# Patient Record
Sex: FEMALE | Race: BLACK OR AFRICAN AMERICAN
[De-identification: names, ages, dates, MRNs, and addresses within clinical notes are randomized per-mention and may not be internally consistent; named-entity substitution may affect disease eponyms.]

---

## 2019-01-18 ENCOUNTER — HOSPITAL ENCOUNTER (EMERGENCY)
Dept: HOSPITAL 87 - ER | Age: 49
LOS: 1 days | Discharge: HOME | End: 2019-01-19
Payer: MEDICAID

## 2019-01-18 VITALS — WEIGHT: 134.48 LBS | BODY MASS INDEX: 22.96 KG/M2 | HEIGHT: 64 IN

## 2019-01-18 DIAGNOSIS — F17.200: ICD-10-CM

## 2019-01-18 DIAGNOSIS — Z59.0: ICD-10-CM

## 2019-01-18 DIAGNOSIS — K64.9: ICD-10-CM

## 2019-01-18 DIAGNOSIS — K62.89: Primary | ICD-10-CM

## 2019-01-18 DIAGNOSIS — F20.9: ICD-10-CM

## 2019-01-18 PROCEDURE — 99283 EMERGENCY DEPT VISIT LOW MDM: CPT

## 2019-01-18 SDOH — ECONOMIC STABILITY - HOUSING INSECURITY: HOMELESSNESS: Z59.0

## 2019-01-19 VITALS — DIASTOLIC BLOOD PRESSURE: 76 MMHG | SYSTOLIC BLOOD PRESSURE: 126 MMHG

## 2022-11-13 ENCOUNTER — APPOINTMENT (OUTPATIENT)
Dept: RADIOLOGY | Facility: MEDICAL CENTER | Age: 52
DRG: 923 | End: 2022-11-13
Attending: EMERGENCY MEDICINE
Payer: MEDICAID

## 2022-11-13 ENCOUNTER — APPOINTMENT (OUTPATIENT)
Dept: RADIOLOGY | Facility: MEDICAL CENTER | Age: 52
DRG: 923 | End: 2022-11-13
Attending: INTERNAL MEDICINE
Payer: MEDICAID

## 2022-11-13 ENCOUNTER — HOSPITAL ENCOUNTER (INPATIENT)
Facility: MEDICAL CENTER | Age: 52
LOS: 6 days | DRG: 923 | End: 2022-11-20
Attending: EMERGENCY MEDICINE | Admitting: INTERNAL MEDICINE
Payer: MEDICAID

## 2022-11-13 DIAGNOSIS — T33.821A FROSTBITE OF RIGHT FOOT, INITIAL ENCOUNTER: ICD-10-CM

## 2022-11-13 DIAGNOSIS — F20.9 SCHIZOPHRENIA, UNSPECIFIED TYPE (HCC): ICD-10-CM

## 2022-11-13 DIAGNOSIS — T33.822D FROSTBITE OF BOTH FEET, SUBSEQUENT ENCOUNTER: ICD-10-CM
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
## 2022-11-13 DIAGNOSIS — R78.81 BACTEREMIA: ICD-10-CM

## 2022-11-13 DIAGNOSIS — Z21 ASYMPTOMATIC HIV INFECTION, WITH NO HISTORY OF HIV-RELATED ILLNESS (HCC): ICD-10-CM

## 2022-11-13 DIAGNOSIS — T33.821D FROSTBITE OF BOTH FEET, SUBSEQUENT ENCOUNTER: ICD-10-CM

## 2022-11-13 DIAGNOSIS — R10.84 GENERALIZED ABDOMINAL PAIN: ICD-10-CM

## 2022-11-13 PROBLEM — Z72.0 TOBACCO ABUSE: Status: ACTIVE | Noted: 2022-11-13

## 2022-11-13 PROBLEM — R23.8 BULLAE: Status: ACTIVE | Noted: 2022-11-13

## 2022-11-13 PROBLEM — N94.89 ENDOMETRIAL MASS: Status: ACTIVE | Noted: 2022-11-13

## 2022-11-13 PROBLEM — R10.13 EPIGASTRIC PAIN: Status: ACTIVE | Noted: 2022-11-13

## 2022-11-13 PROBLEM — E87.6 HYPOKALEMIA: Status: ACTIVE | Noted: 2022-11-13

## 2022-11-13 PROBLEM — R74.01 ELEVATED LIVER TRANSAMINASE LEVEL: Status: ACTIVE | Noted: 2022-11-13

## 2022-11-13 LAB
ALBUMIN SERPL BCP-MCNC: 3.9 G/DL (ref 3.2–4.9)
ALBUMIN/GLOB SERPL: 1 G/DL
ALP SERPL-CCNC: 87 U/L (ref 30–99)
ALT SERPL-CCNC: 51 U/L (ref 2–50)
ANION GAP SERPL CALC-SCNC: 12 MMOL/L (ref 7–16)
AST SERPL-CCNC: 81 U/L (ref 12–45)
BASOPHILS # BLD AUTO: 1.1 % (ref 0–1.8)
BASOPHILS # BLD: 0.11 K/UL (ref 0–0.12)
BILIRUB SERPL-MCNC: 0.5 MG/DL (ref 0.1–1.5)
BUN SERPL-MCNC: 23 MG/DL (ref 8–22)
CALCIUM SERPL-MCNC: 9.4 MG/DL (ref 8.5–10.5)
CANCER AG125 SERPL-ACNC: 12.3 U/ML (ref 0–35)
CHLORIDE SERPL-SCNC: 96 MMOL/L (ref 96–112)
CO2 SERPL-SCNC: 27 MMOL/L (ref 20–33)
CREAT SERPL-MCNC: 1 MG/DL (ref 0.5–1.4)
EKG IMPRESSION: NORMAL
EOSINOPHIL # BLD AUTO: 0.09 K/UL (ref 0–0.51)
EOSINOPHIL NFR BLD: 0.9 % (ref 0–6.9)
ERYTHROCYTE [DISTWIDTH] IN BLOOD BY AUTOMATED COUNT: 39.6 FL (ref 35.9–50)
ETHANOL BLD-MCNC: <10.1 MG/DL
GFR SERPLBLD CREATININE-BSD FMLA CKD-EPI: 67 ML/MIN/1.73 M 2
GLOBULIN SER CALC-MCNC: 4.1 G/DL (ref 1.9–3.5)
GLUCOSE SERPL-MCNC: 113 MG/DL (ref 65–99)
HCT VFR BLD AUTO: 38.5 % (ref 37–47)
HGB BLD-MCNC: 12.9 G/DL (ref 12–16)
IMM GRANULOCYTES # BLD AUTO: 0.05 K/UL (ref 0–0.11)
IMM GRANULOCYTES NFR BLD AUTO: 0.5 % (ref 0–0.9)
LACTATE SERPL-SCNC: 1.4 MMOL/L (ref 0.5–2)
LACTATE SERPL-SCNC: 1.6 MMOL/L (ref 0.5–2)
LIPASE SERPL-CCNC: 20 U/L (ref 11–82)
LYMPHOCYTES # BLD AUTO: 1.47 K/UL (ref 1–4.8)
LYMPHOCYTES NFR BLD: 14.4 % (ref 22–41)
MCH RBC QN AUTO: 29.9 PG (ref 27–33)
MCHC RBC AUTO-ENTMCNC: 33.5 G/DL (ref 33.6–35)
MCV RBC AUTO: 89.1 FL (ref 81.4–97.8)
MONOCYTES # BLD AUTO: 1.05 K/UL (ref 0–0.85)
MONOCYTES NFR BLD AUTO: 10.3 % (ref 0–13.4)
NEUTROPHILS # BLD AUTO: 7.46 K/UL (ref 2–7.15)
NEUTROPHILS NFR BLD: 72.8 % (ref 44–72)
NRBC # BLD AUTO: 0 K/UL
NRBC BLD-RTO: 0 /100 WBC
PLATELET # BLD AUTO: 356 K/UL (ref 164–446)
PMV BLD AUTO: 9.6 FL (ref 9–12.9)
POTASSIUM SERPL-SCNC: 3.3 MMOL/L (ref 3.6–5.5)
PROT SERPL-MCNC: 8 G/DL (ref 6–8.2)
RBC # BLD AUTO: 4.32 M/UL (ref 4.2–5.4)
SODIUM SERPL-SCNC: 135 MMOL/L (ref 135–145)
TROPONIN T SERPL-MCNC: 27 NG/L (ref 6–19)
WBC # BLD AUTO: 10.2 K/UL (ref 4.8–10.8)

## 2022-11-13 PROCEDURE — 96376 TX/PRO/DX INJ SAME DRUG ADON: CPT

## 2022-11-13 PROCEDURE — 90471 IMMUNIZATION ADMIN: CPT

## 2022-11-13 PROCEDURE — 84484 ASSAY OF TROPONIN QUANT: CPT

## 2022-11-13 PROCEDURE — 93926 LOWER EXTREMITY STUDY: CPT | Mod: RT

## 2022-11-13 PROCEDURE — 96374 THER/PROPH/DIAG INJ IV PUSH: CPT

## 2022-11-13 PROCEDURE — 93005 ELECTROCARDIOGRAM TRACING: CPT | Performed by: EMERGENCY MEDICINE

## 2022-11-13 PROCEDURE — A9270 NON-COVERED ITEM OR SERVICE: HCPCS | Performed by: INTERNAL MEDICINE

## 2022-11-13 PROCEDURE — 80053 COMPREHEN METABOLIC PANEL: CPT

## 2022-11-13 PROCEDURE — 82077 ASSAY SPEC XCP UR&BREATH IA: CPT

## 2022-11-13 PROCEDURE — 700105 HCHG RX REV CODE 258: Performed by: INTERNAL MEDICINE

## 2022-11-13 PROCEDURE — 94760 N-INVAS EAR/PLS OXIMETRY 1: CPT

## 2022-11-13 PROCEDURE — 99285 EMERGENCY DEPT VISIT HI MDM: CPT

## 2022-11-13 PROCEDURE — 99220 PR INITIAL OBSERVATION CARE,LEVL III: CPT | Mod: 25 | Performed by: INTERNAL MEDICINE

## 2022-11-13 PROCEDURE — 71045 X-RAY EXAM CHEST 1 VIEW: CPT

## 2022-11-13 PROCEDURE — 87077 CULTURE AEROBIC IDENTIFY: CPT

## 2022-11-13 PROCEDURE — 700102 HCHG RX REV CODE 250 W/ 637 OVERRIDE(OP): Performed by: INTERNAL MEDICINE

## 2022-11-13 PROCEDURE — 99406 BEHAV CHNG SMOKING 3-10 MIN: CPT | Performed by: INTERNAL MEDICINE

## 2022-11-13 PROCEDURE — 700111 HCHG RX REV CODE 636 W/ 250 OVERRIDE (IP): Performed by: INTERNAL MEDICINE

## 2022-11-13 PROCEDURE — 83605 ASSAY OF LACTIC ACID: CPT

## 2022-11-13 PROCEDURE — 36415 COLL VENOUS BLD VENIPUNCTURE: CPT

## 2022-11-13 PROCEDURE — 99406 BEHAV CHNG SMOKING 3-10 MIN: CPT

## 2022-11-13 PROCEDURE — 86304 IMMUNOASSAY TUMOR CA 125: CPT

## 2022-11-13 PROCEDURE — 96375 TX/PRO/DX INJ NEW DRUG ADDON: CPT

## 2022-11-13 PROCEDURE — 90715 TDAP VACCINE 7 YRS/> IM: CPT | Performed by: EMERGENCY MEDICINE

## 2022-11-13 PROCEDURE — 76856 US EXAM PELVIC COMPLETE: CPT

## 2022-11-13 PROCEDURE — 83690 ASSAY OF LIPASE: CPT

## 2022-11-13 PROCEDURE — 87186 SC STD MICRODIL/AGAR DIL: CPT

## 2022-11-13 PROCEDURE — 93922 UPR/L XTREMITY ART 2 LEVELS: CPT

## 2022-11-13 PROCEDURE — 85025 COMPLETE CBC W/AUTO DIFF WBC: CPT

## 2022-11-13 PROCEDURE — 87040 BLOOD CULTURE FOR BACTERIA: CPT

## 2022-11-13 PROCEDURE — 700111 HCHG RX REV CODE 636 W/ 250 OVERRIDE (IP): Performed by: EMERGENCY MEDICINE

## 2022-11-13 PROCEDURE — G0378 HOSPITAL OBSERVATION PER HR: HCPCS

## 2022-11-13 PROCEDURE — 74177 CT ABD & PELVIS W/CONTRAST: CPT

## 2022-11-13 PROCEDURE — 96365 THER/PROPH/DIAG IV INF INIT: CPT

## 2022-11-13 PROCEDURE — 700117 HCHG RX CONTRAST REV CODE 255: Performed by: EMERGENCY MEDICINE

## 2022-11-13 RX ORDER — POTASSIUM CHLORIDE 20 MEQ/1
40 TABLET, EXTENDED RELEASE ORAL ONCE
Status: COMPLETED | OUTPATIENT
Start: 2022-11-13 | End: 2022-11-13

## 2022-11-13 RX ORDER — HYDROMORPHONE HYDROCHLORIDE 1 MG/ML
0.5 INJECTION, SOLUTION INTRAMUSCULAR; INTRAVENOUS; SUBCUTANEOUS
Status: COMPLETED | OUTPATIENT
Start: 2022-11-13 | End: 2022-11-13

## 2022-11-13 RX ORDER — PROCHLORPERAZINE EDISYLATE 5 MG/ML
5-10 INJECTION INTRAMUSCULAR; INTRAVENOUS EVERY 4 HOURS PRN
Status: DISCONTINUED | OUTPATIENT
Start: 2022-11-13 | End: 2022-11-20 | Stop reason: HOSPADM

## 2022-11-13 RX ORDER — POLYETHYLENE GLYCOL 3350 17 G/17G
1 POWDER, FOR SOLUTION ORAL
Status: DISCONTINUED | OUTPATIENT
Start: 2022-11-13 | End: 2022-11-20 | Stop reason: HOSPADM

## 2022-11-13 RX ORDER — ONDANSETRON 2 MG/ML
4 INJECTION INTRAMUSCULAR; INTRAVENOUS EVERY 4 HOURS PRN
Status: DISCONTINUED | OUTPATIENT
Start: 2022-11-13 | End: 2022-11-20 | Stop reason: HOSPADM

## 2022-11-13 RX ORDER — ONDANSETRON 4 MG/1
4 TABLET, ORALLY DISINTEGRATING ORAL EVERY 4 HOURS PRN
Status: DISCONTINUED | OUTPATIENT
Start: 2022-11-13 | End: 2022-11-20 | Stop reason: HOSPADM

## 2022-11-13 RX ORDER — HYDROMORPHONE HYDROCHLORIDE 1 MG/ML
0.25 INJECTION, SOLUTION INTRAMUSCULAR; INTRAVENOUS; SUBCUTANEOUS
Status: DISCONTINUED | OUTPATIENT
Start: 2022-11-13 | End: 2022-11-20 | Stop reason: HOSPADM

## 2022-11-13 RX ORDER — ACETAMINOPHEN 325 MG/1
650 TABLET ORAL EVERY 6 HOURS PRN
Status: DISCONTINUED | OUTPATIENT
Start: 2022-11-13 | End: 2022-11-20 | Stop reason: HOSPADM

## 2022-11-13 RX ORDER — AMOXICILLIN 250 MG
2 CAPSULE ORAL 2 TIMES DAILY
Status: DISCONTINUED | OUTPATIENT
Start: 2022-11-13 | End: 2022-11-20 | Stop reason: HOSPADM

## 2022-11-13 RX ORDER — OXYCODONE HYDROCHLORIDE 5 MG/1
2.5 TABLET ORAL
Status: DISCONTINUED | OUTPATIENT
Start: 2022-11-13 | End: 2022-11-20 | Stop reason: HOSPADM

## 2022-11-13 RX ORDER — ONDANSETRON 2 MG/ML
4 INJECTION INTRAMUSCULAR; INTRAVENOUS ONCE
Status: COMPLETED | OUTPATIENT
Start: 2022-11-13 | End: 2022-11-13

## 2022-11-13 RX ORDER — BISACODYL 10 MG
10 SUPPOSITORY, RECTAL RECTAL
Status: DISCONTINUED | OUTPATIENT
Start: 2022-11-13 | End: 2022-11-20 | Stop reason: HOSPADM

## 2022-11-13 RX ORDER — PROMETHAZINE HYDROCHLORIDE 25 MG/1
12.5-25 SUPPOSITORY RECTAL EVERY 4 HOURS PRN
Status: DISCONTINUED | OUTPATIENT
Start: 2022-11-13 | End: 2022-11-20 | Stop reason: HOSPADM

## 2022-11-13 RX ORDER — PROMETHAZINE HYDROCHLORIDE 25 MG/1
12.5-25 TABLET ORAL EVERY 4 HOURS PRN
Status: DISCONTINUED | OUTPATIENT
Start: 2022-11-13 | End: 2022-11-20 | Stop reason: HOSPADM

## 2022-11-13 RX ORDER — CEFAZOLIN 2 G/1
2 INJECTION, POWDER, FOR SOLUTION INTRAMUSCULAR; INTRAVENOUS ONCE
Status: COMPLETED | OUTPATIENT
Start: 2022-11-13 | End: 2022-11-13

## 2022-11-13 RX ORDER — OXYCODONE HYDROCHLORIDE 5 MG/1
5 TABLET ORAL
Status: DISCONTINUED | OUTPATIENT
Start: 2022-11-13 | End: 2022-11-20 | Stop reason: HOSPADM

## 2022-11-13 RX ADMIN — HYDROMORPHONE HYDROCHLORIDE 0.5 MG: 1 INJECTION, SOLUTION INTRAMUSCULAR; INTRAVENOUS; SUBCUTANEOUS at 09:18

## 2022-11-13 RX ADMIN — AMPICILLIN AND SULBACTAM 3 G: 1; 2 INJECTION, POWDER, FOR SOLUTION INTRAMUSCULAR; INTRAVENOUS at 23:04

## 2022-11-13 RX ADMIN — RIVAROXABAN 10 MG: 10 TABLET, FILM COATED ORAL at 17:35

## 2022-11-13 RX ADMIN — HYDROMORPHONE HYDROCHLORIDE 0.5 MG: 1 INJECTION, SOLUTION INTRAMUSCULAR; INTRAVENOUS; SUBCUTANEOUS at 07:14

## 2022-11-13 RX ADMIN — ONDANSETRON 4 MG: 2 INJECTION INTRAMUSCULAR; INTRAVENOUS at 07:14

## 2022-11-13 RX ADMIN — AMPICILLIN AND SULBACTAM 3 G: 1; 2 INJECTION, POWDER, FOR SOLUTION INTRAMUSCULAR; INTRAVENOUS at 17:38

## 2022-11-13 RX ADMIN — SENNOSIDES AND DOCUSATE SODIUM 2 TABLET: 50; 8.6 TABLET ORAL at 17:35

## 2022-11-13 RX ADMIN — CLOSTRIDIUM TETANI TOXOID ANTIGEN (FORMALDEHYDE INACTIVATED), CORYNEBACTERIUM DIPHTHERIAE TOXOID ANTIGEN (FORMALDEHYDE INACTIVATED), BORDETELLA PERTUSSIS TOXOID ANTIGEN (GLUTARALDEHYDE INACTIVATED), BORDETELLA PERTUSSIS FILAMENTOUS HEMAGGLUTININ ANTIGEN (FORMALDEHYDE INACTIVATED), BORDETELLA PERTUSSIS PERTACTIN ANTIGEN, AND BORDETELLA PERTUSSIS FIMBRIAE 2/3 ANTIGEN 0.5 ML: 5; 2; 2.5; 5; 3; 5 INJECTION, SUSPENSION INTRAMUSCULAR at 07:14

## 2022-11-13 RX ADMIN — IOHEXOL 100 ML: 350 INJECTION, SOLUTION INTRAVENOUS at 07:49

## 2022-11-13 RX ADMIN — POTASSIUM CHLORIDE 40 MEQ: 1500 TABLET, EXTENDED RELEASE ORAL at 12:55

## 2022-11-13 RX ADMIN — CEFAZOLIN 2 G: 2 INJECTION, POWDER, FOR SOLUTION INTRAMUSCULAR; INTRAVENOUS at 07:15

## 2022-11-13 RX ADMIN — HYDROMORPHONE HYDROCHLORIDE 0.5 MG: 1 INJECTION, SOLUTION INTRAMUSCULAR; INTRAVENOUS; SUBCUTANEOUS at 19:24

## 2022-11-13 RX ADMIN — SENNOSIDES AND DOCUSATE SODIUM 2 TABLET: 50; 8.6 TABLET ORAL at 12:55

## 2022-11-13 RX ADMIN — AMPICILLIN AND SULBACTAM 3 G: 1; 2 INJECTION, POWDER, FOR SOLUTION INTRAMUSCULAR; INTRAVENOUS at 12:48

## 2022-11-13 ASSESSMENT — ENCOUNTER SYMPTOMS
ABDOMINAL PAIN: 1
NERVOUS/ANXIOUS: 0
COUGH: 0
FEVER: 0
SORE THROAT: 0
VOMITING: 0
BLURRED VISION: 0
DOUBLE VISION: 0
FALLS: 0
SHORTNESS OF BREATH: 0
PALPITATIONS: 0
BACK PAIN: 0
HALLUCINATIONS: 0
DIARRHEA: 1
CHILLS: 0
SEIZURES: 0
LOSS OF CONSCIOUSNESS: 0
NAUSEA: 0

## 2022-11-13 ASSESSMENT — PATIENT HEALTH QUESTIONNAIRE - PHQ9
1. LITTLE INTEREST OR PLEASURE IN DOING THINGS: NOT AT ALL
SUM OF ALL RESPONSES TO PHQ9 QUESTIONS 1 AND 2: 0
2. FEELING DOWN, DEPRESSED, IRRITABLE, OR HOPELESS: NOT AT ALL
SUM OF ALL RESPONSES TO PHQ9 QUESTIONS 1 AND 2: 0
2. FEELING DOWN, DEPRESSED, IRRITABLE, OR HOPELESS: NOT AT ALL
1. LITTLE INTEREST OR PLEASURE IN DOING THINGS: NOT AT ALL

## 2022-11-13 ASSESSMENT — LIFESTYLE VARIABLES
TOTAL SCORE: 0
HAVE YOU EVER FELT YOU SHOULD CUT DOWN ON YOUR DRINKING: NO
TOTAL SCORE: 0
CONSUMPTION TOTAL: NEGATIVE
EVER FELT BAD OR GUILTY ABOUT YOUR DRINKING: NO
ALCOHOL_USE: NO
TOTAL SCORE: 0
EVER HAD A DRINK FIRST THING IN THE MORNING TO STEADY YOUR NERVES TO GET RID OF A HANGOVER: NO
HAVE PEOPLE ANNOYED YOU BY CRITICIZING YOUR DRINKING: NO
DOES PATIENT WANT TO STOP DRINKING: NO
HOW MANY TIMES IN THE PAST YEAR HAVE YOU HAD 5 OR MORE DRINKS IN A DAY: 0
AVERAGE NUMBER OF DAYS PER WEEK YOU HAVE A DRINK CONTAINING ALCOHOL: 0
ON A TYPICAL DAY WHEN YOU DRINK ALCOHOL HOW MANY DRINKS DO YOU HAVE: 0

## 2022-11-13 ASSESSMENT — PAIN DESCRIPTION - PAIN TYPE
TYPE: ACUTE PAIN

## 2022-11-13 ASSESSMENT — FIBROSIS 4 INDEX: FIB4 SCORE: 1.72

## 2022-11-13 NOTE — ED PROVIDER NOTES
"ED Provider Note    Scribed for Elvin Jay M.D. by Barber Daley. 11/13/2022  6:22 AM    Primary care provider: No primary care provider on file.  Means of arrival: EMS  History obtained from: Patient  History limited by: None    CHIEF COMPLAINT  Chief Complaint   Patient presents with    Epigastric Pain     X2days, pt states comes and goes      Foot Pain     Pt reports bilat blisters, that have \"doubled in size\"       HPI  Yumiko Chatman is a 52 y.o. female who presents to the Emergency Department for evaluation of constant abdominal pain onset three days ago. She admits to associated symptoms of bilateral pedal blistering, foot pain, diarrhea, headache, chest pain, shortness of breath, and loss of sleep. She denies any leg swelling, pain radiation, fever, diaphoresis, bloody stools, nausea, vomiting. No alleviating factors were reported. Her chest pain is exacerbated by sitting up, lasts for hours at a time, and is a 10/10. She states that whenever she lays down to go to bed, her foot hurts badly describing this as \"pressure\", and this is exacerbated by laying down. She states that she does wear shoes, and is not sure what caused her blistering. She states that she \"just woke up and they were like that\". She denies any history of similar blistering. Patient reports that she was given aspirin upon arrival to the ED today, with moderate effect. Has a history of bipolar disorder and schizophrenia. Patient is prescribed Depakote and Remeron for her sphyciatric disorders, but admits to not taking it anymore recently. She denies a history of hypertension, diabetes, myocardial infarction, stroke, or blood clots. Patient reports being hospitalized in the past for a broken shoulder. She denies any surgical history. Patient denies any known allergies. She denies any alcohol or recreational drug use, but admits to smoking tobacco. Patient lives in a shelter at this time. She denies any abdominal surgical history. She " "denies any visual or auditory hallucinations patient is falling in and out of sleep at this time.          REVIEW OF SYSTEMS  Pertinent positives include chest pain, bilateral pedal blistering, foot pain, constipation, headache, abdominal pain, shortness of breath, and loss of sleep. Pertinent negatives include no leg swelling, pain radiation, fever, diaphoresis, diarrhea, nausea, vomiting. All other systems reviewed and negative.    PAST MEDICAL HISTORY   None noted.     SURGICAL HISTORY  patient denies any surgical history    SOCIAL HISTORY  Social History     Tobacco Use    Smoking status: Every Day     Packs/day: 0.25     Types: Cigarettes    Smokeless tobacco: Never   Substance Use Topics    Alcohol use: Never    Drug use: Never      Social History     Substance and Sexual Activity   Drug Use Never       FAMILY HISTORY  History reviewed. No pertinent family history.    CURRENT MEDICATIONS  Home Medications       Reviewed by Bobo Brito (Pharmacy Tech) on 11/13/22 at 0826  Med List Status: Complete     Medication Last Dose Status        Patient Choco Taking any Medications                           ALLERGIES  No Known Allergies    PHYSICAL EXAM  VITAL SIGNS: /80   Pulse 94   Temp 37.2 °C (98.9 °F) (Temporal)   Resp 14   Ht 1.778 m (5' 10\")   Wt 79.4 kg (175 lb)   SpO2 97%   BMI 25.11 kg/m²     Constitutional: Well developed, Well nourished, moderate distress, Non-toxic appearance.   HENT: Normocephalic, Atraumatic, Bilateral external ears normal, Oropharynx dry, No oral exudates.   Eyes: PERRLA, EOMI, Conjunctiva normal, No discharge.   Neck: No tenderness, Supple, No stridor.   Lymphatic: No lymphadenopathy noted.   Cardiovascular: Tachycardic, Normal rhythm.   Thorax & Lungs: Clear to auscultation bilaterally, No respiratory distress, No wheezing, No crackles.   Abdomen: Soft, Diffuse tenderness throughout, No masses, No pulsatile masses.   Skin: Warm, Dry, No erythema, No rash. "   Extremities: Right leg with erythema soft tissue welling from knee distally on first through fifth toes and some plantar aspect of right foot with cyanosis. Large blister over first three toes on the dorsal aspect. 2+ dorsalis pedias pulse on the left, unable to get one on the right proximal pulse is warm.        Musculoskeletal: No tenderness to palpation or major deformities noted.  Intact distal pulses  Neurologic: Awake, alert. Moves all extremities spontaneously.  Psychiatric: Affect normal, Judgment normal, Mood normal.     LABS  Results for orders placed or performed during the hospital encounter of 11/13/22   CBC WITH DIFFERENTIAL   Result Value Ref Range    WBC 10.2 4.8 - 10.8 K/uL    RBC 4.32 4.20 - 5.40 M/uL    Hemoglobin 12.9 12.0 - 16.0 g/dL    Hematocrit 38.5 37.0 - 47.0 %    MCV 89.1 81.4 - 97.8 fL    MCH 29.9 27.0 - 33.0 pg    MCHC 33.5 (L) 33.6 - 35.0 g/dL    RDW 39.6 35.9 - 50.0 fL    Platelet Count 356 164 - 446 K/uL    MPV 9.6 9.0 - 12.9 fL    Neutrophils-Polys 72.80 (H) 44.00 - 72.00 %    Lymphocytes 14.40 (L) 22.00 - 41.00 %    Monocytes 10.30 0.00 - 13.40 %    Eosinophils 0.90 0.00 - 6.90 %    Basophils 1.10 0.00 - 1.80 %    Immature Granulocytes 0.50 0.00 - 0.90 %    Nucleated RBC 0.00 /100 WBC    Neutrophils (Absolute) 7.46 (H) 2.00 - 7.15 K/uL    Lymphs (Absolute) 1.47 1.00 - 4.80 K/uL    Monos (Absolute) 1.05 (H) 0.00 - 0.85 K/uL    Eos (Absolute) 0.09 0.00 - 0.51 K/uL    Baso (Absolute) 0.11 0.00 - 0.12 K/uL    Immature Granulocytes (abs) 0.05 0.00 - 0.11 K/uL    NRBC (Absolute) 0.00 K/uL   COMP METABOLIC PANEL   Result Value Ref Range    Sodium 135 135 - 145 mmol/L    Potassium 3.3 (L) 3.6 - 5.5 mmol/L    Chloride 96 96 - 112 mmol/L    Co2 27 20 - 33 mmol/L    Anion Gap 12.0 7.0 - 16.0    Glucose 113 (H) 65 - 99 mg/dL    Bun 23 (H) 8 - 22 mg/dL    Creatinine 1.00 0.50 - 1.40 mg/dL    Calcium 9.4 8.5 - 10.5 mg/dL    AST(SGOT) 81 (H) 12 - 45 U/L    ALT(SGPT) 51 (H) 2 - 50 U/L    Alkaline  Phosphatase 87 30 - 99 U/L    Total Bilirubin 0.5 0.1 - 1.5 mg/dL    Albumin 3.9 3.2 - 4.9 g/dL    Total Protein 8.0 6.0 - 8.2 g/dL    Globulin 4.1 (H) 1.9 - 3.5 g/dL    A-G Ratio 1.0 g/dL   LIPASE   Result Value Ref Range    Lipase 20 11 - 82 U/L   TROPONIN   Result Value Ref Range    Troponin T 27 (H) 6 - 19 ng/L   LACTIC ACID   Result Value Ref Range    Lactic Acid 1.4 0.5 - 2.0 mmol/L   DIAGNOSTIC ALCOHOL   Result Value Ref Range    Diagnostic Alcohol <10.1 <10.1 mg/dL   ESTIMATED GFR   Result Value Ref Range    GFR (CKD-EPI) 67 >60 mL/min/1.73 m 2   EKG   Result Value Ref Range    Report       [facility] Emergency Dept.    Test Date:  2022  Pt Name:    SALENA MCCONNELL                Department: ER  MRN:        [id number]                      Room:        03  Gender:     Female                       Technician: [id number]  :        1970                   Requested By:ER TRIAGE PROTOCOL  Order #:    [id number]                    Reading MD: LILIYA BHARDWAJ MD    Measurements  Intervals                                Axis  Rate:       94                           P:          77  TN:         118                          QRS:        8  QRSD:       92                           T:          47  QT:         365  QTc:        457    Interpretive Statements  Sinus rhythm  Borderline short TN interval  No previous ECG available for comparison  Electronically Signed On 2022 9:18:31 PST by LILIYA BHARDWAJ MD          RADIOLOGY  US-GISELLA SINGLE LEVEL BILAT         CT-ABDOMEN-PELVIS WITH   Final Result         1. No acute inflammatory change in the abdomen or pelvis.   2. No small bowel obstruction.   3. There is a 2.6 cm enhancing or high density mass in the endometrium. Further evaluation with ultrasound is recommended.      DX-CHEST-PORTABLE (1 VIEW)   Final Result         1. No acute cardiopulmonary abnormalities are identified.      US-EXTREMITY ARTERY LOWER UNILAT RIGHT    (Results  Pending)   US-OB TRANSVAGINAL ONLY    (Results Pending)     The radiologist's interpretation of all radiological studies have been reviewed by me.      COURSE & MEDICAL DECISION MAKING  Pertinent Labs & Imaging studies reviewed. (See chart for details    6:22 AM - Patient seen and examined at bedside. Patient will be treated with Dilaudid 0.5 mg, Adacel 0.5 mL, Ancef 2 g, and Zofran 4 mg. Ordered EKG, Troponin, Lipase, CMP, CBC with differential, Blood culture, Urine culture, UA, Lactic acid, Urine drug screen, Diagnostic alcohol, Lactic acid, DX-Chest, CT-Abdomen-Pelvis, US-GISELLA single level bilaterally, and US-Extremity artery lower unilateral with GISELLA right to evaluate her symptoms. The differential diagnoses include but are not limited to: intra abdominal infection, constipation, Diverticulitis, ACS, Cellulitis, Limb ischemia, Frost bite.    9:32 AM - I discussed the patient's case and the above findings with Dr. Orellana (hospitalist) who agrees to admit the patient.      9:56 AM - Ordered US-OB transvaginal to further evaluate patient's symptoms.     Decision Making:  Patient is coming in with epigastric and diffuse abdominal pain.  The patient also complains of bilateral foot pain right greater than left.  CT scan of the abdomen pelvis was unremarkable, her feet look cyanotic, likely secondary to frostbite.  Give the patient IV antibiotics in case there is an infectious component to it.  The patient will need to be admitted to the hospital, discussed case with the hospitalist for hospitalization.        DISPOSITION:  Patient will be hospitalized by Dr. Orellana in guarded condition.     FINAL IMPRESSION  1. Generalized abdominal pain    2. Frostbite of right foot, initial encounter          Barber DAVALOS (Rashaad), am scribing for, and in the presence of, Elvin Jay M.D..    Electronically signed by: Barber Daley (Rashaad), 11/13/2022    Elvin DAVALOS M.D. personally performed the services  described in this documentation, as scribed by Barber Daley in my presence, and it is both accurate and complete.    The note accurately reflects work and decisions made by me.  Elvin Jay M.D.  11/13/2022  1:39 PM

## 2022-11-13 NOTE — ASSESSMENT & PLAN NOTE
Necrosis to right toes 1 through 5, cold to touch.  Forefoot continues to demarcate  -Right heel with ischemic injury, continues to evolve  -Necrosis to left first toe, cool to touch, continues to demarcate  -Continue monitoring for further demarcation prior to consideration of amputation.  Patient remains at high risk for right TMA.  -Continue to monitor right heel ischemia  -Continue to monitor left first toe, at risk for left first toe amputation    Related to frostbite injury.  LPS involved-appreciate their recs.  Allow for self demarcation   Follow up with orthopedic surgery and wound clinic  Will need definitive treatment

## 2022-11-13 NOTE — ASSESSMENT & PLAN NOTE
-Not cuurently on medications  -Has been on Risperdal in the past  -11/17 patient states she has taken Depakote in the past, which has been restarted.  Could not find any mention of Risperdal in the past.

## 2022-11-13 NOTE — ASSESSMENT & PLAN NOTE
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 If hemorrhage is present or there is worsening, biopsy would be indicated.  -CA-125= WDL

## 2022-11-13 NOTE — ASSESSMENT & PLAN NOTE
-Epigastric abdominal pain for 2-3 days with associated loose stools up to 3 times daily  -CT abdomen pelvis demonstrated no evidence of obstruction, no acute inflammatory change, enhancing mass in the endometrium  -Transvaginal ultrasound 11/13/2022: Posterior fundal mass exerts mass effect upon the endometrium is more likely to represent a submucosal leiomyoma than endometrial polyp or carcinoma.   No current hemorrhage, hemoglobin stable.  Follow-up outpatient in 6 months.

## 2022-11-13 NOTE — ED NOTES
Pt medicated per MAR, bedside report to Saw DLE CID.   Pt de satting to 83% on RA. Pt placed on 2L NC and tolerating well.

## 2022-11-13 NOTE — ED TRIAGE NOTES
"Chief Complaint   Patient presents with    Epigastric Pain     X2days, pt states comes and goes      Foot Pain     Pt reports bilat blisters, that have \"doubled in size\"     BIB [facility] for above complaint. Pt received 324 [facility] PTA. Pt reports being seen here apx 2 days ago for the same.     /80   Pulse 94   Temp 37.2 °C (98.9 °F) (Temporal)   Resp 14   Ht 1.778 m (5' 10\")   Wt 79.4 kg (175 lb)   SpO2 97%   BMI 25.11 kg/m²     "

## 2022-11-13 NOTE — ASSESSMENT & PLAN NOTE
Tobacco cessation counseling and education provided for 4 minutes. Nicotine replacement options provided including patch, and further medical treatments including Wellbutrin and Chantix. As well as over the counter options of lozenges and gum.

## 2022-11-13 NOTE — PROGRESS NOTES
Report received from ER RN. Assume care. Pt. AAOx4 pt is bed,  Assessment completed. VSS. Denies pain, able to wiggle toes and dorsi/plantar flex feet, good CMS and pulses to james LE, denies numbness and tingling.  Pt was update for the care for the day. White board updated, All question answered. Pt has call light within reach,  bed is in the lowest position. Pt has no other needs at this time.

## 2022-11-13 NOTE — H&P
"Hospital Medicine History & Physical Note    Date of Service  11/13/2022    Primary Care Physician  Pcp Pt States None    Code Status  Full Code    Chief Complaint  Chief Complaint   Patient presents with    Epigastric Pain     X2days, pt states comes and goes      Foot Pain     Pt reports bilat blisters, that have \"doubled in size\"       History of Presenting Illness  Yumiko Chatman is a 52 y.o. female who presented 11/13/2022 with right foot pain, abdominal pain.  Medical history is significant for schizophrenia with medication noncompliance, tobacco abuse.  Patient resides and homeless shelter.  Presents after experiencing several days of abdominal pain and loose stools up to 3 times a day.  Patient additionally reports right foot pain, swelling, blistering.  Patient denies nausea, vomiting, fever, chills.  Upon presentation to the ED.  Presenting vitals: /87, HR 93, RR 16, T98.9, SPO2 97% on room air.  Initial laboratory evaluation demonstrated a WBC of 10.2, creatinine 1, AST 81, ALT 51, potassium 3.3.  CT abdomen pelvis demonstrated no evidence of obstruction, no acute inflammatory change, enhancing mass in the endometrium.  Patient was started on Ancef for cellulitis.  Discussed case with ER provider and patient was admitted for further work-up and monitoring.    I discussed the plan of care with patient and ER Provider .    Review of Systems  Review of Systems   Constitutional:  Negative for chills and fever.   HENT:  Negative for congestion and sore throat.    Eyes:  Negative for blurred vision and double vision.   Respiratory:  Negative for cough and shortness of breath.    Cardiovascular:  Negative for chest pain and palpitations.   Gastrointestinal:  Positive for abdominal pain and diarrhea. Negative for nausea and vomiting.   Genitourinary:  Negative for dysuria and frequency.   Musculoskeletal:  Negative for back pain and falls.   Skin:  Negative for rash.   Neurological:  Negative for seizures " and loss of consciousness.   Psychiatric/Behavioral:  Negative for hallucinations. The patient is not nervous/anxious.      Past Medical History   has no past medical history on file.    Surgical History   has no past surgical history on file.     Family History  family history is not on file.   Family history reviewed with patient. There is no family history that is pertinent to the chief complaint.     Social History   reports that she has been smoking cigarettes. She has been smoking an average of .25 packs per day. She has never used smokeless tobacco. She reports that she does not drink alcohol and does not use drugs.    Allergies  No Known Allergies    Medications  None       Physical Exam  Temp:  [37.2 °C (98.9 °F)] 37.2 °C (98.9 °F)  Pulse:  [88-97] 89  Resp:  [12-16] 16  BP: (110-125)/(60-87) 121/75  SpO2:  [96 %-97 %] 96 %  Blood Pressure: 121/75   Temperature: 37.2 °C (98.9 °F)   Pulse: 89   Respiration: 16   Pulse Oximetry: 96 %       Physical Exam  Vitals and nursing note reviewed.   Constitutional:       General: She is not in acute distress.     Appearance: She is not toxic-appearing.      Comments: Poor hygeine   HENT:      Right Ear: External ear normal.      Left Ear: External ear normal.      Nose: No congestion.      Mouth/Throat:      Mouth: Mucous membranes are moist.      Pharynx: No oropharyngeal exudate.   Eyes:      General: No scleral icterus.     Pupils: Pupils are equal, round, and reactive to light.   Cardiovascular:      Rate and Rhythm: Normal rate and regular rhythm.      Heart sounds: No murmur heard.  Pulmonary:      Breath sounds: No wheezing.   Abdominal:      Palpations: Abdomen is soft.      Tenderness: There is no abdominal tenderness. There is no guarding or rebound.   Musculoskeletal:         General: No swelling or deformity.   Skin:     Capillary Refill: Capillary refill takes less than 2 seconds.      Coloration: Skin is not jaundiced.      Findings: No bruising.       Comments: Large bollae over right foot and toes  Swelling of right foot   Neurological:      General: No focal deficit present.      Mental Status: She is alert.      Motor: No weakness.   Psychiatric:         Mood and Affect: Mood normal.         Behavior: Behavior normal.         Laboratory:  Recent Labs     11/13/22  0653   WBC 10.2   RBC 4.32   HEMOGLOBIN 12.9   HEMATOCRIT 38.5   MCV 89.1   MCH 29.9   MCHC 33.5*   RDW 39.6   PLATELETCT 356   MPV 9.6     Recent Labs     11/13/22  0653   SODIUM 135   POTASSIUM 3.3*   CHLORIDE 96   CO2 27   GLUCOSE 113*   BUN 23*   CREATININE 1.00   CALCIUM 9.4     Recent Labs     11/13/22  0653   ALTSGPT 51*   ASTSGOT 81*   ALKPHOSPHAT 87   TBILIRUBIN 0.5   LIPASE 20   GLUCOSE 113*         No results for input(s): NTPROBNP in the last 72 hours.      Recent Labs     11/13/22  0653   TROPONINT 27*       Imaging:  US-GISELLA SINGLE LEVEL BILAT         CT-ABDOMEN-PELVIS WITH   Final Result         1. No acute inflammatory change in the abdomen or pelvis.   2. No small bowel obstruction.   3. There is a 2.6 cm enhancing or high density mass in the endometrium. Further evaluation with ultrasound is recommended.      DX-CHEST-PORTABLE (1 VIEW)   Final Result         1. No acute cardiopulmonary abnormalities are identified.      US-EXTREMITY ARTERY LOWER UNILAT RIGHT    (Results Pending)   US-OB TRANSVAGINAL ONLY    (Results Pending)       X-Ray:  I have personally reviewed the images and compared with prior images.  EKG:  I have personally reviewed the images and compared with prior images.    Assessment/Plan:  Justification for Admission Status  I anticipate this patient will require at least two midnights for appropriate medical management, necessitating inpatient admission because cellulitis due to frostbite injury and endometrial mass will require 2 midnights for appropriate IV antibiotic treatment and further workup    Patient will need a  bed on EMERGENCY service .  The need is  secondary to Frostbite injury.    * Bullae- (present on admission)  Assessment & Plan  Possible frostbite injury   LPS consulted  Follow up lower extremity ultrasound  Blood cultures in process  No leukocytosis  Ancef in ED  Unasyn initiated    Endometrial mass  Assessment & Plan  As identified on CT abdomen  Transvaginal ultrasound ordered  Check CA-125    Epigastric pain  Assessment & Plan  Epigastric abdominal pain for 2-3 days with associated loose stools up to 3 times daily  CT abdomen pelvis demonstrated no evidence of obstruction, no acute inflammatory change, enhancing mass in the endometrium  Transvaginal ultrasound ordered    Elevated liver transaminase level  Assessment & Plan  AST 81  ALT 51  Multiple hepatic cysts on CT abdomen    Schizophrenia (HCC)  Assessment & Plan  Not cuurently on medications  Has been on Risperdal in the past    Hypokalemia  Assessment & Plan  Monitor and replace    Tobacco abuse  Assessment & Plan  Tobacco cessation counseling and education provided for 4 minutes. Nicotine replacement options provided including patch, and further medical treatments including Wellbutrin and Chantix. As well as over the counter options of lozenges and gum.        VTE prophylaxis: SCDs/TEDs and Xarelto 10 mg daily as prophylaxis

## 2022-11-14 PROBLEM — R78.81 BACTEREMIA: Status: ACTIVE | Noted: 2022-11-14

## 2022-11-14 LAB
ALBUMIN SERPL BCP-MCNC: 3.1 G/DL (ref 3.2–4.9)
BASOPHILS # BLD AUTO: 1.3 % (ref 0–1.8)
BASOPHILS # BLD: 0.09 K/UL (ref 0–0.12)
BUN SERPL-MCNC: 11 MG/DL (ref 8–22)
CALCIUM SERPL-MCNC: 8.4 MG/DL (ref 8.5–10.5)
CHLORIDE SERPL-SCNC: 99 MMOL/L (ref 96–112)
CO2 SERPL-SCNC: 27 MMOL/L (ref 20–33)
CREAT SERPL-MCNC: 0.54 MG/DL (ref 0.5–1.4)
EOSINOPHIL # BLD AUTO: 0.07 K/UL (ref 0–0.51)
EOSINOPHIL NFR BLD: 1 % (ref 0–6.9)
ERYTHROCYTE [DISTWIDTH] IN BLOOD BY AUTOMATED COUNT: 39.6 FL (ref 35.9–50)
GFR SERPLBLD CREATININE-BSD FMLA CKD-EPI: 109 ML/MIN/1.73 M 2
GLUCOSE SERPL-MCNC: 111 MG/DL (ref 65–99)
HCT VFR BLD AUTO: 33.4 % (ref 37–47)
HGB BLD-MCNC: 11 G/DL (ref 12–16)
IMM GRANULOCYTES # BLD AUTO: 0.04 K/UL (ref 0–0.11)
IMM GRANULOCYTES NFR BLD AUTO: 0.6 % (ref 0–0.9)
LYMPHOCYTES # BLD AUTO: 1.24 K/UL (ref 1–4.8)
LYMPHOCYTES NFR BLD: 18.4 % (ref 22–41)
MAGNESIUM SERPL-MCNC: 1.7 MG/DL (ref 1.5–2.5)
MCH RBC QN AUTO: 29.6 PG (ref 27–33)
MCHC RBC AUTO-ENTMCNC: 32.9 G/DL (ref 33.6–35)
MCV RBC AUTO: 90 FL (ref 81.4–97.8)
MONOCYTES # BLD AUTO: 0.88 K/UL (ref 0–0.85)
MONOCYTES NFR BLD AUTO: 13.1 % (ref 0–13.4)
NEUTROPHILS # BLD AUTO: 4.42 K/UL (ref 2–7.15)
NEUTROPHILS NFR BLD: 65.6 % (ref 44–72)
NRBC # BLD AUTO: 0 K/UL
NRBC BLD-RTO: 0 /100 WBC
PHOSPHATE SERPL-MCNC: 2.2 MG/DL (ref 2.5–4.5)
PLATELET # BLD AUTO: 321 K/UL (ref 164–446)
PMV BLD AUTO: 9.4 FL (ref 9–12.9)
POTASSIUM SERPL-SCNC: 3.8 MMOL/L (ref 3.6–5.5)
RBC # BLD AUTO: 3.71 M/UL (ref 4.2–5.4)
SODIUM SERPL-SCNC: 135 MMOL/L (ref 135–145)
WBC # BLD AUTO: 6.7 K/UL (ref 4.8–10.8)

## 2022-11-14 PROCEDURE — 770001 HCHG ROOM/CARE - MED/SURG/GYN PRIV*

## 2022-11-14 PROCEDURE — 99233 SBSQ HOSP IP/OBS HIGH 50: CPT | Performed by: NURSE PRACTITIONER

## 2022-11-14 PROCEDURE — 85025 COMPLETE CBC W/AUTO DIFF WBC: CPT

## 2022-11-14 PROCEDURE — 700111 HCHG RX REV CODE 636 W/ 250 OVERRIDE (IP): Performed by: NURSE PRACTITIONER

## 2022-11-14 PROCEDURE — A9270 NON-COVERED ITEM OR SERVICE: HCPCS | Performed by: INTERNAL MEDICINE

## 2022-11-14 PROCEDURE — 96376 TX/PRO/DX INJ SAME DRUG ADON: CPT

## 2022-11-14 PROCEDURE — 96375 TX/PRO/DX INJ NEW DRUG ADDON: CPT

## 2022-11-14 PROCEDURE — 80069 RENAL FUNCTION PANEL: CPT

## 2022-11-14 PROCEDURE — 83735 ASSAY OF MAGNESIUM: CPT

## 2022-11-14 PROCEDURE — 99222 1ST HOSP IP/OBS MODERATE 55: CPT | Performed by: NURSE PRACTITIONER

## 2022-11-14 PROCEDURE — 700102 HCHG RX REV CODE 250 W/ 637 OVERRIDE(OP)

## 2022-11-14 PROCEDURE — 36415 COLL VENOUS BLD VENIPUNCTURE: CPT

## 2022-11-14 PROCEDURE — A9270 NON-COVERED ITEM OR SERVICE: HCPCS

## 2022-11-14 PROCEDURE — 700102 HCHG RX REV CODE 250 W/ 637 OVERRIDE(OP): Performed by: INTERNAL MEDICINE

## 2022-11-14 PROCEDURE — 700111 HCHG RX REV CODE 636 W/ 250 OVERRIDE (IP)

## 2022-11-14 RX ADMIN — SENNOSIDES AND DOCUSATE SODIUM 2 TABLET: 50; 8.6 TABLET ORAL at 05:55

## 2022-11-14 RX ADMIN — OXYCODONE 2.5 MG: 5 TABLET ORAL at 18:17

## 2022-11-14 RX ADMIN — SENNOSIDES AND DOCUSATE SODIUM 2 TABLET: 50; 8.6 TABLET ORAL at 18:17

## 2022-11-14 RX ADMIN — HYDROMORPHONE HYDROCHLORIDE 0.25 MG: 1 INJECTION, SOLUTION INTRAMUSCULAR; INTRAVENOUS; SUBCUTANEOUS at 06:00

## 2022-11-14 RX ADMIN — OXYCODONE 5 MG: 5 TABLET ORAL at 09:16

## 2022-11-14 RX ADMIN — CEFTRIAXONE SODIUM 2000 MG: 10 INJECTION, POWDER, FOR SOLUTION INTRAVENOUS at 06:09

## 2022-11-14 RX ADMIN — RIVAROXABAN 10 MG: 10 TABLET, FILM COATED ORAL at 18:17

## 2022-11-14 RX ADMIN — OXYCODONE 5 MG: 5 TABLET ORAL at 21:21

## 2022-11-14 RX ADMIN — OXYCODONE 5 MG: 5 TABLET ORAL at 04:22

## 2022-11-14 ASSESSMENT — COGNITIVE AND FUNCTIONAL STATUS - GENERAL
MOBILITY SCORE: 24
DAILY ACTIVITIY SCORE: 24
SUGGESTED CMS G CODE MODIFIER MOBILITY: CH
SUGGESTED CMS G CODE MODIFIER DAILY ACTIVITY: CH

## 2022-11-14 ASSESSMENT — ENCOUNTER SYMPTOMS
RESPIRATORY NEGATIVE: 1
DEPRESSION: 1
CHILLS: 0
EYES NEGATIVE: 1
ABDOMINAL PAIN: 1
DIARRHEA: 1
FEVER: 0
CARDIOVASCULAR NEGATIVE: 1
NERVOUS/ANXIOUS: 1
WEAKNESS: 1
MYALGIAS: 1

## 2022-11-14 ASSESSMENT — PAIN DESCRIPTION - PAIN TYPE
TYPE: ACUTE PAIN

## 2022-11-14 ASSESSMENT — LIFESTYLE VARIABLES: SUBSTANCE_ABUSE: 1

## 2022-11-14 NOTE — PROGRESS NOTES
[facility] Daily Progress Note    Date of Service  11/14/2022    Chief Complaint  Yumiko Chatman is a 52 y.o. female admitted 11/13/2022 with RIGHT foot pain    Hospital Course  Ms. Yuimko Chatman is a 52 y.o. female who with a PMHx of Medical history is significant for schizophrenia with medication noncompliance, tobacco abuse, resides at homeless shelter, who presented 11/13/2022 with right foot pain, abdominal pain.      Patient presents after experiencing several days of abdominal pain and loose stools up to 3 times a day.  Patient additionally reports right foot pain, swelling, blistering.  Patient denies nausea, vomiting, fever, chills.    Upon presentation to the ER, presenting vitals: /87, HR 93, RR 16, T98.9, SPO2 97% on room air.  Initial laboratory evaluation demonstrated a WBC of 10.2, creatinine 1, AST 81, ALT 51, potassium 3.3.  CT abdomen pelvis demonstrated no evidence of obstruction, no acute inflammatory change, enhancing mass in the endometrium.  Patient was started on Ancef for cellulitis.  Discussed case with ER provider and patient was admitted for further work-up and monitoring.    Patient monitored overnight in observation unit and during this time, patient's blood culture results are back positive for gram-negative rods.  Patient was already placed on IV ABX Ancef due to left lower extremity bullae, this was then changed to Unasyn then to Rocephin for better coverage of gram-negative bacteremia.  Patient switched to inpatient status as she is anticipated to stay 2-3 midnights for management of bacteremia, and left lower foot cellulitis.    Interval Problem Update  -Patient seen and examined.  Patient reports mild discomfort over right foot, more numbing sensation than pain.  Discussed with patient blood culture results of which we will cover her with IV antibiotics.  Also we will consult wound team for management of right foot frostbite/bullae.   -Plan of care: Continue IV  antibiotics; pain management as indicated; await recommendations from wound team regarding right foot frostbite/bullae  -Disposition: Patient switched to inpatient status as she is anticipated to stay 2-3 midnights due to bacteremia, and right foot cellulitis  -Lab work: Reviewed; expected  -VSS at this time    I have discussed this patient's plan of care and discharge plan at IDT rounds today with Case Management, Nursing, Nursing leadership, and other members of the IDT team.    Consultants/Specialty  Wound    Code Status  Full Code    Disposition  Patient is not medically cleared for discharge.   Anticipate discharge to to home with close outpatient follow-up.  I have placed the appropriate orders for post-discharge needs.    Review of Systems  Review of Systems   Constitutional:  Positive for malaise/fatigue. Negative for chills and fever.   HENT: Negative.     Eyes: Negative.    Respiratory: Negative.     Cardiovascular: Negative.    Gastrointestinal:  Positive for abdominal pain and diarrhea.   Genitourinary: Negative.    Musculoskeletal:  Positive for joint pain and myalgias.   Skin: Negative.    Neurological:  Positive for weakness.   Endo/Heme/Allergies: Negative.    Psychiatric/Behavioral:  Positive for depression and substance abuse. The patient is nervous/anxious.       Physical Exam  Temp:  [36.7 °C (98.1 °F)-37.7 °C (99.8 °F)] 37.2 °C (98.9 °F)  Pulse:  [] 92  Resp:  [16-18] 16  BP: (111-128)/(67-75) 128/75  SpO2:  [94 %-97 %] 97 %    Physical Exam  Vitals and nursing note reviewed.   HENT:      Head: Normocephalic.      Nose: Nose normal.      Mouth/Throat:      Mouth: Mucous membranes are moist.      Pharynx: Oropharynx is clear.   Eyes:      Pupils: Pupils are equal, round, and reactive to light.   Cardiovascular:      Rate and Rhythm: Normal rate and regular rhythm.      Pulses: Normal pulses.      Heart sounds: Normal heart sounds.   Pulmonary:      Effort: Pulmonary effort is normal.       Breath sounds: Normal breath sounds.   Abdominal:      General: Bowel sounds are normal.      Palpations: Abdomen is soft.   Musculoskeletal:         General: Tenderness and signs of injury present.      Cervical back: Normal range of motion and neck supple.      Right foot: Deformity present.        Feet:    Feet:      Right foot:      Skin integrity: Blister present.   Skin:     General: Skin is dry.      Capillary Refill: Capillary refill takes 2 to 3 seconds.   Neurological:      Mental Status: She is alert. Mental status is at baseline.       Fluids    Intake/Output Summary (Last 24 hours) at 11/14/2022 1230  Last data filed at 11/13/2022 1318  Gross per 24 hour   Intake 240 ml   Output --   Net 240 ml       Laboratory  Recent Labs     11/13/22  0653 11/14/22  0653   WBC 10.2 6.7   RBC 4.32 3.71*   HEMOGLOBIN 12.9 11.0*   HEMATOCRIT 38.5 33.4*   MCV 89.1 90.0   MCH 29.9 29.6   MCHC 33.5* 32.9*   RDW 39.6 39.6   PLATELETCT 356 321   MPV 9.6 9.4     Recent Labs     11/13/22  0653 11/14/22  0653   SODIUM 135 135   POTASSIUM 3.3* 3.8   CHLORIDE 96 99   CO2 27 27   GLUCOSE 113* 111*   BUN 23* 11   CREATININE 1.00 0.54   CALCIUM 9.4 8.4*                   Imaging  US-PELVIC COMPLETE (TRANSABDOMINAL/TRANSVAGINAL) (COMBO)   Final Result      Posterior fundal mass exerts mass effect upon the endometrium is more likely to represent a submucosal leiomyoma than endometrial polyp or carcinoma. Recommend clinical correlation for hemorrhage. Follow-up is advised in 6 months to confirm stability if    no hemorrhage is present. If hemorrhage is present or there is worsening, biopsy would be indicated      US-EXTREMITY ARTERY LOWER UNILAT RIGHT   Final Result      US-GISELLA SINGLE LEVEL BILAT   Final Result      CT-ABDOMEN-PELVIS WITH   Final Result         1. No acute inflammatory change in the abdomen or pelvis.   2. No small bowel obstruction.   3. There is a 2.6 cm enhancing or high density mass in the endometrium. Further  evaluation with ultrasound is recommended.      DX-CHEST-PORTABLE (1 VIEW)   Final Result         1. No acute cardiopulmonary abnormalities are identified.           Assessment/Plan  * Bullae- (present on admission)  Assessment & Plan  -Possible frostbite injury   -LPS consulted  -Follow up lower extremity ultrasound  -Blood cultures - POSITIVE  -No leukocytosis  -Ancef in ED - changed to Unasyn, then to Rocephin  - Continue to follow                         Bacteremia- (present on admission)  Assessment & Plan  - Noted POSITIVE blood Cx  -ON IV ABX - Rocephine  -Likely from RIGHT foot cellulitis concerning for frost bite  - will need to repeat BLD Cx prior to d/c    Endometrial mass  Assessment & Plan  -As identified on CT abdomen  -Transvaginal ultrasound ordered  -Check CA-125    Elevated liver transaminase level  Assessment & Plan  -AST 81  ALT 51  -Multiple hepatic cysts on CT abdomen    Hypokalemia  Assessment & Plan  -Monitor and replace    Epigastric pain  Assessment & Plan  -Epigastric abdominal pain for 2-3 days with associated loose stools up to 3 times daily  -CT abdomen pelvis demonstrated no evidence of obstruction, no acute inflammatory change, enhancing mass in the endometrium  -Transvaginal ultrasound ordered    Schizophrenia (HCC)  Assessment & Plan  -Not cuurently on medications  -Has been on Risperdal in the past    Tobacco abuse  Assessment & Plan  -Tobacco cessation counseling and education provided for 4 minutes. Nicotine replacement options provided including patch, and further medical treatments including Wellbutrin and Chantix. As well as over the counter options of lozenges and gum.         VTE prophylaxis: Xarelto 10 mg daily as prophylaxis    I have performed a physical exam and reviewed and updated ROS and Plan today (11/14/2022). In review of yesterday's note (11/13/2022), there are no changes except as documented  above.    ====================================================================================================================================  Please note that this dictation was created using voice recognition software. I have made every reasonable attempt to correct obvious errors, but there may be errors of grammar and possibly content that I did not discover before finalizing the note.    Electronically signed by:  Dr. MARILUZ Rick, DNP, APRN, FNP-C  Hospitalist Services  [facility]  [phone]  [email or web address]  11/14/22                 0182

## 2022-11-14 NOTE — PROGRESS NOTES
4 Eyes Skin Assessment Completed by MARIA EUGENIA Bennett and MARIA EUGENIA Arreola.    Head WDL  Ears WDL  Nose WDL  Mouth WDL  Neck WDL  Breast/Chest WDL  Shoulder Blades WDL  Spine WDL  (R) Arm/Elbow/Hand WDL  (L) Arm/Elbow/Hand WDL  Abdomen WDL  Groin WDL  Scrotum/Coccyx/Buttocks WDL  (R) Leg Edema   (L) Leg Edema  (R) Heel/Foot/Toe /heel: Swelling, blister, discoloration(possible frost bite)  (L) Heel/Foot/Toe Swelling, discoloration to toes possible frost bite          Devices In Places Pulse Ox and Nasal Cannula      Interventions In Place Pillows    Possible Skin Injury Yes    Pictures Uploaded Into Epic Yes  Wound Consult Placed Yes  RN Wound Prevention Protocol Ordered No

## 2022-11-14 NOTE — ASSESSMENT & PLAN NOTE
- Noted positive blood Cx  -Likely from RIGHT foot cellulitis concerning for frost bite  - will need to repeat BLD Cx prior to d/c  - repeat blood cultures  negative

## 2022-11-14 NOTE — PROGRESS NOTES
HOSPITALIST NOC CROSS COVER    Blood cultures positive for gram neg rods. Patient was started on ancef in ED for LE bullae. This was changed to Unasyn on admit. Based on blood culture results, will change Unasyn to Rocephin to narrow coverage for G negative bacteremia.

## 2022-11-14 NOTE — CARE PLAN
Assumed care of pt. AA&O x4, currently on 1 liter of oxygen sating 96%. Pt experiencing 6/10 pain in bilateral feet, medicated per MAR. Bed is locked and in the lowest position. Call light and pt belongings within reach.         The patient is Stable - Low risk of patient condition declining or worsening    Shift Goals  Clinical Goals: Pain management  Patient Goals: comfort  Family Goals: not at bedside    Progress made toward(s) clinical / shift goals:    Problem: Pain - Standard  Goal: Alleviation of pain or a reduction in pain to the patient’s comfort goal  Outcome: Progressing     Problem: Knowledge Deficit - Standard  Goal: Patient and family/care givers will demonstrate understanding of plan of care, disease process/condition, diagnostic tests and medications  Outcome: Progressing       Patient is not progressing towards the following goals:

## 2022-11-14 NOTE — HOSPITAL COURSE
Ms. Yumiko Chatman is a 52 y.o. female who with a PMHx of Medical history is significant for schizophrenia with medication noncompliance, tobacco abuse, resides at homeless shelter, who presented 11/13/2022 with right foot pain, abdominal pain.      Patient monitored overnight in observation unit and during this time, patient's blood culture results are back positive for gram-negative rods.  Patient was continued on IV antibiotics.   Patient was evaluated by limb preservation services.  ABIs show decreased blood flow to right lower extremity of 0.6. Patient has frost bite injury that will require 1-2 weeks for final demarcation for amputation. Patient will complete course of antibiotics as outpatient. Patient will follow up with [facility] for assessment of necrotic demarcation and amputation. Patient refused to work with physical therapy, refused to wear off loading boots, refused betadine in dressing changes. Additionally, patient reported abdominal pain on admission. CT abdomen pelvis demonstrated no evidence of obstruction, no acute inflammatory change, enhancing mass in the endometrium. Transvaginal ultrasound demonstrated posterior fundal mass exerts mass effect upon the endometrium is more likely to represent a submucosal leiomyoma than endometrial polyp or carcinoma.  within normal limits. Follow-up outpatient in 6 months for further imaging. Patient was determined satisfactory for discharge with appropriate follow up. Patient follows at [facility].

## 2022-11-14 NOTE — CARE PLAN
Problem: Pain - Standard  Goal: Alleviation of pain or a reduction in pain to the patient’s comfort goal  Outcome: Not Progressing   The patient is Watcher - Medium risk of patient condition declining or worsening    Shift Goals  Clinical Goals: Pain control  Patient Goals: Rest  Family Goals: not at bedside    Progress made toward(s) clinical / shift goals:  The patient is still having 10/10 pain.   She has been updated on plan of care    Patient is not progressing towards the following goals:      Problem: Pain - Standard  Goal: Alleviation of pain or a reduction in pain to the patient’s comfort goal  Outcome: Not Progressing

## 2022-11-15 ENCOUNTER — APPOINTMENT (OUTPATIENT)
Dept: RADIOLOGY | Facility: MEDICAL CENTER | Age: 52
DRG: 923 | End: 2022-11-15
Attending: INTERNAL MEDICINE
Payer: MEDICAID

## 2022-11-15 PROBLEM — T33.821A FROSTBITE OF BOTH FEET: Status: ACTIVE | Noted: 2022-11-15

## 2022-11-15 PROBLEM — T33.822A FROSTBITE OF BOTH FEET: Status: ACTIVE | Noted: 2022-11-15

## 2022-11-15 LAB
ANION GAP SERPL CALC-SCNC: 7 MMOL/L (ref 7–16)
BUN SERPL-MCNC: 9 MG/DL (ref 8–22)
CALCIUM SERPL-MCNC: 8.4 MG/DL (ref 8.5–10.5)
CHLORIDE SERPL-SCNC: 99 MMOL/L (ref 96–112)
CO2 SERPL-SCNC: 27 MMOL/L (ref 20–33)
CREAT SERPL-MCNC: 0.67 MG/DL (ref 0.5–1.4)
ERYTHROCYTE [DISTWIDTH] IN BLOOD BY AUTOMATED COUNT: 39.1 FL (ref 35.9–50)
GFR SERPLBLD CREATININE-BSD FMLA CKD-EPI: 103 ML/MIN/1.73 M 2
GLUCOSE SERPL-MCNC: 96 MG/DL (ref 65–99)
HCT VFR BLD AUTO: 31.8 % (ref 37–47)
HGB BLD-MCNC: 10.4 G/DL (ref 12–16)
MAGNESIUM SERPL-MCNC: 1.6 MG/DL (ref 1.5–2.5)
MCH RBC QN AUTO: 29.5 PG (ref 27–33)
MCHC RBC AUTO-ENTMCNC: 32.7 G/DL (ref 33.6–35)
MCV RBC AUTO: 90.1 FL (ref 81.4–97.8)
PHOSPHATE SERPL-MCNC: 3 MG/DL (ref 2.5–4.5)
PLATELET # BLD AUTO: 290 K/UL (ref 164–446)
PMV BLD AUTO: 9.1 FL (ref 9–12.9)
POTASSIUM SERPL-SCNC: 4 MMOL/L (ref 3.6–5.5)
RBC # BLD AUTO: 3.53 M/UL (ref 4.2–5.4)
SODIUM SERPL-SCNC: 133 MMOL/L (ref 135–145)
WBC # BLD AUTO: 6.3 K/UL (ref 4.8–10.8)

## 2022-11-15 PROCEDURE — 83735 ASSAY OF MAGNESIUM: CPT

## 2022-11-15 PROCEDURE — A9270 NON-COVERED ITEM OR SERVICE: HCPCS

## 2022-11-15 PROCEDURE — 99232 SBSQ HOSP IP/OBS MODERATE 35: CPT | Performed by: NURSE PRACTITIONER

## 2022-11-15 PROCEDURE — 700111 HCHG RX REV CODE 636 W/ 250 OVERRIDE (IP): Performed by: NURSE PRACTITIONER

## 2022-11-15 PROCEDURE — 770001 HCHG ROOM/CARE - MED/SURG/GYN PRIV*

## 2022-11-15 PROCEDURE — 700102 HCHG RX REV CODE 250 W/ 637 OVERRIDE(OP): Performed by: INTERNAL MEDICINE

## 2022-11-15 PROCEDURE — 700102 HCHG RX REV CODE 250 W/ 637 OVERRIDE(OP)

## 2022-11-15 PROCEDURE — 36415 COLL VENOUS BLD VENIPUNCTURE: CPT

## 2022-11-15 PROCEDURE — 85027 COMPLETE CBC AUTOMATED: CPT

## 2022-11-15 PROCEDURE — 80048 BASIC METABOLIC PNL TOTAL CA: CPT

## 2022-11-15 PROCEDURE — 84100 ASSAY OF PHOSPHORUS: CPT

## 2022-11-15 PROCEDURE — A9270 NON-COVERED ITEM OR SERVICE: HCPCS | Performed by: INTERNAL MEDICINE

## 2022-11-15 RX ORDER — MAGNESIUM SULFATE HEPTAHYDRATE 40 MG/ML
2 INJECTION, SOLUTION INTRAVENOUS ONCE
Status: COMPLETED | OUTPATIENT
Start: 2022-11-15 | End: 2022-11-15

## 2022-11-15 RX ADMIN — OXYCODONE 5 MG: 5 TABLET ORAL at 17:16

## 2022-11-15 RX ADMIN — OXYCODONE 5 MG: 5 TABLET ORAL at 05:06

## 2022-11-15 RX ADMIN — OXYCODONE 5 MG: 5 TABLET ORAL at 08:45

## 2022-11-15 RX ADMIN — MAGNESIUM SULFATE HEPTAHYDRATE 2 G: 40 INJECTION, SOLUTION INTRAVENOUS at 17:23

## 2022-11-15 RX ADMIN — OXYCODONE 5 MG: 5 TABLET ORAL at 21:00

## 2022-11-15 RX ADMIN — SENNOSIDES AND DOCUSATE SODIUM 2 TABLET: 50; 8.6 TABLET ORAL at 05:06

## 2022-11-15 RX ADMIN — OXYCODONE 5 MG: 5 TABLET ORAL at 01:49

## 2022-11-15 RX ADMIN — CEFTRIAXONE SODIUM 2000 MG: 10 INJECTION, POWDER, FOR SOLUTION INTRAVENOUS at 05:06

## 2022-11-15 RX ADMIN — RIVAROXABAN 10 MG: 10 TABLET, FILM COATED ORAL at 17:16

## 2022-11-15 ASSESSMENT — ENCOUNTER SYMPTOMS
HEMOPTYSIS: 0
VOMITING: 0
MYALGIAS: 0
COUGH: 0
CONSTIPATION: 0
STRIDOR: 0
NERVOUS/ANXIOUS: 0
DIZZINESS: 0
FOCAL WEAKNESS: 0
HEADACHES: 0
SHORTNESS OF BREATH: 0
FEVER: 0
ORTHOPNEA: 0
WEAKNESS: 0
SORE THROAT: 0
NAUSEA: 0
FLANK PAIN: 0
INSOMNIA: 0
SPUTUM PRODUCTION: 0
DIARRHEA: 0
PALPITATIONS: 0
CHILLS: 0
ABDOMINAL PAIN: 0

## 2022-11-15 ASSESSMENT — PAIN DESCRIPTION - PAIN TYPE
TYPE: ACUTE PAIN

## 2022-11-15 ASSESSMENT — LIFESTYLE VARIABLES: SUBSTANCE_ABUSE: 0

## 2022-11-15 NOTE — PROGRESS NOTES
Pt admitted to room T432 via transport in [facility] from CDU at 1800.      Patient reports pain at 5 on a scale of 0-10.  Medication given for pain control. Educated patient regarding pharmacologic and non pharmacologic modalities for pain management. Oriented to room call light and smoking policy.  Reviewed plan of care (equipment, incentive spirometer, sequential compression devices, medications, activity, diet, fall precautions, skin care, and pain) with patient and family. Welcome packet given and reviewed with patient, all questions answered. Education provided on oral hygiene program.     AA&Ox4. 2L O2.    See 2 RN skin note.    Tolerating regular diet. Denies N/V.    Plan of care discussed, all questions answered. Educated on the importance of calling before getting OOB and pt verbalizes understanding. Educated regarding importance of oral care. Oral care kit at bedside. Call light is within reach, treaded slipper socks on, bed in lowest/ locked position, hourly rounding in place, all needs met at this time.    Elyssa Dietrich R.N.

## 2022-11-15 NOTE — PROGRESS NOTES
.4 Eyes Skin Assessment Completed by MARIA EUGENIA Bergeron and MARIA EUGENIA Russell.    Head WDL  Ears WDL  Nose WDL  Mouth WDL  Neck WDL  Breast/Chest WDL  Shoulder Blades WDL  Spine WDL  (R) Arm/Elbow/Hand WDL  (L) Arm/Elbow/Hand WDL  Abdomen WDL  Groin WDL  Scrotum/Coccyx/Buttocks WDL  (R) Leg WDL  (L) Leg Incision  (R) Heel/Foot/Toe Blisters from frostbite. Mepilex on heel- pt refuses to let us remove mepilex to look underneath. Discoloration, non blanchable  (L) Heel/Foot/Toe Blisters from frostbite. Mepilex on heel- pt refuses to let us remove mepilex to look underneath. Discoloration, nonblanchable.          Devices In Places Blood Pressure Cuff, Pulse Ox, and Nasal Cannula      Interventions In Place Gray Ear Foams, Heel Mepilex, and Pillows    Possible Skin Injury Yes    Pictures Uploaded Into Epic Yes  Wound Consult Placed Yes  RN Wound Prevention Protocol Ordered No

## 2022-11-15 NOTE — DISCHARGE PLANNING
Case Management Discharge Planning    Admission Date: 11/13/2022  GMLOS: 2.8  ALOS: 1    6-Clicks ADL Score: 24  6-Clicks Mobility Score: 24      Anticipated Discharge Dispo: Discharge Disposition: Discharged to home/self care (01)    DME Needed: No    Action(s) Taken: Updated Provider/Nurse on Discharge Plan    Escalations Completed: None    Medically Clear: No    Next Steps: Post acute needs to be determined.     Barriers to Discharge: Medical clearance    Is the patient up for discharge tomorrow: No

## 2022-11-15 NOTE — CARE PLAN
Problem: Pain - Standard  Goal: Alleviation of pain or a reduction in pain to the patient’s comfort goal  Outcome: Progressing   The patient is Stable - Low risk of patient condition declining or worsening    Shift Goals  Clinical Goals: Pain control and safety  Patient Goals: Pain control and comfort  Family Goals: N/A    Progress made toward(s) clinical / shift goals:      Patient is not progressing towards the following goals:N/A

## 2022-11-15 NOTE — CARE PLAN
Problem: Pain - Standard  Goal: Alleviation of pain or a reduction in pain to the patient’s comfort goal  Outcome: Progressing     Problem: Knowledge Deficit - Standard  Goal: Patient and family/care givers will demonstrate understanding of plan of care, disease process/condition, diagnostic tests and medications  Outcome: Progressing   The patient is Stable - Low risk of patient condition declining or worsening    Shift Goals  Clinical Goals: pain control, iv abx  Patient Goals: pain control and comfort  Family Goals: n/a    Progress made toward(s) clinical / shift goals:  a+o x 4, 8/10 right and left foot pain - oxycodone admin a/o - sleeping on reassessment, iv abx a/o, vss/afebrile, +pedal pulses to right and left foot, no needs at this time, [facility]    Patient is not progressing towards the following goals: n/a    Fall precautions/hourly rounding maintained, call light within reach and functioning, all items within reach.  Patient encouraged to call for assistance, poc reviewed with patient, ?'s/concerns answered.   Bed alarm active.

## 2022-11-15 NOTE — CONSULTS
LIMB PRESERVATION SERVICE CONSULT      REFERRED BY: Dr. Orellana    DATE OF CONSULTATION: 11/14/2022    REASON FOR CONSULT: Frostbite right foot, left great toe     HISTORY OF PRESENT ILLNESS: Yumiko Chatman is a 52 y.o.  with a past medical history that includes tobacco abuse, schizophrenia, homelessness, admitted 11/13/2022 for Bullae [R23.8]  Bacteremia [R78.81].     LPS has been consulted for frostbite injury to both feet.  Patient reports she developed pain, swelling and blistering approximately 3 days prior to admission when exposed to cold.  She reports she was wearing socks but not shoes.  She initially was seen at [place] ED and was discharged.  She developed worsening pain to her feet and presented to [facility] ED for further care.  Patient denies fevers, chills, nausea, vomiting.  She denies any numbness, tingling or burning to her feet at this time.  IV antibiotics were started on this admission.  Infectious diseases has not consulted.  No x-ray of feet noted.  Ortho or vascular surgery not involved yet..             Smoking:   reports that she has been smoking cigarettes. She has been smoking an average of .25 packs per day. She has never used smokeless tobacco.    Alcohol:   reports no history of alcohol use.    Drug:   reports no history of drug use.      PAST MEDICAL HISTORY: History reviewed. No pertinent past medical history.     PAST SURGICAL HISTORY: History reviewed. No pertinent surgical history.    MEDICATIONS:   Current Facility-Administered Medications   Medication Dose    cefTRIAXone (Rocephin) syringe 2,000 mg  2,000 mg    senna-docusate (PERICOLACE or SENOKOT S) 8.6-50 MG per tablet 2 Tablet  2 Tablet    And    polyethylene glycol/lytes (MIRALAX) PACKET 1 Packet  1 Packet    And    magnesium hydroxide (MILK OF MAGNESIA) suspension 30 mL  30 mL    And    bisacodyl (DULCOLAX) suppository 10 mg  10 mg    rivaroxaban (XARELTO) tablet 10 mg  10 mg    ondansetron (ZOFRAN) syringe/vial injection 4 mg   4 mg    ondansetron (ZOFRAN ODT) dispertab 4 mg  4 mg    promethazine (PHENERGAN) tablet 12.5-25 mg  12.5-25 mg    promethazine (PHENERGAN) suppository 12.5-25 mg  12.5-25 mg    prochlorperazine (COMPAZINE) injection 5-10 mg  5-10 mg    acetaminophen (Tylenol) tablet 650 mg  650 mg    Pharmacy Consult Request ...Pain Management Review 1 Each  1 Each    oxyCODONE immediate-release (ROXICODONE) tablet 2.5 mg  2.5 mg    Or    oxyCODONE immediate-release (ROXICODONE) tablet 5 mg  5 mg    Or    HYDROmorphone (Dilaudid) injection 0.25 mg  0.25 mg       ALLERGIES:  No Known Allergies     FAMILY HISTORY: History reviewed. No pertinent family history.      REVIEW OF SYSTEMS:   Constitutional: Negative for chills, fever   Respiratory: Negative for cough and shortness of breath.    Cardiovascular:Negative for chest pain, and claudication.   Gastrointestinal: Negative for constipation, diarrhea, nausea and vomiting.   Lower extremities: positive for swelling and pain  Neurological: Negative for numbness to feet and lower legs  All other systems reviewed and are negative     RESULTS:     Recent Labs     11/13/22  0653 11/14/22  0653   WBC 10.2 6.7   RBC 4.32 3.71*   HEMOGLOBIN 12.9 11.0*   HEMATOCRIT 38.5 33.4*   MCV 89.1 90.0   MCH 29.9 29.6   MCHC 33.5* 32.9*   RDW 39.6 39.6   PLATELETCT 356 321   MPV 9.6 9.4     Recent Labs     11/13/22  0653 11/14/22  0653   SODIUM 135 135   POTASSIUM 3.3* 3.8   CHLORIDE 96 99   CO2 27 27   GLUCOSE 113* 111*   BUN 23* 11         ESR:     None this admit    CRP:       None this admit      COVID-19: Not completed this admission     Imaging:  US-PELVIC COMPLETE (TRANSABDOMINAL/TRANSVAGINAL) (COMBO)   Final Result      Posterior fundal mass exerts mass effect upon the endometrium is more likely to represent a submucosal leiomyoma than endometrial polyp or carcinoma. Recommend clinical correlation for hemorrhage. Follow-up is advised in 6 months to confirm stability if    no hemorrhage is  present. If hemorrhage is present or there is worsening, biopsy would be indicated      US-EXTREMITY ARTERY LOWER UNILAT RIGHT   Final Result      US-GISELLA SINGLE LEVEL BILAT   Final Result      CT-ABDOMEN-PELVIS WITH   Final Result         1. No acute inflammatory change in the abdomen or pelvis.   2. No small bowel obstruction.   3. There is a 2.6 cm enhancing or high density mass in the endometrium. Further evaluation with ultrasound is recommended.      DX-CHEST-PORTABLE (1 VIEW)   Final Result         1. No acute cardiopulmonary abnormalities are identified.            Xray/CT/MRI: None of foot bilaterally    Arterial studies:   RIGHT      Waveform            Systolic BPs (mmHg)                              125           Brachial   Hyperemic                                Common Femoral   Hyperemic                                Popliteal   Hyperemic                  75            Posterior Tibial   Hyperemic                  72            Dorsalis Pedis                                            Digit                              0.60          GISELLA                                            TBI                           LEFT   Waveform        Systolic BPs (mmHg)                              120           Brachial   Hyperemic                                Common Femoral   Hyperemic                                Popliteal   Hyperemic                  136           Posterior Tibial   Hyperemic                  138           Dorsalis Pedis                                            Digit                              1.10          GISELLA                                            TBI         Findings   Right-   The ankle-brachial index is mildly reduced.    Doppler waveforms of the common femoral artery and popliteal artery are of    high amplitude and hyperemic multiphasic.    Doppler waveforms at the ankle are brisk and hyperemic multiphasic.        Left-   The ankle-brachial index is normal.    Doppler waveforms of  "the common femoral artery and popliteal artery are of    high amplitude and hyperemic multiphasic.    Doppler waveforms at the ankle are brisk and hyperemic multiphasic.       Right-   Multiphasic flow throughout the common femoral, superficial femoral, and    popliteal arteries with no hemodynamically significant stenosis.   Three vessel runoff to the ankle with biphasic non-reversed flow.               A1c:  No results found for: HBA1C         Microbiology:  Results       Procedure Component Value Units Date/Time    BLOOD CULTURE [[id number]] Collected: 11/13/22 0735    Order Status: Completed Specimen: Blood from Peripheral Updated: 11/14/22 0842     Significant Indicator NEG     Source BLD     Site PERIPHERAL     Culture Result No Growth  Note: Blood cultures are incubated for 5 days and  are monitored continuously.Positive blood cultures  are called to the RN and reported as soon as  they are identified.      Narrative:      Per Hospital Policy: Only change Specimen Src: to \"Line\" if  specified by physician order.  Left Hand    BLOOD CULTURE [[id number]]  (Abnormal) Collected: 11/13/22 0710    Order Status: Completed Specimen: Blood from Peripheral Updated: 11/14/22 0003     Significant Indicator POS     Source BLD     Site PERIPHERAL     Culture Result Growth detected by Bactec instrument. 11/14/2022  00:03  Gram Stain: Gram negative rods.      Narrative:      CALL  [place]  CPU tel. [phone],  CALLED  CPU tel. [phone] 11/14/2022, 00:03, RB PERF. RESULTS CALLED TO: RN  [id number]  Per Hospital Policy: Only change Specimen Src: to \"Line\" if  specified by physician order.  Left AC    URINALYSIS [[id number]]     Order Status: Sent Specimen: Urine     URINE CULTURE(NEW) [[id number]]     Order Status: Sent Specimen: Urine              PHYSICAL EXAMINATION:     VITAL SIGNS: /61   Pulse 94   Temp 37 °C (98.6 °F) (Temporal)   Resp 16   Ht 1.778 m (5' 10\")   Wt 74.5 kg (164 lb 3.9 oz)   SpO2 93%   BMI 23.57 kg/m² "       General Appearance:  Well developed, well nourished, in no acute distress      Lower Extremity Assessment:    Edema:   Moderate non pitting edema bilateral foot        ROM dorsi/plantarflexion:   Dorsiflexion intact    Structural /mechanical changes:   non mycotic toenails    Sensory Assessment:  Monofilament testing with a 10 gram force: sensation: Declined monofilament testing  Feet sensate to light touch        Pulses:  R foot: Nonpalpable DP/PT  L foot: palpable DP, palpable PT  With doppler brisk tones noted to PT/DP      Wound Assessment:      location: Left great toe  Ischemia to the entire toe with necrosis forming distal tip, toe slightly cool      Right foot:  Necrosis to toes 1 through 5  Toes are cold and to level MTP  Ischemic changes to plantar forefoot  Large fluctuant dorsal bulla extending over toes 1 through 4 and dorsal forefoot  Hypersensitive to light touch  Right heel with ischemia and necrotic changes beginning    Bulla drained leaving epidermis intact and wound care completed by wound care RN/PT. See note and flowsheets.                 Wound photo:     Right plantar foot      Right heel      Right dorsal foot  bulla      Right dorsal foot post draining of bulla         Left great toe              ASSESSMENT AND PLAN:   52 y.o. admitted for Bullae [R23.8]  Bacteremia [R78.81]. Presents with bilateral foot frostbite injury, right foot worse than left foot.    -Necrosis present to right toes 1 through 5, cold to touch.  Additionally has ischemia with necrosis beginning to right heel however heel is warm to touch.  -Continue to monitor both feet to allow for further demarcation prior to consideration of amputation.  High risk for right TMA.  Continue to monitor heel ischemia    -Continue to monitor left great toe.  At risk for toe amputation.    See below for further detail          Wound care:   -Wound care orders placed for nursing by wound team   -Right foot: Betadine, Mepitel 1 to  dorsal foot and heel, Mepilex to heel, roll gauze to  foot.  Change daily  -Left great toe: Betadine daily    Imaging/Labs:  -COVID-19: not completed this admission.     Vascular status:   Unable to palpate pedal pulses right foot secondary to edema.  Faintly palpable left DP.   With doppler, pedal pulses are brisk bilaterally  -  arterial studies completed.  GISELLA of 0.6 on RLE.  RLE duplex completed showing bi non reversed blood flow to all vessels   will continue to monitor perfusion and obtain TBI, toe pressures in the next 1 to 2 days to allow for increased reperfusion following cold exposure    Surgery:   -  no plans for surgery at this time  Will allow for further demarcation prior to any amputation.  This may take 1 to 2 weeks for complete demarcation  High risk for right TMA    Antibiotics:   -currently on antibiotics managed by hospitalist           Weight Bearing Status:   -Right foot: Heel weight bearing  -Left foot: Weight bearing as tolerated    Offloading:   TBD demarcation and surgical outcome  -Orthotic company: None  -shoe size: 10    PT/OT:   Not involved at this time          Smoking Cessation:    Patient not interested  in quitting at this time.             Discharge Plan:  TBD.      D/W: pt, RN, Filiberto RN wound care, Linnette DEL CID wound care    Please note that this dictation was created using voice recognition software. I have  worked with technical experts from [employer] to optimize the interface.  I have made every reasonable attempt to correct obvious errors, but there may be errors of grammar and possibly content that I did not discover before finalizing the note.    Please contact LPS through [employer].

## 2022-11-15 NOTE — WOUND TEAM
"[facility]  Inpatient Services  Initial Wound and Skin Care Evaluation    Admission Date: 11/13/2022     Last order of IP CONSULT TO WOUND CARE was found on 11/13/2022 from Hospital Encounter on 11/13/2022     HPI, PMH, SH: Reviewed    History reviewed. No pertinent surgical history.  Social History     Tobacco Use    Smoking status: Every Day     Packs/day: 0.25     Types: Cigarettes    Smokeless tobacco: Never   Substance Use Topics    Alcohol use: Never     Chief Complaint   Patient presents with    Epigastric Pain     X2days, pt states comes and goes      Foot Pain     Pt reports bilat blisters, that have \"doubled in size\"     Diagnosis: Bullae [R23.8]  Bacteremia [R78.81]    Unit where seen by Wound Team: T213/00     WOUND CONSULT/FOLLOW UP RELATED TO:  Bilateral feet     WOUND HISTORY:  Pt presented to ER with black toes and large blister to the right foot. Pt reports that she was outside with no shoes only socks for 3 days. Pt presented to ER at outside facility and was discharged, pt reports the wounds got worse and so she presented to [facility] ER. Pt is a cigarette smoker. Pt denies history of diabetes, ETOH use or illicit drug abuse.     WOUND ASSESSMENT/LDA    Skin Risk/Lonnie   Sensory Perception: Slightly Limited  Moisture: Rarely Moist  Activity: Walks Occasionally  Mobility: Slightly Limited  Nutrition: Adequate  Friction and Shear: No Apparent Problem  Total Score: 19  Skin Breakdown Risk: 18-23 Minimal Risk for Skin Breakdown    Sensory Interventions  Bed Types: Pressure Redistribution Mattress (Atmosair)        Activity : Bed, Ambulated, Edge of bed, Chair  Patient Turns / Repositioning: Patient Turns Self from Side to Side  Patient is Receiving Nutrition: Oral Intake Adequate     Vitamin Therapy in Use: No                            Wound 11/13/22 Soft Tissue Necrosis Foot Dorsal Right Possible frostbite (Active)   Wound Image         Site Assessment Pink;Black;Purple    Periwound " Assessment Purple;Black;Blistered;Edema;Fragile    Margins Unattached edges    Closure None    Drainage Amount Moderate    Drainage Description Serosanguineous;Yellow;Tan    Treatments Cleansed;Offloading;Site care    Wound Cleansing Normal Saline Irrigation    Periwound Protectant Mepitel    Dressing Cleansing/Solutions 3% Betadine    Dressing Options Open to Air    Dressing Changed New    Dressing Status Clean;Dry;Intact    Dressing Change/Treatment Frequency Every Shift, and As Needed    NEXT Dressing Change/Treatment Date 22    NEXT Weekly Photo (Inpatient Only) 22    Non-staged Wound Description Full thickness    Pulses 2+    WOUND NURSE ONLY - Time Spent with Patient (mins) 60      Vascular:    GISELLA:   GISELLA Results, Last 30 Days US-GISELLA SINGLE LEVEL BILAT    Result Date: 2022  Narrative  Vascular Laboratory  Conclusions  Mildly diminished right GISELLA.  Normal left.  SALENA MCCONNELL  Age:    52    Gender:     F  MRN:    [id number]  :    1970      BSA:  Exam Date:     2022 08:26  Room #:     Inpatient  Priority:     Stat  Ht (in):             Wt (lb):  Ordering Physician:        LILIYA BHARDWAJ  Referring Physician:       [id number] Orellana  Sonographer:               Ryann Moon RVT  Study Type:                Complete Bilateral  Technical Quality:         Adequate  Indications:     Cyanosis, Pain in leg, unspecified  CPT Codes:       71010  ICD Codes:       782.5  M79.606  History:         Right lower extremity cyanosis. No prior exams.  Limitations:                 RIGHT  Waveform            Systolic BPs (mmHg)                             125           Brachial  Hyperemic                                Common Femoral  Hyperemic                                Popliteal  Hyperemic                  75            Posterior Tibial  Hyperemic                  72            Dorsalis Pedis                                           Digit                              0.60          GISELLA                                           TBI                       LEFT  Waveform        Systolic BPs (mmHg)                             120           Brachial  Hyperemic                                Common Femoral  Hyperemic                                Popliteal  Hyperemic                  136           Posterior Tibial  Hyperemic                  138           Dorsalis Pedis                                           Digit                             1.10          GISELLA                                           TBI  Findings  Right-  The ankle-brachial index is mildly reduced.  Doppler waveforms of the common femoral artery and popliteal artery are of  high amplitude and hyperemic multiphasic.  Doppler waveforms at the ankle are brisk and hyperemic multiphasic.    Left-  The ankle-brachial index is normal.  Doppler waveforms of the common femoral artery and popliteal artery are of  high amplitude and hyperemic multiphasic.  Doppler waveforms at the ankle are brisk and hyperemic multiphasic.  Right arterial duplex scan was performed in accordance with lower extremity  arterial evaluation protocol - see separate report.  River Lacy  (Electronically Signed)  Final Date:      2022                   10:10    GISELLA Results, Last 30 Days US-EXTREMITY ARTERY LOWER UNILAT RIGHT    Result Date: 2022  Narrative Lower Extremity  Arterial Duplex Report  Vascular Laboratory  CONCLUSIONS  Negative for stenosis.  SALENA MCCONNELL  Exam Date:     2022 09:22  Room #:     Inpatient  Priority:     Stat  Ht (in):             Wt (lb):  Ordering Physician:        LILIAY BHARDWAJ  Referring Physician:       [id number] Orellana  Sonographer:               Ryann Moon RVT  Study Type:                Complete Unilateral  Technical Quality:         Adequate  Age:    52    Gender:     F  MRN:    [id number]  :    1970      BSA:  Indications:     Cyanosis  CPT Codes:        82048  ICD Codes:       782.5  History:         Right lower extremity cyanosis, No prior exams.  Limitations:                RIGHT  Waveform        Peak Systolic Velocity (cm/s)                  Prox    Prox-Mid  Mid    Mid-Dist  Distal  Bi, non-                          168                      CFA  reversed  Bi, non-        96                                         PFA  reversed  Bi, non-        89                143              73      SFA  reversed  Bi, non-                          65                       POP  reversed  Bi, non-        73                                 53      AT  reversed  Bi, non-        24                                 47      PT  reversed  Bi, non-        25                                 41      BOY  reversed                LEFT  Waveform        Peak Systolic Velocity (cm/s)                  Prox    Prox-Mid  Mid    Mid-Dist  Distal                                                             CFA                                                             PFA                                                             SFA                                                             POP                                                             AT                                                             PT                                                             BOY  FINDINGS  Right-  Multiphasic flow throughout the common femoral, superficial femoral, and  popliteal arteries with no hemodynamically significant stenosis.  Three vessel runoff to the ankle with biphasic non-reversed flow.  River Lacy  (Electronically Signed)  Final Date:      13 November 2022                   10:22      Lab Values:    Lab Results   Component Value Date/Time    WBC 6.7 11/14/2022 06:53 AM    RBC 3.71 (L) 11/14/2022 06:53 AM    HEMOGLOBIN 11.0 (L) 11/14/2022 06:53 AM    HEMATOCRIT 33.4 (L) 11/14/2022 06:53 AM      Culture Results show:  No results found for this or any previous visit (from  the past 720 hour(s)).    Pain Level/Medicated:  Pain with movement and assessment.       INTERVENTIONS BY WOUND TEAM:  Chart and images reviewed. Discussed with bedside RN. All areas of concern (based on picture review, LDA review and discussion with bedside RN) have been thoroughly assessed. Documentation of areas based on significant findings. This RN in to assess patient. Performed standard wound care which includes appropriate positioning, dressing removal and non-selective debridement. Pictures and measurements obtained weekly if/when required.  Preparation for Dressing removal: NA open to air  Non-selectively Debrided with:  NS and gauze.  Sharp debridement: Right foot blister deroofed.  Nory wound: Cleansed with NS and gauze, Prepped with Betadine  Primary Dressing: Betadine to Right toes, mepitel one to heel  Secondary (Outer) Dressing: Heel mepilex applied    Interdisciplinary consultation: Patient, Bedside RN, Wound RN (Linnette), LPS ARPN (Gokul Banuelos)    EVALUATION / RATIONALE FOR TREATMENT:  Most Recent Date:  11/14/22: Pt with suspected frostbite. R toes effected more than left. Waiting for full demarcation. Met with LPS service. Bullae to right dorsal foot/toes drained. Betadine applied to dry the area and protect from bacterial growth.      Goals: Steady decrease in wound area and depth weekly.    WOUND TEAM PLAN OF CARE ([X] for frequency of wound follow up,):   Nursing to follow dressing orders written for wound care. Contact wound team if area fails to progress, deteriorates or with any questions/concerns if something comes up before next scheduled follow up (See below as to whether wound is following and frequency of wound follow up)  Dressing changes by wound team:                   Follow up 3 times weekly:                NPWT change 3 times weekly:     Follow up 1-2 times weekly:      Follow up Bi-Monthly:           Follow up Monthly (High Risk):                        Follow up as needed:    X  Other (explain):     NURSING PLAN OF CARE ORDERS (X):  Dressing changes: See Dressing Care orders: X  Skin care: See Skin Care orders:   RN Prevention Protocol:  X  Rectal tube care: See Rectal Tube Care orders:   Other orders:    RSKIN:   CURRENTLY IN PLACE (X), APPLIED THIS VISIT (A), ORDERED (O):   Q shift Lonnie:  X  Q shift pressure point assessments:  X    Surface/Positioning   Pressure redistribution mattress       X     Low Airloss          ICU Low Airloss   Bariatric SHASHA     Waffle cushion        Waffle Overlay          Reposition q 2 hours    Self mobile in bed  TAPs Turning system     Z James Pillow     Offloading/Redistribution   Sacral Mepilex (Silicone dressing)     Heel Mepilex (Silicone dressing)      A   Heel float boots (Prevalon boot)      O       Float Heels off Bed with Pillows           Respiratory   Silicone O2 tubing       X  Gray Foam Ear protectors   X  Cannula fixation Device (Tender )          High flow offloading Clip    Elastic head band offloading device      Anchorfast                                                         Trach with Optifoam split foam             Containment/Moisture Prevention Continent    Rectal tube or BMS    Purwick/Condom Cath        Leggett Catheter    Barrier wipes           Barrier paste       Antifungal tx      Interdry        Mobilization       Up to chair      X  Ambulate    X  PT/OT      Nutrition       Dietician        Diabetes Education      PO   X  TF     TPN     NPO   # days     Other        Anticipated discharge plans:   LTACH:        SNF/Rehab:                  Home Health Care:           Outpatient Wound Center:       X     Self/Family Care:      X  Other:                  Vac Discharge Needs:   Not Applicable Pt not on a wound vac:     X  Regular Vac while inpatient, alternative dressing at DC:        Regular Vac in use and continued at DC:            Reg. Vac w/ Skin Sub/Biologic in use. Will need to be changed 2x wkly:      Veraflo Vac  while inpatient, ok to transition to Regular Vac on Discharge:           Veraflo Vac while inpatient, will need to remain on Veraflo Vac upon discharge:

## 2022-11-15 NOTE — DISCHARGE PLANNING
"Care Transition Team Assessment  In the case of an emergency, pt's legal NOK is none    RNCM met with pt at bedside and obtained the information used in this assessment. Pt states she lives at shelter, has no family, no job, no income. States she \"lost disability.\" Denies substance abuse and MH issues, but unsure if this is true.  Prior to current hospitalization, pt was completely independent in ADLS/IADLS. Pt has no support system.     Information Source:pt  Orientation Level: Oriented X4  Information Given By: Patient  Informant's Name: Yumiko  Who is responsible for making decisions for patient? : Patient         Elopement Risk  Legal Hold: No  Ambulatory or Self Mobile in Wheelchair: Yes  Disoriented: No  Psychiatric Symptoms: None  History of Wandering: No  Elopement this Admit: No  Vocalizing Wanting to Leave: No  Displays Behaviors, Body Language Wanting to Leave: No-Not at Risk for Elopement  Elopement Risk: Not at Risk for Elopement    Interdisciplinary Discharge Planning  Does Admitting Nurse Feel This Could be a Complex Discharge?: Yes  Primary Care Physician: none  Lives with - Patient's Self Care Capacity: Unrelated Adult  Patient or legal guardian wants to designate a caregiver: No  Support Systems: None  Housing / Facility: Homeless  Do You Take your Prescribed Medications Regularly: No  Mobility Issues: No  Durable Medical Equipment: Not Applicable    Discharge Preparedness  What is your plan after discharge?: Other (comment)  What are your discharge supports?:  (none)  Prior Functional Level: Ambulatory, Independent with Activities of Daily Living, Independent with Medication Management  Difficulity with ADLs: None  Difficulity with IADLs: None    Functional Assesment  Prior Functional Level: Ambulatory, Independent with Activities of Daily Living, Independent with Medication Management    Finances  Financial Barriers to Discharge: Yes  Prescription Coverage: Yes    Vision / Hearing " Impairment  Vision Impairment : No  Hearing Impairment : No              Domestic Abuse  Have you ever been the victim of abuse or violence?: No  Physical Abuse or Sexual Abuse: No  Verbal Abuse or Emotional Abuse: No  Possible Abuse/Neglect Reported to:: Not Applicable    Psychological Assessment  History of Substance Abuse: None  History of Psychiatric Problems: No    Discharge Risks or Barriers  Discharge risks or barriers?: No PCP, Post-acute placement / services, Homeless / couch surfing  Patient risk factors: Homeless, Lack of outside supports, Lives alone and no community support, No PCP, Vulnerable adult    Anticipated Discharge Information  Discharge Disposition: Discharged to home/self care (01)

## 2022-11-16 PROBLEM — B20 HIV (HUMAN IMMUNODEFICIENCY VIRUS INFECTION) (HCC): Status: ACTIVE | Noted: 2022-11-16

## 2022-11-16 LAB
ANION GAP SERPL CALC-SCNC: 9 MMOL/L (ref 7–16)
BACTERIA BLD CULT: ABNORMAL
BACTERIA BLD CULT: ABNORMAL
BUN SERPL-MCNC: 6 MG/DL (ref 8–22)
CALCIUM SERPL-MCNC: 8.4 MG/DL (ref 8.5–10.5)
CHLORIDE SERPL-SCNC: 97 MMOL/L (ref 96–112)
CO2 SERPL-SCNC: 26 MMOL/L (ref 20–33)
CREAT SERPL-MCNC: 0.6 MG/DL (ref 0.5–1.4)
ERYTHROCYTE [DISTWIDTH] IN BLOOD BY AUTOMATED COUNT: 39 FL (ref 35.9–50)
GFR SERPLBLD CREATININE-BSD FMLA CKD-EPI: 106 ML/MIN/1.73 M 2
GLUCOSE SERPL-MCNC: 116 MG/DL (ref 65–99)
HCT VFR BLD AUTO: 33.9 % (ref 37–47)
HGB BLD-MCNC: 10.9 G/DL (ref 12–16)
MAGNESIUM SERPL-MCNC: 1.9 MG/DL (ref 1.5–2.5)
MCH RBC QN AUTO: 29.1 PG (ref 27–33)
MCHC RBC AUTO-ENTMCNC: 32.2 G/DL (ref 33.6–35)
MCV RBC AUTO: 90.4 FL (ref 81.4–97.8)
PLATELET # BLD AUTO: 341 K/UL (ref 164–446)
PMV BLD AUTO: 9 FL (ref 9–12.9)
POTASSIUM SERPL-SCNC: 4.1 MMOL/L (ref 3.6–5.5)
RBC # BLD AUTO: 3.75 M/UL (ref 4.2–5.4)
SIGNIFICANT IND 70042: ABNORMAL
SITE SITE: ABNORMAL
SODIUM SERPL-SCNC: 132 MMOL/L (ref 135–145)
SOURCE SOURCE: ABNORMAL
WBC # BLD AUTO: 6.6 K/UL (ref 4.8–10.8)

## 2022-11-16 PROCEDURE — 85027 COMPLETE CBC AUTOMATED: CPT

## 2022-11-16 PROCEDURE — A9270 NON-COVERED ITEM OR SERVICE: HCPCS | Performed by: INTERNAL MEDICINE

## 2022-11-16 PROCEDURE — 700102 HCHG RX REV CODE 250 W/ 637 OVERRIDE(OP)

## 2022-11-16 PROCEDURE — 770001 HCHG ROOM/CARE - MED/SURG/GYN PRIV*

## 2022-11-16 PROCEDURE — 700102 HCHG RX REV CODE 250 W/ 637 OVERRIDE(OP): Performed by: INTERNAL MEDICINE

## 2022-11-16 PROCEDURE — 700111 HCHG RX REV CODE 636 W/ 250 OVERRIDE (IP): Performed by: NURSE PRACTITIONER

## 2022-11-16 PROCEDURE — A9270 NON-COVERED ITEM OR SERVICE: HCPCS

## 2022-11-16 PROCEDURE — 36415 COLL VENOUS BLD VENIPUNCTURE: CPT

## 2022-11-16 PROCEDURE — 83735 ASSAY OF MAGNESIUM: CPT

## 2022-11-16 PROCEDURE — 80048 BASIC METABOLIC PNL TOTAL CA: CPT

## 2022-11-16 PROCEDURE — 99232 SBSQ HOSP IP/OBS MODERATE 35: CPT | Performed by: NURSE PRACTITIONER

## 2022-11-16 PROCEDURE — 700101 HCHG RX REV CODE 250: Performed by: NURSE PRACTITIONER

## 2022-11-16 RX ADMIN — OXYCODONE 5 MG: 5 TABLET ORAL at 14:46

## 2022-11-16 RX ADMIN — OXYCODONE 5 MG: 5 TABLET ORAL at 22:37

## 2022-11-16 RX ADMIN — RIVAROXABAN 10 MG: 10 TABLET, FILM COATED ORAL at 18:34

## 2022-11-16 RX ADMIN — CEFTRIAXONE SODIUM 2000 MG: 10 INJECTION, POWDER, FOR SOLUTION INTRAVENOUS at 22:34

## 2022-11-16 RX ADMIN — OXYCODONE 5 MG: 5 TABLET ORAL at 01:37

## 2022-11-16 RX ADMIN — CEFTRIAXONE SODIUM 2000 MG: 10 INJECTION, POWDER, FOR SOLUTION INTRAVENOUS at 04:47

## 2022-11-16 RX ADMIN — SENNOSIDES AND DOCUSATE SODIUM 2 TABLET: 50; 8.6 TABLET ORAL at 04:47

## 2022-11-16 RX ADMIN — OXYCODONE 5 MG: 5 TABLET ORAL at 04:48

## 2022-11-16 ASSESSMENT — PAIN DESCRIPTION - PAIN TYPE
TYPE: ACUTE PAIN

## 2022-11-16 ASSESSMENT — ENCOUNTER SYMPTOMS
TINGLING: 0
FOCAL WEAKNESS: 0
FLANK PAIN: 0
CHILLS: 0
WEAKNESS: 0
NERVOUS/ANXIOUS: 0
DIARRHEA: 0
DIZZINESS: 0
SORE THROAT: 0
SPUTUM PRODUCTION: 0
HEMOPTYSIS: 0
SHORTNESS OF BREATH: 0
ABDOMINAL PAIN: 0
STRIDOR: 0
VOMITING: 0
NAUSEA: 0
MYALGIAS: 0
CONSTIPATION: 0
INSOMNIA: 0
ORTHOPNEA: 0
HEADACHES: 0
COUGH: 0
FEVER: 0
PALPITATIONS: 0

## 2022-11-16 ASSESSMENT — PAIN SCALES - WONG BAKER: WONGBAKER_NUMERICALRESPONSE: HURTS JUST A LITTLE BIT

## 2022-11-16 ASSESSMENT — LIFESTYLE VARIABLES: SUBSTANCE_ABUSE: 0

## 2022-11-16 NOTE — DISCHARGE PLANNING
Received call from Meli at [facility] . Pt has HIV and was receiving antiviral meds while incarcerated. Was released in Sept and was provided with 30 day supply of Biktarvay. Did not keep appt at [facility] for f/u meds.  Prachi at [facility]  [phone] needs to be contacted prior to discharge to schedule F/U appt.

## 2022-11-16 NOTE — CARE PLAN
The patient is Stable - Low risk of patient condition declining or worsening    Shift Goals  Clinical Goals: Pain control, Mobility  Patient Goals: Pain control, Comfort  Family Goals: IVETTE    Progress made toward(s) clinical / shift goals:  PRN pain medication per MAR. Up to commode. Dressing care to feet per order.     Patient is not progressing towards the following goals: N/A      Problem: Pain - Standard  Goal: Alleviation of pain or a reduction in pain to the patient’s comfort goal  Outcome: Progressing  Pain assessed using verbal and nonverbal scales. Pain medication given per MAR. Education on pain management plan and goals.

## 2022-11-16 NOTE — ASSESSMENT & PLAN NOTE
Ischemic and necrotic changes noted to left great toe and right 1-4 toes and right plantar forefoot.  LPS involved-appreciate their recs.  GISELLA showed decreased blood flow 0.6 to right lower extremity.  TBI's and toe pressures pending.  Recommend offloading and wound care per limb preservation services.    Per LPS, patient at risk for amputation.  Monitor for final demarcation to determine necessity and level of amputation

## 2022-11-16 NOTE — PROGRESS NOTES
Hospital Medicine Daily Progress Note    Date of Service  11/15/2022    Chief Complaint  Yumiko Chatman is a 52 y.o. female admitted 11/13/2022 with right foot pain    Hospital Course  Ms. Yumiko Chatman is a 52 y.o. female who with a PMHx of Medical history is significant for schizophrenia with medication noncompliance, tobacco abuse, resides at homeless shelter, who presented 11/13/2022 with right foot pain, abdominal pain.      Patient presents after experiencing several days of abdominal pain and loose stools up to 3 times a day.  Patient additionally reports right foot pain, swelling, blistering.  Patient denies nausea, vomiting, fever, chills.    Upon presentation to the ER, presenting vitals: /87, HR 93, RR 16, T98.9, SPO2 97% on room air.  Initial laboratory evaluation demonstrated a WBC of 10.2, creatinine 1, AST 81, ALT 51, potassium 3.3.  CT abdomen pelvis demonstrated no evidence of obstruction, no acute inflammatory change, enhancing mass in the endometrium.  Patient was started on Ancef for cellulitis.  Discussed case with ER provider and patient was admitted for further work-up and monitoring.    Patient monitored overnight in observation unit and during this time, patient's blood culture results are back positive for gram-negative rods.  Patient was already placed on IV ABX Ancef due to left lower extremity bullae, this was then changed to Unasyn then to Rocephin for better coverage of gram-negative bacteremia.  Patient switched to inpatient status as she is anticipated to stay 2-3 midnights for management of bacteremia, and left lower foot cellulitis.    Interval Problem Update  1/15/2022: Patient resting quietly but arouses appropriately.  AAOx4.  Patient with right foot pain sensitive to air exposure.  Dressing in place for limb preservation.  She is aware of plan for continuing antibiotics, and monitoring for demarcation.  She is refused offloading per nursing.    I have discussed this  patient's plan of care and discharge plan at IDT rounds today with Case Management, Nursing, Nursing leadership, and other members of the IDT team.    Consultants/Specialty  Limb preservation services  Wound care  Code Status  Full Code    Disposition  Patient is not medically cleared for discharge.   Anticipate discharge to to home with close outpatient follow-up.  I have placed the appropriate orders for post-discharge needs.    Review of Systems  Review of Systems   Constitutional:  Negative for chills, fever and malaise/fatigue.   HENT:  Negative for congestion and sore throat.    Respiratory:  Negative for cough, hemoptysis, sputum production, shortness of breath and stridor.    Cardiovascular:  Negative for chest pain, palpitations, orthopnea and leg swelling.   Gastrointestinal:  Negative for abdominal pain, constipation, diarrhea, nausea and vomiting.   Genitourinary:  Negative for dysuria and flank pain.   Musculoskeletal:  Positive for joint pain (Right foot pain). Negative for myalgias.   Neurological:  Negative for dizziness, focal weakness, weakness and headaches.   Psychiatric/Behavioral:  Negative for substance abuse. The patient is not nervous/anxious and does not have insomnia.    All other systems reviewed and are negative.     Physical Exam  Temp:  [36.2 °C (97.2 °F)-37.4 °C (99.3 °F)] 36.2 °C (97.2 °F)  Pulse:  [83-94] 94  Resp:  [16-17] 17  BP: (104-118)/(61-79) 104/67  SpO2:  [90 %-92 %] 92 %    Physical Exam  Vitals and nursing note reviewed.   Constitutional:       General: She is not in acute distress.     Appearance: She is normal weight. She is not ill-appearing or toxic-appearing.   HENT:      Head: Normocephalic.      Nose: Nose normal.      Mouth/Throat:      Mouth: Mucous membranes are moist.      Pharynx: Oropharynx is clear. No oropharyngeal exudate.   Eyes:      General: No scleral icterus.     Extraocular Movements: Extraocular movements intact.      Conjunctiva/sclera:  Conjunctivae normal.      Pupils: Pupils are equal, round, and reactive to light.   Cardiovascular:      Rate and Rhythm: Normal rate and regular rhythm.      Pulses: Normal pulses.      Heart sounds: Normal heart sounds. No murmur heard.    No gallop.   Pulmonary:      Effort: Pulmonary effort is normal. No respiratory distress.      Breath sounds: Normal breath sounds. No wheezing or rhonchi.   Chest:      Chest wall: No tenderness.   Abdominal:      General: Abdomen is flat. Bowel sounds are normal. There is no distension.      Palpations: Abdomen is soft.      Tenderness: There is no abdominal tenderness. There is no guarding.   Musculoskeletal:         General: No swelling or deformity.   Skin:     Comments: Deflated bullae to right dorsal forefoot proximal to 1-4 toes.  Ischemic changes noted to left great toe and right 1-5 toes.     Neurological:      General: No focal deficit present.      Mental Status: She is alert and oriented to person, place, and time. Mental status is at baseline.      Motor: No weakness.   Psychiatric:         Mood and Affect: Mood normal.         Behavior: Behavior normal.         Thought Content: Thought content normal.         Judgment: Judgment normal.       Fluids    Intake/Output Summary (Last 24 hours) at 11/15/2022 1640  Last data filed at 11/15/2022 0900  Gross per 24 hour   Intake 240 ml   Output --   Net 240 ml       Laboratory  Recent Labs     11/13/22  0653 11/14/22  0653 11/15/22  0240   WBC 10.2 6.7 6.3   RBC 4.32 3.71* 3.53*   HEMOGLOBIN 12.9 11.0* 10.4*   HEMATOCRIT 38.5 33.4* 31.8*   MCV 89.1 90.0 90.1   MCH 29.9 29.6 29.5   MCHC 33.5* 32.9* 32.7*   RDW 39.6 39.6 39.1   PLATELETCT 356 321 290   MPV 9.6 9.4 9.1     Recent Labs     11/13/22  0653 11/14/22  0653 11/15/22  0240   SODIUM 135 135 133*   POTASSIUM 3.3* 3.8 4.0   CHLORIDE 96 99 99   CO2 27 27 27   GLUCOSE 113* 111* 96   BUN 23* 11 9   CREATININE 1.00 0.54 0.67   CALCIUM 9.4 8.4* 8.4*                    Imaging  IR-US GUIDED PIV   Final Result    Ultrasound-guided PERIPHERAL IV INSERTION performed by    qualified nursing staff as above.      US-PELVIC COMPLETE (TRANSABDOMINAL/TRANSVAGINAL) (COMBO)   Final Result      Posterior fundal mass exerts mass effect upon the endometrium is more likely to represent a submucosal leiomyoma than endometrial polyp or carcinoma. Recommend clinical correlation for hemorrhage. Follow-up is advised in 6 months to confirm stability if    no hemorrhage is present. If hemorrhage is present or there is worsening, biopsy would be indicated      US-EXTREMITY ARTERY LOWER UNILAT RIGHT   Final Result      US-GISELLA SINGLE LEVEL BILAT   Final Result      CT-ABDOMEN-PELVIS WITH   Final Result         1. No acute inflammatory change in the abdomen or pelvis.   2. No small bowel obstruction.   3. There is a 2.6 cm enhancing or high density mass in the endometrium. Further evaluation with ultrasound is recommended.      DX-CHEST-PORTABLE (1 VIEW)   Final Result         1. No acute cardiopulmonary abnormalities are identified.           Assessment/Plan  * Bullae- (present on admission)  Assessment & Plan  Right dorsal forefoot proximal to 1-4 toes.    Related to frostbite injury.  LPS involved-appreciate their recs.  ABIs show decreased blood flow right lower extremity at 0.6, TBI's and toe pressures pending.  Cultures positive-see plan for bacteremia.                    Frostbite of both feet  Assessment & Plan  Ischemic and necrotic changes noted to left great toe and right 1-4 toes and right plantar forefoot.  LPS involved-appreciate their recs.  GISELLA showed decreased blood flow 0.6 to right lower extremity.  TBI's and toe pressures pending.  Recommend offloading and wound care per limb preservation services.    Per LPS, patient at risk for amputation.  Monitor for final demarcation to determine necessity and level of amputation    Bacteremia- (present on admission)  Assessment & Plan  - Noted  POSITIVE blood Cx  -ON IV ABX - Rocephine  -Likely from RIGHT foot cellulitis concerning for frost bite  - will need to repeat BLD Cx prior to d/c    Endometrial mass  Assessment & Plan  -As identified on CT abdomen  -Transvaginal ultrasound ordered  -Check CA-125    Elevated liver transaminase level  Assessment & Plan  -AST 81  ALT 51  -Multiple hepatic cysts on CT abdomen    Hypokalemia  Assessment & Plan  -Monitor and replace    Epigastric pain  Assessment & Plan  -Epigastric abdominal pain for 2-3 days with associated loose stools up to 3 times daily  -CT abdomen pelvis demonstrated no evidence of obstruction, no acute inflammatory change, enhancing mass in the endometrium  -Transvaginal ultrasound 11/13/2022: Posterior fundal mass exerts mass effect upon the endometrium is more likely to represent a submucosal leiomyoma than endometrial polyp or carcinoma.   No current hemorrhage, hemoglobin stable.  Follow-up outpatient in 6 months.    Schizophrenia (HCC)  Assessment & Plan  -Not cuurently on medications  -Has been on Risperdal in the past    Tobacco abuse  Assessment & Plan  11/14:Tobacco cessation counseling and education provided for 4 minutes. Nicotine replacement options provided including patch, and further medical treatments including Wellbutrin and Chantix. As well as over the counter options of lozenges and gum.         VTE prophylaxis: Xarelto 10 mg daily as prophylaxis    I have performed a physical exam and reviewed and updated ROS and Plan today (11/15/2022). In review of yesterday's note (11/14/2022), there are no changes except as documented above.

## 2022-11-17 LAB
ANION GAP SERPL CALC-SCNC: 8 MMOL/L (ref 7–16)
BUN SERPL-MCNC: 8 MG/DL (ref 8–22)
CALCIUM SERPL-MCNC: 8.9 MG/DL (ref 8.5–10.5)
CHLORIDE SERPL-SCNC: 99 MMOL/L (ref 96–112)
CO2 SERPL-SCNC: 25 MMOL/L (ref 20–33)
CREAT SERPL-MCNC: 0.58 MG/DL (ref 0.5–1.4)
ERYTHROCYTE [DISTWIDTH] IN BLOOD BY AUTOMATED COUNT: 39.1 FL (ref 35.9–50)
GFR SERPLBLD CREATININE-BSD FMLA CKD-EPI: 107 ML/MIN/1.73 M 2
GLUCOSE SERPL-MCNC: 96 MG/DL (ref 65–99)
HCT VFR BLD AUTO: 33.2 % (ref 37–47)
HGB BLD-MCNC: 10.8 G/DL (ref 12–16)
MAGNESIUM SERPL-MCNC: 1.8 MG/DL (ref 1.5–2.5)
MCH RBC QN AUTO: 29.4 PG (ref 27–33)
MCHC RBC AUTO-ENTMCNC: 32.5 G/DL (ref 33.6–35)
MCV RBC AUTO: 90.5 FL (ref 81.4–97.8)
PLATELET # BLD AUTO: 355 K/UL (ref 164–446)
PMV BLD AUTO: 8.8 FL (ref 9–12.9)
POTASSIUM SERPL-SCNC: 4.4 MMOL/L (ref 3.6–5.5)
RBC # BLD AUTO: 3.67 M/UL (ref 4.2–5.4)
SODIUM SERPL-SCNC: 132 MMOL/L (ref 135–145)
WBC # BLD AUTO: 6.3 K/UL (ref 4.8–10.8)

## 2022-11-17 PROCEDURE — A9270 NON-COVERED ITEM OR SERVICE: HCPCS

## 2022-11-17 PROCEDURE — 700111 HCHG RX REV CODE 636 W/ 250 OVERRIDE (IP): Performed by: NURSE PRACTITIONER

## 2022-11-17 PROCEDURE — 80048 BASIC METABOLIC PNL TOTAL CA: CPT

## 2022-11-17 PROCEDURE — 36415 COLL VENOUS BLD VENIPUNCTURE: CPT

## 2022-11-17 PROCEDURE — 770001 HCHG ROOM/CARE - MED/SURG/GYN PRIV*

## 2022-11-17 PROCEDURE — 700102 HCHG RX REV CODE 250 W/ 637 OVERRIDE(OP)

## 2022-11-17 PROCEDURE — 85027 COMPLETE CBC AUTOMATED: CPT

## 2022-11-17 PROCEDURE — A9270 NON-COVERED ITEM OR SERVICE: HCPCS | Performed by: INTERNAL MEDICINE

## 2022-11-17 PROCEDURE — 700102 HCHG RX REV CODE 250 W/ 637 OVERRIDE(OP): Performed by: INTERNAL MEDICINE

## 2022-11-17 PROCEDURE — 99233 SBSQ HOSP IP/OBS HIGH 50: CPT

## 2022-11-17 PROCEDURE — 83735 ASSAY OF MAGNESIUM: CPT

## 2022-11-17 RX ORDER — SULFAMETHOXAZOLE AND TRIMETHOPRIM 800; 160 MG/1; MG/1
1 TABLET ORAL EVERY 12 HOURS
Status: DISCONTINUED | OUTPATIENT
Start: 2022-11-18 | End: 2022-11-20 | Stop reason: HOSPADM

## 2022-11-17 RX ORDER — DIVALPROEX SODIUM 250 MG/1
500 TABLET, DELAYED RELEASE ORAL EVERY 12 HOURS
Status: DISCONTINUED | OUTPATIENT
Start: 2022-11-17 | End: 2022-11-20 | Stop reason: HOSPADM

## 2022-11-17 RX ORDER — AMOXICILLIN AND CLAVULANATE POTASSIUM 875; 125 MG/1; MG/1
1 TABLET, FILM COATED ORAL EVERY 12 HOURS
Status: DISCONTINUED | OUTPATIENT
Start: 2022-11-18 | End: 2022-11-20 | Stop reason: HOSPADM

## 2022-11-17 RX ORDER — LORAZEPAM 0.5 MG/1
0.5 TABLET ORAL EVERY 6 HOURS PRN
Status: DISCONTINUED | OUTPATIENT
Start: 2022-11-17 | End: 2022-11-20 | Stop reason: HOSPADM

## 2022-11-17 RX ADMIN — RIVAROXABAN 10 MG: 10 TABLET, FILM COATED ORAL at 17:22

## 2022-11-17 RX ADMIN — DIVALPROEX SODIUM 500 MG: 250 TABLET, DELAYED RELEASE ORAL at 17:22

## 2022-11-17 RX ADMIN — OXYCODONE 5 MG: 5 TABLET ORAL at 16:04

## 2022-11-17 RX ADMIN — SENNOSIDES AND DOCUSATE SODIUM 2 TABLET: 50; 8.6 TABLET ORAL at 17:22

## 2022-11-17 RX ADMIN — OXYCODONE 5 MG: 5 TABLET ORAL at 05:03

## 2022-11-17 RX ADMIN — CEFTRIAXONE SODIUM 2000 MG: 10 INJECTION, POWDER, FOR SOLUTION INTRAVENOUS at 05:03

## 2022-11-17 RX ADMIN — SENNOSIDES AND DOCUSATE SODIUM 2 TABLET: 50; 8.6 TABLET ORAL at 05:03

## 2022-11-17 RX ADMIN — OXYCODONE 5 MG: 5 TABLET ORAL at 20:09

## 2022-11-17 ASSESSMENT — ENCOUNTER SYMPTOMS
HEADACHES: 0
FEVER: 0
DIARRHEA: 0
FOCAL WEAKNESS: 0
COUGH: 0
STRIDOR: 0
MYALGIAS: 0
SPUTUM PRODUCTION: 0
HEMOPTYSIS: 0
NAUSEA: 0
PALPITATIONS: 0
VOMITING: 0
ABDOMINAL PAIN: 0
CHILLS: 0
NERVOUS/ANXIOUS: 0
SHORTNESS OF BREATH: 0
INSOMNIA: 0
WEAKNESS: 0
CONSTIPATION: 0
DIZZINESS: 0
TINGLING: 0
SORE THROAT: 0
FLANK PAIN: 0
ORTHOPNEA: 0

## 2022-11-17 ASSESSMENT — PAIN DESCRIPTION - PAIN TYPE
TYPE: ACUTE PAIN

## 2022-11-17 ASSESSMENT — LIFESTYLE VARIABLES: SUBSTANCE_ABUSE: 0

## 2022-11-17 NOTE — PROGRESS NOTES
Hospital Medicine Daily Progress Note    Date of Service  11/16/2022    Chief Complaint  Yumiko Chatman is a 52 y.o. female admitted 11/13/2022 with right foot pain    Hospital Course  Ms. Yumiko Chatman is a 52 y.o. female who with a PMHx of Medical history is significant for schizophrenia with medication noncompliance, tobacco abuse, resides at homeless shelter, who presented 11/13/2022 with right foot pain, abdominal pain.      Patient presents after experiencing several days of abdominal pain and loose stools up to 3 times a day.  Patient additionally reports right foot pain, swelling, blistering.  Patient denies nausea, vomiting, fever, chills.    Upon presentation to the ER, presenting vitals: /87, HR 93, RR 16, T98.9, SPO2 97% on room air.  Initial laboratory evaluation demonstrated a WBC of 10.2, creatinine 1, AST 81, ALT 51, potassium 3.3.  CT abdomen pelvis demonstrated no evidence of obstruction, no acute inflammatory change, enhancing mass in the endometrium.  Patient was started on Ancef for cellulitis.  Discussed case with ER provider and patient was admitted for further work-up and monitoring.    Patient monitored overnight in observation unit and during this time, patient's blood culture results are back positive for gram-negative rods.  Patient was already placed on IV ABX Ancef due to left lower extremity bullae, this was then changed to Unasyn then to Rocephin for better coverage of gram-negative bacteremia.  Patient switched to inpatient status as she is anticipated to stay 2-3 midnights for management of bacteremia, and left lower foot cellulitis.  Patient was evaluated by limb preservation services who states patient at risk for amputation and to monitor for final demarcation to determine amputation necessity as well as level.  ABIs show decreased blood flow to right lower extremity of 0.6.  TBI's are ordered and pending.    Interval Problem Update  11/15/2022: Patient resting quietly but  arouses appropriately.  AAOx4.  Patient with right foot pain sensitive to air exposure.  Dressing in place for limb preservation.  She is aware of plan for continuing antibiotics, and monitoring for demarcation.  She is refused offloading per nursing.    11/16/2022: Patient AOx4, vital signs stable on room air.  Right foot pain sensitivity with exposure, light touch.  Continuing antibiotics at this time.  Received phone call from ancillary staff that health department had called stating patient HIV positive upon recent discharge from incarceration.  During that time, viral load was low and patient was given 1 month supply of antiviral medications with instructions to follow-up with [facility] for follow-up.  Patient missed followed up with [facility] and staff will see if patient may continue to resume despite missing appointment.      I have discussed this patient's plan of care and discharge plan at IDT rounds today with Case Management, Nursing, Nursing leadership, and other members of the IDT team.    Consultants/Specialty  Limb preservation services  Wound care  Code Status  Full Code    Disposition  Patient is not medically cleared for discharge.   Anticipate discharge to to home with close outpatient follow-up.  I have placed the appropriate orders for post-discharge needs.    Review of Systems  Review of Systems   Constitutional:  Negative for chills, fever and malaise/fatigue.   HENT:  Negative for congestion and sore throat.    Respiratory:  Negative for cough, hemoptysis, sputum production, shortness of breath and stridor.    Cardiovascular:  Negative for chest pain, palpitations, orthopnea and leg swelling.   Gastrointestinal:  Negative for abdominal pain, constipation, diarrhea, nausea and vomiting.   Genitourinary:  Negative for dysuria and flank pain.   Musculoskeletal:  Positive for joint pain (Right foot pain). Negative for myalgias.   Neurological:  Negative for dizziness, tingling, focal  weakness, weakness and headaches.   Psychiatric/Behavioral:  Negative for substance abuse. The patient is not nervous/anxious and does not have insomnia.    All other systems reviewed and are negative.     Physical Exam  Temp:  [36.8 °C (98.2 °F)-36.9 °C (98.5 °F)] 36.9 °C (98.5 °F)  Pulse:  [77-90] 77  Resp:  [16-18] 17  BP: (106-122)/(61-81) 119/81  SpO2:  [92 %-93 %] 93 %    Physical Exam  Vitals and nursing note reviewed.   Constitutional:       General: She is not in acute distress.     Appearance: Normal appearance. She is normal weight. She is not ill-appearing or toxic-appearing.   HENT:      Head: Normocephalic.      Nose: Nose normal.      Mouth/Throat:      Mouth: Mucous membranes are moist.      Pharynx: Oropharynx is clear. No oropharyngeal exudate.   Eyes:      General: No scleral icterus.     Extraocular Movements: Extraocular movements intact.      Conjunctiva/sclera: Conjunctivae normal.      Pupils: Pupils are equal, round, and reactive to light.   Cardiovascular:      Rate and Rhythm: Normal rate and regular rhythm.      Pulses: Normal pulses.      Heart sounds: Normal heart sounds. No murmur heard.    No gallop.   Pulmonary:      Effort: Pulmonary effort is normal. No respiratory distress.      Breath sounds: Normal breath sounds. No wheezing or rhonchi.   Chest:      Chest wall: No tenderness.   Abdominal:      General: Abdomen is flat. Bowel sounds are normal. There is no distension.      Palpations: Abdomen is soft.      Tenderness: There is no abdominal tenderness. There is no guarding.   Musculoskeletal:         General: No swelling or deformity.   Skin:     Capillary Refill: Capillary refill takes less than 2 seconds.      Comments: Deflated bullae to right dorsal forefoot proximal to 1-4 toes.  Ischemic changes noted to left great toe and right 1-5 toes.  No surrounding erythema, edema, drainage.     Neurological:      General: No focal deficit present.      Mental Status: She is alert and  oriented to person, place, and time. Mental status is at baseline.      Motor: No weakness.   Psychiatric:         Mood and Affect: Mood normal.         Behavior: Behavior normal.         Thought Content: Thought content normal.         Judgment: Judgment normal.       Fluids    Intake/Output Summary (Last 24 hours) at 11/16/2022 1946  Last data filed at 11/16/2022 0447  Gross per 24 hour   Intake 480 ml   Output --   Net 480 ml         Laboratory  Recent Labs     11/14/22  0653 11/15/22  0240 11/16/22  0550   WBC 6.7 6.3 6.6   RBC 3.71* 3.53* 3.75*   HEMOGLOBIN 11.0* 10.4* 10.9*   HEMATOCRIT 33.4* 31.8* 33.9*   MCV 90.0 90.1 90.4   MCH 29.6 29.5 29.1   MCHC 32.9* 32.7* 32.2*   RDW 39.6 39.1 39.0   PLATELETCT 321 290 341   MPV 9.4 9.1 9.0       Recent Labs     11/14/22  0653 11/15/22  0240 11/16/22  0550   SODIUM 135 133* 132*   POTASSIUM 3.8 4.0 4.1   CHLORIDE 99 99 97   CO2 27 27 26   GLUCOSE 111* 96 116*   BUN 11 9 6*   CREATININE 0.54 0.67 0.60   CALCIUM 8.4* 8.4* 8.4*                     Imaging  IR-US GUIDED PIV   Final Result    Ultrasound-guided PERIPHERAL IV INSERTION performed by    qualified nursing staff as above.      US-PELVIC COMPLETE (TRANSABDOMINAL/TRANSVAGINAL) (COMBO)   Final Result      Posterior fundal mass exerts mass effect upon the endometrium is more likely to represent a submucosal leiomyoma than endometrial polyp or carcinoma. Recommend clinical correlation for hemorrhage. Follow-up is advised in 6 months to confirm stability if    no hemorrhage is present. If hemorrhage is present or there is worsening, biopsy would be indicated      US-EXTREMITY ARTERY LOWER UNILAT RIGHT   Final Result      US-GISELLA SINGLE LEVEL BILAT   Final Result      CT-ABDOMEN-PELVIS WITH   Final Result         1. No acute inflammatory change in the abdomen or pelvis.   2. No small bowel obstruction.   3. There is a 2.6 cm enhancing or high density mass in the endometrium. Further evaluation with ultrasound is  recommended.      DX-CHEST-PORTABLE (1 VIEW)   Final Result         1. No acute cardiopulmonary abnormalities are identified.             Assessment/Plan  * Bullae- (present on admission)  Assessment & Plan  Right dorsal forefoot proximal to 1-4 toes.    Related to frostbite injury.  LPS involved-appreciate their recs.  ABIs show decreased blood flow right lower extremity at 0.6, TBI's and toe pressures pending.  Cultures positive-see plan for bacteremia.                    HIV (human immunodeficiency virus infection) ([facility])  Assessment & Plan  Received phone call from ancillary staff that health department had called stating patient HIV positive upon recent discharge from incarceration.  During that time, viral load was low and patient was given 1 month supply of antiviral medications with instructions to follow-up with [facility] for follow-up.    Patient missed followed up with [facility] and staff will see if patient may continue to resume despite missing appointment.      Frostbite of both feet  Assessment & Plan  Ischemic and necrotic changes noted to left great toe and right 1-4 toes and right plantar forefoot.  LPS involved-appreciate their recs.  GISELLA showed decreased blood flow 0.6 to right lower extremity.  TBI's and toe pressures pending.  Recommend offloading and wound care per limb preservation services.    Per LPS, patient at risk for amputation.  Monitor for final demarcation to determine necessity and level of amputation    Bacteremia- (present on admission)  Assessment & Plan  - Noted POSITIVE blood Cx  -ON IV ABX - Rocephine  -Likely from RIGHT foot cellulitis concerning for frost bite  - will need to repeat BLD Cx prior to d/c    Endometrial mass  Assessment & Plan  -As identified on CT abdomen  -Transvaginal ultrasound ordered  -Check CA-125    Elevated liver transaminase level  Assessment & Plan  -AST 81  ALT 51  -Multiple hepatic cysts on CT abdomen    Hypokalemia  Assessment &  Plan  -Monitor and replace    Epigastric pain  Assessment & Plan  -Epigastric abdominal pain for 2-3 days with associated loose stools up to 3 times daily  -CT abdomen pelvis demonstrated no evidence of obstruction, no acute inflammatory change, enhancing mass in the endometrium  -Transvaginal ultrasound 11/13/2022: Posterior fundal mass exerts mass effect upon the endometrium is more likely to represent a submucosal leiomyoma than endometrial polyp or carcinoma.   No current hemorrhage, hemoglobin stable.  Follow-up outpatient in 6 months.    Schizophrenia (HCC)  Assessment & Plan  -Not cuurently on medications  -Has been on Risperdal in the past    Tobacco abuse  Assessment & Plan  11/14:Tobacco cessation counseling and education provided for 4 minutes. Nicotine replacement options provided including patch, and further medical treatments including Wellbutrin and Chantix. As well as over the counter options of lozenges and gum.         VTE prophylaxis: Xarelto 10 mg daily as prophylaxis    I have performed a physical exam and reviewed and updated ROS and Plan today (11/16/2022). In review of yesterday's note (11/15/2022), there are no changes except as documented above.

## 2022-11-17 NOTE — CARE PLAN
Problem: Pain - Standard  Goal: Alleviation of pain or a reduction in pain to the patient’s comfort goal  Outcome: Progressing     Problem: Knowledge Deficit - Standard  Goal: Patient and family/care givers will demonstrate understanding of plan of care, disease process/condition, diagnostic tests and medications  Outcome: Progressing   The patient is Stable - Low risk of patient condition declining or worsening    Shift Goals  Clinical Goals: pain control, improve mobility  Patient Goals: sleep, walk again  Family Goals: IVETTE    Progress made toward(s) clinical / shift goals:  Patient medicated for pain per MAR    Patient is not progressing towards the following goals:

## 2022-11-17 NOTE — CARE PLAN
The patient is Stable - Low risk of patient condition declining or worsening    Shift Goals  Clinical Goals: pain control, mobility, wound care, abx  Patient Goals: pain management  Family Goals: IVETTE    Progress made toward(s) clinical / shift goals:       Patient is not progressing towards the following goals:

## 2022-11-17 NOTE — PROGRESS NOTES
Hospital Medicine Daily Progress Note    Date of Service  11/17/2022    Chief Complaint  Ymuiko Chatman is a 52 y.o. female admitted 11/13/2022 with right foot pain    Hospital Course  Ms. Yumiko Chatman is a 52 y.o. female who with a PMHx of Medical history is significant for schizophrenia with medication noncompliance, tobacco abuse, resides at homeless shelter, who presented 11/13/2022 with right foot pain, abdominal pain.      Patient presents after experiencing several days of abdominal pain and loose stools up to 3 times a day.  Patient additionally reports right foot pain, swelling, blistering.  Patient denies nausea, vomiting, fever, chills.    Upon presentation to the ER, presenting vitals: /87, HR 93, RR 16, T98.9, SPO2 97% on room air.  Initial laboratory evaluation demonstrated a WBC of 10.2, creatinine 1, AST 81, ALT 51, potassium 3.3.  CT abdomen pelvis demonstrated no evidence of obstruction, no acute inflammatory change, enhancing mass in the endometrium.  Patient was started on Ancef for cellulitis.  Discussed case with ER provider and patient was admitted for further work-up and monitoring.    Patient monitored overnight in observation unit and during this time, patient's blood culture results are back positive for gram-negative rods.  Patient was already placed on IV ABX Ancef due to left lower extremity bullae, this was then changed to Unasyn then to Rocephin for better coverage of gram-negative bacteremia.  Patient switched to inpatient status as she is anticipated to stay 2-3 midnights for management of bacteremia, and left lower foot cellulitis.  Patient was evaluated by limb preservation services who states patient at risk for amputation and to monitor for final demarcation to determine amputation necessity as well as level.  ABIs show decreased blood flow to right lower extremity of 0.6.  TBI's are ordered and pending.    Interval Problem Update  11/15/2022: Patient resting quietly but  arouses appropriately.  AAOx4.  Patient with right foot pain sensitive to air exposure.  Dressing in place for limb preservation.  She is aware of plan for continuing antibiotics, and monitoring for demarcation.  She is refused offloading per nursing.    11/16/2022: Patient AOx4, vital signs stable on room air.  Right foot pain sensitivity with exposure, light touch.  Continuing antibiotics at this time.  Received phone call from ancillary staff that health department had called stating patient HIV positive upon recent discharge from incarceration.  During that time, viral load was low and patient was given 1 month supply of antiviral medications with instructions to follow-up with [facility] for follow-up.  Patient missed followed up with [facility] and staff will see if patient may continue to resume despite missing appointment.     11/17/2022: Afebrile, vital stable, on room air.  No white count.  H&H is stable.  Sodium low but stable.  Patient requesting to be placed back on her schizophrenia medication-Depakote.  Discussed with pharmacy who was unable to find the previous dosage, so started her on low-dose.  Patient states she has taken it since before she went to [facility].  Discussed HIV medication Biktarvay with patient, she would like to continue taking it and follow-up outpatient.  Discussed with ID pharmacy and Dr. Manzo who will address and add if indicated.    I have discussed this patient's plan of care and discharge plan at IDT rounds today with Case Management, Nursing, Nursing leadership, and other members of the IDT team.    Consultants/Specialty  Limb preservation services  Wound care  Code Status  Full Code    Disposition  Patient is not medically cleared for discharge.   Anticipate discharge to to home with close outpatient follow-up.  I have placed the appropriate orders for post-discharge needs.    Review of Systems  Review of Systems   Constitutional:  Negative for chills, fever and  malaise/fatigue.   HENT:  Negative for congestion and sore throat.    Respiratory:  Negative for cough, hemoptysis, sputum production, shortness of breath and stridor.    Cardiovascular:  Negative for chest pain, palpitations, orthopnea and leg swelling.   Gastrointestinal:  Negative for abdominal pain, constipation, diarrhea, nausea and vomiting.   Genitourinary:  Negative for dysuria and flank pain.   Musculoskeletal:  Positive for joint pain (Right foot pain). Negative for myalgias.   Neurological:  Negative for dizziness, tingling, focal weakness, weakness and headaches.   Psychiatric/Behavioral:  Negative for substance abuse. The patient is not nervous/anxious and does not have insomnia.    All other systems reviewed and are negative.     Physical Exam  Temp:  [36.3 °C (97.4 °F)-37.1 °C (98.7 °F)] 36.6 °C (97.9 °F)  Pulse:  [79-88] 88  Resp:  [16-18] 17  BP: (121-128)/(80-92) 125/85  SpO2:  [94 %-96 %] 96 %    Physical Exam  Vitals and nursing note reviewed.   Constitutional:       General: She is not in acute distress.     Appearance: Normal appearance. She is normal weight. She is not ill-appearing or toxic-appearing.   HENT:      Head: Normocephalic.      Nose: Nose normal.      Mouth/Throat:      Mouth: Mucous membranes are moist.      Pharynx: Oropharynx is clear. No oropharyngeal exudate.   Eyes:      General: No scleral icterus.     Extraocular Movements: Extraocular movements intact.      Conjunctiva/sclera: Conjunctivae normal.      Pupils: Pupils are equal, round, and reactive to light.   Cardiovascular:      Rate and Rhythm: Normal rate and regular rhythm.      Pulses: Normal pulses.      Heart sounds: Normal heart sounds. No murmur heard.    No gallop.   Pulmonary:      Effort: Pulmonary effort is normal. No respiratory distress.      Breath sounds: Normal breath sounds. No wheezing or rhonchi.   Chest:      Chest wall: No tenderness.   Abdominal:      General: Abdomen is flat. Bowel sounds are  normal. There is no distension.      Palpations: Abdomen is soft.      Tenderness: There is no abdominal tenderness. There is no guarding.   Musculoskeletal:         General: No swelling or deformity.   Skin:     Capillary Refill: Capillary refill takes less than 2 seconds.      Comments: Deflated bullae to right dorsal forefoot proximal to 1-4 toes.  Ischemic changes noted to left great toe and right 1-5 toes.  No surrounding erythema, edema, drainage.     Neurological:      General: No focal deficit present.      Mental Status: She is alert and oriented to person, place, and time. Mental status is at baseline.      Motor: No weakness.   Psychiatric:         Mood and Affect: Mood normal.         Behavior: Behavior normal.         Thought Content: Thought content normal.         Judgment: Judgment normal.       Fluids    Intake/Output Summary (Last 24 hours) at 11/17/2022 1405  Last data filed at 11/16/2022 2000  Gross per 24 hour   Intake 240 ml   Output --   Net 240 ml       Laboratory  Recent Labs     11/15/22  0240 11/16/22  0550 11/17/22  0441   WBC 6.3 6.6 6.3   RBC 3.53* 3.75* 3.67*   HEMOGLOBIN 10.4* 10.9* 10.8*   HEMATOCRIT 31.8* 33.9* 33.2*   MCV 90.1 90.4 90.5   MCH 29.5 29.1 29.4   MCHC 32.7* 32.2* 32.5*   RDW 39.1 39.0 39.1   PLATELETCT 290 341 355   MPV 9.1 9.0 8.8*     Recent Labs     11/15/22  0240 11/16/22  0550 11/17/22  0441   SODIUM 133* 132* 132*   POTASSIUM 4.0 4.1 4.4   CHLORIDE 99 97 99   CO2 27 26 25   GLUCOSE 96 116* 96   BUN 9 6* 8   CREATININE 0.67 0.60 0.58   CALCIUM 8.4* 8.4* 8.9                   Imaging  IR-US GUIDED PIV   Final Result    Ultrasound-guided PERIPHERAL IV INSERTION performed by    qualified nursing staff as above.      US-PELVIC COMPLETE (TRANSABDOMINAL/TRANSVAGINAL) (COMBO)   Final Result      Posterior fundal mass exerts mass effect upon the endometrium is more likely to represent a submucosal leiomyoma than endometrial polyp or carcinoma. Recommend clinical  correlation for hemorrhage. Follow-up is advised in 6 months to confirm stability if    no hemorrhage is present. If hemorrhage is present or there is worsening, biopsy would be indicated      US-EXTREMITY ARTERY LOWER UNILAT RIGHT   Final Result      US-GISELLA SINGLE LEVEL BILAT   Final Result      CT-ABDOMEN-PELVIS WITH   Final Result         1. No acute inflammatory change in the abdomen or pelvis.   2. No small bowel obstruction.   3. There is a 2.6 cm enhancing or high density mass in the endometrium. Further evaluation with ultrasound is recommended.      DX-CHEST-PORTABLE (1 VIEW)   Final Result         1. No acute cardiopulmonary abnormalities are identified.             Assessment/Plan  * Bullae- (present on admission)  Assessment & Plan  Right dorsal forefoot proximal to 1-4 toes.    Related to frostbite injury.  LPS involved-appreciate their recs.  ABIs show decreased blood flow right lower extremity at 0.6, TBI's and toe pressures pending.  Cultures positive-see plan for bacteremia.                    Frostbite of both feet  Assessment & Plan  Ischemic and necrotic changes noted to left great toe and right 1-4 toes and right plantar forefoot.  LPS involved-appreciate their recs.  GISELLA showed decreased blood flow 0.6 to right lower extremity.  TBI's and toe pressures pending.  Recommend offloading and wound care per limb preservation services.    Per LPS, patient at risk for amputation.  Monitor for final demarcation to determine necessity and level of amputation    Bacteremia- (present on admission)  Assessment & Plan  - Noted POSITIVE blood Cx  -ON IV ABX - Rocephine  -Likely from RIGHT foot cellulitis concerning for frost bite  - will need to repeat BLD Cx prior to d/c    HIV (human immunodeficiency virus infection) ([facility])  Assessment & Plan  Received phone call from ancillary staff that health department had called stating patient HIV positive upon recent discharge from incarceration.  During that time,  viral load was low and patient was given 1 month supply of antiviral medications with instructions to follow-up with [facility] for follow-up.    Patient missed followed up with [facility] and staff will see if patient may continue to resume despite missing appointment.    11/17 discussed with ID pharmacy and Dr. Manzo who will address and place patient on antiviral medications if necessary    Endometrial mass  Assessment & Plan  -As identified on CT abdomen  -Transvaginal ultrasound reveals most likely submucosal leiomyoma - recommend follow up in 6 months to confirm stability if   no hemorrhage is present. If hemorrhage is present or there is worsening, biopsy would be indicated.  -CA-125= WDL    Elevated liver transaminase level  Assessment & Plan  -AST 81  ALT 51  -Multiple hepatic cysts on CT abdomen    Hypokalemia  Assessment & Plan  -Monitor and replace    Epigastric pain  Assessment & Plan  -Epigastric abdominal pain for 2-3 days with associated loose stools up to 3 times daily  -CT abdomen pelvis demonstrated no evidence of obstruction, no acute inflammatory change, enhancing mass in the endometrium  -Transvaginal ultrasound 11/13/2022: Posterior fundal mass exerts mass effect upon the endometrium is more likely to represent a submucosal leiomyoma than endometrial polyp or carcinoma.   No current hemorrhage, hemoglobin stable.  Follow-up outpatient in 6 months.    Schizophrenia (HCC)  Assessment & Plan  -Not cuurently on medications  -Has been on Risperdal in the past  -11/17 patient states she has taken Depakote in the past, which has been restarted.  Could not find any mention of Risperdal in the past.    Tobacco abuse  Assessment & Plan  11/14:Tobacco cessation counseling and education provided for 4 minutes. Nicotine replacement options provided including patch, and further medical treatments including Wellbutrin and Chantix. As well as over the counter options of lozenges and gum.         VTE  prophylaxis: Xarelto 10 mg daily as prophylaxis    I have performed a physical exam and reviewed and updated ROS and Plan today (11/17/2022). In review of yesterday's note (11/16/2022), there are no changes except as documented above.

## 2022-11-17 NOTE — DISCHARGE PLANNING
Case Management Discharge Planning    Admission Date: 11/13/2022  GMLOS: 2.8  ALOS: 3    6-Clicks ADL Score: 24  6-Clicks Mobility Score: 24      Anticipated Discharge Dispo: Discharge Disposition: Discharged to home/self care (01)    DME Needed: No    Action(s) Taken: Updated Provider/Nurse on Discharge Plan    Escalations Completed: None    Medically Clear: No    Next Steps: Pt not yet medically cleared for discharge. Plans to return to shelter. Will need F/U for HIV at [facility].    Barriers to Discharge: Medical clearance    Is the patient up for discharge tomorrow: No

## 2022-11-17 NOTE — ASSESSMENT & PLAN NOTE
Received phone call from ancillary staff that health department had called stating patient HIV positive upon recent discharge from incarceration.  During that time, viral load was low and patient was given 1 month supply of antiviral medications with instructions to follow-up with [facility] for follow-up.    Patient missed followed up with [facility] and staff will see if patient may continue to resume despite missing appointment.    11/17 discussed with ID pharmacy and Dr. Manzo who will address and place patient on antiviral medications if necessary

## 2022-11-17 NOTE — PROGRESS NOTES
Patient stays she is schizophrenic and not getting her medication while she is here. Message sent to SHELLEY Bell

## 2022-11-18 LAB
ALBUMIN SERPL BCP-MCNC: 2.9 G/DL (ref 3.2–4.9)
ALBUMIN/GLOB SERPL: 0.7 G/DL
ALP SERPL-CCNC: 66 U/L (ref 30–99)
ALT SERPL-CCNC: 15 U/L (ref 2–50)
ANION GAP SERPL CALC-SCNC: 8 MMOL/L (ref 7–16)
AST SERPL-CCNC: 16 U/L (ref 12–45)
BACTERIA BLD CULT: NORMAL
BILIRUB SERPL-MCNC: 0.2 MG/DL (ref 0.1–1.5)
BUN SERPL-MCNC: 8 MG/DL (ref 8–22)
CALCIUM SERPL-MCNC: 9 MG/DL (ref 8.5–10.5)
CHLORIDE SERPL-SCNC: 100 MMOL/L (ref 96–112)
CO2 SERPL-SCNC: 25 MMOL/L (ref 20–33)
CREAT SERPL-MCNC: 0.57 MG/DL (ref 0.5–1.4)
ERYTHROCYTE [DISTWIDTH] IN BLOOD BY AUTOMATED COUNT: 38.5 FL (ref 35.9–50)
GFR SERPLBLD CREATININE-BSD FMLA CKD-EPI: 107 ML/MIN/1.73 M 2
GLOBULIN SER CALC-MCNC: 4.1 G/DL (ref 1.9–3.5)
GLUCOSE SERPL-MCNC: 98 MG/DL (ref 65–99)
HCT VFR BLD AUTO: 34.7 % (ref 37–47)
HGB BLD-MCNC: 11.3 G/DL (ref 12–16)
MCH RBC QN AUTO: 29 PG (ref 27–33)
MCHC RBC AUTO-ENTMCNC: 32.6 G/DL (ref 33.6–35)
MCV RBC AUTO: 89.2 FL (ref 81.4–97.8)
PLATELET # BLD AUTO: 415 K/UL (ref 164–446)
PMV BLD AUTO: 8.7 FL (ref 9–12.9)
POTASSIUM SERPL-SCNC: 4.4 MMOL/L (ref 3.6–5.5)
PROT SERPL-MCNC: 7 G/DL (ref 6–8.2)
RBC # BLD AUTO: 3.89 M/UL (ref 4.2–5.4)
SIGNIFICANT IND 70042: NORMAL
SITE SITE: NORMAL
SODIUM SERPL-SCNC: 133 MMOL/L (ref 135–145)
SOURCE SOURCE: NORMAL
WBC # BLD AUTO: 5.2 K/UL (ref 4.8–10.8)

## 2022-11-18 PROCEDURE — A9270 NON-COVERED ITEM OR SERVICE: HCPCS

## 2022-11-18 PROCEDURE — 99232 SBSQ HOSP IP/OBS MODERATE 35: CPT | Performed by: INTERNAL MEDICINE

## 2022-11-18 PROCEDURE — 80053 COMPREHEN METABOLIC PANEL: CPT

## 2022-11-18 PROCEDURE — 86361 T CELL ABSOLUTE COUNT: CPT

## 2022-11-18 PROCEDURE — 770001 HCHG ROOM/CARE - MED/SURG/GYN PRIV*

## 2022-11-18 PROCEDURE — 36415 COLL VENOUS BLD VENIPUNCTURE: CPT

## 2022-11-18 PROCEDURE — 87040 BLOOD CULTURE FOR BACTERIA: CPT | Mod: 91

## 2022-11-18 PROCEDURE — 700102 HCHG RX REV CODE 250 W/ 637 OVERRIDE(OP)

## 2022-11-18 PROCEDURE — 85027 COMPLETE CBC AUTOMATED: CPT

## 2022-11-18 PROCEDURE — 700102 HCHG RX REV CODE 250 W/ 637 OVERRIDE(OP): Performed by: INTERNAL MEDICINE

## 2022-11-18 PROCEDURE — A9270 NON-COVERED ITEM OR SERVICE: HCPCS | Performed by: INTERNAL MEDICINE

## 2022-11-18 RX ADMIN — SULFAMETHOXAZOLE AND TRIMETHOPRIM 1 TABLET: 800; 160 TABLET ORAL at 06:19

## 2022-11-18 RX ADMIN — DIVALPROEX SODIUM 500 MG: 250 TABLET, DELAYED RELEASE ORAL at 06:19

## 2022-11-18 RX ADMIN — AMOXICILLIN AND CLAVULANATE POTASSIUM 1 TABLET: 875; 125 TABLET, FILM COATED ORAL at 06:20

## 2022-11-18 RX ADMIN — OXYCODONE 5 MG: 5 TABLET ORAL at 19:24

## 2022-11-18 RX ADMIN — AMOXICILLIN AND CLAVULANATE POTASSIUM 1 TABLET: 875; 125 TABLET, FILM COATED ORAL at 19:24

## 2022-11-18 RX ADMIN — BICTEGRAVIR SODIUM, EMTRICITABINE, AND TENOFOVIR ALAFENAMIDE FUMARATE 1 TABLET: 50; 200; 25 TABLET ORAL at 09:52

## 2022-11-18 RX ADMIN — OXYCODONE 5 MG: 5 TABLET ORAL at 06:21

## 2022-11-18 RX ADMIN — OXYCODONE 5 MG: 5 TABLET ORAL at 09:35

## 2022-11-18 RX ADMIN — SENNOSIDES AND DOCUSATE SODIUM 2 TABLET: 50; 8.6 TABLET ORAL at 06:20

## 2022-11-18 ASSESSMENT — ENCOUNTER SYMPTOMS
STRIDOR: 0
SORE THROAT: 0
ABDOMINAL PAIN: 0
SEIZURES: 0
CHILLS: 0
FLANK PAIN: 0
FEVER: 0
VOMITING: 0
NAUSEA: 0
DOUBLE VISION: 0
PALPITATIONS: 0
LOSS OF CONSCIOUSNESS: 0
BLURRED VISION: 0
SHORTNESS OF BREATH: 0
MYALGIAS: 0
HALLUCINATIONS: 0
COUGH: 0
DIARRHEA: 0

## 2022-11-18 ASSESSMENT — LIFESTYLE VARIABLES: SUBSTANCE_ABUSE: 0

## 2022-11-18 NOTE — CARE PLAN
Problem: Pain - Standard  Goal: Alleviation of pain or a reduction in pain to the patient’s comfort goal  Outcome: Progressing     Problem: Skin Integrity  Goal: Skin integrity is maintained or improved  Outcome: Progressing   The patient is Stable - Low risk of patient condition declining or worsening    Shift Goals  Clinical Goals: pain control, mobility  Patient Goals: pain control  Family Goals: not present    Progress made toward(s) clinical / shift goals:  yes    Patient is not progressing towards the following goals: n/a

## 2022-11-18 NOTE — CARE PLAN
The patient is Stable - Low risk of patient condition declining or worsening    Shift Goals  Clinical Goals: pain control, mobility  Patient Goals: comfort, sleep  Family Goals: not present    Progress made toward(s) clinical / shift goals:    Problem: Pain - Standard  Goal: Alleviation of pain or a reduction in pain to the patient’s comfort goal  Outcome: Progressing   Pt educated on 0-10 pain scale, pt medicated per MAR.     Problem: Knowledge Deficit - Standard  Goal: Patient and family/care givers will demonstrate understanding of plan of care, disease process/condition, diagnostic tests and medications  Outcome: Progressing   Pt educated on POC, all questions and concerns addressed at this time.     Patient is not progressing towards the following goals:

## 2022-11-18 NOTE — CARE PLAN
The patient is Stable - Low risk of patient condition declining or worsening    Shift Goals  Clinical Goals: pain control, mobility  Patient Goals: pain control  Family Goals: not present    Progress made toward(s) clinical / shift goals:    Problem: Pain - Standard  Goal: Alleviation of pain or a reduction in pain to the patient’s comfort goal  Outcome: Progressing     Problem: Knowledge Deficit - Standard  Goal: Patient and family/care givers will demonstrate understanding of plan of care, disease process/condition, diagnostic tests and medications  Outcome: Progressing     Problem: Skin Integrity  Goal: Skin integrity is maintained or improved  Outcome: Progressing       Patient is not progressing towards the following goals:

## 2022-11-18 NOTE — PROGRESS NOTES
Hospital Medicine Daily Progress Note    Date of Service  11/18/2022    Chief Complaint  Yumiko Chatman is a 52 y.o. female admitted 11/13/2022 with right foot pain    Hospital Course  Ms. Yumiko Chatman is a 52 y.o. female who with a PMHx of Medical history is significant for schizophrenia with medication noncompliance, tobacco abuse, resides at homeless shelter, who presented 11/13/2022 with right foot pain, abdominal pain.      Patient presents after experiencing several days of abdominal pain and loose stools up to 3 times a day.  Patient additionally reports right foot pain, swelling, blistering.  Patient denies nausea, vomiting, fever, chills.    Upon presentation to the ER, presenting vitals: /87, HR 93, RR 16, T98.9, SPO2 97% on room air.  Initial laboratory evaluation demonstrated a WBC of 10.2, creatinine 1, AST 81, ALT 51, potassium 3.3.  CT abdomen pelvis demonstrated no evidence of obstruction, no acute inflammatory change, enhancing mass in the endometrium.  Patient was started on Ancef for cellulitis.  Discussed case with ER provider and patient was admitted for further work-up and monitoring.    Patient monitored overnight in observation unit and during this time, patient's blood culture results are back positive for gram-negative rods.  Patient was already placed on IV ABX Ancef due to left lower extremity bullae, this was then changed to Unasyn then to Rocephin for better coverage of gram-negative bacteremia.  Patient switched to inpatient status as she is anticipated to stay 2-3 midnights for management of bacteremia, and left lower foot cellulitis.  Patient was evaluated by limb preservation services who states patient at risk for amputation and to monitor for final demarcation to determine amputation necessity as well as level.  ABIs show decreased blood flow to right lower extremity of 0.6.  TBI's are ordered and pending.    Interval Problem Update  Patient was seen and examined at bedside.   No acute events overnight. Patient is resting comfortably in bed and in no acute distress.     Discharge in 24 - 48 hours  Repeat blood culture      I have discussed this patient's plan of care and discharge plan at IDT rounds today with Case Management, Nursing, Nursing leadership, and other members of the IDT team.    Consultants/Specialty  Limb preservation services  Wound care  Code Status  Full Code    Disposition  Patient is not medically cleared for discharge.   Anticipate discharge to to home with close outpatient follow-up.  I have placed the appropriate orders for post-discharge needs.    Review of Systems  Review of Systems   Constitutional:  Negative for chills and fever.   HENT:  Negative for congestion and sore throat.    Eyes:  Negative for blurred vision and double vision.   Respiratory:  Negative for cough, shortness of breath and stridor.    Cardiovascular:  Negative for chest pain and palpitations.   Gastrointestinal:  Negative for abdominal pain, diarrhea, nausea and vomiting.   Genitourinary:  Negative for dysuria and flank pain.   Musculoskeletal:  Positive for joint pain (Right foot pain). Negative for myalgias.   Neurological:  Negative for seizures and loss of consciousness.   Psychiatric/Behavioral:  Negative for hallucinations and substance abuse.    All other systems reviewed and are negative.     Physical Exam  Temp:  [36.2 °C (97.1 °F)-36.6 °C (97.9 °F)] 36.2 °C (97.2 °F)  Pulse:  [64-98] 98  Resp:  [17-20] 17  BP: (104-119)/(42-80) 108/42  SpO2:  [94 %-96 %] 94 %    Physical Exam  Vitals and nursing note reviewed.   Constitutional:       General: She is not in acute distress.     Appearance: Normal appearance. She is normal weight. She is not ill-appearing or toxic-appearing.   HENT:      Head: Normocephalic.      Right Ear: External ear normal.      Left Ear: External ear normal.      Nose: Nose normal. No congestion.      Mouth/Throat:      Mouth: Mucous membranes are dry.       Pharynx: No oropharyngeal exudate.   Eyes:      General: No scleral icterus.  Cardiovascular:      Rate and Rhythm: Normal rate and regular rhythm.      Pulses: Normal pulses.      Heart sounds: Normal heart sounds. No murmur heard.  Pulmonary:      Effort: Pulmonary effort is normal.      Breath sounds: Normal breath sounds. No wheezing.   Abdominal:      General: Abdomen is flat. Bowel sounds are normal.      Palpations: Abdomen is soft.      Tenderness: There is no abdominal tenderness. There is no guarding or rebound.   Musculoskeletal:         General: No swelling.   Skin:     Capillary Refill: Capillary refill takes less than 2 seconds.      Coloration: Skin is not jaundiced.      Findings: No bruising.      Comments: Deflated bullae to right dorsal forefoot proximal to 1-4 toes.  Ischemic changes noted to left great toe and right 1-5 toes.  No surrounding erythema, edema, drainage.     Neurological:      General: No focal deficit present.      Mental Status: She is alert and oriented to person, place, and time. Mental status is at baseline.      Motor: No weakness.   Psychiatric:         Mood and Affect: Mood normal.         Behavior: Behavior normal.         Thought Content: Thought content normal.         Judgment: Judgment normal.         Fluids  No intake or output data in the 24 hours ending 11/18/22 1557      Laboratory  Recent Labs     11/16/22  0550 11/17/22  0441 11/18/22  0249   WBC 6.6 6.3 5.2   RBC 3.75* 3.67* 3.89*   HEMOGLOBIN 10.9* 10.8* 11.3*   HEMATOCRIT 33.9* 33.2* 34.7*   MCV 90.4 90.5 89.2   MCH 29.1 29.4 29.0   MCHC 32.2* 32.5* 32.6*   RDW 39.0 39.1 38.5   PLATELETCT 341 355 415   MPV 9.0 8.8* 8.7*     Recent Labs     11/16/22  0550 11/17/22  0441 11/18/22  0249   SODIUM 132* 132* 133*   POTASSIUM 4.1 4.4 4.4   CHLORIDE 97 99 100   CO2 26 25 25   GLUCOSE 116* 96 98   BUN 6* 8 8   CREATININE 0.60 0.58 0.57   CALCIUM 8.4* 8.9 9.0                   Imaging  IR-US GUIDED PIV   Final Result     Ultrasound-guided PERIPHERAL IV INSERTION performed by    qualified nursing staff as above.      US-PELVIC COMPLETE (TRANSABDOMINAL/TRANSVAGINAL) (COMBO)   Final Result      Posterior fundal mass exerts mass effect upon the endometrium is more likely to represent a submucosal leiomyoma than endometrial polyp or carcinoma. Recommend clinical correlation for hemorrhage. Follow-up is advised in 6 months to confirm stability if    no hemorrhage is present. If hemorrhage is present or there is worsening, biopsy would be indicated      US-EXTREMITY ARTERY LOWER UNILAT RIGHT   Final Result      US-GISELLA SINGLE LEVEL BILAT   Final Result      CT-ABDOMEN-PELVIS WITH   Final Result         1. No acute inflammatory change in the abdomen or pelvis.   2. No small bowel obstruction.   3. There is a 2.6 cm enhancing or high density mass in the endometrium. Further evaluation with ultrasound is recommended.      DX-CHEST-PORTABLE (1 VIEW)   Final Result         1. No acute cardiopulmonary abnormalities are identified.             Assessment/Plan  * Bullae- (present on admission)  Assessment & Plan  Right dorsal forefoot proximal to 1-4 toes.    Related to frostbite injury.  LPS involved-appreciate their recs.  ABIs show decreased blood flow right lower extremity at 0.6, TBI's and toe pressures pending.  Cultures positive-see plan for bacteremia.                    Endometrial mass  Assessment & Plan  -As identified on CT abdomen  -Transvaginal ultrasound reveals most likely submucosal leiomyoma - recommend follow up in 6 months to confirm stability if   no hemorrhage is present. If hemorrhage is present or there is worsening, biopsy would be indicated.  -CA-125= WDL    Epigastric pain  Assessment & Plan  -Epigastric abdominal pain for 2-3 days with associated loose stools up to 3 times daily  -CT abdomen pelvis demonstrated no evidence of obstruction, no acute inflammatory change, enhancing mass in the endometrium  -Transvaginal  ultrasound 11/13/2022: Posterior fundal mass exerts mass effect upon the endometrium is more likely to represent a submucosal leiomyoma than endometrial polyp or carcinoma.   No current hemorrhage, hemoglobin stable.  Follow-up outpatient in 6 months.    Elevated liver transaminase level  Assessment & Plan  -AST 81  ALT 51  -Multiple hepatic cysts on CT abdomen  -11/17 recheck CMP in a.m.    Schizophrenia (HCC)  Assessment & Plan  -Not cuurently on medications  -Has been on Risperdal in the past  -11/17 patient states she has taken Depakote in the past, which has been restarted.  Could not find any mention of Risperdal in the past.    HIV (human immunodeficiency virus infection) (HCC)  Assessment & Plan  Received phone call from ancillary staff that health department had called stating patient HIV positive upon recent discharge from incarceration.  During that time, viral load was low and patient was given 1 month supply of antiviral medications with instructions to follow-up with [facility] for follow-up.    Patient missed followed up with [facility] and staff will see if patient may continue to resume despite missing appointment.    11/17 discussed with ID pharmacy and Dr. Manzo who will address and place patient on antiviral medications if necessary    Frostbite of both feet  Assessment & Plan  Ischemic and necrotic changes noted to left great toe and right 1-4 toes and right plantar forefoot.  LPS involved-appreciate their recs.  GISELLA showed decreased blood flow 0.6 to right lower extremity.  TBI's and toe pressures pending.  Recommend offloading and wound care per limb preservation services.    Per LPS, patient at risk for amputation.  Monitor for final demarcation to determine necessity and level of amputation    Bacteremia- (present on admission)  Assessment & Plan  - Noted positive blood Cx  -Likely from RIGHT foot cellulitis concerning for frost bite  - will need to repeat BLD Cx prior to d/c  - repeat  blood cultures     Hypokalemia  Assessment & Plan  -Monitor and replace    Tobacco abuse  Assessment & Plan  Tobacco cessation counseling and education provided for 4 minutes. Nicotine replacement options provided including patch, and further medical treatments including Wellbutrin and Chantix. As well as over the counter options of lozenges and gum.           VTE prophylaxis: Xarelto 10 mg daily as prophylaxis    I have performed a physical exam and reviewed and updated ROS and Plan today (11/18/2022). In review of yesterday's note (11/17/2022), there are no changes except as documented above.

## 2022-11-19 ENCOUNTER — PHARMACY VISIT (OUTPATIENT)
Dept: PHARMACY | Facility: MEDICAL CENTER | Age: 52
End: 2022-11-19
Payer: COMMERCIAL

## 2022-11-19 PROBLEM — I96 NECROTIC TOES (HCC): Status: ACTIVE | Noted: 2022-11-13

## 2022-11-19 PROCEDURE — 700102 HCHG RX REV CODE 250 W/ 637 OVERRIDE(OP)

## 2022-11-19 PROCEDURE — A9270 NON-COVERED ITEM OR SERVICE: HCPCS | Performed by: INTERNAL MEDICINE

## 2022-11-19 PROCEDURE — A9270 NON-COVERED ITEM OR SERVICE: HCPCS

## 2022-11-19 PROCEDURE — 99232 SBSQ HOSP IP/OBS MODERATE 35: CPT | Performed by: INTERNAL MEDICINE

## 2022-11-19 PROCEDURE — 700102 HCHG RX REV CODE 250 W/ 637 OVERRIDE(OP): Performed by: INTERNAL MEDICINE

## 2022-11-19 PROCEDURE — 700105 HCHG RX REV CODE 258: Performed by: INTERNAL MEDICINE

## 2022-11-19 PROCEDURE — RXMED WILLOW AMBULATORY MEDICATION CHARGE: Performed by: INTERNAL MEDICINE

## 2022-11-19 PROCEDURE — 770001 HCHG ROOM/CARE - MED/SURG/GYN PRIV*

## 2022-11-19 PROCEDURE — 99232 SBSQ HOSP IP/OBS MODERATE 35: CPT

## 2022-11-19 RX ORDER — SODIUM CHLORIDE 9 MG/ML
1000 INJECTION, SOLUTION INTRAVENOUS ONCE
Status: COMPLETED | OUTPATIENT
Start: 2022-11-19 | End: 2022-11-19

## 2022-11-19 RX ORDER — AMOXICILLIN AND CLAVULANATE POTASSIUM 875; 125 MG/1; MG/1
1 TABLET, FILM COATED ORAL EVERY 12 HOURS
Qty: 20 TABLET | Refills: 0 | Status: SHIPPED | OUTPATIENT
Start: 2022-11-19 | End: 2022-11-29

## 2022-11-19 RX ORDER — DIVALPROEX SODIUM 500 MG/1
500 TABLET, DELAYED RELEASE ORAL EVERY 12 HOURS
Qty: 90 TABLET | Refills: 0 | Status: ON HOLD | OUTPATIENT
Start: 2022-11-19 | End: 2023-03-29 | Stop reason: SDUPTHER

## 2022-11-19 RX ADMIN — OXYCODONE 5 MG: 5 TABLET ORAL at 06:11

## 2022-11-19 RX ADMIN — SULFAMETHOXAZOLE AND TRIMETHOPRIM 1 TABLET: 800; 160 TABLET ORAL at 16:36

## 2022-11-19 RX ADMIN — OXYCODONE 5 MG: 5 TABLET ORAL at 09:49

## 2022-11-19 RX ADMIN — RIVAROXABAN 10 MG: 10 TABLET, FILM COATED ORAL at 16:36

## 2022-11-19 RX ADMIN — SODIUM CHLORIDE 1000 ML: 9 INJECTION, SOLUTION INTRAVENOUS at 16:34

## 2022-11-19 RX ADMIN — AMOXICILLIN AND CLAVULANATE POTASSIUM 1 TABLET: 875; 125 TABLET, FILM COATED ORAL at 16:36

## 2022-11-19 RX ADMIN — BICTEGRAVIR SODIUM, EMTRICITABINE, AND TENOFOVIR ALAFENAMIDE FUMARATE 1 TABLET: 50; 200; 25 TABLET ORAL at 06:11

## 2022-11-19 RX ADMIN — SENNOSIDES AND DOCUSATE SODIUM 2 TABLET: 50; 8.6 TABLET ORAL at 16:36

## 2022-11-19 RX ADMIN — DIVALPROEX SODIUM 500 MG: 250 TABLET, DELAYED RELEASE ORAL at 16:37

## 2022-11-19 ASSESSMENT — ENCOUNTER SYMPTOMS
ABDOMINAL PAIN: 0
BLURRED VISION: 0
VOMITING: 0
DOUBLE VISION: 0
HALLUCINATIONS: 0
FEVER: 0
MYALGIAS: 0
STRIDOR: 0
SORE THROAT: 0
FLANK PAIN: 0
SHORTNESS OF BREATH: 0
NAUSEA: 0
LOSS OF CONSCIOUSNESS: 0
DIARRHEA: 0
COUGH: 0
SEIZURES: 0
CHILLS: 0
PALPITATIONS: 0

## 2022-11-19 ASSESSMENT — LIFESTYLE VARIABLES: SUBSTANCE_ABUSE: 0

## 2022-11-19 ASSESSMENT — PAIN DESCRIPTION - PAIN TYPE: TYPE: ACUTE PAIN

## 2022-11-19 NOTE — THERAPY
Missed Therapy     Patient Name: Yumiko Chatman  Age:  52 y.o., Sex:  female  Medical Record #: [id number]  Today's Date: 11/19/2022    Pt refused eval today. Does not want to sit up in bed, does not want to ambulate with off loading shoes. Discussed this with both RN and Physician. Will attempt again if pt does not DC from acute setting.       11/19/22 1430   Initial Contact Note    Initial Contact Note Order Received and Verified, Physical Therapy Evaluation in Progress with Full Report to Follow.   Interdisciplinary Plan of Care Collaboration   Collaboration Comments pt refusing eval today, does not want to get out of bed, does not want to wear offloading shoes. Discussed this with RN and Physician.   Session Information   Date / Session Number  11/19

## 2022-11-19 NOTE — PROGRESS NOTES
"Pt once again refused majority of medications and would not allow this RN to apply betadine per order to feet. States that she \"doesn't want anyone to touch my feet.\" Pt educated on each medication and POC moving forward despite refusal of care. Will notify day shift RN.  "

## 2022-11-19 NOTE — PROGRESS NOTES
"Received report from day shift RN Rabia that pt refused all evening medications. Attempted to re-administer at shift change--pt refused. On 2nd attempt ~1930 she only agreed to take her Augmentin and Oxy 5mg for pain--all other meds were refused. Pt states \"they make me more sick.\" Pt was educated on each medication.  "

## 2022-11-19 NOTE — PROGRESS NOTES
" LIMB PRESERVATION SERVICE  PROGRESS NOTE    HPI: Yumiko Chatman is a 52 y.o.  with a past medical history that includes tobacco abuse, schizophrenia, homelessness, admitted 11/13/2022 for Bullae [R23.8]  Bacteremia [R78.81].     LPS has been consulted for frostbite injury to both feet.  Patient reports she developed pain, swelling and blistering approximately 3 days prior to admission when exposed to cold.  She reports she was wearing socks but not shoes.  She initially was seen at [facility] ED and was discharged.  She developed worsening pain to her feet and presented to [facility] ED for further care.  Patient denies fevers, chills, nausea, vomiting.  She denies any numbness, tingling or burning to her feet at this time.  IV antibiotics were started on this admission.  Infectious diseases has not consulted.  No x-ray of feet noted.  Ortho or vascular surgery not involved yet.      INTERVAL HISTORY:  11/19/2022: Patient denies fevers, chills, nausea, vomiting.  Patient stated pain controlled as long as she does not walk.      PERTINENT LPS RESULTS:   COVID-19: not completed      EXAM:      /79   Pulse 64   Temp 36.5 °C (97.7 °F) (Temporal)   Resp 16   Ht 1.778 m (5' 10\")   Wt 74.5 kg (164 lb 3.9 oz)   SpO2 94%   BMI 23.57 kg/m²     Pedal Pulses:   R foot: Faintly palpable DP, nonpalpable PT  L foot: palpable DP, palpable PT  With doppler brisk tones noted to PT/DP      Sensation:   Monofilament testing completed on 11/14/2022  Feet sensate to light touch      Wound(s) :    Wound location: Left first toe  Ischemia to entire toe.  Frostbite damage continues to demarcate      Wound location: Right forefoot  Necrosis to toes 1 through 5.   Frostbite damage continues to demarcate  Ischemic changes to plantar forefoot  Bulla deflated over toes 1 through 4.  Small fluid-filled bulla dorsal base of fifth toe  Remains hypersensitive to light touch  Right heel with ischemia, continues to evolve        Wound photo: " "Left first toe    Right plantar foot    Right dorsal foot    Right heel      Patient refused dressing.  Bilateral Prevalon boots were placed      DIABETES MANAGEMENT:    A1c: No results found for: HBA1C         INFECTION MANAGEMENT:    Results from last 7 days   Lab Units 11/18/22  0249 11/17/22  0441 11/16/22  0550 11/15/22  0240 11/14/22  0653 11/13/22  0653   WBC 1501 K/uL 5.2 6.3 6.6 6.3 6.7 10.2   PLATELET COUNT 1518 K/uL 415 355 341 290 321 356     Wound culture results:   Results       Procedure Component Value Units Date/Time    BLOOD CULTURE [[id number]] Collected: 11/18/22 0905    Order Status: Completed Specimen: Blood from Peripheral Updated: 11/19/22 0602     Significant Indicator NEG     Source BLD     Site PERIPHERAL     Culture Result No Growth  Note: Blood cultures are incubated for 5 days and  are monitored continuously.Positive blood cultures  are called to the RN and reported as soon as  they are identified.      Narrative:      Per Hospital Policy: Only change Specimen Src: to \"Line\" if  specified by physician order.  Left Hand    BLOOD CULTURE [[id number]] Collected: 11/18/22 0905    Order Status: Completed Specimen: Blood from Peripheral Updated: 11/19/22 0602     Significant Indicator NEG     Source BLD     Site PERIPHERAL     Culture Result No Growth  Note: Blood cultures are incubated for 5 days and  are monitored continuously.Positive blood cultures  are called to the RN and reported as soon as  they are identified.      Narrative:      Per Hospital Policy: Only change Specimen Src: to \"Line\" if  specified by physician order.  Right Hand    BLOOD CULTURE [[id number]] Collected: 11/13/22 0735    Order Status: Completed Specimen: Blood from Peripheral Updated: 11/18/22 0900     Significant Indicator NEG     Source BLD     Site PERIPHERAL     Culture Result No growth after 5 days of incubation.    Narrative:      Per Hospital Policy: Only change Specimen Src: to \"Line\" if  specified by physician " "order.  Left Hand    BLOOD CULTURE [[id number]]  (Abnormal)  (Susceptibility) Collected: 11/13/22 0710    Order Status: Completed Specimen: Blood from Peripheral Updated: 11/16/22 0844     Significant Indicator POS     Source BLD     Site PERIPHERAL     Culture Result Growth detected by Bactec instrument. 11/14/2022  00:03      Acinetobacter lwoffii    Narrative:      CALL  [place]  CPU tel. [phone],  CALLED  CPU tel. [phone] 11/14/2022, 00:03, RB PERF. RESULTS CALLED TO: RN  [id number]  Per Hospital Policy: Only change Specimen Src: to \"Line\" if  specified by physician order.  Left AC    Susceptibility       Acinetobacter lwoffii (1)       Antibiotic Interpretation Microscan   Method Status    Amikacin Sensitive <=16 mcg/mL MARK Final    Ceftriaxone Sensitive 2 mcg/mL MARK Final    Cefepime Sensitive <=2 mcg/mL MARK Final    Ampicillin/sulbactam Sensitive <=4/2 mcg/mL MARK Final    Gentamicin Sensitive <=2 mcg/mL MARK Final    Tobramycin Sensitive <=2 mcg/mL MARK Final    Minocycline Sensitive <=4 mcg/mL MARK Final    Trimeth/Sulfa Sensitive <=0.5/9.5 mcg/mL MARK Final                       URINALYSIS [[id number]] Collected: 11/13/22 0000    Order Status: Canceled Specimen: Urine     URINE CULTURE(NEW) [[id number]] Collected: 11/13/22 0000    Order Status: Canceled Specimen: Urine                        ASSESSMENT/PLAN:   52 y.o. admitted for Bullae [R23.8]  Bacteremia [R78.81]. Presents with frostbite injury to right forefoot and right heel, frostbite injury to left first toe    -Necrosis to right toes 1 through 5, cold to touch.  Forefoot continues to demarcate  -Right heel with ischemic injury, continues to evolve  -Necrosis to left first toe, cool to touch, continues to demarcate  -Continue monitoring for further demarcation prior to consideration of amputation.  Patient remains at high risk for right TMA.  -Continue to monitor right heel ischemia  -Continue to monitor left first toe, at risk for left first toe " amputation        Wound care:   - continue current wound care orders  -Right foot: Betadine, Mepitel 1 to dorsal foot and heel, Mepilex to heel, roll gauze to  foot.  Change daily  -Left great toe: Betadine daily    Labs/Imaging:  -COVID-19: not completed this admission.     Vascular status:   - R foot: Faintly palpable DP, nonpalpable PT  -L foot: palpable DP, palpable PT  -With doppler brisk tones noted to PT/DP  -Arterial studies completed, GISELLA of 0.6 on RLE.  RLE duplex completed showing by  Reversed flow to all vessels    Surgery:   -No plans for surgery at this time  -Allowing for further demarcation prior to amputation.  May take 1 to 2 weeks  -High risk for right TMA and left first toe amputation    Antibiotics:   - on antibiotics managed by hospitalist    Weight Bearing Status:   -Right foot: Heel weight bearing  -Left foot: Weight bearing as tolerated    Offloading:   -Offloading shoe; bilateral offloading device ordered.  -Orthotic company: None  -Shoe size: 10    PT/OT :   -not involved.   -To be ordered to assist with DC planning        DISCHARGE PLAN:    Disposition:   TBD, patient homeless.  Recommend DC to SNF as frostbite injury demarcates if able    Follow-up: [facility] once frostbite injury has completely demarcated      Discussed with: pt, RN, Dr. Orellana     Please note that this dictation was created using voice recognition software. I have  worked with technical experts from [facility] to optimize the interface.  I have made every reasonable attempt to correct obvious errors, but there may be errors of grammar and possibly content that I did not discover before finalizing the note.    Yuliet Hwang, A.P.R.N.    If any questions or concerns, please contact LPS through voalte.

## 2022-11-20 VITALS
DIASTOLIC BLOOD PRESSURE: 68 MMHG | SYSTOLIC BLOOD PRESSURE: 104 MMHG | OXYGEN SATURATION: 97 % | TEMPERATURE: 97 F | HEIGHT: 70 IN | BODY MASS INDEX: 23.51 KG/M2 | RESPIRATION RATE: 16 BRPM | HEART RATE: 85 BPM | WEIGHT: 164.24 LBS

## 2022-11-20 LAB
CD3+CD4+ CELLS # BLD: 692 CELLS/UL (ref 430–1800)
CD3+CD4+ CELLS NFR BLD: 45 % (ref 32–64)
IMMUNODEFICIENCY MARKERS SPEC-IMP: NORMAL

## 2022-11-20 PROCEDURE — 700102 HCHG RX REV CODE 250 W/ 637 OVERRIDE(OP)

## 2022-11-20 PROCEDURE — A9270 NON-COVERED ITEM OR SERVICE: HCPCS

## 2022-11-20 PROCEDURE — 700102 HCHG RX REV CODE 250 W/ 637 OVERRIDE(OP): Performed by: INTERNAL MEDICINE

## 2022-11-20 PROCEDURE — A9270 NON-COVERED ITEM OR SERVICE: HCPCS | Performed by: INTERNAL MEDICINE

## 2022-11-20 PROCEDURE — 700111 HCHG RX REV CODE 636 W/ 250 OVERRIDE (IP): Performed by: INTERNAL MEDICINE

## 2022-11-20 PROCEDURE — 99239 HOSP IP/OBS DSCHRG MGMT >30: CPT | Performed by: INTERNAL MEDICINE

## 2022-11-20 PROCEDURE — 97162 PT EVAL MOD COMPLEX 30 MIN: CPT

## 2022-11-20 PROCEDURE — 97165 OT EVAL LOW COMPLEX 30 MIN: CPT

## 2022-11-20 RX ADMIN — DIVALPROEX SODIUM 500 MG: 250 TABLET, DELAYED RELEASE ORAL at 05:34

## 2022-11-20 RX ADMIN — BICTEGRAVIR SODIUM, EMTRICITABINE, AND TENOFOVIR ALAFENAMIDE FUMARATE 1 TABLET: 50; 200; 25 TABLET ORAL at 05:34

## 2022-11-20 RX ADMIN — SULFAMETHOXAZOLE AND TRIMETHOPRIM 1 TABLET: 800; 160 TABLET ORAL at 05:33

## 2022-11-20 RX ADMIN — AMOXICILLIN AND CLAVULANATE POTASSIUM 1 TABLET: 875; 125 TABLET, FILM COATED ORAL at 05:34

## 2022-11-20 RX ADMIN — ONDANSETRON 4 MG: 2 INJECTION INTRAMUSCULAR; INTRAVENOUS at 09:32

## 2022-11-20 RX ADMIN — SENNOSIDES AND DOCUSATE SODIUM 2 TABLET: 50; 8.6 TABLET ORAL at 05:34

## 2022-11-20 RX ADMIN — OXYCODONE 5 MG: 5 TABLET ORAL at 05:37

## 2022-11-20 ASSESSMENT — COGNITIVE AND FUNCTIONAL STATUS - GENERAL
CLIMB 3 TO 5 STEPS WITH RAILING: TOTAL
MOBILITY SCORE: 12
WALKING IN HOSPITAL ROOM: A LOT
MOVING TO AND FROM BED TO CHAIR: A LOT
STANDING UP FROM CHAIR USING ARMS: A LITTLE
DAILY ACTIVITIY SCORE: 24
SUGGESTED CMS G CODE MODIFIER DAILY ACTIVITY: CH
TURNING FROM BACK TO SIDE WHILE IN FLAT BAD: A LITTLE
SUGGESTED CMS G CODE MODIFIER MOBILITY: CL
MOVING FROM LYING ON BACK TO SITTING ON SIDE OF FLAT BED: UNABLE

## 2022-11-20 ASSESSMENT — ACTIVITIES OF DAILY LIVING (ADL): TOILETING: INDEPENDENT

## 2022-11-20 ASSESSMENT — GAIT ASSESSMENTS: GAIT LEVEL OF ASSIST: UNABLE TO PARTICIPATE

## 2022-11-20 ASSESSMENT — PAIN DESCRIPTION - PAIN TYPE: TYPE: ACUTE PAIN

## 2022-11-20 NOTE — THERAPY
Physical Therapy   Initial Evaluation     Patient Name: Yumiko Chatman  Age:  52 y.o., Sex:  female  Medical Record #: [id number]  Today's Date: 11/20/2022    Precautions: Heel Weight Bearing Right Lower Extremity;Weight Bearing As Tolerated Left Lower Extremity  Comments: pt refused her B offloading shoes    Assessment  Patient is a 52 y.o. female admitted with necrotic toes. Pt seen for PT evaluation at this time. Pt resistant to edu regarding use of FWW or WC for mobility, only willing to perform squat-pivot to/from [facility] and had difficulty explaining needs or how she planned to mobilize and access community upon DC. Pt reports is discharging today, PT will follow if pt to remain in acute and as pt willing.     Plan  Recommend Physical Therapy 4 times per week until therapy goals are met for the following treatments:  Bed Mobility, Gait Training, Neuro Re-Education / Balance, Self Care/Home Evaluation, Stair Training, Therapeutic Activities, and Therapeutic Exercises  DC Equipment Recommendations: Unable to determine at this time  Discharge Recommendations: Recommend post-acute placement for additional physical therapy services prior to discharge home     11/20/22 1126   Prior Living Situation   Prior Services None   Housing / Facility Homeless   Equipment Owned None   Comments reports is staying at the shelter   Prior Level of Functional Mobility   Bed Mobility Independent   Transfer Status Independent   Ambulation Independent   Distance Ambulation (Feet) community distances   Assistive Devices Used None   Comments pt reports was indep PTA   Cognition    Level of Consciousness Alert   Comments decr insight into current condition, pt reluctant to use FWW despite stating she needs one to walk, but unwilling to try ambulating although reports she will be getting into a cab to go to the shelter. pt with difficulty forming full sentences and making needs known.   Balance Assessment   Sitting Balance (Static) Fair   Sitting  Balance (Dynamic) Fair   Standing Balance (Static) Fair   Standing Balance (Dynamic) Fair   Weight Shift Sitting Fair   Weight Shift Standing Poor   Comments partial stand with UE support for pivot transfers   Gait Analysis   Gait Level Of Assist Unable to Participate   Weight Bearing Status HWB RLE, WBAT LLE   Comments pt refusing to attempt ambulating, completed SPT to/from commode. educated on HWB status and use of walker, importance of gait training prior to return to community, pt declines but also does not appear to truly understand deficits and implication at DC   Bed Mobility    Supine to Sit Supervised   Sit to Supine Supervised   Comments HOB elevated   Functional Mobility   Bed, Chair, Wheelchair Transfer Supervised   Transfer Method Squat Pivot   Mobility EOB <> BSC, very rigid and with BUE support throughout, decr foot clearance and unwilling to attempt full STS with FWW to offload for improved transfers and stepping   Short Term Goals    Short Term Goal # 1 pt will perform supine <> sit without bed features with SPV in 6 visits for improved independence   Short Term Goal # 2 pt will perform STS to FWW with SPV in 6 visits for improved indep   Short Term Goal # 3 pt will ambulate 50ft with FWW and SPV in 6 visits for improved indep

## 2022-11-20 NOTE — DISCHARGE PLANNING
Patient medically stable for discharge today back to [facility]. SW calling [facility] to arrange transport for today.    LMSW left VM for Prachi at [facility] ([phone] ext. [phone]) to schedule follow up appointment at [facility]. LMSW called main [facility] number, office closed today with no triage available.    Transport Company Scheduled: [facility]  Spoke with Radha at [facility] to schedule transport  [phone]     Scheduled Date: 11/20/2022  Scheduled Time: 1230     Destination: [address] [address]     Attending, RN, charge RN updated.

## 2022-11-20 NOTE — CARE PLAN
The patient is Stable - Low risk of patient condition declining or worsening    Shift Goals  Clinical Goals: pain mgmt  Patient Goals: comfort, rest  Family Goals: not present    Progress made toward(s) clinical / shift goals:      Patient is not progressing towards the following goals:      Problem: Skin Integrity  Goal: Skin integrity is maintained or improved  Outcome: Not Met   Necrosis to bilat feet. Demarcation noted. Toes cool to touch

## 2022-11-20 NOTE — PROGRESS NOTES
Hospital Medicine Daily Progress Note    Date of Service  11/19/2022    Chief Complaint  Yumiko Chatman is a 52 y.o. female admitted 11/13/2022 with right foot pain    Hospital Course  Ms. Yumiko Chatman is a 52 y.o. female who with a PMHx of Medical history is significant for schizophrenia with medication noncompliance, tobacco abuse, resides at homeless shelter, who presented 11/13/2022 with right foot pain, abdominal pain.      Patient presents after experiencing several days of abdominal pain and loose stools up to 3 times a day.  Patient additionally reports right foot pain, swelling, blistering.  Patient denies nausea, vomiting, fever, chills.    Upon presentation to the ER, presenting vitals: /87, HR 93, RR 16, T98.9, SPO2 97% on room air.  Initial laboratory evaluation demonstrated a WBC of 10.2, creatinine 1, AST 81, ALT 51, potassium 3.3.  CT abdomen pelvis demonstrated no evidence of obstruction, no acute inflammatory change, enhancing mass in the endometrium.  Patient was started on Ancef for cellulitis.  Discussed case with ER provider and patient was admitted for further work-up and monitoring.    Patient monitored overnight in observation unit and during this time, patient's blood culture results are back positive for gram-negative rods.  Patient was already placed on IV ABX Ancef due to left lower extremity bullae, this was then changed to Unasyn then to Rocephin for better coverage of gram-negative bacteremia.  Patient switched to inpatient status as she is anticipated to stay 2-3 midnights for management of bacteremia, and left lower foot cellulitis.  Patient was evaluated by limb preservation services who states patient at risk for amputation and to monitor for final demarcation to determine amputation necessity as well as level.  ABIs show decreased blood flow to right lower extremity of 0.6.  TBI's are ordered and pending.    Interval Problem Update  Patient was seen and examined at bedside.   No acute events overnight. Patient is resting comfortably in bed and in no acute distress.     Patient is agitated, refusing care, refusing to participate with physical therapy, refusing wound care.  Repeat blood culture negative x1 day  Plan for discharge in AM  Asymptomatic hypotension with SBP in 80's this afternoon  1L bolus NS  Plan to allow for self demarcation, follow up with orthopedic surgery to discuss amputation  Offloading shoe provided    I have discussed this patient's plan of care and discharge plan at IDT rounds today with Case Management, Nursing, Nursing leadership, and other members of the IDT team.    Consultants/Specialty  Limb preservation services  Wound care  Code Status  Full Code    Disposition  Patient is not medically cleared for discharge.   Anticipate discharge to to home with close outpatient follow-up.  I have placed the appropriate orders for post-discharge needs.    Review of Systems  Review of Systems   Constitutional:  Negative for chills and fever.   HENT:  Negative for congestion and sore throat.    Eyes:  Negative for blurred vision and double vision.   Respiratory:  Negative for cough, shortness of breath and stridor.    Cardiovascular:  Negative for chest pain and palpitations.   Gastrointestinal:  Negative for abdominal pain, diarrhea, nausea and vomiting.   Genitourinary:  Negative for dysuria and flank pain.   Musculoskeletal:  Positive for joint pain (Right foot pain). Negative for myalgias.   Neurological:  Negative for seizures and loss of consciousness.   Psychiatric/Behavioral:  Negative for hallucinations and substance abuse.    All other systems reviewed and are negative.     Physical Exam  Temp:  [36 °C (96.8 °F)-37 °C (98.6 °F)] 37 °C (98.6 °F)  Pulse:  [64-79] 64  Resp:  [16-17] 16  BP: ()/(57-79) 87/57  SpO2:  [92 %-96 %] 94 %    Physical Exam  Vitals and nursing note reviewed.   Constitutional:       General: She is not in acute distress.     Appearance:  Normal appearance. She is normal weight. She is not ill-appearing or toxic-appearing.   HENT:      Head: Normocephalic.      Right Ear: External ear normal.      Left Ear: External ear normal.      Nose: Nose normal. No congestion.      Mouth/Throat:      Mouth: Mucous membranes are dry.      Pharynx: No oropharyngeal exudate.   Eyes:      General: No scleral icterus.  Cardiovascular:      Rate and Rhythm: Normal rate and regular rhythm.      Pulses: Normal pulses.      Heart sounds: Normal heart sounds. No murmur heard.  Pulmonary:      Effort: Pulmonary effort is normal.      Breath sounds: Normal breath sounds. No wheezing.   Abdominal:      General: Abdomen is flat. Bowel sounds are normal.      Palpations: Abdomen is soft.      Tenderness: There is no abdominal tenderness. There is no guarding or rebound.   Musculoskeletal:         General: No swelling.   Skin:     Capillary Refill: Capillary refill takes less than 2 seconds.      Coloration: Skin is not jaundiced.      Findings: No bruising.      Comments: Deflated bullae to right dorsal forefoot proximal to 1-4 toes.  Ischemic changes noted to left great toe and right 1-5 toes.  No surrounding erythema, edema, drainage.     Neurological:      General: No focal deficit present.      Mental Status: She is alert and oriented to person, place, and time. Mental status is at baseline.      Motor: No weakness.   Psychiatric:         Mood and Affect: Mood normal.         Behavior: Behavior normal.         Thought Content: Thought content normal.         Judgment: Judgment normal.         Fluids  No intake or output data in the 24 hours ending 11/19/22 1626      Laboratory  Recent Labs     11/17/22 0441 11/18/22  0249   WBC 6.3 5.2   RBC 3.67* 3.89*   HEMOGLOBIN 10.8* 11.3*   HEMATOCRIT 33.2* 34.7*   MCV 90.5 89.2   MCH 29.4 29.0   MCHC 32.5* 32.6*   RDW 39.1 38.5   PLATELETCT 355 415   MPV 8.8* 8.7*     Recent Labs     11/17/22 0441 11/18/22  0249   SODIUM 132*  133*   POTASSIUM 4.4 4.4   CHLORIDE 99 100   CO2 25 25   GLUCOSE 96 98   BUN 8 8   CREATININE 0.58 0.57   CALCIUM 8.9 9.0                   Imaging  IR-US GUIDED PIV   Final Result    Ultrasound-guided PERIPHERAL IV INSERTION performed by    qualified nursing staff as above.      US-PELVIC COMPLETE (TRANSABDOMINAL/TRANSVAGINAL) (COMBO)   Final Result      Posterior fundal mass exerts mass effect upon the endometrium is more likely to represent a submucosal leiomyoma than endometrial polyp or carcinoma. Recommend clinical correlation for hemorrhage. Follow-up is advised in 6 months to confirm stability if    no hemorrhage is present. If hemorrhage is present or there is worsening, biopsy would be indicated      US-EXTREMITY ARTERY LOWER UNILAT RIGHT   Final Result      US-GISELLA SINGLE LEVEL BILAT   Final Result      CT-ABDOMEN-PELVIS WITH   Final Result         1. No acute inflammatory change in the abdomen or pelvis.   2. No small bowel obstruction.   3. There is a 2.6 cm enhancing or high density mass in the endometrium. Further evaluation with ultrasound is recommended.      DX-CHEST-PORTABLE (1 VIEW)   Final Result         1. No acute cardiopulmonary abnormalities are identified.             Assessment/Plan  * Necrotic toes (HCC)- (present on admission)  Assessment & Plan  Necrosis to right toes 1 through 5, cold to touch.  Forefoot continues to demarcate  -Right heel with ischemic injury, continues to evolve  -Necrosis to left first toe, cool to touch, continues to demarcate  -Continue monitoring for further demarcation prior to consideration of amputation.  Patient remains at high risk for right TMA.  -Continue to monitor right heel ischemia  -Continue to monitor left first toe, at risk for left first toe amputation    Related to frostbite injury.  LPS involved-appreciate their recs.  Allow for self demarcation   Follow up with orthopedic surgery and wound clinic  Will need definitive treatment        Endometrial  mass  Assessment & Plan  -As identified on CT abdomen  -Transvaginal ultrasound reveals most likely submucosal leiomyoma - recommend follow up in 6 months to confirm stability if   no hemorrhage is present. If hemorrhage is present or there is worsening, biopsy would be indicated.  -CA-125= WDL    HIV (human immunodeficiency virus infection) (HCC)  Assessment & Plan  Received phone call from ancillary staff that health department had called stating patient HIV positive upon recent discharge from incarceration.  During that time, viral load was low and patient was given 1 month supply of antiviral medications with instructions to follow-up with [facility] for follow-up.    Patient missed followed up with [facility] and staff will see if patient may continue to resume despite missing appointment.    11/17 discussed with ID pharmacy and Dr. Manzo who will address and place patient on antiviral medications if necessary    Elevated liver transaminase level  Assessment & Plan  -AST 81  ALT 51  -Multiple hepatic cysts on CT abdomen  -11/17 recheck CMP in a.m.    Schizophrenia (HCC)  Assessment & Plan  -Not cuurently on medications  -Has been on Risperdal in the past  -11/17 patient states she has taken Depakote in the past, which has been restarted.  Could not find any mention of Risperdal in the past.    Frostbite of both feet  Assessment & Plan  Ischemic and necrotic changes noted to left great toe and right 1-4 toes and right plantar forefoot.  LPS involved-appreciate their recs.  GISELLA showed decreased blood flow 0.6 to right lower extremity.  TBI's and toe pressures pending.  Recommend offloading and wound care per limb preservation services.    Per LPS, patient at risk for amputation.  Monitor for final demarcation to determine necessity and level of amputation    Bacteremia- (present on admission)  Assessment & Plan  - Noted positive blood Cx  -Likely from RIGHT foot cellulitis concerning for frost bite  - will  need to repeat BLD Cx prior to d/c  - repeat blood cultures  negative    Hypokalemia  Assessment & Plan  -Monitor and replace    Epigastric pain  Assessment & Plan  -Epigastric abdominal pain for 2-3 days with associated loose stools up to 3 times daily  -CT abdomen pelvis demonstrated no evidence of obstruction, no acute inflammatory change, enhancing mass in the endometrium  -Transvaginal ultrasound 11/13/2022: Posterior fundal mass exerts mass effect upon the endometrium is more likely to represent a submucosal leiomyoma than endometrial polyp or carcinoma.   No current hemorrhage, hemoglobin stable.  Follow-up outpatient in 6 months.    Tobacco abuse  Assessment & Plan  Tobacco cessation counseling and education provided for 4 minutes. Nicotine replacement options provided including patch, and further medical treatments including Wellbutrin and Chantix. As well as over the counter options of lozenges and gum.           VTE prophylaxis: Xarelto 10 mg daily as prophylaxis    I have performed a physical exam and reviewed and updated ROS and Plan today (11/19/2022). In review of yesterday's note (11/18/2022), there are no changes except as documented above.

## 2022-11-20 NOTE — DISCHARGE SUMMARY
"Discharge Summary    CHIEF COMPLAINT ON ADMISSION  Chief Complaint   Patient presents with    Epigastric Pain     X2days, pt states comes and goes      Foot Pain     Pt reports bilat blisters, that have \"doubled in size\"       Reason for Admission  EMS     Admission Date  11/13/2022    CODE STATUS  Full Code    HPI & HOSPITAL COURSE  Ms. Yumiko Chatman is a 52 y.o. female who with a PMHx of Medical history is significant for schizophrenia with medication noncompliance, tobacco abuse, resides at homeless shelter, who presented 11/13/2022 with right foot pain, abdominal pain.      Patient monitored overnight in observation unit and during this time, patient's blood culture results are back positive for gram-negative rods.  Patient was continued on IV antibiotics.   Patient was evaluated by limb preservation services.  ABIs show decreased blood flow to right lower extremity of 0.6. Patient has frost bite injury that will require 1-2 weeks for final demarcation for amputation. Patient will complete course of antibiotics as outpatient. Patient will follow up with [facility] for assessment of necrotic demarcation and amputation. Patient refused to work with physical therapy, refused to wear off loading boots, refused betadine in dressing changes. Additionally, patient reported abdominal pain on admission. CT abdomen pelvis demonstrated no evidence of obstruction, no acute inflammatory change, enhancing mass in the endometrium. Transvaginal ultrasound demonstrated posterior fundal mass exerts mass effect upon the endometrium is more likely to represent a submucosal leiomyoma than endometrial polyp or carcinoma.  within normal limits. Follow-up outpatient in 6 months for further imaging. Patient was determined satisfactory for discharge with appropriate follow up. Patient [facility] at [facility].     Therefore, she is discharged in fair and stable condition to home with close outpatient follow-up.    The " patient met 2-midnight criteria for an inpatient stay at the time of discharge.    Discharge Date  11/20/2022    FOLLOW UP ITEMS POST DISCHARGE  Please follow up with PCP in 3-5 days for post hospitalization follow up and medication reconciliation.     Follow up with Orthopedic clinic for further assessment regarding future amputation.     DISCHARGE DIAGNOSES  Principal Problem:    Necrotic toes (HCC) POA: Yes  Active Problems:    Endometrial mass POA: Unknown    Schizophrenia (HCC) POA: Unknown    Elevated liver transaminase level POA: Unknown    HIV (human immunodeficiency virus infection) (HCC) POA: Unknown    Bacteremia POA: Yes    Frostbite of both feet POA: Unknown    Tobacco abuse POA: Unknown    Epigastric pain POA: Unknown    Hypokalemia POA: Unknown  Resolved Problems:    * No resolved hospital problems. *      FOLLOW UP  No future appointments.  [facility]  [address]  [place] [address]  [phone]  Schedule an appointment as soon as possible for a visit in 1 week(s)  Orthopedic surgery, follow up for possible amputation, assess demarcation of necrosis      MEDICATIONS ON DISCHARGE     Medication List        START taking these medications        Instructions   amoxicillin-clavulanate 875-125 MG Tabs  Commonly known as: AUGMENTIN   Take 1 Tablet by mouth every 12 hours for 10 days.  Dose: 1 Tablet     Biktarvy -25 mg Tabs tablet  Generic drug: bictegravir-emtricitab-TAF   Take 1 Tablet by mouth every day.  Dose: 1 Tablet     divalproex 500 MG Tbec  Commonly known as: DEPAKOTE   Take 1 Tablet by mouth every 12 hours.  Dose: 500 mg              Allergies  No Known Allergies    DIET  Orders Placed This Encounter   Procedures    Diet Order Diet: Regular     Standing Status:   Standing     Number of Occurrences:   1     Order Specific Question:   Diet:     Answer:   Regular [1]       ACTIVITY  As tolerated.  Weight bearing as  tolerated    CONSULTATIONS  LPS    PROCEDURES  N/A    LABORATORY  Lab Results   Component Value Date    SODIUM 133 (L) 11/18/2022    POTASSIUM 4.4 11/18/2022    CHLORIDE 100 11/18/2022    CO2 25 11/18/2022    GLUCOSE 98 11/18/2022    BUN 8 11/18/2022    CREATININE 0.57 11/18/2022        Lab Results   Component Value Date    WBC 5.2 11/18/2022    HEMOGLOBIN 11.3 (L) 11/18/2022    HEMATOCRIT 34.7 (L) 11/18/2022    PLATELETCT 415 11/18/2022        Total time of the discharge process exceeds 46 minutes.

## 2022-11-20 NOTE — THERAPY
Occupational Therapy   Initial Evaluation     Patient Name: Yumiko Chatman  Age:  52 y.o., Sex:  female  Medical Record #: [id number]  Today's Date: 11/20/2022     Precautions: Heel Weight Bearing Right Lower Extremity, Weight Bearing As Tolerated Left Lower Extremity    Assessment    Patient is 52 y.o. female admitted with necrotic toes and abdominal pain, multiple days of loose stools. Pmhx includes schizophrenia, HIV, medical noncompliance and homelessness. Per chart pt has been refusing wound care for necrotic feet and presents minimally participatory with OT eval. Pt demonstrates ability to tolerate squat pivot txf to commode and independence with dressing and toileting ADLs. Pt refusing to practice additional ambulation with FWW d/t pain, appears to have decreased insight to her deficits. Would recommend supportive DC location d/t concerns for pt's safety awareness and ability to care for herself.     Plan    Recommend Occupational Therapy for Evaluation only    DC Equipment Recommendations: Unable to determine at this time  Discharge Recommendations: Other - (recommend supportive DC location ie group home, pt homeless with high risk for readmission)      Objective       11/20/22 1127   Prior Living Situation   Prior Services None   Housing / Facility Homeless   Prior Level of ADL Function   Self Feeding Independent   Grooming / Hygiene Independent   Bathing Independent   Dressing Independent   Toileting Independent   Comments reports no social support   Precautions   Precautions Heel Weight Bearing Right Lower Extremity;Weight Bearing As Tolerated Left Lower Extremity   Cognition    Level of Consciousness Alert   Comments pt with poor insight to her deficits and refuses encouragement to attempt functional tasks prior to DC to shelter. Question confusion as pt initially stated she will DC to [facility], then later said [facility]. Pt with impaired problem solving, risk for readmission d/t  unprepared for potential challenges/complications upon DC   Active ROM Upper Body   Active ROM Upper Body  WDL   Strength Upper Body   Upper Body Strength  WDL   Coordination Upper Body   Coordination WDL   Balance Assessment   Sitting Balance (Static) Fair   Sitting Balance (Dynamic) Fair   Standing Balance (Static) Poor +   Standing Balance (Dynamic) Poor   Weight Shift Sitting Fair   Weight Shift Standing Poor   Bed Mobility    Supine to Sit Supervised   Sit to Supine Supervised   Scooting Supervised   Rolling Supervised   ADL Assessment   Eating Supervision   Grooming Supervision;Seated   Upper Body Dressing Supervision   Lower Body Dressing Supervision   Toileting Supervision   How much help from another person does the patient currently need...   Putting on and taking off regular lower body clothing? 4   Bathing (including washing, rinsing, and drying)? 4   Toileting, which includes using a toilet, bedpan, or urinal? 4   Putting on and taking off regular upper body clothing? 4   Taking care of personal grooming such as brushing teeth? 4   Eating meals? 4   6 Clicks Daily Activity Score 24   Functional Mobility   Sit to Stand Supervised   Bed, Chair, Wheelchair Transfer Supervised   Toilet Transfers Supervised   Transfer Method Squat Pivot   Mobility to BSC only, refused additional sit>stand or FWW practice   Activity Tolerance   Sitting in Chair 5min   Sitting Edge of Bed 5min   Standing squat pivot to BSC and back to bed only   Education Group   Education Provided Activities of Daily Living;Role of Occupational Therapist;Transfers   Role of Occupational Therapist Patient Response Patient;Acceptance;Explanation;No Learning Evidence   Transfers Patient Response Patient;Acceptance;Explanation;No Learning Evidence   ADL Patient Response Patient;Acceptance;Explanation;No Learning Evidence   Problem List   Problem List Impaired Cognitive Function   Anticipated Discharge Equipment and Recommendations   DC Equipment  Recommendations Unable to determine at this time   Discharge Recommendations Other -  (recommend supportive DC location ie group home, pt homeless with high risk for readmission)

## 2022-11-23 LAB
BACTERIA BLD CULT: NORMAL
BACTERIA BLD CULT: NORMAL
SIGNIFICANT IND 70042: NORMAL
SIGNIFICANT IND 70042: NORMAL
SITE SITE: NORMAL
SITE SITE: NORMAL
SOURCE SOURCE: NORMAL
SOURCE SOURCE: NORMAL

## 2022-11-24 ENCOUNTER — HOSPITAL ENCOUNTER (INPATIENT)
Facility: MEDICAL CENTER | Age: 52
LOS: 3 days | DRG: 300 | End: 2022-11-27
Attending: EMERGENCY MEDICINE | Admitting: INTERNAL MEDICINE
Payer: MEDICAID

## 2022-11-24 ENCOUNTER — APPOINTMENT (OUTPATIENT)
Dept: RADIOLOGY | Facility: MEDICAL CENTER | Age: 52
DRG: 300 | End: 2022-11-24
Attending: EMERGENCY MEDICINE
Payer: MEDICAID

## 2022-11-24 DIAGNOSIS — I96 NECROTIC TOES (HCC): ICD-10-CM

## 2022-11-24 LAB
ALBUMIN SERPL BCP-MCNC: 3.8 G/DL (ref 3.2–4.9)
ALBUMIN/GLOB SERPL: 1 G/DL
ALP SERPL-CCNC: 96 U/L (ref 30–99)
ALT SERPL-CCNC: 9 U/L (ref 2–50)
ANION GAP SERPL CALC-SCNC: 10 MMOL/L (ref 7–16)
AST SERPL-CCNC: 14 U/L (ref 12–45)
BASOPHILS # BLD AUTO: 0 % (ref 0–1.8)
BASOPHILS # BLD: 0 K/UL (ref 0–0.12)
BILIRUB SERPL-MCNC: 0.2 MG/DL (ref 0.1–1.5)
BUN SERPL-MCNC: 13 MG/DL (ref 8–22)
CALCIUM SERPL-MCNC: 9.2 MG/DL (ref 8.5–10.5)
CHLORIDE SERPL-SCNC: 105 MMOL/L (ref 96–112)
CO2 SERPL-SCNC: 29 MMOL/L (ref 20–33)
CREAT SERPL-MCNC: 0.65 MG/DL (ref 0.5–1.4)
CRP SERPL HS-MCNC: 0.34 MG/DL (ref 0–0.75)
EOSINOPHIL # BLD AUTO: 0.36 K/UL (ref 0–0.51)
EOSINOPHIL NFR BLD: 6.1 % (ref 0–6.9)
ERYTHROCYTE [DISTWIDTH] IN BLOOD BY AUTOMATED COUNT: 42.1 FL (ref 35.9–50)
ERYTHROCYTE [SEDIMENTATION RATE] IN BLOOD BY WESTERGREN METHOD: 72 MM/HOUR (ref 0–25)
GFR SERPLBLD CREATININE-BSD FMLA CKD-EPI: 104 ML/MIN/1.73 M 2
GIANT PLATELETS BLD QL SMEAR: NORMAL
GLOBULIN SER CALC-MCNC: 3.9 G/DL (ref 1.9–3.5)
GLUCOSE SERPL-MCNC: 66 MG/DL (ref 65–99)
HCT VFR BLD AUTO: 37.3 % (ref 37–47)
HGB BLD-MCNC: 11.8 G/DL (ref 12–16)
LACTATE SERPL-SCNC: 1.3 MMOL/L (ref 0.5–2)
LACTATE SERPL-SCNC: 1.6 MMOL/L (ref 0.5–2)
LYMPHOCYTES # BLD AUTO: 1.66 K/UL (ref 1–4.8)
LYMPHOCYTES NFR BLD: 28.1 % (ref 22–41)
MANUAL DIFF BLD: NORMAL
MCH RBC QN AUTO: 29.2 PG (ref 27–33)
MCHC RBC AUTO-ENTMCNC: 31.6 G/DL (ref 33.6–35)
MCV RBC AUTO: 92.3 FL (ref 81.4–97.8)
MONOCYTES # BLD AUTO: 0.26 K/UL (ref 0–0.85)
MONOCYTES NFR BLD AUTO: 4.4 % (ref 0–13.4)
MORPHOLOGY BLD-IMP: NORMAL
NEUTROPHILS # BLD AUTO: 3.62 K/UL (ref 2–7.15)
NEUTROPHILS NFR BLD: 61.4 % (ref 44–72)
NRBC # BLD AUTO: 0 K/UL
NRBC BLD-RTO: 0 /100 WBC
PLATELET # BLD AUTO: 565 K/UL (ref 164–446)
PLATELET BLD QL SMEAR: NORMAL
PMV BLD AUTO: 8.4 FL (ref 9–12.9)
POLYCHROMASIA BLD QL SMEAR: NORMAL
POTASSIUM SERPL-SCNC: 4 MMOL/L (ref 3.6–5.5)
PROCALCITONIN SERPL-MCNC: <0.05 NG/ML
PROT SERPL-MCNC: 7.7 G/DL (ref 6–8.2)
RBC # BLD AUTO: 4.04 M/UL (ref 4.2–5.4)
RBC BLD AUTO: PRESENT
SODIUM SERPL-SCNC: 144 MMOL/L (ref 135–145)
WBC # BLD AUTO: 5.9 K/UL (ref 4.8–10.8)

## 2022-11-24 PROCEDURE — 96372 THER/PROPH/DIAG INJ SC/IM: CPT

## 2022-11-24 PROCEDURE — 99285 EMERGENCY DEPT VISIT HI MDM: CPT

## 2022-11-24 PROCEDURE — 87040 BLOOD CULTURE FOR BACTERIA: CPT | Mod: 91

## 2022-11-24 PROCEDURE — 86140 C-REACTIVE PROTEIN: CPT

## 2022-11-24 PROCEDURE — 770001 HCHG ROOM/CARE - MED/SURG/GYN PRIV*

## 2022-11-24 PROCEDURE — 85025 COMPLETE CBC W/AUTO DIFF WBC: CPT

## 2022-11-24 PROCEDURE — 700105 HCHG RX REV CODE 258: Performed by: EMERGENCY MEDICINE

## 2022-11-24 PROCEDURE — 80053 COMPREHEN METABOLIC PANEL: CPT

## 2022-11-24 PROCEDURE — 83605 ASSAY OF LACTIC ACID: CPT

## 2022-11-24 PROCEDURE — 96365 THER/PROPH/DIAG IV INF INIT: CPT

## 2022-11-24 PROCEDURE — A9270 NON-COVERED ITEM OR SERVICE: HCPCS | Performed by: INTERNAL MEDICINE

## 2022-11-24 PROCEDURE — 99223 1ST HOSP IP/OBS HIGH 75: CPT | Performed by: INTERNAL MEDICINE

## 2022-11-24 PROCEDURE — 73630 X-RAY EXAM OF FOOT: CPT | Mod: RT

## 2022-11-24 PROCEDURE — 700102 HCHG RX REV CODE 250 W/ 637 OVERRIDE(OP): Performed by: INTERNAL MEDICINE

## 2022-11-24 PROCEDURE — 84145 PROCALCITONIN (PCT): CPT

## 2022-11-24 PROCEDURE — 700111 HCHG RX REV CODE 636 W/ 250 OVERRIDE (IP): Performed by: EMERGENCY MEDICINE

## 2022-11-24 PROCEDURE — 85007 BL SMEAR W/DIFF WBC COUNT: CPT

## 2022-11-24 PROCEDURE — 36415 COLL VENOUS BLD VENIPUNCTURE: CPT

## 2022-11-24 PROCEDURE — 700111 HCHG RX REV CODE 636 W/ 250 OVERRIDE (IP): Performed by: INTERNAL MEDICINE

## 2022-11-24 PROCEDURE — 85652 RBC SED RATE AUTOMATED: CPT

## 2022-11-24 RX ORDER — POLYETHYLENE GLYCOL 3350 17 G/17G
1 POWDER, FOR SOLUTION ORAL
Status: DISCONTINUED | OUTPATIENT
Start: 2022-11-24 | End: 2022-11-27 | Stop reason: HOSPADM

## 2022-11-24 RX ORDER — ACETAMINOPHEN 325 MG/1
650 TABLET ORAL EVERY 6 HOURS PRN
Status: DISCONTINUED | OUTPATIENT
Start: 2022-11-24 | End: 2022-11-27 | Stop reason: HOSPADM

## 2022-11-24 RX ORDER — ONDANSETRON 4 MG/1
4 TABLET, ORALLY DISINTEGRATING ORAL EVERY 4 HOURS PRN
Status: DISCONTINUED | OUTPATIENT
Start: 2022-11-24 | End: 2022-11-27 | Stop reason: HOSPADM

## 2022-11-24 RX ORDER — PROMETHAZINE HYDROCHLORIDE 25 MG/1
12.5-25 TABLET ORAL EVERY 4 HOURS PRN
Status: DISCONTINUED | OUTPATIENT
Start: 2022-11-24 | End: 2022-11-27 | Stop reason: HOSPADM

## 2022-11-24 RX ORDER — LABETALOL HYDROCHLORIDE 5 MG/ML
10 INJECTION, SOLUTION INTRAVENOUS EVERY 4 HOURS PRN
Status: DISCONTINUED | OUTPATIENT
Start: 2022-11-24 | End: 2022-11-27 | Stop reason: HOSPADM

## 2022-11-24 RX ORDER — BISACODYL 10 MG
10 SUPPOSITORY, RECTAL RECTAL
Status: DISCONTINUED | OUTPATIENT
Start: 2022-11-24 | End: 2022-11-27 | Stop reason: HOSPADM

## 2022-11-24 RX ORDER — ONDANSETRON 2 MG/ML
4 INJECTION INTRAMUSCULAR; INTRAVENOUS EVERY 4 HOURS PRN
Status: DISCONTINUED | OUTPATIENT
Start: 2022-11-24 | End: 2022-11-27 | Stop reason: HOSPADM

## 2022-11-24 RX ORDER — AMOXICILLIN 250 MG
2 CAPSULE ORAL 2 TIMES DAILY
Status: DISCONTINUED | OUTPATIENT
Start: 2022-11-25 | End: 2022-11-27 | Stop reason: HOSPADM

## 2022-11-24 RX ORDER — DIVALPROEX SODIUM 500 MG/1
500 TABLET, DELAYED RELEASE ORAL EVERY 12 HOURS
Status: DISCONTINUED | OUTPATIENT
Start: 2022-11-24 | End: 2022-11-27 | Stop reason: HOSPADM

## 2022-11-24 RX ORDER — ENOXAPARIN SODIUM 100 MG/ML
40 INJECTION SUBCUTANEOUS DAILY
Status: DISCONTINUED | OUTPATIENT
Start: 2022-11-24 | End: 2022-11-27 | Stop reason: HOSPADM

## 2022-11-24 RX ORDER — PROMETHAZINE HYDROCHLORIDE 25 MG/1
12.5-25 SUPPOSITORY RECTAL EVERY 4 HOURS PRN
Status: DISCONTINUED | OUTPATIENT
Start: 2022-11-24 | End: 2022-11-27 | Stop reason: HOSPADM

## 2022-11-24 RX ORDER — PROCHLORPERAZINE EDISYLATE 5 MG/ML
5-10 INJECTION INTRAMUSCULAR; INTRAVENOUS EVERY 4 HOURS PRN
Status: DISCONTINUED | OUTPATIENT
Start: 2022-11-24 | End: 2022-11-27 | Stop reason: HOSPADM

## 2022-11-24 RX ADMIN — ENOXAPARIN SODIUM 40 MG: 40 INJECTION SUBCUTANEOUS at 18:34

## 2022-11-24 RX ADMIN — DIVALPROEX SODIUM 500 MG: 500 TABLET, DELAYED RELEASE ORAL at 18:33

## 2022-11-24 RX ADMIN — AMPICILLIN AND SULBACTAM 3 G: 1; 2 INJECTION, POWDER, FOR SOLUTION INTRAMUSCULAR; INTRAVENOUS at 18:15

## 2022-11-24 ASSESSMENT — ENCOUNTER SYMPTOMS
HEADACHES: 0
NECK PAIN: 0
FLANK PAIN: 0
COUGH: 0
PALPITATIONS: 0
CHILLS: 0
WEIGHT LOSS: 0
DOUBLE VISION: 0
POLYDIPSIA: 0
NERVOUS/ANXIOUS: 0
HEMOPTYSIS: 0
NAUSEA: 0
HALLUCINATIONS: 0
ORTHOPNEA: 0
TREMORS: 0
SPEECH CHANGE: 0
BRUISES/BLEEDS EASILY: 0
BLURRED VISION: 0
BACK PAIN: 0
FOCAL WEAKNESS: 0
HEARTBURN: 0
FEVER: 0
PHOTOPHOBIA: 0
SPUTUM PRODUCTION: 0
VOMITING: 0

## 2022-11-24 ASSESSMENT — COGNITIVE AND FUNCTIONAL STATUS - GENERAL
TOILETING: A LITTLE
SUGGESTED CMS G CODE MODIFIER DAILY ACTIVITY: CI
SUGGESTED CMS G CODE MODIFIER MOBILITY: CK
WALKING IN HOSPITAL ROOM: A LOT
STANDING UP FROM CHAIR USING ARMS: A LITTLE
DAILY ACTIVITIY SCORE: 23
MOVING FROM LYING ON BACK TO SITTING ON SIDE OF FLAT BED: A LITTLE
CLIMB 3 TO 5 STEPS WITH RAILING: A LITTLE
MOBILITY SCORE: 19

## 2022-11-24 ASSESSMENT — PATIENT HEALTH QUESTIONNAIRE - PHQ9
2. FEELING DOWN, DEPRESSED, IRRITABLE, OR HOPELESS: NOT AT ALL
SUM OF ALL RESPONSES TO PHQ9 QUESTIONS 1 AND 2: 0
1. LITTLE INTEREST OR PLEASURE IN DOING THINGS: NOT AT ALL

## 2022-11-24 ASSESSMENT — LIFESTYLE VARIABLES
EVER FELT BAD OR GUILTY ABOUT YOUR DRINKING: NO
HOW MANY TIMES IN THE PAST YEAR HAVE YOU HAD 5 OR MORE DRINKS IN A DAY: 0
CONSUMPTION TOTAL: NEGATIVE
TOTAL SCORE: 0
HAVE YOU EVER FELT YOU SHOULD CUT DOWN ON YOUR DRINKING: NO
SUBSTANCE_ABUSE: 0
EVER HAD A DRINK FIRST THING IN THE MORNING TO STEADY YOUR NERVES TO GET RID OF A HANGOVER: NO
AVERAGE NUMBER OF DAYS PER WEEK YOU HAVE A DRINK CONTAINING ALCOHOL: 0
TOTAL SCORE: 0
HAVE PEOPLE ANNOYED YOU BY CRITICIZING YOUR DRINKING: NO
ON A TYPICAL DAY WHEN YOU DRINK ALCOHOL HOW MANY DRINKS DO YOU HAVE: 0
DOES PATIENT WANT TO STOP DRINKING: NO
TOTAL SCORE: 0
ALCOHOL_USE: NO

## 2022-11-24 ASSESSMENT — FIBROSIS 4 INDEX
FIB4 SCORE: 0.45
FIB4 SCORE: 0.54

## 2022-11-24 ASSESSMENT — PAIN DESCRIPTION - PAIN TYPE: TYPE: ACUTE PAIN

## 2022-11-24 NOTE — ED TRIAGE NOTES
"Chief Complaint   Patient presents with    Wound Check     Frostbite injury noted to pt's R toes. Pt recently admitted here for the same and discharged on 11/20.     /66   Pulse 92   Temp 36.6 °C (97.8 °F) (Oral)   Resp 16   Ht 1.778 m (5' 10\")   Wt 74.8 kg (165 lb)   SpO2 94%   BMI 23.68 kg/m²     Pt by wheelchair to triage for above. +CMS intact to pt's RLE.   "

## 2022-11-25 PROBLEM — D64.9 NORMOCYTIC ANEMIA: Status: ACTIVE | Noted: 2022-11-25

## 2022-11-25 LAB
ALBUMIN SERPL BCP-MCNC: 2.6 G/DL (ref 3.2–4.9)
ALBUMIN/GLOB SERPL: 0.7 G/DL
ALP SERPL-CCNC: 81 U/L (ref 30–99)
ALT SERPL-CCNC: 7 U/L (ref 2–50)
ANION GAP SERPL CALC-SCNC: 9 MMOL/L (ref 7–16)
AST SERPL-CCNC: 11 U/L (ref 12–45)
BASOPHILS # BLD AUTO: 2.6 % (ref 0–1.8)
BASOPHILS # BLD: 0.12 K/UL (ref 0–0.12)
BILIRUB SERPL-MCNC: <0.2 MG/DL (ref 0.1–1.5)
BUN SERPL-MCNC: 12 MG/DL (ref 8–22)
CALCIUM SERPL-MCNC: 8.6 MG/DL (ref 8.5–10.5)
CHLORIDE SERPL-SCNC: 108 MMOL/L (ref 96–112)
CO2 SERPL-SCNC: 24 MMOL/L (ref 20–33)
CREAT SERPL-MCNC: 0.64 MG/DL (ref 0.5–1.4)
EOSINOPHIL # BLD AUTO: 0.16 K/UL (ref 0–0.51)
EOSINOPHIL NFR BLD: 3.5 % (ref 0–6.9)
ERYTHROCYTE [DISTWIDTH] IN BLOOD BY AUTOMATED COUNT: 41.9 FL (ref 35.9–50)
FERRITIN SERPL-MCNC: 81.9 NG/ML (ref 10–291)
GFR SERPLBLD CREATININE-BSD FMLA CKD-EPI: 104 ML/MIN/1.73 M 2
GLOBULIN SER CALC-MCNC: 3.6 G/DL (ref 1.9–3.5)
GLUCOSE SERPL-MCNC: 93 MG/DL (ref 65–99)
HCT VFR BLD AUTO: 31.4 % (ref 37–47)
HGB BLD-MCNC: 9.9 G/DL (ref 12–16)
IRON SATN MFR SERPL: 31 % (ref 15–55)
IRON SERPL-MCNC: 68 UG/DL (ref 40–170)
LYMPHOCYTES # BLD AUTO: 1.88 K/UL (ref 1–4.8)
LYMPHOCYTES NFR BLD: 40 % (ref 22–41)
MANUAL DIFF BLD: NORMAL
MCH RBC QN AUTO: 29 PG (ref 27–33)
MCHC RBC AUTO-ENTMCNC: 31.5 G/DL (ref 33.6–35)
MCV RBC AUTO: 92.1 FL (ref 81.4–97.8)
MONOCYTES # BLD AUTO: 0.12 K/UL (ref 0–0.85)
MONOCYTES NFR BLD AUTO: 2.6 % (ref 0–13.4)
MORPHOLOGY BLD-IMP: NORMAL
MYELOCYTES NFR BLD MANUAL: 0.9 %
NEUTROPHILS # BLD AUTO: 2.37 K/UL (ref 2–7.15)
NEUTROPHILS NFR BLD: 49.5 % (ref 44–72)
NEUTS BAND NFR BLD MANUAL: 0.9 % (ref 0–10)
NRBC # BLD AUTO: 0 K/UL
NRBC BLD-RTO: 0 /100 WBC
PLATELET # BLD AUTO: 437 K/UL (ref 164–446)
PLATELET BLD QL SMEAR: NORMAL
PMV BLD AUTO: 8.6 FL (ref 9–12.9)
POTASSIUM SERPL-SCNC: 4.2 MMOL/L (ref 3.6–5.5)
PROT SERPL-MCNC: 6.2 G/DL (ref 6–8.2)
RBC # BLD AUTO: 3.41 M/UL (ref 4.2–5.4)
RBC BLD AUTO: NORMAL
SODIUM SERPL-SCNC: 141 MMOL/L (ref 135–145)
TIBC SERPL-MCNC: 222 UG/DL (ref 250–450)
UIBC SERPL-MCNC: 154 UG/DL (ref 110–370)
VIT B12 SERPL-MCNC: 974 PG/ML (ref 211–911)
WBC # BLD AUTO: 4.7 K/UL (ref 4.8–10.8)

## 2022-11-25 PROCEDURE — 85007 BL SMEAR W/DIFF WBC COUNT: CPT

## 2022-11-25 PROCEDURE — A9270 NON-COVERED ITEM OR SERVICE: HCPCS | Performed by: INTERNAL MEDICINE

## 2022-11-25 PROCEDURE — 97161 PT EVAL LOW COMPLEX 20 MIN: CPT

## 2022-11-25 PROCEDURE — 99222 1ST HOSP IP/OBS MODERATE 55: CPT | Performed by: ORTHOPAEDIC SURGERY

## 2022-11-25 PROCEDURE — 700111 HCHG RX REV CODE 636 W/ 250 OVERRIDE (IP): Performed by: INTERNAL MEDICINE

## 2022-11-25 PROCEDURE — 99233 SBSQ HOSP IP/OBS HIGH 50: CPT | Performed by: STUDENT IN AN ORGANIZED HEALTH CARE EDUCATION/TRAINING PROGRAM

## 2022-11-25 PROCEDURE — 302015 SILVER POWDER: Performed by: STUDENT IN AN ORGANIZED HEALTH CARE EDUCATION/TRAINING PROGRAM

## 2022-11-25 PROCEDURE — 770001 HCHG ROOM/CARE - MED/SURG/GYN PRIV*

## 2022-11-25 PROCEDURE — 82607 VITAMIN B-12: CPT

## 2022-11-25 PROCEDURE — 700102 HCHG RX REV CODE 250 W/ 637 OVERRIDE(OP): Performed by: INTERNAL MEDICINE

## 2022-11-25 PROCEDURE — 83540 ASSAY OF IRON: CPT

## 2022-11-25 PROCEDURE — 85025 COMPLETE CBC W/AUTO DIFF WBC: CPT

## 2022-11-25 PROCEDURE — 80053 COMPREHEN METABOLIC PANEL: CPT

## 2022-11-25 PROCEDURE — 36415 COLL VENOUS BLD VENIPUNCTURE: CPT

## 2022-11-25 PROCEDURE — 83550 IRON BINDING TEST: CPT

## 2022-11-25 PROCEDURE — 82728 ASSAY OF FERRITIN: CPT

## 2022-11-25 RX ADMIN — ENOXAPARIN SODIUM 40 MG: 40 INJECTION SUBCUTANEOUS at 17:14

## 2022-11-25 RX ADMIN — ACETAMINOPHEN 650 MG: 325 TABLET, FILM COATED ORAL at 15:13

## 2022-11-25 RX ADMIN — SENNOSIDES AND DOCUSATE SODIUM 2 TABLET: 50; 8.6 TABLET ORAL at 17:15

## 2022-11-25 RX ADMIN — DIVALPROEX SODIUM 500 MG: 500 TABLET, DELAYED RELEASE ORAL at 17:15

## 2022-11-25 RX ADMIN — DIVALPROEX SODIUM 500 MG: 500 TABLET, DELAYED RELEASE ORAL at 05:35

## 2022-11-25 RX ADMIN — BICTEGRAVIR SODIUM, EMTRICITABINE, AND TENOFOVIR ALAFENAMIDE FUMARATE 1 TABLET: 50; 200; 25 TABLET ORAL at 05:35

## 2022-11-25 ASSESSMENT — PAIN DESCRIPTION - PAIN TYPE: TYPE: ACUTE PAIN

## 2022-11-25 ASSESSMENT — ENCOUNTER SYMPTOMS: WEAKNESS: 1

## 2022-11-25 NOTE — WOUND TEAM
[facility]  Inpatient Services  LIMB PRESERVATION SERVICE CONSULT    Admission Date: 11/24/2022      REFERRED BY:   Dr Toby Frausto                          ON: 11/24/22    DATE OF LPS CONSULTATION: 11/25/22    REASON FOR CONSULT: B foot frost bite     NEEDED: pt is english speaking    Chief Complaint   Patient presents with    Wound Check     Frostbite injury noted to pt's R toes. Pt recently admitted here for the same and discharged on 11/20.       Gangrene (HCC) [I96]    HISTORY  HPI: Pt presented to ED for wound check.  Pt reports to this clinician that she has been staying at the shelter since she was discharged. She states that her pain has increased to the B feet.  Pt was made NPO in the ED.      Pt was evaluated by LPS on prior admission on 11/19/22.  Pt reported blistering to feet around 26530/22 due to cold exposure.  Pt has hx of schizophrenia ad homelessness ad HIV.      Diabetes History - pt does not have DM   Wound History    Onset 11/16/22    Previous treatment betadine    Previous surgeries - awaiting demarcation of foot wounds  History reviewed. No pertinent surgical history.  Social History     Tobacco Use    Smoking status: Every Day     Packs/day: 0.25     Types: Cigarettes    Smokeless tobacco: Never   Substance Use Topics    Alcohol use: Never      Occupation/home environment/level of function - pt currently residing in shelter  Activity restrictions: TBD    Interval History/Procedures    Initial Treatments/Consults/Diagnostics   Unasyn    LAB VALUES AND IMAGING  Lab Values:  Lab Results   Component Value Date/Time    WBC 4.7 (L) 11/25/2022 02:56 AM    RBC 3.41 (L) 11/25/2022 02:56 AM    HEMOGLOBIN 9.9 (L) 11/25/2022 02:56 AM    HEMATOCRIT 31.4 (L) 11/25/2022 02:56 AM    CREACTPROT 0.34 11/24/2022 02:30 PM    SEDRATEWES 72 (H) 11/24/2022 02:30 PM      Microbiology  na  Blood Culture 11/24/22 prelim is negative  Urinalysis na  X-Ray 11/24/22  IMPRESSION:      1.  There is mild midfoot swelling. There is no soft tissue gas or foreign body.  2.  There is mild multifocal osteoarthritis.  MRI na  CT  GISELLA   FINDINGS  11/13/22   RIGHT      Waveform            Systolic BPs (mmHg)                              125           Brachial   Hyperemic                                Common Femoral   Hyperemic                                Popliteal   Hyperemic                  75            Posterior Tibial   Hyperemic                  72            Dorsalis Pedis                                            Digit                              0.60          GISELLA                                            TBI                           LEFT   Waveform        Systolic BPs (mmHg)                              120           Brachial   Hyperemic                                Common Femoral   Hyperemic                                Popliteal   Hyperemic                  136           Posterior Tibial   Hyperemic                  138           Dorsalis Pedis                                            Digit                              1.10          GISELLA                                            TBI         Findings   Right-   The ankle-brachial index is mildly reduced.    Doppler waveforms of the common femoral artery and popliteal artery are of    high amplitude and hyperemic multiphasic.    Doppler waveforms at the ankle are brisk and hyperemic multiphasic.        Left-   The ankle-brachial index is normal.    Doppler waveforms of the common femoral artery and popliteal artery are of    high amplitude and hyperemic multiphasic.    Doppler waveforms at the ankle are brisk and hyperemic multiphasic.   Right-   Multiphasic flow throughout the common femoral, superficial femoral, and    popliteal arteries with no hemodynamically significant stenosis.   Three vessel runoff to the ankle with biphasic non-reversed flow.      Venous Studies    LOWER EXTERMITY ASSESSMENT    Pain 10/10  Wound  Assessment/LDA/Pictures                          Wound 11/13/22 Soft Tissue Necrosis Foot Dorsal Right frostbite (Active)   Periwound Assessment Edema 11/25/22 0902   Drainage Amount Scant 11/25/22 0902   Drainage Description Serous 11/25/22 0902   Dressing Cleansing/Solutions 3% Betadine 11/25/22 0902   Dressing Changed Observed 11/25/22 0902   Dressing Change/Treatment Frequency Daily, and As Needed 11/25/22 0902   NEXT Dressing Change/Treatment Date 11/25/22 11/25/22 0902   NEXT Weekly Photo (Inpatient Only) 12/02/22 11/25/22 0902   Non-staged Wound Description Full thickness 11/25/22 0902   Wound Bed Slough % - (Post-Procedure) 10 % 11/25/22 0902   Wound Bed Eschar % - (Post-Procedure) 90 % 11/25/22 0902   Wound Odor None 11/25/22 0902   Exposed Structures IVETTE 11/25/22 0902   Number of days: 12       Wound 11/24/22 Other (comment) Toe, Hallux Left frostbite (Active)   Wound Image    11/25/22 0902   Site Assessment Clean;Dry;Black 11/25/22 0746   Periwound Assessment Edema 11/25/22 0902   Drainage Amount None 11/25/22 0902   Dressing Cleansing/Solutions 3% Betadine 11/25/22 0902   Dressing Options Open to Air 11/25/22 0746   Dressing Change/Treatment Frequency Daily, and As Needed 11/25/22 0902   NEXT Dressing Change/Treatment Date 11/26/22 11/25/22 0902   NEXT Weekly Photo (Inpatient Only) 12/02/22 11/25/22 0902   Wound Bed Eschar (%) 100 % 11/25/22 0902   Wound Odor None 11/25/22 0902   Exposed Structures IVETTE 11/25/22 0902   WOUND NURSE ONLY - Time Spent with Patient (mins) 60 11/25/22 0902   Number of days: 1        INTERVENTIONS BY WOUND TEAM:    Wound care completed by:    Wound care orders for daily 3% betadine to eschar written for RN to perform daily  Interdisciplinary consultation: Patient, Bedside RN, Hospitalist Dr Choudhary    Goals of wound care: follow ortho/medical plan.  For now keep all affected areas of the B feet dry    EVALUATION:  11/25/22 Pt now with mummified great toe on the right with wet area  between digits 1 and 2.  Entire R forefoot is black with a fairly distinct line between viable and non viable tissues.  There is also some proximal intact blisters.  Unfortunately pt has a large black eschar surrounding the calcaneous on the R as well with a small area at the proximal aspect of blistering that is presently closed.  Foot is edematous.  L great toe is somewhat mummified, eschar is hard and demarcated.  No open areas on the L.  L second and third toes have some darkened discoloration as well and will need to be watched.  Awaiting ortho consult to determine surgical needs at this point.  In the meantime will keep affected tissue as dry as possible with betadine and off load damaged tissue.      PLAN OF CARE   Dressing changes - bedside RN   LPS to follow - POD2,       Consults -       ID not yet     Ortho- awaiting consult    Nutrition ordered 11/25/22    PT/OT not yet    DISCHARGE PLAN   Anticipated wound care needs TBD   VAC   Foot wear ordered - pt states she did not take any shoes with her from her last hospital stay -  pt is size 10   Type of facility possibly SNFvs rehab   LPS rounds TBD   Follow up with physician KAITLIN

## 2022-11-25 NOTE — CARE PLAN
The patient is Stable - Low risk of patient condition declining or worsening    Shift Goals  Clinical Goals: Safety    Progress made toward(s) clinical / shift goals:    Problem: Knowledge Deficit - Standard  Goal: Patient and family/care givers will demonstrate understanding of plan of care, disease process/condition, diagnostic tests and medications  Outcome: Progressing     Problem: Skin Integrity  Goal: Skin integrity is maintained or improved  Outcome: Progressing     Problem: Pain - Standard  Goal: Alleviation of pain or a reduction in pain to the patient’s comfort goal  Outcome: Progressing       Patient is not progressing towards the following goals:

## 2022-11-25 NOTE — ED NOTES
Pt transported to T7 with transport staff with all belongings and personal wheelchair. Vitals stable for transport.

## 2022-11-25 NOTE — CARE PLAN
Kellie is alert and oriented on room air. Complained of pain in L toes, relief w PRN tylenol. Up with 1 person assist to wheelchair and bathroom. Tolerating PO intake well. Heels boots placed on feet while in bed. Pending ortho consult. New wound care orders updated for bilateral toes - wound care performed. Vitals stable, hourly rounding performed.

## 2022-11-25 NOTE — WOUND TEAM
Wound team and LPS consulted for B feet.  LPS will be following, Wound team signing off.  Please re consult wound as needed.

## 2022-11-25 NOTE — ED NOTES
Message sent to Jonna wound care voalte contact to determine if wound care team on shift and can assess wound if appropriate for inpatient or outpatient care

## 2022-11-25 NOTE — H&P
Hospital Medicine History & Physical Note    Date of Service  11/24/2022    Primary Care Physician  Pcp Pt States None    Consultants  Orthopedic surgery    Code Status  Full Code    Chief Complaint  Chief Complaint   Patient presents with    Wound Check     Frostbite injury noted to pt's R toes. Pt recently admitted here for the same and discharged on 11/20.       History of Presenting Illness  Kellie Chatman is a 52 y.o. -American female with past medical history of schizophrenia, homelessness, HIV, nicotine dependence, frostbite injury to the feet bilaterally, who presented 11/24/2022 with bilateral feet pain, more on the right side, black discoloration of the toes, more on the right foot.  She was admitted to this hospital 11/13-11/20 for gangrene.  She was treated with IV antibiotics and LPS consulted.  GISELLA showed decreased blood flow in the right lower extremity of 0.6.  Orthopedic surgery consulted and recommended to allow for demarcation over 1 to 2 weeks.  He was discharged on p.o. Augmentin.  Patient return to ER for continuous right foot pain without alleviating aggravating factors on and off up to 9 out of 10, some numbness.  Her sed rate is elevated.  ERP requested admission for LPS consultation, orthopedic surgery consultation.  She was started on Unasyn.  X-ray of the right foot showed osteoarthritis, soft tissue swelling, no soft tissue gas  I discussed the plan of care with patient.    Review of Systems  Review of Systems   Constitutional:  Negative for chills, fever and weight loss.   HENT:  Negative for ear pain, hearing loss and tinnitus.    Eyes:  Negative for blurred vision, double vision and photophobia.   Respiratory:  Negative for cough, hemoptysis and sputum production.    Cardiovascular:  Negative for chest pain, palpitations and orthopnea.   Gastrointestinal:  Negative for heartburn, nausea and vomiting.   Genitourinary:  Negative for dysuria, flank pain, frequency and hematuria.    Musculoskeletal:  Positive for joint pain. Negative for back pain and neck pain.   Skin:  Negative for itching and rash.   Neurological:  Negative for tremors, speech change, focal weakness and headaches.   Endo/Heme/Allergies:  Negative for environmental allergies and polydipsia. Does not bruise/bleed easily.   Psychiatric/Behavioral:  Negative for hallucinations and substance abuse. The patient is not nervous/anxious.      Past Medical History   has no past medical history on file.  Frostbite  HIV  Schizophrenia    Surgical History   has no past surgical history on file.     Family History     Family history reviewed with patient. There is no family history that is pertinent to the chief complaint.     Social History   reports that she has been smoking cigarettes. She has been smoking an average of .25 packs per day. She has never used smokeless tobacco. She reports that she does not drink alcohol and does not use drugs.    Allergies  No Known Allergies    Medications  Prior to Admission Medications   Prescriptions Last Dose Informant Patient Reported? Taking?   amoxicillin-clavulanate (AUGMENTIN) 875-125 MG Tab   No No   Sig: Take 1 Tablet by mouth every 12 hours for 10 days.   bictegravir-emtricitab-TAF (BIKTARVY) -25 mg Tab tablet   No No   Sig: Take 1 Tablet by mouth every day.   divalproex (DEPAKOTE) 500 MG Tablet Delayed Response   No No   Sig: Take 1 Tablet by mouth every 12 hours.      Facility-Administered Medications: None       Physical Exam  Temp:  [36.6 °C (97.8 °F)] 36.6 °C (97.8 °F)  Pulse:  [] 100  Resp:  [16] 16  BP: ()/(52-66) 94/52  SpO2:  [93 %-98 %] 94 %  Blood Pressure: (!) 94/52   Temperature: 36.6 °C (97.8 °F)   Pulse: 100   Respiration: 16   Pulse Oximetry: 94 %       Physical Exam  Vitals and nursing note reviewed.   Constitutional:       General: She is not in acute distress.     Appearance: Normal appearance.   HENT:      Head: Normocephalic and atraumatic.       Nose: Nose normal.      Mouth/Throat:      Mouth: Mucous membranes are moist.   Eyes:      Extraocular Movements: Extraocular movements intact.      Pupils: Pupils are equal, round, and reactive to light.   Cardiovascular:      Rate and Rhythm: Normal rate and regular rhythm.   Pulmonary:      Effort: Pulmonary effort is normal.      Breath sounds: Normal breath sounds.   Abdominal:      General: Abdomen is flat. There is no distension.      Tenderness: There is no abdominal tenderness.   Musculoskeletal:         General: No swelling or deformity. Normal range of motion.      Cervical back: Normal range of motion and neck supple.      Comments:  blackening of all 5 toes of the right foot with demarcation about a centimeter into the foot.  Swelling, skin breakdown, serous weeping.  Some gangrene to the heel.   The foot is warm to touch   Skin:     General: Skin is warm and dry.   Neurological:      General: No focal deficit present.      Mental Status: She is alert and oriented to person, place, and time.   Psychiatric:         Mood and Affect: Mood normal.         Behavior: Behavior normal.       Laboratory:  Recent Labs     11/24/22  1430   WBC 5.9   RBC 4.04*   HEMOGLOBIN 11.8*   HEMATOCRIT 37.3   MCV 92.3   MCH 29.2   MCHC 31.6*   RDW 42.1   PLATELETCT 565*   MPV 8.4*     Recent Labs     11/24/22  1430   SODIUM 144   POTASSIUM 4.0   CHLORIDE 105   CO2 29   GLUCOSE 66   BUN 13   CREATININE 0.65   CALCIUM 9.2     Recent Labs     11/24/22  1430   ALTSGPT 9   ASTSGOT 14   ALKPHOSPHAT 96   TBILIRUBIN 0.2   GLUCOSE 66         No results for input(s): NTPROBNP in the last 72 hours.      No results for input(s): TROPONINT in the last 72 hours.    Imaging:  DX-FOOT-COMPLETE 3+ RIGHT   Final Result      1.  There is mild midfoot swelling. There is no soft tissue gas or foreign body.   2.  There is mild multifocal osteoarthritis.          X-Ray:  I have personally reviewed the images and compared with prior  images.    Assessment/Plan:  Justification for Admission Status  I anticipate this patient will require at least two midnights for appropriate medical management, necessitating inpatient admission because gangrene    Patient will need a Med/Surg bed on MEDICAL service .  The need is secondary to gangrene.    * Gangrene (HCC)- (present on admission)  Assessment & Plan  Dry and wet gangrene of the right foot, With possible cellulitis and osteomyelitis  Secondary to recent frostbite and arterial insufficiency  Plan: Empiric Unasyn IV  LPS consult ordered  Orthopedic surgery is expected to consult by ERP  Pain control    HIV (human immunodeficiency virus infection) ([facility])- (present on admission)  Assessment & Plan  Continue Biktarvy    Schizophrenia ([facility])- (present on admission)  Assessment & Plan  Continue Depakote    Tobacco abuse- (present on admission)  Assessment & Plan  Nicotine cessation counseling        VTE prophylaxis: enoxaparin ppx

## 2022-11-25 NOTE — PROGRESS NOTES
Hospital Medicine Daily Progress Note    Date of Service  11/25/2022    Chief Complaint  Kellie Chatman is a 52 y.o. female admitted 11/24/2022 with frostbite injury    Hospital Course  52 y.o. -American female with past medical history of schizophrenia, homelessness, HIV, nicotine dependence, frostbite injury to the feet bilaterally, who presented 11/24/2022 with bilateral feet pain, more on the right side, black discoloration of the toes, more on the right foot.  She was admitted to this hospital 11/13-11/20 for gangrene.  She was treated with IV antibiotics and LPS consulted.  GISELLA showed decreased blood flow in the right lower extremity of 0.6.  Orthopedic surgery consulted and recommended to allow for demarcation over 1 to 2 weeks.  He was discharged on p.o. Augmentin.  Patient return to ER for continuous right foot pain.   LPS and orthopedics consulted    Interval Problem Update  Seen at bedside.   Consulted LPS and orthopedics.   Demarcation   Await ortho plans    I have discussed this patient's plan of care and discharge plan at IDT rounds today with Case Management, Nursing, Nursing leadership, and other members of the IDT team.    Consultants/Specialty  orthopedics    Code Status  Full Code    Disposition  Patient is not medically cleared for discharge.   Anticipate discharge to to home with close outpatient follow-up.  I have placed the appropriate orders for post-discharge needs.    Review of Systems  Review of Systems   Constitutional:  Positive for malaise/fatigue.   Musculoskeletal:  Positive for joint pain.   Neurological:  Positive for weakness.   All other systems reviewed and are negative.     Physical Exam  Temp:  [36.8 °C (98.2 °F)-37 °C (98.6 °F)] 36.8 °C (98.2 °F)  Pulse:  [] 83  Resp:  [16-17] 17  BP: ()/(52-83) 117/68  SpO2:  [93 %-98 %] 97 %    Physical Exam  Vitals reviewed.   Constitutional:       Appearance: Normal appearance. She is ill-appearing.   HENT:      Head:  Normocephalic and atraumatic.      Nose: Nose normal.      Mouth/Throat:      Pharynx: Oropharynx is clear.   Eyes:      Extraocular Movements: Extraocular movements intact.      Conjunctiva/sclera: Conjunctivae normal.      Pupils: Pupils are equal, round, and reactive to light.   Cardiovascular:      Rate and Rhythm: Normal rate and regular rhythm.      Pulses: Normal pulses.      Heart sounds: Normal heart sounds.   Pulmonary:      Effort: Pulmonary effort is normal.      Breath sounds: Normal breath sounds.   Abdominal:      General: Abdomen is flat. Bowel sounds are normal.      Palpations: Abdomen is soft.   Musculoskeletal:         General: Signs of injury present.      Cervical back: Normal range of motion and neck supple.      Comments: R foot:  blackening of all 5 toes with demarcation about a centimeter into the foot.  There is also some necrosis and gangrene noted to the heel.  There is no crepitus or skin sloughing  L foot: Big toe gangrene    Skin:     General: Skin is warm and dry.   Neurological:      General: No focal deficit present.      Mental Status: She is alert and oriented to person, place, and time. Mental status is at baseline.   Psychiatric:         Mood and Affect: Mood normal.         Behavior: Behavior normal.       Fluids    Intake/Output Summary (Last 24 hours) at 11/25/2022 1232  Last data filed at 11/25/2022 0746  Gross per 24 hour   Intake 460 ml   Output --   Net 460 ml       Laboratory  Recent Labs     11/24/22  1430 11/25/22  0256   WBC 5.9 4.7*   RBC 4.04* 3.41*   HEMOGLOBIN 11.8* 9.9*   HEMATOCRIT 37.3 31.4*   MCV 92.3 92.1   MCH 29.2 29.0   MCHC 31.6* 31.5*   RDW 42.1 41.9   PLATELETCT 565* 437   MPV 8.4* 8.6*     Recent Labs     11/24/22  1430 11/25/22  0256   SODIUM 144 141   POTASSIUM 4.0 4.2   CHLORIDE 105 108   CO2 29 24   GLUCOSE 66 93   BUN 13 12   CREATININE 0.65 0.64   CALCIUM 9.2 8.6                   Imaging  DX-FOOT-COMPLETE 3+ RIGHT   Final Result      1.  There  is mild midfoot swelling. There is no soft tissue gas or foreign body.   2.  There is mild multifocal osteoarthritis.           Assessment/Plan  * Gangrene (HCC)- (present on admission)  Assessment & Plan  Dry and wet gangrene of the right foot, With possible cellulitis and osteomyelitis  Secondary to recent frostbite and arterial insufficiency  Plan: Empiric Unasyn IV  LPS and Orthopedic surgery consulted  Pain control    Normocytic anemia  Assessment & Plan  No sign of active bleeding  Cont monitoring, transfuse if Hb<7  Check iron profile and ferritin, vitB12    HIV (human immunodeficiency virus infection) ([facility])- (present on admission)  Assessment & Plan  Continue Biktarvy    Schizophrenia ([facility])- (present on admission)  Assessment & Plan  Continue Depakote    Tobacco abuse- (present on admission)  Assessment & Plan  Nicotine cessation counseling       VTE prophylaxis: enoxaparin ppx    I have performed a physical exam and reviewed and updated ROS and Plan today (11/25/2022). In review of yesterday's note (11/24/2022), there are no changes except as documented above.

## 2022-11-25 NOTE — PROGRESS NOTES
4 Eyes Skin Assessment Completed by Franklin RN and MARIA EUGENIA Stahl.    Head WDL  Ears WDL  Nose WDL  Mouth WDL  Neck WDL  Breast/Chest WDL  Shoulder Blades WDL  Spine WDL  (R) Arm/Elbow/Hand WDL  (L) Arm/Elbow/Hand WDL  Abdomen WDL  Groin WDL  Scrotum/Coccyx/Buttocks WDL  (R) Leg WDL  (L) Leg WDL  (R) Heel/Foot/Toe Discoloration and Swelling Gangrene Toes  (L) Heel/Foot/Toe Discoloration and Swelling. Gangrene Toes          Devices In Places NA      Interventions In Place N/A    Possible Skin Injury No    Pictures Uploaded Into Epic Yes  Wound Consult Placed Yes  RN Wound Prevention Protocol Ordered Yes

## 2022-11-25 NOTE — PROGRESS NOTES
Pt received from [facility] on stretcher. Able to transport from stretcher to bed. Unable to assess gait, pt arrived with own wheelchair. Noted gangrene to BL Feet/Toes. Educated pt on unit routine, verbalized understanding. Callbell and bedside table placed within reach. Home meds given to pharmacy. Fall/Safety precautions maintained.

## 2022-11-25 NOTE — ASSESSMENT & PLAN NOTE
No sign of active bleeding  Cont monitoring, transfuse if Hb<7  Check iron profile and ferritin, vitB12

## 2022-11-25 NOTE — ASSESSMENT & PLAN NOTE
Dry and wet gangrene of the right foot, With possible cellulitis and osteomyelitis  Secondary to recent frostbite and arterial insufficiency  LPS and Orthopedic surgery consulted. Per orthopedics, still needs time for demarcation   Pain control  PT/OT

## 2022-11-26 PROCEDURE — 99232 SBSQ HOSP IP/OBS MODERATE 35: CPT | Performed by: STUDENT IN AN ORGANIZED HEALTH CARE EDUCATION/TRAINING PROGRAM

## 2022-11-26 PROCEDURE — 700111 HCHG RX REV CODE 636 W/ 250 OVERRIDE (IP): Performed by: INTERNAL MEDICINE

## 2022-11-26 PROCEDURE — 700102 HCHG RX REV CODE 250 W/ 637 OVERRIDE(OP): Performed by: INTERNAL MEDICINE

## 2022-11-26 PROCEDURE — 770001 HCHG ROOM/CARE - MED/SURG/GYN PRIV*

## 2022-11-26 PROCEDURE — A9270 NON-COVERED ITEM OR SERVICE: HCPCS | Performed by: INTERNAL MEDICINE

## 2022-11-26 RX ORDER — OXYCODONE HYDROCHLORIDE 5 MG/1
5 TABLET ORAL EVERY 4 HOURS PRN
Status: DISCONTINUED | OUTPATIENT
Start: 2022-11-26 | End: 2022-11-27 | Stop reason: HOSPADM

## 2022-11-26 RX ADMIN — DIVALPROEX SODIUM 500 MG: 500 TABLET, DELAYED RELEASE ORAL at 18:04

## 2022-11-26 RX ADMIN — SENNOSIDES AND DOCUSATE SODIUM 2 TABLET: 50; 8.6 TABLET ORAL at 18:04

## 2022-11-26 RX ADMIN — SENNOSIDES AND DOCUSATE SODIUM 2 TABLET: 50; 8.6 TABLET ORAL at 04:39

## 2022-11-26 RX ADMIN — DIVALPROEX SODIUM 500 MG: 500 TABLET, DELAYED RELEASE ORAL at 04:40

## 2022-11-26 RX ADMIN — BICTEGRAVIR SODIUM, EMTRICITABINE, AND TENOFOVIR ALAFENAMIDE FUMARATE 1 TABLET: 50; 200; 25 TABLET ORAL at 04:39

## 2022-11-26 RX ADMIN — ENOXAPARIN SODIUM 40 MG: 40 INJECTION SUBCUTANEOUS at 18:04

## 2022-11-26 ASSESSMENT — ENCOUNTER SYMPTOMS: WEAKNESS: 1

## 2022-11-26 ASSESSMENT — FIBROSIS 4 INDEX: FIB4 SCORE: 0.51

## 2022-11-26 NOTE — CONSULTS
DATE:  11/25/2022    REQUESTING PHYSICIAN: [facility] hospitalist    CHIEF COMPLAINT:  Frostbite injury to both feet    HISTORY OF PRESENT ILLNESS: The patient is homeless and had a frostbite injury to both feet a couple of weeks ago.  She was admitted to the hospital.  Orthopedic consultation was requested and there was apparently plans for observation and for clinic follow-up but there was no note from the consultant on-call.  She has been readmitted and orthopedic consultation was read as requested.    ALLERGIES: None    MEDICATIONS: Valproic acid, Biktarvy    PAST MEDICAL HISTORY: Schizophrenia    PAST SURGICAL HISTORY: None    SOCIAL HISTORY: Patient is homeless.  She smokes cigarettes.    FAMILY HISTORY: Negative    REVIEW OF SYSTEMS:  No headaches, nausea, vomiting, diarrhea, constipation, polyuria, dysuria, fevers, chills, weight loss, weight gain, abdominal pain, chest pain, shortness of breath.     EXAMINATION:    General: No acute distress   Vitals:  On presentation to the ER, blood pressure 94/52, heart rate 100, respirations 16, temperature 97.8  HEENT: Normocephalic  Neck: Nontender  Chest: Nontender  Lungs: No labored breathing  Heart: Regular  Extremities: Deformity of the upper extremities or left lower extremity.  There is some developing gangrene at the tip of her right second through fifth toes and there is dry gangrene of her great great toe.  No purulence.  No odor.  Vascular: Good dorsalis pedis pulse     IMAGING: None    ASSESSMENT: Frostbite injury to bilateral feet with dry gangrene of right great toe    PLAN: Recommend continued observation and follow-up in the clinic.  Looks like the left foot will be viable.  Some of the lesser toes on the right foot may be as well.  She will likely require at least a great toe amputation if not a transmetatarsal amputation.

## 2022-11-26 NOTE — WOUND TEAM
New consult received for LPS.  Full LPS evaluation done 11/25/22.  LPS will follow pt to watch for further demarcation of B feet.  Ortho recommendations noted.  LPS will update ortho service as pt progresses.

## 2022-11-26 NOTE — CARE PLAN
Problem: Knowledge Deficit - Standard  Goal: Patient and family/care givers will demonstrate understanding of plan of care, disease process/condition, diagnostic tests and medications  Outcome: Progressing     Problem: Skin Integrity  Goal: Skin integrity is maintained or improved  Outcome: Progressing   The patient is Stable - Low risk of patient condition declining or worsening    Shift Goals  Clinical Goals: Wound care safety  Patient Goals: Rest    Progress made toward(s) clinical / shift goals:  pt is able to explain dressing change and car of wounds-    Patient is not progressing towards the following goals:

## 2022-11-26 NOTE — DIETARY
NUTRITION SERVICES:   Consult received for pressure ulcer stage 2-4 or non-healing wound. Per wound team note on 11/25, pt with soft tissue necrosis foot dorsal right frostbite and frostbite on toe ,hallux left. No staged wounds noted. PO intake % of meals since admit per ADL's.     RD will monitor per dept policy.

## 2022-11-26 NOTE — CONSULTS
DATE OF SERVICE:  11/25/2022     ORTHOPEDIC CONSULTATION     REQUESTING PHYSICIAN:  Nabila Choudhary MD, hospitalist service.     REASON FOR CONSULTATION:  Frostbite bilaterally with necrosis of several toes.     HISTORY OF PRESENT ILLNESS:  The patient is a 52-year-old female.  She is   homeless and has schizophrenia, HIV.  She sustained a frostbite injury she   states about 2 weeks ago to her bilateral feet.  She was seen in the emergency   department and admitted on 11/13/2022.  She had a concern for frostbite to   the bilateral lower extremities and was evaluated by the limb preservation   service and was ultimately discharged on 11/20/2022 with recommendation to   follow up with the [facility] on an outpatient basis after   awaiting demarcation of the zone of necrosis and to coordinate amputation of   devitalized toes and tissue.  She returned yesterday to the emergency   department yesterday and was readmitted by the hospitalist service because the   toes started looking more black.  She denies having any pain at rest.  She   denies feeling febrile.  I was contacted by Dr. Choudhary to provide further   treatment recommendations for her bilateral frostbite injuries.     PAST MEDICAL HISTORY:  ALLERGIES:  No known drug allergies.     OUTPATIENT MEDICATIONS:  Augmentin, Depakote, Biktarvy.     PERTINENT ADDITIONAL INPATIENT MEDICATIONS:  Include Lovenox.     PAST MEDICAL DIAGNOSES:  Include schizophrenia, HIV, homelessness, nicotine   dependence.     PAST SURGICAL HISTORY:  None listed.     SOCIAL HISTORY:  The patient smokes 1/4 pack of cigarettes a day.  Denies   alcohol and illicit drug use.  She is homeless.     PHYSICAL EXAMINATION:    VITAL SIGNS:  Temperature 98.2, heart rate 83, respiratory rate 17, blood   pressure 117/68, pulse oximetry 97% on room air.  GENERAL APPEARANCE:  The patient is alert, pleasant, cooperative, in no acute   distress.  MUSCULOSKELETAL:  Right lower extremity; her decubitus  ulcer boot was removed.    She does have what appears to be dry gangrene of the great toe and some   necrosis of the soft tissue release of the lesser toes.  There is a large zone   of necrosis over the posterior heel.  Left lower extremity; she has some   black discoloration of the soft tissue of the great toe without significant   swelling or wound breakdown.  There is minimal areas of soft tissue necrosis   of the tips of the remaining toes on the left foot.     ASSESSMENT:  A 52-year-old female with schizophrenia, homeless, HIV infection.    She has bilateral foot frostbite injuries.  She was seen a couple weeks ago   and discharged after being evaluated by the limb preservation service with   plans to follow up with the [facility] to coordinate further   surgical treatment over the weeks to come, but she was readmitted just   yesterday to the hospitalist service.  She is currently not having any pain.    She has no signs of systemic infection or obvious local infection.  She still   has evidence of full-thickness tissue injury to the right great toe to at   least the level of the metatarsophalangeal joint and to a lesser degree the   lesser toes on the right foot.  She has a large area of soft tissue necrosis   at the heel on the right side as well.  On the left lower extremity, she has   some necrosis to the soft tissues of the great toe without obvious   full-thickness necrosis down to bone or any skin breakdown there.  She has a   normal white blood cell count, elevated ESR is 72 and a normal CRP of 0.34.     RECOMMENDATIONS:    1.  I discussed these findings with the patient and I feel that at this point,   she would benefit from a longer duration of time to allow for further   demarcation of the zone of tissue necrosis, particularly at the right heel.  If that  Area does not have any healing potential, then the only best option for further   surgical treatment would likely be a transtibial  amputation.  If the   skin on the heel can recover, then she may be a candidate for a more distal   amputation either through the first metatarsal or MTP joint or a transmetatarsal   amputation through the foot.  2.  With regard to the left great toe, I recommend just continued clinical   monitoring as I do not feel that the necrosis is significant enough at the point  to warrant amputation, but it could potentially progress to that.  3.  Since she is currently not systemically ill and is not having pain, I feel that   more time to wait prior to proceeding with surgical intervention would be in her   best interest.  4.  I recommend that the limb preservation service continue to follow the patient   and from my standpoint it would be fine with her to follow up on an outpatient   basis with the [facility], which was apparently the plan after her  previous admission.  If she remains inpatient, and her feet become amenable to    amputation after further demarcation then I would be happy to assist with that   as well.        ______________________________  MD KIARRA Tao/SUB    DD:  11/25/2022 13:31  DT:  11/25/2022 19:12    Job#:  [id number]

## 2022-11-26 NOTE — PROGRESS NOTES
Frostbite injury a couple weeks ago   Lives in shelter  Apparently was previously evaluated by [facility] ortho MD, but no documentation  There is dry gangrene of right great toe and tips of lesser toes with extension over 1st metatarsal head dorsally and plantarly  Left foot without any obvious gangrene  Continue observation  Will need some level of amputation on right, probably TMA, but will reassess on Monday

## 2022-11-26 NOTE — PROGRESS NOTES
Hospital Medicine Daily Progress Note    Date of Service  11/26/2022    Chief Complaint  Kellie Chatman is a 52 y.o. female admitted 11/24/2022 with frostbite injury    Hospital Course  52 y.o. -American female with past medical history of schizophrenia, homelessness, HIV, nicotine dependence, frostbite injury to the feet bilaterally, who presented 11/24/2022 with bilateral feet pain, more on the right side, black discoloration of the toes, more on the right foot.  She was admitted to this hospital 11/13-11/20 for gangrene.  She was treated with IV antibiotics and LPS consulted.  GISELLA showed decreased blood flow in the right lower extremity of 0.6.  Orthopedic surgery consulted and recommended to allow for demarcation over 1 to 2 weeks.  He was discharged on p.o. Augmentin.  Patient return to ER for continuous right foot pain.   LPS and orthopedics consulted  Per orthopedics, still needs time for demarcation.     Interval Problem Update  Seen at bedside.   Reports pain in BLE  PT/OT    I have discussed this patient's plan of care and discharge plan at IDT rounds today with Case Management, Nursing, Nursing leadership, and other members of the IDT team.    Consultants/Specialty  orthopedics    Code Status  Full Code    Disposition  Patient is not medically cleared for discharge.   Anticipate discharge to to home with close outpatient follow-up.  I have placed the appropriate orders for post-discharge needs.    Review of Systems  Review of Systems   Constitutional:  Positive for malaise/fatigue.   Musculoskeletal:  Positive for joint pain.   Neurological:  Positive for weakness.   All other systems reviewed and are negative.     Physical Exam  Temp:  [36.1 °C (97 °F)-36.7 °C (98 °F)] 36.5 °C (97.7 °F)  Pulse:  [78-91] 82  Resp:  [17-18] 17  BP: (105-125)/(68-76) 118/69  SpO2:  [95 %-98 %] 98 %    Physical Exam  Vitals reviewed.   Constitutional:       Appearance: Normal appearance. She is ill-appearing.   HENT:       Head: Normocephalic and atraumatic.      Nose: Nose normal.      Mouth/Throat:      Pharynx: Oropharynx is clear.   Eyes:      Extraocular Movements: Extraocular movements intact.      Conjunctiva/sclera: Conjunctivae normal.      Pupils: Pupils are equal, round, and reactive to light.   Cardiovascular:      Rate and Rhythm: Normal rate and regular rhythm.      Pulses: Normal pulses.      Heart sounds: Normal heart sounds.   Pulmonary:      Effort: Pulmonary effort is normal.      Breath sounds: Normal breath sounds.   Abdominal:      General: Abdomen is flat. Bowel sounds are normal.      Palpations: Abdomen is soft.   Musculoskeletal:         General: Signs of injury present.      Cervical back: Normal range of motion and neck supple.      Comments: R foot:  blackening of all 5 toes with demarcation about a centimeter into the foot.  There is also some necrosis and gangrene noted to the heel.  There is no crepitus or skin sloughing  L foot: Big toe gangrene    Skin:     General: Skin is warm and dry.   Neurological:      General: No focal deficit present.      Mental Status: She is alert and oriented to person, place, and time. Mental status is at baseline.   Psychiatric:         Mood and Affect: Mood normal.         Behavior: Behavior normal.       Fluids    Intake/Output Summary (Last 24 hours) at 11/26/2022 1253  Last data filed at 11/26/2022 0900  Gross per 24 hour   Intake 840 ml   Output --   Net 840 ml         Laboratory  Recent Labs     11/24/22  1430 11/25/22  0256   WBC 5.9 4.7*   RBC 4.04* 3.41*   HEMOGLOBIN 11.8* 9.9*   HEMATOCRIT 37.3 31.4*   MCV 92.3 92.1   MCH 29.2 29.0   MCHC 31.6* 31.5*   RDW 42.1 41.9   PLATELETCT 565* 437   MPV 8.4* 8.6*       Recent Labs     11/24/22  1430 11/25/22  0256   SODIUM 144 141   POTASSIUM 4.0 4.2   CHLORIDE 105 108   CO2 29 24   GLUCOSE 66 93   BUN 13 12   CREATININE 0.65 0.64   CALCIUM 9.2 8.6                     Imaging  DX-FOOT-COMPLETE 3+ RIGHT   Final  Result      1.  There is mild midfoot swelling. There is no soft tissue gas or foreign body.   2.  There is mild multifocal osteoarthritis.             Assessment/Plan  * Gangrene (HCC)- (present on admission)  Assessment & Plan  Dry and wet gangrene of the right foot, With possible cellulitis and osteomyelitis  Secondary to recent frostbite and arterial insufficiency  LPS and Orthopedic surgery consulted. Per orthopedics, still needs time for demarcation   Pain control  PT/OT    Normocytic anemia  Assessment & Plan  No sign of active bleeding  Cont monitoring, transfuse if Hb<7  Check iron profile and ferritin, vitB12    HIV (human immunodeficiency virus infection) (HCC)- (present on admission)  Assessment & Plan  Continue Biktarvy    Schizophrenia ([facility])- (present on admission)  Assessment & Plan  Continue Depakote    Tobacco abuse- (present on admission)  Assessment & Plan  Nicotine cessation counseling       VTE prophylaxis: enoxaparin ppx    I have performed a physical exam and reviewed and updated ROS and Plan today (11/26/2022). In review of yesterday's note (11/25/2022), there are no changes except as documented above.

## 2022-11-27 VITALS
RESPIRATION RATE: 16 BRPM | DIASTOLIC BLOOD PRESSURE: 66 MMHG | OXYGEN SATURATION: 96 % | HEART RATE: 73 BPM | BODY MASS INDEX: 24.43 KG/M2 | SYSTOLIC BLOOD PRESSURE: 104 MMHG | HEIGHT: 70 IN | WEIGHT: 170.64 LBS | TEMPERATURE: 98 F

## 2022-11-27 PROCEDURE — 99239 HOSP IP/OBS DSCHRG MGMT >30: CPT | Performed by: STUDENT IN AN ORGANIZED HEALTH CARE EDUCATION/TRAINING PROGRAM

## 2022-11-27 PROCEDURE — A9270 NON-COVERED ITEM OR SERVICE: HCPCS | Performed by: INTERNAL MEDICINE

## 2022-11-27 PROCEDURE — 700102 HCHG RX REV CODE 250 W/ 637 OVERRIDE(OP): Performed by: INTERNAL MEDICINE

## 2022-11-27 RX ORDER — LIDOCAINE AND PRILOCAINE 25; 25 MG/G; MG/G
CREAM TOPICAL ONCE
Status: SHIPPED | OUTPATIENT
Start: 2022-11-27 | End: 2022-11-30

## 2022-11-27 RX ADMIN — DIVALPROEX SODIUM 500 MG: 500 TABLET, DELAYED RELEASE ORAL at 04:26

## 2022-11-27 RX ADMIN — BICTEGRAVIR SODIUM, EMTRICITABINE, AND TENOFOVIR ALAFENAMIDE FUMARATE 1 TABLET: 50; 200; 25 TABLET ORAL at 04:23

## 2022-11-27 NOTE — DISCHARGE SUMMARY
Discharge Summary    CHIEF COMPLAINT ON ADMISSION  Chief Complaint   Patient presents with    Wound Check     Frostbite injury noted to pt's R toes. Pt recently admitted here for the same and discharged on 11/20.       Reason for Admission  EMS     Admission Date  11/24/2022    CODE STATUS  Full Code    HPI & HOSPITAL COURSE  52 y.o. -American female with past medical history of schizophrenia, homelessness, HIV, nicotine dependence, frostbite injury to the feet bilaterally, who presented 11/24/2022 with bilateral feet pain, more on the right side, black discoloration of the toes, more on the right foot.  She was admitted to this hospital 11/13-11/20 for gangrene.  She was treated with IV antibiotics and LPS consulted.  GISELLA showed decreased blood flow in the right lower extremity of 0.6.  Orthopedic surgery consulted and recommended to allow for demarcation over 1 to 2 weeks.  He was discharged on p.o. Augmentin.  Patient return to ER for continuous right foot pain. LPS and orthopedics consulted. Per orthopedics Dr. Garsia, recommended continuing monitoring to allow for further demarcation of the zone of tissue necrosis. She will need to follow up with wound care and [facility] as outpatient basis. Discussed with the patient and she agreed to follow with orthopedics and wound care as outpatient setting. Wound care follow up referral was placed. I have called  to set up ortho Dr. Garsia follow up.   Patient is wheelchair bound and she declines PT/OT evaluation and requests to be discharged to the community/shelter.     Therefore, she is discharged in fair and stable condition to home with close outpatient follow-up.    The patient met 2-midnight criteria for an inpatient stay at the time of discharge.    Discharge Date  11/27/2022    FOLLOW UP ITEMS POST DISCHARGE  Wound care  Orthopedics Dr. Garsia     DISCHARGE DIAGNOSES  Principal Problem:    Gangrene (HCC) POA: Yes  Active  Problems:    Tobacco abuse POA: Yes    Schizophrenia (HCC) POA: Yes    HIV (human immunodeficiency virus infection) (HCC) POA: Yes    Normocytic anemia POA: Unknown  Resolved Problems:    * No resolved hospital problems. *      FOLLOW UP  No future appointments.  Blake Garsia M.D.  [address] [facility] [address]  [phone]    Follow up in 1 week(s)        MEDICATIONS ON DISCHARGE     Medication List        CONTINUE taking these medications        Instructions   amoxicillin-clavulanate 875-125 MG Tabs  Commonly known as: AUGMENTIN   Take 1 Tablet by mouth every 12 hours for 10 days.  Dose: 1 Tablet     Biktarvy -25 mg Tabs tablet  Generic drug: bictegravir-emtricitab-TAF   Take 1 Tablet by mouth every day.  Dose: 1 Tablet     divalproex 500 MG Tbec  Commonly known as: DEPAKOTE   Take 1 Tablet by mouth every 12 hours.  Dose: 500 mg              Allergies  No Known Allergies    DIET  Orders Placed This Encounter   Procedures    Diet Order Diet: Regular     Standing Status:   Standing     Number of Occurrences:   1     Order Specific Question:   Diet:     Answer:   Regular [1]       ACTIVITY  As tolerated.  Weight bearing as tolerated    CONSULTATIONS  orthopedics    PROCEDURES  na    LABORATORY  Lab Results   Component Value Date    SODIUM 141 11/25/2022    POTASSIUM 4.2 11/25/2022    CHLORIDE 108 11/25/2022    CO2 24 11/25/2022    GLUCOSE 93 11/25/2022    BUN 12 11/25/2022    CREATININE 0.64 11/25/2022        Lab Results   Component Value Date    WBC 4.7 (L) 11/25/2022    HEMOGLOBIN 9.9 (L) 11/25/2022    HEMATOCRIT 31.4 (L) 11/25/2022    PLATELETCT 437 11/25/2022        Total time of the discharge process 33 minutes.

## 2022-11-27 NOTE — CARE PLAN
[place] of care from [id number]. Kellie is alert and oriented on room air. Denied pain. Up to wheel chair with stand by assist. Pt tolerating PO intake well. DC education provided. Pt is aware she needs to call tomorrow to schedule a wound care/LPS follow up appointment outpatient. Verbal teach-back reported by pt that she will call and schedule appointment. Transport called for pt. Pt to DC back to shelter.

## 2022-11-27 NOTE — DISCHARGE PLANNING
Case Management Discharge Planning    Admission Date: 11/24/2022  GMLOS: 6.2  ALOS: 3    6-Clicks ADL Score: 23  6-Clicks Mobility Score: 19      Anticipated Discharge Dispo: Discharge Disposition: Discharged to home/self care (01)    DME Needed: No    Action(s) Taken: Updated Provider/Nurse on Discharge Plan, DC Assessment Complete (See below), Referral(s) sent, and Transport Arranged     Escalations Completed: None    Medically Clear: Yes    Next Steps: Rev'd with LARS Lomeli, Dr Choudhary wants to dc pt home today but she needs a wound clinic appt arranged for some time this week. Unfortunately she is homeless, lives at the [facility], and has no phone so she needs the appt arranged prior to dc today. Wound clinic is only open M-F 7:30-5:00. I left a message for tomorrow to return my call.     Voalte message escalated to Sue patel, Admin Director for wound clinic, and she states the clinic is closed and she has no way to access the schedule. She can only confirm they have some open spots this week. Voalte texted Dr Choudhary and LARS Lomeli.     Pt can call wound clinic tomorrow ar [phone].     Pt wants taxi ride to shelter. Pt given MTM number as she has transportation benefits to arrange herself.     Barriers to Discharge: Outpatient referrals pending and Transportation    Is the patient up for discharge tomorrow: No

## 2022-11-27 NOTE — CARE PLAN
Problem: Knowledge Deficit - Standard  Goal: Patient and family/care givers will demonstrate understanding of plan of care, disease process/condition, diagnostic tests and medications  Outcome: Progressing     Problem: Skin Integrity  Goal: Skin integrity is maintained or improved  Outcome: Progressing   The patient is Stable - Low risk of patient condition declining or worsening    Shift Goals  Clinical Goals: Wound care safety  Patient Goals: Rest    Progress made toward(s) clinical / shift goals:  pt is usually slow to respond yet is [place] of [place] able to express wants and needs and use of call light for assist        Patient is not progressing towards the following goals:

## 2022-11-27 NOTE — DISCHARGE INSTRUCTIONS
Discharge Instructions per Nabila Choudhary M.D.    Please follow-up with [facility] and follow up with wound care as outpatient    DIET: regular diet    ACTIVITY: As tolerated    DIAGNOSIS: frostbite injury gangrene, HIV    Return to ER in the event of new or worsening symptoms. Please note importance of compliance and the patient has agreed to proceed with all medical recommendations and follow up plan indicated above. All medications come with benefits and risks. Risks may include permanent injury or death and these risks can be minimized with close reassessment and monitoring. Please make it to your scheduled follow ups with PCP and orthopedics

## 2022-11-27 NOTE — CARE PLAN
Kellie is alert and oriented on room air. Complained of pain in bilateral feet, denied interventions this shift. Heel boots on and feet elevated for relief. Betadine applied to bilateral feet per orders. Pt up to wheel chair with stand by assist. Independently gave herself a bed bath today. Tolerating PO intake. Vitals stable, hourly rounding performed.

## 2022-11-28 ENCOUNTER — PATIENT OUTREACH (OUTPATIENT)
Dept: SCHEDULING | Facility: IMAGING CENTER | Age: 52
End: 2022-11-28
Payer: MEDICAID

## 2023-03-06 ENCOUNTER — APPOINTMENT (OUTPATIENT)
Dept: RADIOLOGY | Facility: MEDICAL CENTER | Age: 53
DRG: 475 | End: 2023-03-06
Attending: EMERGENCY MEDICINE
Payer: MEDICAID

## 2023-03-06 ENCOUNTER — HOSPITAL ENCOUNTER (INPATIENT)
Facility: MEDICAL CENTER | Age: 53
LOS: 24 days | DRG: 475 | End: 2023-03-30
Attending: EMERGENCY MEDICINE | Admitting: INTERNAL MEDICINE
Payer: MEDICAID

## 2023-03-06 DIAGNOSIS — F20.9 SCHIZOPHRENIA, UNSPECIFIED TYPE (HCC): ICD-10-CM

## 2023-03-06 DIAGNOSIS — I82.4Y2 DVT, LOWER EXTREMITY, PROXIMAL, ACUTE, LEFT (HCC): ICD-10-CM

## 2023-03-06 DIAGNOSIS — I96 NECROTIC TOES (HCC): ICD-10-CM

## 2023-03-06 DIAGNOSIS — T33.821D FROSTBITE OF BOTH FEET, SUBSEQUENT ENCOUNTER: ICD-10-CM

## 2023-03-06 DIAGNOSIS — T87.44 INFECTION OF AMPUTATION STUMP OF LEFT LOWER EXTREMITY (HCC): ICD-10-CM

## 2023-03-06 DIAGNOSIS — Z21 ASYMPTOMATIC HIV INFECTION, WITH NO HISTORY OF HIV-RELATED ILLNESS (HCC): ICD-10-CM

## 2023-03-06 DIAGNOSIS — I82.421 ACUTE DEEP VEIN THROMBOSIS (DVT) OF ILIAC VEIN OF RIGHT LOWER EXTREMITY (HCC): ICD-10-CM

## 2023-03-06 DIAGNOSIS — T33.822D FROSTBITE OF BOTH FEET, SUBSEQUENT ENCOUNTER: ICD-10-CM

## 2023-03-06 PROBLEM — Z89.611 HX OF AKA (ABOVE KNEE AMPUTATION), RIGHT (HCC): Status: ACTIVE | Noted: 2023-03-06

## 2023-03-06 PROBLEM — E87.20 LACTIC ACID ACIDOSIS: Status: ACTIVE | Noted: 2023-03-06

## 2023-03-06 PROBLEM — I82.409 ACUTE DEEP VEIN THROMBOSIS (DVT) (HCC): Status: ACTIVE | Noted: 2023-03-06

## 2023-03-06 PROBLEM — F15.10 METHAMPHETAMINE ABUSE (HCC): Status: ACTIVE | Noted: 2023-03-06

## 2023-03-06 PROBLEM — Z59.00 HOMELESSNESS: Status: ACTIVE | Noted: 2023-03-06

## 2023-03-06 PROBLEM — N12 PYELONEPHRITIS: Status: ACTIVE | Noted: 2023-03-06

## 2023-03-06 PROBLEM — Z91.199 NONCOMPLIANCE: Status: ACTIVE | Noted: 2023-03-06

## 2023-03-06 LAB
ALBUMIN SERPL BCP-MCNC: 3.9 G/DL (ref 3.2–4.9)
ALBUMIN/GLOB SERPL: 0.9 G/DL
ALP SERPL-CCNC: 93 U/L (ref 30–99)
ALT SERPL-CCNC: 12 U/L (ref 2–50)
AMPHET UR QL SCN: POSITIVE
ANION GAP SERPL CALC-SCNC: 14 MMOL/L (ref 7–16)
APPEARANCE UR: ABNORMAL
APPEARANCE UR: CLEAR
APTT PPP: 28.5 SEC (ref 24.7–36)
AST SERPL-CCNC: 15 U/L (ref 12–45)
BACTERIA #/AREA URNS HPF: NEGATIVE /HPF
BACTERIA #/AREA URNS HPF: NEGATIVE /HPF
BARBITURATES UR QL SCN: NEGATIVE
BASOPHILS # BLD AUTO: 0.9 % (ref 0–1.8)
BASOPHILS # BLD: 0.06 K/UL (ref 0–0.12)
BENZODIAZ UR QL SCN: NEGATIVE
BILIRUB SERPL-MCNC: 0.3 MG/DL (ref 0.1–1.5)
BILIRUB UR QL STRIP.AUTO: ABNORMAL
BILIRUB UR QL STRIP.AUTO: NEGATIVE
BLOOD CULTURE HOLD CXBCH: NORMAL
BUN SERPL-MCNC: 30 MG/DL (ref 8–22)
BZE UR QL SCN: NEGATIVE
CALCIUM ALBUM COR SERPL-MCNC: 9.8 MG/DL (ref 8.5–10.5)
CALCIUM SERPL-MCNC: 9.7 MG/DL (ref 8.5–10.5)
CANNABINOIDS UR QL SCN: NEGATIVE
CHLORIDE SERPL-SCNC: 101 MMOL/L (ref 96–112)
CO2 SERPL-SCNC: 22 MMOL/L (ref 20–33)
COLOR UR: ABNORMAL
COLOR UR: YELLOW
CREAT SERPL-MCNC: 0.9 MG/DL (ref 0.5–1.4)
EKG IMPRESSION: NORMAL
EOSINOPHIL # BLD AUTO: 0.13 K/UL (ref 0–0.51)
EOSINOPHIL NFR BLD: 1.9 % (ref 0–6.9)
EPI CELLS #/AREA URNS HPF: ABNORMAL /HPF
EPI CELLS #/AREA URNS HPF: ABNORMAL /HPF
ERYTHROCYTE [DISTWIDTH] IN BLOOD BY AUTOMATED COUNT: 43.8 FL (ref 35.9–50)
ERYTHROCYTE [SEDIMENTATION RATE] IN BLOOD BY WESTERGREN METHOD: 24 MM/HOUR (ref 0–25)
GFR SERPLBLD CREATININE-BSD FMLA CKD-EPI: 75 ML/MIN/1.73 M 2
GLOBULIN SER CALC-MCNC: 4.4 G/DL (ref 1.9–3.5)
GLUCOSE SERPL-MCNC: 98 MG/DL (ref 65–99)
GLUCOSE UR STRIP.AUTO-MCNC: NEGATIVE MG/DL
GLUCOSE UR STRIP.AUTO-MCNC: NEGATIVE MG/DL
GRAN CASTS #/AREA URNS LPF: ABNORMAL /LPF
HCT VFR BLD AUTO: 39.1 % (ref 37–47)
HGB BLD-MCNC: 12.4 G/DL (ref 12–16)
HYALINE CASTS #/AREA URNS LPF: ABNORMAL /LPF
HYALINE CASTS #/AREA URNS LPF: ABNORMAL /LPF
IMM GRANULOCYTES # BLD AUTO: 0.03 K/UL (ref 0–0.11)
IMM GRANULOCYTES NFR BLD AUTO: 0.4 % (ref 0–0.9)
INR PPP: 1.29 (ref 0.87–1.13)
KETONES UR STRIP.AUTO-MCNC: ABNORMAL MG/DL
KETONES UR STRIP.AUTO-MCNC: ABNORMAL MG/DL
LACTATE SERPL-SCNC: 1.2 MMOL/L (ref 0.5–2)
LACTATE SERPL-SCNC: 1.4 MMOL/L (ref 0.5–2)
LACTATE SERPL-SCNC: 1.6 MMOL/L (ref 0.5–2)
LACTATE SERPL-SCNC: 2.6 MMOL/L (ref 0.5–2)
LEUKOCYTE ESTERASE UR QL STRIP.AUTO: ABNORMAL
LEUKOCYTE ESTERASE UR QL STRIP.AUTO: NEGATIVE
LYMPHOCYTES # BLD AUTO: 1.95 K/UL (ref 1–4.8)
LYMPHOCYTES NFR BLD: 29.1 % (ref 22–41)
MCH RBC QN AUTO: 27.2 PG (ref 27–33)
MCHC RBC AUTO-ENTMCNC: 31.7 G/DL (ref 33.6–35)
MCV RBC AUTO: 85.7 FL (ref 81.4–97.8)
METHADONE UR QL SCN: NEGATIVE
MICRO URNS: ABNORMAL
MICRO URNS: ABNORMAL
MONOCYTES # BLD AUTO: 0.5 K/UL (ref 0–0.85)
MONOCYTES NFR BLD AUTO: 7.5 % (ref 0–13.4)
MUCOUS THREADS #/AREA URNS HPF: ABNORMAL /HPF
NEUTROPHILS # BLD AUTO: 4.03 K/UL (ref 2–7.15)
NEUTROPHILS NFR BLD: 60.2 % (ref 44–72)
NITRITE UR QL STRIP.AUTO: NEGATIVE
NITRITE UR QL STRIP.AUTO: NEGATIVE
NRBC # BLD AUTO: 0 K/UL
NRBC BLD-RTO: 0 /100 WBC
OPIATES UR QL SCN: NEGATIVE
OXYCODONE UR QL SCN: NEGATIVE
PCP UR QL SCN: NEGATIVE
PH UR STRIP.AUTO: 5 [PH] (ref 5–8)
PH UR STRIP.AUTO: 5 [PH] (ref 5–8)
PLATELET # BLD AUTO: 466 K/UL (ref 164–446)
PMV BLD AUTO: 9.3 FL (ref 9–12.9)
POTASSIUM SERPL-SCNC: 4 MMOL/L (ref 3.6–5.5)
PROCALCITONIN SERPL-MCNC: 0.09 NG/ML
PROPOXYPH UR QL SCN: NEGATIVE
PROT SERPL-MCNC: 8.3 G/DL (ref 6–8.2)
PROT UR QL STRIP: 30 MG/DL
PROT UR QL STRIP: 30 MG/DL
PROTHROMBIN TIME: 15.9 SEC (ref 12–14.6)
RBC # BLD AUTO: 4.56 M/UL (ref 4.2–5.4)
RBC # URNS HPF: ABNORMAL /HPF
RBC # URNS HPF: ABNORMAL /HPF
RBC UR QL AUTO: NEGATIVE
RBC UR QL AUTO: NEGATIVE
SODIUM SERPL-SCNC: 137 MMOL/L (ref 135–145)
SP GR UR STRIP.AUTO: 1.03
SP GR UR STRIP.AUTO: >=1.045
TROPONIN T SERPL-MCNC: 28 NG/L (ref 6–19)
TROPONIN T SERPL-MCNC: 30 NG/L (ref 6–19)
UFH PPP CHRO-ACNC: <0.1 IU/ML
UROBILINOGEN UR STRIP.AUTO-MCNC: 0.2 MG/DL
UROBILINOGEN UR STRIP.AUTO-MCNC: 1 MG/DL
WBC # BLD AUTO: 6.7 K/UL (ref 4.8–10.8)
WBC #/AREA URNS HPF: ABNORMAL /HPF
WBC #/AREA URNS HPF: ABNORMAL /HPF

## 2023-03-06 PROCEDURE — 85520 HEPARIN ASSAY: CPT

## 2023-03-06 PROCEDURE — 80307 DRUG TEST PRSMV CHEM ANLYZR: CPT

## 2023-03-06 PROCEDURE — 770001 HCHG ROOM/CARE - MED/SURG/GYN PRIV*

## 2023-03-06 PROCEDURE — 700111 HCHG RX REV CODE 636 W/ 250 OVERRIDE (IP): Performed by: INTERNAL MEDICINE

## 2023-03-06 PROCEDURE — 85652 RBC SED RATE AUTOMATED: CPT

## 2023-03-06 PROCEDURE — 85025 COMPLETE CBC W/AUTO DIFF WBC: CPT

## 2023-03-06 PROCEDURE — 99223 1ST HOSP IP/OBS HIGH 75: CPT | Performed by: INTERNAL MEDICINE

## 2023-03-06 PROCEDURE — 700105 HCHG RX REV CODE 258: Performed by: GENERAL PRACTICE

## 2023-03-06 PROCEDURE — 83605 ASSAY OF LACTIC ACID: CPT | Mod: 91

## 2023-03-06 PROCEDURE — 700105 HCHG RX REV CODE 258: Performed by: EMERGENCY MEDICINE

## 2023-03-06 PROCEDURE — A9270 NON-COVERED ITEM OR SERVICE: HCPCS | Performed by: INTERNAL MEDICINE

## 2023-03-06 PROCEDURE — 700102 HCHG RX REV CODE 250 W/ 637 OVERRIDE(OP): Performed by: INTERNAL MEDICINE

## 2023-03-06 PROCEDURE — 36415 COLL VENOUS BLD VENIPUNCTURE: CPT

## 2023-03-06 PROCEDURE — 86359 T CELLS TOTAL COUNT: CPT

## 2023-03-06 PROCEDURE — 80053 COMPREHEN METABOLIC PANEL: CPT

## 2023-03-06 PROCEDURE — 71045 X-RAY EXAM CHEST 1 VIEW: CPT

## 2023-03-06 PROCEDURE — 86140 C-REACTIVE PROTEIN: CPT

## 2023-03-06 PROCEDURE — 84484 ASSAY OF TROPONIN QUANT: CPT

## 2023-03-06 PROCEDURE — 93005 ELECTROCARDIOGRAM TRACING: CPT | Performed by: EMERGENCY MEDICINE

## 2023-03-06 PROCEDURE — 73620 X-RAY EXAM OF FOOT: CPT | Mod: LT

## 2023-03-06 PROCEDURE — 700111 HCHG RX REV CODE 636 W/ 250 OVERRIDE (IP): Performed by: EMERGENCY MEDICINE

## 2023-03-06 PROCEDURE — 84145 PROCALCITONIN (PCT): CPT

## 2023-03-06 PROCEDURE — 86360 T CELL ABSOLUTE COUNT/RATIO: CPT

## 2023-03-06 PROCEDURE — 700117 HCHG RX CONTRAST REV CODE 255: Performed by: EMERGENCY MEDICINE

## 2023-03-06 PROCEDURE — 87086 URINE CULTURE/COLONY COUNT: CPT

## 2023-03-06 PROCEDURE — 96365 THER/PROPH/DIAG IV INF INIT: CPT

## 2023-03-06 PROCEDURE — 74177 CT ABD & PELVIS W/CONTRAST: CPT

## 2023-03-06 PROCEDURE — 85730 THROMBOPLASTIN TIME PARTIAL: CPT

## 2023-03-06 PROCEDURE — 96375 TX/PRO/DX INJ NEW DRUG ADDON: CPT

## 2023-03-06 PROCEDURE — 73552 X-RAY EXAM OF FEMUR 2/>: CPT | Mod: RT

## 2023-03-06 PROCEDURE — 81001 URINALYSIS AUTO W/SCOPE: CPT

## 2023-03-06 PROCEDURE — 87040 BLOOD CULTURE FOR BACTERIA: CPT | Mod: 91

## 2023-03-06 PROCEDURE — 99285 EMERGENCY DEPT VISIT HI MDM: CPT

## 2023-03-06 PROCEDURE — 85610 PROTHROMBIN TIME: CPT

## 2023-03-06 RX ORDER — POLYETHYLENE GLYCOL 3350 17 G/17G
1 POWDER, FOR SOLUTION ORAL
Status: DISCONTINUED | OUTPATIENT
Start: 2023-03-06 | End: 2023-03-30 | Stop reason: HOSPADM

## 2023-03-06 RX ORDER — HEPARIN SODIUM 1000 [USP'U]/ML
80 INJECTION, SOLUTION INTRAVENOUS; SUBCUTANEOUS ONCE
Status: COMPLETED | OUTPATIENT
Start: 2023-03-06 | End: 2023-03-06

## 2023-03-06 RX ORDER — SODIUM CHLORIDE 9 MG/ML
INJECTION, SOLUTION INTRAVENOUS CONTINUOUS
Status: DISCONTINUED | OUTPATIENT
Start: 2023-03-06 | End: 2023-03-06

## 2023-03-06 RX ORDER — BISACODYL 10 MG
10 SUPPOSITORY, RECTAL RECTAL
Status: DISCONTINUED | OUTPATIENT
Start: 2023-03-06 | End: 2023-03-30 | Stop reason: HOSPADM

## 2023-03-06 RX ORDER — ASPIRIN 81 MG/1
81 TABLET ORAL DAILY
Status: ON HOLD | COMMUNITY
Start: 2023-02-09 | End: 2023-03-29

## 2023-03-06 RX ORDER — NICOTINE 21 MG/24HR
14 PATCH, TRANSDERMAL 24 HOURS TRANSDERMAL
Status: DISCONTINUED | OUTPATIENT
Start: 2023-03-06 | End: 2023-03-30 | Stop reason: HOSPADM

## 2023-03-06 RX ORDER — SODIUM CHLORIDE 9 MG/ML
INJECTION, SOLUTION INTRAVENOUS CONTINUOUS
Status: ACTIVE | OUTPATIENT
Start: 2023-03-06 | End: 2023-03-07

## 2023-03-06 RX ORDER — HEPARIN SODIUM 1000 [USP'U]/ML
40 INJECTION, SOLUTION INTRAVENOUS; SUBCUTANEOUS PRN
Status: DISCONTINUED | OUTPATIENT
Start: 2023-03-06 | End: 2023-03-06

## 2023-03-06 RX ORDER — SODIUM CHLORIDE 9 MG/ML
1000 INJECTION, SOLUTION INTRAVENOUS ONCE
Status: COMPLETED | OUTPATIENT
Start: 2023-03-06 | End: 2023-03-06

## 2023-03-06 RX ORDER — GUAIFENESIN/DEXTROMETHORPHAN 100-10MG/5
10 SYRUP ORAL EVERY 6 HOURS PRN
Status: DISCONTINUED | OUTPATIENT
Start: 2023-03-06 | End: 2023-03-30 | Stop reason: HOSPADM

## 2023-03-06 RX ORDER — DIVALPROEX SODIUM 500 MG/1
500 TABLET, EXTENDED RELEASE ORAL 2 TIMES DAILY
Status: ON HOLD | COMMUNITY
Start: 2023-02-28 | End: 2023-03-29

## 2023-03-06 RX ORDER — DIVALPROEX SODIUM 500 MG/1
500 TABLET, DELAYED RELEASE ORAL EVERY 12 HOURS
Status: DISCONTINUED | OUTPATIENT
Start: 2023-03-06 | End: 2023-03-30 | Stop reason: HOSPADM

## 2023-03-06 RX ORDER — ONDANSETRON 4 MG/1
4 TABLET, ORALLY DISINTEGRATING ORAL EVERY 4 HOURS PRN
Status: DISCONTINUED | OUTPATIENT
Start: 2023-03-06 | End: 2023-03-30 | Stop reason: HOSPADM

## 2023-03-06 RX ORDER — OLANZAPINE 15 MG/1
15 TABLET ORAL
Status: ON HOLD | COMMUNITY
Start: 2023-02-28 | End: 2023-03-29

## 2023-03-06 RX ORDER — PROMETHAZINE HYDROCHLORIDE 25 MG/1
12.5-25 TABLET ORAL EVERY 4 HOURS PRN
Status: DISCONTINUED | OUTPATIENT
Start: 2023-03-06 | End: 2023-03-30 | Stop reason: HOSPADM

## 2023-03-06 RX ORDER — ONDANSETRON 2 MG/ML
4 INJECTION INTRAMUSCULAR; INTRAVENOUS EVERY 4 HOURS PRN
Status: DISCONTINUED | OUTPATIENT
Start: 2023-03-06 | End: 2023-03-30 | Stop reason: HOSPADM

## 2023-03-06 RX ORDER — PROMETHAZINE HYDROCHLORIDE 25 MG/1
12.5-25 SUPPOSITORY RECTAL EVERY 4 HOURS PRN
Status: DISCONTINUED | OUTPATIENT
Start: 2023-03-06 | End: 2023-03-30 | Stop reason: HOSPADM

## 2023-03-06 RX ORDER — PROCHLORPERAZINE EDISYLATE 5 MG/ML
5-10 INJECTION INTRAMUSCULAR; INTRAVENOUS EVERY 4 HOURS PRN
Status: DISCONTINUED | OUTPATIENT
Start: 2023-03-06 | End: 2023-03-30 | Stop reason: HOSPADM

## 2023-03-06 RX ORDER — HEPARIN SODIUM 5000 [USP'U]/100ML
0-30 INJECTION, SOLUTION INTRAVENOUS CONTINUOUS
Status: DISCONTINUED | OUTPATIENT
Start: 2023-03-06 | End: 2023-03-06

## 2023-03-06 RX ORDER — AMLODIPINE BESYLATE 5 MG/1
5 TABLET ORAL DAILY
Status: ON HOLD | COMMUNITY
Start: 2023-03-01 | End: 2023-03-29

## 2023-03-06 RX ORDER — ENOXAPARIN SODIUM 100 MG/ML
40 INJECTION SUBCUTANEOUS DAILY
Status: DISCONTINUED | OUTPATIENT
Start: 2023-03-06 | End: 2023-03-06

## 2023-03-06 RX ORDER — AMOXICILLIN 250 MG
2 CAPSULE ORAL 2 TIMES DAILY
Status: DISCONTINUED | OUTPATIENT
Start: 2023-03-06 | End: 2023-03-30 | Stop reason: HOSPADM

## 2023-03-06 RX ORDER — LORAZEPAM 2 MG/ML
0.5 INJECTION INTRAMUSCULAR ONCE
Status: COMPLETED | OUTPATIENT
Start: 2023-03-06 | End: 2023-03-06

## 2023-03-06 RX ADMIN — APIXABAN 10 MG: 5 TABLET, FILM COATED ORAL at 17:35

## 2023-03-06 RX ADMIN — LORAZEPAM 0.5 MG: 2 INJECTION INTRAMUSCULAR; INTRAVENOUS at 14:00

## 2023-03-06 RX ADMIN — HEPARIN SODIUM 6200 UNITS: 1000 INJECTION, SOLUTION INTRAVENOUS; SUBCUTANEOUS at 14:51

## 2023-03-06 RX ADMIN — SODIUM CHLORIDE 1000 ML: 9 INJECTION, SOLUTION INTRAVENOUS at 12:58

## 2023-03-06 RX ADMIN — SODIUM CHLORIDE: 9 INJECTION, SOLUTION INTRAVENOUS at 17:40

## 2023-03-06 RX ADMIN — HEPARIN SODIUM 18 UNITS/KG/HR: 5000 INJECTION, SOLUTION INTRAVENOUS at 14:55

## 2023-03-06 RX ADMIN — IOHEXOL 100 ML: 350 INJECTION, SOLUTION INTRAVENOUS at 13:35

## 2023-03-06 RX ADMIN — DIVALPROEX SODIUM 500 MG: 500 TABLET, DELAYED RELEASE ORAL at 17:36

## 2023-03-06 RX ADMIN — BICTEGRAVIR SODIUM, EMTRICITABINE, AND TENOFOVIR ALAFENAMIDE FUMARATE 1 TABLET: 50; 200; 25 TABLET ORAL at 17:35

## 2023-03-06 RX ADMIN — CEFTRIAXONE SODIUM 1000 MG: 10 INJECTION, POWDER, FOR SOLUTION INTRAVENOUS at 20:56

## 2023-03-06 ASSESSMENT — COGNITIVE AND FUNCTIONAL STATUS - GENERAL
SUGGESTED CMS G CODE MODIFIER DAILY ACTIVITY: CK
STANDING UP FROM CHAIR USING ARMS: TOTAL
HELP NEEDED FOR BATHING: A LOT
CLIMB 3 TO 5 STEPS WITH RAILING: TOTAL
MOBILITY SCORE: 9
DRESSING REGULAR UPPER BODY CLOTHING: A LITTLE
MOVING TO AND FROM BED TO CHAIR: A LOT
WALKING IN HOSPITAL ROOM: TOTAL
TURNING FROM BACK TO SIDE WHILE IN FLAT BAD: A LITTLE
SUGGESTED CMS G CODE MODIFIER MOBILITY: CM
DRESSING REGULAR LOWER BODY CLOTHING: A LITTLE
MOVING FROM LYING ON BACK TO SITTING ON SIDE OF FLAT BED: UNABLE
DAILY ACTIVITIY SCORE: 19
TOILETING: A LITTLE

## 2023-03-06 ASSESSMENT — ENCOUNTER SYMPTOMS
SPEECH CHANGE: 0
CHILLS: 0
CLAUDICATION: 0
NECK PAIN: 0
ABDOMINAL PAIN: 1
WEIGHT LOSS: 0
COUGH: 0
NERVOUS/ANXIOUS: 1
PALPITATIONS: 0
FEVER: 1
HALLUCINATIONS: 1
NAUSEA: 0
VOMITING: 0
HEMOPTYSIS: 0
DIARRHEA: 0
DIZZINESS: 0
MYALGIAS: 0
BLURRED VISION: 0
WEAKNESS: 0
PHOTOPHOBIA: 0
DOUBLE VISION: 0
ORTHOPNEA: 0
CONSTIPATION: 0

## 2023-03-06 ASSESSMENT — LIFESTYLE VARIABLES
TOTAL SCORE: 0
HOW MANY TIMES IN THE PAST YEAR HAVE YOU HAD 5 OR MORE DRINKS IN A DAY: 0
TOTAL SCORE: 0
TOTAL SCORE: 0
CONSUMPTION TOTAL: NEGATIVE
EVER HAD A DRINK FIRST THING IN THE MORNING TO STEADY YOUR NERVES TO GET RID OF A HANGOVER: NO
ON A TYPICAL DAY WHEN YOU DRINK ALCOHOL HOW MANY DRINKS DO YOU HAVE: 0
EVER FELT BAD OR GUILTY ABOUT YOUR DRINKING: NO
HAVE YOU EVER FELT YOU SHOULD CUT DOWN ON YOUR DRINKING: NO
HAVE PEOPLE ANNOYED YOU BY CRITICIZING YOUR DRINKING: NO
ALCOHOL_USE: NO
AVERAGE NUMBER OF DAYS PER WEEK YOU HAVE A DRINK CONTAINING ALCOHOL: 0
DOES PATIENT WANT TO STOP DRINKING: NO

## 2023-03-06 ASSESSMENT — PATIENT HEALTH QUESTIONNAIRE - PHQ9
1. LITTLE INTEREST OR PLEASURE IN DOING THINGS: NOT AT ALL
SUM OF ALL RESPONSES TO PHQ9 QUESTIONS 1 AND 2: 0
2. FEELING DOWN, DEPRESSED, IRRITABLE, OR HOPELESS: NOT AT ALL

## 2023-03-06 ASSESSMENT — FIBROSIS 4 INDEX: FIB4 SCORE: 0.73

## 2023-03-06 NOTE — ED TRIAGE NOTES
".  Chief Complaint   Patient presents with    Wound Check     Open wounds to bilateral lower extremity amputation sites .    Aggressive Behavior     Reports she is hearing voices. Hx Schizophrenia, has not been taking her medication.        54 yo female BIB Ambulance because she was lying in the middle of the street. Patient is making threatening statements while being triaged, saying \"You need to be dead\". Endorses hearing voices.     Patient has toe amputations to left foot with sutures in place, and right below knee amputation with non-healing surgical sites. Patient reports she has not attempted to follow up with orthopedics.     /82   Pulse (!) 115   Temp 36.8 °C (98.2 °F) (Temporal)   Resp 20   Ht 1.778 m (5' 10\")   Wt 77.1 kg (170 lb)   SpO2 93%   BMI 24.39 kg/m²     "

## 2023-03-06 NOTE — ED NOTES
MD in to discuss findings thus far and request pt. Get a straight cath urine sample  as pt. Was unable to follow instructions for clean catch ua.  Straight cath ua collected with MD at [facility]. PT. Tolerated well.

## 2023-03-06 NOTE — ED NOTES
Called x-ray about femur imaging.  Pt. Was reported to have been refusing this.  Will medicate pt. And x-ray states they will come and attempt again.

## 2023-03-06 NOTE — ED PROVIDER NOTES
"ED Provider Note    CHIEF COMPLAINT  Chief Complaint   Patient presents with    Wound Check     Open wounds to bilateral lower extremity amputation sites .    Aggressive Behavior     Reports she is hearing voices. Hx Schizophrenia, has not been taking her medication.        EXTERNAL RECORDS REVIEWED  Inpatient Notes -from [facility]'s discharge summary dated 3/1/2023 the patient was admitted for recurrent frostbite with a right AKA and left TMA as well as left superficial femoral vein DVT, hypertension, constipation, HIV with last CD4 count greater than 900, schizophrenia, and methamphetamine abuse.  Orthopedic surgery was consulted and recommended to wait for further demarcation of wounds in the next 1 to 2 weeks.  They recommended sutures stay in place for the transmetatarsal amputation stump and also recommended postop shoe with weightbearing as tolerated.  Therapy services recommended postacute therapy however this was difficult to obtain.  Her frostbite injury appeared to significantly improve and she was discharged back to the shelter with the boot.  She was continued on Xarelto for left superficial femoral vein DVT and was instructed to follow-up with orthopedic surgery in 2 weeks.  Imaging performed 2/2/2023 includes an MRI of the right tib/fib with and without contrast which was suspicious for osteomyelitis at the distal margin of the right tibial stump.  MRI of the left foot 2/1/2023 was suspicious for osteomyelitis of the first distal metatarsal and second proximal phalanx.  Also notable for severe cellulitis.    HPI/ROS  LIMITATION TO HISTORY   Select: Behavior  OUTSIDE HISTORIAN(S):  None    Kellie Chatman is a 53 y.o. undomiciled female who presents after she was found lying in the middle of the street.  She was brought to the ER because there was some concern about her stump wounds.  She reports she does have pain in her \"feet\" and states that she has \"heart\" and abdomen pain as well as pain all over.  " "She denies any nausea or vomiting and does not think she has had fevers.  She states she is hearing voices and notes that she has medication for schizophrenia but has not been taking it for an unclear reason.  She denies alcohol or drug use.  She otherwise is a very poor historian and unable to provide additional information about her presentation.    PAST MEDICAL HISTORY   has a past medical history of Frostbite of foot, left, Frostbite of foot, right, and HIV (human immunodeficiency virus infection) (HCC).    SURGICAL HISTORY  patient denies any surgical history    FAMILY HISTORY  No family history on file.    SOCIAL HISTORY  Social History     Tobacco Use    Smoking status: Every Day     Packs/day: 0.25     Types: Cigarettes    Smokeless tobacco: Never   Vaping Use    Vaping Use: Never used   Substance and Sexual Activity    Alcohol use: Never    Drug use: Never    Sexual activity: Not on file       CURRENT MEDICATIONS  Home Medications       Reviewed by Rupa Sutherland R.N. (Registered Nurse) on 03/06/23 at 1042  Med List Status: Partial     Medication Last Dose Status   bictegravir-emtricitab-TAF (BIKTARVY) -25 mg Tab tablet  Active   divalproex (DEPAKOTE) 500 MG Tablet Delayed Response  Active                    ALLERGIES  No Known Allergies    PHYSICAL EXAM  VITAL SIGNS: /82   Pulse (!) 115   Temp 36.8 °C (98.2 °F) (Temporal)   Resp 20   Ht 1.778 m (5' 10\")   Wt 77.1 kg (170 lb)   SpO2 93%   BMI 24.39 kg/m²    Physical Exam  Vitals and nursing note reviewed.   Constitutional:       Comments: Chronically ill-appearing.  Disheveled.   HENT:      Head: Normocephalic and atraumatic.      Right Ear: External ear normal.      Left Ear: External ear normal.      Nose: Nose normal.      Mouth/Throat:      Mouth: Mucous membranes are dry.   Eyes:      Extraocular Movements: Extraocular movements intact.      Conjunctiva/sclera: Conjunctivae normal.      Pupils: Pupils are equal, round, and reactive " to light.   Cardiovascular:      Rate and Rhythm: Regular rhythm. Tachycardia present.   Pulmonary:      Effort: Pulmonary effort is normal.      Breath sounds: Normal breath sounds.   Abdominal:      Palpations: Abdomen is soft.      Tenderness: There is abdominal tenderness (Diffuse without peritoneal signs).   Musculoskeletal:      Cervical back: Normal range of motion and neck supple.      Comments: Right AKA with left TMA.  There is swelling at the wound site bilaterally.  There is no drainage.  Sutures are still in place in the transmetatarsal amputation site.  There is very mild erythema.  There is associated tenderness.   Skin:     Comments: Cold.   Neurological:      General: No focal deficit present.      Mental Status: She is alert.   Psychiatric:      Comments: Withdrawn.  Appears to be responding to internal stimuli.     DIAGNOSTIC STUDIES / PROCEDURES  LABS  Results for orders placed or performed during the hospital encounter of 03/06/23   CBC With Differential   Result Value Ref Range    WBC 6.7 4.8 - 10.8 K/uL    RBC 4.56 4.20 - 5.40 M/uL    Hemoglobin 12.4 12.0 - 16.0 g/dL    Hematocrit 39.1 37.0 - 47.0 %    MCV 85.7 81.4 - 97.8 fL    MCH 27.2 27.0 - 33.0 pg    MCHC 31.7 (L) 33.6 - 35.0 g/dL    RDW 43.8 35.9 - 50.0 fL    Platelet Count 466 (H) 164 - 446 K/uL    MPV 9.3 9.0 - 12.9 fL    Neutrophils-Polys 60.20 44.00 - 72.00 %    Lymphocytes 29.10 22.00 - 41.00 %    Monocytes 7.50 0.00 - 13.40 %    Eosinophils 1.90 0.00 - 6.90 %    Basophils 0.90 0.00 - 1.80 %    Immature Granulocytes 0.40 0.00 - 0.90 %    Nucleated RBC 0.00 /100 WBC    Neutrophils (Absolute) 4.03 2.00 - 7.15 K/uL    Lymphs (Absolute) 1.95 1.00 - 4.80 K/uL    Monos (Absolute) 0.50 0.00 - 0.85 K/uL    Eos (Absolute) 0.13 0.00 - 0.51 K/uL    Baso (Absolute) 0.06 0.00 - 0.12 K/uL    Immature Granulocytes (abs) 0.03 0.00 - 0.11 K/uL    NRBC (Absolute) 0.00 K/uL   Comp Metabolic Panel   Result Value Ref Range    Sodium 137 135 - 145 mmol/L     Potassium 4.0 3.6 - 5.5 mmol/L    Chloride 101 96 - 112 mmol/L    Co2 22 20 - 33 mmol/L    Anion Gap 14.0 7.0 - 16.0    Glucose 98 65 - 99 mg/dL    Bun 30 (H) 8 - 22 mg/dL    Creatinine 0.90 0.50 - 1.40 mg/dL    Calcium 9.7 8.5 - 10.5 mg/dL    AST(SGOT) 15 12 - 45 U/L    ALT(SGPT) 12 2 - 50 U/L    Alkaline Phosphatase 93 30 - 99 U/L    Total Bilirubin 0.3 0.1 - 1.5 mg/dL    Albumin 3.9 3.2 - 4.9 g/dL    Total Protein 8.3 (H) 6.0 - 8.2 g/dL    Globulin 4.4 (H) 1.9 - 3.5 g/dL    A-G Ratio 0.9 g/dL   Lactic Acid   Result Value Ref Range    Lactic Acid 2.6 (H) 0.5 - 2.0 mmol/L   Lactic Acid   Result Value Ref Range    Lactic Acid 1.6 0.5 - 2.0 mmol/L   Troponin   Result Value Ref Range    Troponin T 28 (H) 6 - 19 ng/L   URINALYSIS,CULTURE IF INDICATED    Specimen: Urine, Clean Catch   Result Value Ref Range    Color DK Yellow     Character Cloudy (A)     Specific Gravity 1.029 <1.035    Ph 5.0 5.0 - 8.0    Glucose Negative Negative mg/dL    Ketones Trace (A) Negative mg/dL    Protein 30 (A) Negative mg/dL    Bilirubin Moderate (A) Negative    Urobilinogen, Urine 1.0 Negative    Nitrite Negative Negative    Leukocyte Esterase Trace (A) Negative    Occult Blood Negative Negative    Micro Urine Req Microscopic    URINE DRUG SCREEN   Result Value Ref Range    Amphetamines Urine Positive (A) Negative    Barbiturates Negative Negative    Benzodiazepines Negative Negative    Cocaine Metabolite Negative Negative    Methadone Negative Negative    Opiates Negative Negative    Oxycodone Negative Negative    Phencyclidine -Pcp Negative Negative    Propoxyphene Negative Negative    Cannabinoid Metab Negative Negative   CORRECTED CALCIUM   Result Value Ref Range    Correct Calcium 9.8 8.5 - 10.5 mg/dL   ESTIMATED GFR   Result Value Ref Range    GFR (CKD-EPI) 75 >60 mL/min/1.73 m 2   Blood Culture,Hold   Result Value Ref Range    Blood Culture Hold Collected    URINE MICROSCOPIC (W/UA)   Result Value Ref Range    WBC 10-20 (A) /hpf     RBC 0-2 /hpf    Bacteria Negative None /hpf    Epithelial Cells Few /hpf    Mucous Threads Few /hpf    Hyaline Cast 6-10 (A) /lpf    Granular Casts 0-2 /lpf   URINE CULTURE(NEW)    Specimen: Urine   Result Value Ref Range    Significant Indicator NEG     Source UR     Site -     Culture Result -    URINALYSIS,CULTURE IF INDICATED    Specimen: Urine, Cath; Blood   Result Value Ref Range    Color Yellow     Character Clear     Specific Gravity >=1.045 (A) <1.035    Ph 5.0 5.0 - 8.0    Glucose Negative Negative mg/dL    Ketones Trace (A) Negative mg/dL    Protein 30 (A) Negative mg/dL    Bilirubin Negative Negative    Urobilinogen, Urine 0.2 Negative    Nitrite Negative Negative    Leukocyte Esterase Negative Negative    Occult Blood Negative Negative    Micro Urine Req Microscopic    aPTT   Result Value Ref Range    APTT 28.5 24.7 - 36.0 sec   Prothrombin Time   Result Value Ref Range    PT 15.9 (H) 12.0 - 14.6 sec    INR 1.29 (H) 0.87 - 1.13   Heparin Xa (Unfractionated)   Result Value Ref Range    Heparin Xa (UFH) <0.10 IU/mL   URINE MICROSCOPIC (W/UA)   Result Value Ref Range    WBC 2-5 /hpf    RBC 0-2 /hpf    Bacteria Negative None /hpf    Epithelial Cells Few /hpf    Hyaline Cast 6-10 (A) /lpf   PROCALCITONIN   Result Value Ref Range    Procalcitonin 0.09 <0.25 ng/mL   EKG   Result Value Ref Range    Report       [facility] Emergency Dept.    Test Date:  2023  Pt Name:    MARY MCCONNELL                Department: ER  MRN:        [id number]                      Room:        07  Gender:     Female                       Technician: [id number]  :        1970                   Requested By:JOE LANG  Order #:    [id number]                    Reading MD: Joe Lang MD    Measurements  Intervals                                Axis  Rate:       109                          P:          51  WV:         99                           QRS:        -8  QRSD:       131                           T:          91  QT:         358  QTc:        483    Interpretive Statements  Sinus tachycardia  Atrial premature complex  Probable left ventricular hypertrophy  Artifact in lead(s) I,III,aVR,aVL,V1,V2,V6 and baseline wander in lead(s) V6  Compared to ECG 11/13/2022 06:02:51  Atrial premature complex(es) now present  Sinus rhythm no longer present  Electronically Signed On 3-6-2023 14:40:10  PST by Reno Child MD       RADIOLOGY  DX-FEMUR-2+ RIGHT   Final Result      No radiographic evidence of acute traumatic injury.      CT-ABDOMEN-PELVIS WITH   Final Result      1.  Right internal iliac vein DVT.   2.  Somewhat striated nephrogram of the bilateral superior poles may be artifactual. Correlate with urinalysis for pyelonephritis.   3.  Redemonstration of enhancing lesion of the uterus. This has been evaluated on prior ultrasound.   4.  Prominent bilateral inguinal lymph nodes may be reactive. Malignancy not excluded.      Dr. Emanuel discussed these findings with Dr. Child at 1:49 PM by telephone on 3/6/2023      DX-FOOT-2- LEFT   Final Result      Status post transmetatarsal amputation. No definitive radiographic evidence of ostomy myelitis. If there is strong clinical suspicion, MRI of the foot with and without contrast should be obtained for further evaluation.      DX-CHEST-PORTABLE (1 VIEW)   Final Result      No acute cardiopulmonary disease evident.        COURSE & MEDICAL DECISION MAKING    ED Observation Status? No; Patient does not meet criteria for ED Observation.     INITIAL ASSESSMENT, COURSE AND PLAN  Care Narrative: This is an unfortunate and chronically ill 53-year-old female who was brought here after she was found lying on the ground in traffic.  She has multiple complaints but EMS was concerned about her poorly healed stump wounds.  As above she recently had a right AKA and left TMA after pretty significant frostbite.  There are still sutures in the left TMA but the wounds themselves appear to be  "healing.  Her legs bilaterally are very cold and tender with some swelling but no discoloration.  Her abdomen is diffusely tender.  She is reporting chest pain.  She has no shortness of breath but reports that she is supposed to be on supplemental oxygen at baseline but cannot tell me how much oxygen she is supposed to use.  She is noncompliant with her oxygen as well as all of her medications including her psychiatric medication and anticoagulation.  She does appear to be responding to internal stimuli and reports that she is hearing voices from her \"feet\".    Started on IV fluids for tachycardia and dry mucous membranes.  She was also given a dose of Ativan for some mild agitation.    CBC is reassuring without leukocytosis or anemia.  Metabolic panel is also reassuring with normal electrolytes, glucose, and liver function.  BUN is slightly elevated to 30 but creatinine and GFR are both normal.  Lactic acid is elevated to 2.6 with a troponin of 28.  Urine drug screen is positive for amphetamines.  She does not have a urinary tract infection.    Chest x-ray is without acute abnormality.  X-ray of the left foot and right femur do not suggest osteomyelitis.    Given her abdominal pain and diffuse tenderness I did obtain a CT of the abdomen/pelvis which revealed a right internal iliac vein DVT. Also noted was superior pole renale striation which was felt to be possibly artifact. The patient's UA does not suggest infection and I think it is unlikely that this represents pyelonephritis. She confirms that she is not compliant on her Eliquis and now that we have seen the DVT there is concern for PE given her persistent tachycardia. I am starting her on heparin and she will require hospitalization. I cannot perform a CTA chest to rule out PE given the contrast load from the prior CT abd/pelvis and this can be accomplished on the floor.    Discussed with the hospitalist and admitted in guarded condition.    HYDRATION: Based " on the patient's presentation of Dehydration and Tachycardia the patient was given IV fluids. IV Hydration was used because oral hydration was not adequate alone. Upon recheck following hydration, the patient was unchanged.    ADDITIONAL PROBLEM LIST  DVT    DISPOSITION AND DISCUSSIONS  I have discussed management of the patient with the following physicians and LONNY's:  Dr Friend, hospitalist.    Discussion of management with other Q or appropriate source(s): None     Escalation of care considered, and ultimately not performed: N/A    Barriers to care at this time, including but not limited to:  None .     Decision tools and prescription drugs considered including, but not limited to:  N/A .    FINAL DIAGNOSIS  DVT  Elevated troponin  Homelessness    Electronically signed by: Reno Child M.D., 3/6/2023 11:02 AM

## 2023-03-06 NOTE — ED NOTES
Med Rec complete per patient  No oral antibiotics in the last 30 days   Allergies reviewed  Preferred Pharmacy: [facility]     Patient states she has not taken her medications in about a week due to running out or losing them

## 2023-03-06 NOTE — ASSESSMENT & PLAN NOTE
Complicated medical history and noncompliance and drug abuse   was consulted to consider group home

## 2023-03-06 NOTE — ASSESSMENT & PLAN NOTE
Patient has hallucination with hearing voices  Denied any SI or HI, however with active auditory hallucinations  Resume home medication Depakote, formally on Zyprexa  Psych consulted, continue with Depakote, trough level stable, Zyprexa 5 mg twice daily initiated

## 2023-03-06 NOTE — ASSESSMENT & PLAN NOTE
Urine culture noted E. Faecium VRE.  However patient denies UTI symptoms  No indication for treatment

## 2023-03-06 NOTE — H&P
Hospital Medicine History & Physical Note    Date of Service  3/6/2023    Primary Care Physician  Pcp Pt States None    Consultants  None    Code Status  Full Code    Chief Complaint  Chief Complaint   Patient presents with    Wound Check     Open wounds to bilateral lower extremity amputation sites .    Aggressive Behavior     Reports she is hearing voices. Hx Schizophrenia, has not been taking her medication.        History of Presenting Illness    53-year-old female with history of homelessness, noncompliance, schizophrenia, HIV, drug abuse, smoking and bilateral rotation due to frostbite presented 3/6 with abdominal pain.  Patient is a very poor historian due to psych disorder.  Patient was found laying in the middle of the street and brought by EMS.  She states she has some abdominal pain, and pain on her stump wounds, no significant fever.  Also the patient states she is hearing voices.  Patient admitted that she is using meth and smoking cigarettes.  On admission patient was found to be dry with lactic acid 2.6, no leukocytosis, CT scan for abdomen showed right internal iliac vein DVT with possible pyelonephritis bilateral.  IV fluid with heparin drip was started at the ED.    I discussed the plan of care with patient and bedside RN.    Review of Systems  Review of Systems   Constitutional:  Positive for fever. Negative for chills and weight loss.   HENT:  Negative for ear pain, hearing loss and tinnitus.    Eyes:  Negative for blurred vision, double vision and photophobia.   Respiratory:  Negative for cough and hemoptysis.    Cardiovascular:  Negative for chest pain, palpitations, orthopnea and claudication.   Gastrointestinal:  Positive for abdominal pain. Negative for constipation, diarrhea, nausea and vomiting.   Genitourinary:  Negative for dysuria, frequency and urgency.   Musculoskeletal:  Negative for myalgias and neck pain.   Skin:  Negative for rash.   Neurological:  Negative for dizziness, speech  change and weakness.   Psychiatric/Behavioral:  Positive for hallucinations. Negative for suicidal ideas. The patient is nervous/anxious.      Past Medical History   has a past medical history of Frostbite of foot, left, Frostbite of foot, right, and HIV (human immunodeficiency virus infection) (HCC).    Surgical History   has no past surgical history on file.     Family History  Family history reviewed with patient. There is no family history that is pertinent to the chief complaint.     Social History   reports that she has been smoking cigarettes. She has been smoking an average of .25 packs per day. She has never used smokeless tobacco. She reports that she does not drink alcohol and does not use drugs.    Allergies  No Known Allergies    Medications  Prior to Admission Medications   Prescriptions Last Dose Informant Patient Reported? Taking?   bictegravir-emtricitab-TAF (BIKTARVY) -25 mg Tab tablet  Patient No No   Sig: Take 1 Tablet by mouth every day.   divalproex (DEPAKOTE) 500 MG Tablet Delayed Response  Patient No No   Sig: Take 1 Tablet by mouth every 12 hours.      Facility-Administered Medications: None       Physical Exam  Temp:  [36.8 °C (98.2 °F)] 36.8 °C (98.2 °F)  Pulse:  [104-121] 121  Resp:  [12-20] 16  BP: (131-148)/(71-95) 146/80  SpO2:  [93 %-99 %] 96 %  Blood Pressure: (!) 146/80   Temperature: 36.8 °C (98.2 °F)   Pulse: (!) 121   Respiration: 16   Pulse Oximetry: 96 %       Physical Exam  Constitutional:       General: She is not in acute distress.     Appearance: She is not ill-appearing.   HENT:      Head: Normocephalic and atraumatic.   Cardiovascular:      Rate and Rhythm: Tachycardia present.      Heart sounds: No murmur heard.  Pulmonary:      Effort: No respiratory distress.      Breath sounds: No wheezing or rales.   Abdominal:      General: There is no distension.      Palpations: Abdomen is soft.      Tenderness: There is no abdominal tenderness.   Musculoskeletal:       Comments: Bilateral amputation  Wound on her stump   Skin:     General: Skin is dry.      Coloration: Skin is not jaundiced or pale.      Findings: Erythema and lesion present. No bruising.   Neurological:      General: No focal deficit present.      Mental Status: Mental status is at baseline. She is disoriented.      Cranial Nerves: No cranial nerve deficit.      Motor: No weakness.      Comments: Alert and oriented to herself and place however confusing about date  Patient states she has some hallucination however no SI       Laboratory:  Recent Labs     03/06/23  1239   WBC 6.7   RBC 4.56   HEMOGLOBIN 12.4   HEMATOCRIT 39.1   MCV 85.7   MCH 27.2   MCHC 31.7*   RDW 43.8   PLATELETCT 466*   MPV 9.3     Recent Labs     03/06/23  1239   SODIUM 137   POTASSIUM 4.0   CHLORIDE 101   CO2 22   GLUCOSE 98   BUN 30*   CREATININE 0.90   CALCIUM 9.7     Recent Labs     03/06/23  1239   ALTSGPT 12   ASTSGOT 15   ALKPHOSPHAT 93   TBILIRUBIN 0.3   GLUCOSE 98         No results for input(s): NTPROBNP in the last 72 hours.      Recent Labs     03/06/23  1239   TROPONINT 28*       Imaging:  DX-FEMUR-2+ RIGHT   Final Result      No radiographic evidence of acute traumatic injury.      CT-ABDOMEN-PELVIS WITH   Final Result      1.  Right internal iliac vein DVT.   2.  Somewhat striated nephrogram of the bilateral superior poles may be artifactual. Correlate with urinalysis for pyelonephritis.   3.  Redemonstration of enhancing lesion of the uterus. This has been evaluated on prior ultrasound.   4.  Prominent bilateral inguinal lymph nodes may be reactive. Malignancy not excluded.      Dr. Emanuel discussed these findings with Dr. Child at 1:49 PM by telephone on 3/6/2023      DX-FOOT-2- LEFT   Final Result      Status post transmetatarsal amputation. No definitive radiographic evidence of ostomy myelitis. If there is strong clinical suspicion, MRI of the foot with and without contrast should be obtained for further evaluation.       DX-CHEST-PORTABLE (1 VIEW)   Final Result      No acute cardiopulmonary disease evident.          X-Ray:  I have personally reviewed the images and compared with prior images.  EKG:  I have personally reviewed the images and compared with prior images.    Assessment/Plan:  Justification for Admission Status  I anticipate this patient will require at least two midnights for appropriate medical management, necessitating inpatient admission because DVT treatment and pyelonephritis    Patient will need a Med/Surg bed on MEDICAL service .  The need is secondary to DVT treatment    I attest.    * Acute deep vein thrombosis (DVT) ([facility])- (present on admission)  Assessment & Plan  no recent history of surgery however patient is active.  was found at the CT scan  Start with heparin drip, switch to oral medication Eliquis  No shortness of breath and no signs of PE    Pyelonephritis  Assessment & Plan  CT scan shows signs of pyelonephritis  Urine showed white blood cell  No leukocytosis or sepsis  Patient has abdominal pain  Start with ceftriaxone  Blood culture    Homelessness  Assessment & Plan  Complicated medical history and noncompliance and drug abuse   was consulted to consider group home      Hx of AKA (above knee amputation), right ([facility])  Assessment & Plan  Wound care for the stump    HIV (human immunodeficiency virus infection) ([facility])- (present on admission)  Assessment & Plan  Patient is on Biktarvy however, clearly questioning about compliance  Check CD4 and CD8  Resume Biktarvy with close monitoring      Schizophrenia ([facility])- (present on admission)  Assessment & Plan  Patient has hallucination with hearing voices  Denied SI however patient states she has some voices tell her to kill home however not significant person.  Resume home medication Depakote  Consider psych consult if needed    Noncompliance  Assessment & Plan  Significant history with noncompliance with medications and doctor  visits    Lactic acid acidosis  Assessment & Plan  Likely related to dehydration and infection  IV fluid and ceftriaxone  Follow-up with labs    Methamphetamine abuse (HCC)  Assessment & Plan  Encouraged the patient to quit  Urine drug screen is pending  Blood culture    Tobacco abuse- (present on admission)  Assessment & Plan  Cessation counseled, 3 minute  Nicotine replacement          VTE prophylaxis: SCDs/TEDs and therapeutic anticoagulation with heparin drip

## 2023-03-06 NOTE — ED NOTES
Pt. O2 sat occasionally dropping to mid 80's.  Pt. Reports that she is supposed to be using supplemental oxygen 24/7 but she doesn't have it and doesn't know how many liters she is supposed to use. 2L o2 via nc placed with immediate increase to >92%.  Pt. Verbally aggressive with this RN but is cooperative with interventions.  Pt. Assisted to reposition for comfort on [facility].  IV established and IVF infusing per order, labs were sent.

## 2023-03-07 PROBLEM — K59.01 SLOW TRANSIT CONSTIPATION: Status: ACTIVE | Noted: 2023-03-07

## 2023-03-07 PROBLEM — Z89.511 HX OF BKA, RIGHT (HCC): Status: ACTIVE | Noted: 2023-03-06

## 2023-03-07 LAB
ANION GAP SERPL CALC-SCNC: 11 MMOL/L (ref 7–16)
BACTERIA UR CULT: NORMAL
BASOPHILS # BLD AUTO: 0.9 % (ref 0–1.8)
BASOPHILS # BLD: 0.05 K/UL (ref 0–0.12)
BUN SERPL-MCNC: 19 MG/DL (ref 8–22)
CALCIUM SERPL-MCNC: 8.5 MG/DL (ref 8.5–10.5)
CHLORIDE SERPL-SCNC: 106 MMOL/L (ref 96–112)
CO2 SERPL-SCNC: 23 MMOL/L (ref 20–33)
CREAT SERPL-MCNC: 0.78 MG/DL (ref 0.5–1.4)
CRP SERPL HS-MCNC: 1.41 MG/DL (ref 0–0.75)
EOSINOPHIL # BLD AUTO: 0.36 K/UL (ref 0–0.51)
EOSINOPHIL NFR BLD: 6.1 % (ref 0–6.9)
ERYTHROCYTE [DISTWIDTH] IN BLOOD BY AUTOMATED COUNT: 43.8 FL (ref 35.9–50)
GFR SERPLBLD CREATININE-BSD FMLA CKD-EPI: 90 ML/MIN/1.73 M 2
GLUCOSE SERPL-MCNC: 98 MG/DL (ref 65–99)
HCT VFR BLD AUTO: 29 % (ref 37–47)
HGB BLD-MCNC: 9.2 G/DL (ref 12–16)
IMM GRANULOCYTES # BLD AUTO: 0.03 K/UL (ref 0–0.11)
IMM GRANULOCYTES NFR BLD AUTO: 0.5 % (ref 0–0.9)
LACTATE SERPL-SCNC: 1 MMOL/L (ref 0.5–2)
LYMPHOCYTES # BLD AUTO: 0.94 K/UL (ref 1–4.8)
LYMPHOCYTES NFR BLD: 16 % (ref 22–41)
MAGNESIUM SERPL-MCNC: 1.6 MG/DL (ref 1.5–2.5)
MCH RBC QN AUTO: 26.9 PG (ref 27–33)
MCHC RBC AUTO-ENTMCNC: 31.7 G/DL (ref 33.6–35)
MCV RBC AUTO: 84.8 FL (ref 81.4–97.8)
MONOCYTES # BLD AUTO: 0.5 K/UL (ref 0–0.85)
MONOCYTES NFR BLD AUTO: 8.5 % (ref 0–13.4)
NEUTROPHILS # BLD AUTO: 3.98 K/UL (ref 2–7.15)
NEUTROPHILS NFR BLD: 68 % (ref 44–72)
NRBC # BLD AUTO: 0 K/UL
NRBC BLD-RTO: 0 /100 WBC
PHOSPHATE SERPL-MCNC: 2.9 MG/DL (ref 2.5–4.5)
PLATELET # BLD AUTO: 302 K/UL (ref 164–446)
PMV BLD AUTO: 9.3 FL (ref 9–12.9)
POTASSIUM SERPL-SCNC: 3.4 MMOL/L (ref 3.6–5.5)
RBC # BLD AUTO: 3.42 M/UL (ref 4.2–5.4)
SIGNIFICANT IND 70042: NORMAL
SITE SITE: NORMAL
SODIUM SERPL-SCNC: 140 MMOL/L (ref 135–145)
SOURCE SOURCE: NORMAL
WBC # BLD AUTO: 5.9 K/UL (ref 4.8–10.8)

## 2023-03-07 PROCEDURE — 80048 BASIC METABOLIC PNL TOTAL CA: CPT

## 2023-03-07 PROCEDURE — 700102 HCHG RX REV CODE 250 W/ 637 OVERRIDE(OP): Performed by: INTERNAL MEDICINE

## 2023-03-07 PROCEDURE — 700111 HCHG RX REV CODE 636 W/ 250 OVERRIDE (IP): Performed by: GENERAL PRACTICE

## 2023-03-07 PROCEDURE — A9270 NON-COVERED ITEM OR SERVICE: HCPCS | Performed by: GENERAL PRACTICE

## 2023-03-07 PROCEDURE — 84100 ASSAY OF PHOSPHORUS: CPT

## 2023-03-07 PROCEDURE — 83735 ASSAY OF MAGNESIUM: CPT

## 2023-03-07 PROCEDURE — 700102 HCHG RX REV CODE 250 W/ 637 OVERRIDE(OP): Performed by: GENERAL PRACTICE

## 2023-03-07 PROCEDURE — 36415 COLL VENOUS BLD VENIPUNCTURE: CPT

## 2023-03-07 PROCEDURE — 97535 SELF CARE MNGMENT TRAINING: CPT

## 2023-03-07 PROCEDURE — 85025 COMPLETE CBC W/AUTO DIFF WBC: CPT

## 2023-03-07 PROCEDURE — 770001 HCHG ROOM/CARE - MED/SURG/GYN PRIV*

## 2023-03-07 PROCEDURE — 99233 SBSQ HOSP IP/OBS HIGH 50: CPT | Performed by: GENERAL PRACTICE

## 2023-03-07 PROCEDURE — 83605 ASSAY OF LACTIC ACID: CPT

## 2023-03-07 PROCEDURE — A9270 NON-COVERED ITEM OR SERVICE: HCPCS | Performed by: INTERNAL MEDICINE

## 2023-03-07 PROCEDURE — 700101 HCHG RX REV CODE 250: Performed by: GENERAL PRACTICE

## 2023-03-07 RX ORDER — BISACODYL 5 MG
10 TABLET, DELAYED RELEASE (ENTERIC COATED) ORAL ONCE
Status: COMPLETED | OUTPATIENT
Start: 2023-03-07 | End: 2023-03-07

## 2023-03-07 RX ORDER — DIVALPROEX SODIUM 500 MG/1
500 TABLET, DELAYED RELEASE ORAL EVERY 12 HOURS
Qty: 60 TABLET | Refills: 0 | OUTPATIENT
Start: 2023-03-07 | End: 2023-04-06

## 2023-03-07 RX ORDER — POTASSIUM CHLORIDE 20 MEQ/1
40 TABLET, EXTENDED RELEASE ORAL ONCE
Status: COMPLETED | OUTPATIENT
Start: 2023-03-07 | End: 2023-03-07

## 2023-03-07 RX ADMIN — MAGNESIUM HYDROXIDE 30 ML: 400 SUSPENSION ORAL at 00:05

## 2023-03-07 RX ADMIN — POTASSIUM CHLORIDE 40 MEQ: 1500 TABLET, EXTENDED RELEASE ORAL at 08:45

## 2023-03-07 RX ADMIN — DIVALPROEX SODIUM 500 MG: 500 TABLET, DELAYED RELEASE ORAL at 17:39

## 2023-03-07 RX ADMIN — APIXABAN 10 MG: 5 TABLET, FILM COATED ORAL at 21:10

## 2023-03-07 RX ADMIN — MAGNESIUM HYDROXIDE 30 ML: 400 SUSPENSION ORAL at 21:10

## 2023-03-07 RX ADMIN — CEFTRIAXONE SODIUM 1000 MG: 10 INJECTION, POWDER, FOR SOLUTION INTRAVENOUS at 21:14

## 2023-03-07 RX ADMIN — POLYETHYLENE GLYCOL 3350 1 PACKET: 17 POWDER, FOR SOLUTION ORAL at 21:10

## 2023-03-07 RX ADMIN — NICOTINE 14 MG: 14 PATCH, EXTENDED RELEASE TRANSDERMAL at 04:14

## 2023-03-07 RX ADMIN — DOCUSATE SODIUM 50 MG AND SENNOSIDES 8.6 MG 2 TABLET: 8.6; 5 TABLET, FILM COATED ORAL at 17:39

## 2023-03-07 RX ADMIN — BISACODYL 10 MG: 5 TABLET, COATED ORAL at 12:32

## 2023-03-07 RX ADMIN — POLYETHYLENE GLYCOL 3350 1 PACKET: 17 POWDER, FOR SOLUTION ORAL at 00:05

## 2023-03-07 ASSESSMENT — PAIN DESCRIPTION - PAIN TYPE: TYPE: ACUTE PAIN;CHRONIC PAIN

## 2023-03-07 ASSESSMENT — ENCOUNTER SYMPTOMS: WEAKNESS: 1

## 2023-03-07 NOTE — DISCHARGE PLANNING
note:  MD wanted ROGELIO to check if pt can be dc to [facility].    ROGELIO called Judy at [facility] and she said that pt is eligible to return but they do not have any beds. Judy said that pt will need to complete a new application.

## 2023-03-07 NOTE — PROGRESS NOTES
Lethargic , oriented x2, arouses but closes her eyes to back to sleep. Bed alarm in place  Notified greg Lilly regarding tachycardia and increased trop from 28-30; asymptomatic . Await orders  Refused to have mepilex replaced, to stump and heel.  Refused morning medication despite education

## 2023-03-07 NOTE — PROGRESS NOTES
4 Eyes Skin Assessment Completed by MARIA EUGENIA Sow and MARIA EUGENIA Jacobs.    Head WDL  Ears WDL  Nose WDL  Mouth WDL  Neck WDL  Breast/Chest WDL  Shoulder Blades WDL  Spine WDL  (R) Arm/Elbow/Hand WDL  (L) Arm/Elbow/Hand WDL  Abdomen WDL  Groin WDL  Scrotum/Coccyx/Buttocks Redness and Blanching  (R) Leg Scar, Scab, and Incision  (L) Leg Scar, Scab, and Incision  (R) Heel/Foot/Toe n/a  (L) Heel/Foot/Toe Ulcer(s) and Scab          Devices In Places Blood Pressure Cuff      Interventions In Place NC W/Ear Foams, Heel Mepilex, Sacral Mepilex, and Waffle Overlay    Possible Skin Injury Yes    Pictures Uploaded Into Epic Yes  Wound Consult Placed Yes  RN Wound Prevention Protocol Ordered Yes

## 2023-03-07 NOTE — PROGRESS NOTES
[facility] Medicine Daily Progress Note    Date of Service  3/7/2023    Chief Complaint  Kellie Chatman is a 53 y.o. female admitted 3/6/2023 with AMS    Hospital Course  This is a 53-year-old female with past medical history of s/p right BKA and left transmetatarsal amputation, hypertension, HIV, seizure disorder, schizophrenia, methamphetamine use, tobacco use and homelessness who was found laying in the middle of the street and brought in by EMS.    Patient had recent admission at outside facility 2/2023 where she was noted to have recurrent frostbite on her right BKA and left transmetatarsal amputation stump, recommended to wait for further demarcation prior to any surgical intervention.  She was to follow-up with orthopedic surgery outpatient.  During that admission she was diagnosed with a left superficial femoral vein DVT discharged with Xarelto.    On this admission, patient had CT imaging which noted right internal iliac vein DVT.  Concern for possible pyelonephritis.  Patient started on Rocephin.  Urine culture and blood culture pending.    Interval Problem Update  Patient states she is currently residing at [facility].  She is not on any medications.    Upon EMR review, noted that patient had diagnosis of DVT in February however noncompliant with medications.  Repeat imaging here noted right internal iliac vein DVT.  Started patient on Eliquis.    Abdomen is distended, no bowel movement over the past several days.  Aggressive bowel regimen initiated.    Mild hypokalemia noted on labs, oral supplementation.  Monitor labs.    Patient denied any SI/HI however admitted to auditory hallucinations.  Formally on Zyprexa, psych consulted.    Patient is wheelchair-bound at baseline, electric wheelchair at bedside.    Discussing with case management, [facility] where she can reside up to 6 months.    I have discussed this patient's plan of care and discharge plan at  IDT rounds today with Case Management, Nursing, Nursing leadership, and other members of the IDT team.    Consultants/Specialty  psychiatry    Code Status  Full Code    Disposition  Patient is not medically cleared for discharge.   Anticipate discharge to  D .  I have placed the appropriate orders for post-discharge needs.    Review of Systems  Review of Systems   Constitutional:  Positive for malaise/fatigue.   Neurological:  Positive for weakness.   All other systems reviewed and are negative.     Physical Exam  Temp:  [36.3 °C (97.4 °F)-36.7 °C (98.1 °F)] 36.3 °C (97.4 °F)  Pulse:  [104-127] 104  Resp:  [18] 18  BP: (112-144)/(64-96) 112/64  SpO2:  [96 %-98 %] 97 %    Physical Exam  Vitals and nursing note reviewed.   Constitutional:       General: She is not in acute distress.     Appearance: She is ill-appearing.   HENT:      Head: Normocephalic and atraumatic.      Mouth/Throat:      Mouth: Mucous membranes are moist.      Pharynx: No oropharyngeal exudate.   Eyes:      Extraocular Movements: Extraocular movements intact.      Pupils: Pupils are equal, round, and reactive to light.   Cardiovascular:      Rate and Rhythm: Normal rate and regular rhythm.      Pulses: Normal pulses.      Heart sounds: No murmur heard.    No friction rub. No gallop.   Pulmonary:      Effort: Pulmonary effort is normal. No respiratory distress.      Breath sounds: No wheezing, rhonchi or rales.   Abdominal:      General: Bowel sounds are normal. There is distension.      Palpations: Abdomen is soft. There is no mass.      Tenderness: There is no abdominal tenderness.   Musculoskeletal:         General: No swelling or tenderness. Normal range of motion.      Cervical back: Normal range of motion. No rigidity. No muscular tenderness.      Right lower leg: No edema.      Left lower leg: No edema.      Comments: Right lower extremity BKA, left lower extremity with transmetatarsal amputation, no concern for cellulitis or active  infection   Skin:     General: Skin is warm and dry.      Capillary Refill: Capillary refill takes less than 2 seconds.      Findings: No erythema or rash.   Neurological:      General: No focal deficit present.      Mental Status: She is alert. She is disoriented.      Motor: No weakness.      Gait: Gait normal.   Psychiatric:         Attention and Perception: She perceives auditory hallucinations.         Mood and Affect: Affect is flat.         Speech: Speech is delayed.         Behavior: Behavior is withdrawn.       Fluids    Intake/Output Summary (Last 24 hours) at 3/7/2023 1549  Last data filed at 3/7/2023 1443  Gross per 24 hour   Intake 600 ml   Output 0 ml   Net 600 ml       Laboratory  Recent Labs     03/06/23  1239 03/07/23  0315   WBC 6.7 5.9   RBC 4.56 3.42*   HEMOGLOBIN 12.4 9.2*   HEMATOCRIT 39.1 29.0*   MCV 85.7 84.8   MCH 27.2 26.9*   MCHC 31.7* 31.7*   RDW 43.8 43.8   PLATELETCT 466* 302   MPV 9.3 9.3     Recent Labs     03/06/23  1239 03/07/23  0315   SODIUM 137 140   POTASSIUM 4.0 3.4*   CHLORIDE 101 106   CO2 22 23   GLUCOSE 98 98   BUN 30* 19   CREATININE 0.90 0.78   CALCIUM 9.7 8.5     Recent Labs     03/06/23  1450   APTT 28.5   INR 1.29*               Imaging  DX-FEMUR-2+ RIGHT   Final Result      No radiographic evidence of acute traumatic injury.      CT-ABDOMEN-PELVIS WITH   Final Result      1.  Right internal iliac vein DVT.   2.  Somewhat striated nephrogram of the bilateral superior poles may be artifactual. Correlate with urinalysis for pyelonephritis.   3.  Redemonstration of enhancing lesion of the uterus. This has been evaluated on prior ultrasound.   4.  Prominent bilateral inguinal lymph nodes may be reactive. Malignancy not excluded.      Dr. Emanuel discussed these findings with Dr. Child at 1:49 PM by telephone on 3/6/2023      DX-FOOT-2- LEFT   Final Result      Status post transmetatarsal amputation. No definitive radiographic evidence of ostomy myelitis. If there is strong  clinical suspicion, MRI of the foot with and without contrast should be obtained for further evaluation.      DX-CHEST-PORTABLE (1 VIEW)   Final Result      No acute cardiopulmonary disease evident.           Assessment/Plan  * Acute deep vein thrombosis (DVT) ([facility])- (present on admission)  Assessment & Plan  Patient had recent admission at outside facility 2/2023,  she was diagnosed with a left superficial femoral vein DVT discharged with Xarelto.  Patient had CT imaging which noted right internal iliac vein DVT.    Initially on heparin drip, transitioned to Eliquis      Pyelonephritis  Assessment & Plan  CT scan shows signs of pyelonephritis  Urine showed white blood cell  No leukocytosis or sepsis  Patient has abdominal pain  Start with ceftriaxone  Blood culture    Schizophrenia ([facility])- (present on admission)  Assessment & Plan  Patient has hallucination with hearing voices  Denied any SI or HI, however with active auditory hallucinations  Resume home medication Depakote, formally on Zyprexa  Psych consulted    Slow transit constipation  Assessment & Plan  Patient noted to have abdominal distention, prior bowel movement several days prior  Started on aggressive bowel regimen, given one-time dose of Dulcolax  Serial abdominal exams    Noncompliance  Assessment & Plan  Significant history with noncompliance with medications and doctor visits    Homelessness  Assessment & Plan  Complicated medical history and noncompliance and drug abuse   was consulted to consider group home      Lactic acid acidosis  Assessment & Plan  Likely related to dehydration and infection  IV fluid and ceftriaxone  Resolved    Hx of BKA, right ([facility])  Assessment & Plan  Wound care for the stump    Methamphetamine abuse ([facility])  Assessment & Plan  Encouraged the patient to quit  Urine drug screen is pending  Blood culture    HIV (human immunodeficiency virus infection) ([facility])- (present on admission)  Assessment & Plan  Patient is on  Biktarvy however, clearly questioning about compliance  Check CD4 and CD8  Resume Biktarvy with close monitoring    Hypokalemia- (present on admission)  Assessment & Plan  Mild at 3.4  Given oral supplementation  BMP and mag ordered for tomorrow    Tobacco abuse- (present on admission)  Assessment & Plan  Cessation counseled, 3 minute  Nicotine replacement           VTE prophylaxis: therapeutic anticoagulation with Eliquis    I have performed a physical exam and reviewed and updated ROS and Plan today (3/7/2023). In review of yesterday's note (3/6/2023), there are no changes except as documented above.

## 2023-03-07 NOTE — HOSPITAL COURSE
53-year-old female with past medical history of s/p right BKA and left transmetatarsal amputation, hypertension, HIV, seizure disorder, schizophrenia, methamphetamine use, tobacco use and homelessness to the ED with altered mental status.  See completed course of antibiotic for pyelonephritis  CT imaging LLE, negative for osteomyelitis or abscess formation.  Evaluated by orthopedics and status post left below-knee amputation on 3/22/2023.  Patient will need placement,  assisting

## 2023-03-07 NOTE — PROGRESS NOTES
Patient arrived to unit in stable condition. Skin check performed with second RN and pictures taken. Waffle mattress applied to bed. Patient A&O x3. On RA , 95% patient refusing O2 at this time. Patient reports pain in her right stump, and LLE but does not want medication at this time. Will continue to closely monitor.

## 2023-03-07 NOTE — CARE PLAN
The patient is Stable - Low risk of patient condition declining or worsening    Shift Goals  Clinical Goals: safety  Patient Goals: rest    Progress made toward(s) clinical / shift goals:  Patient was able to rest during the shift.  Patient was weaned from oxygen this am.  Patient very drowsy during shift; easy to arouse and take medications. Patient noncompliant with instructions from staff.  Medications administered per orders.  Patient provided opportunity for questions.  All questions answered during shift.       Problem: Pain - Standard  Goal: Alleviation of pain or a reduction in pain to the patient’s comfort goal  Outcome: Progressing     Problem: Fall Risk  Goal: Patient will remain free from falls  Outcome: Progressing       Patient is not progressing towards the following goals:      Problem: Knowledge Deficit - Standard  Goal: Patient and family/care givers will demonstrate understanding of plan of care, disease process/condition, diagnostic tests and medications  Outcome: Not Progressing     Problem: Skin Integrity  Goal: Skin integrity is maintained or improved  Outcome: Not Progressing

## 2023-03-07 NOTE — THERAPY
Physical Therapy Contact Note    PT consult received and acknowledged. Eval attempted but patient was lethargic, reported she was cold and irritable when therapist attempted to move blankets, and largely unable/unwilling to attend to therapist. Will re attempt as able and appropriate.    Rupa Hay, PT, DPT  [phone]

## 2023-03-07 NOTE — THERAPY
Occupational Therapy Contact Note    Patient Name: Kellie Chatman  Age:  53 y.o., Sex:  female  Medical Record #: [id number]  Today's Date: 3/7/2023       03/07/23 1344   Interdisciplinary Plan of Care Collaboration   IDT Collaboration with  Nursing   Collaboration Comments Attempted to see patient for OT evaluation, pt initially agreeable but would get upset if blankets moved or assist provided to EOB. Attempted education of benefit of maintaining mobility and ADL independence pt continued to refuse. Will attempt again as able/appropriate.     Abelardo Palacio OTD, OTR/L

## 2023-03-08 ENCOUNTER — APPOINTMENT (OUTPATIENT)
Dept: RADIOLOGY | Facility: MEDICAL CENTER | Age: 53
DRG: 475 | End: 2023-03-08
Attending: GENERAL PRACTICE
Payer: MEDICAID

## 2023-03-08 PROBLEM — R50.9 FEVER IN ADULT: Status: ACTIVE | Noted: 2023-03-08

## 2023-03-08 LAB
ANION GAP SERPL CALC-SCNC: 10 MMOL/L (ref 7–16)
ANNOTATION COMMENT IMP: ABNORMAL
BUN SERPL-MCNC: 10 MG/DL (ref 8–22)
CALCIUM SERPL-MCNC: 8.6 MG/DL (ref 8.5–10.5)
CD3 CELLS # BLD: 965 CELLS/UL (ref 570–2400)
CD3+CD4+ CELLS # BLD: 761 CELLS/UL (ref 430–1800)
CD3+CD4+ CELLS/CD3+CD8+ CLL BLD: 3.92 RATIO (ref 0.8–3.9)
CD3+CD8+ CELLS # BLD: 194 CELLS/UL (ref 210–1200)
CHLORIDE SERPL-SCNC: 108 MMOL/L (ref 96–112)
CO2 SERPL-SCNC: 24 MMOL/L (ref 20–33)
CREAT SERPL-MCNC: 0.6 MG/DL (ref 0.5–1.4)
ERYTHROCYTE [DISTWIDTH] IN BLOOD BY AUTOMATED COUNT: 44.6 FL (ref 35.9–50)
FLUAV RNA SPEC QL NAA+PROBE: NEGATIVE
FLUBV RNA SPEC QL NAA+PROBE: NEGATIVE
GFR SERPLBLD CREATININE-BSD FMLA CKD-EPI: 106 ML/MIN/1.73 M 2
GLUCOSE SERPL-MCNC: 85 MG/DL (ref 65–99)
HCT VFR BLD AUTO: 32.8 % (ref 37–47)
HGB BLD-MCNC: 10.4 G/DL (ref 12–16)
LACTATE SERPL-SCNC: 1.1 MMOL/L (ref 0.5–2)
MAGNESIUM SERPL-MCNC: 1.9 MG/DL (ref 1.5–2.5)
MCH RBC QN AUTO: 27 PG (ref 27–33)
MCHC RBC AUTO-ENTMCNC: 31.7 G/DL (ref 33.6–35)
MCV RBC AUTO: 85.2 FL (ref 81.4–97.8)
PLATELET # BLD AUTO: 310 K/UL (ref 164–446)
PMV BLD AUTO: 9.8 FL (ref 9–12.9)
POTASSIUM SERPL-SCNC: 4.4 MMOL/L (ref 3.6–5.5)
PROCALCITONIN SERPL-MCNC: 0.1 NG/ML
RBC # BLD AUTO: 3.85 M/UL (ref 4.2–5.4)
RSV RNA SPEC QL NAA+PROBE: NEGATIVE
SARS-COV-2 RNA RESP QL NAA+PROBE: NOTDETECTED
SCCMEC + MECA PNL NOSE NAA+PROBE: POSITIVE
SODIUM SERPL-SCNC: 142 MMOL/L (ref 135–145)
SPECIMEN SOURCE: NORMAL
WBC # BLD AUTO: 6.4 K/UL (ref 4.8–10.8)

## 2023-03-08 PROCEDURE — 99233 SBSQ HOSP IP/OBS HIGH 50: CPT | Performed by: GENERAL PRACTICE

## 2023-03-08 PROCEDURE — 99255 IP/OBS CONSLTJ NEW/EST HI 80: CPT | Mod: GC | Performed by: PSYCHIATRY & NEUROLOGY

## 2023-03-08 PROCEDURE — 85027 COMPLETE CBC AUTOMATED: CPT

## 2023-03-08 PROCEDURE — 83735 ASSAY OF MAGNESIUM: CPT

## 2023-03-08 PROCEDURE — 80048 BASIC METABOLIC PNL TOTAL CA: CPT

## 2023-03-08 PROCEDURE — 83605 ASSAY OF LACTIC ACID: CPT

## 2023-03-08 PROCEDURE — 87641 MR-STAPH DNA AMP PROBE: CPT

## 2023-03-08 PROCEDURE — 700102 HCHG RX REV CODE 250 W/ 637 OVERRIDE(OP): Performed by: GENERAL PRACTICE

## 2023-03-08 PROCEDURE — 700105 HCHG RX REV CODE 258: Performed by: GENERAL PRACTICE

## 2023-03-08 PROCEDURE — A9270 NON-COVERED ITEM OR SERVICE: HCPCS | Performed by: INTERNAL MEDICINE

## 2023-03-08 PROCEDURE — C9803 HOPD COVID-19 SPEC COLLECT: HCPCS | Performed by: GENERAL PRACTICE

## 2023-03-08 PROCEDURE — 700102 HCHG RX REV CODE 250 W/ 637 OVERRIDE(OP): Performed by: INTERNAL MEDICINE

## 2023-03-08 PROCEDURE — 87040 BLOOD CULTURE FOR BACTERIA: CPT

## 2023-03-08 PROCEDURE — 36415 COLL VENOUS BLD VENIPUNCTURE: CPT

## 2023-03-08 PROCEDURE — 99221 1ST HOSP IP/OBS SF/LOW 40: CPT

## 2023-03-08 PROCEDURE — 770006 HCHG ROOM/CARE - MED/SURG/GYN SEMI*

## 2023-03-08 PROCEDURE — 0241U HCHG SARS-COV-2 COVID-19 NFCT DS RESP RNA 4 TRGT MIC: CPT

## 2023-03-08 PROCEDURE — 84145 PROCALCITONIN (PCT): CPT

## 2023-03-08 PROCEDURE — 71045 X-RAY EXAM CHEST 1 VIEW: CPT

## 2023-03-08 PROCEDURE — 700101 HCHG RX REV CODE 250

## 2023-03-08 PROCEDURE — 700111 HCHG RX REV CODE 636 W/ 250 OVERRIDE (IP): Performed by: GENERAL PRACTICE

## 2023-03-08 PROCEDURE — A9270 NON-COVERED ITEM OR SERVICE: HCPCS | Performed by: GENERAL PRACTICE

## 2023-03-08 RX ORDER — ACETAMINOPHEN 500 MG
1000 TABLET ORAL ONCE
Status: COMPLETED | OUTPATIENT
Start: 2023-03-08 | End: 2023-03-08

## 2023-03-08 RX ORDER — LACTULOSE 20 G/30ML
30 SOLUTION ORAL ONCE
Status: COMPLETED | OUTPATIENT
Start: 2023-03-08 | End: 2023-03-08

## 2023-03-08 RX ORDER — ACETAMINOPHEN 325 MG/1
650 TABLET ORAL EVERY 6 HOURS PRN
Status: DISCONTINUED | OUTPATIENT
Start: 2023-03-08 | End: 2023-03-30 | Stop reason: HOSPADM

## 2023-03-08 RX ORDER — ENEMA 19; 7 G/133ML; G/133ML
1 ENEMA RECTAL ONCE
Status: COMPLETED | OUTPATIENT
Start: 2023-03-08 | End: 2023-03-08

## 2023-03-08 RX ORDER — OLANZAPINE 5 MG/1
5 TABLET ORAL 2 TIMES DAILY
Status: DISCONTINUED | OUTPATIENT
Start: 2023-03-08 | End: 2023-03-30 | Stop reason: HOSPADM

## 2023-03-08 RX ADMIN — BICTEGRAVIR SODIUM, EMTRICITABINE, AND TENOFOVIR ALAFENAMIDE FUMARATE 1 TABLET: 50; 200; 25 TABLET ORAL at 09:20

## 2023-03-08 RX ADMIN — APIXABAN 10 MG: 5 TABLET, FILM COATED ORAL at 20:04

## 2023-03-08 RX ADMIN — LACTULOSE 30 ML: 10 SOLUTION ORAL at 09:20

## 2023-03-08 RX ADMIN — DOCUSATE SODIUM 50 MG AND SENNOSIDES 8.6 MG 2 TABLET: 8.6; 5 TABLET, FILM COATED ORAL at 20:04

## 2023-03-08 RX ADMIN — VANCOMYCIN HYDROCHLORIDE 2000 MG: 500 INJECTION, POWDER, LYOPHILIZED, FOR SOLUTION INTRAVENOUS at 20:07

## 2023-03-08 RX ADMIN — DOCUSATE SODIUM 50 MG AND SENNOSIDES 8.6 MG 2 TABLET: 8.6; 5 TABLET, FILM COATED ORAL at 09:19

## 2023-03-08 RX ADMIN — NICOTINE 14 MG: 14 PATCH, EXTENDED RELEASE TRANSDERMAL at 09:19

## 2023-03-08 RX ADMIN — DIVALPROEX SODIUM 500 MG: 500 TABLET, DELAYED RELEASE ORAL at 09:19

## 2023-03-08 RX ADMIN — APIXABAN 10 MG: 5 TABLET, FILM COATED ORAL at 09:19

## 2023-03-08 RX ADMIN — OLANZAPINE 5 MG: 5 TABLET, FILM COATED ORAL at 18:03

## 2023-03-08 RX ADMIN — PIPERACILLIN AND TAZOBACTAM 3.38 G: 3; .375 INJECTION, POWDER, LYOPHILIZED, FOR SOLUTION INTRAVENOUS; PARENTERAL at 18:10

## 2023-03-08 RX ADMIN — PIPERACILLIN AND TAZOBACTAM 3.38 G: 3; .375 INJECTION, POWDER, LYOPHILIZED, FOR SOLUTION INTRAVENOUS; PARENTERAL at 23:18

## 2023-03-08 RX ADMIN — SODIUM PHOSPHATE 133 ML: 7; 19 ENEMA RECTAL at 22:01

## 2023-03-08 RX ADMIN — DIVALPROEX SODIUM 500 MG: 500 TABLET, DELAYED RELEASE ORAL at 20:04

## 2023-03-08 RX ADMIN — ACETAMINOPHEN 1000 MG: 500 TABLET, FILM COATED ORAL at 17:13

## 2023-03-08 ASSESSMENT — ENCOUNTER SYMPTOMS
CONSTITUTIONAL NEGATIVE: 1
CARDIOVASCULAR NEGATIVE: 1
NEUROLOGICAL NEGATIVE: 1
RESPIRATORY NEGATIVE: 1
HALLUCINATIONS: 0
WEAKNESS: 1
EYES NEGATIVE: 1
ABDOMINAL PAIN: 0
DIARRHEA: 1
MUSCULOSKELETAL NEGATIVE: 1

## 2023-03-08 ASSESSMENT — LIFESTYLE VARIABLES: SUBSTANCE_ABUSE: 1

## 2023-03-08 ASSESSMENT — PAIN DESCRIPTION - PAIN TYPE: TYPE: ACUTE PAIN;CHRONIC PAIN

## 2023-03-08 NOTE — DISCHARGE PLANNING
Met with pt but she was falling asleep during our conversation. CM assisted pt to complete [facility] application and this was emailed to [facility] INTAKE copy to Jeremy CLARK for follow up. Pt is homeless, has her own wheelchair at bedside. Pt sign the application form and then went back to sleep.    Care Transition Team Assessment    Information Source  Orientation Level: Disoriented to place, Disoriented to time  Information Given By: Patient  Who is responsible for making decisions for patient? : Patient    Readmission Evaluation  Is this a readmission?: No    Elopement Risk  Legal Hold: No  Ambulatory or Self Mobile in Wheelchair: No-Not an Elopement Risk  Elopement Risk: Not at Risk for Elopement    Interdisciplinary Discharge Planning  Does Admitting Nurse Feel This Could be a Complex Discharge?: No  Lives with - Patient's Self Care Capacity: Alone and Able to Care For Self  Patient or legal guardian wants to designate a caregiver: No  Support Systems: None  Housing / Facility: Homeless  Do You Take your Prescribed Medications Regularly: No  Reasons Why Not Taking Medications : Financial Reasons  Able to Return to Previous ADL's: Yes  Mobility Issues: Yes  Prior Services: Home-Independent  Patient Prefers to be Discharged to:: [facility]  Assistance Needed: No  Durable Medical Equipment: Other - Specify (wheelchair)    Discharge Preparedness  What is your plan after discharge?: Other (comment)  What are your discharge supports?: Other (comment)  Prior Functional Level: Uses Wheelchair  Difficulity with ADLs: None  Difficulity with IADLs: None    Functional Assesment  Prior Functional Level: Uses Wheelchair    Finances  Financial Barriers to Discharge: No  Prescription Coverage: Yes    Vision / Hearing Impairment  Vision Impairment : Yes  Hearing Impairment : No    Values / Beliefs / Concerns  Values / Beliefs Concerns : No    Advance Directive  Advance Directive?: None    Domestic Abuse  Have you ever been the victim  of abuse or violence?: No         Discharge Risks or Barriers  Discharge risks or barriers?: Post-acute placement / services  Patient risk factors: Homeless    Anticipated Discharge Information  Discharge Disposition: Discharged to home/self care (01)

## 2023-03-08 NOTE — PROGRESS NOTES
Lethargic but arouses only to fall asleep, bed alarm in place. Prn bowel meds given again with night snack per request  Refuses to place dressings to stump and heel wound; AZRA

## 2023-03-08 NOTE — WOUND TEAM
Wound team consulted for patient's bilateral lower extremities, Patient may need revisions LPS consulted to assist.

## 2023-03-08 NOTE — CARE PLAN
The patient is Stable - Low risk of patient condition declining or worsening    Shift Goals  Clinical Goals: montior bowel routine  Patient Goals: rest    Progress made toward(s) clinical / shift goals:      Patient A&O x 2-disorient to time or situation.  Patient able to make needs known to staff when staff in room.  Patient reluctant to utilize call light for assistance.  Patient with 101.5 fever this afternoon.  MD notified with orders received and followed.  Day shift will pass urine labs to night shift to obtain.  Abx and tylenol provided.  Temperature recheck to 99.9 and patient feeling better.  RN completed wound teams instructions for wound care.  Patient provided opportunity for questions.  All questions answered during shift.     Problem: Knowledge Deficit - Standard  Goal: Patient and family/care givers will demonstrate understanding of plan of care, disease process/condition, diagnostic tests and medications  Outcome: Progressing     Problem: Pain - Standard  Goal: Alleviation of pain or a reduction in pain to the patient’s comfort goal  Outcome: Progressing     Problem: Skin Integrity  Goal: Skin integrity is maintained or improved  Outcome: Progressing     Problem: Fall Risk  Goal: Patient will remain free from falls  Outcome: Progressing

## 2023-03-08 NOTE — CONSULTS
PSYCHIATRIC INTAKE EVALUATION(new)  Reason for admission: wound check, aggressive behavior  Reason for consult: psychiatric medication evaluation/management   Requesting Provider: Mary Cristobal D.O.      Legal Hold Status: Pt is NOT on a legal hold     Chart reviewed.         Pt is a 53 y.o. female with past medical history of schizophrenia, HIV on biktarvy, HTN, seizure disorder, methamphetamine use disorder, and s/p right BKA and left transmetatarsal amputation found to be lying in the middle of the street complaining of abdominal pain. There were concerns about her stump wounds and was subsequently brought to the hospital. She was recently discharged from [facility] where she was admitted for recent frostbite and left superficial femoral vein DVT. Pt reportedly has difficulty with adherence to both medication and appointments. She did endorse auditory hallucinations upon initial presentation to the ER. Today, she denies having such complaints. Last instance of auditory hallucinations being yesterday. She states that the voices oftentimes tell her to harm others but she does not intend to. She says that she has never acted on their commands. She denies SI or HI. She reports that she was at the shelter and would likely return upon discharge. She says that she has been taking her medication but was documented otherwise by ER physicians.     Psychiatric ROS:  It should be noted that Pt is a poor historian and engages minimally with evaluation.   Depression: denies thoughts of worthlessness, feelings of guilt, recent changes in sleep, concentration, appetite or energy level. Denies psychomotor retardation. Denies Suicidal ideation.   Anxiety: denies difficulty in controlling worry, feeling restless, or irritable. Denies muscle tension or impairment in social/occupational functioning.    Ebony: denies recent period of distractibility, impulsivity, grandiosity, flight of ideas, increased activity and needing little  "sleep.   Psychosis: no AVH, paranoia or delusions     Medical ROS (as pertinent):     Review of Systems   Constitutional: Negative.    HENT: Negative.     Eyes: Negative.    Respiratory: Negative.     Cardiovascular: Negative.    Gastrointestinal:  Positive for diarrhea. Negative for abdominal pain.   Musculoskeletal: Negative.    Skin: Negative.    Neurological: Negative.    Psychiatric/Behavioral:  Positive for substance abuse. Negative for hallucinations and suicidal ideas.        *Psychiatric Examination:  Vitals:   Vitals:    03/08/23 0404   BP: 99/61   Pulse: 100   Resp: 18   Temp: 37.4 °C (99.3 °F)   SpO2: 97%      General Appearance: Appears stated age, no acute distress, wearing hospital gown, laying in bed, somnolent, engaging little with interview   Abnormal Movements: No abnormal movements. Poor eye contact.   Gait and Posture: Gait not observed; posture is okay  Speech: Normal rate and low volume, nonpressured speech. Provides mostly one word responses.   Thought processes: largely linear, repetition of responses   Associations: No loose associations  Abnormal or Psychotic Thoughts: No AVH; does not appear to be responding to internal stimuli.  No paranoia or delusional content evident on evaluation.  Judgement and Insight: poor-limited  Orientation: Alert and fully oriented  Recent and Remote Memory: difficulty in recall of both short and long term memory  Attention Span and Concentration: Within normal limits  Language: Fluent and coherent English  Fund of Knowledge: Estimated to be average for age group  Mood and Affect: \"okay\" flat, incongruent to mood, non-labile  SI/HI: Denies/denies    Past Medical History:   Past Medical History:   Diagnosis Date    Frostbite of foot, left     Frostbite of foot, right     HIV (human immunodeficiency virus infection) (HCC)         Past Psychiatric History:  Previous Diagnosis: schizophrenia, schizoaffective bipolar type; reportedly diagnosed with psychosis at a " "young age  Current meds: depakote (dose unclear) and zyprexa (dose unclear)  Previous med trials: unable to recall  Hospitalizations: little engagement with interview   Suicide attempts/SIB: reports that she attempted to end her life two years ago via cutting   Outpatient services: states that she saw a psychiatrist 3 days ago but unable to describe any details of the encounter    Family Hx:   Denies    Social Hx:   Drugs: Uses meth \"monthly\". Last usage a couple of days ago. Occasional cocaine and marijuana.   Alcohol: reportedly has not had alcohol in a few days. Used to drink daily.  Nicotine: denies    Current Medications:  Current Facility-Administered Medications   Medication Dose Route Frequency Provider Last Rate Last Admin    apixaban (ELIQUIS) tablet 10 mg  10 mg Oral BID Mary Cristobal D.O.   10 mg at 03/07/23 2110    Followed by    [START ON 3/13/2023] apixaban (ELIQUIS) tablet 5 mg  5 mg Oral BID Mary Cristobal D.O.        cefTRIAXone (Rocephin) syringe 1,000 mg  1,000 mg Intravenous Q24HRS Mary Cristobal D.O.   1,000 mg at 03/07/23 2114    bictegravir-emtricitab-TAF (Biktarvy) -25 mg tablet 1 Tablet  1 Tablet Oral DAILY Noreen Friend M.D.   1 Tablet at 03/06/23 1735    divalproex (DEPAKOTE) delayed-release tablet 500 mg  500 mg Oral Q12HRS Noreen Friend M.D.   500 mg at 03/07/23 1739    senna-docusate (PERICOLACE or SENOKOT S) 8.6-50 MG per tablet 2 Tablet  2 Tablet Oral BID Noreen Friend M.D.   2 Tablet at 03/07/23 1739    And    polyethylene glycol/lytes (MIRALAX) PACKET 1 Packet  1 Packet Oral QDAY PRN Noreen Friend M.D.   1 Packet at 03/07/23 2110    And    magnesium hydroxide (MILK OF MAGNESIA) suspension 30 mL  30 mL Oral QDAY PRN Noreen Friend M.D.   30 mL at 03/07/23 2110    And    bisacodyl (DULCOLAX) suppository 10 mg  10 mg Rectal QDAY PRN Noreen Friend M.D.        nicotine (NICODERM) 14 MG/24HR 14 mg  14 mg Transdermal Daily-0600 Noreen Friend, " M.D.   14 mg at 23 0414    And    nicotine polacrilex (NICORETTE) 2 MG piece 2 mg  2 mg Oral Q HOUR PRN Noreen Friend M.D.        ondansetron (ZOFRAN) syringe/vial injection 4 mg  4 mg Intravenous Q4HRS PRN Noreen Friend M.D.        ondansetron (ZOFRAN ODT) dispertab 4 mg  4 mg Oral Q4HRS PRN Noreen Friend M.D.        promethazine (PHENERGAN) tablet 12.5-25 mg  12.5-25 mg Oral Q4HRS PRN Noreen Friend M.D.        promethazine (PHENERGAN) suppository 12.5-25 mg  12.5-25 mg Rectal Q4HRS PRN Noreen Friend M.D.        prochlorperazine (COMPAZINE) injection 5-10 mg  5-10 mg Intravenous Q4HRS PRN Noreen Friend M.D.        guaiFENesin dextromethorphan (ROBITUSSIN DM) 100-10 MG/5ML syrup 10 mL  10 mL Oral Q6HRS PRN Noreen Friend M.D.            Allergies:  Patient has no known allergies.       Labs personally reviewed:   Recent Results (from the past 72 hour(s))   EKG    Collection Time: 23 11:48 AM   Result Value Ref Range    Report       [facility] Emergency Dept.    Test Date:  2023  Pt Name:    MARY MCCONNELL                Department: ER  MRN:        [id number]                      Room:       [facility]  Gender:     Female                       Technician: [id number]  :        1970                   Requested By:JOE LANG  Order #:    [id number]                    Reading MD: Joe Lang MD    Measurements  Intervals                                Axis  Rate:       109                          P:          51  VT:         99                           QRS:        -8  QRSD:       131                          T:          91  QT:         358  QTc:        483    Interpretive Statements  Sinus tachycardia  Atrial premature complex  Probable left ventricular hypertrophy  Artifact in lead(s) I,III,aVR,aVL,V1,V2,V6 and baseline wander in lead(s) V6  Compared to ECG 2022 06:02:51  Atrial premature complex(es) now present  Sinus rhythm no longer  present  Electronically Signed On 3-6-2023 14:40:10  PST by Reno Child MD     Blood Culture,Hold    Collection Time: 03/06/23 12:38 PM   Result Value Ref Range    Blood Culture Hold Collected    CBC With Differential    Collection Time: 03/06/23 12:39 PM   Result Value Ref Range    WBC 6.7 4.8 - 10.8 K/uL    RBC 4.56 4.20 - 5.40 M/uL    Hemoglobin 12.4 12.0 - 16.0 g/dL    Hematocrit 39.1 37.0 - 47.0 %    MCV 85.7 81.4 - 97.8 fL    MCH 27.2 27.0 - 33.0 pg    MCHC 31.7 (L) 33.6 - 35.0 g/dL    RDW 43.8 35.9 - 50.0 fL    Platelet Count 466 (H) 164 - 446 K/uL    MPV 9.3 9.0 - 12.9 fL    Neutrophils-Polys 60.20 44.00 - 72.00 %    Lymphocytes 29.10 22.00 - 41.00 %    Monocytes 7.50 0.00 - 13.40 %    Eosinophils 1.90 0.00 - 6.90 %    Basophils 0.90 0.00 - 1.80 %    Immature Granulocytes 0.40 0.00 - 0.90 %    Nucleated RBC 0.00 /100 WBC    Neutrophils (Absolute) 4.03 2.00 - 7.15 K/uL    Lymphs (Absolute) 1.95 1.00 - 4.80 K/uL    Monos (Absolute) 0.50 0.00 - 0.85 K/uL    Eos (Absolute) 0.13 0.00 - 0.51 K/uL    Baso (Absolute) 0.06 0.00 - 0.12 K/uL    Immature Granulocytes (abs) 0.03 0.00 - 0.11 K/uL    NRBC (Absolute) 0.00 K/uL   Comp Metabolic Panel    Collection Time: 03/06/23 12:39 PM   Result Value Ref Range    Sodium 137 135 - 145 mmol/L    Potassium 4.0 3.6 - 5.5 mmol/L    Chloride 101 96 - 112 mmol/L    Co2 22 20 - 33 mmol/L    Anion Gap 14.0 7.0 - 16.0    Glucose 98 65 - 99 mg/dL    Bun 30 (H) 8 - 22 mg/dL    Creatinine 0.90 0.50 - 1.40 mg/dL    Calcium 9.7 8.5 - 10.5 mg/dL    AST(SGOT) 15 12 - 45 U/L    ALT(SGPT) 12 2 - 50 U/L    Alkaline Phosphatase 93 30 - 99 U/L    Total Bilirubin 0.3 0.1 - 1.5 mg/dL    Albumin 3.9 3.2 - 4.9 g/dL    Total Protein 8.3 (H) 6.0 - 8.2 g/dL    Globulin 4.4 (H) 1.9 - 3.5 g/dL    A-G Ratio 0.9 g/dL   Lactic Acid    Collection Time: 03/06/23 12:39 PM   Result Value Ref Range    Lactic Acid 2.6 (H) 0.5 - 2.0 mmol/L   Troponin    Collection Time: 03/06/23 12:39 PM   Result Value Ref Range     Troponin T 28 (H) 6 - 19 ng/L   CORRECTED CALCIUM    Collection Time: 03/06/23 12:39 PM   Result Value Ref Range    Correct Calcium 9.8 8.5 - 10.5 mg/dL   ESTIMATED GFR    Collection Time: 03/06/23 12:39 PM   Result Value Ref Range    GFR (CKD-EPI) 75 >60 mL/min/1.73 m 2   URINALYSIS,CULTURE IF INDICATED    Collection Time: 03/06/23  1:38 PM    Specimen: Urine, Clean Catch   Result Value Ref Range    Color DK Yellow     Character Cloudy (A)     Specific Gravity 1.029 <1.035    Ph 5.0 5.0 - 8.0    Glucose Negative Negative mg/dL    Ketones Trace (A) Negative mg/dL    Protein 30 (A) Negative mg/dL    Bilirubin Moderate (A) Negative    Urobilinogen, Urine 1.0 Negative    Nitrite Negative Negative    Leukocyte Esterase Trace (A) Negative    Occult Blood Negative Negative    Micro Urine Req Microscopic    URINE DRUG SCREEN    Collection Time: 03/06/23  1:38 PM   Result Value Ref Range    Amphetamines Urine Positive (A) Negative    Barbiturates Negative Negative    Benzodiazepines Negative Negative    Cocaine Metabolite Negative Negative    Methadone Negative Negative    Opiates Negative Negative    Oxycodone Negative Negative    Phencyclidine -Pcp Negative Negative    Propoxyphene Negative Negative    Cannabinoid Metab Negative Negative   URINE MICROSCOPIC (W/UA)    Collection Time: 03/06/23  1:38 PM   Result Value Ref Range    WBC 10-20 (A) /hpf    RBC 0-2 /hpf    Bacteria Negative None /hpf    Epithelial Cells Few /hpf    Mucous Threads Few /hpf    Hyaline Cast 6-10 (A) /lpf    Granular Casts 0-2 /lpf   URINE CULTURE(NEW)    Collection Time: 03/06/23  1:38 PM    Specimen: Urine   Result Value Ref Range    Significant Indicator NEG     Source UR     Site -     Culture Result       Mixed enteric itzel >100,000 cfu/mL  Greater than 3 organisms isolated, culture of doubtful  significance, please recollect.     URINALYSIS,CULTURE IF INDICATED    Collection Time: 03/06/23  2:20 PM    Specimen: Urine, Cath; Blood   Result  Value Ref Range    Color Yellow     Character Clear     Specific Gravity >=1.045 (A) <1.035    Ph 5.0 5.0 - 8.0    Glucose Negative Negative mg/dL    Ketones Trace (A) Negative mg/dL    Protein 30 (A) Negative mg/dL    Bilirubin Negative Negative    Urobilinogen, Urine 0.2 Negative    Nitrite Negative Negative    Leukocyte Esterase Negative Negative    Occult Blood Negative Negative    Micro Urine Req Microscopic    URINE MICROSCOPIC (W/UA)    Collection Time: 03/06/23  2:20 PM   Result Value Ref Range    WBC 2-5 /hpf    RBC 0-2 /hpf    Bacteria Negative None /hpf    Epithelial Cells Few /hpf    Hyaline Cast 6-10 (A) /lpf   Lactic Acid    Collection Time: 03/06/23  2:50 PM   Result Value Ref Range    Lactic Acid 1.6 0.5 - 2.0 mmol/L   aPTT    Collection Time: 03/06/23  2:50 PM   Result Value Ref Range    APTT 28.5 24.7 - 36.0 sec   Prothrombin Time    Collection Time: 03/06/23  2:50 PM   Result Value Ref Range    PT 15.9 (H) 12.0 - 14.6 sec    INR 1.29 (H) 0.87 - 1.13   Heparin Xa (Unfractionated)    Collection Time: 03/06/23  2:50 PM   Result Value Ref Range    Heparin Xa (UFH) <0.10 IU/mL   BLOOD CULTURE    Collection Time: 03/06/23  3:28 PM    Specimen: Peripheral; Blood   Result Value Ref Range    Significant Indicator NEG     Source BLD     Site PERIPHERAL     Culture Result       No Growth  Note: Blood cultures are incubated for 5 days and  are monitored continuously.Positive blood cultures  are called to the RN and reported as soon as  they are identified.     PROCALCITONIN    Collection Time: 03/06/23  3:28 PM   Result Value Ref Range    Procalcitonin 0.09 <0.25 ng/mL   BLOOD CULTURE    Collection Time: 03/06/23  7:41 PM    Specimen: Peripheral; Blood   Result Value Ref Range    Significant Indicator NEG     Source BLD     Site PERIPHERAL     Culture Result       No Growth  Note: Blood cultures are incubated for 5 days and  are monitored continuously.Positive blood cultures  are called to the RN and reported  as soon as  they are identified.     CRP QUANTITIVE (NON-CARDIAC)    Collection Time: 03/06/23  7:41 PM   Result Value Ref Range    Stat C-Reactive Protein 1.41 (H) 0.00 - 0.75 mg/dL   Sed Rate    Collection Time: 03/06/23  7:41 PM   Result Value Ref Range    Sed Rate Westergren 24 0 - 25 mm/hour   Lactic Acid    Collection Time: 03/06/23  8:16 PM   Result Value Ref Range    Lactic Acid 1.2 0.5 - 2.0 mmol/L   TROPONIN    Collection Time: 03/06/23  8:16 PM   Result Value Ref Range    Troponin T 30 (H) 6 - 19 ng/L   LACTIC ACID    Collection Time: 03/06/23 10:10 PM   Result Value Ref Range    Lactic Acid 1.4 0.5 - 2.0 mmol/L   CBC WITH DIFFERENTIAL    Collection Time: 03/07/23  3:15 AM   Result Value Ref Range    WBC 5.9 4.8 - 10.8 K/uL    RBC 3.42 (L) 4.20 - 5.40 M/uL    Hemoglobin 9.2 (L) 12.0 - 16.0 g/dL    Hematocrit 29.0 (L) 37.0 - 47.0 %    MCV 84.8 81.4 - 97.8 fL    MCH 26.9 (L) 27.0 - 33.0 pg    MCHC 31.7 (L) 33.6 - 35.0 g/dL    RDW 43.8 35.9 - 50.0 fL    Platelet Count 302 164 - 446 K/uL    MPV 9.3 9.0 - 12.9 fL    Neutrophils-Polys 68.00 44.00 - 72.00 %    Lymphocytes 16.00 (L) 22.00 - 41.00 %    Monocytes 8.50 0.00 - 13.40 %    Eosinophils 6.10 0.00 - 6.90 %    Basophils 0.90 0.00 - 1.80 %    Immature Granulocytes 0.50 0.00 - 0.90 %    Nucleated RBC 0.00 /100 WBC    Neutrophils (Absolute) 3.98 2.00 - 7.15 K/uL    Lymphs (Absolute) 0.94 (L) 1.00 - 4.80 K/uL    Monos (Absolute) 0.50 0.00 - 0.85 K/uL    Eos (Absolute) 0.36 0.00 - 0.51 K/uL    Baso (Absolute) 0.05 0.00 - 0.12 K/uL    Immature Granulocytes (abs) 0.03 0.00 - 0.11 K/uL    NRBC (Absolute) 0.00 K/uL   Basic Metabolic Panel    Collection Time: 03/07/23  3:15 AM   Result Value Ref Range    Sodium 140 135 - 145 mmol/L    Potassium 3.4 (L) 3.6 - 5.5 mmol/L    Chloride 106 96 - 112 mmol/L    Co2 23 20 - 33 mmol/L    Glucose 98 65 - 99 mg/dL    Bun 19 8 - 22 mg/dL    Creatinine 0.78 0.50 - 1.40 mg/dL    Calcium 8.5 8.5 - 10.5 mg/dL    Anion Gap 11.0 7.0 -  16.0   MAGNESIUM    Collection Time: 23  3:15 AM   Result Value Ref Range    Magnesium 1.6 1.5 - 2.5 mg/dL   PHOSPHORUS    Collection Time: 23  3:15 AM   Result Value Ref Range    Phosphorus 2.9 2.5 - 4.5 mg/dL   LACTIC ACID    Collection Time: 23  3:15 AM   Result Value Ref Range    Lactic Acid 1.0 0.5 - 2.0 mmol/L   ESTIMATED GFR    Collection Time: 23  3:15 AM   Result Value Ref Range    GFR (CKD-EPI) 90 >60 mL/min/1.73 m 2   Basic Metabolic Panel    Collection Time: 23  5:21 AM   Result Value Ref Range    Sodium 142 135 - 145 mmol/L    Potassium 4.4 3.6 - 5.5 mmol/L    Chloride 108 96 - 112 mmol/L    Co2 24 20 - 33 mmol/L    Glucose 85 65 - 99 mg/dL    Bun 10 8 - 22 mg/dL    Creatinine 0.60 0.50 - 1.40 mg/dL    Calcium 8.6 8.5 - 10.5 mg/dL    Anion Gap 10.0 7.0 - 16.0   MAGNESIUM    Collection Time: 23  5:21 AM   Result Value Ref Range    Magnesium 1.9 1.5 - 2.5 mg/dL   CBC WITHOUT DIFFERENTIAL    Collection Time: 23  5:21 AM   Result Value Ref Range    WBC 6.4 4.8 - 10.8 K/uL    RBC 3.85 (L) 4.20 - 5.40 M/uL    Hemoglobin 10.4 (L) 12.0 - 16.0 g/dL    Hematocrit 32.8 (L) 37.0 - 47.0 %    MCV 85.2 81.4 - 97.8 fL    MCH 27.0 27.0 - 33.0 pg    MCHC 31.7 (L) 33.6 - 35.0 g/dL    RDW 44.6 35.9 - 50.0 fL    Platelet Count 310 164 - 446 K/uL    MPV 9.8 9.0 - 12.9 fL   ESTIMATED GFR    Collection Time: 23  5:21 AM   Result Value Ref Range    GFR (CKD-EPI) 106 >60 mL/min/1.73 m 2      EKG:   Results for orders placed or performed during the hospital encounter of 23   EKG   Result Value Ref Range    Report       [facility] Emergency Dept.    Test Date:  2023  Pt Name:    MARY MCCONNELL                Department: ER  MRN:        [id number]                      Room:       [facility]  Gender:     Female                       Technician: [id number]  :        1970                   Requested By:JOE LANG  Order #:    [id number]                     Reading MD: Reno Child MD    Measurements  Intervals                                Axis  Rate:       109                          P:          51  UT:         99                           QRS:        -8  QRSD:       131                          T:          91  QT:         358  QTc:        483    Interpretive Statements  Sinus tachycardia  Atrial premature complex  Probable left ventricular hypertrophy  Artifact in lead(s) I,III,aVR,aVL,V1,V2,V6 and baseline wander in lead(s) V6  Compared to ECG 11/13/2022 06:02:51  Atrial premature complex(es) now present  Sinus rhythm no longer present  Electronically Signed On 3-6-2023 14:40:10  PST by Reno Child MD       Brain Imaging: N/a  EEG: N/a     Assessment:  Pt is a 53 y.o. female with past medical history of schizophrenia, HIV on biktarvy, HTN, seizure disorder, methamphetamine use disorder, and s/p right BKA and left transmetatarsal amputation found to be lying in the middle of the street complaining of abdominal pain. There were concerns about her stump wounds and was subsequently brought to the hospital. Upon presentation, she did endorse auditory hallucinations but denies any on initial psychiatric evaluation. UDS was positive for amphetamines which could be leaving her system and would be in line with the improvement in psychotic symptoms. She does have a history of schizophrenia. She did make mention of possible schizoaffective bipolar type however would not describe any symptoms indicative of ham[place]. She is reportedly on Depakote which has already been restarted and Zyprexa which she would like restarted. Pt is not a risk of harm to self or others, nor does she have inability to care for self. Therefore, she does not require further stabilization at a psychiatric inpatient unit at this time.     Dx:  Methamphetamine intoxication on arrival  Methamphetamine withdrawal   Schizophrenia      Methamphetamine use disorder    Medical:  Acute  DVT  Pyelonephritis    Plan:  Legal hold: Pt is not on a legal hold   Psychotropic medications  Recommend continuing Depakote  mg bid for mood stabilization and seizure disorder. Recommend ordering Depakote level in 5 days.  Recommend restarting Zyprexa PO at dose of 5 mg bid for psychosis.   Labs reviewed  EKG reviewed: Qtc of 483  Old records reviewed   Pt counseled on tobacco cessation (code 13301)  Discussed the case with: Dr. Martin  Psychiatry will follow up     Thank you for the consult.

## 2023-03-08 NOTE — CONSULTS
LIMB PRESERVATION SERVICE CONSULT      REFERRED BY: Dr. Cristobal     DATE OF CONSULTATION: 3/8/2023    REASON FOR CONSULT: frostbite injury to left TMA, left heel, right BKA.     HISTORY OF PRESENT ILLNESS: Kellie Chatman is a 53 y.o.  with a past medical history that includes frostbite of bilateral feet, HIV, EtOH abuse, methamphetamine abuse.  Admitted 3/6/2023 for DVT, lower extremity, proximal, acute, left (HCC) [I82.4Y2].     [facility] has been consulted for frostbite injuries to left TMA, left heel, right BKA.    The patient was initially seen on 11/14/2022 for frostbite to her right foot and left hallux.  Due to this injury patient had a right BKA on 12/20/2022 with Dr. Lux.  Due to continued decline of her left foot she eventually had a left TMA on 2/7/2023 with Dr. Lux.    Patient is homeless and does attempt to stay at the [facility] as able.  Patient states unfortunately she had another frostbite injury to the posterior aspect of her right BKA and her left TMA and plantar foot including her left heel.  Patient was seen at [place] with DC on 3/1/2023, and it was deemed that frostbite injury needed to demarcate prior to determination for surgical intervention for 1 to 2 weeks.    Patient denies fevers, chills, nausea, vomiting.  Patient does states she continues to hear voices, states they are telling her to eat.    Antibiotics were started on this admission.  Infectious diseases has not been consulted.  Bilateral xray completed and is negative for osteomyelitis. Ortho has been consulted.  Vascular surgery not involved yet.         Smoking:   reports that she has been smoking cigarettes. She has been smoking an average of .25 packs per day. She has never used smokeless tobacco.    Alcohol:   reports no history of alcohol use.    Drug:   reports current drug use. Drug: Inhaled.      PAST MEDICAL HISTORY:   Past Medical History:   Diagnosis Date    Frostbite of foot, left     Frostbite of foot, right      HIV (human immunodeficiency virus infection) (HCC)         PAST SURGICAL HISTORY: History reviewed. No pertinent surgical history.    MEDICATIONS:   Scheduled Medications   Medication Dose Frequency    apixaban  10 mg BID    Followed by    [START ON 3/13/2023] apixaban  5 mg BID    cefTRIAXone (ROCEPHIN) IV  1,000 mg Q24HRS    bictegravir-emtricitab-TAF  1 Tablet DAILY    divalproex  500 mg Q12HRS    senna-docusate  2 Tablet BID    nicotine  14 mg Daily-0600       ALLERGIES:  No Known Allergies     FAMILY HISTORY: History reviewed. No pertinent family history.      REVIEW OF SYSTEMS:   Constitutional: Negative for chills, fever   Respiratory: Negative for cough and shortness of breath.    Cardiovascular:Negative for chest pain, and claudication.   Gastrointestinal: Negative for constipation, diarrhea, nausea and vomiting.   Lower extremities: positive for swelling and redness  Neurological: Negative for numbness to feet and lower legs  All other systems reviewed and are negative     RESULTS:     Recent Labs     03/06/23  1239 03/07/23  0315 03/08/23  0521   WBC 6.7 5.9 6.4   RBC 4.56 3.42* 3.85*   HEMOGLOBIN 12.4 9.2* 10.4*   HEMATOCRIT 39.1 29.0* 32.8*   MCV 85.7 84.8 85.2   MCH 27.2 26.9* 27.0   MCHC 31.7* 31.7* 31.7*   RDW 43.8 43.8 44.6   PLATELETCT 466* 302 310   MPV 9.3 9.3 9.8     Recent Labs     03/06/23  1239 03/07/23  0315 03/08/23  0521   SODIUM 137 140 142   POTASSIUM 4.0 3.4* 4.4   CHLORIDE 101 106 108   CO2 22 23 24   GLUCOSE 98 98 85   BUN 30* 19 10         ESR:     Results from last 7 days   Lab Units 03/06/23 1941   SED RATE WESTERGREN 1526 mm/hour 24       CRP:       Results from last 7 days   Lab Units 03/06/23 1941   C REACTIVE PROTEIN 4596 mg/dL 1.41*         COVID-19: Not completed this admission     Imaging:  DX-FEMUR-2+ RIGHT   Final Result      No radiographic evidence of acute traumatic injury.      CT-ABDOMEN-PELVIS WITH   Final Result      1.  Right internal iliac vein DVT.   2.   Somewhat striated nephrogram of the bilateral superior poles may be artifactual. Correlate with urinalysis for pyelonephritis.   3.  Redemonstration of enhancing lesion of the uterus. This has been evaluated on prior ultrasound.   4.  Prominent bilateral inguinal lymph nodes may be reactive. Malignancy not excluded.      Dr. Emanuel discussed these findings with Dr. Child at 1:49 PM by telephone on 3/6/2023      DX-FOOT-2- LEFT   Final Result      Status post transmetatarsal amputation. No definitive radiographic evidence of ostomy myelitis. If there is strong clinical suspicion, MRI of the foot with and without contrast should be obtained for further evaluation.      DX-CHEST-PORTABLE (1 VIEW)   Final Result      No acute cardiopulmonary disease evident.          Arterial studies: 11/13/2022-prior to right BKA  RIGHT      Waveform            Systolic BPs (mmHg)                              125           Brachial   Hyperemic                                Common Femoral   Hyperemic                                Popliteal   Hyperemic                  75            Posterior Tibial   Hyperemic                  72            Dorsalis Pedis                                            Digit                              0.60          GISELLA                                            TBI                           LEFT   Waveform        Systolic BPs (mmHg)                              120           Brachial   Hyperemic                                Common Femoral   Hyperemic                                Popliteal   Hyperemic                  136           Posterior Tibial   Hyperemic                  138           Dorsalis Pedis                                            Digit                              1.10          GISELLA                                            TBI         Findings   Right-   The ankle-brachial index is mildly reduced.    Doppler waveforms of the common femoral artery and popliteal artery are of    high  "amplitude and hyperemic multiphasic.    Doppler waveforms at the ankle are brisk and hyperemic multiphasic.        Left-   The ankle-brachial index is normal.    Doppler waveforms of the common femoral artery and popliteal artery are of    high amplitude and hyperemic multiphasic.    Doppler waveforms at the ankle are brisk and hyperemic multiphasic.     A1c:  Lab Results   Component Value Date/Time    HBA1C 5.1 02/24/2023 05:54 AM            Microbiology:  Results       Procedure Component Value Units Date/Time    URINE CULTURE(NEW) [[id number]] Collected: 03/06/23 1338    Order Status: Completed Specimen: Urine Updated: 03/07/23 1558     Significant Indicator NEG     Source UR     Site -     Culture Result Mixed enteric itzel >100,000 cfu/mL  Greater than 3 organisms isolated, culture of doubtful  significance, please recollect.      Narrative:      Indication for culture:->Patient WITHOUT an indwelling Leggett  catheter in place with new onset of Dysuria, Frequency,  Urgency, and/or Suprapubic pain    BLOOD CULTURE [[id number]] Collected: 03/06/23 1528    Order Status: Completed Specimen: Blood from Peripheral Updated: 03/07/23 0734     Significant Indicator NEG     Source BLD     Site PERIPHERAL     Culture Result No Growth  Note: Blood cultures are incubated for 5 days and  are monitored continuously.Positive blood cultures  are called to the RN and reported as soon as  they are identified.      Narrative:      Per Hospital Policy: Only change Specimen Src: to \"Line\" if  specified by physician order.  Left Forearm/Arm    BLOOD CULTURE [[id number]] Collected: 03/06/23 1941    Order Status: Completed Specimen: Blood from Peripheral Updated: 03/07/23 0734     Significant Indicator NEG     Source BLD     Site PERIPHERAL     Culture Result No Growth  Note: Blood cultures are incubated for 5 days and  are monitored continuously.Positive blood cultures  are called to the RN and reported as soon as  they are identified.   " "   Narrative:      Per Hospital Policy: Only change Specimen Src: to \"Line\" if  specified by physician order.  Right Hand    URINALYSIS,CULTURE IF INDICATED [[id number]]  (Abnormal) Collected: 03/06/23 1420    Order Status: Completed Specimen: Blood from Urine, Cath Updated: 03/06/23 1544     Color Yellow     Character Clear     Specific Gravity >=1.045     Ph 5.0     Glucose Negative mg/dL      Ketones Trace mg/dL      Protein 30 mg/dL      Bilirubin Negative     Urobilinogen, Urine 0.2     Nitrite Negative     Leukocyte Esterase Negative     Occult Blood Negative     Micro Urine Req Microscopic    Narrative:      Indication for culture:->Patient WITHOUT an indwelling Leggett  catheter in place with new onset of Dysuria, Frequency,  Urgency, and/or Suprapubic pain    URINALYSIS,CULTURE IF INDICATED [[id number]]  (Abnormal) Collected: 03/06/23 1338    Order Status: Completed Specimen: Urine, Clean Catch Updated: 03/06/23 1413     Color DK Yellow     Character Cloudy     Specific Gravity 1.029     Ph 5.0     Glucose Negative mg/dL      Ketones Trace mg/dL      Protein 30 mg/dL      Bilirubin Moderate     Urobilinogen, Urine 1.0     Nitrite Negative     Leukocyte Esterase Trace     Occult Blood Negative     Micro Urine Req Microscopic    Narrative:      Indication for culture:->Patient WITHOUT an indwelling Leggett  catheter in place with new onset of Dysuria, Frequency,  Urgency, and/or Suprapubic pain    Blood Culture,Hold [[id number]] Collected: 03/06/23 1238    Order Status: Completed Updated: 03/06/23 1342     Blood Culture Hold Collected             PHYSICAL EXAMINATION:     VITAL SIGNS: BP 96/62 Comment: nurse aware  Pulse 99   Temp 37.1 °C (98.8 °F) (Temporal)   Resp 17   Ht 1.778 m (5' 10\")   Wt 77.1 kg (170 lb)   SpO2 96%   BMI 24.39 kg/m²       General Appearance:  Well developed, well nourished, in no acute distress    Lower Extremity Assessment:    Edema:   Minimal non pitting edema    Structural " /mechanical changes:  Left TMA, right BKA    Sensory Assessment:  Monofilament testing with a 10 gram force: Declines  Visual Inspection: Feet with maceration, ulcers, fissures.  Pedal pulses: Intact to left, right BKA    Sensation intact to left foot  Sensation intact to right BKA      Pulses:  R BKA warm and well-perfused  L foot: palpable DP, palpable PT      Wound Assessment:    Wound(s)   location: Posterior right BKA-frostbite injury  Wound characteristics: Black eschar  Erythema: Mild  Drainage: None   Measures: 6.5 x 7.5 by IVETTE      Wound(s)   location: Left TMA sutures in place-frostbite injury  Wound characteristics: Blackened eschar over incision with large bullae to dorsal/medial foot  Erythema: None  Drainage: None    Wound(s)   location: Left plantar heel and foot-frostbite injury  Wound characteristics: Blackened tissue to heal with bolus stretching from posterior heel to distal end of foot  Erythema: Mild  Drainage: None    Right posterior BKA: Paint with Betadine, allowed to air dry, leave open to air  Left TMA, heel, plantar foot: Paint with Betadine, allowed to air dry, leave open to air      Wound photo:                       ASSESSMENT AND PLAN:   53 y.o. admitted for DVT, lower extremity, proximal, acute, left (HCC) [I82.4Y2]. Presents with frostbite injury to posterior right BKA, left TMA, left heel, left plantar foot.    -Frostbite injury continues to evolve at this time.  -Betadine ordered to be applied to all areas of frostbite injury  -Discussed with patient need to keep areas of frostbite injury warm, dry, and clean        Wound care:   -Wound care orders placed for nursing by LPS   -Right posterior BKA: Paint with Betadine, allowed to air dry, leave open to air  -Left TMA, heel, plantar foot: Paint with Betadine, allowed to air dry, leave open to air    Imaging/Labs:  -COVID-19: not completed this admission.     Vascular status:   R BKA warm and well-perfused  L foot: palpable DP,  palpable PT    Surgery:   -Carly has consulted Ortho Dr. Lux.   -  no plans for surgery at this time    Antibiotics:   -currently on antibiotics managed by hospitalist     Weight Bearing Status:   -Right foot: Weight bearing as tolerated  -Left foot: Transfer weight bearing    Offloading:   -Wheelchair at the bedside    PT/OT:   -Not involved at this time    -   Lab Results   Component Value Date/Time    HBA1C 5.1 02/24/2023 05:54 AM        Smoking Cessation:   -negative effects of smoking to cardiac, pulmonary, vascular, and skin systems not discussed with patient at this time.            Discharge Plan:  -TBD, patient currently homeless      D/W: pt, RN, Dr. Cristobal    Please note that this dictation was created using voice recognition software. I have  worked with technical experts from [employer] to optimize the interface.  I have made every reasonable attempt to correct obvious errors, but there may be errors of grammar and possibly content that I did not discover before finalizing the note.    Please contact [facility] through [employer].

## 2023-03-09 ENCOUNTER — APPOINTMENT (OUTPATIENT)
Dept: RADIOLOGY | Facility: MEDICAL CENTER | Age: 53
DRG: 475 | End: 2023-03-09
Attending: GENERAL PRACTICE
Payer: MEDICAID

## 2023-03-09 PROBLEM — T87.44 INFECTION OF AMPUTATION STUMP OF LEFT LOWER EXTREMITY (HCC): Status: ACTIVE | Noted: 2023-03-09

## 2023-03-09 LAB
ALBUMIN SERPL BCP-MCNC: 2.7 G/DL (ref 3.2–4.9)
ALBUMIN/GLOB SERPL: 0.7 G/DL
ALP SERPL-CCNC: 68 U/L (ref 30–99)
ALT SERPL-CCNC: 11 U/L (ref 2–50)
ANION GAP SERPL CALC-SCNC: 10 MMOL/L (ref 7–16)
APPEARANCE UR: CLEAR
AST SERPL-CCNC: 18 U/L (ref 12–45)
BILIRUB SERPL-MCNC: 0.3 MG/DL (ref 0.1–1.5)
BILIRUB UR QL STRIP.AUTO: NEGATIVE
BUN SERPL-MCNC: 8 MG/DL (ref 8–22)
CALCIUM ALBUM COR SERPL-MCNC: 9.6 MG/DL (ref 8.5–10.5)
CALCIUM SERPL-MCNC: 8.6 MG/DL (ref 8.5–10.5)
CHLORIDE SERPL-SCNC: 106 MMOL/L (ref 96–112)
CO2 SERPL-SCNC: 25 MMOL/L (ref 20–33)
COLOR UR: YELLOW
CREAT SERPL-MCNC: 0.67 MG/DL (ref 0.5–1.4)
ERYTHROCYTE [DISTWIDTH] IN BLOOD BY AUTOMATED COUNT: 43.8 FL (ref 35.9–50)
GFR SERPLBLD CREATININE-BSD FMLA CKD-EPI: 103 ML/MIN/1.73 M 2
GLOBULIN SER CALC-MCNC: 3.8 G/DL (ref 1.9–3.5)
GLUCOSE SERPL-MCNC: 83 MG/DL (ref 65–99)
GLUCOSE UR STRIP.AUTO-MCNC: NEGATIVE MG/DL
HCT VFR BLD AUTO: 30.8 % (ref 37–47)
HGB BLD-MCNC: 9.8 G/DL (ref 12–16)
KETONES UR STRIP.AUTO-MCNC: NEGATIVE MG/DL
LEUKOCYTE ESTERASE UR QL STRIP.AUTO: NEGATIVE
MCH RBC QN AUTO: 27.1 PG (ref 27–33)
MCHC RBC AUTO-ENTMCNC: 31.8 G/DL (ref 33.6–35)
MCV RBC AUTO: 85.1 FL (ref 81.4–97.8)
MICRO URNS: NORMAL
NITRITE UR QL STRIP.AUTO: NEGATIVE
PH UR STRIP.AUTO: 6.5 [PH] (ref 5–8)
PLATELET # BLD AUTO: 299 K/UL (ref 164–446)
PMV BLD AUTO: 9.5 FL (ref 9–12.9)
POTASSIUM SERPL-SCNC: 4 MMOL/L (ref 3.6–5.5)
PROT SERPL-MCNC: 6.5 G/DL (ref 6–8.2)
PROT UR QL STRIP: NEGATIVE MG/DL
RBC # BLD AUTO: 3.62 M/UL (ref 4.2–5.4)
RBC UR QL AUTO: NEGATIVE
SODIUM SERPL-SCNC: 141 MMOL/L (ref 135–145)
SP GR UR STRIP.AUTO: 1.01
UROBILINOGEN UR STRIP.AUTO-MCNC: 0.2 MG/DL
WBC # BLD AUTO: 8.5 K/UL (ref 4.8–10.8)

## 2023-03-09 PROCEDURE — 99233 SBSQ HOSP IP/OBS HIGH 50: CPT | Performed by: GENERAL PRACTICE

## 2023-03-09 PROCEDURE — 87086 URINE CULTURE/COLONY COUNT: CPT

## 2023-03-09 PROCEDURE — 700102 HCHG RX REV CODE 250 W/ 637 OVERRIDE(OP): Performed by: INTERNAL MEDICINE

## 2023-03-09 PROCEDURE — 700102 HCHG RX REV CODE 250 W/ 637 OVERRIDE(OP): Performed by: GENERAL PRACTICE

## 2023-03-09 PROCEDURE — 73701 CT LOWER EXTREMITY W/DYE: CPT | Mod: LT

## 2023-03-09 PROCEDURE — 700111 HCHG RX REV CODE 636 W/ 250 OVERRIDE (IP): Performed by: GENERAL PRACTICE

## 2023-03-09 PROCEDURE — 36415 COLL VENOUS BLD VENIPUNCTURE: CPT

## 2023-03-09 PROCEDURE — A9270 NON-COVERED ITEM OR SERVICE: HCPCS | Performed by: GENERAL PRACTICE

## 2023-03-09 PROCEDURE — A9270 NON-COVERED ITEM OR SERVICE: HCPCS | Performed by: INTERNAL MEDICINE

## 2023-03-09 PROCEDURE — 87186 SC STD MICRODIL/AGAR DIL: CPT

## 2023-03-09 PROCEDURE — 700105 HCHG RX REV CODE 258: Performed by: GENERAL PRACTICE

## 2023-03-09 PROCEDURE — 80053 COMPREHEN METABOLIC PANEL: CPT

## 2023-03-09 PROCEDURE — 700117 HCHG RX CONTRAST REV CODE 255: Performed by: GENERAL PRACTICE

## 2023-03-09 PROCEDURE — 87077 CULTURE AEROBIC IDENTIFY: CPT

## 2023-03-09 PROCEDURE — 85027 COMPLETE CBC AUTOMATED: CPT

## 2023-03-09 PROCEDURE — 770006 HCHG ROOM/CARE - MED/SURG/GYN SEMI*

## 2023-03-09 PROCEDURE — 81003 URINALYSIS AUTO W/O SCOPE: CPT

## 2023-03-09 RX ORDER — LINEZOLID 600 MG/1
600 TABLET, FILM COATED ORAL EVERY 12 HOURS
Status: DISCONTINUED | OUTPATIENT
Start: 2023-03-09 | End: 2023-03-10

## 2023-03-09 RX ADMIN — BICTEGRAVIR SODIUM, EMTRICITABINE, AND TENOFOVIR ALAFENAMIDE FUMARATE 1 TABLET: 50; 200; 25 TABLET ORAL at 09:07

## 2023-03-09 RX ADMIN — NICOTINE 14 MG: 14 PATCH, EXTENDED RELEASE TRANSDERMAL at 09:02

## 2023-03-09 RX ADMIN — DIVALPROEX SODIUM 500 MG: 500 TABLET, DELAYED RELEASE ORAL at 20:41

## 2023-03-09 RX ADMIN — DOCUSATE SODIUM 50 MG AND SENNOSIDES 8.6 MG 2 TABLET: 8.6; 5 TABLET, FILM COATED ORAL at 20:41

## 2023-03-09 RX ADMIN — APIXABAN 10 MG: 5 TABLET, FILM COATED ORAL at 20:41

## 2023-03-09 RX ADMIN — APIXABAN 10 MG: 5 TABLET, FILM COATED ORAL at 09:02

## 2023-03-09 RX ADMIN — PIPERACILLIN AND TAZOBACTAM 3.38 G: 3; .375 INJECTION, POWDER, LYOPHILIZED, FOR SOLUTION INTRAVENOUS; PARENTERAL at 05:07

## 2023-03-09 RX ADMIN — LINEZOLID 600 MG: 600 TABLET, FILM COATED ORAL at 17:39

## 2023-03-09 RX ADMIN — OLANZAPINE 5 MG: 5 TABLET, FILM COATED ORAL at 09:01

## 2023-03-09 RX ADMIN — IOHEXOL 100 ML: 350 INJECTION, SOLUTION INTRAVENOUS at 17:40

## 2023-03-09 RX ADMIN — OLANZAPINE 5 MG: 5 TABLET, FILM COATED ORAL at 20:41

## 2023-03-09 RX ADMIN — VANCOMYCIN HYDROCHLORIDE 1250 MG: 500 INJECTION, POWDER, LYOPHILIZED, FOR SOLUTION INTRAVENOUS at 05:06

## 2023-03-09 RX ADMIN — DIVALPROEX SODIUM 500 MG: 500 TABLET, DELAYED RELEASE ORAL at 09:02

## 2023-03-09 RX ADMIN — DOCUSATE SODIUM 50 MG AND SENNOSIDES 8.6 MG 2 TABLET: 8.6; 5 TABLET, FILM COATED ORAL at 09:02

## 2023-03-09 ASSESSMENT — PAIN DESCRIPTION - PAIN TYPE: TYPE: ACUTE PAIN;CHRONIC PAIN

## 2023-03-09 ASSESSMENT — ENCOUNTER SYMPTOMS: WEAKNESS: 1

## 2023-03-09 NOTE — ASSESSMENT & PLAN NOTE
Patient noted to spike a 101.6 fever on 3/8, negative chest x-ray, UA negative, COVID, influenza, RSV negative, chest x-ray negative for any infiltrate or pulmonary effusion.  Lactic acid and procalcitonin negative. Blood cultures with no growth to date.  Concern for possible underlying infection and patient's prior frostbitten BLE. MRSA swab positive.

## 2023-03-09 NOTE — PROGRESS NOTES
[facility] Medicine Daily Progress Note    Date of Service  3/9/2023    Chief Complaint  Kellie Chatman is a 53 y.o. female admitted 3/6/2023 with AMS    Hospital Course  This is a 53-year-old female with past medical history of s/p right BKA and left transmetatarsal amputation, hypertension, HIV, seizure disorder, schizophrenia, methamphetamine use, tobacco use and homelessness who was found laying in the middle of the street and brought in by EMS.    Patient had recent admission at outside facility 2/2023 where she was noted to have recurrent frostbite on her right BKA and left transmetatarsal amputation stump, recommended to wait for further demarcation prior to any surgical intervention.  She was to follow-up with orthopedic surgery outpatient.  During that admission she was diagnosed with a left superficial femoral vein DVT discharged with Xarelto.    On this admission, patient had CT imaging which noted right internal iliac vein DVT, patient started on Eliquis.      Concern for possible pyelonephritis on CT imaging.  Patient started on Rocephin. UC pending.    Patient noted to spike a 101.6 fever on 3/8, negative chest x-ray, UA negative, COVID, influenza, RSV negative, chest x-ray negative for any infiltrate or pulmonary effusion.  Lactic acid and procalcitonin negative. Blood cultures with no growth to date.  Concern for possible underlying infection and patient's prior frostbitten BLE. MRSA swab positive. CT imaging ordered.    Interval Problem Update  Patient noted to spike a 101.6 fever on 3/8, negative chest x-ray, UA negative, COVID, influenza, RSV negative, chest x-ray negative for any infiltrate or pulmonary effusion.  Lactic acid and procalcitonin negative. Blood cultures with no growth to date.  Concern for possible underlying infection and patient's prior frostbitten BLE. MRSA swab positive.  Discontinued vancomycin and Zosyn, switched to Zyvox.  Monitor renal function.    Discussed with LPS again  today given concerns for ongoing infection, CT imaging ordered, possible intervention with orthopedic surgery pending results    Abdomen soft, noted to have a large bowel movement last night.  Continue with gentle bowel regimen.    Patient tolerating resumption of Depakote and Zyprexa, depakote trough level ordered for 3/12.    Patient is wheelchair-bound at baseline, electric wheelchair at bedside.  Plan is to discharge back to our place once medically cleared.    I have discussed this patient's plan of care and discharge plan at IDT rounds today with Case Management, Nursing, Nursing leadership, and other members of the IDT team.    Consultants/Specialty  psychiatry    Code Status  Full Code    Disposition  Patient is not medically cleared for discharge.   Anticipate discharge to shelter ([facility]).  I have placed the appropriate orders for post-discharge needs.    Review of Systems  Review of Systems   Constitutional:  Positive for malaise/fatigue.   Neurological:  Positive for weakness.   All other systems reviewed and are negative.     Physical Exam  Temp:  [36.3 °C (97.4 °F)-38.7 °C (101.6 °F)] 36.3 °C (97.4 °F)  Pulse:  [65-97] 65  Resp:  [15-20] 17  BP: ()/(48-67) 110/67  SpO2:  [94 %-100 %] 100 %    Physical Exam  Vitals and nursing note reviewed.   Constitutional:       General: She is not in acute distress.     Appearance: She is ill-appearing.   HENT:      Head: Normocephalic and atraumatic.      Mouth/Throat:      Mouth: Mucous membranes are moist.      Pharynx: No oropharyngeal exudate.   Eyes:      Extraocular Movements: Extraocular movements intact.      Pupils: Pupils are equal, round, and reactive to light.   Cardiovascular:      Rate and Rhythm: Normal rate and regular rhythm.      Pulses: Normal pulses.      Heart sounds: No murmur heard.    No friction rub. No gallop.   Pulmonary:      Effort: Pulmonary effort is normal. No respiratory distress.      Breath sounds: No wheezing, rhonchi or  rales.   Abdominal:      General: Bowel sounds are normal. There is no distension.      Palpations: Abdomen is soft. There is no mass.      Tenderness: There is no abdominal tenderness.   Musculoskeletal:         General: No swelling or tenderness. Normal range of motion.      Cervical back: Normal range of motion. No rigidity. No muscular tenderness.      Right lower leg: No edema.      Left lower leg: No edema.      Comments: Right lower extremity BKA, left lower extremity with transmetatarsal amputation, bullae noted, no pus   Skin:     General: Skin is warm and dry.      Capillary Refill: Capillary refill takes less than 2 seconds.      Findings: No erythema or rash.   Neurological:      General: No focal deficit present.      Mental Status: She is alert. She is disoriented.      Motor: No weakness.      Gait: Gait normal.   Psychiatric:         Mood and Affect: Affect is flat.         Speech: Speech is delayed.         Behavior: Behavior is withdrawn.       Fluids    Intake/Output Summary (Last 24 hours) at 3/9/2023 1412  Last data filed at 3/9/2023 0853  Gross per 24 hour   Intake --   Output 600 ml   Net -600 ml       Laboratory  Recent Labs     03/07/23  0315 03/08/23  0521 03/09/23  0623   WBC 5.9 6.4 8.5   RBC 3.42* 3.85* 3.62*   HEMOGLOBIN 9.2* 10.4* 9.8*   HEMATOCRIT 29.0* 32.8* 30.8*   MCV 84.8 85.2 85.1   MCH 26.9* 27.0 27.1   MCHC 31.7* 31.7* 31.8*   RDW 43.8 44.6 43.8   PLATELETCT 302 310 299   MPV 9.3 9.8 9.5     Recent Labs     03/07/23  0315 03/08/23  0521 03/09/23  0623   SODIUM 140 142 141   POTASSIUM 3.4* 4.4 4.0   CHLORIDE 106 108 106   CO2 23 24 25   GLUCOSE 98 85 83   BUN 19 10 8   CREATININE 0.78 0.60 0.67   CALCIUM 8.5 8.6 8.6     Recent Labs     03/06/23  1450   APTT 28.5   INR 1.29*               Imaging  DX-CHEST-PORTABLE (1 VIEW)   Final Result      No acute cardiac or pulmonary abnormalities are identified.      DX-FEMUR-2+ RIGHT   Final Result      No radiographic evidence of acute  traumatic injury.      CT-ABDOMEN-PELVIS WITH   Final Result      1.  Right internal iliac vein DVT.   2.  Somewhat striated nephrogram of the bilateral superior poles may be artifactual. Correlate with urinalysis for pyelonephritis.   3.  Redemonstration of enhancing lesion of the uterus. This has been evaluated on prior ultrasound.   4.  Prominent bilateral inguinal lymph nodes may be reactive. Malignancy not excluded.      Dr. Emanuel discussed these findings with Dr. Child at 1:49 PM by telephone on 3/6/2023      DX-FOOT-2- LEFT   Final Result      Status post transmetatarsal amputation. No definitive radiographic evidence of ostomy myelitis. If there is strong clinical suspicion, MRI of the foot with and without contrast should be obtained for further evaluation.      DX-CHEST-PORTABLE (1 VIEW)   Final Result      No acute cardiopulmonary disease evident.      CT-FOOT WITH PLUS RECONS LEFT    (Results Pending)        Assessment/Plan  * Infection of amputation stump of left lower extremity (HCC)  Assessment & Plan  Concern for   Initially discussed the case with orthopedic surgery, who did not believe at this time required intervention  However patient with persistent fever, all other work-up negative  Concern for possible underlying infection and patient's prior frostbitten BLE stumps, bullae noted   Blood cultures with no growth to date.   MRSA swab positive.   Discontinued vancomycin and Zosyn, switched to Zyvox.  Monitor renal function.     Discussed with LPS again today given concerns for ongoing infection, CT imaging ordered, possible intervention with orthopedic surgery pending results      Fever in adult  Assessment & Plan  Patient noted to spike a 101.6 fever on 3/8, negative chest x-ray, UA negative, COVID, influenza, RSV negative, chest x-ray negative for any infiltrate or pulmonary effusion.  Lactic acid and procalcitonin negative. Blood cultures with no growth to date.  Concern for possible underlying  infection and patient's prior frostbitten BLE. MRSA swab positive.     Pyelonephritis  Assessment & Plan  CT scan shows signs of ??pyelonephritis  UA with no nitrites or leuk esterases  Patient has abdominal pain  Initially on Rocephin, escalated to Zosyn and vancomycin at this time  Repeat UA negative for any infection  Blood culture no growth to date  Urine culture pending    Schizophrenia ([facility])- (present on admission)  Assessment & Plan  Patient has hallucination with hearing voices  Denied any SI or HI, however with active auditory hallucinations  Resume home medication Depakote, formally on Zyprexa  Psych consulted, continue with Depakote, trough level ordered for 3/12, Zyprexa 5 mg twice daily initiated    Slow transit constipation  Assessment & Plan  Patient noted to have abdominal distention, prior bowel movement several days prior  Started on aggressive bowel regimen, given one-time dose of Dulcolax  Still with no bowel movement, oral lactulose given  Large BM 3/8 PM  Serial abdominal exams  RESOLVED    Noncompliance  Assessment & Plan  Significant history with noncompliance with medications and doctor visits    Homelessness  Assessment & Plan  Complicated medical history and noncompliance and drug abuse   was consulted to consider group home      Lactic acid acidosis  Assessment & Plan  Likely related to dehydration and infection  IV fluid and ceftriaxone  Resolved    Methamphetamine abuse ([facility])  Assessment & Plan  Encouraged the patient to quit  Urine drug screen is positive  Blood culture no growth to date    Acute deep vein thrombosis (DVT) ([facility])- (present on admission)  Assessment & Plan  Patient had recent admission at outside facility 2/2023,  she was diagnosed with a left superficial femoral vein DVT discharged with Xarelto.  Patient had CT imaging which noted right internal iliac vein DVT.    Initially on heparin drip, transitioned to Eliquis      HIV (human immunodeficiency virus  infection) (HCC)- (present on admission)  Assessment & Plan  Patient is on Biktarvy however, clearly questioning about compliance  CD4-761 and CD8-194  Resume Biktarvy with close monitoring    Hypokalemia- (present on admission)  Assessment & Plan  Labs reviewed, resolved    Tobacco abuse- (present on admission)  Assessment & Plan  Cessation counseled, 3 minute  Nicotine replacement    Hx of BKA, right (HCC)  Assessment & Plan  Right BKA and left transmetatarsal amputation  LPS following, local wound care          VTE prophylaxis: therapeutic anticoagulation with Eliquis    I have performed a physical exam and reviewed and updated ROS and Plan today (3/9/2023). In review of yesterday's note (3/8/2023), there are no changes except as documented above.

## 2023-03-09 NOTE — ASSESSMENT & PLAN NOTE
CT imaging LLE, negative for osteomyelitis or abscess formation.   S/p course of abx     status post left below-knee amputation on 3/22/2023.    Pathology noted  With Gangrene of bone and soft tissue with underlying chronic active  osteomyelitis ,Resection margins viable.  Outpatient would care clinic and Dr. Lux follow up

## 2023-03-09 NOTE — PROGRESS NOTES
Hospital Medicine Daily Progress Note    Date of Service  3/8/2023    Chief Complaint  Kellie Chatman is a 53 y.o. female admitted 3/6/2023 with AMS    Hospital Course  This is a 53-year-old female with past medical history of s/p right BKA and left transmetatarsal amputation, hypertension, HIV, seizure disorder, schizophrenia, methamphetamine use, tobacco use and homelessness who was found laying in the middle of the street and brought in by EMS.    Patient had recent admission at outside facility 2/2023 where she was noted to have recurrent frostbite on her right BKA and left transmetatarsal amputation stump, recommended to wait for further demarcation prior to any surgical intervention.  She was to follow-up with orthopedic surgery outpatient.  During that admission she was diagnosed with a left superficial femoral vein DVT discharged with Xarelto.    On this admission, patient had CT imaging which noted right internal iliac vein DVT.  Concern for possible pyelonephritis.  Patient started on Rocephin.  Urine culture and blood culture pending.    Interval Problem Update  Patient noted to be febrile on 3/8/2023 4 PM, 102.5F     Unclear source at this time  Reordered chest x-ray, UA and urine culture  Patient initially hypoxic on admission, COVID/influenza, RSV swab pending  Repeat blood cultures ordered, MRSA swab pending, Pro-Calvin and lactate pending  Bilateral lower extremity frostbite wounds do not look infected  At this time will cover with empiric antibiotics Zosyn and vancomycin    Discussed the case with orthopedic surgery today, Dr. Lux, no recommendation for OR intervention, recommended LPS/wound consult.     Patient tolerating Eliquis, no bleeding, monitor for any signs of bleeding.    Abdomen continues with distention, no bowel movement over the past several days.  Patient started on stool softener, given dose of Dulcolax, added lactulose today, if no bowel movement, milk of molasses enema to be  administered.      Labs reviewed, electrolyte abnormalities resolved.    Psych consulted, resumption of Depakote and Zyprexa, trough level ordered for 3/12.    Patient is wheelchair-bound at baseline, electric wheelchair at bedside.  Plan is to discharge back to our place once medically clear.    I have discussed this patient's plan of care and discharge plan at IDT rounds today with Case Management, Nursing, Nursing leadership, and other members of the IDT team.    Consultants/Specialty  psychiatry    Code Status  Full Code    Disposition  Patient is not medically cleared for discharge.   Anticipate discharge to shelter ([facility]).  I have placed the appropriate orders for post-discharge needs.    Review of Systems  Review of Systems   Constitutional:  Positive for malaise/fatigue.   Neurological:  Positive for weakness.   All other systems reviewed and are negative.     Physical Exam  Temp:  [36.6 °C (97.9 °F)-37.4 °C (99.3 °F)] 37.1 °C (98.8 °F)  Pulse:  [] 99  Resp:  [17-18] 17  BP: (94-99)/(55-62) 96/62  SpO2:  [96 %-97 %] 96 %    Physical Exam  Vitals and nursing note reviewed.   Constitutional:       General: She is not in acute distress.     Appearance: She is ill-appearing.   HENT:      Head: Normocephalic and atraumatic.      Mouth/Throat:      Mouth: Mucous membranes are moist.      Pharynx: No oropharyngeal exudate.   Eyes:      Extraocular Movements: Extraocular movements intact.      Pupils: Pupils are equal, round, and reactive to light.   Cardiovascular:      Rate and Rhythm: Normal rate and regular rhythm.      Pulses: Normal pulses.      Heart sounds: No murmur heard.    No friction rub. No gallop.   Pulmonary:      Effort: Pulmonary effort is normal. No respiratory distress.      Breath sounds: No wheezing, rhonchi or rales.   Abdominal:      General: Bowel sounds are normal. There is distension.      Palpations: Abdomen is soft. There is no mass.      Tenderness: There is no abdominal  tenderness.   Musculoskeletal:         General: No swelling or tenderness. Normal range of motion.      Cervical back: Normal range of motion. No rigidity. No muscular tenderness.      Right lower leg: No edema.      Left lower leg: No edema.      Comments: Right lower extremity BKA, left lower extremity with transmetatarsal amputation, no concern for cellulitis or active infection   Skin:     General: Skin is warm and dry.      Capillary Refill: Capillary refill takes less than 2 seconds.      Findings: No erythema or rash.   Neurological:      General: No focal deficit present.      Mental Status: She is alert. She is disoriented.      Motor: No weakness.      Gait: Gait normal.   Psychiatric:         Attention and Perception: She perceives auditory hallucinations.         Mood and Affect: Affect is flat.         Speech: Speech is delayed.         Behavior: Behavior is withdrawn.       Fluids    Intake/Output Summary (Last 24 hours) at 3/8/2023 1701  Last data filed at 3/8/2023 1355  Gross per 24 hour   Intake 580 ml   Output 0 ml   Net 580 ml       Laboratory  Recent Labs     03/06/23  1239 03/07/23  0315 03/08/23  0521   WBC 6.7 5.9 6.4   RBC 4.56 3.42* 3.85*   HEMOGLOBIN 12.4 9.2* 10.4*   HEMATOCRIT 39.1 29.0* 32.8*   MCV 85.7 84.8 85.2   MCH 27.2 26.9* 27.0   MCHC 31.7* 31.7* 31.7*   RDW 43.8 43.8 44.6   PLATELETCT 466* 302 310   MPV 9.3 9.3 9.8     Recent Labs     03/06/23  1239 03/07/23  0315 03/08/23  0521   SODIUM 137 140 142   POTASSIUM 4.0 3.4* 4.4   CHLORIDE 101 106 108   CO2 22 23 24   GLUCOSE 98 98 85   BUN 30* 19 10   CREATININE 0.90 0.78 0.60   CALCIUM 9.7 8.5 8.6     Recent Labs     03/06/23  1450   APTT 28.5   INR 1.29*               Imaging  DX-FEMUR-2+ RIGHT   Final Result      No radiographic evidence of acute traumatic injury.      CT-ABDOMEN-PELVIS WITH   Final Result      1.  Right internal iliac vein DVT.   2.  Somewhat striated nephrogram of the bilateral superior poles may be artifactual.  Correlate with urinalysis for pyelonephritis.   3.  Redemonstration of enhancing lesion of the uterus. This has been evaluated on prior ultrasound.   4.  Prominent bilateral inguinal lymph nodes may be reactive. Malignancy not excluded.      Dr. Emanuel discussed these findings with Dr. Child at 1:49 PM by telephone on 3/6/2023      DX-FOOT-2- LEFT   Final Result      Status post transmetatarsal amputation. No definitive radiographic evidence of ostomy myelitis. If there is strong clinical suspicion, MRI of the foot with and without contrast should be obtained for further evaluation.      DX-CHEST-PORTABLE (1 VIEW)   Final Result      No acute cardiopulmonary disease evident.      DX-CHEST-PORTABLE (1 VIEW)    (Results Pending)        Assessment/Plan  * Acute deep vein thrombosis (DVT) (HCC)- (present on admission)  Assessment & Plan  Patient had recent admission at outside facility 2/2023,  she was diagnosed with a left superficial femoral vein DVT discharged with Xarelto.  Patient had CT imaging which noted right internal iliac vein DVT.    Initially on heparin drip, transitioned to Eliquis      Fever in adult  Assessment & Plan  Vital stable  Febrile at 102.5 on 3/8/2023  Unclear source at this time  Reordere chest x-ray, UA and urine culture  Patient initially hypoxic on admission, COVID/influenza, RSV swab pending  Repeat blood cultures ordered, MRSA swab pending Pro-Calvin and lactate pending  Bilateral lower extremity frostbite wounds do not look overtly infected  At this time will cover with empiric antibiotics Zosyn and vancomycin    Hx of BKA, right (HCC)  Assessment & Plan  Right BKA and left transmetatarsal amputation  Discussed the case with orthopedic surgery, no need for debridement in the OR  LPS following, local wound care     Pyelonephritis  Assessment & Plan  CT scan shows signs of ??pyelonephritis  UA with no nitrites or leuk esterases  Patient has abdominal pain  Initially on Rocephin, escalated to  Zosyn and vancomycin at this time  Urine culture pending  Blood culture no growth to date    Schizophrenia (HCC)- (present on admission)  Assessment & Plan  Patient has hallucination with hearing voices  Denied any SI or HI, however with active auditory hallucinations  Resume home medication Depakote, formally on Zyprexa  Psych consulted, continue with Depakote, trough level ordered for 3/12, Zyprexa 5 mg twice daily initiated    Slow transit constipation  Assessment & Plan  Patient noted to have abdominal distention, prior bowel movement several days prior  Started on aggressive bowel regimen, given one-time dose of Dulcolax  Still with no bowel movement, oral lactulose ordered, if no bowel movement will proceed with milk molasses enema  Serial abdominal exams    Noncompliance  Assessment & Plan  Significant history with noncompliance with medications and doctor visits    Homelessness  Assessment & Plan  Complicated medical history and noncompliance and drug abuse   was consulted to consider group home      Lactic acid acidosis  Assessment & Plan  Likely related to dehydration and infection  IV fluid and ceftriaxone  Resolved    Methamphetamine abuse ([facility])  Assessment & Plan  Encouraged the patient to quit  Urine drug screen is positive  Blood culture no growth to date    HIV (human immunodeficiency virus infection) (HCC)- (present on admission)  Assessment & Plan  Patient is on Biktarvy however, clearly questioning about compliance  CD4-761 and CD8-194  Resume Biktarvy with close monitoring    Hypokalemia- (present on admission)  Assessment & Plan  Labs reviewed, resolved    Tobacco abuse- (present on admission)  Assessment & Plan  Cessation counseled, 3 minute  Nicotine replacement         VTE prophylaxis: therapeutic anticoagulation with Eliquis    I have performed a physical exam and reviewed and updated ROS and Plan today (3/8/2023). In review of yesterday's note (3/7/2023), there are no changes  except as documented above.

## 2023-03-09 NOTE — CARE PLAN
The patient is Stable - Low risk of patient condition declining or worsening    Shift Goals  Clinical Goals: monitor bowel routing  Patient Goals: rest    Progress made toward(s) clinical / shift goals:        Problem: Pain - Standard  Goal: Alleviation of pain or a reduction in pain to the patient’s comfort goal  Outcome: Progressing     Problem: Skin Integrity  Goal: Skin integrity is maintained or improved  Outcome: Progressing     Problem: Fall Risk  Goal: Patient will remain free from falls  Outcome: Progressing       Patient is not progressing towards the following goals:      Problem: Knowledge Deficit - Standard  Goal: Patient and family/care givers will demonstrate understanding of plan of care, disease process/condition, diagnostic tests and medications  Outcome: Not Progressing

## 2023-03-09 NOTE — PROGRESS NOTES
Pharmacy Vancomycin Kinetics Note for 3/8/2023     53 y.o. female on Vancomycin day # 1     Vancomycin Indication (AUC Dosing): Skin/skin structure infection  Provider specified end date: 03/15/23    Active Antibiotics (From admission, onward)      Ordered     Ordering Provider       Wed Mar 8, 2023  6:56 PM    03/08/23 1856  vancomycin (VANCOCIN) 1,250 mg in  mL IVPB  (vancomycin (VANCOCIN) IV (LD + Maintenance))  EVERY 12 HOURS         Mary Cristobal D.O.       Wed Mar 8, 2023  5:51 PM    03/08/23 1751  vancomycin (VANCOCIN) 2,000 mg in  mL IVPB  (vancomycin (VANCOCIN) IV (LD + Maintenance))  ONCE         Mary Cristobal D.O.       Wed Mar 8, 2023  4:41 PM    03/08/23 1641  MD Alert...Vancomycin per Pharmacy  PHARMACY TO DOSE        Question:  Indication(s) for vancomycin?  Answer:  Skin and soft tissue infection    Mary Cristobal D.O.    03/08/23 1641  piperacillin-tazobactam (Zosyn) 3.375 g in  mL IVPB  (piperacillin-tazobactam (ZOSYN) IV (Extended-infusion) PANEL )  ONCE        See Hyperspace for full Linked Orders Report.    Mary Cristobal D.O.    03/08/23 1641  piperacillin-tazobactam (Zosyn) 3.375 g in  mL IVPB  (piperacillin-tazobactam (ZOSYN) IV (Extended-infusion) PANEL )  EVERY 8 HOURS        See Hyperspace for full Linked Orders Report.    Mary Cristobal D.O.       Mon Mar 6, 2023  3:04 PM    03/06/23 1504  bictegravir-emtricitab-TAF (Biktarvy) -25 mg tablet 1 Tablet  DAILY         Noreen Friend M.D.            Dosing Weight: 77.1 kg (169 lb 15.6 oz)      Admission History: Admitted on 3/6/2023 for DVT, lower extremity, proximal, acute, left (HCC) [I82.4Y2]  Pertinent history: 54 y/o F presenting for SSTI and DVT.    Allergies:     Patient has no known allergies.     Pertinent cultures to date:     Results       Procedure Component Value Units Date/Time    BLOOD CULTURE [[id number]] Collected: 03/08/23 1748    Order Status: Sent Specimen: Blood from Peripheral  "Updated: 03/08/23 1839    Narrative:      Per Hospital Policy: Only change Specimen Src: to \"Line\" if  specified by physician order.    BLOOD CULTURE [[id number]] Collected: 03/08/23 1748    Order Status: Sent Specimen: Blood from Peripheral Updated: 03/08/23 1838    Narrative:      Per Hospital Policy: Only change Specimen Src: to \"Line\" if  specified by physician order.    COV-2, FLU A/B, AND RSV BY PCR (2-4 HOURS CEPmyParcelDeliveryID): Collect NP swab in VTM [[id number]] Collected: 03/08/23 1716    Order Status: Completed Specimen: Respirate from Nasopharyngeal Updated: 03/08/23 1835     Influenza virus A RNA Negative     Influenza virus B, PCR Negative     RSV, PCR Negative     SARS-CoV-2 by PCR NotDetected     Comment: [facility] providers: PLEASE REFER TO DE-ESCALATION AND RETESTING PROTOCOL  on inside[email or web address]    **The [employer] GeneXpert Xpress SARS-CoV-2 RT-PCR Test has been made  available for use under the Emergency Use Authorization (EUA) only.          SARS-CoV-2 Source NP Swab    Narrative:      Collected By: [id number] DC GONZALEZ    MRSA By PCR (Amp) [[id number]] Collected: 03/08/23 1716    Order Status: Sent Specimen: Respirate from Nasopharyngeal Updated: 03/08/23 1742    Narrative:      Collected By: [id number] DC GONZALEZ    URINALYSIS [[id number]]     Order Status: No result Specimen: Urine, Clean Catch     URINE CULTURE(NEW) [[id number]]     Order Status: No result Specimen: Urine, Clean Catch     URINE CULTURE(NEW) [[id number]] Collected: 03/06/23 1338    Order Status: Completed Specimen: Urine Updated: 03/07/23 1558     Significant Indicator NEG     Source UR     Site -     Culture Result Mixed enteric itzel >100,000 cfu/mL  Greater than 3 organisms isolated, culture of doubtful  significance, please recollect.      Narrative:      Indication for culture:->Patient WITHOUT an indwelling Leggett  catheter in place with new onset of Dysuria, Frequency,  Urgency, and/or Suprapubic pain    BLOOD CULTURE [[id number]] " "Collected: 03/06/23 1528    Order Status: Completed Specimen: Blood from Peripheral Updated: 03/07/23 0734     Significant Indicator NEG     Source BLD     Site PERIPHERAL     Culture Result No Growth  Note: Blood cultures are incubated for 5 days and  are monitored continuously.Positive blood cultures  are called to the RN and reported as soon as  they are identified.      Narrative:      Per Hospital Policy: Only change Specimen Src: to \"Line\" if  specified by physician order.  Left Forearm/Arm    BLOOD CULTURE [[id number]] Collected: 03/06/23 1941    Order Status: Completed Specimen: Blood from Peripheral Updated: 03/07/23 0734     Significant Indicator NEG     Source BLD     Site PERIPHERAL     Culture Result No Growth  Note: Blood cultures are incubated for 5 days and  are monitored continuously.Positive blood cultures  are called to the RN and reported as soon as  they are identified.      Narrative:      Per Hospital Policy: Only change Specimen Src: to \"Line\" if  specified by physician order.  Right Hand    URINALYSIS,CULTURE IF INDICATED [[id number]]  (Abnormal) Collected: 03/06/23 1420    Order Status: Completed Specimen: Blood from Urine, Cath Updated: 03/06/23 1544     Color Yellow     Character Clear     Specific Gravity >=1.045     Ph 5.0     Glucose Negative mg/dL      Ketones Trace mg/dL      Protein 30 mg/dL      Bilirubin Negative     Urobilinogen, Urine 0.2     Nitrite Negative     Leukocyte Esterase Negative     Occult Blood Negative     Micro Urine Req Microscopic    Narrative:      Indication for culture:->Patient WITHOUT an indwelling Leggett  catheter in place with new onset of Dysuria, Frequency,  Urgency, and/or Suprapubic pain    URINALYSIS,CULTURE IF INDICATED [[id number]]  (Abnormal) Collected: 03/06/23 1338    Order Status: Completed Specimen: Urine, Clean Catch Updated: 03/06/23 1413     Color DK Yellow     Character Cloudy     Specific Gravity 1.029     Ph 5.0     Glucose Negative mg/dL  " "    Ketones Trace mg/dL      Protein 30 mg/dL      Bilirubin Moderate     Urobilinogen, Urine 1.0     Nitrite Negative     Leukocyte Esterase Trace     Occult Blood Negative     Micro Urine Req Microscopic    Narrative:      Indication for culture:->Patient WITHOUT an indwelling Leggett  catheter in place with new onset of Dysuria, Frequency,  Urgency, and/or Suprapubic pain    Blood Culture,Hold [[id number]] Collected: 23 1238    Order Status: Completed Updated: 23 1342     Blood Culture Hold Collected            Labs:     Estimated Creatinine Clearance: 114.6 mL/min (by C-G formula based on SCr of 0.6 mg/dL).  Recent Labs     23  1239 23  0315 23  0521   WBC 6.7 5.9 6.4   NEUTSPOLYS 60.20 68.00  --      Recent Labs     23  1239 23  0521   BUN 30* 19 10   CREATININE 0.90 0.78 0.60   ALBUMIN 3.9  --   --        Intake/Output Summary (Last 24 hours) at 3/8/2023 1856  Last data filed at 3/8/2023 1355  Gross per 24 hour   Intake 580 ml   Output 0 ml   Net 580 ml      /62   Pulse 97   Temp 37.7 °C (99.9 °F)   Resp 20   Ht 1.778 m (5' 10\")   Wt 77.1 kg (170 lb)   SpO2 100%  Temp (24hrs), Av.5 °C (99.5 °F), Min:36.6 °C (97.9 °F), Max:38.7 °C (101.6 °F)      List concerns for Vancomycin clearance:     Nephrotoxic drugs    Pharmacokinetics:   AUC kinetics:   Ke (hr ^-1): 0.0995 hr^-1  Half life: 6.97 hr  Clearance: 4.986  Estimated TDD: 2493  Estimated Dose: 935  Estimated interval: 9    A/P:     -  Vancomycin dose: 1250 mg q12h maintenance     -  Next vancomycin level(s): if +MRSA or continued beyond 48H, levels due 3/11    -  Predicted vancomycin AUC from initial AUC test calculator: 501 mg·hr/L    Татьяна Hutchinson, PharmD  [id number]    "

## 2023-03-09 NOTE — PROGRESS NOTES
Nursing communication from MD to give milk of molasses enema if no stool this evening. None available through pharmacy, new order received by on call.  Large soft BM noted; abdomin soft in some areas but firmness still present

## 2023-03-10 LAB
ANION GAP SERPL CALC-SCNC: 9 MMOL/L (ref 7–16)
BUN SERPL-MCNC: 9 MG/DL (ref 8–22)
CALCIUM SERPL-MCNC: 8.3 MG/DL (ref 8.5–10.5)
CHLORIDE SERPL-SCNC: 106 MMOL/L (ref 96–112)
CO2 SERPL-SCNC: 22 MMOL/L (ref 20–33)
CREAT SERPL-MCNC: 0.64 MG/DL (ref 0.5–1.4)
ERYTHROCYTE [DISTWIDTH] IN BLOOD BY AUTOMATED COUNT: 43.3 FL (ref 35.9–50)
GFR SERPLBLD CREATININE-BSD FMLA CKD-EPI: 104 ML/MIN/1.73 M 2
GLUCOSE SERPL-MCNC: 101 MG/DL (ref 65–99)
HCT VFR BLD AUTO: 26.2 % (ref 37–47)
HGB BLD-MCNC: 8.5 G/DL (ref 12–16)
MCH RBC QN AUTO: 27.2 PG (ref 27–33)
MCHC RBC AUTO-ENTMCNC: 32.4 G/DL (ref 33.6–35)
MCV RBC AUTO: 84 FL (ref 81.4–97.8)
PLATELET # BLD AUTO: 331 K/UL (ref 164–446)
PMV BLD AUTO: 9.8 FL (ref 9–12.9)
POTASSIUM SERPL-SCNC: 4.2 MMOL/L (ref 3.6–5.5)
RBC # BLD AUTO: 3.12 M/UL (ref 4.2–5.4)
SODIUM SERPL-SCNC: 137 MMOL/L (ref 135–145)
WBC # BLD AUTO: 6.4 K/UL (ref 4.8–10.8)

## 2023-03-10 PROCEDURE — 770006 HCHG ROOM/CARE - MED/SURG/GYN SEMI*

## 2023-03-10 PROCEDURE — 36415 COLL VENOUS BLD VENIPUNCTURE: CPT

## 2023-03-10 PROCEDURE — 99232 SBSQ HOSP IP/OBS MODERATE 35: CPT

## 2023-03-10 PROCEDURE — 700102 HCHG RX REV CODE 250 W/ 637 OVERRIDE(OP): Performed by: GENERAL PRACTICE

## 2023-03-10 PROCEDURE — 85027 COMPLETE CBC AUTOMATED: CPT

## 2023-03-10 PROCEDURE — 80048 BASIC METABOLIC PNL TOTAL CA: CPT

## 2023-03-10 PROCEDURE — 700102 HCHG RX REV CODE 250 W/ 637 OVERRIDE(OP): Performed by: INTERNAL MEDICINE

## 2023-03-10 PROCEDURE — 99232 SBSQ HOSP IP/OBS MODERATE 35: CPT | Performed by: GENERAL PRACTICE

## 2023-03-10 PROCEDURE — A9270 NON-COVERED ITEM OR SERVICE: HCPCS | Performed by: GENERAL PRACTICE

## 2023-03-10 PROCEDURE — A9270 NON-COVERED ITEM OR SERVICE: HCPCS | Performed by: INTERNAL MEDICINE

## 2023-03-10 RX ORDER — AMOXICILLIN AND CLAVULANATE POTASSIUM 875; 125 MG/1; MG/1
1 TABLET, FILM COATED ORAL EVERY 12 HOURS
Status: DISCONTINUED | OUTPATIENT
Start: 2023-03-10 | End: 2023-03-10

## 2023-03-10 RX ORDER — AMOXICILLIN AND CLAVULANATE POTASSIUM 500; 125 MG/1; MG/1
1 TABLET, FILM COATED ORAL EVERY 8 HOURS
Status: DISCONTINUED | OUTPATIENT
Start: 2023-03-10 | End: 2023-03-11

## 2023-03-10 RX ADMIN — DIVALPROEX SODIUM 500 MG: 500 TABLET, DELAYED RELEASE ORAL at 08:22

## 2023-03-10 RX ADMIN — AMOXICILLIN AND CLAVULANATE POTASSIUM 1 TABLET: 500; 125 TABLET, FILM COATED ORAL at 17:25

## 2023-03-10 RX ADMIN — OLANZAPINE 5 MG: 5 TABLET, FILM COATED ORAL at 20:51

## 2023-03-10 RX ADMIN — DIVALPROEX SODIUM 500 MG: 500 TABLET, DELAYED RELEASE ORAL at 20:51

## 2023-03-10 RX ADMIN — APIXABAN 10 MG: 5 TABLET, FILM COATED ORAL at 08:22

## 2023-03-10 RX ADMIN — BICTEGRAVIR SODIUM, EMTRICITABINE, AND TENOFOVIR ALAFENAMIDE FUMARATE 1 TABLET: 50; 200; 25 TABLET ORAL at 08:23

## 2023-03-10 RX ADMIN — APIXABAN 10 MG: 5 TABLET, FILM COATED ORAL at 20:52

## 2023-03-10 RX ADMIN — NICOTINE 14 MG: 14 PATCH, EXTENDED RELEASE TRANSDERMAL at 08:22

## 2023-03-10 RX ADMIN — DOCUSATE SODIUM 50 MG AND SENNOSIDES 8.6 MG 2 TABLET: 8.6; 5 TABLET, FILM COATED ORAL at 08:23

## 2023-03-10 RX ADMIN — LINEZOLID 600 MG: 600 TABLET, FILM COATED ORAL at 06:05

## 2023-03-10 RX ADMIN — DOCUSATE SODIUM 50 MG AND SENNOSIDES 8.6 MG 2 TABLET: 8.6; 5 TABLET, FILM COATED ORAL at 20:52

## 2023-03-10 RX ADMIN — OLANZAPINE 5 MG: 5 TABLET, FILM COATED ORAL at 08:22

## 2023-03-10 ASSESSMENT — FIBROSIS 4 INDEX
FIB4 SCORE: 0.9
FIB4 SCORE: 0.9

## 2023-03-10 ASSESSMENT — ENCOUNTER SYMPTOMS: WEAKNESS: 1

## 2023-03-10 ASSESSMENT — PAIN DESCRIPTION - PAIN TYPE: TYPE: ACUTE PAIN;CHRONIC PAIN

## 2023-03-10 NOTE — DISCHARGE PLANNING
note:  Received an email from Sharon Kellogg confirming that she has received the [facility] application. The Page 2 of the waiver and DANNY needs to be completes as well but this CM is not covering the unit where pt is so email has been forwarded to the [address].

## 2023-03-10 NOTE — CARE PLAN
Problem: Fall Risk  Goal: Patient will remain free from falls  Outcome: Progressing   The patient is Stable - Low risk of patient condition declining or worsening    Shift Goals  Clinical Goals: compliance with care  Patient Goals: rest    Progress made toward(s) clinical / shift goals:    Patient is not progressing towards the following goals:

## 2023-03-10 NOTE — PROGRESS NOTES
Hospital Medicine Daily Progress Note    Date of Service  3/10/2023    Chief Complaint  Kellie Chatman is a 53 y.o. female admitted 3/6/2023 with AMS    Hospital Course  This is a 53-year-old female with past medical history of s/p right BKA and left transmetatarsal amputation, hypertension, HIV, seizure disorder, schizophrenia, methamphetamine use, tobacco use and homelessness who was found laying in the middle of the street and brought in by EMS.    Patient had recent admission at outside facility 2/2023 where she was noted to have recurrent frostbite on her right BKA and left transmetatarsal amputation stump, recommended to wait for further demarcation prior to any surgical intervention.  She was to follow-up with orthopedic surgery outpatient.  During that admission she was diagnosed with a left superficial femoral vein DVT discharged with Xarelto.    On this admission, patient had CT imaging which noted right internal iliac vein DVT, patient started on Eliquis.      Concern for possible pyelonephritis on CT imaging.  Patient started on Rocephin. UC pending.    Patient noted to spike a 101.6 fever on 3/8, negative chest x-ray, UA negative, COVID, influenza, RSV negative, chest x-ray negative for any infiltrate or pulmonary effusion.  Lactic acid and procalcitonin negative. Blood cultures with no growth to date.  Concern for possible underlying infection and patient's prior frostbitten BLE. MRSA swab positive.  Discussed with ID pharmacy, recommended de-escalation to Augmentin.  CT imaging LLE, negative for osteomyelitis or abscess formation.    Interval Problem Update  CT imaging LLE, negative for osteomyelitis or abscess formation. Discussed with ID pharmacy, recommended de-escalation to Augmentin.      Discussed with LPS again today,  due to copious drainage from blisters from TMA heel and BKA stump.    Patient tolerating resumption of Depakote and Zyprexa, depakote trough level ordered for 3/12.    Patient is  wheelchair-bound at baseline, electric wheelchair at bedside.     I have discussed this patient's plan of care and discharge plan at IDT rounds today with Case Management, Nursing, Nursing leadership, and other members of the IDT team.    Consultants/Specialty  psychiatry    Code Status  Full Code    Disposition  Patient is not medically cleared for discharge.   Anticipate discharge to shelter ([facility]).  I have placed the appropriate orders for post-discharge needs.    Review of Systems  Review of Systems   Constitutional:  Positive for malaise/fatigue.   Neurological:  Positive for weakness.   All other systems reviewed and are negative.     Physical Exam  Temp:  [36.7 °C (98.1 °F)-37.7 °C (99.8 °F)] 36.8 °C (98.2 °F)  Pulse:  [] 89  Resp:  [18-20] 18  BP: ()/(54-67) 98/66  SpO2:  [93 %-97 %] 97 %    Physical Exam  Vitals and nursing note reviewed.   Constitutional:       General: She is not in acute distress.     Appearance: She is ill-appearing.   HENT:      Head: Normocephalic and atraumatic.      Mouth/Throat:      Mouth: Mucous membranes are moist.      Pharynx: No oropharyngeal exudate.   Eyes:      Extraocular Movements: Extraocular movements intact.      Pupils: Pupils are equal, round, and reactive to light.   Cardiovascular:      Rate and Rhythm: Normal rate and regular rhythm.      Pulses: Normal pulses.      Heart sounds: No murmur heard.    No friction rub. No gallop.   Pulmonary:      Effort: Pulmonary effort is normal. No respiratory distress.      Breath sounds: No wheezing, rhonchi or rales.   Abdominal:      General: Bowel sounds are normal. There is no distension.      Palpations: Abdomen is soft. There is no mass.      Tenderness: There is no abdominal tenderness.   Musculoskeletal:         General: No swelling or tenderness. Normal range of motion.      Cervical back: Normal range of motion. No rigidity. No muscular tenderness.      Right lower leg: No edema.      Left lower leg:  No edema.      Comments: Right lower extremity BKA, left lower extremity with transmetatarsal amputation, bullae noted, now draining copiously   Skin:     General: Skin is warm and dry.      Capillary Refill: Capillary refill takes less than 2 seconds.      Findings: No erythema or rash.   Neurological:      General: No focal deficit present.      Mental Status: She is alert. She is disoriented.      Motor: No weakness.      Gait: Gait normal.   Psychiatric:         Mood and Affect: Affect is flat.         Speech: Speech is delayed.         Behavior: Behavior is withdrawn.       Fluids    Intake/Output Summary (Last 24 hours) at 3/10/2023 1505  Last data filed at 3/10/2023 1400  Gross per 24 hour   Intake 480 ml   Output 900 ml   Net -420 ml       Laboratory  Recent Labs     03/08/23  0521 03/09/23  0623 03/10/23  0231   WBC 6.4 8.5 6.4   RBC 3.85* 3.62* 3.12*   HEMOGLOBIN 10.4* 9.8* 8.5*   HEMATOCRIT 32.8* 30.8* 26.2*   MCV 85.2 85.1 84.0   MCH 27.0 27.1 27.2   MCHC 31.7* 31.8* 32.4*   RDW 44.6 43.8 43.3   PLATELETCT 310 299 331   MPV 9.8 9.5 9.8     Recent Labs     03/08/23  0521 03/09/23  0623 03/10/23  0231   SODIUM 142 141 137   POTASSIUM 4.4 4.0 4.2   CHLORIDE 108 106 106   CO2 24 25 22   GLUCOSE 85 83 101*   BUN 10 8 9   CREATININE 0.60 0.67 0.64   CALCIUM 8.6 8.6 8.3*                     Imaging  CT-FOOT WITH PLUS RECONS LEFT   Final Result      1.  Edema and/or cellulitis of the visualized leg, ankle and foot. No focal abscess.   2.  Large blisters.   3.  Postsurgical changes first through fifth transmetatarsal amputations.   4.  No definite evidence for osteomyelitis.   5.  Some thickening of the Achilles tendon is nonspecific and not well evaluated on CT.   6.  Further evaluation with MRI as clinically indicated.      DX-CHEST-PORTABLE (1 VIEW)   Final Result      No acute cardiac or pulmonary abnormalities are identified.      DX-FEMUR-2+ RIGHT   Final Result      No radiographic evidence of acute  traumatic injury.      CT-ABDOMEN-PELVIS WITH   Final Result      1.  Right internal iliac vein DVT.   2.  Somewhat striated nephrogram of the bilateral superior poles may be artifactual. Correlate with urinalysis for pyelonephritis.   3.  Redemonstration of enhancing lesion of the uterus. This has been evaluated on prior ultrasound.   4.  Prominent bilateral inguinal lymph nodes may be reactive. Malignancy not excluded.      Dr. Emanuel discussed these findings with Dr. Child at 1:49 PM by telephone on 3/6/2023      DX-FOOT-2- LEFT   Final Result      Status post transmetatarsal amputation. No definitive radiographic evidence of ostomy myelitis. If there is strong clinical suspicion, MRI of the foot with and without contrast should be obtained for further evaluation.      DX-CHEST-PORTABLE (1 VIEW)   Final Result      No acute cardiopulmonary disease evident.           Assessment/Plan  * Infection of amputation stump of left lower extremity (HCC)  Assessment & Plan  Concern for   Initially discussed the case with orthopedic surgery, who did not believe at this time required intervention  However patient with persistent fever, all other work-up negative  Concern for possible underlying infection and patient's prior frostbitten BLE stumps, bullae noted   Blood cultures with no growth to date.   MRSA swab positive.   Discontinued vancomycin and Zosyn, switched to Zyvox.  Monitor renal function.     CT imaging LLE, negative for osteomyelitis or abscess formation.   Discussed with ID pharmacy, recommended de-escalation to Augmentin.    Discussed with LPS for further wound management as blisters are leaking     Fever in adult  Assessment & Plan  Patient noted to spike a 101.6 fever on 3/8, negative chest x-ray, UA negative, COVID, influenza, RSV negative, chest x-ray negative for any infiltrate or pulmonary effusion.  Lactic acid and procalcitonin negative. Blood cultures with no growth to date.  Concern for possible  underlying infection and patient's prior frostbitten BLE. MRSA swab positive.     Pyelonephritis  Assessment & Plan  CT scan shows signs of ??pyelonephritis  UA with no nitrites or leuk esterases  Patient has abdominal pain  Initially on Rocephin, escalated to Zosyn and vancomycin at this time  Repeat UA negative for any infection  Blood culture no growth to date  Urine culture pending    Schizophrenia ([facility])- (present on admission)  Assessment & Plan  Patient has hallucination with hearing voices  Denied any SI or HI, however with active auditory hallucinations  Resume home medication Depakote, formally on Zyprexa  Psych consulted, continue with Depakote, trough level ordered for 3/12, Zyprexa 5 mg twice daily initiated    Slow transit constipation  Assessment & Plan  Patient noted to have abdominal distention, prior bowel movement several days prior  Started on aggressive bowel regimen, given one-time dose of Dulcolax  Still with no bowel movement, oral lactulose given  Large BM 3/8 PM  Serial abdominal exams  RESOLVED    Noncompliance  Assessment & Plan  Significant history with noncompliance with medications and doctor visits    Homelessness  Assessment & Plan  Complicated medical history and noncompliance and drug abuse   was consulted to consider group home      Lactic acid acidosis  Assessment & Plan  Likely related to dehydration and infection  IV fluid and ceftriaxone  Resolved    Methamphetamine abuse ([facility])  Assessment & Plan  Encouraged the patient to quit  Urine drug screen is positive  Blood culture no growth to date    Acute deep vein thrombosis (DVT) ([facility])- (present on admission)  Assessment & Plan  Patient had recent admission at outside facility 2/2023,  she was diagnosed with a left superficial femoral vein DVT discharged with Xarelto.  Patient had CT imaging which noted right internal iliac vein DVT.    Initially on heparin drip, transitioned to Eliquis      HIV (human immunodeficiency  virus infection) (HCC)- (present on admission)  Assessment & Plan  Patient is on Biktarvy however, clearly questioning about compliance  CD4-761 and CD8-194  Resume Biktarvy with close monitoring    Hypokalemia- (present on admission)  Assessment & Plan  Labs reviewed, resolved    Tobacco abuse- (present on admission)  Assessment & Plan  Cessation counseled, 3 minute  Nicotine replacement    Hx of BKA, right (HCC)  Assessment & Plan  Right BKA and left transmetatarsal amputation  LPS following, local wound care          VTE prophylaxis: therapeutic anticoagulation with Eliquis    I have performed a physical exam and reviewed and updated ROS and Plan today (3/10/2023). In review of yesterday's note (3/9/2023), there are no changes except as documented above.

## 2023-03-11 PROCEDURE — 700102 HCHG RX REV CODE 250 W/ 637 OVERRIDE(OP): Performed by: GENERAL PRACTICE

## 2023-03-11 PROCEDURE — 700102 HCHG RX REV CODE 250 W/ 637 OVERRIDE(OP): Performed by: INTERNAL MEDICINE

## 2023-03-11 PROCEDURE — A9270 NON-COVERED ITEM OR SERVICE: HCPCS | Performed by: GENERAL PRACTICE

## 2023-03-11 PROCEDURE — 770006 HCHG ROOM/CARE - MED/SURG/GYN SEMI*

## 2023-03-11 PROCEDURE — 99232 SBSQ HOSP IP/OBS MODERATE 35: CPT | Performed by: GENERAL PRACTICE

## 2023-03-11 PROCEDURE — A9270 NON-COVERED ITEM OR SERVICE: HCPCS | Performed by: INTERNAL MEDICINE

## 2023-03-11 RX ORDER — AMOXICILLIN AND CLAVULANATE POTASSIUM 500; 125 MG/1; MG/1
1 TABLET, FILM COATED ORAL EVERY 8 HOURS
Status: DISCONTINUED | OUTPATIENT
Start: 2023-03-11 | End: 2023-03-14

## 2023-03-11 RX ADMIN — AMOXICILLIN AND CLAVULANATE POTASSIUM 1 TABLET: 500; 125 TABLET, FILM COATED ORAL at 06:07

## 2023-03-11 RX ADMIN — APIXABAN 10 MG: 5 TABLET, FILM COATED ORAL at 08:52

## 2023-03-11 RX ADMIN — BICTEGRAVIR SODIUM, EMTRICITABINE, AND TENOFOVIR ALAFENAMIDE FUMARATE 1 TABLET: 50; 200; 25 TABLET ORAL at 08:51

## 2023-03-11 RX ADMIN — DOCUSATE SODIUM 50 MG AND SENNOSIDES 8.6 MG 2 TABLET: 8.6; 5 TABLET, FILM COATED ORAL at 21:43

## 2023-03-11 RX ADMIN — DIVALPROEX SODIUM 500 MG: 500 TABLET, DELAYED RELEASE ORAL at 21:45

## 2023-03-11 RX ADMIN — DIVALPROEX SODIUM 500 MG: 500 TABLET, DELAYED RELEASE ORAL at 08:51

## 2023-03-11 RX ADMIN — AMOXICILLIN AND CLAVULANATE POTASSIUM 1 TABLET: 500; 125 TABLET, FILM COATED ORAL at 13:07

## 2023-03-11 RX ADMIN — DOCUSATE SODIUM 50 MG AND SENNOSIDES 8.6 MG 2 TABLET: 8.6; 5 TABLET, FILM COATED ORAL at 08:51

## 2023-03-11 RX ADMIN — OLANZAPINE 5 MG: 5 TABLET, FILM COATED ORAL at 08:51

## 2023-03-11 RX ADMIN — OLANZAPINE 5 MG: 5 TABLET, FILM COATED ORAL at 21:43

## 2023-03-11 RX ADMIN — AMOXICILLIN AND CLAVULANATE POTASSIUM 1 TABLET: 500; 125 TABLET, FILM COATED ORAL at 21:43

## 2023-03-11 RX ADMIN — NICOTINE 14 MG: 14 PATCH, EXTENDED RELEASE TRANSDERMAL at 08:51

## 2023-03-11 RX ADMIN — APIXABAN 10 MG: 5 TABLET, FILM COATED ORAL at 21:42

## 2023-03-11 ASSESSMENT — FIBROSIS 4 INDEX: FIB4 SCORE: 0.9

## 2023-03-11 ASSESSMENT — ENCOUNTER SYMPTOMS: WEAKNESS: 1

## 2023-03-11 ASSESSMENT — PAIN DESCRIPTION - PAIN TYPE: TYPE: ACUTE PAIN

## 2023-03-11 NOTE — DIETARY
NUTRITION SERVICES -   Day 5 of admit.  Kellie Chatman is a 53 y.o. female with admitting DX of DVT, lower extremity, proximal, acute, left     Alert received for newly identified wound. Pt with hx of right BKA, recently admitted at outside facility for recurrent frostbite on right BKA and left transmetatarsal amputation stump. PI also noted to left heel, seen and assessed by wound team and LPS.     Current Diet: Regular. Pt with variable overall intake, intake averaging ~50%. RD recommends addition of Cayla plus supplements TID to aid in wound healing. RD encourages intake of all meals and supplements >50%.     Problem: Nutritional:  Goal: Achieve adequate nutritional intake  Description: Patient will consume >50% of meals and sups   Outcome: Not met       RD following

## 2023-03-11 NOTE — DISCHARGE PLANNING
Case Management Discharge Planning    Admission Date: 3/6/2023  GMLOS: 3  ALOS: 4    6-Clicks ADL Score: 19  6-Clicks Mobility Score: 9  PT and/or OT Eval ordered: Yes  Post-acute Referrals Ordered: No  Post-acute Choice Obtained: Yes  Has referral(s) been sent to post-acute provider:  No      Anticipated Discharge Dispo: Discharge Disposition: Still a Patient (30)    DME Needed: No    Action(s) Taken: Updated Provider/Nurse on Discharge Plan and Patient Conference    Escalations Completed: Provider    Medically Clear: No    Next Steps: CM updated by MD that pt will need wound care follow up and barrier to DCPlan for community is pt's noncompliance concern of wound care follow up. CM met with pt about DCPlan options. Pt states has been staying at shelter. Per prior notes pt has application for women's shelter for [facility]. Cm discussed to SNF, [facility] with transportation from [facility] [place] and possible Medical respite and HH. Pt in agreement with SNF if this is an option. SNF choice form sent to [facility].     Barriers to Discharge: Medical clearance, Pending Placement, Outpatient referrals pending, No Social Support, and Homelessness    Is the patient up for discharge tomorrow: No

## 2023-03-11 NOTE — PROGRESS NOTES
" [facility]ICE  PROGRESS NOTE    HPI: Kellie Chatman is a 53 y.o.  with a past medical history that includes frostbite of bilateral feet, HIV, EtOH abuse, methamphetamine abuse.  Admitted 3/6/2023 for DVT, lower extremity, proximal, acute, left (HCC) [I82.4Y2].     LPS has been consulted for frostbite injuries to left TMA, left heel, right BKA.    The patient was initially seen on 11/14/2022 for frostbite to her right foot and left hallux.  Due to this injury patient had a right BKA on 12/20/2022 with Dr. Lux.  Due to continued decline of her left foot she eventually had a left TMA on 2/7/2023 with Dr. Lux.    Patient is homeless and does attempt to stay at the [facility] as able.  Patient states unfortunately she had another frostbite injury to the posterior aspect of her right BKA and her left TMA and plantar foot including her left heel.  Patient was seen at [facility] with DC on 3/1/2023, and it was deemed that frostbite injury needed to demarcate prior to determination for surgical intervention for 1 to 2 weeks.         INTERVAL HISTORY:  3/11/2023: Patient denies fevers, chills, nausea, vomiting.  Pain controlled.       PERTINENT LPS RESULTS:   COVID-19: not completed      EXAM:      /70   Pulse 85   Temp 36.2 °C (97.2 °F) (Temporal)   Resp 17   Ht 1.778 m (5' 10\")   Wt 72 kg (158 lb 11.7 oz)   SpO2 98%   BMI 22.78 kg/m²     Pedal Pulses:   Right BKA warm and well-perfused  Left foot: Palpable DP, palpable PT    Sensation:   Monofilament testing, patient declines   Sensation intact to left foot  Sensation intact to right BKA      Wound(s) :    Wound location: Right posterior BKA  Wound characteristics: Black eschar  Erythema: Mild  Drainage: None      Wound location: Left TMA-frostbite injury  Wound characteristics: Blackened eschar over incision with bulla to dorsal/medial foot-bone with decreasing  Erythema: None  Drainage: None    Wound location: Left plantar heel and " foot-frostbite injury  Wound characteristics: Black eschar to plantar heel with intact bulla, from heel to distal end of foot intact  Erythema: None  Drainage: None    Wound location: Left posterior ankle-deflated frostbite bulla  Wound characteristics: Pink, red tissue  Erythema: None  Drainage: Serosanguineous    Wound photo:                 Wound care completed by LPS APRN  All areas of eschar to posterior right BKA and left TMA, plantar heel, plantar foot: Paint with Betadine    Left posterior ankle: Cleanse with NS, pat dry.  Apply Xeroform accordion folded over wound bed, secure with half a Mepilex.  Do not cover heel.        DIABETES MANAGEMENT:    A1c:   Lab Results   Component Value Date/Time    HBA1C 5.1 02/24/2023 05:54 AM            INFECTION MANAGEMENT:    Results from last 7 days   Lab Units 03/10/23  0231 03/09/23  0623 03/08/23  0521 03/07/23  0315 03/06/23  1239   WBC 1501 K/uL 6.4 8.5 6.4 5.9 6.7   PLATELET COUNT 1518 K/uL 331 299 310 302 466*     Wound culture results:   Results       Procedure Component Value Units Date/Time    URINE CULTURE(NEW) [[id number]]  (Abnormal) Collected: 03/09/23 0853    Order Status: Completed Specimen: Urine, Clean Catch Updated: 03/10/23 1626     Significant Indicator POS     Source UR     Site URINE, CLEAN CATCH     Culture Result -      Enterococcus faecium  ,000 cfu/mL      Narrative:      Collected By: [id number] DC GONZALEZ  Indication for culture:->New onset fever with no other  identified cause  Collected By: [id number] DC GONZALEZ    URINALYSIS [[id number]] Collected: 03/09/23 0853    Order Status: Completed Specimen: Urine, Cath Updated: 03/09/23 0920     Color Yellow     Character Clear     Specific Gravity 1.015     Ph 6.5     Glucose Negative mg/dL      Ketones Negative mg/dL      Protein Negative mg/dL      Bilirubin Negative     Urobilinogen, Urine 0.2     Nitrite Negative     Leukocyte Esterase Negative     Occult Blood Negative     Micro Urine Req  "see below     Comment: Microscopic examination not performed when specimen is clear  and chemically negative for protein, blood, leukocyte esterase  and nitrite.         Narrative:      Collected By: [id number] DC GONZALEZ  Indication for culture:->New onset fever with no other  identified cause    BLOOD CULTURE [[id number]] Collected: 03/08/23 1748    Order Status: Completed Specimen: Blood from Peripheral Updated: 03/09/23 0714     Significant Indicator NEG     Source BLD     Site PERIPHERAL     Culture Result No Growth  Note: Blood cultures are incubated for 5 days and  are monitored continuously.Positive blood cultures  are called to the RN and reported as soon as  they are identified.      Narrative:      Per Hospital Policy: Only change Specimen Src: to \"Line\" if  specified by physician order.  Right Hand    BLOOD CULTURE [[id number]] Collected: 03/08/23 1748    Order Status: Completed Specimen: Blood from Peripheral Updated: 03/09/23 0714     Significant Indicator NEG     Source BLD     Site PERIPHERAL     Culture Result No Growth  Note: Blood cultures are incubated for 5 days and  are monitored continuously.Positive blood cultures  are called to the RN and reported as soon as  they are identified.      Narrative:      Per Hospital Policy: Only change Specimen Src: to \"Line\" if  specified by physician order.  Left Hand    MRSA By PCR (Amp) [[id number]] Collected: 03/08/23 1716    Order Status: Completed Specimen: Respirate from Nasopharyngeal Updated: 03/08/23 1906     MRSA by PCR POSITIVE    Narrative:      Collected By: [id number] DC GONZALEZ    COV-2, FLU A/B, AND RSV BY PCR (2-4 HOURS CEPHEID): Collect NP swab in VTM [[id number]] Collected: 03/08/23 1716    Order Status: Completed Specimen: Respirate from Nasopharyngeal Updated: 03/08/23 1835     Influenza virus A RNA Negative     Influenza virus B, PCR Negative     RSV, PCR Negative     SARS-CoV-2 by PCR NotDetected     Comment: [facility] providers: PLEASE REFER " "TO DE-ESCALATION AND RETESTING PROTOCOL  on [email or web address]    **The [employer] GeneXpert Xpress SARS-CoV-2 RT-PCR Test has been made  available for use under the Emergency Use Authorization (EUA) only.          SARS-CoV-2 Source NP Swab    Narrative:      Collected By: [id number] DC GONZALEZ    URINALYSIS [[id number]]     Order Status: Canceled Specimen: Urine, Clean Catch     URINE CULTURE(NEW) [[id number]]     Order Status: Canceled Specimen: Urine, Clean Catch     URINE CULTURE(NEW) [[id number]] Collected: 03/06/23 1338    Order Status: Completed Specimen: Urine Updated: 03/07/23 1558     Significant Indicator NEG     Source UR     Site -     Culture Result Mixed enteric itzel >100,000 cfu/mL  Greater than 3 organisms isolated, culture of doubtful  significance, please recollect.      Narrative:      Indication for culture:->Patient WITHOUT an indwelling Leggett  catheter in place with new onset of Dysuria, Frequency,  Urgency, and/or Suprapubic pain    BLOOD CULTURE [[id number]] Collected: 03/06/23 1528    Order Status: Completed Specimen: Blood from Peripheral Updated: 03/07/23 0734     Significant Indicator NEG     Source BLD     Site PERIPHERAL     Culture Result No Growth  Note: Blood cultures are incubated for 5 days and  are monitored continuously.Positive blood cultures  are called to the RN and reported as soon as  they are identified.      Narrative:      Per Hospital Policy: Only change Specimen Src: to \"Line\" if  specified by physician order.  Left Forearm/Arm    BLOOD CULTURE [[id number]] Collected: 03/06/23 1941    Order Status: Completed Specimen: Blood from Peripheral Updated: 03/07/23 0734     Significant Indicator NEG     Source BLD     Site PERIPHERAL     Culture Result No Growth  Note: Blood cultures are incubated for 5 days and  are monitored continuously.Positive blood cultures  are called to the RN and reported as soon as  they are identified.      Narrative:      Per Hospital Policy: Only change " "Specimen Src: to \"Line\" if  specified by physician order.  Right Hand    URINALYSIS,CULTURE IF INDICATED [[id number]]  (Abnormal) Collected: 03/06/23 1420    Order Status: Completed Specimen: Blood from Urine, Cath Updated: 03/06/23 1544     Color Yellow     Character Clear     Specific Gravity >=1.045     Ph 5.0     Glucose Negative mg/dL      Ketones Trace mg/dL      Protein 30 mg/dL      Bilirubin Negative     Urobilinogen, Urine 0.2     Nitrite Negative     Leukocyte Esterase Negative     Occult Blood Negative     Micro Urine Req Microscopic    Narrative:      Indication for culture:->Patient WITHOUT an indwelling Leggett  catheter in place with new onset of Dysuria, Frequency,  Urgency, and/or Suprapubic pain    URINALYSIS,CULTURE IF INDICATED [[id number]]  (Abnormal) Collected: 03/06/23 1338    Order Status: Completed Specimen: Urine, Clean Catch Updated: 03/06/23 1413     Color DK Yellow     Character Cloudy     Specific Gravity 1.029     Ph 5.0     Glucose Negative mg/dL      Ketones Trace mg/dL      Protein 30 mg/dL      Bilirubin Moderate     Urobilinogen, Urine 1.0     Nitrite Negative     Leukocyte Esterase Trace     Occult Blood Negative     Micro Urine Req Microscopic    Narrative:      Indication for culture:->Patient WITHOUT an indwelling Leggett  catheter in place with new onset of Dysuria, Frequency,  Urgency, and/or Suprapubic pain    Blood Culture,Hold [[id number]] Collected: 03/06/23 1238    Order Status: Completed Updated: 03/06/23 1342     Blood Culture Hold Collected                       ASSESSMENT/PLAN:   53 y.o. admitted for DVT, lower extremity, proximal, acute, left ([facility]) [I82.4Y2]. Presents with frostbite injury posterior right BKA, left TMA, left heel, left plantar foot.    -Frostbite injury continues to evolve  -Bulla to left posterior ankle burst today.  Dressing orders initiated  -Continue current orders for Betadine to all intact eschar        Wound care:   -wound care orders placed for " no change in pain nursing by LPS   -Right posterior BKA: Paint with Betadine, allowed to air dry, leave open to air.  -Left TMA, plantar heel, plantar foot: Paint with Betadine, allowed to air dry, leave open to air  -Left posterior ankle: Cleanse with NS, pat dry.  Apply Xeroform accordion folded over wound bed, secure with half of Mepilex.  Ensure heel remains open to air      Labs/Imaging:  -COVID-19: not completed this admission.     Vascular status:   -Right BKA warm and well-perfused  - Left foot: Palpable DP, palpable PT      Surgery:   - Ortho Dr. Lux  -No plans for surgery at this time    Antibiotics:   - on antibiotics managed by hospitalist    Weight Bearing Status:   -Right BKA: Nonweightbearing  -Left foot: Transfer weight bearing    Offloading:   -Pillows for comfort  -Wheelchair at bedside    PT/OT :   -involved,          DISCHARGE PLAN:    Disposition:   TBD, patient currently homeless  -Patient would benefit from SNF stay for continued wound care              Discussed with: pt, RN, Dr. Cristobal     Please note that this dictation was created using voice recognition software. I have  worked with technical experts from [employer] to optimize the interface.  I have made every reasonable attempt to correct obvious errors, but there may be errors of grammar and possibly content that I did not discover before finalizing the note.    Yuliet Hwang, A.P.R.N.    If any questions or concerns, please contact [facility] through voalte.

## 2023-03-11 NOTE — CARE PLAN
Problem: Fall Risk  Goal: Patient will remain free from falls  Outcome: Progressing   The patient is Stable - Low risk of patient condition declining or worsening    Shift Goals  Clinical Goals: compliance with cares/treatments  Patient Goals: rest    Progress made toward(s) clinical / shift goals:  fall precautions in place. Bed locked and in lowest position.     Patient is not progressing towards the following goals:

## 2023-03-11 NOTE — PROGRESS NOTES
Received report and assumed care at shift change. A&O x 4 , non compliant with some cares. Fall precautions in place, bed locked and in lowest position. Needs attended to. No complains of pain

## 2023-03-11 NOTE — PROGRESS NOTES
Hospital Medicine Daily Progress Note    Date of Service  3/11/2023    Chief Complaint  Kellie Chatman is a 53 y.o. female admitted 3/6/2023 with AMS    Hospital Course  This is a 53-year-old female with past medical history of s/p right BKA and left transmetatarsal amputation, hypertension, HIV, seizure disorder, schizophrenia, methamphetamine use, tobacco use and homelessness who was found laying in the middle of the street and brought in by EMS.    Patient had recent admission at outside facility 2/2023 where she was noted to have recurrent frostbite on her right BKA and left transmetatarsal amputation stump, recommended to wait for further demarcation prior to any surgical intervention.  She was to follow-up with orthopedic surgery outpatient.  During that admission she was diagnosed with a left superficial femoral vein DVT discharged with Xarelto.    On this admission, patient had CT imaging which noted right internal iliac vein DVT, patient started on Eliquis.      Concern for possible pyelonephritis on CT imaging.  Patient started on Rocephin. Urine culture E. Faecium 50-100K.    Patient noted to spike a 101.6 fever on 3/8, negative chest x-ray, UA negative, COVID, influenza, RSV negative, chest x-ray negative for any infiltrate or pulmonary effusion.  Lactic acid and procalcitonin negative. Blood cultures with no growth to date.  Concern for possible underlying infection and patient's prior frostbitten BLE. MRSA swab positive.  Discussed with ID pharmacy, recommended de-escalation to Augmentin.  CT imaging LLE, negative for osteomyelitis or abscess formation.    Interval Problem Update  CT imaging LLE, negative for osteomyelitis or abscess formation. Discussed with ID pharmacy, recommended de-escalation to Augmentin will complete total 10-day course of antibiotics.    Patient will require extensive wound care which will not be available at [facility] place shelter as home health cannot be initiated there.  Discussed  with case management SNF versus medical respite for placement to attend to her bilateral lower extremity wounds.    If these wounds are not management, they can result in further cellulitis, osteomyelitis, leading to sepsis and/ or even death.     Patient tolerating resumption of Depakote and Zyprexa, depakote trough level ordered for 3/12. Labs ordered for 3/12.     I have discussed this patient's plan of care and discharge plan at IDT rounds today with Case Management, Nursing, Nursing leadership, and other members of the IDT team.    Consultants/Specialty  psychiatry    Code Status  Full Code    Disposition  Patient is not medically cleared for discharge.   Anticipate discharge to shelter SNF vs medical respite for wound care  I have placed the appropriate orders for post-discharge needs.    Review of Systems  Review of Systems   Constitutional:  Positive for malaise/fatigue.   Neurological:  Positive for weakness.   All other systems reviewed and are negative.     Physical Exam  Temp:  [36.2 °C (97.2 °F)-37.6 °C (99.7 °F)] 36.2 °C (97.2 °F)  Pulse:  [] 85  Resp:  [16-18] 17  BP: ()/(59-72) 118/70  SpO2:  [96 %-98 %] 98 %    Physical Exam  Vitals and nursing note reviewed.   Constitutional:       General: She is not in acute distress.     Appearance: She is ill-appearing.   HENT:      Head: Normocephalic and atraumatic.      Mouth/Throat:      Mouth: Mucous membranes are moist.      Pharynx: No oropharyngeal exudate.   Eyes:      Extraocular Movements: Extraocular movements intact.      Pupils: Pupils are equal, round, and reactive to light.   Cardiovascular:      Rate and Rhythm: Normal rate and regular rhythm.      Pulses: Normal pulses.      Heart sounds: No murmur heard.    No friction rub. No gallop.   Pulmonary:      Effort: Pulmonary effort is normal. No respiratory distress.      Breath sounds: No wheezing, rhonchi or rales.   Abdominal:      General: Bowel sounds are normal. There is no  distension.      Palpations: Abdomen is soft. There is no mass.      Tenderness: There is no abdominal tenderness.   Musculoskeletal:         General: No swelling or tenderness. Normal range of motion.      Cervical back: Normal range of motion. No rigidity. No muscular tenderness.      Right lower leg: No edema.      Left lower leg: No edema.      Comments: Right lower extremity BKA, left lower extremity with transmetatarsal amputation, bullae noted, now draining copiously   Skin:     General: Skin is warm and dry.      Capillary Refill: Capillary refill takes less than 2 seconds.      Findings: No erythema or rash.   Neurological:      General: No focal deficit present.      Mental Status: She is alert. She is disoriented.      Motor: No weakness.      Gait: Gait normal.   Psychiatric:         Mood and Affect: Affect is flat.         Speech: Speech is delayed.         Behavior: Behavior is withdrawn.       Fluids    Intake/Output Summary (Last 24 hours) at 3/11/2023 1439  Last data filed at 3/11/2023 1400  Gross per 24 hour   Intake 478 ml   Output 900 ml   Net -422 ml       Laboratory  Recent Labs     03/09/23  0623 03/10/23  0231   WBC 8.5 6.4   RBC 3.62* 3.12*   HEMOGLOBIN 9.8* 8.5*   HEMATOCRIT 30.8* 26.2*   MCV 85.1 84.0   MCH 27.1 27.2   MCHC 31.8* 32.4*   RDW 43.8 43.3   PLATELETCT 299 331   MPV 9.5 9.8     Recent Labs     03/09/23  0623 03/10/23  0231   SODIUM 141 137   POTASSIUM 4.0 4.2   CHLORIDE 106 106   CO2 25 22   GLUCOSE 83 101*   BUN 8 9   CREATININE 0.67 0.64   CALCIUM 8.6 8.3*                     Imaging  CT-FOOT WITH PLUS RECONS LEFT   Final Result      1.  Edema and/or cellulitis of the visualized leg, ankle and foot. No focal abscess.   2.  Large blisters.   3.  Postsurgical changes first through fifth transmetatarsal amputations.   4.  No definite evidence for osteomyelitis.   5.  Some thickening of the Achilles tendon is nonspecific and not well evaluated on CT.   6.  Further evaluation with  MRI as clinically indicated.      DX-CHEST-PORTABLE (1 VIEW)   Final Result      No acute cardiac or pulmonary abnormalities are identified.      DX-FEMUR-2+ RIGHT   Final Result      No radiographic evidence of acute traumatic injury.      CT-ABDOMEN-PELVIS WITH   Final Result      1.  Right internal iliac vein DVT.   2.  Somewhat striated nephrogram of the bilateral superior poles may be artifactual. Correlate with urinalysis for pyelonephritis.   3.  Redemonstration of enhancing lesion of the uterus. This has been evaluated on prior ultrasound.   4.  Prominent bilateral inguinal lymph nodes may be reactive. Malignancy not excluded.      Dr. Emanuel discussed these findings with Dr. Child at 1:49 PM by telephone on 3/6/2023      DX-FOOT-2- LEFT   Final Result      Status post transmetatarsal amputation. No definitive radiographic evidence of ostomy myelitis. If there is strong clinical suspicion, MRI of the foot with and without contrast should be obtained for further evaluation.      DX-CHEST-PORTABLE (1 VIEW)   Final Result      No acute cardiopulmonary disease evident.           Assessment/Plan  * Infection of amputation stump of left lower extremity (HCC)  Assessment & Plan  Concern for   Initially discussed the case with orthopedic surgery, who did not believe at this time required intervention  However patient with persistent fever, all other work-up negative  Concern for possible underlying infection and patient's prior frostbitten BLE stumps, bullae noted   Blood cultures with no growth to date.   MRSA swab positive.   Discontinued vancomycin and Zosyn, switched to Zyvox.  Monitor renal function.     CT imaging LLE, negative for osteomyelitis or abscess formation.   Discussed with ID pharmacy, recommended de-escalation to Augmentin, 10 day total    Discussed with LPS for further wound management as blisters are leaking     Patient will require extensive wound care which will not be available at our place  shelter as home health cannot be initiated there.  Discussed with case management SNF versus medical respite for placement to attend to her bilateral lower extremity wounds. If these wounds are not management, they can result in further cellulitis, osteomyelitis, leading to sepsis and/ or even death.     Fever in adult  Assessment & Plan  Patient noted to spike a 101.6 fever on 3/8, negative chest x-ray, UA negative, COVID, influenza, RSV negative, chest x-ray negative for any infiltrate or pulmonary effusion.  Lactic acid and procalcitonin negative. Blood cultures with no growth to date.  Concern for possible underlying infection and patient's prior frostbitten BLE. MRSA swab positive.     Pyelonephritis  Assessment & Plan  CT scan shows signs of ??pyelonephritis  UA with no nitrites or leuk esterases  Patient has abdominal pain  Initially on Rocephin, escalated to Zosyn and vancomycin at this time  Repeat UA negative for any infection  Blood culture no growth to date  Urine culture 50-100k, likely not infective source    Schizophrenia (HCC)- (present on admission)  Assessment & Plan  Patient has hallucination with hearing voices  Denied any SI or HI, however with active auditory hallucinations  Resume home medication Depakote, formally on Zyprexa  Psych consulted, continue with Depakote, trough level ordered for 3/12, Zyprexa 5 mg twice daily initiated    Slow transit constipation  Assessment & Plan  Patient noted to have abdominal distention, prior bowel movement several days prior  Started on aggressive bowel regimen, given one-time dose of Dulcolax  Still with no bowel movement, oral lactulose given  Large BM 3/8 PM  Serial abdominal exams  RESOLVED    Noncompliance  Assessment & Plan  Significant history with noncompliance with medications and doctor visits    Homelessness  Assessment & Plan  Complicated medical history and noncompliance and drug abuse   was consulted to consider group  home      Lactic acid acidosis  Assessment & Plan  Likely related to dehydration and infection  IV fluid and ceftriaxone  Resolved    Methamphetamine abuse ([facility])  Assessment & Plan  Encouraged the patient to quit  Urine drug screen is positive  Blood culture no growth to date    Acute deep vein thrombosis (DVT) ([facility])- (present on admission)  Assessment & Plan  Patient had recent admission at outside facility 2/2023,  she was diagnosed with a left superficial femoral vein DVT discharged with Xarelto.  Patient had CT imaging which noted right internal iliac vein DVT.    Initially on heparin drip, transitioned to Eliquis      HIV (human immunodeficiency virus infection) ([facility])- (present on admission)  Assessment & Plan  Patient is on Biktarvy however, clearly questioning about compliance  CD4-761 and CD8-194  Resume Biktarvy with close monitoring    Hypokalemia- (present on admission)  Assessment & Plan  Labs reviewed, resolved    Tobacco abuse- (present on admission)  Assessment & Plan  Cessation counseled, 3 minute  Nicotine replacement    Hx of BKA, right ([facility])  Assessment & Plan  Right BKA and left transmetatarsal amputation  LPS following, local wound care          VTE prophylaxis: therapeutic anticoagulation with Eliquis    I have performed a physical exam and reviewed and updated ROS and Plan today (3/11/2023). In review of yesterday's note (3/10/2023), there are no changes except as documented above.

## 2023-03-11 NOTE — DISCHARGE PLANNING
Received referral at 1618.Referral sent to all [facility] SNF as per request at [phone].

## 2023-03-11 NOTE — CARE PLAN
The patient is Stable - Low risk of patient condition declining or worsening    Shift Goals  Clinical Goals: compliance with care  Patient Goals: rest    Progress made toward(s) clinical / shift goals:    Problem: Knowledge Deficit - Standard  Goal: Patient and family/care givers will demonstrate understanding of plan of care, disease process/condition, diagnostic tests and medications  Outcome: Progressing     Problem: Pain - Standard  Goal: Alleviation of pain or a reduction in pain to the patient’s comfort goal  Outcome: Progressing     Problem: Skin Integrity  Goal: Skin integrity is maintained or improved  Outcome: Progressing     Problem: Fall Risk  Goal: Patient will remain free from falls  Outcome: Progressing       Patient is not progressing towards the following goals:

## 2023-03-12 LAB
ALBUMIN SERPL BCP-MCNC: 2.5 G/DL (ref 3.2–4.9)
ALBUMIN/GLOB SERPL: 0.6 G/DL
ALP SERPL-CCNC: 68 U/L (ref 30–99)
ALT SERPL-CCNC: 9 U/L (ref 2–50)
ANION GAP SERPL CALC-SCNC: 11 MMOL/L (ref 7–16)
AST SERPL-CCNC: 10 U/L (ref 12–45)
BACTERIA UR CULT: ABNORMAL
BACTERIA UR CULT: ABNORMAL
BILIRUB SERPL-MCNC: 0.2 MG/DL (ref 0.1–1.5)
BUN SERPL-MCNC: 11 MG/DL (ref 8–22)
CALCIUM ALBUM COR SERPL-MCNC: 10 MG/DL (ref 8.5–10.5)
CALCIUM SERPL-MCNC: 8.8 MG/DL (ref 8.5–10.5)
CHLORIDE SERPL-SCNC: 106 MMOL/L (ref 96–112)
CO2 SERPL-SCNC: 21 MMOL/L (ref 20–33)
CREAT SERPL-MCNC: 0.55 MG/DL (ref 0.5–1.4)
ERYTHROCYTE [DISTWIDTH] IN BLOOD BY AUTOMATED COUNT: 43.1 FL (ref 35.9–50)
GFR SERPLBLD CREATININE-BSD FMLA CKD-EPI: 108 ML/MIN/1.73 M 2
GLOBULIN SER CALC-MCNC: 4.1 G/DL (ref 1.9–3.5)
GLUCOSE SERPL-MCNC: 91 MG/DL (ref 65–99)
HCT VFR BLD AUTO: 28.8 % (ref 37–47)
HGB BLD-MCNC: 9.1 G/DL (ref 12–16)
MAGNESIUM SERPL-MCNC: 1.6 MG/DL (ref 1.5–2.5)
MCH RBC QN AUTO: 27 PG (ref 27–33)
MCHC RBC AUTO-ENTMCNC: 31.6 G/DL (ref 33.6–35)
MCV RBC AUTO: 85.5 FL (ref 81.4–97.8)
PHOSPHATE SERPL-MCNC: 2.8 MG/DL (ref 2.5–4.5)
PLATELET # BLD AUTO: 399 K/UL (ref 164–446)
PMV BLD AUTO: 8.9 FL (ref 9–12.9)
POTASSIUM SERPL-SCNC: 4.5 MMOL/L (ref 3.6–5.5)
PROT SERPL-MCNC: 6.6 G/DL (ref 6–8.2)
RBC # BLD AUTO: 3.37 M/UL (ref 4.2–5.4)
SIGNIFICANT IND 70042: ABNORMAL
SITE SITE: ABNORMAL
SODIUM SERPL-SCNC: 138 MMOL/L (ref 135–145)
SOURCE SOURCE: ABNORMAL
VALPROATE SERPL-MCNC: 70.2 UG/ML (ref 50–100)
WBC # BLD AUTO: 4.8 K/UL (ref 4.8–10.8)

## 2023-03-12 PROCEDURE — A9270 NON-COVERED ITEM OR SERVICE: HCPCS | Performed by: INTERNAL MEDICINE

## 2023-03-12 PROCEDURE — 36415 COLL VENOUS BLD VENIPUNCTURE: CPT

## 2023-03-12 PROCEDURE — 99232 SBSQ HOSP IP/OBS MODERATE 35: CPT | Performed by: GENERAL PRACTICE

## 2023-03-12 PROCEDURE — 80164 ASSAY DIPROPYLACETIC ACD TOT: CPT

## 2023-03-12 PROCEDURE — 700102 HCHG RX REV CODE 250 W/ 637 OVERRIDE(OP): Performed by: GENERAL PRACTICE

## 2023-03-12 PROCEDURE — 770006 HCHG ROOM/CARE - MED/SURG/GYN SEMI*

## 2023-03-12 PROCEDURE — 85027 COMPLETE CBC AUTOMATED: CPT

## 2023-03-12 PROCEDURE — 80053 COMPREHEN METABOLIC PANEL: CPT

## 2023-03-12 PROCEDURE — 83735 ASSAY OF MAGNESIUM: CPT

## 2023-03-12 PROCEDURE — 84100 ASSAY OF PHOSPHORUS: CPT

## 2023-03-12 PROCEDURE — 700102 HCHG RX REV CODE 250 W/ 637 OVERRIDE(OP): Performed by: INTERNAL MEDICINE

## 2023-03-12 PROCEDURE — A9270 NON-COVERED ITEM OR SERVICE: HCPCS | Performed by: GENERAL PRACTICE

## 2023-03-12 RX ADMIN — DIVALPROEX SODIUM 500 MG: 500 TABLET, DELAYED RELEASE ORAL at 22:52

## 2023-03-12 RX ADMIN — DIVALPROEX SODIUM 500 MG: 500 TABLET, DELAYED RELEASE ORAL at 08:56

## 2023-03-12 RX ADMIN — NICOTINE 14 MG: 14 PATCH, EXTENDED RELEASE TRANSDERMAL at 08:56

## 2023-03-12 RX ADMIN — DOCUSATE SODIUM 50 MG AND SENNOSIDES 8.6 MG 2 TABLET: 8.6; 5 TABLET, FILM COATED ORAL at 22:52

## 2023-03-12 RX ADMIN — AMOXICILLIN AND CLAVULANATE POTASSIUM 1 TABLET: 500; 125 TABLET, FILM COATED ORAL at 05:00

## 2023-03-12 RX ADMIN — APIXABAN 10 MG: 5 TABLET, FILM COATED ORAL at 22:52

## 2023-03-12 RX ADMIN — OLANZAPINE 5 MG: 5 TABLET, FILM COATED ORAL at 22:52

## 2023-03-12 RX ADMIN — APIXABAN 10 MG: 5 TABLET, FILM COATED ORAL at 08:56

## 2023-03-12 RX ADMIN — AMOXICILLIN AND CLAVULANATE POTASSIUM 1 TABLET: 500; 125 TABLET, FILM COATED ORAL at 14:01

## 2023-03-12 RX ADMIN — OLANZAPINE 5 MG: 5 TABLET, FILM COATED ORAL at 08:56

## 2023-03-12 RX ADMIN — BICTEGRAVIR SODIUM, EMTRICITABINE, AND TENOFOVIR ALAFENAMIDE FUMARATE 1 TABLET: 50; 200; 25 TABLET ORAL at 08:56

## 2023-03-12 RX ADMIN — AMOXICILLIN AND CLAVULANATE POTASSIUM 1 TABLET: 500; 125 TABLET, FILM COATED ORAL at 22:52

## 2023-03-12 ASSESSMENT — PAIN DESCRIPTION - PAIN TYPE: TYPE: ACUTE PAIN

## 2023-03-12 ASSESSMENT — ENCOUNTER SYMPTOMS: WEAKNESS: 1

## 2023-03-12 NOTE — PROGRESS NOTES
Hospital Medicine Daily Progress Note    Date of Service  3/12/2023    Chief Complaint  Kellie Chatman is a 53 y.o. female admitted 3/6/2023 with AMS    Hospital Course  This is a 53-year-old female with past medical history of s/p right BKA and left transmetatarsal amputation, hypertension, HIV, seizure disorder, schizophrenia, methamphetamine use, tobacco use and homelessness who was found laying in the middle of the street and brought in by EMS.    Patient had recent admission at outside facility 2/2023 where she was noted to have recurrent frostbite on her right BKA and left transmetatarsal amputation stump, recommended to wait for further demarcation prior to any surgical intervention.  She was to follow-up with orthopedic surgery outpatient.  During that admission she was diagnosed with a left superficial femoral vein DVT discharged with Xarelto.    On this admission, patient had CT imaging which noted right internal iliac vein DVT, patient started on Eliquis.      Concern for possible pyelonephritis on CT imaging.  Patient started on Rocephin. Urine culture E. Faecium VRE.  Discussing the case with ID pharmacy as unable to determine if patient is symptomatic or not, as she is a poor historian.  Given the imaging, labs and urine culture unclear to treat.    Patient noted to spike a 101.6 fever on 3/8, negative chest x-ray, UA negative, COVID, influenza, RSV negative, chest x-ray negative for any infiltrate or pulmonary effusion.  Lactic acid and procalcitonin negative. Blood cultures with no growth to date.  Concern for possible underlying infection and patient's prior frostbitten BLE. MRSA swab positive.  Discussed with ID pharmacy, recommended de-escalation to Augmentin.  CT imaging LLE, negative for osteomyelitis or abscess formation.    Interval Problem Update  Urine culture E. Faecium VRE.  Discussing the case with ID pharmacy as unable to determine if patient is symptomatic or not, as she is a poor  historian.      Patient will require extensive wound care which will not be [facility] as home health cannot be initiated there.  Discussing with case management, if SNF declines, can arrange for [facility] with outpatient wound clinic appointments, her insurance covers transportation.    If these wounds are not managed, they can result in further cellulitis, osteomyelitis, leading to sepsis and/ or even death.     Patient tolerating resumption of Depakote and Zyprexa, depakote trough level ordered for 3/12. Labs ordered for 3/12.     I have discussed this patient's plan of care and discharge plan at IDT rounds today with Case Management, Nursing, Nursing leadership, and other members of the IDT team.    Consultants/Specialty  psychiatry    Code Status  Full Code    Disposition  Patient is not medically cleared for discharge.   Anticipate discharge to shelter SNF vs medical respite for wound care  I have placed the appropriate orders for post-discharge needs.    Review of Systems  Review of Systems   Constitutional:  Positive for malaise/fatigue.   Neurological:  Positive for weakness.   All other systems reviewed and are negative.     Physical Exam  Temp:  [36.5 °C (97.7 °F)-37.4 °C (99.4 °F)] 37.4 °C (99.4 °F)  Pulse:  [] 91  Resp:  [16-18] 18  BP: (105-118)/(59-69) 105/60  SpO2:  [93 %-96 %] 96 %    Physical Exam  Vitals and nursing note reviewed.   Constitutional:       General: She is not in acute distress.     Appearance: She is ill-appearing.   HENT:      Head: Normocephalic and atraumatic.      Mouth/Throat:      Mouth: Mucous membranes are moist.      Pharynx: No oropharyngeal exudate.   Eyes:      Extraocular Movements: Extraocular movements intact.      Pupils: Pupils are equal, round, and reactive to light.   Cardiovascular:      Rate and Rhythm: Normal rate and regular rhythm.      Pulses: Normal pulses.      Heart sounds: No murmur heard.    No friction rub. No  gallop.   Pulmonary:      Effort: Pulmonary effort is normal. No respiratory distress.      Breath sounds: No wheezing, rhonchi or rales.   Abdominal:      General: Bowel sounds are normal. There is no distension.      Palpations: Abdomen is soft. There is no mass.      Tenderness: There is no abdominal tenderness.   Musculoskeletal:         General: No swelling or tenderness. Normal range of motion.      Cervical back: Normal range of motion. No rigidity. No muscular tenderness.      Right lower leg: No edema.      Left lower leg: No edema.      Comments: Right lower extremity BKA, left lower extremity with transmetatarsal amputation, bullae noted, now draining copiously   Skin:     General: Skin is warm and dry.      Capillary Refill: Capillary refill takes less than 2 seconds.      Findings: No erythema or rash.   Neurological:      General: No focal deficit present.      Mental Status: She is alert. She is disoriented.      Motor: No weakness.      Gait: Gait normal.   Psychiatric:         Mood and Affect: Affect is flat.         Speech: Speech is delayed.         Behavior: Behavior is withdrawn.       Fluids    Intake/Output Summary (Last 24 hours) at 3/12/2023 1623  Last data filed at 3/12/2023 1435  Gross per 24 hour   Intake 118 ml   Output 1200 ml   Net -1082 ml       Laboratory  Recent Labs     03/10/23  0231 03/12/23  0658   WBC 6.4 4.8   RBC 3.12* 3.37*   HEMOGLOBIN 8.5* 9.1*   HEMATOCRIT 26.2* 28.8*   MCV 84.0 85.5   MCH 27.2 27.0   MCHC 32.4* 31.6*   RDW 43.3 43.1   PLATELETCT 331 399   MPV 9.8 8.9*     Recent Labs     03/10/23  0231 03/12/23  0533   SODIUM 137 138   POTASSIUM 4.2 4.5   CHLORIDE 106 106   CO2 22 21   GLUCOSE 101* 91   BUN 9 11   CREATININE 0.64 0.55   CALCIUM 8.3* 8.8                     Imaging  CT-FOOT WITH PLUS RECONS LEFT   Final Result      1.  Edema and/or cellulitis of the visualized leg, ankle and foot. No focal abscess.   2.  Large blisters.   3.  Postsurgical changes first  through fifth transmetatarsal amputations.   4.  No definite evidence for osteomyelitis.   5.  Some thickening of the Achilles tendon is nonspecific and not well evaluated on CT.   6.  Further evaluation with MRI as clinically indicated.      DX-CHEST-PORTABLE (1 VIEW)   Final Result      No acute cardiac or pulmonary abnormalities are identified.      DX-FEMUR-2+ RIGHT   Final Result      No radiographic evidence of acute traumatic injury.      CT-ABDOMEN-PELVIS WITH   Final Result      1.  Right internal iliac vein DVT.   2.  Somewhat striated nephrogram of the bilateral superior poles may be artifactual. Correlate with urinalysis for pyelonephritis.   3.  Redemonstration of enhancing lesion of the uterus. This has been evaluated on prior ultrasound.   4.  Prominent bilateral inguinal lymph nodes may be reactive. Malignancy not excluded.      Dr. Emanuel discussed these findings with Dr. Child at 1:49 PM by telephone on 3/6/2023      DX-FOOT-2- LEFT   Final Result      Status post transmetatarsal amputation. No definitive radiographic evidence of ostomy myelitis. If there is strong clinical suspicion, MRI of the foot with and without contrast should be obtained for further evaluation.      DX-CHEST-PORTABLE (1 VIEW)   Final Result      No acute cardiopulmonary disease evident.           Assessment/Plan  * Infection of amputation stump of left lower extremity (HCC)  Assessment & Plan  Concern for   Initially discussed the case with orthopedic surgery, who did not believe at this time required intervention  However patient with persistent fever, all other work-up negative  Concern for possible underlying infection and patient's prior frostbitten BLE stumps, bullae noted   Blood cultures with no growth to date.   MRSA swab positive.   Discontinued vancomycin and Zosyn, switched to Zyvox.  Monitor renal function.     CT imaging LLE, negative for osteomyelitis or abscess formation.   Discussed with ID pharmacy,  recommended de-escalation to Augmentin, 10 day total    Discussed with LPS for further wound management as blisters are leaking     Patient will require extensive wound care which will not be available [facility] as home health cannot be initiated there.  Discussed with case management SNF versus medical respite for placement to attend to her bilateral lower extremity wounds. If these wounds are not management, they can result in further cellulitis, osteomyelitis, leading to sepsis and/ or even death.     Fever in adult  Assessment & Plan  Patient noted to spike a 101.6 fever on 3/8, negative chest x-ray, UA negative, COVID, influenza, RSV negative, chest x-ray negative for any infiltrate or pulmonary effusion.  Lactic acid and procalcitonin negative. Blood cultures with no growth to date.  Concern for possible underlying infection and patient's prior frostbitten BLE. MRSA swab positive.     Pyelonephritis  Assessment & Plan  CT scan shows signs of ??pyelonephritis  UA with no nitrites or leuk esterases  Patient has abdominal pain but due to constipation  Initially on Rocephin, escalated to Zosyn and vancomycin at this time, switched to zyvox then augmentin  Repeat UA negative for any infection  Blood culture no growth to date  Urine culture E. Faecium VRE.  Discussing the case with ID pharmacy as unable to determine if patient is symptomatic or not, as she is a poor historian.  Given the imaging, labs and urine culture unclear to treat.    Schizophrenia (HCC)- (present on admission)  Assessment & Plan  Patient has hallucination with hearing voices  Denied any SI or HI, however with active auditory hallucinations  Resume home medication Depakote, formally on Zyprexa  Psych consulted, continue with Depakote, trough level ordered for 3/12, Zyprexa 5 mg twice daily initiated    Slow transit constipation  Assessment & Plan  Patient noted to have abdominal distention, prior bowel movement several days  prior  Started on aggressive bowel regimen, given one-time dose of Dulcolax  Still with no bowel movement, oral lactulose given  Large BM 3/8 PM  Serial abdominal exams  RESOLVED    Noncompliance  Assessment & Plan  Significant history with noncompliance with medications and doctor visits    Homelessness  Assessment & Plan  Complicated medical history and noncompliance and drug abuse   was consulted to consider group home      Lactic acid acidosis  Assessment & Plan  Likely related to dehydration and infection  IV fluid and ceftriaxone  Resolved    Methamphetamine abuse ([facility])  Assessment & Plan  Encouraged the patient to quit  Urine drug screen is positive  Blood culture no growth to date    Acute deep vein thrombosis (DVT) ([facility])- (present on admission)  Assessment & Plan  Patient had recent admission at outside facility 2/2023,  she was diagnosed with a left superficial femoral vein DVT discharged with Xarelto.  Patient had CT imaging which noted right internal iliac vein DVT.    Initially on heparin drip, transitioned to Eliquis      HIV (human immunodeficiency virus infection) ([facility])- (present on admission)  Assessment & Plan  Patient is on Biktarvy however, clearly questioning about compliance  CD4-761 and CD8-194  Resume Biktarvy with close monitoring    Hypokalemia- (present on admission)  Assessment & Plan  Labs reviewed, resolved    Tobacco abuse- (present on admission)  Assessment & Plan  Cessation counseled, 3 minute  Nicotine replacement    Hx of BKA, right ([facility])  Assessment & Plan  Right BKA and left transmetatarsal amputation  LPS following, local wound care          VTE prophylaxis: therapeutic anticoagulation with Eliquis    I have performed a physical exam and reviewed and updated ROS and Plan today (3/12/2023). In review of yesterday's note (3/11/2023), there are no changes except as documented above.

## 2023-03-12 NOTE — CARE PLAN
Problem: Skin Integrity  Goal: Skin integrity is maintained or improved  Outcome: Progressing     Problem: Fall Risk  Goal: Patient will remain free from falls  Outcome: Progressing     The patient is Stable - Low risk of patient condition declining or worsening    Shift Goals  Clinical Goals: wound care, compliance with cares  Patient Goals: rest    Progress made toward(s) clinical / shift goals:  fall precautions in place. Bed locked and in lowest position.     Patient is not progressing towards the following goals:

## 2023-03-12 NOTE — PROGRESS NOTES
Received report and assumed care at shift change. A&O x 4, non compliant with some cares. Patient sleeps majority of the time but is easy to awake. Fall precautions in place, bed locked and in lowest position. Complained of pain but refused medication when it was offered and goes back to sleep.

## 2023-03-13 LAB
BACTERIA BLD CULT: NORMAL
BACTERIA BLD CULT: NORMAL
BASOPHILS # BLD AUTO: 0.8 % (ref 0–1.8)
BASOPHILS # BLD: 0.04 K/UL (ref 0–0.12)
EOSINOPHIL # BLD AUTO: 0.14 K/UL (ref 0–0.51)
EOSINOPHIL NFR BLD: 2.8 % (ref 0–6.9)
ERYTHROCYTE [DISTWIDTH] IN BLOOD BY AUTOMATED COUNT: 43 FL (ref 35.9–50)
GLUCOSE BLD STRIP.AUTO-MCNC: 95 MG/DL (ref 65–99)
HCT VFR BLD AUTO: 28.8 % (ref 37–47)
HGB BLD-MCNC: 9.1 G/DL (ref 12–16)
IMM GRANULOCYTES # BLD AUTO: 0.09 K/UL (ref 0–0.11)
IMM GRANULOCYTES NFR BLD AUTO: 1.8 % (ref 0–0.9)
LYMPHOCYTES # BLD AUTO: 1.21 K/UL (ref 1–4.8)
LYMPHOCYTES NFR BLD: 24.2 % (ref 22–41)
MCH RBC QN AUTO: 26.5 PG (ref 27–33)
MCHC RBC AUTO-ENTMCNC: 31.6 G/DL (ref 33.6–35)
MCV RBC AUTO: 84 FL (ref 81.4–97.8)
MONOCYTES # BLD AUTO: 0.41 K/UL (ref 0–0.85)
MONOCYTES NFR BLD AUTO: 8.2 % (ref 0–13.4)
NEUTROPHILS # BLD AUTO: 3.12 K/UL (ref 2–7.15)
NEUTROPHILS NFR BLD: 62.2 % (ref 44–72)
NRBC # BLD AUTO: 0 K/UL
NRBC BLD-RTO: 0 /100 WBC
PLATELET # BLD AUTO: 465 K/UL (ref 164–446)
PMV BLD AUTO: 9.3 FL (ref 9–12.9)
RBC # BLD AUTO: 3.43 M/UL (ref 4.2–5.4)
SIGNIFICANT IND 70042: NORMAL
SIGNIFICANT IND 70042: NORMAL
SITE SITE: NORMAL
SITE SITE: NORMAL
SOURCE SOURCE: NORMAL
SOURCE SOURCE: NORMAL
WBC # BLD AUTO: 5 K/UL (ref 4.8–10.8)

## 2023-03-13 PROCEDURE — 770001 HCHG ROOM/CARE - MED/SURG/GYN PRIV*

## 2023-03-13 PROCEDURE — A9270 NON-COVERED ITEM OR SERVICE: HCPCS | Performed by: GENERAL PRACTICE

## 2023-03-13 PROCEDURE — 700102 HCHG RX REV CODE 250 W/ 637 OVERRIDE(OP): Performed by: INTERNAL MEDICINE

## 2023-03-13 PROCEDURE — 82962 GLUCOSE BLOOD TEST: CPT

## 2023-03-13 PROCEDURE — 36415 COLL VENOUS BLD VENIPUNCTURE: CPT

## 2023-03-13 PROCEDURE — 700102 HCHG RX REV CODE 250 W/ 637 OVERRIDE(OP): Performed by: GENERAL PRACTICE

## 2023-03-13 PROCEDURE — 99231 SBSQ HOSP IP/OBS SF/LOW 25: CPT | Mod: GC | Performed by: PSYCHIATRY & NEUROLOGY

## 2023-03-13 PROCEDURE — 85025 COMPLETE CBC W/AUTO DIFF WBC: CPT

## 2023-03-13 PROCEDURE — A9270 NON-COVERED ITEM OR SERVICE: HCPCS | Performed by: INTERNAL MEDICINE

## 2023-03-13 PROCEDURE — 99232 SBSQ HOSP IP/OBS MODERATE 35: CPT | Performed by: GENERAL PRACTICE

## 2023-03-13 RX ADMIN — AMOXICILLIN AND CLAVULANATE POTASSIUM 1 TABLET: 500; 125 TABLET, FILM COATED ORAL at 16:25

## 2023-03-13 RX ADMIN — DOCUSATE SODIUM 50 MG AND SENNOSIDES 8.6 MG 2 TABLET: 8.6; 5 TABLET, FILM COATED ORAL at 10:23

## 2023-03-13 RX ADMIN — DOCUSATE SODIUM 50 MG AND SENNOSIDES 8.6 MG 2 TABLET: 8.6; 5 TABLET, FILM COATED ORAL at 20:14

## 2023-03-13 RX ADMIN — NICOTINE 14 MG: 14 PATCH, EXTENDED RELEASE TRANSDERMAL at 10:22

## 2023-03-13 RX ADMIN — APIXABAN 10 MG: 5 TABLET, FILM COATED ORAL at 10:23

## 2023-03-13 RX ADMIN — DIVALPROEX SODIUM 500 MG: 500 TABLET, DELAYED RELEASE ORAL at 10:23

## 2023-03-13 RX ADMIN — AMOXICILLIN AND CLAVULANATE POTASSIUM 1 TABLET: 500; 125 TABLET, FILM COATED ORAL at 20:14

## 2023-03-13 RX ADMIN — DIVALPROEX SODIUM 500 MG: 500 TABLET, DELAYED RELEASE ORAL at 20:14

## 2023-03-13 RX ADMIN — AMOXICILLIN AND CLAVULANATE POTASSIUM 1 TABLET: 500; 125 TABLET, FILM COATED ORAL at 06:03

## 2023-03-13 RX ADMIN — OLANZAPINE 5 MG: 5 TABLET, FILM COATED ORAL at 10:23

## 2023-03-13 RX ADMIN — APIXABAN 5 MG: 5 TABLET, FILM COATED ORAL at 21:05

## 2023-03-13 RX ADMIN — OLANZAPINE 5 MG: 5 TABLET, FILM COATED ORAL at 20:14

## 2023-03-13 RX ADMIN — BICTEGRAVIR SODIUM, EMTRICITABINE, AND TENOFOVIR ALAFENAMIDE FUMARATE 1 TABLET: 50; 200; 25 TABLET ORAL at 10:23

## 2023-03-13 ASSESSMENT — ENCOUNTER SYMPTOMS
HALLUCINATIONS: 0
WEAKNESS: 1
DEPRESSION: 0

## 2023-03-13 NOTE — CONSULTS
"PSYCHIATRIC FOLLOW-UP:(established)  *Reason for admission: wound check, aggressive behavior        *Legal Hold Status: Pt is not on a legal hold   Chart reviewed.         No acute events overnight per nursing documentation. Pt has been adherent with medication. Pt was evaluated this afternoon at bedside. She states that her mood has been \"good\". She is sleeping well overnight and has had a sufficient appetite. Pt is still in pain from her leg wounds however reports that it is gradually decreasing in intensity. She denies SI or HI. She states that last instance of auditory hallucinations occurred three days ago. She does note that the Depakote has been causing drowsiness but denies significant sedation. She has no further questions or concerns at this time.     Medical ROS (as pertinent):     Review of Systems   Psychiatric/Behavioral:  Negative for depression, hallucinations and suicidal ideas.        *Psychiatric Examination:  Vitals:   Vitals:    03/13/23 0738   BP: 108/71   Pulse: 78   Resp: 18   Temp: 36.6 °C (97.8 °F)   SpO2: 97%      General Appearance: Appears stated age, no acute distress, wearing hospital gown, laying in bed, somnolent but engaging with interview. More alert than previous interaction.  Abnormal Movements: No abnormal movements. Good eye contact.   Gait and Posture: Gait not observed; posture is okay  Speech: Normal rate and volume, nonpressured speech.   Thought processes: linear, goal directed  Associations: No loose associations  Abnormal or Psychotic Thoughts: No AVH; does not appear to be responding to internal stimuli.  No paranoia or delusional content evident on evaluation.  Judgement and Insight: poor-limited  Orientation: Alert and fully oriented  Recent and Remote Memory: deficits in memory not evident today   Attention Span and Concentration: Within normal limits  Language: Fluent and coherent English  Fund of Knowledge: Estimated to be average for age group  Mood and Affect: " "\"good\" restricted range, non-labile  SI/HI: Denies/denies     *PAST MEDICAL/PSYCH/FAMILY/SOCIAL(as reported by patient):       Past Psychiatric History:  Previous Diagnosis: schizophrenia, schizoaffective bipolar type; reportedly diagnosed with psychosis at a young age  Current meds: depakote (dose unclear) and zyprexa (dose unclear)  Previous med trials: unable to recall  Hospitalizations: little engagement with interview   Suicide attempts/SIB: reports that she attempted to end her life two years ago via cutting   Outpatient services: states that she saw a psychiatrist 3 days ago but unable to describe any details of the encounter    Family Hx:   Denies    Social Hx:   Drugs: Uses meth \"monthly\". Last usage a couple of days ago. Occasional cocaine and marijuana.   Alcohol: reportedly has not had alcohol in a few days. Used to drink daily.  Nicotine: denies      Results for orders placed or performed during the hospital encounter of 23   EKG   Result Value Ref Range    Report       [facility] Emergency [facility]    Test Date:  2023  Pt Name:    MARY MCCONNELL                Department: ER  MRN:        [id number]                      Room:       [facility]  Gender:     Female                       Technician: [id number]  :        1970                   Requested By:JOE LANG  Order #:    [id number]                    Reading MD: Joe Lang MD    Measurements  Intervals                                Axis  Rate:       109                          P:          51  CT:         99                           QRS:        -8  QRSD:       131                          T:          91  QT:         358  QTc:        483    Interpretive Statements  Sinus tachycardia  Atrial premature complex  Probable left ventricular hypertrophy  Artifact in lead(s) I,III,aVR,aVL,V1,V2,V6 and baseline wander in lead(s) V6  Compared to ECG 2022 06:02:51  Atrial premature complex(es) now present  Sinus rhythm no " longer present  Electronically Signed On 3-6-2023 14:40:10  PST by Reno Child MD        Brain Imaging: N/a  EEG: N/a     Assessment:  Pt is a 53 y.o. female with past medical history of schizophrenia, HIV on biktarvy, HTN, seizure disorder, methamphetamine use disorder, and s/p right BKA and left transmetatarsal amputation found to be lying in the middle of the street complaining of abdominal pain. She did endorse auditory hallucinations upon initial presentation, but these have since diminished since the addition of the Zyprexa. It could also be that the improvement is due to the methamphetamine leaving her system. Pt does report drowsiness on Depakote however would like to continue the current dose.     Dx:  Methamphetamine intoxication on arrival  Methamphetamine withdrawal   Schizophrenia      Methamphetamine use disorder    Medical:  Acute DVT  Pyelonephritis    Plan:  Legal hold: Pt is not on a legal hold   Psychotropic medications  Recommend continuing Depakote  mg bid for mood stabilization and seizure disorder.   Recommend continuing Zyprexa PO at dose of 5 mg bid for psychosis.   Labs reviewed  EKG reviewed: Qtc of 483, Depakote lvl 70.2  Old records reviewed   Pt counseled on tobacco cessation (code [id number])  Discussed the case with: Dr. Martin  Psychiatry will sign off    Thank you for the consult.

## 2023-03-13 NOTE — DISCHARGE PLANNING
Care Transition Team Discharge Planning    Anticipated Discharge Information  Discharge Disposition: Still a Patient [phone] Patient has been declined by [facility]. CM will follow up with MD for OP wound care.       Discharge Plan:  ROGELIO spoke with Rose Mary at [facility] and she states that she hasn't looked at this patient's referral. She states that she will look at it and call CM back. CM will continue to follow.

## 2023-03-13 NOTE — PROGRESS NOTES
Hospital Medicine Daily Progress Note    Date of Service  3/13/2023    Chief Complaint  Kellie Chatman is a 53 y.o. female admitted 3/6/2023 with [facility]    Hospital Course  This is a 53-year-old female with past medical history of s/p right BKA and left transmetatarsal amputation, hypertension, HIV, seizure disorder, schizophrenia, methamphetamine use, tobacco use and homelessness who was found laying in the middle of the street and brought in by EMS.    Patient had recent admission at outside facility 2/2023 where she was noted to have recurrent frostbite on her right BKA and left transmetatarsal amputation stump, recommended to wait for further demarcation prior to any surgical intervention.  She was to follow-up with orthopedic surgery outpatient.  During that admission she was diagnosed with a left superficial femoral vein DVT discharged with Xarelto.    On this admission, patient had CT imaging which noted right internal iliac vein DVT, patient started on Eliquis.      Concern for possible pyelonephritis on CT imaging.  Patient started on Rocephin. Urine culture E. Faecium VRE.  Discussing the case with ID pharmacy as unable to determine if patient is symptomatic or not, as she is a poor historian.  Given she improved clinically throughout this admission, will defer any further changes in her antibiotic regimen.    Blood cultures with no growth to date.  Concern for possible underlying infection and patient's prior frostbitten BLE. MRSA swab positive.  Discussed with ID pharmacy, recommended de-escalation to Augmentin.  CT imaging LLE, negative for osteomyelitis or abscess formation.  Discussed with orthopedic surgery, no recommendation for debridement or surgical intervention at this time.  LPS consulted for wound care.     Interval Problem Update  Discussed with ID pharmacy, given patient has improved clinically, will refrain from treating VRE in the urine.     Discussing with case management, if SNF declines for  "wound care, can arrange for return to [facility] with outpatient wound clinic appointments, her insurance covers transportation.     If these wounds are not managed, they can result in further cellulitis, osteomyelitis, leading to sepsis and/ or even death.     Depakote trough level stable.    I have discussed this patient's plan of care and discharge plan at IDT rounds today with Case Management, Nursing, Nursing leadership, and other members of the IDT team.    Consultants/Specialty  psychiatry    Code Status  Full Code    Disposition  Patient is medically cleared for discharge.   Anticipate discharge to Shelter + outpt wound care vs SNF  I have placed the appropriate orders for post-discharge needs.    Review of Systems  Review of Systems   Constitutional:  Positive for malaise/fatigue.   Neurological:  Positive for weakness.   All other systems reviewed and are negative.     Physical Exam  Temp:  [36.6 °C (97.8 °F)-37.5 °C (99.5 °F)] 36.8 °C (98.2 °F)  Pulse:  [78-89] 82  Resp:  [18-20] 18  BP: (102-108)/(57-71) 102/59  SpO2:  [95 %-97 %] 95 %    Physical Exam  Vitals and nursing note reviewed.   Constitutional:       General: She is not in acute distress.     Appearance: She is ill-appearing.   HENT:      Head: Normocephalic and atraumatic.      Mouth/Throat:      Mouth: Mucous membranes are moist.      Pharynx: No oropharyngeal exudate.   Eyes:      Extraocular Movements: Extraocular movements intact.      Pupils: Pupils are equal, round, and reactive to light.   Cardiovascular:      Rate and Rhythm: Normal rate and regular rhythm.      Pulses: Normal pulses.      Heart sounds: No murmur heard.    No friction rub. No gallop.   Pulmonary:      Effort: Pulmonary effort is normal. No respiratory distress.      Breath sounds: No wheezing, rhonchi or rales.   Abdominal:      General: Bowel sounds are normal. There is no distension.      Palpations: Abdomen is soft. There is no mass.      Tenderness: There is no " abdominal tenderness.   Musculoskeletal:         General: No swelling or tenderness. Normal range of motion.      Cervical back: Normal range of motion. No rigidity. No muscular tenderness.      Right lower leg: No edema.      Left lower leg: No edema.      Comments: Right lower extremity BKA, left lower extremity with transmetatarsal amputation, bullae noted, now draining copiously   Skin:     General: Skin is warm and dry.      Capillary Refill: Capillary refill takes less than 2 seconds.      Findings: No erythema or rash.   Neurological:      General: No focal deficit present.      Mental Status: She is alert. She is disoriented.      Motor: No weakness.      Gait: Gait normal.   Psychiatric:         Mood and Affect: Affect is flat.         Speech: Speech is delayed.         Behavior: Behavior is withdrawn.       Fluids    Intake/Output Summary (Last 24 hours) at 3/13/2023 1616  Last data filed at 3/13/2023 1042  Gross per 24 hour   Intake 240 ml   Output 900 ml   Net -660 ml       Laboratory  Recent Labs     03/12/23  0658 03/13/23  1033   WBC 4.8 5.0   RBC 3.37* 3.43*   HEMOGLOBIN 9.1* 9.1*   HEMATOCRIT 28.8* 28.8*   MCV 85.5 84.0   MCH 27.0 26.5*   MCHC 31.6* 31.6*   RDW 43.1 43.0   PLATELETCT 399 465*   MPV 8.9* 9.3     Recent Labs     03/12/23  0533   SODIUM 138   POTASSIUM 4.5   CHLORIDE 106   CO2 21   GLUCOSE 91   BUN 11   CREATININE 0.55   CALCIUM 8.8                     Imaging  CT-FOOT WITH PLUS RECONS LEFT   Final Result      1.  Edema and/or cellulitis of the visualized leg, ankle and foot. No focal abscess.   2.  Large blisters.   3.  Postsurgical changes first through fifth transmetatarsal amputations.   4.  No definite evidence for osteomyelitis.   5.  Some thickening of the Achilles tendon is nonspecific and not well evaluated on CT.   6.  Further evaluation with MRI as clinically indicated.      DX-CHEST-PORTABLE (1 VIEW)   Final Result      No acute cardiac or pulmonary abnormalities are  "identified.      DX-FEMUR-2+ RIGHT   Final Result      No radiographic evidence of acute traumatic injury.      CT-ABDOMEN-PELVIS WITH   Final Result      1.  Right internal iliac vein DVT.   2.  Somewhat striated nephrogram of the bilateral superior poles may be artifactual. Correlate with urinalysis for pyelonephritis.   3.  Redemonstration of enhancing lesion of the uterus. This has been evaluated on prior ultrasound.   4.  Prominent bilateral inguinal lymph nodes may be reactive. Malignancy not excluded.      Dr. Emanuel discussed these findings with Dr. Child at 1:49 PM by telephone on 3/6/2023      DX-FOOT-2- LEFT   Final Result      Status post transmetatarsal amputation. No definitive radiographic evidence of ostomy myelitis. If there is strong clinical suspicion, MRI of the foot with and without contrast should be obtained for further evaluation.      DX-CHEST-PORTABLE (1 VIEW)   Final Result      No acute cardiopulmonary disease evident.           Assessment/Plan  * Infection of amputation stump of left lower extremity (HCC)  Assessment & Plan  Concern for   Initially discussed the case with orthopedic surgery, who did not believe at this time required intervention  However patient with persistent fever, all other work-up negative  Concern for possible underlying infection and patient's prior frostbitten BLE stumps, bullae noted   Blood cultures with no growth to date.   MRSA swab positive.   Discontinued vancomycin and Zosyn, switched to Zyvox.  Monitor renal function.     CT imaging LLE, negative for osteomyelitis or abscess formation.   Discussed with ID pharmacy, recommended de-escalation to Augmentin, 10 day total    Discussed with LPS for further wound management as blisters are leaking     Discussing with case management, if SNF declines for wound care, can arrange for return to [facility] with outpatient wound clinic appointments, her insurance covers transportation. If these wounds are not " management, they can result in further cellulitis, osteomyelitis, leading to sepsis and/ or even death.     Fever in adult  Assessment & Plan  Patient noted to spike a 101.6 fever on 3/8, negative chest x-ray, UA negative, COVID, influenza, RSV negative, chest x-ray negative for any infiltrate or pulmonary effusion.  Lactic acid and procalcitonin negative. Blood cultures with no growth to date.  Concern for possible underlying infection and patient's prior frostbitten BLE. MRSA swab positive.     Pyelonephritis  Assessment & Plan  CT scan shows signs of ??pyelonephritis  UA with no nitrites or leuk esterases  Patient has abdominal pain but due to constipation  Initially on Rocephin, escalated to Zosyn and vancomycin at this time, switched to zyvox then augmentin  Repeat UA negative for any infection  Blood culture no growth to date  Urine culture E. Faecium VRE.  Discussed the case with ID pharmacy as unable to determine if patient is symptomatic or not, as she is a poor historian  Given she improved clinically throughout this admission, will defer any further changes in her antibiotic regimen.    Schizophrenia (HCC)- (present on admission)  Assessment & Plan  Patient has hallucination with hearing voices  Denied any SI or HI, however with active auditory hallucinations  Resume home medication Depakote, formally on Zyprexa  Psych consulted, continue with Depakote, trough level stable, Zyprexa 5 mg twice daily initiated    Slow transit constipation  Assessment & Plan  Patient noted to have abdominal distention, prior bowel movement several days prior  Started on aggressive bowel regimen, given one-time dose of Dulcolax  Still with no bowel movement, oral lactulose given  Large BM 3/8 PM  Serial abdominal exams  RESOLVED    Noncompliance  Assessment & Plan  Significant history with noncompliance with medications and doctor visits    Homelessness  Assessment & Plan  Complicated medical history and noncompliance and  drug abuse   was consulted to consider group home      Lactic acid acidosis  Assessment & Plan  Likely related to dehydration and infection  IV fluid and ceftriaxone  Resolved    Methamphetamine abuse ([facility])  Assessment & Plan  Encouraged the patient to quit  Urine drug screen is positive  Blood culture no growth to date    Acute deep vein thrombosis (DVT) ([facility])- (present on admission)  Assessment & Plan  Patient had recent admission at outside facility 2/2023,  she was diagnosed with a left superficial femoral vein DVT discharged with Xarelto.  Patient had CT imaging which noted right internal iliac vein DVT.    Initially on heparin drip, transitioned to Eliquis      HIV (human immunodeficiency virus infection) ([facility])- (present on admission)  Assessment & Plan  Patient is on Biktarvy however, clearly questioning about compliance  CD4-761 and CD8-194  Resume Biktarvy with close monitoring    Hypokalemia- (present on admission)  Assessment & Plan  Labs reviewed, resolved    Tobacco abuse- (present on admission)  Assessment & Plan  Cessation counseled, 3 minute  Nicotine replacement    Hx of BKA, right ([facility])  Assessment & Plan  Right BKA and left transmetatarsal amputation  LPS following, local wound care          VTE prophylaxis: therapeutic anticoagulation with Eliquis    I have performed a physical exam and reviewed and updated ROS and Plan today (3/13/2023). In review of yesterday's note (3/12/2023), there are no changes except as documented above.

## 2023-03-13 NOTE — PROGRESS NOTES
Received report and assumed care at shift change. A&O x 4. Fall precautions in place, bed locked and in lowest position. Needs attended to. No complains of pain or distress.

## 2023-03-13 NOTE — CARE PLAN
Problem: Fall Risk  Goal: Patient will remain free from falls  Outcome: Progressing     The patient is Stable - Low risk of patient condition declining or worsening    Shift Goals  Clinical Goals: safety  Patient Goals: rest    Progress made toward(s) clinical / shift goals:  fall precautions in place. Bed locked and in lowest position.     Patient is not progressing towards the following goals:

## 2023-03-14 PROCEDURE — A9270 NON-COVERED ITEM OR SERVICE: HCPCS | Performed by: INTERNAL MEDICINE

## 2023-03-14 PROCEDURE — 11055 PARING/CUTG B9 HYPRKER LES 1: CPT | Performed by: NURSE PRACTITIONER

## 2023-03-14 PROCEDURE — 700111 HCHG RX REV CODE 636 W/ 250 OVERRIDE (IP): Performed by: STUDENT IN AN ORGANIZED HEALTH CARE EDUCATION/TRAINING PROGRAM

## 2023-03-14 PROCEDURE — 99232 SBSQ HOSP IP/OBS MODERATE 35: CPT | Performed by: STUDENT IN AN ORGANIZED HEALTH CARE EDUCATION/TRAINING PROGRAM

## 2023-03-14 PROCEDURE — 700102 HCHG RX REV CODE 250 W/ 637 OVERRIDE(OP): Performed by: STUDENT IN AN ORGANIZED HEALTH CARE EDUCATION/TRAINING PROGRAM

## 2023-03-14 PROCEDURE — A9270 NON-COVERED ITEM OR SERVICE: HCPCS | Performed by: GENERAL PRACTICE

## 2023-03-14 PROCEDURE — 700102 HCHG RX REV CODE 250 W/ 637 OVERRIDE(OP): Performed by: GENERAL PRACTICE

## 2023-03-14 PROCEDURE — A9270 NON-COVERED ITEM OR SERVICE: HCPCS | Performed by: STUDENT IN AN ORGANIZED HEALTH CARE EDUCATION/TRAINING PROGRAM

## 2023-03-14 PROCEDURE — 700102 HCHG RX REV CODE 250 W/ 637 OVERRIDE(OP): Performed by: INTERNAL MEDICINE

## 2023-03-14 PROCEDURE — 99233 SBSQ HOSP IP/OBS HIGH 50: CPT | Mod: 25 | Performed by: NURSE PRACTITIONER

## 2023-03-14 PROCEDURE — 700105 HCHG RX REV CODE 258: Performed by: STUDENT IN AN ORGANIZED HEALTH CARE EDUCATION/TRAINING PROGRAM

## 2023-03-14 PROCEDURE — 770001 HCHG ROOM/CARE - MED/SURG/GYN PRIV*

## 2023-03-14 RX ORDER — LINEZOLID 600 MG/1
600 TABLET, FILM COATED ORAL EVERY 12 HOURS
Status: COMPLETED | OUTPATIENT
Start: 2023-03-14 | End: 2023-03-19

## 2023-03-14 RX ADMIN — DOCUSATE SODIUM 50 MG AND SENNOSIDES 8.6 MG 2 TABLET: 8.6; 5 TABLET, FILM COATED ORAL at 20:31

## 2023-03-14 RX ADMIN — NICOTINE 14 MG: 14 PATCH, EXTENDED RELEASE TRANSDERMAL at 08:27

## 2023-03-14 RX ADMIN — LINEZOLID 600 MG: 600 TABLET, FILM COATED ORAL at 17:53

## 2023-03-14 RX ADMIN — APIXABAN 5 MG: 5 TABLET, FILM COATED ORAL at 20:31

## 2023-03-14 RX ADMIN — OLANZAPINE 5 MG: 5 TABLET, FILM COATED ORAL at 20:31

## 2023-03-14 RX ADMIN — AMPICILLIN AND SULBACTAM 3 G: 1; 2 INJECTION, POWDER, FOR SOLUTION INTRAMUSCULAR; INTRAVENOUS at 16:19

## 2023-03-14 RX ADMIN — BICTEGRAVIR SODIUM, EMTRICITABINE, AND TENOFOVIR ALAFENAMIDE FUMARATE 1 TABLET: 50; 200; 25 TABLET ORAL at 08:26

## 2023-03-14 RX ADMIN — OLANZAPINE 5 MG: 5 TABLET, FILM COATED ORAL at 08:26

## 2023-03-14 RX ADMIN — APIXABAN 5 MG: 5 TABLET, FILM COATED ORAL at 08:27

## 2023-03-14 RX ADMIN — AMOXICILLIN AND CLAVULANATE POTASSIUM 1 TABLET: 500; 125 TABLET, FILM COATED ORAL at 04:41

## 2023-03-14 RX ADMIN — DIVALPROEX SODIUM 500 MG: 500 TABLET, DELAYED RELEASE ORAL at 08:26

## 2023-03-14 RX ADMIN — DIVALPROEX SODIUM 500 MG: 500 TABLET, DELAYED RELEASE ORAL at 20:32

## 2023-03-14 ASSESSMENT — ENCOUNTER SYMPTOMS: WEAKNESS: 1

## 2023-03-14 ASSESSMENT — PAIN DESCRIPTION - PAIN TYPE: TYPE: ACUTE PAIN

## 2023-03-14 NOTE — CARE PLAN
The patient is Stable - Low risk of patient condition declining or worsening    Shift Goals  Clinical Goals: Monitor wound improvement, IV abo  Patient Goals: rest  Family Goals: ryan    Progress made toward(s) clinical / shift goals:      Problem: Knowledge Deficit - Standard  Goal: Patient and family/care givers will demonstrate understanding of plan of care, disease process/condition, diagnostic tests and medications  Outcome: Progressing     Problem: Pain - Standard  Goal: Alleviation of pain or a reduction in pain to the patient’s comfort goal  Outcome: Progressing     Problem: Skin Integrity  Goal: Skin integrity is maintained or improved  Outcome: Progressing     Problem: Fall Risk  Goal: Patient will remain free from falls  Outcome: Progressing

## 2023-03-14 NOTE — PROGRESS NOTES
Hospital Medicine Daily Progress Note    Date of Service  3/14/2023    Chief Complaint  Kellie Chatman is a 53 y.o. female admitted 3/6/2023 with [facility]    Hospital Course  This is a 53-year-old female with past medical history of s/p right BKA and left transmetatarsal amputation, hypertension, HIV, seizure disorder, schizophrenia, methamphetamine use, tobacco use and homelessness who was found laying in the middle of the street and brought in by EMS.    Patient had recent admission at outside facility 2/2023 where she was noted to have recurrent frostbite on her right BKA and left transmetatarsal amputation stump, recommended to wait for further demarcation prior to any surgical intervention.  She was to follow-up with orthopedic surgery outpatient.  During that admission she was diagnosed with a left superficial femoral vein DVT discharged with Xarelto.    On this admission, patient had CT imaging which noted right internal iliac vein DVT, patient started on Eliquis.      Concern for possible pyelonephritis on CT imaging.  Patient started on Rocephin. Urine culture E. Faecium VRE.  Discussing the case with ID pharmacy as unable to determine if patient is symptomatic or not, as she is a poor historian.  Given she improved clinically throughout this admission, will defer any further changes in her antibiotic regimen.    Blood cultures with no growth to date.  Concern for possible underlying infection and patient's prior frostbitten BLE. MRSA swab positive.  Discussed with ID pharmacy, recommended de-escalation to Augmentin.  CT imaging LLE, negative for osteomyelitis or abscess formation.  Discussed with orthopedic surgery, no recommendation for debridement or surgical intervention at this time.  LPS consulted for wound care.     Interval Problem Update  No acute overnight events.   Reports abdominal pain, 3/10, bowel sound appreciated. Abdomen soft.     Discussed with LPS, Frostbite injury to left heel and posterior  ankle continues to evolve and worsen. Likely need BKA next week.     On Augmentin, given worsening wound, will change to Unasyn. MRSA swab positive, added zyvox.     I have discussed this patient's plan of care and discharge plan at IDT rounds today with Case Management, Nursing, Nursing leadership, and other members of the IDT team.    Consultants/Specialty  psychiatry  LPS    Code Status  Full Code    Disposition  Patient is medically cleared for discharge.   Anticipate discharge to Shelter + outpt wound care vs SNF  I have placed the appropriate orders for post-discharge needs.    Review of Systems  Review of Systems   Constitutional:  Positive for malaise/fatigue.   Neurological:  Positive for weakness.   All other systems reviewed and are negative.     Physical Exam  Temp:  [36.4 °C (97.6 °F)-36.8 °C (98.2 °F)] 36.6 °C (97.8 °F)  Pulse:  [80-95] 80  Resp:  [17-18] 17  BP: ()/(50-65) 110/65  SpO2:  [95 %-97 %] 97 %    Physical Exam  Vitals and nursing note reviewed.   Constitutional:       General: She is not in acute distress.     Appearance: She is ill-appearing.   HENT:      Head: Normocephalic and atraumatic.      Mouth/Throat:      Mouth: Mucous membranes are moist.      Pharynx: No oropharyngeal exudate.   Eyes:      Extraocular Movements: Extraocular movements intact.      Pupils: Pupils are equal, round, and reactive to light.   Cardiovascular:      Rate and Rhythm: Normal rate and regular rhythm.      Pulses: Normal pulses.      Heart sounds: No murmur heard.    No friction rub. No gallop.   Pulmonary:      Effort: Pulmonary effort is normal. No respiratory distress.      Breath sounds: No wheezing, rhonchi or rales.   Abdominal:      General: Bowel sounds are normal. There is no distension.      Palpations: Abdomen is soft. There is no mass.      Tenderness: There is no abdominal tenderness.   Musculoskeletal:         General: No swelling or tenderness. Normal range of motion.      Cervical back:  Normal range of motion. No rigidity. No muscular tenderness.      Right lower leg: No edema.      Left lower leg: No edema.      Comments: Right lower extremity BKA, left lower extremity with transmetatarsal amputation, bullae noted, now draining copiously   Skin:     General: Skin is warm and dry.      Capillary Refill: Capillary refill takes less than 2 seconds.      Findings: No erythema or rash.   Neurological:      General: No focal deficit present.      Mental Status: She is alert. She is disoriented.      Motor: No weakness.      Gait: Gait normal.   Psychiatric:         Mood and Affect: Affect is flat.         Speech: Speech is delayed.         Behavior: Behavior is withdrawn.       Fluids    Intake/Output Summary (Last 24 hours) at 3/14/2023 1437  Last data filed at 3/13/2023 2017  Gross per 24 hour   Intake 240 ml   Output --   Net 240 ml         Laboratory  Recent Labs     03/12/23  0658 03/13/23  1033   WBC 4.8 5.0   RBC 3.37* 3.43*   HEMOGLOBIN 9.1* 9.1*   HEMATOCRIT 28.8* 28.8*   MCV 85.5 84.0   MCH 27.0 26.5*   MCHC 31.6* 31.6*   RDW 43.1 43.0   PLATELETCT 399 465*   MPV 8.9* 9.3       Recent Labs     03/12/23  0533   SODIUM 138   POTASSIUM 4.5   CHLORIDE 106   CO2 21   GLUCOSE 91   BUN 11   CREATININE 0.55   CALCIUM 8.8                       Imaging  CT-FOOT WITH PLUS RECONS LEFT   Final Result      1.  Edema and/or cellulitis of the visualized leg, ankle and foot. No focal abscess.   2.  Large blisters.   3.  Postsurgical changes first through fifth transmetatarsal amputations.   4.  No definite evidence for osteomyelitis.   5.  Some thickening of the Achilles tendon is nonspecific and not well evaluated on CT.   6.  Further evaluation with MRI as clinically indicated.      DX-CHEST-PORTABLE (1 VIEW)   Final Result      No acute cardiac or pulmonary abnormalities are identified.      DX-FEMUR-2+ RIGHT   Final Result      No radiographic evidence of acute traumatic injury.      CT-ABDOMEN-PELVIS WITH    Final Result      1.  Right internal iliac vein DVT.   2.  Somewhat striated nephrogram of the bilateral superior poles may be artifactual. Correlate with urinalysis for pyelonephritis.   3.  Redemonstration of enhancing lesion of the uterus. This has been evaluated on prior ultrasound.   4.  Prominent bilateral inguinal lymph nodes may be reactive. Malignancy not excluded.      Dr. Emanuel discussed these findings with Dr. Child at 1:49 PM by telephone on 3/6/2023      DX-FOOT-2- LEFT   Final Result      Status post transmetatarsal amputation. No definitive radiographic evidence of ostomy myelitis. If there is strong clinical suspicion, MRI of the foot with and without contrast should be obtained for further evaluation.      DX-CHEST-PORTABLE (1 VIEW)   Final Result      No acute cardiopulmonary disease evident.             Assessment/Plan  * Infection of amputation stump of left lower extremity (HCC)  Assessment & Plan  Concern for   Initially discussed the case with orthopedic surgery, who did not believe at this time required intervention  However patient with persistent fever, all other work-up negative  Concern for possible underlying infection and patient's prior frostbitten BLE stumps, bullae noted   Blood cultures with no growth to date.   MRSA swab positive.   Discontinued vancomycin and Zosyn, switched to Zyvox.  Monitor renal function.     CT imaging LLE, negative for osteomyelitis or abscess formation.   3/14: Per LPS, Frostbite injury to left heel and posterior ankle continues to evolve and worsen. Likely need BKA next week.     On Augmentin, given worsening wound, will change to Unasyn. MRSA swab positive, added zyvox.     Fever in adult  Assessment & Plan  Patient noted to spike a 101.6 fever on 3/8, negative chest x-ray, UA negative, COVID, influenza, RSV negative, chest x-ray negative for any infiltrate or pulmonary effusion.  Lactic acid and procalcitonin negative. Blood cultures with no growth to  date.  Concern for possible underlying infection and patient's prior frostbitten BLE. MRSA swab positive.     Slow transit constipation  Assessment & Plan  Patient noted to have abdominal distention, prior bowel movement several days prior  Started on aggressive bowel regimen, given one-time dose of Dulcolax  Still with no bowel movement, oral lactulose given  Large BM 3/8 PM  Serial abdominal exams  RESOLVED    Noncompliance  Assessment & Plan  Significant history with noncompliance with medications and doctor visits    Homelessness  Assessment & Plan  Complicated medical history and noncompliance and drug abuse   was consulted to consider group home      Lactic acid acidosis  Assessment & Plan  Likely related to dehydration and infection  IV fluid and ceftriaxone  Resolved    Hx of BKA, right ([facility])  Assessment & Plan  Right BKA and left transmetatarsal amputation  LPS following, local wound care     Methamphetamine abuse ([facility])  Assessment & Plan  Encouraged the patient to quit  Urine drug screen is positive  Blood culture no growth to date    Pyelonephritis  Assessment & Plan  CT scan shows signs of ??pyelonephritis  Urine culture noted E. Faecium VRE  Poor historian, unable to determine if patient is symptomatic or not. She did reports abdominal pain  I ordered Zyvox, which will cover both UTI? And worsening L heel ankle wound. MRSA swab positive.     Acute deep vein thrombosis (DVT) ([facility])- (present on admission)  Assessment & Plan  Patient had recent admission at outside facility 2/2023,  she was diagnosed with a left superficial femoral vein DVT discharged with Xarelto.  Patient had CT imaging which noted right internal iliac vein DVT.    Initially on heparin drip, transitioned to Eliquis      HIV (human immunodeficiency virus infection) ([facility])- (present on admission)  Assessment & Plan  Patient is on Biktarvy however, clearly questioning about compliance  CD4-761 and CD8-194  Resume Biktarvy with  close monitoring    Hypokalemia- (present on admission)  Assessment & Plan  Labs reviewed, resolved    Schizophrenia (HCC)- (present on admission)  Assessment & Plan  Patient has hallucination with hearing voices  Denied any SI or HI, however with active auditory hallucinations  Resume home medication Depakote, formally on Zyprexa  Psych consulted, continue with Depakote, trough level stable, Zyprexa 5 mg twice daily initiated    Tobacco abuse- (present on admission)  Assessment & Plan  Cessation counseled, 3 minute  Nicotine replacement         VTE prophylaxis: therapeutic anticoagulation with Eliquis    I have performed a physical exam and reviewed and updated ROS and Plan today (3/14/2023). In review of yesterday's note (3/13/2023), there are no changes except as documented above.

## 2023-03-14 NOTE — CARE PLAN
The patient is Stable - Low risk of patient condition declining or worsening    Shift Goals  Clinical Goals: wound care  Patient Goals: rest    Progress made toward(s) clinical / shift goals:      Problem: Knowledge Deficit - Standard  Goal: Patient and family/care givers will demonstrate understanding of plan of care, disease process/condition, diagnostic tests and medications  Outcome: Progressing     Problem: Pain - Standard  Goal: Alleviation of pain or a reduction in pain to the patient’s comfort goal  Outcome: Progressing     Problem: Skin Integrity  Goal: Skin integrity is maintained or improved  Outcome: Progressing     Problem: Fall Risk  Goal: Patient will remain free from falls  Outcome: Progressing       Patient is not progressing towards the following goals:

## 2023-03-14 NOTE — PROGRESS NOTES
" [facility]  PROGRESS NOTE    HPI: Kellie Chatman is a 53 y.o.  with a past medical history that includes frostbite of bilateral feet, HIV, EtOH abuse, methamphetamine abuse.  Admitted 3/6/2023 for DVT, lower extremity, proximal, acute, left (HCC) [I82.4Y2].     LPS has been consulted for frostbite injuries to left TMA, left heel, right BKA.    The patient was initially seen on 11/14/2022 for frostbite to her right foot and left hallux.  Due to this injury patient had a right BKA on 12/20/2022 with Dr. Lux.  Due to continued decline of her left foot she eventually had a left TMA on 2/7/2023 with Dr. Lux.    Patient is homeless and does attempt to stay at the [facility] as able.  Patient states unfortunately she had another frostbite injury to the posterior aspect of her right BKA and her left TMA and plantar foot including her left heel.  Patient was seen at [facility] with DC on 3/1/2023, and it was deemed that frostbite injury needed to demarcate prior to determination for surgical intervention for 1 to 2 weeks.         INTERVAL HISTORY:  3/11/2023: Patient denies fevers, chills, nausea, vomiting.  Pain controlled.   3/14/23: complains of discomfort to left foot.  Patient with eyes closed during majority of visit, appears to be resting.      PERTINENT LPS RESULTS:   COVID-19: not completed  CT left foot  1.  Edema and/or cellulitis of the visualized leg, ankle and foot. No focal abscess.  2.  Large blisters.  3.  Postsurgical changes first through fifth transmetatarsal amputations.  4.  No definite evidence for osteomyelitis.  5.  Some thickening of the Achilles tendon is nonspecific and not well evaluated on CT.  6.  Further evaluation with MRI as clinically indicated.        EXAM:      Disengaged and examination    /65   Pulse 80   Temp 36.6 °C (97.8 °F)   Resp 17   Ht 1.778 m (5' 10\")   Wt 73.8 kg (162 lb 11.2 oz)   SpO2 97%   BMI 23.34 kg/m²     Pedal Pulses:   Right BKA " warm and well-perfused, palpable popliteal  Left foot: Faintly palpable DP, palpable PT    Sensation:   Monofilament testing, patient declines   Sensation intact to left foot  Sensation intact to right BKA      Wound(s) :    Wound location: Right posterior BKA  Wound characteristics:   Black eschar, dry, intact  Erythema: None  Minimal edema  Drainage: None  Measures approximately 6 x 7 cm        Right BKA anterior:  Superficial adherent eschar along previous incision line  Dry  No erythema  Minimal edema        Wound location: Left TMA-frostbite injury  Wound characteristics: Black eschar over incision  Sutures remain in place  Erythema: None  Drainage: None    Wound location: Left plantar heel and foot-frostbite injury  Wound characteristics: Black eschar to plantar heel with deflated bulla from heel to distal end  Erythema: None  Drainage: None    Wound location: Left posterior, medial, lateral ankle  Wound characteristics: Dark red tissue with white islands of tissue and brown islands of tissue  No drainage  Tender to touch  Loose epithelium from previous deflated bulla surrounding wound  Erythema: None        Procedure  Using scissors and forceps, trimmed nonviable epithelium to skin level of left heel and ankle.  Total area debrided less than 20 cm² to skin level.  No bleeding noted.  Patient tolerated procedure without pain.  Wound care completed by LPS APRN.  Applied surgical lube to medial/lateral/posterior ankle.  Applied petroleum gauze followed by Mepilex.  Applied dry gauze over left plantar heel followed by Mepilex.        Wound photo:       Left TMA      Left lateral TMA        Left plantar heel      Left plantar foot      Left lateral ankle      Left medial ankle      Right posterior BKA      Right lateral BKA      Right medial BKA                                Photos from 3/8/23                      DIABETES MANAGEMENT:    A1c:   Lab Results   Component Value Date/Time    HBA1C 5.1 02/24/2023 05:54  AM            INFECTION MANAGEMENT:    Results from last 7 days   Lab Units 03/13/23  1033 03/12/23  0658 03/10/23  0231 03/09/23  0623 03/08/23  0521   WBC 1501 K/uL 5.0 4.8 6.4 8.5 6.4   PLATELET COUNT 1518 K/uL 465* [phone] 310                        ASSESSMENT/PLAN:   53 y.o. admitted for DVT, lower extremity, proximal, acute, left (HCC) [I82.4Y2]. Presents with frostbite injury posterior right BKA, left TMA, left heel, left plantar foot.    -Frostbite injury to left heel and posterior ankle continues to evolve and worsen.  Bulla to plantar foot is deflated.  Eschar to TMA stable.  Patient has palpable PT pulse, however there is significant tissue injury from cold exposure that is not improving.  Patient will most likely need BKA in the next week.  We will continue to follow.  -   Discussed high risk for left BKA thoroughly with patient.  Patient lacked interest in discussing any future surgery.        Wound care:   -wound care orders placed for nursing by LPS   -Right anterior/posterior BKA: Paint with Betadine, allowed to air dry, leave open to air.  Daily  -Left TMA, plantar heel,  Paint with Betadine, allowed to air dry, leave open to air.  Daily  -Left posterior ankle: Cleanse with NS, pat dry.  Apply surgical lube, petroleum gauze 3 layers thick, Mepilex.  Applied dry gauze over plantar heel if Mepilex is covering heel.  Change daily      Labs/Imaging:  -COVID-19: not completed this admission.     Vascular status:   -Right BKA palpable popliteal pulse  - Left foot: Faintly palpable DP, palpable PT      Surgery:   - Ortho Dr. Lux  -No plans for surgery at this time  Anticipate left BKA in the next 1 to 2 weeks.  When patient returned to surgery, recommend revision of right BKA as well.    Antibiotics:   - on antibiotics managed by hospitalist    Weight Bearing Status:   -Right BKA: Nonweightbearing  -Left foot: Transfer weight bearing    Offloading:   Heel offloading redistribution pad  -Wheelchair  at bedside    PT/OT :   -involved,          DISCHARGE PLAN:    Disposition:   TBD, patient currently homeless  recommend SNF               Discussed with: pt, RN, Dr. Choudhary    My total time spent caring for the patient on the day of the encounter was 50 minutes.   This does not include time spent on separately billable procedures/tests.   for patient care, counseling, and coordination on this date, including patient face-to face time, unit/floor time with review of records/pertinent lab data and studies, as well as discussing diagnostic evaluation/work up, planned therapeutic interventions, and future disposition of care, as per the interval events/subjective and the assessment and plan as noted above.        Please note that this dictation was created using voice recognition software. I have  worked with technical experts from [facility] to optimize the interface.  I have made every reasonable attempt to correct obvious errors, but there may be errors of grammar and possibly content that I did not discover before finalizing the note.    Jessica Banuelos, A.P.R.N.    If any questions or concerns, please contact LPS through voalte.

## 2023-03-15 LAB
ANION GAP SERPL CALC-SCNC: 7 MMOL/L (ref 7–16)
BUN SERPL-MCNC: 15 MG/DL (ref 8–22)
CALCIUM SERPL-MCNC: 8.8 MG/DL (ref 8.5–10.5)
CHLORIDE SERPL-SCNC: 106 MMOL/L (ref 96–112)
CO2 SERPL-SCNC: 26 MMOL/L (ref 20–33)
CREAT SERPL-MCNC: 0.63 MG/DL (ref 0.5–1.4)
ERYTHROCYTE [DISTWIDTH] IN BLOOD BY AUTOMATED COUNT: 44.7 FL (ref 35.9–50)
GFR SERPLBLD CREATININE-BSD FMLA CKD-EPI: 105 ML/MIN/1.73 M 2
GLUCOSE SERPL-MCNC: 101 MG/DL (ref 65–99)
HCT VFR BLD AUTO: 29.7 % (ref 37–47)
HGB BLD-MCNC: 9.4 G/DL (ref 12–16)
MCH RBC QN AUTO: 27 PG (ref 27–33)
MCHC RBC AUTO-ENTMCNC: 31.6 G/DL (ref 33.6–35)
MCV RBC AUTO: 85.3 FL (ref 81.4–97.8)
PLATELET # BLD AUTO: 507 K/UL (ref 164–446)
PMV BLD AUTO: 9.1 FL (ref 9–12.9)
POTASSIUM SERPL-SCNC: 4.3 MMOL/L (ref 3.6–5.5)
RBC # BLD AUTO: 3.48 M/UL (ref 4.2–5.4)
SODIUM SERPL-SCNC: 139 MMOL/L (ref 135–145)
WBC # BLD AUTO: 6.1 K/UL (ref 4.8–10.8)

## 2023-03-15 PROCEDURE — 700111 HCHG RX REV CODE 636 W/ 250 OVERRIDE (IP): Performed by: STUDENT IN AN ORGANIZED HEALTH CARE EDUCATION/TRAINING PROGRAM

## 2023-03-15 PROCEDURE — 99232 SBSQ HOSP IP/OBS MODERATE 35: CPT | Performed by: STUDENT IN AN ORGANIZED HEALTH CARE EDUCATION/TRAINING PROGRAM

## 2023-03-15 PROCEDURE — 700105 HCHG RX REV CODE 258: Performed by: STUDENT IN AN ORGANIZED HEALTH CARE EDUCATION/TRAINING PROGRAM

## 2023-03-15 PROCEDURE — 700102 HCHG RX REV CODE 250 W/ 637 OVERRIDE(OP): Performed by: INTERNAL MEDICINE

## 2023-03-15 PROCEDURE — 700102 HCHG RX REV CODE 250 W/ 637 OVERRIDE(OP): Performed by: GENERAL PRACTICE

## 2023-03-15 PROCEDURE — 700102 HCHG RX REV CODE 250 W/ 637 OVERRIDE(OP): Performed by: STUDENT IN AN ORGANIZED HEALTH CARE EDUCATION/TRAINING PROGRAM

## 2023-03-15 PROCEDURE — 97602 WOUND(S) CARE NON-SELECTIVE: CPT

## 2023-03-15 PROCEDURE — 85027 COMPLETE CBC AUTOMATED: CPT

## 2023-03-15 PROCEDURE — A9270 NON-COVERED ITEM OR SERVICE: HCPCS | Performed by: STUDENT IN AN ORGANIZED HEALTH CARE EDUCATION/TRAINING PROGRAM

## 2023-03-15 PROCEDURE — 770001 HCHG ROOM/CARE - MED/SURG/GYN PRIV*

## 2023-03-15 PROCEDURE — A9270 NON-COVERED ITEM OR SERVICE: HCPCS | Performed by: GENERAL PRACTICE

## 2023-03-15 PROCEDURE — 80048 BASIC METABOLIC PNL TOTAL CA: CPT

## 2023-03-15 PROCEDURE — 36415 COLL VENOUS BLD VENIPUNCTURE: CPT

## 2023-03-15 PROCEDURE — A9270 NON-COVERED ITEM OR SERVICE: HCPCS | Performed by: INTERNAL MEDICINE

## 2023-03-15 RX ORDER — ULTRASOUND COUPLING MEDIUM
1 GEL (GRAM) TOPICAL DAILY
Status: DISCONTINUED | OUTPATIENT
Start: 2023-03-15 | End: 2023-03-23

## 2023-03-15 RX ORDER — ULTRASOUND COUPLING MEDIUM
1 GEL (GRAM) TOPICAL DAILY
Status: DISCONTINUED | OUTPATIENT
Start: 2023-03-16 | End: 2023-03-15

## 2023-03-15 RX ADMIN — OLANZAPINE 5 MG: 5 TABLET, FILM COATED ORAL at 23:47

## 2023-03-15 RX ADMIN — OLANZAPINE 5 MG: 5 TABLET, FILM COATED ORAL at 09:29

## 2023-03-15 RX ADMIN — AMPICILLIN AND SULBACTAM 3 G: 1; 2 INJECTION, POWDER, FOR SOLUTION INTRAMUSCULAR; INTRAVENOUS at 12:11

## 2023-03-15 RX ADMIN — BICTEGRAVIR SODIUM, EMTRICITABINE, AND TENOFOVIR ALAFENAMIDE FUMARATE 1 TABLET: 50; 200; 25 TABLET ORAL at 09:27

## 2023-03-15 RX ADMIN — LINEZOLID 600 MG: 600 TABLET, FILM COATED ORAL at 18:18

## 2023-03-15 RX ADMIN — APIXABAN 5 MG: 5 TABLET, FILM COATED ORAL at 23:47

## 2023-03-15 RX ADMIN — AMPICILLIN AND SULBACTAM 3 G: 1; 2 INJECTION, POWDER, FOR SOLUTION INTRAMUSCULAR; INTRAVENOUS at 00:31

## 2023-03-15 RX ADMIN — DOCUSATE SODIUM 50 MG AND SENNOSIDES 8.6 MG 2 TABLET: 8.6; 5 TABLET, FILM COATED ORAL at 23:48

## 2023-03-15 RX ADMIN — DIVALPROEX SODIUM 500 MG: 500 TABLET, DELAYED RELEASE ORAL at 09:26

## 2023-03-15 RX ADMIN — DIVALPROEX SODIUM 500 MG: 500 TABLET, DELAYED RELEASE ORAL at 23:47

## 2023-03-15 RX ADMIN — AMPICILLIN AND SULBACTAM 3 G: 1; 2 INJECTION, POWDER, FOR SOLUTION INTRAMUSCULAR; INTRAVENOUS at 23:46

## 2023-03-15 RX ADMIN — LINEZOLID 600 MG: 600 TABLET, FILM COATED ORAL at 05:20

## 2023-03-15 RX ADMIN — AMPICILLIN AND SULBACTAM 3 G: 1; 2 INJECTION, POWDER, FOR SOLUTION INTRAMUSCULAR; INTRAVENOUS at 05:22

## 2023-03-15 RX ADMIN — AMPICILLIN AND SULBACTAM 3 G: 1; 2 INJECTION, POWDER, FOR SOLUTION INTRAMUSCULAR; INTRAVENOUS at 18:20

## 2023-03-15 RX ADMIN — NICOTINE 14 MG: 14 PATCH, EXTENDED RELEASE TRANSDERMAL at 09:26

## 2023-03-15 RX ADMIN — APIXABAN 5 MG: 5 TABLET, FILM COATED ORAL at 09:26

## 2023-03-15 ASSESSMENT — PAIN DESCRIPTION - PAIN TYPE
TYPE: ACUTE PAIN
TYPE: ACUTE PAIN

## 2023-03-15 ASSESSMENT — ENCOUNTER SYMPTOMS: WEAKNESS: 1

## 2023-03-15 NOTE — CARE PLAN
The patient is Stable - Low risk of patient condition declining or worsening    Shift Goals  Clinical Goals: Wound care, placement, LPS c/s, IV ABX  Patient Goals: Rest, comfort  Family Goals: IVETTE      Problem: Knowledge Deficit - Standard  Goal: Patient and family/care givers will demonstrate understanding of plan of care, disease process/condition, diagnostic tests and medications  Outcome: Progressing     Problem: Pain - Standard  Goal: Alleviation of pain or a reduction in pain to the patient’s comfort goal  Outcome: Progressing     Problem: Skin Integrity  Goal: Skin integrity is maintained or improved  Outcome: Progressing     Problem: Fall Risk  Goal: Patient will remain free from falls  Outcome: Progressing

## 2023-03-15 NOTE — WOUND TEAM
[facility]  Inpatient Services  Wound and Skin Care Brief Evaluation    Admission Date: 3/6/2023     Last order of IP CONSULT TO WOUND CARE was found on 3/6/2023 from Hospital Encounter on 3/6/2023     HPI, PMH, SH: Reviewed    Chief Complaint   Patient presents with    Wound Check     Open wounds to bilateral lower extremity amputation sites .    Aggressive Behavior     Reports she is hearing voices. Hx Schizophrenia, has not been taking her medication.      Diagnosis: DVT, lower extremity, proximal, acute, left (HCC) [I82.4Y2]    Unit where seen by Wound Team: [id number]     Evaluation: Changed dressings to Right BKA and Left Foot. Spoke to SHELLEY Mon[place], regarding findings and dressing change. Left heel and ankle wounds have merged and continues to evolve and worsen as anticipated. Right BKA anterior aspect primarily eschar with scattered open areas of pink granulation and slough along incision line with small purulent drainage expressed, xeroform (yellow) applied to open areas to provide an occlusive dressing that incorporates bacteriostatic protection.      Wound 03/06/23 Pressure Injury Knee Right stump pressure ulcer, open area at suture line (Active)   Wound Image    03/15/23 1200   Site Assessment Black;Brown;Pink;Yellow;Boggy;Edema;Eschar;Fragile 03/15/23 1200   Periwound Assessment Clean;Dry;Intact;Normal Temperature;Edema 03/15/23 1200   Margins Defined edges 03/15/23 1200   Closure Secondary intention 03/15/23 1200   Drainage Amount Small 03/15/23 1200   Drainage Description Purulent 03/15/23 1200   Treatments Cleansed;Site care 03/15/23 1200   Wound Cleansing Approved Wound Cleanser 03/15/23 1200   Periwound Protectant Skin Protectant Wipes to Periwound 03/15/23 1200   Dressing Cleansing/Solutions 3% Betadine 03/15/23 1200   Dressing Options Petrolatum Gauze (yellow)to open anterior aspect only;Mepilex;Dry Roll Gauze 03/15/23 1200   Dressing Changed Changed 03/15/23 1200    Dressing Status Clean;Dry;Intact 03/15/23 1200   Dressing Change/Treatment Frequency Daily, and As Needed 03/15/23 1200   NEXT Dressing Change/Treatment Date 03/16/23 03/15/23 1200   NEXT Weekly Photo (Inpatient Only) 03/22/23 03/15/23 1200   Non-staged Wound Description Full thickness 03/15/23 1200   Wound Bed Granulation (%) 10 % 03/15/23 1200   Wound Bed Slough (%) 10 % 03/15/23 1200   Wound Bed Eschar (%) 80 % 03/15/23 1200   Wound Odor None 03/15/23 1200   WOUND NURSE ONLY - Time Spent with Patient (mins) 30 03/15/23 1200       Wound 03/06/23 Incision Foot Left (Active)   Wound Image    03/15/23 1200   Site Assessment IVETTE 03/15/23 1400   Periwound Assessment Dry;Edema;Fragile 03/15/23 1200   Margins Attached edges 03/15/23 1200   Closure Sutures 03/15/23 1200   Drainage Amount None 03/15/23 1200   Treatments Cleansed;Offloading;Site care 03/15/23 1200   Wound Cleansing Approved Wound Cleanser 03/15/23 1200   Dressing Cleansing/Solutions 3% Betadine 03/15/23 1200   Dressing Options Absorbent Abdominal Pad;Dry Roll Gauze 03/15/23 1200   Dressing Changed Changed 03/15/23 1200   Dressing Status Clean;Dry;Intact 03/15/23 1200   Dressing Change/Treatment Frequency Daily, and As Needed 03/15/23 1200   NEXT Dressing Change/Treatment Date 03/16/23 03/15/23 1200   NEXT Weekly Photo (Inpatient Only) 03/22/23 03/15/23 1200   Non-staged Wound Description Full thickness 03/15/23 1200   Wound Bed Eschar (%) 100 % 03/15/23 1200   Wound Odor None 03/15/23 1200   WOUND NURSE ONLY - Time Spent with Patient (mins) 30 03/15/23 1200       Wound 03/06/23 Pressure Injury Heel Left (Active) to ankle   Wound Image      03/15/23 1200   Site Assessment IVETTE 03/15/23 1400   Periwound Assessment Denuded;Normal Temperature;Pink;Red;Blistered;Callused;Edema;Fragile 03/15/23 1200   Margins Unattached edges;Undefined edges 03/15/23 1200   Closure None 03/15/23 1200   Drainage Amount Moderate 03/15/23 1200   Drainage Description  Serosanguineous;Purulent 03/15/23 1200   Treatments Cleansed;Offloading;Site care 03/15/23 1200   Wound Cleansing Approved Wound Cleanser 03/15/23 1200   Periwound Protectant Skin Protectant Wipes to Periwound 03/15/23 1200   Dressing Cleansing/Solutions 3% Betadine 03/15/23 1200   Dressing Options Petrolatum Gauze (yellow);Other (Comments);Absorbent Abdominal Pad;Dry Roll Gauze 03/15/23 1200   Dressing Changed Changed 03/15/23 1200   Dressing Status Clean;Dry;Intact 03/15/23 1200   Dressing Change/Treatment Frequency Daily, and As Needed 03/15/23 1200   NEXT Dressing Change/Treatment Date 03/16/23 03/15/23 1200   NEXT Weekly Photo (Inpatient Only) 03/22/23 03/15/23 1200   WOUND NURSE ONLY - Pressure Injury Stage U 03/15/23 1200   Shape heel and ankle wounds have merged 03/15/23 1200   Wound Odor None 03/15/23 1200   WOUND NURSE ONLY - Time Spent with Patient (mins) 30 03/15/23 1200     Surface/Positioning X  Standard/Trauma Bed          Low Airloss X         Bariatric SHASHA     ICU SHASHA                              Waffle cushion        Waffle Overlay  X        Reposition q 2 hours      TAPs Turning system     Z James Pillow     Offloading/Redistribution X  Sacral Offloading Dressing (Silicone dressing)     Heel Offloading Dressing (Silicone dressing)         Heel float boots (Prevalon boot)             Float Heels off Bed with Pillows  X         Respiratory n/a    Containment/Moisture Prevention n/a       Mobilization IVETTE      Up to chair        Ambulate      PT/OT

## 2023-03-15 NOTE — DIETARY
Nutrition Services Brief Update:    Day 9 of admit.  Kellie Chatman is a 53 y.o. female with admitting DX of DVT, lower extremity, proximal, acute, left (HCC) [I82.4Y2]    Current Diet: Regular w/ Boost Plus    Pt eating well with all meals/snacks/supplements >50%, mostly %.     Problem: Nutritional:  Goal: Achieve adequate nutritional intake  Description: Patient will consume >50% of meals  Outcome: Met    RD to monitor per department policy.

## 2023-03-15 NOTE — PROGRESS NOTES
Received report from off-going nurse.   Assumed pt care at change of shift.   Assessment completed.   Pt is A&Ox 4, vital signs are stable on room air.   Denies pain, no apparent signs of discomfort.   Bed is in low and locked position, call light and belongings in reach, reminded to use call light, frame bed alarm on.   No needs at this time.

## 2023-03-15 NOTE — PROGRESS NOTES
Hospital Medicine Daily Progress Note    Date of Service  3/15/2023    Chief Complaint  Kellie Chatman is a 53 y.o. female admitted 3/6/2023 with [facility]    Hospital Course  This is a 53-year-old female with past medical history of s/p right BKA and left transmetatarsal amputation, hypertension, HIV, seizure disorder, schizophrenia, methamphetamine use, tobacco use and homelessness who was found laying in the middle of the street and brought in by EMS.    Patient had recent admission at outside facility 2/2023 where she was noted to have recurrent frostbite on her right BKA and left transmetatarsal amputation stump, recommended to wait for further demarcation prior to any surgical intervention.  She was to follow-up with orthopedic surgery outpatient.  During that admission she was diagnosed with a left superficial femoral vein DVT discharged with Xarelto.    On this admission, patient had CT imaging which noted right internal iliac vein DVT, patient started on Eliquis.      Concern for possible pyelonephritis on CT imaging.  Patient started on Rocephin. Urine culture E. Faecium VRE.  Discussing the case with ID pharmacy as unable to determine if patient is symptomatic or not, as she is a poor historian.  Given she improved clinically throughout this admission, will defer any further changes in her antibiotic regimen.    Blood cultures with no growth to date.  Concern for possible underlying infection and patient's prior frostbitten BLE. MRSA swab positive.  Discussed with ID pharmacy, recommended de-escalation to Augmentin.  CT imaging LLE, negative for osteomyelitis or abscess formation.  Discussed with orthopedic surgery, no recommendation for debridement or surgical intervention at this time.  LPS consulted for wound care.     Interval Problem Update  L heel wound worsening with drainage.     Discussed with LPS, Frostbite injury to left heel and posterior ankle continues to evolve and worsen. Likely need BKA next  week.     On unasyn and zyvox. Tolerating     I have discussed this patient's plan of care and discharge plan at IDT rounds today with Case Management, Nursing, Nursing leadership, and other members of the IDT team.    Consultants/Specialty  psychiatry  LPS    Code Status  Full Code    Disposition  Patient is medically cleared for discharge.   Anticipate discharge to Shelter + outpt wound care vs SNF  I have placed the appropriate orders for post-discharge needs.    Review of Systems  Review of Systems   Constitutional:  Positive for malaise/fatigue.   Neurological:  Positive for weakness.   All other systems reviewed and are negative.     Physical Exam  Temp:  [36.4 °C (97.5 °F)-36.7 °C (98 °F)] 36.7 °C (98 °F)  Pulse:  [75-89] 75  Resp:  [15-17] 15  BP: ()/(53-65) 101/53  SpO2:  [95 %-98 %] 95 %    Physical Exam  Vitals and nursing note reviewed.   Constitutional:       General: She is not in acute distress.     Appearance: She is ill-appearing.   HENT:      Head: Normocephalic and atraumatic.      Mouth/Throat:      Mouth: Mucous membranes are moist.      Pharynx: No oropharyngeal exudate.   Eyes:      Extraocular Movements: Extraocular movements intact.      Pupils: Pupils are equal, round, and reactive to light.   Cardiovascular:      Rate and Rhythm: Normal rate and regular rhythm.      Pulses: Normal pulses.      Heart sounds: No murmur heard.    No friction rub. No gallop.   Pulmonary:      Effort: Pulmonary effort is normal. No respiratory distress.      Breath sounds: No wheezing, rhonchi or rales.   Abdominal:      General: Bowel sounds are normal. There is no distension.      Palpations: Abdomen is soft. There is no mass.      Tenderness: There is no abdominal tenderness.   Musculoskeletal:         General: No swelling or tenderness. Normal range of motion.      Cervical back: Normal range of motion. No rigidity. No muscular tenderness.      Right lower leg: No edema.      Left lower leg: No edema.       Comments: Right lower extremity BKA, left lower extremity with transmetatarsal amputation, bullae noted, now draining copiously   Skin:     General: Skin is warm and dry.      Capillary Refill: Capillary refill takes less than 2 seconds.      Findings: No erythema or rash.   Neurological:      General: No focal deficit present.      Mental Status: She is alert. She is disoriented.      Motor: No weakness.      Gait: Gait normal.   Psychiatric:         Mood and Affect: Affect is flat.         Speech: Speech is delayed.         Behavior: Behavior is withdrawn.       Fluids    Intake/Output Summary (Last 24 hours) at 3/15/2023 1431  Last data filed at 3/15/2023 1000  Gross per 24 hour   Intake 1402.63 ml   Output 700 ml   Net 702.63 ml         Laboratory  Recent Labs     03/13/23  1033 03/15/23  0555   WBC 5.0 6.1   RBC 3.43* 3.48*   HEMOGLOBIN 9.1* 9.4*   HEMATOCRIT 28.8* 29.7*   MCV 84.0 85.3   MCH 26.5* 27.0   MCHC 31.6* 31.6*   RDW 43.0 44.7   PLATELETCT 465* 507*   MPV 9.3 9.1       Recent Labs     03/15/23  0555   SODIUM 139   POTASSIUM 4.3   CHLORIDE 106   CO2 26   GLUCOSE 101*   BUN 15   CREATININE 0.63   CALCIUM 8.8                       Imaging  CT-FOOT WITH PLUS RECONS LEFT   Final Result      1.  Edema and/or cellulitis of the visualized leg, ankle and foot. No focal abscess.   2.  Large blisters.   3.  Postsurgical changes first through fifth transmetatarsal amputations.   4.  No definite evidence for osteomyelitis.   5.  Some thickening of the Achilles tendon is nonspecific and not well evaluated on CT.   6.  Further evaluation with MRI as clinically indicated.      DX-CHEST-PORTABLE (1 VIEW)   Final Result      No acute cardiac or pulmonary abnormalities are identified.      DX-FEMUR-2+ RIGHT   Final Result      No radiographic evidence of acute traumatic injury.      CT-ABDOMEN-PELVIS WITH   Final Result      1.  Right internal iliac vein DVT.   2.  Somewhat striated nephrogram of the bilateral  superior poles may be artifactual. Correlate with urinalysis for pyelonephritis.   3.  Redemonstration of enhancing lesion of the uterus. This has been evaluated on prior ultrasound.   4.  Prominent bilateral inguinal lymph nodes may be reactive. Malignancy not excluded.      Dr. Emanuel discussed these findings with Dr. Child at 1:49 PM by telephone on 3/6/2023      DX-FOOT-2- LEFT   Final Result      Status post transmetatarsal amputation. No definitive radiographic evidence of ostomy myelitis. If there is strong clinical suspicion, MRI of the foot with and without contrast should be obtained for further evaluation.      DX-CHEST-PORTABLE (1 VIEW)   Final Result      No acute cardiopulmonary disease evident.             Assessment/Plan  * Infection of amputation stump of left lower extremity (HCC)  Assessment & Plan  Concern for   Initially discussed the case with orthopedic surgery, who did not believe at this time required intervention  However patient with persistent fever, all other work-up negative  Concern for possible underlying infection and patient's prior frostbitten BLE stumps, bullae noted   Blood cultures with no growth to date.   MRSA swab positive.   Discontinued vancomycin and Zosyn, switched to Zyvox.  Monitor renal function.     CT imaging LLE, negative for osteomyelitis or abscess formation.   3/14: Per LPS, Frostbite injury to left heel and posterior ankle continues to evolve and worsen. Likely need BKA next week.     On Augmentin, given worsening wound, will change to Unasyn. MRSA swab positive, added zyvox.   Tolerating and monitoring cbc, bmp    Fever in adult  Assessment & Plan  Patient noted to spike a 101.6 fever on 3/8, negative chest x-ray, UA negative, COVID, influenza, RSV negative, chest x-ray negative for any infiltrate or pulmonary effusion.  Lactic acid and procalcitonin negative. Blood cultures with no growth to date.  Concern for possible underlying infection and patient's prior  frostbitten BLE. MRSA swab positive.     Slow transit constipation  Assessment & Plan  Patient noted to have abdominal distention, prior bowel movement several days prior  Started on aggressive bowel regimen, given one-time dose of Dulcolax  Still with no bowel movement, oral lactulose given  Large BM 3/8 PM  Serial abdominal exams  RESOLVED    Noncompliance  Assessment & Plan  Significant history with noncompliance with medications and doctor visits    Homelessness  Assessment & Plan  Complicated medical history and noncompliance and drug abuse   was consulted to consider group home      Lactic acid acidosis  Assessment & Plan  Likely related to dehydration and infection  IV fluid and ceftriaxone  Resolved    Hx of BKA, right ([facility])  Assessment & Plan  Right BKA and left transmetatarsal amputation  LPS following, local wound care     Methamphetamine abuse ([facility])  Assessment & Plan  Encouraged the patient to quit  Urine drug screen is positive  Blood culture no growth to date    Pyelonephritis  Assessment & Plan  CT scan shows signs of ??pyelonephritis  Urine culture noted E. Faecium VRE  Poor historian, unable to determine if patient is symptomatic or not. She did reports abdominal pain  I ordered Zyvox, which will cover both UTI? And worsening L heel ankle wound. MRSA swab positive.     Acute deep vein thrombosis (DVT) ([facility])- (present on admission)  Assessment & Plan  Patient had recent admission at outside facility 2/2023,  she was diagnosed with a left superficial femoral vein DVT discharged with Xarelto.  Patient had CT imaging which noted right internal iliac vein DVT.    Initially on heparin drip, transitioned to Eliquis      HIV (human immunodeficiency virus infection) ([facility])- (present on admission)  Assessment & Plan  Patient is on Biktarvy however, clearly questioning about compliance  CD4-761 and CD8-194  Resume Biktarvy with close monitoring    Hypokalemia- (present on admission)  Assessment &  Plan  Labs reviewed, resolved    Schizophrenia (HCC)- (present on admission)  Assessment & Plan  Patient has hallucination with hearing voices  Denied any SI or HI, however with active auditory hallucinations  Resume home medication Depakote, formally on Zyprexa  Psych consulted, continue with Depakote, trough level stable, Zyprexa 5 mg twice daily initiated    Tobacco abuse- (present on admission)  Assessment & Plan  Cessation counseled, 3 minute  Nicotine replacement         VTE prophylaxis: therapeutic anticoagulation with Eliquis    I have performed a physical exam and reviewed and updated ROS and Plan today (3/15/2023). In review of yesterday's note (3/14/2023), there are no changes except as documented above.

## 2023-03-16 PROCEDURE — 700111 HCHG RX REV CODE 636 W/ 250 OVERRIDE (IP): Performed by: STUDENT IN AN ORGANIZED HEALTH CARE EDUCATION/TRAINING PROGRAM

## 2023-03-16 PROCEDURE — 700105 HCHG RX REV CODE 258: Performed by: STUDENT IN AN ORGANIZED HEALTH CARE EDUCATION/TRAINING PROGRAM

## 2023-03-16 PROCEDURE — A9270 NON-COVERED ITEM OR SERVICE: HCPCS | Performed by: GENERAL PRACTICE

## 2023-03-16 PROCEDURE — A9270 NON-COVERED ITEM OR SERVICE: HCPCS | Performed by: STUDENT IN AN ORGANIZED HEALTH CARE EDUCATION/TRAINING PROGRAM

## 2023-03-16 PROCEDURE — 700102 HCHG RX REV CODE 250 W/ 637 OVERRIDE(OP): Performed by: INTERNAL MEDICINE

## 2023-03-16 PROCEDURE — A9270 NON-COVERED ITEM OR SERVICE: HCPCS | Performed by: INTERNAL MEDICINE

## 2023-03-16 PROCEDURE — 770001 HCHG ROOM/CARE - MED/SURG/GYN PRIV*

## 2023-03-16 PROCEDURE — 700102 HCHG RX REV CODE 250 W/ 637 OVERRIDE(OP): Performed by: GENERAL PRACTICE

## 2023-03-16 PROCEDURE — 700102 HCHG RX REV CODE 250 W/ 637 OVERRIDE(OP): Performed by: STUDENT IN AN ORGANIZED HEALTH CARE EDUCATION/TRAINING PROGRAM

## 2023-03-16 PROCEDURE — 99232 SBSQ HOSP IP/OBS MODERATE 35: CPT | Performed by: STUDENT IN AN ORGANIZED HEALTH CARE EDUCATION/TRAINING PROGRAM

## 2023-03-16 RX ADMIN — LINEZOLID 600 MG: 600 TABLET, FILM COATED ORAL at 05:57

## 2023-03-16 RX ADMIN — OLANZAPINE 5 MG: 5 TABLET, FILM COATED ORAL at 21:55

## 2023-03-16 RX ADMIN — NICOTINE 14 MG: 14 PATCH, EXTENDED RELEASE TRANSDERMAL at 09:47

## 2023-03-16 RX ADMIN — DIVALPROEX SODIUM 500 MG: 500 TABLET, DELAYED RELEASE ORAL at 09:47

## 2023-03-16 RX ADMIN — OLANZAPINE 5 MG: 5 TABLET, FILM COATED ORAL at 09:46

## 2023-03-16 RX ADMIN — BICTEGRAVIR SODIUM, EMTRICITABINE, AND TENOFOVIR ALAFENAMIDE FUMARATE 1 TABLET: 50; 200; 25 TABLET ORAL at 09:46

## 2023-03-16 RX ADMIN — LINEZOLID 600 MG: 600 TABLET, FILM COATED ORAL at 17:36

## 2023-03-16 RX ADMIN — AMPICILLIN AND SULBACTAM 3 G: 1; 2 INJECTION, POWDER, FOR SOLUTION INTRAMUSCULAR; INTRAVENOUS at 23:32

## 2023-03-16 RX ADMIN — DIVALPROEX SODIUM 500 MG: 500 TABLET, DELAYED RELEASE ORAL at 21:55

## 2023-03-16 RX ADMIN — APIXABAN 5 MG: 5 TABLET, FILM COATED ORAL at 09:46

## 2023-03-16 RX ADMIN — APIXABAN 5 MG: 5 TABLET, FILM COATED ORAL at 21:55

## 2023-03-16 RX ADMIN — AMPICILLIN AND SULBACTAM 3 G: 1; 2 INJECTION, POWDER, FOR SOLUTION INTRAMUSCULAR; INTRAVENOUS at 17:38

## 2023-03-16 RX ADMIN — AMPICILLIN AND SULBACTAM 3 G: 1; 2 INJECTION, POWDER, FOR SOLUTION INTRAMUSCULAR; INTRAVENOUS at 06:03

## 2023-03-16 RX ADMIN — Medication 1 APPLICATION: at 09:47

## 2023-03-16 RX ADMIN — AMPICILLIN AND SULBACTAM 3 G: 1; 2 INJECTION, POWDER, FOR SOLUTION INTRAMUSCULAR; INTRAVENOUS at 12:44

## 2023-03-16 ASSESSMENT — PAIN DESCRIPTION - PAIN TYPE
TYPE: ACUTE PAIN
TYPE: ACUTE PAIN

## 2023-03-16 ASSESSMENT — ENCOUNTER SYMPTOMS: WEAKNESS: 1

## 2023-03-16 NOTE — PROGRESS NOTES
Hospital Medicine Daily Progress Note    Date of Service  3/16/2023    Chief Complaint  Kellie Chatman is a 53 y.o. female admitted 3/6/2023 with [facility]    Hospital Course  This is a 53-year-old female with past medical history of s/p right BKA and left transmetatarsal amputation, hypertension, HIV, seizure disorder, schizophrenia, methamphetamine use, tobacco use and homelessness who was found laying in the middle of the street and brought in by EMS.    Patient had recent admission at outside facility 2/2023 where she was noted to have recurrent frostbite on her right BKA and left transmetatarsal amputation stump, recommended to wait for further demarcation prior to any surgical intervention.  She was to follow-up with orthopedic surgery outpatient.  During that admission she was diagnosed with a left superficial femoral vein DVT discharged with Xarelto.    On this admission, patient had CT imaging which noted right internal iliac vein DVT, patient started on Eliquis.      Concern for possible pyelonephritis on CT imaging.  Patient started on Rocephin. Urine culture E. Faecium VRE.  Discussing the case with ID pharmacy as unable to determine if patient is symptomatic or not, as she is a poor historian.  Given she improved clinically throughout this admission, will defer any further changes in her antibiotic regimen.    Blood cultures with no growth to date.  Concern for possible underlying infection and patient's prior frostbitten BLE. MRSA swab positive.  Discussed with ID pharmacy, recommended de-escalation to Augmentin.  CT imaging LLE, negative for osteomyelitis or abscess formation.  Discussed with orthopedic surgery, no recommendation for debridement or surgical intervention at this time.  LPS consulted for wound care.     Interval Problem Update  L heel wound worsening with drainage. Continue Unasyn and zyvox, tolerating. Monitoring labs    Discussed with LPS, Frostbite injury to left heel and posterior ankle  continues to evolve and worsen. Likely need BKA next week.     I have discussed this patient's plan of care and discharge plan at IDT rounds today with Case Management, Nursing, Nursing leadership, and other members of the IDT team.    Consultants/Specialty  psychiatry  LPS    Code Status  Full Code    Disposition  Patient is medically cleared for discharge.   Anticipate discharge to Shelter + outpt wound care vs SNF  I have placed the appropriate orders for post-discharge needs.    Review of Systems  Review of Systems   Constitutional:  Positive for malaise/fatigue.   Neurological:  Positive for weakness.   All other systems reviewed and are negative.     Physical Exam  Temp:  [36.5 °C (97.7 °F)-37.1 °C (98.7 °F)] 36.5 °C (97.7 °F)  Pulse:  [77-85] 77  Resp:  [16-20] 16  BP: ()/(49-77) 109/77  SpO2:  [95 %-100 %] 100 %    Physical Exam  Vitals and nursing note reviewed.   Constitutional:       General: She is not in acute distress.     Appearance: She is ill-appearing.   HENT:      Head: Normocephalic and atraumatic.      Mouth/Throat:      Mouth: Mucous membranes are moist.      Pharynx: No oropharyngeal exudate.   Eyes:      Extraocular Movements: Extraocular movements intact.      Pupils: Pupils are equal, round, and reactive to light.   Cardiovascular:      Rate and Rhythm: Normal rate and regular rhythm.      Pulses: Normal pulses.      Heart sounds: No murmur heard.    No friction rub. No gallop.   Pulmonary:      Effort: Pulmonary effort is normal. No respiratory distress.      Breath sounds: No wheezing, rhonchi or rales.   Abdominal:      General: Bowel sounds are normal. There is no distension.      Palpations: Abdomen is soft. There is no mass.      Tenderness: There is no abdominal tenderness.   Musculoskeletal:         General: No swelling or tenderness. Normal range of motion.      Cervical back: Normal range of motion. No rigidity. No muscular tenderness.      Right lower leg: No edema.       Left lower leg: No edema.      Comments: Right lower extremity BKA, left lower extremity with transmetatarsal amputation, bullae noted, now draining copiously   Skin:     General: Skin is warm and dry.      Capillary Refill: Capillary refill takes less than 2 seconds.      Findings: No erythema or rash.   Neurological:      General: No focal deficit present.      Mental Status: She is alert. She is disoriented.      Motor: No weakness.      Gait: Gait normal.   Psychiatric:         Mood and Affect: Affect is flat.         Speech: Speech is delayed.         Behavior: Behavior is withdrawn.       Fluids    Intake/Output Summary (Last 24 hours) at 3/16/2023 1324  Last data filed at 3/16/2023 0946  Gross per 24 hour   Intake 360 ml   Output --   Net 360 ml         Laboratory  Recent Labs     03/15/23  0555   WBC 6.1   RBC 3.48*   HEMOGLOBIN 9.4*   HEMATOCRIT 29.7*   MCV 85.3   MCH 27.0   MCHC 31.6*   RDW 44.7   PLATELETCT 507*   MPV 9.1       Recent Labs     03/15/23  0555   SODIUM 139   POTASSIUM 4.3   CHLORIDE 106   CO2 26   GLUCOSE 101*   BUN 15   CREATININE 0.63   CALCIUM 8.8                       Imaging  CT-FOOT WITH PLUS RECONS LEFT   Final Result      1.  Edema and/or cellulitis of the visualized leg, ankle and foot. No focal abscess.   2.  Large blisters.   3.  Postsurgical changes first through fifth transmetatarsal amputations.   4.  No definite evidence for osteomyelitis.   5.  Some thickening of the Achilles tendon is nonspecific and not well evaluated on CT.   6.  Further evaluation with MRI as clinically indicated.      DX-CHEST-PORTABLE (1 VIEW)   Final Result      No acute cardiac or pulmonary abnormalities are identified.      DX-FEMUR-2+ RIGHT   Final Result      No radiographic evidence of acute traumatic injury.      CT-ABDOMEN-PELVIS WITH   Final Result      1.  Right internal iliac vein DVT.   2.  Somewhat striated nephrogram of the bilateral superior poles may be artifactual. Correlate with  urinalysis for pyelonephritis.   3.  Redemonstration of enhancing lesion of the uterus. This has been evaluated on prior ultrasound.   4.  Prominent bilateral inguinal lymph nodes may be reactive. Malignancy not excluded.      Dr. Emanuel discussed these findings with Dr. Child at 1:49 PM by telephone on 3/6/2023      DX-FOOT-2- LEFT   Final Result      Status post transmetatarsal amputation. No definitive radiographic evidence of ostomy myelitis. If there is strong clinical suspicion, MRI of the foot with and without contrast should be obtained for further evaluation.      DX-CHEST-PORTABLE (1 VIEW)   Final Result      No acute cardiopulmonary disease evident.             Assessment/Plan  * Infection of amputation stump of left lower extremity (HCC)  Assessment & Plan  Concern for   Initially discussed the case with orthopedic surgery, who did not believe at this time required intervention  However patient with persistent fever, all other work-up negative  Concern for possible underlying infection and patient's prior frostbitten BLE stumps, bullae noted   Blood cultures with no growth to date.   MRSA swab positive.   Discontinued vancomycin and Zosyn, switched to Zyvox.  Monitor renal function.     CT imaging LLE, negative for osteomyelitis or abscess formation.   3/14: Per LPS, Frostbite injury to left heel and posterior ankle continues to evolve and worsen. Likely need BKA next week.     On Augmentin, given worsening wound, will change to Unasyn. MRSA swab positive, added zyvox.   Tolerating and monitoring cbc, bmp    Fever in adult  Assessment & Plan  Patient noted to spike a 101.6 fever on 3/8, negative chest x-ray, UA negative, COVID, influenza, RSV negative, chest x-ray negative for any infiltrate or pulmonary effusion.  Lactic acid and procalcitonin negative. Blood cultures with no growth to date.  Concern for possible underlying infection and patient's prior frostbitten BLE. MRSA swab positive.     Slow  transit constipation  Assessment & Plan  Patient noted to have abdominal distention, prior bowel movement several days prior  Started on aggressive bowel regimen, given one-time dose of Dulcolax  Still with no bowel movement, oral lactulose given  Large BM 3/8 PM  Serial abdominal exams  RESOLVED    Noncompliance  Assessment & Plan  Significant history with noncompliance with medications and doctor visits    Homelessness  Assessment & Plan  Complicated medical history and noncompliance and drug abuse   was consulted to consider group home      Lactic acid acidosis  Assessment & Plan  Likely related to dehydration and infection  IV fluid and ceftriaxone  Resolved    Hx of BKA, right ([facility])  Assessment & Plan  Right BKA and left transmetatarsal amputation  LPS following, local wound care     Methamphetamine abuse ([facility])  Assessment & Plan  Encouraged the patient to quit  Urine drug screen is positive  Blood culture no growth to date    Pyelonephritis  Assessment & Plan  CT scan shows signs of ??pyelonephritis  Urine culture noted E. Faecium VRE  Poor historian, unable to determine if patient is symptomatic or not. She did reports abdominal pain  I ordered Zyvox, which will cover both UTI? And worsening L heel ankle wound. MRSA swab positive.     Acute deep vein thrombosis (DVT) ([facility])- (present on admission)  Assessment & Plan  Patient had recent admission at outside facility 2/2023,  she was diagnosed with a left superficial femoral vein DVT discharged with Xarelto.  Patient had CT imaging which noted right internal iliac vein DVT.    Initially on heparin drip, transitioned to Eliquis  No sign of active bleeding       HIV (human immunodeficiency virus infection) ([facility])- (present on admission)  Assessment & Plan  Patient is on Biktarvy however, clearly questioning about compliance  CD4-761 and CD8-194  Resume Biktarvy with close monitoring    Hypokalemia- (present on admission)  Assessment & Plan  Labs  reviewed, resolved    Schizophrenia (HCC)- (present on admission)  Assessment & Plan  Patient has hallucination with hearing voices  Denied any SI or HI, however with active auditory hallucinations  Resume home medication Depakote, formally on Zyprexa  Psych consulted, continue with Depakote, trough level stable, Zyprexa 5 mg twice daily initiated    Tobacco abuse- (present on admission)  Assessment & Plan  Cessation counseled, 3 minute  Nicotine replacement         VTE prophylaxis: therapeutic anticoagulation with Eliquis    I have performed a physical exam and reviewed and updated ROS and Plan today (3/16/2023). In review of yesterday's note (3/15/2023), there are no changes except as documented above.

## 2023-03-16 NOTE — PROGRESS NOTES
Pt alert/oriented x4, denies pain/discomfort. Pt fatigued/drowsy, resting in bed, needs met. Tolerated IV abx, no adverse rxn noted. Call light within reach, personal belongings available, bed in lowest position, treaded socks on, and bed alarm on. Hourly rounding in place.

## 2023-03-16 NOTE — CARE PLAN
The patient is Stable - Low risk of patient condition declining or worsening    Shift Goals  Clinical Goals: woundcare and placment  Patient Goals: rest  Family Goals: ryan    Progress made toward(s) clinical / shift goals:  Patient included in and updated on plan of care. Wound care provided per protocol by wound nurse.   Problem: Knowledge Deficit - Standard  Goal: Patient and family/care givers will demonstrate understanding of plan of care, disease process/condition, diagnostic tests and medications  Outcome: Progressing     Problem: Skin Integrity  Goal: Skin integrity is maintained or improved  Outcome: Progressing       Patient is not progressing towards the following goals:

## 2023-03-16 NOTE — CARE PLAN
The patient is Stable - Low risk of patient condition declining or worsening    Shift Goals  Clinical Goals: safety  Patient Goals: rest  Family Goals: ryan    Progress made toward(s) clinical / shift goals:  Pt has a right BKA. Safety precautions in place. Bed in low position, treaded socks on, personal possessions in reach, call light in reach and Bed alarm on. Hourly rounding in place.      Problem: Skin Integrity  Goal: Skin integrity is maintained or improved  Outcome: Progressing     Problem: Pain - Standard  Goal: Alleviation of pain or a reduction in pain to the patient’s comfort goal  Outcome: Progressing     Problem: Knowledge Deficit - Standard  Goal: Patient and family/care givers will demonstrate understanding of plan of care, disease process/condition, diagnostic tests and medications  Outcome: Progressing     Problem: Fall Risk  Goal: Patient will remain free from falls  Outcome: Progressing

## 2023-03-17 PROCEDURE — 700105 HCHG RX REV CODE 258: Performed by: STUDENT IN AN ORGANIZED HEALTH CARE EDUCATION/TRAINING PROGRAM

## 2023-03-17 PROCEDURE — 99232 SBSQ HOSP IP/OBS MODERATE 35: CPT | Mod: 25

## 2023-03-17 PROCEDURE — 700102 HCHG RX REV CODE 250 W/ 637 OVERRIDE(OP): Performed by: INTERNAL MEDICINE

## 2023-03-17 PROCEDURE — 700102 HCHG RX REV CODE 250 W/ 637 OVERRIDE(OP): Performed by: GENERAL PRACTICE

## 2023-03-17 PROCEDURE — 11042 DBRDMT SUBQ TIS 1ST 20SQCM/<: CPT

## 2023-03-17 PROCEDURE — A9270 NON-COVERED ITEM OR SERVICE: HCPCS | Performed by: GENERAL PRACTICE

## 2023-03-17 PROCEDURE — 700111 HCHG RX REV CODE 636 W/ 250 OVERRIDE (IP): Performed by: STUDENT IN AN ORGANIZED HEALTH CARE EDUCATION/TRAINING PROGRAM

## 2023-03-17 PROCEDURE — 97602 WOUND(S) CARE NON-SELECTIVE: CPT

## 2023-03-17 PROCEDURE — 770001 HCHG ROOM/CARE - MED/SURG/GYN PRIV*

## 2023-03-17 PROCEDURE — A9270 NON-COVERED ITEM OR SERVICE: HCPCS | Performed by: STUDENT IN AN ORGANIZED HEALTH CARE EDUCATION/TRAINING PROGRAM

## 2023-03-17 PROCEDURE — A9270 NON-COVERED ITEM OR SERVICE: HCPCS | Performed by: INTERNAL MEDICINE

## 2023-03-17 PROCEDURE — 700102 HCHG RX REV CODE 250 W/ 637 OVERRIDE(OP): Performed by: STUDENT IN AN ORGANIZED HEALTH CARE EDUCATION/TRAINING PROGRAM

## 2023-03-17 PROCEDURE — 99232 SBSQ HOSP IP/OBS MODERATE 35: CPT | Performed by: STUDENT IN AN ORGANIZED HEALTH CARE EDUCATION/TRAINING PROGRAM

## 2023-03-17 RX ADMIN — APIXABAN 5 MG: 5 TABLET, FILM COATED ORAL at 09:15

## 2023-03-17 RX ADMIN — OLANZAPINE 5 MG: 5 TABLET, FILM COATED ORAL at 09:15

## 2023-03-17 RX ADMIN — AMPICILLIN AND SULBACTAM 3 G: 1; 2 INJECTION, POWDER, FOR SOLUTION INTRAMUSCULAR; INTRAVENOUS at 06:06

## 2023-03-17 RX ADMIN — BICTEGRAVIR SODIUM, EMTRICITABINE, AND TENOFOVIR ALAFENAMIDE FUMARATE 1 TABLET: 50; 200; 25 TABLET ORAL at 09:15

## 2023-03-17 RX ADMIN — Medication 1 APPLICATION: at 09:16

## 2023-03-17 RX ADMIN — DIVALPROEX SODIUM 500 MG: 500 TABLET, DELAYED RELEASE ORAL at 09:15

## 2023-03-17 RX ADMIN — LINEZOLID 600 MG: 600 TABLET, FILM COATED ORAL at 06:07

## 2023-03-17 RX ADMIN — OLANZAPINE 5 MG: 5 TABLET, FILM COATED ORAL at 20:48

## 2023-03-17 RX ADMIN — ACETAMINOPHEN 650 MG: 325 TABLET, FILM COATED ORAL at 18:31

## 2023-03-17 RX ADMIN — LINEZOLID 600 MG: 600 TABLET, FILM COATED ORAL at 18:31

## 2023-03-17 RX ADMIN — NICOTINE 14 MG: 14 PATCH, EXTENDED RELEASE TRANSDERMAL at 09:16

## 2023-03-17 RX ADMIN — AMPICILLIN AND SULBACTAM 3 G: 1; 2 INJECTION, POWDER, FOR SOLUTION INTRAMUSCULAR; INTRAVENOUS at 12:50

## 2023-03-17 RX ADMIN — APIXABAN 5 MG: 5 TABLET, FILM COATED ORAL at 20:48

## 2023-03-17 RX ADMIN — AMPICILLIN AND SULBACTAM 3 G: 1; 2 INJECTION, POWDER, FOR SOLUTION INTRAMUSCULAR; INTRAVENOUS at 18:35

## 2023-03-17 RX ADMIN — DIVALPROEX SODIUM 500 MG: 500 TABLET, DELAYED RELEASE ORAL at 20:48

## 2023-03-17 ASSESSMENT — PAIN DESCRIPTION - PAIN TYPE
TYPE: ACUTE PAIN
TYPE: ACUTE PAIN

## 2023-03-17 ASSESSMENT — ENCOUNTER SYMPTOMS: WEAKNESS: 1

## 2023-03-17 NOTE — DISCHARGE PLANNING
Case Management Discharge Planning    Admission Date: 3/6/2023  GMLOS: 3.3  ALOS: 11    6-Clicks ADL Score: 19  6-Clicks Mobility Score: 9  PT and/or OT Eval ordered: NA  Post-acute Referrals Ordered: Yes  Post-acute Choice Obtained: Yes  Has referral(s) been sent to post-acute provider:  Yes      Anticipated Discharge Dispo: Discharge Disposition: D/T to SNF with Medicare cert in anticipation of skilled care (03)    DME Needed: No    Action(s) Taken: Updated Provider/Nurse on Discharge Plan, Patient Conference, Choice obtained, and Referral(s) sent    Escalations Completed: None    Medically Clear: No    Next Steps: Referral sent to SNF.    Barriers to Discharge: Medical clearance and Pending Placement    Is the patient up for discharge tomorrow: No     EDUARDO Ta introduced self to Pt. SNF referral placement pending. Release of Information form signed by Pt for [facility], pending placement options. DANNY form sent to PDA.

## 2023-03-17 NOTE — CARE PLAN
The patient is Stable - Low risk of patient condition declining or worsening    Shift Goals  Clinical Goals: Wound Care, Monitor BP  Patient Goals: Rest, proper wound care  Family Goals: ryan    Problem: Pain - Standard  Goal: Alleviation of pain or a reduction in pain to the patient’s comfort goal  Outcome: Progressing     Problem: Skin Integrity  Goal: Skin integrity is maintained or improved  Outcome: Progressing     Problem: Fall Risk  Goal: Patient will remain free from falls  Outcome: Progressing       Progress made toward(s) clinical / shift goals:  Wound care provided, Pt free of falls

## 2023-03-17 NOTE — PROGRESS NOTES
LIMB PRESERVATION SERVICE  PROGRESS NOTE    HPI: Kellie Chatman is a 53 y.o.  with a past medical history that includes frostbite of bilateral feet, HIV, EtOH abuse, methamphetamine abuse.  Admitted 3/6/2023 for DVT, lower extremity, proximal, acute, left (HCC) [I82.4Y2].     LPS has been consulted for frostbite injuries to left TMA, left heel, right BKA.    The patient was initially seen on 11/14/2022 for frostbite to her right foot and left hallux.  Due to this injury patient had a right BKA on 12/20/2022 with Dr. Lux.  Due to continued decline of her left foot she eventually had a left TMA on 2/7/2023 with Dr. Lux.    Patient is homeless and does attempt to stay at the [facility] as able.  Patient states unfortunately she had another frostbite injury to the posterior aspect of her right BKA and her left TMA and plantar foot including her left heel.  Patient was seen at [facility] with DC on 3/1/2023, and it was deemed that frostbite injury needed to demarcate prior to determination for surgical intervention for 1 to 2 weeks.         INTERVAL HISTORY:  3/11/2023: Patient denies fevers, chills, nausea, vomiting.  Pain controlled.   3/14/23: complains of discomfort to left foot.  Patient with eyes closed during majority of visit, appears to be resting.  3/17/2023: Patient feeling better today, sitting up in bed, alert.  States pain under control at this time.  Discussed left TMA and possible need for left BKA if unable to heal.  Patient wishes to preserve left limb if possible and would like to continue with current wound care at this time.  3/22/2023: Received update that pt left TMA and heel have declined and pt is currently NPO. Discussed with pt that due to tissue loss/damage, she may likely require left BKA rather than left TMA revision. Pt vioces understanding and is agreeable to proceed with surgery today.       PERTINENT LPS RESULTS:   COVID-19: not completed  CT left foot  1.  Edema and/or  "cellulitis of the visualized leg, ankle and foot. No focal abscess.  2.  Large blisters.  3.  Postsurgical changes first through fifth transmetatarsal amputations.  4.  No definite evidence for osteomyelitis.  5.  Some thickening of the Achilles tendon is nonspecific and not well evaluated on CT.  6.  Further evaluation with MRI as clinically indicated.        EXAM:      Disengaged and examination    /60   Pulse 77   Temp 37.1 °C (98.7 °F) (Temporal)   Resp 16   Ht 1.778 m (5' 10\")   Wt 73.8 kg (162 lb 11.2 oz)   SpO2 97%   BMI 23.34 kg/m²     Pedal Pulses:   Right BKA warm and well-perfused, palpable popliteal  Left foot: Faintly palpable DP, palpable PT    Sensation:   Monofilament testing, patient declines   Sensation intact to left foot  Sensation intact to right BKA      Wound(s) :    Wound location: Right posterior BKA  Wound characteristics:   Intact Black eschar, dry, no bogginess noted   No erythema  Minimal edema  No drainage    Right BKA anterior:  Superficial eschar along previous incision line-slowly lifting  No erythema  No edema   No drainage    Wound location: Left TMA-frostbite injury  Wound characteristics: Black eschar over incision  Sutures remain embedded  Erythema: None  Drainage: scant, sanguinous     Wound location: Left plantar heel and foot-frostbite injury  Wound characteristics: Plantar heel with black eschar, deflated bulla from heel to distal end of TMA  Yellow, white, nonviable tissue with areas of black eschar  Erythema: None  Drainage: Small, sanguinous      Wound location: Left posterior, medial, lateral ankle  Wound characteristics: Yellow, white, discolored tissue with areas of black of eschar   Erythema: None  Drainage: None      Wound photo: Right BKA      Left TMA    Left plantar TMA     Left medial heel/achilles             DIABETES MANAGEMENT:    A1c:   Lab Results   Component Value Date/Time    HBA1C 5.1 02/24/2023 05:54 AM        INFECTION " MANAGEMENT:    Results from last 7 days   Lab Units 03/15/23  0555 03/13/23  1033 03/12/23  0658   WBC 1501 K/uL 6.1 5.0 4.8   PLATELET COUNT 1518 K/uL 507* 465* 399           ASSESSMENT/PLAN:   53 y.o. admitted for DVT, lower extremity, proximal, acute, left (HCC) [I82.4Y2]. Presents with frostbite injury posterior right BKA, left TMA, left heel, left plantar foot.    -Frostbite injury to left heel, plantar foot, posterior ankle continues to evolve.    -Eschar to distal end of TMA now with drainage    -Patient has been NPO since midnight  -Discussed possible need for left BKA due to extent of tissue loss/damage. Patient agreeable   -Updated Dr Lux. Plan for left BKA vs TMA revision and heel debridement        Wound care:   -wound care orders placed for nursing by LPS   -Right anterior/posterior BKA: Paint with Betadine, allowed to air dry, leave open to air.  Daily  -Left TMA, left heel, plantar foot: Apply 3 layers xeroform over open tissue, paint eschar with betadine, secure with roll gauze, tape     Labs/Imaging:  -COVID-19: not completed this admission.     Vascular status:   -Right BKA palpable popliteal pulse  - Left foot: Faintly palpable DP, palpable PT      Surgery:   - Ortho Dr. Lux  -NPO  -plan for left BKA vs TMA revision with heel debridement     Antibiotics:   - on antibiotics managed by hospitalist    Weight Bearing Status:   -Right BKA: Nonweightbearing  -Left foot: Transfer weight bearing    Offloading:   Heel offloading redistribution pad  -Wheelchair at bedside    PT/OT :   -involved      DISCHARGE PLAN:    Disposition:   TBD, patient currently homeless  recommend SNF              Discussed with: pt, RN, Dr. Lux, Dr. Bolivar     My total time spent caring for the patient on the day of the encounter was 50 minutes.   This does not include time spent on separately billable procedures/tests.   for patient care, counseling, and coordination on this date, including patient face-to face time,  unit/floor time with review of records/pertinent lab data and studies, as well as discussing diagnostic evaluation/work up, planned therapeutic interventions, and future disposition of care, as per the interval events/subjective and the assessment and plan as noted above.        Please note that this dictation was created using voice recognition software. I have  worked with technical experts from [facility] to optimize the interface.  I have made every reasonable attempt to correct obvious errors, but there may be errors of grammar and possibly content that I did not discover before finalizing the note.    Yuliet Hwang, A.P.R.N.    If any questions or concerns, please contact LPS through voalte.

## 2023-03-17 NOTE — PROGRESS NOTES
Hospital Medicine Daily Progress Note    Date of Service  3/17/2023    Chief Complaint  Kellie Chatman is a 53 y.o. female admitted 3/6/2023 with [facility]    Hospital Course  This is a 53-year-old female with past medical history of s/p right BKA and left transmetatarsal amputation, hypertension, HIV, seizure disorder, schizophrenia, methamphetamine use, tobacco use and homelessness who was found laying in the middle of the street and brought in by EMS.    Patient had recent admission at outside facility 2/2023 where she was noted to have recurrent frostbite on her right BKA and left transmetatarsal amputation stump, recommended to wait for further demarcation prior to any surgical intervention.  She was to follow-up with orthopedic surgery outpatient.  During that admission she was diagnosed with a left superficial femoral vein DVT discharged with Xarelto.    On this admission, patient had CT imaging which noted right internal iliac vein DVT, patient started on Eliquis.      Concern for possible pyelonephritis on CT imaging.  Patient started on Rocephin. Urine culture E. Faecium VRE.  Discussing the case with ID pharmacy as unable to determine if patient is symptomatic or not, as she is a poor historian.  Given she improved clinically throughout this admission, will defer any further changes in her antibiotic regimen.    Blood cultures with no growth to date.  Concern for possible underlying infection and patient's prior frostbitten BLE. MRSA swab positive.  Discussed with ID pharmacy, recommended de-escalation to Augmentin.  CT imaging LLE, negative for osteomyelitis or abscess formation.  Discussed with orthopedic surgery, no recommendation for debridement or surgical intervention at this time.  LPS consulted for wound care.     Interval Problem Update  I have discussed with LPS today regarding patient's left heel wound, currently slightly improving.  Patient does not want to have left BKA.   Will repeat  procalcitonin  Continue Unasyn and Zyvox  Need replacement with wound care    I have discussed this patient's plan of care and discharge plan at IDT rounds today with Case Management, Nursing, Nursing leadership, and other members of the IDT team.    Consultants/Specialty  psychiatry  LPS    Code Status  Full Code    Disposition  Patient is medically cleared for discharge.   Anticipate discharge to Shelter + outpt wound care vs SNF  I have placed the appropriate orders for post-discharge needs.    Review of Systems  Review of Systems   Constitutional:  Positive for malaise/fatigue.   Neurological:  Positive for weakness.   All other systems reviewed and are negative.     Physical Exam  Temp:  [36.4 °C (97.5 °F)-37.3 °C (99.1 °F)] 37.1 °C (98.7 °F)  Pulse:  [76-78] 77  Resp:  [16-18] 16  BP: (101-109)/(60-69) 101/60  SpO2:  [96 %-100 %] 97 %    Physical Exam  Vitals and nursing note reviewed.   Constitutional:       General: She is not in acute distress.     Appearance: She is ill-appearing.   HENT:      Head: Normocephalic and atraumatic.      Mouth/Throat:      Mouth: Mucous membranes are moist.      Pharynx: No oropharyngeal exudate.   Eyes:      Extraocular Movements: Extraocular movements intact.      Pupils: Pupils are equal, round, and reactive to light.   Cardiovascular:      Rate and Rhythm: Normal rate and regular rhythm.      Pulses: Normal pulses.      Heart sounds: No murmur heard.    No friction rub. No gallop.   Pulmonary:      Effort: Pulmonary effort is normal. No respiratory distress.      Breath sounds: No wheezing, rhonchi or rales.   Abdominal:      General: Bowel sounds are normal. There is no distension.      Palpations: Abdomen is soft. There is no mass.      Tenderness: There is no abdominal tenderness.   Musculoskeletal:         General: No swelling or tenderness. Normal range of motion.      Cervical back: Normal range of motion. No rigidity. No muscular tenderness.      Right lower leg: No  edema.      Left lower leg: No edema.      Comments: Right lower extremity BKA, left lower extremity with transmetatarsal amputation, bullae noted, now draining copiously   Skin:     General: Skin is warm and dry.      Capillary Refill: Capillary refill takes less than 2 seconds.      Findings: No erythema or rash.   Neurological:      General: No focal deficit present.      Mental Status: She is alert. She is disoriented.      Motor: No weakness.      Gait: Gait normal.   Psychiatric:         Mood and Affect: Affect is flat.         Speech: Speech is delayed.         Behavior: Behavior is withdrawn.       Fluids    Intake/Output Summary (Last 24 hours) at 3/17/2023 1422  Last data filed at 3/17/2023 0915  Gross per 24 hour   Intake 480 ml   Output 800 ml   Net -320 ml         Laboratory  Recent Labs     03/15/23  0555   WBC 6.1   RBC 3.48*   HEMOGLOBIN 9.4*   HEMATOCRIT 29.7*   MCV 85.3   MCH 27.0   MCHC 31.6*   RDW 44.7   PLATELETCT 507*   MPV 9.1       Recent Labs     03/15/23  0555   SODIUM 139   POTASSIUM 4.3   CHLORIDE 106   CO2 26   GLUCOSE 101*   BUN 15   CREATININE 0.63   CALCIUM 8.8                       Imaging  CT-FOOT WITH PLUS RECONS LEFT   Final Result      1.  Edema and/or cellulitis of the visualized leg, ankle and foot. No focal abscess.   2.  Large blisters.   3.  Postsurgical changes first through fifth transmetatarsal amputations.   4.  No definite evidence for osteomyelitis.   5.  Some thickening of the Achilles tendon is nonspecific and not well evaluated on CT.   6.  Further evaluation with MRI as clinically indicated.      DX-CHEST-PORTABLE (1 VIEW)   Final Result      No acute cardiac or pulmonary abnormalities are identified.      DX-FEMUR-2+ RIGHT   Final Result      No radiographic evidence of acute traumatic injury.      CT-ABDOMEN-PELVIS WITH   Final Result      1.  Right internal iliac vein DVT.   2.  Somewhat striated nephrogram of the bilateral superior poles may be artifactual.  Correlate with urinalysis for pyelonephritis.   3.  Redemonstration of enhancing lesion of the uterus. This has been evaluated on prior ultrasound.   4.  Prominent bilateral inguinal lymph nodes may be reactive. Malignancy not excluded.      Dr. Emanuel discussed these findings with Dr. Child at 1:49 PM by telephone on 3/6/2023      DX-FOOT-2- LEFT   Final Result      Status post transmetatarsal amputation. No definitive radiographic evidence of ostomy myelitis. If there is strong clinical suspicion, MRI of the foot with and without contrast should be obtained for further evaluation.      DX-CHEST-PORTABLE (1 VIEW)   Final Result      No acute cardiopulmonary disease evident.             Assessment/Plan  * Infection of amputation stump of left lower extremity (HCC)  Assessment & Plan  Concern for   Initially discussed the case with orthopedic surgery, who did not believe at this time required intervention  However patient with persistent fever, all other work-up negative  Concern for possible underlying infection and patient's prior frostbitten BLE stumps, bullae noted   Blood cultures with no growth to date.   MRSA swab positive.   Discontinued vancomycin and Zosyn, switched to Zyvox.  Monitor renal function.     CT imaging LLE, negative for osteomyelitis or abscess formation.   3/14: Per LPS, Frostbite injury to left heel and posterior ankle continues to evolve and worsen. Likely need BKA next week.     On Augmentin, given worsening wound, will change to Unasyn. MRSA swab positive, added zyvox.   Tolerating and monitoring cbc, bmp  3/17: Patient is not interested in left BKA.  Left heel wound slightly improving, will continue antibiotics    Fever in adult  Assessment & Plan  Patient noted to spike a 101.6 fever on 3/8, negative chest x-ray, UA negative, COVID, influenza, RSV negative, chest x-ray negative for any infiltrate or pulmonary effusion.  Lactic acid and procalcitonin negative. Blood cultures with no growth  to date.  Concern for possible underlying infection and patient's prior frostbitten BLE. MRSA swab positive.     Slow transit constipation  Assessment & Plan  Patient noted to have abdominal distention, prior bowel movement several days prior  Started on aggressive bowel regimen, given one-time dose of Dulcolax  Still with no bowel movement, oral lactulose given  Large BM 3/8 PM  Serial abdominal exams  RESOLVED    Noncompliance  Assessment & Plan  Significant history with noncompliance with medications and doctor visits    Homelessness  Assessment & Plan  Complicated medical history and noncompliance and drug abuse   was consulted to consider group home      Lactic acid acidosis  Assessment & Plan  Likely related to dehydration and infection  IV fluid and ceftriaxone  Resolved    Hx of BKA, right ([facility])  Assessment & Plan  Right BKA and left transmetatarsal amputation  LPS following, local wound care     Methamphetamine abuse ([facility])  Assessment & Plan  Encouraged the patient to quit  Urine drug screen is positive  Blood culture no growth to date    Pyelonephritis  Assessment & Plan  Urine culture noted E. Faecium VRE.  However patient denies UTI symptoms  No indication for treatment    Acute deep vein thrombosis (DVT) ([facility])- (present on admission)  Assessment & Plan  Patient had recent admission at outside facility 2/2023,  she was diagnosed with a left superficial femoral vein DVT discharged with Xarelto.  Patient had CT imaging which noted right internal iliac vein DVT.    Initially on heparin drip, transitioned to Eliquis  No sign of active bleeding       HIV (human immunodeficiency virus infection) ([facility])- (present on admission)  Assessment & Plan  Patient is on Biktarvy however, clearly questioning about compliance  CD4-761 and CD8-194  Resume Biktarvy with close monitoring    Hypokalemia- (present on admission)  Assessment & Plan  Labs reviewed, resolved    Schizophrenia ([facility])- (present on  admission)  Assessment & Plan  Patient has hallucination with hearing voices  Denied any SI or HI, however with active auditory hallucinations  Resume home medication Depakote, formally on Zyprexa  Psych consulted, continue with Depakote, trough level stable, Zyprexa 5 mg twice daily initiated    Tobacco abuse- (present on admission)  Assessment & Plan  Cessation counseled, 3 minute  Nicotine replacement         VTE prophylaxis: therapeutic anticoagulation with Eliquis    I have performed a physical exam and reviewed and updated ROS and Plan today (3/17/2023). In review of yesterday's note (3/16/2023), there are no changes except as documented above.

## 2023-03-17 NOTE — PROGRESS NOTES
Received bedside report from night shift RN. Patient is A&Ox3 for me this shift. She did not know date. Patient is on RA, VSS, bp can be soft. Patient denies any pain at this time. POC discussed with patient, all questions answered. No further needs at this time. Bed is in lowest/locked position. Call light and belongings are within reach. Hourly rounding in place.     Wound care was complete in the early afternoon. Patient got up to the bsc to have a bm. When the patient got back to bed, the dressing on the L foot had fallen off. The skin was folded back. It almost look like a degloving. This RN took a photo and contacted LPS for advice.    LPS said to cover the foot with Surgilube , 3 layers of xeroform and then wrap the foot. This was completed, no other needs at this time.

## 2023-03-18 LAB
ANION GAP SERPL CALC-SCNC: 8 MMOL/L (ref 7–16)
BUN SERPL-MCNC: 17 MG/DL (ref 8–22)
CALCIUM SERPL-MCNC: 8.7 MG/DL (ref 8.5–10.5)
CHLORIDE SERPL-SCNC: 108 MMOL/L (ref 96–112)
CO2 SERPL-SCNC: 24 MMOL/L (ref 20–33)
CREAT SERPL-MCNC: 0.71 MG/DL (ref 0.5–1.4)
ERYTHROCYTE [DISTWIDTH] IN BLOOD BY AUTOMATED COUNT: 46.7 FL (ref 35.9–50)
GFR SERPLBLD CREATININE-BSD FMLA CKD-EPI: 100 ML/MIN/1.73 M 2
GLUCOSE SERPL-MCNC: 95 MG/DL (ref 65–99)
HCT VFR BLD AUTO: 28.5 % (ref 37–47)
HGB BLD-MCNC: 8.8 G/DL (ref 12–16)
MCH RBC QN AUTO: 26.7 PG (ref 27–33)
MCHC RBC AUTO-ENTMCNC: 30.9 G/DL (ref 33.6–35)
MCV RBC AUTO: 86.6 FL (ref 81.4–97.8)
PLATELET # BLD AUTO: 431 K/UL (ref 164–446)
PMV BLD AUTO: 9.3 FL (ref 9–12.9)
POTASSIUM SERPL-SCNC: 4.2 MMOL/L (ref 3.6–5.5)
PROCALCITONIN SERPL-MCNC: <0.05 NG/ML
RBC # BLD AUTO: 3.29 M/UL (ref 4.2–5.4)
SODIUM SERPL-SCNC: 140 MMOL/L (ref 135–145)
WBC # BLD AUTO: 5.2 K/UL (ref 4.8–10.8)

## 2023-03-18 PROCEDURE — 700102 HCHG RX REV CODE 250 W/ 637 OVERRIDE(OP): Performed by: GENERAL PRACTICE

## 2023-03-18 PROCEDURE — A9270 NON-COVERED ITEM OR SERVICE: HCPCS | Performed by: STUDENT IN AN ORGANIZED HEALTH CARE EDUCATION/TRAINING PROGRAM

## 2023-03-18 PROCEDURE — A9270 NON-COVERED ITEM OR SERVICE: HCPCS | Performed by: INTERNAL MEDICINE

## 2023-03-18 PROCEDURE — 80048 BASIC METABOLIC PNL TOTAL CA: CPT

## 2023-03-18 PROCEDURE — 700105 HCHG RX REV CODE 258: Performed by: STUDENT IN AN ORGANIZED HEALTH CARE EDUCATION/TRAINING PROGRAM

## 2023-03-18 PROCEDURE — 84145 PROCALCITONIN (PCT): CPT

## 2023-03-18 PROCEDURE — 99232 SBSQ HOSP IP/OBS MODERATE 35: CPT | Performed by: STUDENT IN AN ORGANIZED HEALTH CARE EDUCATION/TRAINING PROGRAM

## 2023-03-18 PROCEDURE — 36415 COLL VENOUS BLD VENIPUNCTURE: CPT

## 2023-03-18 PROCEDURE — 700102 HCHG RX REV CODE 250 W/ 637 OVERRIDE(OP): Performed by: INTERNAL MEDICINE

## 2023-03-18 PROCEDURE — 700102 HCHG RX REV CODE 250 W/ 637 OVERRIDE(OP): Performed by: STUDENT IN AN ORGANIZED HEALTH CARE EDUCATION/TRAINING PROGRAM

## 2023-03-18 PROCEDURE — 85027 COMPLETE CBC AUTOMATED: CPT

## 2023-03-18 PROCEDURE — A9270 NON-COVERED ITEM OR SERVICE: HCPCS | Performed by: GENERAL PRACTICE

## 2023-03-18 PROCEDURE — 770001 HCHG ROOM/CARE - MED/SURG/GYN PRIV*

## 2023-03-18 PROCEDURE — 700111 HCHG RX REV CODE 636 W/ 250 OVERRIDE (IP): Performed by: STUDENT IN AN ORGANIZED HEALTH CARE EDUCATION/TRAINING PROGRAM

## 2023-03-18 RX ORDER — KETOROLAC TROMETHAMINE 30 MG/ML
15 INJECTION, SOLUTION INTRAMUSCULAR; INTRAVENOUS ONCE
Status: COMPLETED | OUTPATIENT
Start: 2023-03-19 | End: 2023-03-19

## 2023-03-18 RX ADMIN — AMPICILLIN AND SULBACTAM 3 G: 1; 2 INJECTION, POWDER, FOR SOLUTION INTRAMUSCULAR; INTRAVENOUS at 11:36

## 2023-03-18 RX ADMIN — DOCUSATE SODIUM 50 MG AND SENNOSIDES 8.6 MG 2 TABLET: 8.6; 5 TABLET, FILM COATED ORAL at 21:59

## 2023-03-18 RX ADMIN — AMPICILLIN AND SULBACTAM 3 G: 1; 2 INJECTION, POWDER, FOR SOLUTION INTRAMUSCULAR; INTRAVENOUS at 17:24

## 2023-03-18 RX ADMIN — LINEZOLID 600 MG: 600 TABLET, FILM COATED ORAL at 17:22

## 2023-03-18 RX ADMIN — NICOTINE 14 MG: 14 PATCH, EXTENDED RELEASE TRANSDERMAL at 09:30

## 2023-03-18 RX ADMIN — APIXABAN 5 MG: 5 TABLET, FILM COATED ORAL at 22:00

## 2023-03-18 RX ADMIN — APIXABAN 5 MG: 5 TABLET, FILM COATED ORAL at 09:31

## 2023-03-18 RX ADMIN — AMPICILLIN AND SULBACTAM 3 G: 1; 2 INJECTION, POWDER, FOR SOLUTION INTRAMUSCULAR; INTRAVENOUS at 05:18

## 2023-03-18 RX ADMIN — OLANZAPINE 5 MG: 5 TABLET, FILM COATED ORAL at 21:59

## 2023-03-18 RX ADMIN — LINEZOLID 600 MG: 600 TABLET, FILM COATED ORAL at 05:17

## 2023-03-18 RX ADMIN — OLANZAPINE 5 MG: 5 TABLET, FILM COATED ORAL at 09:31

## 2023-03-18 RX ADMIN — BICTEGRAVIR SODIUM, EMTRICITABINE, AND TENOFOVIR ALAFENAMIDE FUMARATE 1 TABLET: 50; 200; 25 TABLET ORAL at 09:31

## 2023-03-18 RX ADMIN — AMPICILLIN AND SULBACTAM 3 G: 1; 2 INJECTION, POWDER, FOR SOLUTION INTRAMUSCULAR; INTRAVENOUS at 00:29

## 2023-03-18 RX ADMIN — DIVALPROEX SODIUM 500 MG: 500 TABLET, DELAYED RELEASE ORAL at 09:31

## 2023-03-18 RX ADMIN — Medication 1 APPLICATION: at 09:31

## 2023-03-18 RX ADMIN — ACETAMINOPHEN 650 MG: 325 TABLET, FILM COATED ORAL at 22:27

## 2023-03-18 RX ADMIN — DIVALPROEX SODIUM 500 MG: 500 TABLET, DELAYED RELEASE ORAL at 22:00

## 2023-03-18 ASSESSMENT — PAIN DESCRIPTION - PAIN TYPE: TYPE: ACUTE PAIN

## 2023-03-18 ASSESSMENT — ENCOUNTER SYMPTOMS: WEAKNESS: 1

## 2023-03-18 NOTE — PROGRESS NOTES
Received bedside report from night shift RN. Patient is A&Ox3-4. Patient is on RA, VSS. Patient complained of head pain at this time, she asked for tylenol. POC discussed with patient, all questions answered. No further needs at this time. Bed is in lowest/locked position. Call light and belongings are within reach. Hourly rounding in place.

## 2023-03-18 NOTE — CARE PLAN
The patient is Stable - Low risk of patient condition declining or worsening    Shift Goals  Clinical Goals: wound care  Patient Goals: Rest, pain management  Family Goals: IVETTE    Progress made toward(s) clinical / shift goals:  No falls this shift, new wound care orders put in today on day shift 3/17    Problem: Pain - Standard  Goal: Alleviation of pain or a reduction in pain to the patient’s comfort goal  Outcome: Progressing     Problem: Skin Integrity  Goal: Skin integrity is maintained or improved  Outcome: Progressing  Follow new wound care orders placed today 3/17/23     Problem: Fall Risk  Goal: Patient will remain free from falls  Outcome: Progressing       Patient is not progressing towards the following goals:

## 2023-03-18 NOTE — PROGRESS NOTES
Hospital Medicine Daily Progress Note    Date of Service  3/18/2023    Chief Complaint  Kellie Chatman is a 53 y.o. female admitted 3/6/2023 with AMS    Hospital Course  This is a 53-year-old female with past medical history of s/p right BKA and left transmetatarsal amputation, hypertension, HIV, seizure disorder, schizophrenia, methamphetamine use, tobacco use and homelessness who was found laying in the middle of the street and brought in by EMS.    Patient had recent admission at outside facility 2/2023 where she was noted to have recurrent frostbite on her right BKA and left transmetatarsal amputation stump, recommended to wait for further demarcation prior to any surgical intervention.  She was to follow-up with orthopedic surgery outpatient.  During that admission she was diagnosed with a left superficial femoral vein DVT discharged with Xarelto.    On this admission, patient had CT imaging which noted right internal iliac vein DVT, patient started on Eliquis.      Concern for possible pyelonephritis on CT imaging.  Patient started on Rocephin. Urine culture E. Faecium VRE.  Discussing the case with ID pharmacy as unable to determine if patient is symptomatic or not, as she is a poor historian.  Given she improved clinically throughout this admission, will defer any further changes in her antibiotic regimen.    Blood cultures with no growth to date.  Concern for possible underlying infection and patient's prior frostbitten BLE. MRSA swab positive.  Discussed with ID pharmacy, recommended de-escalation to Augmentin.  CT imaging LLE, negative for osteomyelitis or abscess formation.  Discussed with orthopedic surgery, no recommendation for debridement or surgical intervention at this time.  LPS consulted for wound care.     Interval Problem Update  Patient is sleepy this morning, flat affection  Labs reviewed, stable.   Procal negative  Will complete antibiotics until tomorrow  I have discussed with LPS regarding  patient's left heel wound, currently slightly improving.  Patient does not want to have left BKA.   Need replacement with wound care    I have discussed this patient's plan of care and discharge plan at IDT rounds today with Case Management, Nursing, Nursing leadership, and other members of the IDT team.    Consultants/Specialty  psychiatry  LPS    Code Status  Full Code    Disposition  Patient is medically cleared for discharge.   Anticipate discharge to Shelter + outpt wound care vs SNF  I have placed the appropriate orders for post-discharge needs.    Review of Systems  Review of Systems   Constitutional:  Positive for malaise/fatigue.   Neurological:  Positive for weakness.   All other systems reviewed and are negative.     Physical Exam  Temp:  [19.9 °C (67.8 °F)-36.6 °C (97.8 °F)] 36.4 °C (97.6 °F)  Pulse:  [70-96] 89  Resp:  [16-18] 16  BP: ()/(55-65) 94/57  SpO2:  [94 %-97 %] 96 %    Physical Exam  Vitals and nursing note reviewed.   Constitutional:       General: She is not in acute distress.     Appearance: She is ill-appearing.   HENT:      Head: Normocephalic and atraumatic.      Mouth/Throat:      Mouth: Mucous membranes are moist.      Pharynx: No oropharyngeal exudate.   Eyes:      Extraocular Movements: Extraocular movements intact.      Pupils: Pupils are equal, round, and reactive to light.   Cardiovascular:      Rate and Rhythm: Normal rate and regular rhythm.      Pulses: Normal pulses.      Heart sounds: No murmur heard.    No friction rub. No gallop.   Pulmonary:      Effort: Pulmonary effort is normal. No respiratory distress.      Breath sounds: No wheezing, rhonchi or rales.   Abdominal:      General: Bowel sounds are normal. There is no distension.      Palpations: Abdomen is soft. There is no mass.      Tenderness: There is no abdominal tenderness.   Musculoskeletal:         General: No swelling or tenderness. Normal range of motion.      Cervical back: Normal range of motion. No  rigidity. No muscular tenderness.      Right lower leg: No edema.      Left lower leg: No edema.      Comments: Right lower extremity BKA, left lower extremity with transmetatarsal amputation, bullae noted, now draining copiously   Skin:     General: Skin is warm and dry.      Capillary Refill: Capillary refill takes less than 2 seconds.      Findings: No erythema or rash.   Neurological:      General: No focal deficit present.      Mental Status: She is alert. She is disoriented.      Motor: No weakness.      Gait: Gait normal.   Psychiatric:         Mood and Affect: Affect is flat.         Speech: Speech is delayed.         Behavior: Behavior is withdrawn.       Fluids  No intake or output data in the 24 hours ending 03/18/23 1149      Laboratory  Recent Labs     03/18/23  0337   WBC 5.2   RBC 3.29*   HEMOGLOBIN 8.8*   HEMATOCRIT 28.5*   MCV 86.6   MCH 26.7*   MCHC 30.9*   RDW 46.7   PLATELETCT 431   MPV 9.3       Recent Labs     03/18/23  0337   SODIUM 140   POTASSIUM 4.2   CHLORIDE 108   CO2 24   GLUCOSE 95   BUN 17   CREATININE 0.71   CALCIUM 8.7                       Imaging  CT-FOOT WITH PLUS RECONS LEFT   Final Result      1.  Edema and/or cellulitis of the visualized leg, ankle and foot. No focal abscess.   2.  Large blisters.   3.  Postsurgical changes first through fifth transmetatarsal amputations.   4.  No definite evidence for osteomyelitis.   5.  Some thickening of the Achilles tendon is nonspecific and not well evaluated on CT.   6.  Further evaluation with MRI as clinically indicated.      DX-CHEST-PORTABLE (1 VIEW)   Final Result      No acute cardiac or pulmonary abnormalities are identified.      DX-FEMUR-2+ RIGHT   Final Result      No radiographic evidence of acute traumatic injury.      CT-ABDOMEN-PELVIS WITH   Final Result      1.  Right internal iliac vein DVT.   2.  Somewhat striated nephrogram of the bilateral superior poles may be artifactual. Correlate with urinalysis for pyelonephritis.    3.  Redemonstration of enhancing lesion of the uterus. This has been evaluated on prior ultrasound.   4.  Prominent bilateral inguinal lymph nodes may be reactive. Malignancy not excluded.      Dr. Emanuel discussed these findings with Dr. Child at 1:49 PM by telephone on 3/6/2023      DX-FOOT-2- LEFT   Final Result      Status post transmetatarsal amputation. No definitive radiographic evidence of ostomy myelitis. If there is strong clinical suspicion, MRI of the foot with and without contrast should be obtained for further evaluation.      DX-CHEST-PORTABLE (1 VIEW)   Final Result      No acute cardiopulmonary disease evident.             Assessment/Plan  * Infection of amputation stump of left lower extremity (HCC)  Assessment & Plan  Concern for   Initially discussed the case with orthopedic surgery, who did not believe at this time required intervention  However patient with persistent fever, all other work-up negative  Concern for possible underlying infection and patient's prior frostbitten BLE stumps, bullae noted   Blood cultures with no growth to date.   MRSA swab positive.   Discontinued vancomycin and Zosyn, switched to Zyvox.  Monitor renal function.     CT imaging LLE, negative for osteomyelitis or abscess formation.   3/14: Per LPS, Frostbite injury to left heel and posterior ankle continues to evolve and worsen. Likely need BKA next week.     On Augmentin, given worsening wound, will change to Unasyn. MRSA swab positive, added zyvox.   Tolerating and monitoring cbc, bmp  3/17: Patient is not interested in left BKA.  Left heel wound slightly improving, will continue antibiotics  3/18: procal negative. No sign of active infection. Will complete antibiotics until completion     Fever in adult  Assessment & Plan  Patient noted to spike a 101.6 fever on 3/8, negative chest x-ray, UA negative, COVID, influenza, RSV negative, chest x-ray negative for any infiltrate or pulmonary effusion.  Lactic acid and  procalcitonin negative. Blood cultures with no growth to date.  Concern for possible underlying infection and patient's prior frostbitten BLE. MRSA swab positive.     Slow transit constipation  Assessment & Plan  Patient noted to have abdominal distention, prior bowel movement several days prior  Started on aggressive bowel regimen, given one-time dose of Dulcolax  Still with no bowel movement, oral lactulose given  Large BM 3/8 PM  Serial abdominal exams  RESOLVED    Noncompliance  Assessment & Plan  Significant history with noncompliance with medications and doctor visits    Homelessness  Assessment & Plan  Complicated medical history and noncompliance and drug abuse   was consulted to consider group home      Lactic acid acidosis  Assessment & Plan  Likely related to dehydration and infection  IV fluid and ceftriaxone  Resolved    Hx of BKA, right ([facility])  Assessment & Plan  Right BKA and left transmetatarsal amputation  LPS following, local wound care     Methamphetamine abuse ([facility])  Assessment & Plan  Encouraged the patient to quit  Urine drug screen is positive  Blood culture no growth to date    Pyelonephritis  Assessment & Plan  Urine culture noted E. Faecium VRE.  However patient denies UTI symptoms  No indication for treatment    Acute deep vein thrombosis (DVT) ([facility])- (present on admission)  Assessment & Plan  Patient had recent admission at outside facility 2/2023,  she was diagnosed with a left superficial femoral vein DVT discharged with Xarelto.  Patient had CT imaging which noted right internal iliac vein DVT.    Initially on heparin drip, transitioned to Eliquis  No sign of active bleeding       HIV (human immunodeficiency virus infection) ([facility])- (present on admission)  Assessment & Plan  Patient is on Biktarvy however, clearly questioning about compliance  CD4-761 and CD8-194  Resume Biktarvy with close monitoring    Hypokalemia- (present on admission)  Assessment & Plan  Labs reviewed,  resolved    Schizophrenia (HCC)- (present on admission)  Assessment & Plan  Patient has hallucination with hearing voices  Denied any SI or HI, however with active auditory hallucinations  Resume home medication Depakote, formally on Zyprexa  Psych consulted, continue with Depakote, trough level stable, Zyprexa 5 mg twice daily initiated    Tobacco abuse- (present on admission)  Assessment & Plan  Cessation counseled, 3 minute  Nicotine replacement         VTE prophylaxis: therapeutic anticoagulation with Eliquis    I have performed a physical exam and reviewed and updated ROS and Plan today (3/18/2023). In review of yesterday's note (3/17/2023), there are no changes except as documented above.

## 2023-03-19 PROBLEM — N39.0 UTI (URINARY TRACT INFECTION): Status: ACTIVE | Noted: 2023-03-19

## 2023-03-19 PROCEDURE — A9270 NON-COVERED ITEM OR SERVICE: HCPCS | Performed by: INTERNAL MEDICINE

## 2023-03-19 PROCEDURE — A9270 NON-COVERED ITEM OR SERVICE: HCPCS | Performed by: STUDENT IN AN ORGANIZED HEALTH CARE EDUCATION/TRAINING PROGRAM

## 2023-03-19 PROCEDURE — 700105 HCHG RX REV CODE 258: Performed by: STUDENT IN AN ORGANIZED HEALTH CARE EDUCATION/TRAINING PROGRAM

## 2023-03-19 PROCEDURE — 770001 HCHG ROOM/CARE - MED/SURG/GYN PRIV*

## 2023-03-19 PROCEDURE — 99232 SBSQ HOSP IP/OBS MODERATE 35: CPT | Performed by: STUDENT IN AN ORGANIZED HEALTH CARE EDUCATION/TRAINING PROGRAM

## 2023-03-19 PROCEDURE — 700102 HCHG RX REV CODE 250 W/ 637 OVERRIDE(OP): Performed by: GENERAL PRACTICE

## 2023-03-19 PROCEDURE — 700111 HCHG RX REV CODE 636 W/ 250 OVERRIDE (IP): Performed by: NURSE PRACTITIONER

## 2023-03-19 PROCEDURE — 700102 HCHG RX REV CODE 250 W/ 637 OVERRIDE(OP): Performed by: INTERNAL MEDICINE

## 2023-03-19 PROCEDURE — 700102 HCHG RX REV CODE 250 W/ 637 OVERRIDE(OP): Performed by: STUDENT IN AN ORGANIZED HEALTH CARE EDUCATION/TRAINING PROGRAM

## 2023-03-19 PROCEDURE — 700111 HCHG RX REV CODE 636 W/ 250 OVERRIDE (IP): Performed by: STUDENT IN AN ORGANIZED HEALTH CARE EDUCATION/TRAINING PROGRAM

## 2023-03-19 PROCEDURE — A9270 NON-COVERED ITEM OR SERVICE: HCPCS | Performed by: GENERAL PRACTICE

## 2023-03-19 RX ADMIN — LINEZOLID 600 MG: 600 TABLET, FILM COATED ORAL at 04:45

## 2023-03-19 RX ADMIN — APIXABAN 5 MG: 5 TABLET, FILM COATED ORAL at 12:18

## 2023-03-19 RX ADMIN — OLANZAPINE 5 MG: 5 TABLET, FILM COATED ORAL at 12:18

## 2023-03-19 RX ADMIN — DIVALPROEX SODIUM 500 MG: 500 TABLET, DELAYED RELEASE ORAL at 19:43

## 2023-03-19 RX ADMIN — ACETAMINOPHEN 650 MG: 325 TABLET, FILM COATED ORAL at 04:45

## 2023-03-19 RX ADMIN — BICTEGRAVIR SODIUM, EMTRICITABINE, AND TENOFOVIR ALAFENAMIDE FUMARATE 1 TABLET: 50; 200; 25 TABLET ORAL at 12:18

## 2023-03-19 RX ADMIN — Medication 1 APPLICATION: at 09:00

## 2023-03-19 RX ADMIN — ACETAMINOPHEN 650 MG: 325 TABLET, FILM COATED ORAL at 19:42

## 2023-03-19 RX ADMIN — DIVALPROEX SODIUM 500 MG: 500 TABLET, DELAYED RELEASE ORAL at 12:18

## 2023-03-19 RX ADMIN — AMPICILLIN AND SULBACTAM 3 G: 1; 2 INJECTION, POWDER, FOR SOLUTION INTRAMUSCULAR; INTRAVENOUS at 04:47

## 2023-03-19 RX ADMIN — KETOROLAC TROMETHAMINE 15 MG: 30 INJECTION, SOLUTION INTRAMUSCULAR at 00:43

## 2023-03-19 RX ADMIN — AMPICILLIN AND SULBACTAM 3 G: 1; 2 INJECTION, POWDER, FOR SOLUTION INTRAMUSCULAR; INTRAVENOUS at 00:45

## 2023-03-19 RX ADMIN — APIXABAN 5 MG: 5 TABLET, FILM COATED ORAL at 19:42

## 2023-03-19 RX ADMIN — NICOTINE 14 MG: 14 PATCH, EXTENDED RELEASE TRANSDERMAL at 12:18

## 2023-03-19 RX ADMIN — DOCUSATE SODIUM 50 MG AND SENNOSIDES 8.6 MG 2 TABLET: 8.6; 5 TABLET, FILM COATED ORAL at 12:18

## 2023-03-19 RX ADMIN — AMPICILLIN AND SULBACTAM 3 G: 1; 2 INJECTION, POWDER, FOR SOLUTION INTRAMUSCULAR; INTRAVENOUS at 12:24

## 2023-03-19 RX ADMIN — OLANZAPINE 5 MG: 5 TABLET, FILM COATED ORAL at 19:42

## 2023-03-19 ASSESSMENT — PAIN DESCRIPTION - PAIN TYPE
TYPE: ACUTE PAIN;CHRONIC PAIN
TYPE: ACUTE PAIN;CHRONIC PAIN

## 2023-03-19 ASSESSMENT — ENCOUNTER SYMPTOMS: WEAKNESS: 1

## 2023-03-19 NOTE — PROGRESS NOTES
Hospital Medicine Daily Progress Note    Date of Service  3/19/2023    Chief Complaint  Kellie Chatman is a 53 y.o. female admitted 3/6/2023 with [facility]    Hospital Course  This is a 53-year-old female with past medical history of s/p right BKA and left transmetatarsal amputation, hypertension, HIV, seizure disorder, schizophrenia, methamphetamine use, tobacco use and homelessness who was found laying in the middle of the street and brought in by EMS.    Patient had recent admission at outside facility 2/2023 where she was noted to have recurrent frostbite on her right BKA and left transmetatarsal amputation stump, recommended to wait for further demarcation prior to any surgical intervention.  She was to follow-up with orthopedic surgery outpatient.  During that admission she was diagnosed with a left superficial femoral vein DVT discharged with Xarelto.    On this admission, patient had CT imaging which noted right internal iliac vein DVT, patient started on Eliquis.      Concern for possible pyelonephritis on CT imaging.  Patient started on Rocephin. Urine culture E. Faecium VRE.  Discussing the case with ID pharmacy as unable to determine if patient is symptomatic or not, as she is a poor historian.  Given she improved clinically throughout this admission, will defer any further changes in her antibiotic regimen.    Blood cultures with no growth to date.  Concern for possible underlying infection and patient's prior frostbitten BLE. MRSA swab positive.  Discussed with ID pharmacy, recommended de-escalation to Augmentin.  CT imaging LLE, negative for osteomyelitis or abscess formation.  Discussed with orthopedic surgery, no recommendation for debridement or surgical intervention at this time.  LPS consulted for wound care.     Interval Problem Update  Patient has flat affection  She will complete antibiotics today. Checked L foot wound, no active drainage or erythema  I have discussed with LPS regarding patient's  left heel wound, currently slightly improving.  Patient does not want to have left BKA.   Need replacement with wound care    I have discussed this patient's plan of care and discharge plan at IDT rounds today with Case Management, Nursing, Nursing leadership, and other members of the IDT team.    Consultants/Specialty  psychiatry  LPS    Code Status  Full Code    Disposition  Patient is medically cleared for discharge.   Anticipate discharge to Shelter + outpt wound care vs SNF  I have placed the appropriate orders for post-discharge needs.    Review of Systems  Review of Systems   Constitutional:  Positive for malaise/fatigue.   Neurological:  Positive for weakness.   All other systems reviewed and are negative.     Physical Exam  Temp:  [36.6 °C (97.8 °F)-37.1 °C (98.7 °F)] 36.6 °C (97.8 °F)  Pulse:  [72-99] 76  Resp:  [16-20] 17  BP: ()/(57-63) 121/63  SpO2:  [96 %-100 %] 100 %    Physical Exam  Vitals and nursing note reviewed.   Constitutional:       General: She is not in acute distress.     Appearance: She is ill-appearing.   HENT:      Head: Normocephalic and atraumatic.      Mouth/Throat:      Mouth: Mucous membranes are moist.      Pharynx: No oropharyngeal exudate.   Eyes:      Extraocular Movements: Extraocular movements intact.      Pupils: Pupils are equal, round, and reactive to light.   Cardiovascular:      Rate and Rhythm: Normal rate and regular rhythm.      Pulses: Normal pulses.      Heart sounds: No murmur heard.    No friction rub. No gallop.   Pulmonary:      Effort: Pulmonary effort is normal. No respiratory distress.      Breath sounds: No wheezing, rhonchi or rales.   Abdominal:      General: Bowel sounds are normal. There is no distension.      Palpations: Abdomen is soft. There is no mass.      Tenderness: There is no abdominal tenderness.   Musculoskeletal:         General: No swelling or tenderness. Normal range of motion.      Cervical back: Normal range of motion. No rigidity.  No muscular tenderness.      Right lower leg: No edema.      Left lower leg: No edema.      Comments: Right lower extremity BKA, left lower extremity with transmetatarsal amputation, bullae noted, now draining copiously   Skin:     General: Skin is warm and dry.      Capillary Refill: Capillary refill takes less than 2 seconds.      Findings: No erythema or rash.   Neurological:      General: No focal deficit present.      Mental Status: She is alert. She is disoriented.      Motor: No weakness.      Gait: Gait normal.   Psychiatric:         Mood and Affect: Affect is flat.         Speech: Speech is delayed.         Behavior: Behavior is withdrawn.       Fluids    Intake/Output Summary (Last 24 hours) at 3/19/2023 1220  Last data filed at 3/19/2023 0835  Gross per 24 hour   Intake 480 ml   Output 900 ml   Net -420 ml         Laboratory  Recent Labs     03/18/23  0337   WBC 5.2   RBC 3.29*   HEMOGLOBIN 8.8*   HEMATOCRIT 28.5*   MCV 86.6   MCH 26.7*   MCHC 30.9*   RDW 46.7   PLATELETCT 431   MPV 9.3       Recent Labs     03/18/23  0337   SODIUM 140   POTASSIUM 4.2   CHLORIDE 108   CO2 24   GLUCOSE 95   BUN 17   CREATININE 0.71   CALCIUM 8.7                       Imaging  CT-FOOT WITH PLUS RECONS LEFT   Final Result      1.  Edema and/or cellulitis of the visualized leg, ankle and foot. No focal abscess.   2.  Large blisters.   3.  Postsurgical changes first through fifth transmetatarsal amputations.   4.  No definite evidence for osteomyelitis.   5.  Some thickening of the Achilles tendon is nonspecific and not well evaluated on CT.   6.  Further evaluation with MRI as clinically indicated.      DX-CHEST-PORTABLE (1 VIEW)   Final Result      No acute cardiac or pulmonary abnormalities are identified.      DX-FEMUR-2+ RIGHT   Final Result      No radiographic evidence of acute traumatic injury.      CT-ABDOMEN-PELVIS WITH   Final Result      1.  Right internal iliac vein DVT.   2.  Somewhat striated nephrogram of the  bilateral superior poles may be artifactual. Correlate with urinalysis for pyelonephritis.   3.  Redemonstration of enhancing lesion of the uterus. This has been evaluated on prior ultrasound.   4.  Prominent bilateral inguinal lymph nodes may be reactive. Malignancy not excluded.      Dr. Emanuel discussed these findings with Dr. Child at 1:49 PM by telephone on 3/6/2023      DX-FOOT-2- LEFT   Final Result      Status post transmetatarsal amputation. No definitive radiographic evidence of ostomy myelitis. If there is strong clinical suspicion, MRI of the foot with and without contrast should be obtained for further evaluation.      DX-CHEST-PORTABLE (1 VIEW)   Final Result      No acute cardiopulmonary disease evident.             Assessment/Plan  * Infection of amputation stump of left lower extremity (HCC)  Assessment & Plan  Concern for   Initially discussed the case with orthopedic surgery, who did not believe at this time required intervention  However patient with persistent fever, all other work-up negative  Concern for possible underlying infection and patient's prior frostbitten BLE stumps, bullae noted   Blood cultures with no growth to date.   MRSA swab positive.   Discontinued vancomycin and Zosyn, switched to Zyvox.  Monitor renal function.     CT imaging LLE, negative for osteomyelitis or abscess formation.   3/14: Per LPS, Frostbite injury to left heel and posterior ankle continues to evolve and worsen. Likely need BKA next week.     On Augmentin, given worsening wound, will change to Unasyn. MRSA swab positive, added zyvox.   Tolerating and monitoring cbc, bmp  3/17: Patient is not interested in left BKA.  Left heel wound slightly improving, will continue antibiotics  3/18: procal negative. No sign of active infection. Will complete antibiotics until completion   Antibiotics completed. L foot checked, no sign of active infection    UTI (urinary tract infection)  Assessment & Plan  Urine culture noted  E. Faecium VRE.  However patient denies UTI symptoms  No indication for treatment    Fever in adult  Assessment & Plan  Patient noted to spike a 101.6 fever on 3/8, negative chest x-ray, UA negative, COVID, influenza, RSV negative, chest x-ray negative for any infiltrate or pulmonary effusion.  Lactic acid and procalcitonin negative. Blood cultures with no growth to date.  Concern for possible underlying infection and patient's prior frostbitten BLE. MRSA swab positive.     Slow transit constipation  Assessment & Plan  Patient noted to have abdominal distention, prior bowel movement several days prior  Started on aggressive bowel regimen, given one-time dose of Dulcolax  Still with no bowel movement, oral lactulose given  Large BM 3/8 PM  Serial abdominal exams  RESOLVED    Noncompliance  Assessment & Plan  Significant history with noncompliance with medications and doctor visits    Homelessness  Assessment & Plan  Complicated medical history and noncompliance and drug abuse   was consulted to consider group home      Lactic acid acidosis  Assessment & Plan  Likely related to dehydration and infection  IV fluid and ceftriaxone  Resolved    Hx of BKA, right ([facility])  Assessment & Plan  Right BKA and left transmetatarsal amputation  LPS following, local wound care     Methamphetamine abuse ([facility])  Assessment & Plan  Encouraged the patient to quit  Urine drug screen is positive  Blood culture no growth to date    Acute deep vein thrombosis (DVT) ([facility])- (present on admission)  Assessment & Plan  Patient had recent admission at outside facility 2/2023,  she was diagnosed with a left superficial femoral vein DVT discharged with Xarelto.  Patient had CT imaging which noted right internal iliac vein DVT.    Initially on heparin drip, transitioned to Eliquis  No sign of active bleeding       HIV (human immunodeficiency virus infection) ([facility])- (present on admission)  Assessment & Plan  Patient is on Biktarvy however,  clearly questioning about compliance  CD4-761 and CD8-194  Resume Biktarvy with close monitoring    Hypokalemia- (present on admission)  Assessment & Plan  Labs reviewed, resolved    Schizophrenia (HCC)- (present on admission)  Assessment & Plan  Patient has hallucination with hearing voices  Denied any SI or HI, however with active auditory hallucinations  Resume home medication Depakote, formally on Zyprexa  Psych consulted, continue with Depakote, trough level stable, Zyprexa 5 mg twice daily initiated    Tobacco abuse- (present on admission)  Assessment & Plan  Cessation counseled, 3 minute  Nicotine replacement         VTE prophylaxis: therapeutic anticoagulation with Eliquis    I have performed a physical exam and reviewed and updated ROS and Plan today (3/19/2023). In review of yesterday's note (3/18/2023), there are no changes except as documented above.

## 2023-03-19 NOTE — CARE PLAN
The patient is Stable - Low risk of patient condition declining or worsening    Shift Goals  Clinical Goals: wound management, pain management  Patient Goals: rest, pain management  Family Goals: IVETTE    Progress made toward(s) clinical / shift goals:  No falls this shift, new pain med ordered to help pt's pain level, wound care orders to be followed       Problem: Pain - Standard  Goal: Alleviation of pain or a reduction in pain to the patient’s comfort goal  Outcome: Progressing     Problem: Skin Integrity  Goal: Skin integrity is maintained or improved  Outcome: Progressing     Problem: Fall Risk  Goal: Patient will remain free from falls  Outcome: Progressing       Patient is not progressing towards the following goals:

## 2023-03-19 NOTE — PROGRESS NOTES
Received bedside report from night shift RN. Patient is A&Ox3-4, Kellie often doesn't know the current date. Patient is on RA, VSS. Patient denies any pain at this time. POC discussed with patient, all questions answered. No further needs at this time. Bed is in lowest/locked position. Call light and belongings are within reach. Hourly rounding in place.

## 2023-03-19 NOTE — CARE PLAN
The patient is Stable - Low risk of patient condition declining or worsening    Shift Goals  Clinical Goals: pain control  Patient Goals: rest  Family Goals: IVETTE    Progress made toward(s) clinical / shift goals:        Problem: Knowledge Deficit - Standard  Goal: Patient and family/care givers will demonstrate understanding of plan of care, disease process/condition, diagnostic tests and medications  Outcome: Progressing     Problem: Pain - Standard  Goal: Alleviation of pain or a reduction in pain to the patient’s comfort goal  Outcome: Progressing     Problem: Skin Integrity  Goal: Skin integrity is maintained or improved  Outcome: Progressing     Problem: Fall Risk  Goal: Patient will remain free from falls  Outcome: Progressing       Patient is not progressing towards the following goals:

## 2023-03-20 PROCEDURE — 700102 HCHG RX REV CODE 250 W/ 637 OVERRIDE(OP): Performed by: INTERNAL MEDICINE

## 2023-03-20 PROCEDURE — 99231 SBSQ HOSP IP/OBS SF/LOW 25: CPT | Performed by: STUDENT IN AN ORGANIZED HEALTH CARE EDUCATION/TRAINING PROGRAM

## 2023-03-20 PROCEDURE — 700102 HCHG RX REV CODE 250 W/ 637 OVERRIDE(OP): Performed by: GENERAL PRACTICE

## 2023-03-20 PROCEDURE — A9270 NON-COVERED ITEM OR SERVICE: HCPCS | Performed by: INTERNAL MEDICINE

## 2023-03-20 PROCEDURE — 770001 HCHG ROOM/CARE - MED/SURG/GYN PRIV*

## 2023-03-20 PROCEDURE — A9270 NON-COVERED ITEM OR SERVICE: HCPCS | Performed by: GENERAL PRACTICE

## 2023-03-20 RX ADMIN — DIVALPROEX SODIUM 500 MG: 500 TABLET, DELAYED RELEASE ORAL at 09:38

## 2023-03-20 RX ADMIN — DIVALPROEX SODIUM 500 MG: 500 TABLET, DELAYED RELEASE ORAL at 21:30

## 2023-03-20 RX ADMIN — NICOTINE 14 MG: 14 PATCH, EXTENDED RELEASE TRANSDERMAL at 09:38

## 2023-03-20 RX ADMIN — OLANZAPINE 5 MG: 5 TABLET, FILM COATED ORAL at 21:30

## 2023-03-20 RX ADMIN — OLANZAPINE 5 MG: 5 TABLET, FILM COATED ORAL at 09:38

## 2023-03-20 RX ADMIN — APIXABAN 5 MG: 5 TABLET, FILM COATED ORAL at 09:38

## 2023-03-20 RX ADMIN — APIXABAN 5 MG: 5 TABLET, FILM COATED ORAL at 21:30

## 2023-03-20 RX ADMIN — BICTEGRAVIR SODIUM, EMTRICITABINE, AND TENOFOVIR ALAFENAMIDE FUMARATE 1 TABLET: 50; 200; 25 TABLET ORAL at 09:38

## 2023-03-20 ASSESSMENT — PAIN DESCRIPTION - PAIN TYPE: TYPE: ACUTE PAIN

## 2023-03-20 ASSESSMENT — ENCOUNTER SYMPTOMS: WEAKNESS: 1

## 2023-03-20 NOTE — CARE PLAN
The patient is Stable - Low risk of patient condition declining or worsening    Shift Goals  Clinical Goals: pain mgmt  Patient Goals: rest  Family Goals: IVETTE    Progress made toward(s) clinical / shift goals:  pt meds passed per MAR, no injuries this shift      Problem: Knowledge Deficit - Standard  Goal: Patient and family/care givers will demonstrate understanding of plan of care, disease process/condition, diagnostic tests and medications  Outcome: Progressing     Problem: Pain - Standard  Goal: Alleviation of pain or a reduction in pain to the patient’s comfort goal  Outcome: Progressing     Problem: Skin Integrity  Goal: Skin integrity is maintained or improved  Outcome: Progressing     Problem: Fall Risk  Goal: Patient will remain free from falls  Outcome: Progressing       Patient is not progressing towards the following goals:

## 2023-03-20 NOTE — PROGRESS NOTES
Hospital Medicine Daily Progress Note    Date of Service  3/20/2023    Chief Complaint  Kellie Chatman is a 53 y.o. female admitted 3/6/2023 with AMS    Hospital Course  This is a 53-year-old female with past medical history of s/p right BKA and left transmetatarsal amputation, hypertension, HIV, seizure disorder, schizophrenia, methamphetamine use, tobacco use and homelessness who was found laying in the middle of the street and brought in by EMS.    Patient had recent admission at outside facility 2/2023 where she was noted to have recurrent frostbite on her right BKA and left transmetatarsal amputation stump, recommended to wait for further demarcation prior to any surgical intervention.  She was to follow-up with orthopedic surgery outpatient.  During that admission she was diagnosed with a left superficial femoral vein DVT discharged with Xarelto.    On this admission, patient had CT imaging which noted right internal iliac vein DVT, patient started on Eliquis.      Concern for possible pyelonephritis on CT imaging.  Patient started on Rocephin. Urine culture E. Faecium VRE.  Discussing the case with ID pharmacy as unable to determine if patient is symptomatic or not, as she is a poor historian.  Given she improved clinically throughout this admission, will defer any further changes in her antibiotic regimen.    Blood cultures with no growth to date.  Concern for possible underlying infection and patient's prior frostbitten BLE. MRSA swab positive.  Discussed with ID pharmacy, recommended de-escalation to Augmentin.  CT imaging LLE, negative for osteomyelitis or abscess formation.  Discussed with orthopedic surgery, no recommendation for debridement or surgical intervention at this time.  LPS consulted for wound care.     Interval Problem Update  Patient has flat affection  Patient has completed antibiotics.   Checked L foot wound, no active drainage or erythema  I have discussed with LPS regarding patient's  left heel wound, currently slightly improving.  Patient does not want to have left BKA.   PT/OT  Wound care clinic     I have discussed this patient's plan of care and discharge plan at IDT rounds today with Case Management, Nursing, Nursing leadership, and other members of the IDT team.    Consultants/Specialty  psychiatry  LPS    Code Status  Full Code    Disposition  Patient is medically cleared for discharge.   Anticipate discharge to Shelter + outpt wound care vs SNF  I have placed the appropriate orders for post-discharge needs.    Review of Systems  Review of Systems   Constitutional:  Positive for malaise/fatigue.   Neurological:  Positive for weakness.   All other systems reviewed and are negative.     Physical Exam  Temp:  [36.1 °C (97 °F)-37 °C (98.6 °F)] 36.4 °C (97.6 °F)  Pulse:  [76-99] 83  Resp:  [17-20] 17  BP: (103-114)/(63-90) 107/90  SpO2:  [95 %-100 %] 100 %    Physical Exam  Vitals and nursing note reviewed.   Constitutional:       General: She is not in acute distress.     Appearance: She is ill-appearing.   HENT:      Head: Normocephalic and atraumatic.      Mouth/Throat:      Mouth: Mucous membranes are moist.      Pharynx: No oropharyngeal exudate.   Eyes:      Extraocular Movements: Extraocular movements intact.      Pupils: Pupils are equal, round, and reactive to light.   Cardiovascular:      Rate and Rhythm: Normal rate and regular rhythm.      Pulses: Normal pulses.      Heart sounds: No murmur heard.    No friction rub. No gallop.   Pulmonary:      Effort: Pulmonary effort is normal. No respiratory distress.      Breath sounds: No wheezing, rhonchi or rales.   Abdominal:      General: Bowel sounds are normal. There is no distension.      Palpations: Abdomen is soft. There is no mass.      Tenderness: There is no abdominal tenderness.   Musculoskeletal:         General: No swelling or tenderness. Normal range of motion.      Cervical back: Normal range of motion. No rigidity. No  muscular tenderness.      Right lower leg: No edema.      Left lower leg: No edema.      Comments: Right lower extremity BKA, left lower extremity with transmetatarsal amputation, bullae noted, now draining copiously   Skin:     General: Skin is warm and dry.      Capillary Refill: Capillary refill takes less than 2 seconds.      Findings: No erythema or rash.   Neurological:      General: No focal deficit present.      Mental Status: She is alert. She is disoriented.      Motor: No weakness.      Gait: Gait normal.   Psychiatric:         Mood and Affect: Affect is flat.         Speech: Speech is delayed.         Behavior: Behavior is withdrawn.       Fluids    Intake/Output Summary (Last 24 hours) at 3/20/2023 1238  Last data filed at 3/20/2023 0900  Gross per 24 hour   Intake 480 ml   Output --   Net 480 ml         Laboratory  Recent Labs     03/18/23  0337   WBC 5.2   RBC 3.29*   HEMOGLOBIN 8.8*   HEMATOCRIT 28.5*   MCV 86.6   MCH 26.7*   MCHC 30.9*   RDW 46.7   PLATELETCT 431   MPV 9.3       Recent Labs     03/18/23  0337   SODIUM 140   POTASSIUM 4.2   CHLORIDE 108   CO2 24   GLUCOSE 95   BUN 17   CREATININE 0.71   CALCIUM 8.7                       Imaging  CT-FOOT WITH PLUS RECONS LEFT   Final Result      1.  Edema and/or cellulitis of the visualized leg, ankle and foot. No focal abscess.   2.  Large blisters.   3.  Postsurgical changes first through fifth transmetatarsal amputations.   4.  No definite evidence for osteomyelitis.   5.  Some thickening of the Achilles tendon is nonspecific and not well evaluated on CT.   6.  Further evaluation with MRI as clinically indicated.      DX-CHEST-PORTABLE (1 VIEW)   Final Result      No acute cardiac or pulmonary abnormalities are identified.      DX-FEMUR-2+ RIGHT   Final Result      No radiographic evidence of acute traumatic injury.      CT-ABDOMEN-PELVIS WITH   Final Result      1.  Right internal iliac vein DVT.   2.  Somewhat striated nephrogram of the bilateral  superior poles may be artifactual. Correlate with urinalysis for pyelonephritis.   3.  Redemonstration of enhancing lesion of the uterus. This has been evaluated on prior ultrasound.   4.  Prominent bilateral inguinal lymph nodes may be reactive. Malignancy not excluded.      Dr. Emanuel discussed these findings with Dr. Child at 1:49 PM by telephone on 3/6/2023      DX-FOOT-2- LEFT   Final Result      Status post transmetatarsal amputation. No definitive radiographic evidence of ostomy myelitis. If there is strong clinical suspicion, MRI of the foot with and without contrast should be obtained for further evaluation.      DX-CHEST-PORTABLE (1 VIEW)   Final Result      No acute cardiopulmonary disease evident.             Assessment/Plan  * Infection of amputation stump of left lower extremity (HCC)  Assessment & Plan  Concern for   Initially discussed the case with orthopedic surgery, who did not believe at this time required intervention  However patient with persistent fever, all other work-up negative  Concern for possible underlying infection and patient's prior frostbitten BLE stumps, bullae noted   Blood cultures with no growth to date.   MRSA swab positive.   Discontinued vancomycin and Zosyn, switched to Zyvox.  Monitor renal function.     CT imaging LLE, negative for osteomyelitis or abscess formation.   3/14: Per LPS, Frostbite injury to left heel and posterior ankle continues to evolve and worsen. Likely need BKA next week.     On Augmentin, given worsening wound, will change to Unasyn. MRSA swab positive, added zyvox.   Tolerating and monitoring cbc, bmp  3/17: Patient is not interested in left BKA.  Left heel wound slightly improving, will continue antibiotics  3/18: procal negative. No sign of active infection. Will complete antibiotics until completion   Antibiotics completed. L foot checked, no sign of active infection    UTI (urinary tract infection)  Assessment & Plan  Urine culture noted E. Faecium  VRE.  However patient denies UTI symptoms  No indication for treatment    Fever in adult  Assessment & Plan  Patient noted to spike a 101.6 fever on 3/8, negative chest x-ray, UA negative, COVID, influenza, RSV negative, chest x-ray negative for any infiltrate or pulmonary effusion.  Lactic acid and procalcitonin negative. Blood cultures with no growth to date.  Concern for possible underlying infection and patient's prior frostbitten BLE. MRSA swab positive.     Slow transit constipation  Assessment & Plan  Patient noted to have abdominal distention, prior bowel movement several days prior  Started on aggressive bowel regimen, given one-time dose of Dulcolax  Still with no bowel movement, oral lactulose given  Large BM 3/8 PM  Serial abdominal exams  RESOLVED    Noncompliance  Assessment & Plan  Significant history with noncompliance with medications and doctor visits    Homelessness  Assessment & Plan  Complicated medical history and noncompliance and drug abuse   was consulted to consider group home      Lactic acid acidosis  Assessment & Plan  Likely related to dehydration and infection  IV fluid and ceftriaxone  Resolved    Hx of BKA, right (HCC)  Assessment & Plan  Right BKA and left transmetatarsal amputation  LPS following, local wound care     Methamphetamine abuse ([facility])  Assessment & Plan  Encouraged the patient to quit  Urine drug screen is positive  Blood culture no growth to date    Acute deep vein thrombosis (DVT) ([facility])- (present on admission)  Assessment & Plan  Patient had recent admission at outside facility 2/2023,  she was diagnosed with a left superficial femoral vein DVT discharged with Xarelto.  Patient had CT imaging which noted right internal iliac vein DVT.    Initially on heparin drip, transitioned to Eliquis  No sign of active bleeding       HIV (human immunodeficiency virus infection) ([facility])- (present on admission)  Assessment & Plan  Patient is on Biktarvy however, clearly  questioning about compliance  CD4-761 and CD8-194  Resume Biktarvy with close monitoring    Hypokalemia- (present on admission)  Assessment & Plan  Labs reviewed, resolved    Schizophrenia (HCC)- (present on admission)  Assessment & Plan  Patient has hallucination with hearing voices  Denied any SI or HI, however with active auditory hallucinations  Resume home medication Depakote, formally on Zyprexa  Psych consulted, continue with Depakote, trough level stable, Zyprexa 5 mg twice daily initiated    Tobacco abuse- (present on admission)  Assessment & Plan  Cessation counseled, 3 minute  Nicotine replacement         VTE prophylaxis: therapeutic anticoagulation with Eliquis    I have performed a physical exam and reviewed and updated ROS and Plan today (3/20/2023). In review of yesterday's note (3/19/2023), there are no changes except as documented above.

## 2023-03-20 NOTE — CARE PLAN
Problem: Knowledge Deficit - Standard  Goal: Patient and family/care givers will demonstrate understanding of plan of care, disease process/condition, diagnostic tests and medications  Outcome: Progressing  Note: Discussed POC and medications with patient.  Patient verbalized understanding.     The patient is Stable - Low risk of patient condition declining or worsening    Shift Goals  Clinical Goals: pain control  Patient Goals: rest  Family Goals: IVETTE

## 2023-03-20 NOTE — DISCHARGE PLANNING
Due to isolation status CHW tried to contact pt via bedside phone. CHW was unable to reach pt. CHW will try at a later date\.

## 2023-03-21 PROCEDURE — 700102 HCHG RX REV CODE 250 W/ 637 OVERRIDE(OP): Performed by: GENERAL PRACTICE

## 2023-03-21 PROCEDURE — 99232 SBSQ HOSP IP/OBS MODERATE 35: CPT | Performed by: STUDENT IN AN ORGANIZED HEALTH CARE EDUCATION/TRAINING PROGRAM

## 2023-03-21 PROCEDURE — 770001 HCHG ROOM/CARE - MED/SURG/GYN PRIV*

## 2023-03-21 PROCEDURE — A9270 NON-COVERED ITEM OR SERVICE: HCPCS | Performed by: INTERNAL MEDICINE

## 2023-03-21 PROCEDURE — 700102 HCHG RX REV CODE 250 W/ 637 OVERRIDE(OP): Performed by: INTERNAL MEDICINE

## 2023-03-21 PROCEDURE — A9270 NON-COVERED ITEM OR SERVICE: HCPCS | Performed by: GENERAL PRACTICE

## 2023-03-21 RX ADMIN — OLANZAPINE 5 MG: 5 TABLET, FILM COATED ORAL at 08:59

## 2023-03-21 RX ADMIN — DOCUSATE SODIUM 50 MG AND SENNOSIDES 8.6 MG 2 TABLET: 8.6; 5 TABLET, FILM COATED ORAL at 09:00

## 2023-03-21 RX ADMIN — NICOTINE 14 MG: 14 PATCH, EXTENDED RELEASE TRANSDERMAL at 09:00

## 2023-03-21 RX ADMIN — OLANZAPINE 5 MG: 5 TABLET, FILM COATED ORAL at 21:14

## 2023-03-21 RX ADMIN — Medication 1 APPLICATION: at 08:59

## 2023-03-21 RX ADMIN — DIVALPROEX SODIUM 500 MG: 500 TABLET, DELAYED RELEASE ORAL at 08:59

## 2023-03-21 RX ADMIN — DOCUSATE SODIUM 50 MG AND SENNOSIDES 8.6 MG 2 TABLET: 8.6; 5 TABLET, FILM COATED ORAL at 21:14

## 2023-03-21 RX ADMIN — BICTEGRAVIR SODIUM, EMTRICITABINE, AND TENOFOVIR ALAFENAMIDE FUMARATE 1 TABLET: 50; 200; 25 TABLET ORAL at 09:00

## 2023-03-21 RX ADMIN — APIXABAN 5 MG: 5 TABLET, FILM COATED ORAL at 21:14

## 2023-03-21 RX ADMIN — APIXABAN 5 MG: 5 TABLET, FILM COATED ORAL at 09:00

## 2023-03-21 RX ADMIN — DIVALPROEX SODIUM 500 MG: 500 TABLET, DELAYED RELEASE ORAL at 21:14

## 2023-03-21 ASSESSMENT — ENCOUNTER SYMPTOMS
BLURRED VISION: 0
BRUISES/BLEEDS EASILY: 0
FEVER: 0
MYALGIAS: 0
DIZZINESS: 0
COUGH: 0
VOMITING: 0
SHORTNESS OF BREATH: 0
NAUSEA: 0

## 2023-03-21 NOTE — PROGRESS NOTES
Hospital Medicine Daily Progress Note    Date of Service  3/21/2023    Chief Complaint  Kellie Chatman is a 53 y.o. female admitted 3/6/2023 with altered mental status    Hospital Course  53-year-old female with past medical history of s/p right BKA and left transmetatarsal amputation, hypertension, HIV, seizure disorder, schizophrenia, methamphetamine use, tobacco use and homelessness who was found laying in the middle of the street and brought in by EMS.     Patient had recent admission at outside facility 2/2023 where she was noted to have recurrent frostbite on her right BKA and left transmetatarsal amputation stump, recommended to wait for further demarcation prior to any surgical intervention.  She was to follow-up with orthopedic surgery outpatient.  During that admission she was diagnosed with a left superficial femoral vein DVT discharged with Xarelto.     On this admission, patient had CT imaging which noted right internal iliac vein DVT, patient started on Eliquis.       Concern for possible pyelonephritis on CT imaging.  Patient started on Rocephin. Urine culture E. Faecium VRE.  Discussing the case with ID pharmacy as unable to determine if patient is symptomatic or not, as she is a poor historian.  Given she improved clinically throughout this admission, will defer any further changes in her antibiotic regimen.     Blood cultures with no growth to date.  Concern for possible underlying infection and patient's prior frostbitten BLE. MRSA swab positive.  Discussed with ID pharmacy, recommended de-escalation to Augmentin.  CT imaging LLE, negative for osteomyelitis or abscess formation.  Discussed with orthopedic surgery, no recommendation for debridement or surgical intervention at this time.  LPS consulted for wound care.   Interval Problem Update  3/21/2023  Vitals remained stable  Labs reviewed  Pain well controlled  Significant drainage noted around left heel, foul-smelling  Greenish  discharge  Patient okay to have surgery if needed    Get wound culture  LPS and wound care follow-up  Given significant wound she would likely benefit from aggressive wound care on discharge.  Likely need placement      I have discussed this patient's plan of care and discharge plan at IDT rounds today with Case Management, Nursing, Nursing leadership, and other members of the IDT team.    Consultants/Specialty  LPS    Code Status  Full Code    Disposition  Patient is not medically cleared for discharge.   Anticipate discharge to  TBD .  I have placed the appropriate orders for post-discharge needs.    Review of Systems  Review of Systems   Constitutional:  Positive for malaise/fatigue. Negative for fever.   HENT:  Negative for hearing loss.    Eyes:  Negative for blurred vision.   Respiratory:  Negative for cough and shortness of breath.    Cardiovascular:  Negative for chest pain.   Gastrointestinal:  Negative for nausea and vomiting.   Genitourinary:  Negative for dysuria.   Musculoskeletal:  Positive for joint pain. Negative for myalgias.   Skin:  Negative for rash.   Neurological:  Negative for dizziness.   Endo/Heme/Allergies:  Does not bruise/bleed easily.      Physical Exam  Temp:  [36.5 °C (97.7 °F)-36.8 °C (98.3 °F)] 36.5 °C (97.7 °F)  Pulse:  [70-95] 71  Resp:  [18] 18  BP: (101-124)/(50-75) 113/75  SpO2:  [95 %-100 %] 97 %    Physical Exam  Constitutional:       General: She is not in acute distress.  HENT:      Head: Normocephalic and atraumatic.      Right Ear: Tympanic membrane normal.      Left Ear: Tympanic membrane normal.      Nose: Nose normal.      Mouth/Throat:      Mouth: Mucous membranes are moist.   Eyes:      Extraocular Movements: Extraocular movements intact.      Pupils: Pupils are equal, round, and reactive to light.   Cardiovascular:      Rate and Rhythm: Normal rate and regular rhythm.      Pulses: Normal pulses.   Pulmonary:      Effort: Pulmonary effort is normal. No respiratory  distress.   Abdominal:      General: Abdomen is flat. There is no distension.   Musculoskeletal:      Cervical back: Normal range of motion.      Right lower leg: No edema.      Left lower leg: No edema.      Comments:   Right-sided BKA, left lower extremity noted with transmetatarsal amputation with significant greenish drainage, foul-smelling, tender to touch.   Skin:     General: Skin is warm.   Neurological:      General: No focal deficit present.      Mental Status: She is alert and oriented to person, place, and time. Mental status is at baseline.   Psychiatric:         Mood and Affect: Mood normal.       Fluids    Intake/Output Summary (Last 24 hours) at 3/21/2023 1516  Last data filed at 3/21/2023 0900  Gross per 24 hour   Intake 240 ml   Output 900 ml   Net -660 ml       Laboratory                        Imaging  CT-FOOT WITH PLUS RECONS LEFT   Final Result      1.  Edema and/or cellulitis of the visualized leg, ankle and foot. No focal abscess.   2.  Large blisters.   3.  Postsurgical changes first through fifth transmetatarsal amputations.   4.  No definite evidence for osteomyelitis.   5.  Some thickening of the Achilles tendon is nonspecific and not well evaluated on CT.   6.  Further evaluation with MRI as clinically indicated.      DX-CHEST-PORTABLE (1 VIEW)   Final Result      No acute cardiac or pulmonary abnormalities are identified.      DX-FEMUR-2+ RIGHT   Final Result      No radiographic evidence of acute traumatic injury.      CT-ABDOMEN-PELVIS WITH   Final Result      1.  Right internal iliac vein DVT.   2.  Somewhat striated nephrogram of the bilateral superior poles may be artifactual. Correlate with urinalysis for pyelonephritis.   3.  Redemonstration of enhancing lesion of the uterus. This has been evaluated on prior ultrasound.   4.  Prominent bilateral inguinal lymph nodes may be reactive. Malignancy not excluded.      Dr. Emanuel discussed these findings with Dr. Child at 1:49 PM by  telephone on 3/6/2023      DX-FOOT-2- LEFT   Final Result      Status post transmetatarsal amputation. No definitive radiographic evidence of ostomy myelitis. If there is strong clinical suspicion, MRI of the foot with and without contrast should be obtained for further evaluation.      DX-CHEST-PORTABLE (1 VIEW)   Final Result      No acute cardiopulmonary disease evident.           Assessment/Plan  * Infection of amputation stump of left lower extremity (HCC)  Assessment & Plan  Concern for   Initially discussed the case with orthopedic surgery, who did not believe at this time required intervention  However patient with persistent fever, all other work-up negative  Concern for possible underlying infection and patient's prior frostbitten BLE stumps, bullae noted   Blood cultures with no growth to date.   MRSA swab positive.   Discontinued vancomycin and Zosyn, switched to Zyvox.  Monitor renal function.     CT imaging LLE, negative for osteomyelitis or abscess formation.   3/14: Per LPS, Frostbite injury to left heel and posterior ankle continues to evolve and worsen. Likely need BKA next week.     On Augmentin, given worsening wound, will change to Unasyn. MRSA swab positive, added zyvox.   Tolerating and monitoring cbc, bmp    See completed course of antibiotic  Okay to go through surgical procedure if needed    UTI (urinary tract infection)  Assessment & Plan  Urine culture noted E. Faecium VRE.  However patient denies UTI symptoms  No indication for treatment    Fever in adult  Assessment & Plan  Patient noted to spike a 101.6 fever on 3/8, negative chest x-ray, UA negative, COVID, influenza, RSV negative, chest x-ray negative for any infiltrate or pulmonary effusion.  Lactic acid and procalcitonin negative. Blood cultures with no growth to date.  Concern for possible underlying infection and patient's prior frostbitten BLE. MRSA swab positive.     Slow transit constipation  Assessment & Plan  Patient noted  to have abdominal distention, prior bowel movement several days prior  Started on aggressive bowel regimen, given one-time dose of Dulcolax  Still with no bowel movement, oral lactulose given  Large BM 3/8 PM  Serial abdominal exams  RESOLVED    Noncompliance  Assessment & Plan  Significant history with noncompliance with medications and doctor visits    Homelessness  Assessment & Plan  Complicated medical history and noncompliance and drug abuse   was consulted to consider group home      Lactic acid acidosis  Assessment & Plan  Likely related to dehydration and infection  IV fluid and ceftriaxone  Resolved    Hx of BKA, right ([facility])  Assessment & Plan  Right BKA and left transmetatarsal amputation  LPS following, local wound care     Methamphetamine abuse ([facility])  Assessment & Plan  Encouraged the patient to quit  Urine drug screen is positive  Blood culture no growth to date    Acute deep vein thrombosis (DVT) ([facility])- (present on admission)  Assessment & Plan  Patient had recent admission at outside facility 2/2023,  she was diagnosed with a left superficial femoral vein DVT discharged with Xarelto.  Patient had CT imaging which noted right internal iliac vein DVT.    Initially on heparin drip, transitioned to Eliquis  No sign of active bleeding       HIV (human immunodeficiency virus infection) ([facility])- (present on admission)  Assessment & Plan  Patient is on Biktarvy however, clearly questioning about compliance  CD4-761 and CD8-194  Resume Biktarvy with close monitoring    Hypokalemia- (present on admission)  Assessment & Plan  Labs reviewed, resolved    Schizophrenia ([facility])- (present on admission)  Assessment & Plan  Patient has hallucination with hearing voices  Denied any SI or HI, however with active auditory hallucinations  Resume home medication Depakote, formally on Zyprexa  Psych consulted, continue with Depakote, trough level stable, Zyprexa 5 mg twice daily initiated    Tobacco abuse- (present  on admission)  Assessment & Plan  Cessation counseled, 3 minute  Nicotine replacement         VTE prophylaxis: SCDs/TEDs and therapeutic anticoagulation with eliquis    I have performed a physical exam and reviewed and updated ROS and Plan today (3/21/2023). In review of yesterday's note (3/20/2023), there are no changes except as documented above.

## 2023-03-21 NOTE — CARE PLAN
The patient is Stable - Low risk of patient condition declining or worsening    Shift Goals  Clinical Goals: VSS, comfort, rest  Patient Goals: rest  Family Goals: IVETTE    Progress made toward(s) clinical / shift goals:  pt meds passed per MAR, no injuries this shift      Problem: Knowledge Deficit - Standard  Goal: Patient and family/care givers will demonstrate understanding of plan of care, disease process/condition, diagnostic tests and medications  Outcome: Progressing     Problem: Pain - Standard  Goal: Alleviation of pain or a reduction in pain to the patient’s comfort goal  Outcome: Progressing     Problem: Skin Integrity  Goal: Skin integrity is maintained or improved  Outcome: Progressing     Problem: Fall Risk  Goal: Patient will remain free from falls  Outcome: Progressing       Patient is not progressing towards the following goals:

## 2023-03-21 NOTE — CARE PLAN
The patient is Stable - Low risk of patient condition declining or worsening    Shift Goals  Clinical Goals: VSS  Patient Goals: rest    Progress made toward(s) clinical / shift goals:    Plan of care reviewed with pt, all questions and concerns addressed. Call light within reach, bed in lowest and locked position.    Problem: Knowledge Deficit - Standard  Goal: Patient and family/care givers will demonstrate understanding of plan of care, disease process/condition, diagnostic tests and medications  Outcome: Progressing     Problem: Pain - Standard  Goal: Alleviation of pain or a reduction in pain to the patient’s comfort goal  Outcome: Progressing     Problem: Skin Integrity  Goal: Skin integrity is maintained or improved  Outcome: Progressing     Problem: Fall Risk  Goal: Patient will remain free from falls  Outcome: Progressing

## 2023-03-22 ENCOUNTER — ANESTHESIA EVENT (OUTPATIENT)
Dept: SURGERY | Facility: MEDICAL CENTER | Age: 53
DRG: 475 | End: 2023-03-22
Payer: MEDICAID

## 2023-03-22 ENCOUNTER — ANESTHESIA (OUTPATIENT)
Dept: SURGERY | Facility: MEDICAL CENTER | Age: 53
DRG: 475 | End: 2023-03-22
Payer: MEDICAID

## 2023-03-22 PROCEDURE — 700111 HCHG RX REV CODE 636 W/ 250 OVERRIDE (IP): Performed by: ANESTHESIOLOGY

## 2023-03-22 PROCEDURE — 160048 HCHG OR STATISTICAL LEVEL 1-5: Performed by: ORTHOPAEDIC SURGERY

## 2023-03-22 PROCEDURE — 770001 HCHG ROOM/CARE - MED/SURG/GYN PRIV*

## 2023-03-22 PROCEDURE — 27880 AMPUTATION OF LOWER LEG: CPT | Mod: 78,LT | Performed by: ORTHOPAEDIC SURGERY

## 2023-03-22 PROCEDURE — A9270 NON-COVERED ITEM OR SERVICE: HCPCS | Performed by: GENERAL PRACTICE

## 2023-03-22 PROCEDURE — 700111 HCHG RX REV CODE 636 W/ 250 OVERRIDE (IP): Performed by: PHYSICIAN ASSISTANT

## 2023-03-22 PROCEDURE — 700102 HCHG RX REV CODE 250 W/ 637 OVERRIDE(OP): Performed by: PHYSICIAN ASSISTANT

## 2023-03-22 PROCEDURE — A9270 NON-COVERED ITEM OR SERVICE: HCPCS | Performed by: PHYSICIAN ASSISTANT

## 2023-03-22 PROCEDURE — 700102 HCHG RX REV CODE 250 W/ 637 OVERRIDE(OP): Performed by: GENERAL PRACTICE

## 2023-03-22 PROCEDURE — 99024 POSTOP FOLLOW-UP VISIT: CPT | Performed by: ORTHOPAEDIC SURGERY

## 2023-03-22 PROCEDURE — 700102 HCHG RX REV CODE 250 W/ 637 OVERRIDE(OP): Performed by: ANESTHESIOLOGY

## 2023-03-22 PROCEDURE — 700102 HCHG RX REV CODE 250 W/ 637 OVERRIDE(OP): Performed by: INTERNAL MEDICINE

## 2023-03-22 PROCEDURE — 160035 HCHG PACU - 1ST 60 MINS PHASE I: Performed by: ORTHOPAEDIC SURGERY

## 2023-03-22 PROCEDURE — 99232 SBSQ HOSP IP/OBS MODERATE 35: CPT | Performed by: STUDENT IN AN ORGANIZED HEALTH CARE EDUCATION/TRAINING PROGRAM

## 2023-03-22 PROCEDURE — 0Y6J0Z1 DETACHMENT AT LEFT LOWER LEG, HIGH, OPEN APPROACH: ICD-10-PCS | Performed by: ORTHOPAEDIC SURGERY

## 2023-03-22 PROCEDURE — A9270 NON-COVERED ITEM OR SERVICE: HCPCS | Performed by: ANESTHESIOLOGY

## 2023-03-22 PROCEDURE — 01482 ANES OPN LWR L/A/F RAD RESCJ: CPT | Performed by: ANESTHESIOLOGY

## 2023-03-22 PROCEDURE — 88311 DECALCIFY TISSUE: CPT

## 2023-03-22 PROCEDURE — A9270 NON-COVERED ITEM OR SERVICE: HCPCS | Performed by: INTERNAL MEDICINE

## 2023-03-22 PROCEDURE — 99232 SBSQ HOSP IP/OBS MODERATE 35: CPT

## 2023-03-22 PROCEDURE — 88307 TISSUE EXAM BY PATHOLOGIST: CPT

## 2023-03-22 PROCEDURE — 160039 HCHG SURGERY MINUTES - EA ADDL 1 MIN LEVEL 3: Performed by: ORTHOPAEDIC SURGERY

## 2023-03-22 PROCEDURE — 160009 HCHG ANES TIME/MIN: Performed by: ORTHOPAEDIC SURGERY

## 2023-03-22 PROCEDURE — 160028 HCHG SURGERY MINUTES - 1ST 30 MINS LEVEL 3: Performed by: ORTHOPAEDIC SURGERY

## 2023-03-22 PROCEDURE — 27880 AMPUTATION OF LOWER LEG: CPT | Mod: 80ROC,78,LT | Performed by: STUDENT IN AN ORGANIZED HEALTH CARE EDUCATION/TRAINING PROGRAM

## 2023-03-22 PROCEDURE — 160002 HCHG RECOVERY MINUTES (STAT): Performed by: ORTHOPAEDIC SURGERY

## 2023-03-22 PROCEDURE — 160036 HCHG PACU - EA ADDL 30 MINS PHASE I: Performed by: ORTHOPAEDIC SURGERY

## 2023-03-22 RX ORDER — OXYCODONE HYDROCHLORIDE 10 MG/1
10 TABLET ORAL
Status: DISCONTINUED | OUTPATIENT
Start: 2023-03-22 | End: 2023-03-23

## 2023-03-22 RX ORDER — CEFAZOLIN SODIUM 1 G/3ML
INJECTION, POWDER, FOR SOLUTION INTRAMUSCULAR; INTRAVENOUS PRN
Status: DISCONTINUED | OUTPATIENT
Start: 2023-03-22 | End: 2023-03-22 | Stop reason: SURG

## 2023-03-22 RX ORDER — HYDROMORPHONE HYDROCHLORIDE 1 MG/ML
0.1 INJECTION, SOLUTION INTRAMUSCULAR; INTRAVENOUS; SUBCUTANEOUS
Status: DISCONTINUED | OUTPATIENT
Start: 2023-03-22 | End: 2023-03-22 | Stop reason: HOSPADM

## 2023-03-22 RX ORDER — ONDANSETRON 2 MG/ML
4 INJECTION INTRAMUSCULAR; INTRAVENOUS
Status: COMPLETED | OUTPATIENT
Start: 2023-03-22 | End: 2023-03-22

## 2023-03-22 RX ORDER — MORPHINE SULFATE 4 MG/ML
4 INJECTION INTRAVENOUS
Status: DISCONTINUED | OUTPATIENT
Start: 2023-03-22 | End: 2023-03-23

## 2023-03-22 RX ORDER — OXYCODONE HCL 5 MG/5 ML
5 SOLUTION, ORAL ORAL
Status: COMPLETED | OUTPATIENT
Start: 2023-03-22 | End: 2023-03-22

## 2023-03-22 RX ORDER — SODIUM CHLORIDE, SODIUM LACTATE, POTASSIUM CHLORIDE, CALCIUM CHLORIDE 600; 310; 30; 20 MG/100ML; MG/100ML; MG/100ML; MG/100ML
INJECTION, SOLUTION INTRAVENOUS CONTINUOUS
Status: DISCONTINUED | OUTPATIENT
Start: 2023-03-22 | End: 2023-03-22 | Stop reason: HOSPADM

## 2023-03-22 RX ORDER — HYDROMORPHONE HYDROCHLORIDE 1 MG/ML
0.2 INJECTION, SOLUTION INTRAMUSCULAR; INTRAVENOUS; SUBCUTANEOUS
Status: DISCONTINUED | OUTPATIENT
Start: 2023-03-22 | End: 2023-03-22 | Stop reason: HOSPADM

## 2023-03-22 RX ORDER — HYDROMORPHONE HYDROCHLORIDE 1 MG/ML
0.4 INJECTION, SOLUTION INTRAMUSCULAR; INTRAVENOUS; SUBCUTANEOUS
Status: DISCONTINUED | OUTPATIENT
Start: 2023-03-22 | End: 2023-03-22 | Stop reason: HOSPADM

## 2023-03-22 RX ORDER — DIPHENHYDRAMINE HYDROCHLORIDE 50 MG/ML
12.5 INJECTION INTRAMUSCULAR; INTRAVENOUS
Status: DISCONTINUED | OUTPATIENT
Start: 2023-03-22 | End: 2023-03-22 | Stop reason: HOSPADM

## 2023-03-22 RX ORDER — ONDANSETRON 2 MG/ML
INJECTION INTRAMUSCULAR; INTRAVENOUS PRN
Status: DISCONTINUED | OUTPATIENT
Start: 2023-03-22 | End: 2023-03-22 | Stop reason: SURG

## 2023-03-22 RX ORDER — OXYCODONE HCL 5 MG/5 ML
10 SOLUTION, ORAL ORAL
Status: COMPLETED | OUTPATIENT
Start: 2023-03-22 | End: 2023-03-22

## 2023-03-22 RX ORDER — OXYCODONE HYDROCHLORIDE 5 MG/1
5 TABLET ORAL
Status: DISCONTINUED | OUTPATIENT
Start: 2023-03-22 | End: 2023-03-23

## 2023-03-22 RX ADMIN — FENTANYL CITRATE 100 MCG: 50 INJECTION, SOLUTION INTRAMUSCULAR; INTRAVENOUS at 13:36

## 2023-03-22 RX ADMIN — CEFAZOLIN 2 G: 330 INJECTION, POWDER, FOR SOLUTION INTRAMUSCULAR; INTRAVENOUS at 13:44

## 2023-03-22 RX ADMIN — OXYCODONE HYDROCHLORIDE 10 MG: 5 SOLUTION ORAL at 15:00

## 2023-03-22 RX ADMIN — FENTANYL CITRATE 50 MCG: 50 INJECTION, SOLUTION INTRAMUSCULAR; INTRAVENOUS at 15:10

## 2023-03-22 RX ADMIN — HYDROMORPHONE HYDROCHLORIDE 0.2 MG: 1 INJECTION, SOLUTION INTRAMUSCULAR; INTRAVENOUS; SUBCUTANEOUS at 15:35

## 2023-03-22 RX ADMIN — OLANZAPINE 5 MG: 5 TABLET, FILM COATED ORAL at 20:02

## 2023-03-22 RX ADMIN — OXYCODONE HYDROCHLORIDE 10 MG: 10 TABLET ORAL at 18:34

## 2023-03-22 RX ADMIN — ONDANSETRON HYDROCHLORIDE 4 MG: 2 SOLUTION INTRAMUSCULAR; INTRAVENOUS at 16:05

## 2023-03-22 RX ADMIN — HYDROMORPHONE HYDROCHLORIDE 0.4 MG: 1 INJECTION, SOLUTION INTRAMUSCULAR; INTRAVENOUS; SUBCUTANEOUS at 15:15

## 2023-03-22 RX ADMIN — ONDANSETRON 4 MG: 2 INJECTION INTRAMUSCULAR; INTRAVENOUS at 13:57

## 2023-03-22 RX ADMIN — HYDROMORPHONE HYDROCHLORIDE 0.4 MG: 1 INJECTION, SOLUTION INTRAMUSCULAR; INTRAVENOUS; SUBCUTANEOUS at 15:22

## 2023-03-22 RX ADMIN — DIPHENHYDRAMINE HYDROCHLORIDE 12.5 MG: 50 INJECTION, SOLUTION INTRAMUSCULAR; INTRAVENOUS at 16:05

## 2023-03-22 RX ADMIN — Medication 1 APPLICATION: at 10:10

## 2023-03-22 RX ADMIN — PROPOFOL 150 MG: 10 INJECTION, EMULSION INTRAVENOUS at 13:38

## 2023-03-22 RX ADMIN — MORPHINE SULFATE 4 MG: 4 INJECTION, SOLUTION INTRAMUSCULAR; INTRAVENOUS at 20:02

## 2023-03-22 RX ADMIN — FENTANYL CITRATE 50 MCG: 50 INJECTION, SOLUTION INTRAMUSCULAR; INTRAVENOUS at 15:00

## 2023-03-22 RX ADMIN — FENTANYL CITRATE 100 MCG: 50 INJECTION, SOLUTION INTRAMUSCULAR; INTRAVENOUS at 13:53

## 2023-03-22 RX ADMIN — DIVALPROEX SODIUM 500 MG: 500 TABLET, DELAYED RELEASE ORAL at 20:02

## 2023-03-22 RX ADMIN — ACETAMINOPHEN 650 MG: 325 TABLET, FILM COATED ORAL at 18:11

## 2023-03-22 RX ADMIN — FENTANYL CITRATE 50 MCG: 50 INJECTION, SOLUTION INTRAMUSCULAR; INTRAVENOUS at 13:56

## 2023-03-22 RX ADMIN — NICOTINE 14 MG: 14 PATCH, EXTENDED RELEASE TRANSDERMAL at 10:10

## 2023-03-22 RX ADMIN — APIXABAN 5 MG: 5 TABLET, FILM COATED ORAL at 20:02

## 2023-03-22 ASSESSMENT — PAIN DESCRIPTION - PAIN TYPE
TYPE: SURGICAL PAIN
TYPE: ACUTE PAIN
TYPE: SURGICAL PAIN

## 2023-03-22 ASSESSMENT — ENCOUNTER SYMPTOMS
NAUSEA: 0
SHORTNESS OF BREATH: 0
FEVER: 0
BRUISES/BLEEDS EASILY: 0
MYALGIAS: 0
COUGH: 0
BLURRED VISION: 0
VOMITING: 0
DIZZINESS: 0

## 2023-03-22 NOTE — ANESTHESIA PREPROCEDURE EVALUATION
Case: [id number] Date/Time: 03/22/23 1326    Procedure: AMPUTATION, BELOW KNEE (Left: Leg Lower)    Anesthesia type: General    Location: [facility] OR  / SURGERY [facility]    Surgeons: Nas Lux M.D.          Relevant Problems   CARDIAC   (positive) Acute deep vein thrombosis (DVT) (HCC)       Physical Exam    Airway   Mallampati: II  TM distance: >3 FB  Neck ROM: full       Cardiovascular - normal exam  Rhythm: regular  Rate: normal  (-) murmur     Dental - normal exam           Pulmonary - normal exam  Breath sounds clear to auscultation     Abdominal    Neurological - normal exam         Other findings: Poor mouth opening, but sufficient            Anesthesia Plan    ASA 2       Plan - general       Airway plan will be LMA    (Last used methamphetamine 2 weeks ago)      Induction: intravenous    Postoperative Plan: Postoperative administration of opioids is intended.    Pertinent diagnostic labs and testing reviewed    Informed Consent:    Anesthetic plan and risks discussed with patient.    Use of blood products discussed with: patient whom consented to blood products.

## 2023-03-22 NOTE — OP REPORT
DATE OF OPERATION: 3/22/2023     PREOPERATIVE DIAGNOSIS: Left heel pain ischemia, left transmetatarsal amputation dehiscence, left lower extremity frostbite injuries    POSTOPERATIVE DIAGNOSIS: Same    PROCEDURE PERFORMED: Left below-knee amputation    SURGEON: Nas Lux M.D.     ASSISTANT: Phill Patricia MD     ANESTHESIA: General    SPECIMEN: None    ESTIMATED BLOOD LOSS: 25 mL    IMPLANTS: None      INDICATIONS: The patient is a 53 y.o. female who presented with history of bilateral lower extremity frostbite injuries.  I treated her previously at an outside hospital with right below-knee amputation and left transmetatarsal amputation.  She also had a heel pad eschar from the frostbite at that time.  She has been undergoing wound care.  This is failed to resolve.  She has also had repeat frostbite injuries that have again worsened the appearance of her left lower extremity.  I recommended transtibial amputation as I do not think the foot is salvageable in its current state.  I discussed the risks and benefits of the procedure which include but are not limited to risks of infection, wound healing complication, neurovascular injury, pain, phantom limb pains, and the medical risks of anesthesia including MI, stroke, and death.  Alternatives to surgery were also discussed, including non-operative management, which I did not recommend.  The patient was in agreement with the plan to proceed, and the informed consent was signed and documented.  I met with the patient pre-operatively and marked the operative extremity with their agreement.  We proceeded to the operating room.     DESCRIPTION OF PROCEDURE:  Patient was seen in the preoperative holding area on the day of surgery. The operative site was marked with my initials.  she was taken to the operating room and placed supine on the operative table.  Anesthesia was induced.  The operative extremity was prepped and draped in the normal sterile fashion.   Operative pause was conducted and the correct patient, site, side, procedure, and surgeon's initials on extremity were identified.  Incisions were marked out in the bone cut roughly handbreadth beneath the tibial tubercle.  Skin incision made roughly a centimeter distal to this.  This was made in a standard fashion with a long posterior flap.  This was taken as a full-thickness flap.  The muscle was divided Bovie cautery controlling bleeding tissues.  Tourniquet was utilized during dissection.  Once the anterior compartment musculature was divided the tibial and fibular cuts were marked out.  The fibula was cut first followed by the tibia.  The anterior edge of the tibia was beveled.  An amputation knife was then used to divide the deep posterior musculature and developed the posterior flap.  The remainder of the leg was removed from the table.  The musculature was  at the level of the gastroc fascia and the overlying soleal and deep posterior compartments were removed.  The posterior flap was then contoured.  The neurovascular structures were identified.  The neural structures were placed on stretch and cut and allowed to retract.  Vascular structures were doubly tied with silk suture.  Tourniquet was let down and there is no additional brisk bleeding.  Any venous bleeding was controlled with Bovie cautery.  The deep gastric fascia was then reapproximated to the anterior fascia and periosteum.  The skin flap portion was then contoured.  This was reapproximated with 2-0 Vicryl and then staples were used to approximate the skin edges.  There is no tension present at the time.  Sterile dressings were applied followed by an Ace wrap.  The patient awoke in the operating room and was taken to PACU in stable condition.    POSTOPERATIVE PLAN: Non weightbearing to the left lower extremity.  Continue wound care to the right lower extremity eschar.  Sutures out of the left leg in 3 to 4 weeks.  Prosthetic fitting  after further wound healing has occurred.      ____________________________________   Nas Lux M.D.   DD: 3/22/2023  2:55 PM

## 2023-03-22 NOTE — ANESTHESIA PROCEDURE NOTES
Airway    Date/Time: 3/22/2023 1:40 PM  Performed by: Fortino Friedman M.D.  Authorized by: Fortino Friedman M.D.     Location:  OR  Urgency:  Elective  Indications for Airway Management:  Anesthesia      Spontaneous Ventilation: absent    Sedation Level:  Deep  Preoxygenated: Yes    Mask Difficulty Assessment:  1 - vent by mask  Final Airway Type:  Supraglottic airway  Final Supraglottic Airway:  Standard LMA    SGA Size:  4  Number of Attempts at Approach:  1

## 2023-03-22 NOTE — PROGRESS NOTES
Patient is well-known to me from previous right below-knee amputation as well as left transmetatarsal amputation following frostbite injuries.  She had some repeat frostbite injuries even after the surgeries.  She is undergoing wound care for the left foot which has had poor success in resolving the heel eschar and has had some continued drainage from the transmetatarsal amputation site.  Given the long extent that she has been undergoing wound care this does not appear to be reaching a satisfactory endpoint.  Because of this I think that her best chance of fully healing will be a below-knee amputation.  I had a thorough discussion with the patient at length regarding this recommendation.  She is in agreement today as she is also ready to have her wounds healed so she can start to mobilize and recover.

## 2023-03-22 NOTE — PROGRESS NOTES
Hospital Medicine Daily Progress Note    Date of Service  3/22/2023    Chief Complaint  Kellie Chatman is a 53 y.o. female admitted 3/6/2023 with altered mental status    Hospital Course  53-year-old female with past medical history of s/p right BKA and left transmetatarsal amputation, hypertension, HIV, seizure disorder, schizophrenia, methamphetamine use, tobacco use and homelessness to the ED with altered mental status.  See completed course of antibiotic for pyelonephritis  CT imaging LLE, negative for osteomyelitis or abscess formation.  Evaluated by orthopedics and status post left below-knee amputation on 3/22/2023.      Interval Problem Update  3/21/2023  Vitals remained stable  Labs reviewed  Pain well controlled  Significant drainage noted around left heel, foul-smelling  Greenish discharge  Patient okay to have surgery if needed    Get wound culture  LPS and wound care follow-up  Given significant wound she would likely benefit from aggressive wound care on discharge.  Likely need placement    3/22/2023  Vitals remained stable  On room air saturating over 90  Labs reviewed noted with hemoglobin 8.1, no concern of ongoing infection  Case was discussed with CHEYANNE Horvath   status post left below-knee amputation on 3/22/2023.  LPS and orthopedics following closely  She will require IV hydromorphone for pain management      I have discussed this patient's plan of care and discharge plan at IDT rounds today with Case Management, Nursing, Nursing leadership, and other members of the IDT team.    Consultants/Specialty  LPS    Code Status  Full Code    Disposition  Patient is not medically cleared for discharge.   Anticipate discharge to  TBD .  I have placed the appropriate orders for post-discharge needs.    Review of Systems  Review of Systems   Constitutional:  Positive for malaise/fatigue. Negative for fever.   HENT:  Negative for hearing loss.    Eyes:  Negative for blurred vision.   Respiratory:  Negative for  cough and shortness of breath.    Cardiovascular:  Negative for chest pain.   Gastrointestinal:  Negative for nausea and vomiting.   Genitourinary:  Negative for dysuria.   Musculoskeletal:  Positive for joint pain. Negative for myalgias.   Skin:  Negative for rash.   Neurological:  Negative for dizziness.   Endo/Heme/Allergies:  Does not bruise/bleed easily.      Physical Exam  Temp:  [36.1 °C (96.9 °F)-36.4 °C (97.6 °F)] 36.4 °C (97.5 °F)  Pulse:  [76-94] 80  Resp:  [16-18] 16  BP: (103-160)/() 156/82  SpO2:  [92 %-99 %] 94 %    Physical Exam  Constitutional:       General: She is not in acute distress.  HENT:      Head: Normocephalic and atraumatic.      Right Ear: Tympanic membrane normal.      Left Ear: Tympanic membrane normal.      Nose: Nose normal.      Mouth/Throat:      Mouth: Mucous membranes are moist.   Eyes:      Extraocular Movements: Extraocular movements intact.      Pupils: Pupils are equal, round, and reactive to light.   Cardiovascular:      Rate and Rhythm: Normal rate and regular rhythm.      Pulses: Normal pulses.   Pulmonary:      Effort: Pulmonary effort is normal. No respiratory distress.   Abdominal:      General: Abdomen is flat. There is no distension.   Musculoskeletal:      Cervical back: Normal range of motion.      Right lower leg: No edema.      Left lower leg: No edema.      Comments:   Right-sided BKA, left lower extremity noted with transmetatarsal amputation with significant greenish drainage, foul-smelling, tender to touch.   Skin:     General: Skin is warm.   Neurological:      General: No focal deficit present.      Mental Status: She is alert and oriented to person, place, and time. Mental status is at baseline.   Psychiatric:         Mood and Affect: Mood normal.       Fluids    Intake/Output Summary (Last 24 hours) at 3/22/2023 1653  Last data filed at 3/21/2023 1800  Gross per 24 hour   Intake 240 ml   Output --   Net 240 ml         Laboratory                         Imaging  CT-FOOT WITH PLUS RECONS LEFT   Final Result      1.  Edema and/or cellulitis of the visualized leg, ankle and foot. No focal abscess.   2.  Large blisters.   3.  Postsurgical changes first through fifth transmetatarsal amputations.   4.  No definite evidence for osteomyelitis.   5.  Some thickening of the Achilles tendon is nonspecific and not well evaluated on CT.   6.  Further evaluation with MRI as clinically indicated.      DX-CHEST-PORTABLE (1 VIEW)   Final Result      No acute cardiac or pulmonary abnormalities are identified.      DX-FEMUR-2+ RIGHT   Final Result      No radiographic evidence of acute traumatic injury.      CT-ABDOMEN-PELVIS WITH   Final Result      1.  Right internal iliac vein DVT.   2.  Somewhat striated nephrogram of the bilateral superior poles may be artifactual. Correlate with urinalysis for pyelonephritis.   3.  Redemonstration of enhancing lesion of the uterus. This has been evaluated on prior ultrasound.   4.  Prominent bilateral inguinal lymph nodes may be reactive. Malignancy not excluded.      Dr. Emanuel discussed these findings with Dr. Child at 1:49 PM by telephone on 3/6/2023      DX-FOOT-2- LEFT   Final Result      Status post transmetatarsal amputation. No definitive radiographic evidence of ostomy myelitis. If there is strong clinical suspicion, MRI of the foot with and without contrast should be obtained for further evaluation.      DX-CHEST-PORTABLE (1 VIEW)   Final Result      No acute cardiopulmonary disease evident.           Assessment/Plan  * Infection of amputation stump of left lower extremity (HCC)  Assessment & Plan  Concern for   Initially discussed the case with orthopedic surgery, who did not believe at this time required intervention  However patient with persistent fever, all other work-up negative  Concern for possible underlying infection and patient's prior frostbitten BLE stumps, bullae noted   Blood cultures with no growth to date.   MRSA  swab positive.   Discontinued vancomycin and Zosyn, switched to Zyvox.  Monitor renal function.     CT imaging LLE, negative for osteomyelitis or abscess formation.     status post left below-knee amputation on 3/22/2023.      UTI (urinary tract infection)  Assessment & Plan  Urine culture noted E. Faecium VRE.  However patient denies UTI symptoms  No indication for treatment    Fever in adult  Assessment & Plan  Patient noted to spike a 101.6 fever on 3/8, negative chest x-ray, UA negative, COVID, influenza, RSV negative, chest x-ray negative for any infiltrate or pulmonary effusion.  Lactic acid and procalcitonin negative. Blood cultures with no growth to date.  Concern for possible underlying infection and patient's prior frostbitten BLE. MRSA swab positive.     Slow transit constipation  Assessment & Plan  Patient noted to have abdominal distention, prior bowel movement several days prior  Started on aggressive bowel regimen, given one-time dose of Dulcolax  Still with no bowel movement, oral lactulose given  Large BM 3/8 PM  Serial abdominal exams  RESOLVED    Noncompliance  Assessment & Plan  Significant history with noncompliance with medications and doctor visits    Homelessness  Assessment & Plan  Complicated medical history and noncompliance and drug abuse   was consulted to consider group home      Lactic acid acidosis  Assessment & Plan  Likely related to dehydration and infection  IV fluid and ceftriaxone  Resolved    Hx of BKA, right ([facility])  Assessment & Plan  Right BKA and left transmetatarsal amputation  LPS following, local wound care     Methamphetamine abuse (HCC)  Assessment & Plan  Encouraged the patient to quit  Urine drug screen is positive  Blood culture no growth to date    Acute deep vein thrombosis (DVT) ([facility])- (present on admission)  Assessment & Plan  Patient had recent admission at outside facility 2/2023,  she was diagnosed with a left superficial femoral vein DVT  discharged with Xarelto.  Patient had CT imaging which noted right internal iliac vein DVT.    Initially on heparin drip, transitioned to Eliquis  No sign of active bleeding       HIV (human immunodeficiency virus infection) (HCC)- (present on admission)  Assessment & Plan  Patient is on Biktarvy however, clearly questioning about compliance  CD4-761 and CD8-194  Resume Biktarvy with close monitoring    Hypokalemia- (present on admission)  Assessment & Plan  Labs reviewed, resolved    Schizophrenia (HCC)- (present on admission)  Assessment & Plan  Patient has hallucination with hearing voices  Denied any SI or HI, however with active auditory hallucinations  Resume home medication Depakote, formally on Zyprexa  Psych consulted, continue with Depakote, trough level stable, Zyprexa 5 mg twice daily initiated    Tobacco abuse- (present on admission)  Assessment & Plan  Cessation counseled, 3 minute  Nicotine replacement         VTE prophylaxis: SCDs/TEDs and therapeutic anticoagulation with eliquis    I have performed a physical exam and reviewed and updated ROS and Plan today (3/22/2023). In review of yesterday's note (3/21/2023), there are no changes except as documented above.

## 2023-03-22 NOTE — ANESTHESIA TIME REPORT
Anesthesia Start and Stop Event Times     Date Time Event    3/22/2023 1320 Ready for Procedure     1333 Anesthesia Start     1448 Anesthesia Stop        Responsible Staff  03/22/23    Name Role Begin End    Fortino Friedman M.D. Anesth 1333 1448        Overtime Reason:  no overtime (within assigned shift)    Comments:

## 2023-03-22 NOTE — ANESTHESIA POSTPROCEDURE EVALUATION
Patient: Kellie Chatman    Procedure Summary     Date: 03/22/23 Room / Location: [place] [id number] / SURGERY [facility]    Anesthesia Start: 1333 Anesthesia Stop: 1448    Procedure: AMPUTATION, BELOW KNEE (Left: Leg Lower) Diagnosis: (frostbite, chronic foot wounds )    Surgeons: Nas Lux M.D. Responsible Provider: Fortino Friedman M.D.    Anesthesia Type: general ASA Status: 2          Final Anesthesia Type: general  Last vitals  BP   Blood Pressure: 110/69    Temp   36.4 °C (97.6 °F)    Pulse   77   Resp   16    SpO2   96 %      Anesthesia Post Evaluation    Patient location during evaluation: PACU  Patient participation: waiting for patient participation    Airway patency: patent  Anesthetic complications: no  Cardiovascular status: hemodynamically stable  Respiratory status: acceptable  Hydration status: euvolemic    PONV: none          No notable events documented.     Nurse Pain Score: 0 (NPRS)

## 2023-03-22 NOTE — CARE PLAN
The patient is Stable - Low risk of patient condition declining or worsening    Shift Goals  Clinical Goals: skin integrity, safety  Patient Goals: rest  Family Goals: IVETTE    Progress made toward(s) clinical / shift goals:  Cooperative with cares, bed alarm on, frequent rounds. NPO midnight.     Patient is not progressing towards the following goals: N/A    Problem: Knowledge Deficit - Standard  Goal: Patient and family/care givers will demonstrate understanding of plan of care, disease process/condition, diagnostic tests and medications  Outcome: Progressing     Problem: Pain - Standard  Goal: Alleviation of pain or a reduction in pain to the patient’s comfort goal  Outcome: Progressing     Problem: Skin Integrity  Goal: Skin integrity is maintained or improved  Outcome: Progressing     Problem: Fall Risk  Goal: Patient will remain free from falls  Outcome: Progressing

## 2023-03-22 NOTE — OR NURSING
Patient arrived to PACU in stable condition.  LMA airway in place  Surgical site to L BKA, gauze and ace wrap in place, CDI  Medicated for pain and nausea per MAR  Patient tolerated clears and cracker without nausea or vomiting  Report given to Cammie DEL CID. Patient transferring back to [facility]

## 2023-03-23 LAB
ANION GAP SERPL CALC-SCNC: 10 MMOL/L (ref 7–16)
BASOPHILS # BLD AUTO: 0.4 % (ref 0–1.8)
BASOPHILS # BLD: 0.03 K/UL (ref 0–0.12)
BUN SERPL-MCNC: 18 MG/DL (ref 8–22)
CALCIUM SERPL-MCNC: 8.8 MG/DL (ref 8.5–10.5)
CHLORIDE SERPL-SCNC: 100 MMOL/L (ref 96–112)
CO2 SERPL-SCNC: 24 MMOL/L (ref 20–33)
CREAT SERPL-MCNC: 0.74 MG/DL (ref 0.5–1.4)
EOSINOPHIL # BLD AUTO: 0.05 K/UL (ref 0–0.51)
EOSINOPHIL NFR BLD: 0.6 % (ref 0–6.9)
ERYTHROCYTE [DISTWIDTH] IN BLOOD BY AUTOMATED COUNT: 46.5 FL (ref 35.9–50)
GFR SERPLBLD CREATININE-BSD FMLA CKD-EPI: 95 ML/MIN/1.73 M 2
GLUCOSE SERPL-MCNC: 119 MG/DL (ref 65–99)
HCT VFR BLD AUTO: 31.1 % (ref 37–47)
HGB BLD-MCNC: 9.7 G/DL (ref 12–16)
IMM GRANULOCYTES # BLD AUTO: 0.1 K/UL (ref 0–0.11)
IMM GRANULOCYTES NFR BLD AUTO: 1.2 % (ref 0–0.9)
LYMPHOCYTES # BLD AUTO: 1.48 K/UL (ref 1–4.8)
LYMPHOCYTES NFR BLD: 17.5 % (ref 22–41)
MAGNESIUM SERPL-MCNC: 1.6 MG/DL (ref 1.5–2.5)
MCH RBC QN AUTO: 26.5 PG (ref 27–33)
MCHC RBC AUTO-ENTMCNC: 31.2 G/DL (ref 33.6–35)
MCV RBC AUTO: 85 FL (ref 81.4–97.8)
MONOCYTES # BLD AUTO: 1 K/UL (ref 0–0.85)
MONOCYTES NFR BLD AUTO: 11.8 % (ref 0–13.4)
NEUTROPHILS # BLD AUTO: 5.78 K/UL (ref 2–7.15)
NEUTROPHILS NFR BLD: 68.5 % (ref 44–72)
NRBC # BLD AUTO: 0 K/UL
NRBC BLD-RTO: 0 /100 WBC
PATHOLOGY CONSULT NOTE: NORMAL
PHOSPHATE SERPL-MCNC: 4.9 MG/DL (ref 2.5–4.5)
PLATELET # BLD AUTO: 390 K/UL (ref 164–446)
PMV BLD AUTO: 9.2 FL (ref 9–12.9)
POTASSIUM SERPL-SCNC: 4.3 MMOL/L (ref 3.6–5.5)
RBC # BLD AUTO: 3.66 M/UL (ref 4.2–5.4)
SODIUM SERPL-SCNC: 134 MMOL/L (ref 135–145)
WBC # BLD AUTO: 8.4 K/UL (ref 4.8–10.8)

## 2023-03-23 PROCEDURE — 700102 HCHG RX REV CODE 250 W/ 637 OVERRIDE(OP): Performed by: GENERAL PRACTICE

## 2023-03-23 PROCEDURE — 700102 HCHG RX REV CODE 250 W/ 637 OVERRIDE(OP): Performed by: INTERNAL MEDICINE

## 2023-03-23 PROCEDURE — 700102 HCHG RX REV CODE 250 W/ 637 OVERRIDE(OP): Performed by: STUDENT IN AN ORGANIZED HEALTH CARE EDUCATION/TRAINING PROGRAM

## 2023-03-23 PROCEDURE — 770001 HCHG ROOM/CARE - MED/SURG/GYN PRIV*

## 2023-03-23 PROCEDURE — 99232 SBSQ HOSP IP/OBS MODERATE 35: CPT | Performed by: STUDENT IN AN ORGANIZED HEALTH CARE EDUCATION/TRAINING PROGRAM

## 2023-03-23 PROCEDURE — A9270 NON-COVERED ITEM OR SERVICE: HCPCS | Performed by: STUDENT IN AN ORGANIZED HEALTH CARE EDUCATION/TRAINING PROGRAM

## 2023-03-23 PROCEDURE — A9270 NON-COVERED ITEM OR SERVICE: HCPCS | Performed by: GENERAL PRACTICE

## 2023-03-23 PROCEDURE — 83735 ASSAY OF MAGNESIUM: CPT

## 2023-03-23 PROCEDURE — 85025 COMPLETE CBC W/AUTO DIFF WBC: CPT

## 2023-03-23 PROCEDURE — 700111 HCHG RX REV CODE 636 W/ 250 OVERRIDE (IP): Performed by: PHYSICIAN ASSISTANT

## 2023-03-23 PROCEDURE — 36415 COLL VENOUS BLD VENIPUNCTURE: CPT

## 2023-03-23 PROCEDURE — 80048 BASIC METABOLIC PNL TOTAL CA: CPT

## 2023-03-23 PROCEDURE — A9270 NON-COVERED ITEM OR SERVICE: HCPCS | Performed by: INTERNAL MEDICINE

## 2023-03-23 PROCEDURE — 84100 ASSAY OF PHOSPHORUS: CPT

## 2023-03-23 RX ORDER — OXYCODONE HYDROCHLORIDE 5 MG/1
2.5 TABLET ORAL
Status: DISCONTINUED | OUTPATIENT
Start: 2023-03-23 | End: 2023-03-30 | Stop reason: HOSPADM

## 2023-03-23 RX ORDER — HYDROMORPHONE HYDROCHLORIDE 1 MG/ML
0.25 INJECTION, SOLUTION INTRAMUSCULAR; INTRAVENOUS; SUBCUTANEOUS
Status: DISCONTINUED | OUTPATIENT
Start: 2023-03-23 | End: 2023-03-30 | Stop reason: HOSPADM

## 2023-03-23 RX ORDER — OXYCODONE HYDROCHLORIDE 5 MG/1
5 TABLET ORAL
Status: DISCONTINUED | OUTPATIENT
Start: 2023-03-23 | End: 2023-03-30 | Stop reason: HOSPADM

## 2023-03-23 RX ADMIN — OLANZAPINE 5 MG: 5 TABLET, FILM COATED ORAL at 20:19

## 2023-03-23 RX ADMIN — OXYCODONE HYDROCHLORIDE 5 MG: 5 TABLET ORAL at 20:24

## 2023-03-23 RX ADMIN — DOCUSATE SODIUM 50 MG AND SENNOSIDES 8.6 MG 2 TABLET: 8.6; 5 TABLET, FILM COATED ORAL at 20:19

## 2023-03-23 RX ADMIN — APIXABAN 5 MG: 5 TABLET, FILM COATED ORAL at 20:19

## 2023-03-23 RX ADMIN — DIVALPROEX SODIUM 500 MG: 500 TABLET, DELAYED RELEASE ORAL at 20:19

## 2023-03-23 RX ADMIN — APIXABAN 5 MG: 5 TABLET, FILM COATED ORAL at 09:16

## 2023-03-23 RX ADMIN — DIVALPROEX SODIUM 500 MG: 500 TABLET, DELAYED RELEASE ORAL at 09:16

## 2023-03-23 RX ADMIN — MORPHINE SULFATE 4 MG: 4 INJECTION, SOLUTION INTRAMUSCULAR; INTRAVENOUS at 09:18

## 2023-03-23 RX ADMIN — NICOTINE 14 MG: 14 PATCH, EXTENDED RELEASE TRANSDERMAL at 09:16

## 2023-03-23 RX ADMIN — MORPHINE SULFATE 4 MG: 4 INJECTION, SOLUTION INTRAMUSCULAR; INTRAVENOUS at 03:46

## 2023-03-23 RX ADMIN — OXYCODONE HYDROCHLORIDE 5 MG: 5 TABLET ORAL at 17:49

## 2023-03-23 RX ADMIN — OLANZAPINE 5 MG: 5 TABLET, FILM COATED ORAL at 09:16

## 2023-03-23 RX ADMIN — BICTEGRAVIR SODIUM, EMTRICITABINE, AND TENOFOVIR ALAFENAMIDE FUMARATE 1 TABLET: 50; 200; 25 TABLET ORAL at 09:17

## 2023-03-23 RX ADMIN — DOCUSATE SODIUM 50 MG AND SENNOSIDES 8.6 MG 2 TABLET: 8.6; 5 TABLET, FILM COATED ORAL at 09:16

## 2023-03-23 ASSESSMENT — PAIN DESCRIPTION - PAIN TYPE
TYPE: SURGICAL PAIN
TYPE: ACUTE PAIN;CHRONIC PAIN
TYPE: SURGICAL PAIN

## 2023-03-23 ASSESSMENT — ENCOUNTER SYMPTOMS
DIZZINESS: 0
NAUSEA: 0
COUGH: 0
BRUISES/BLEEDS EASILY: 0
MYALGIAS: 0
BLURRED VISION: 0
SHORTNESS OF BREATH: 0
FEVER: 0
VOMITING: 0

## 2023-03-23 NOTE — PROGRESS NOTES
Hospital Medicine Daily Progress Note    Date of Service  3/23/2023    Chief Complaint  Kellie Chatman is a 53 y.o. female admitted 3/6/2023 with altered mental status    Hospital Course  53-year-old female with past medical history of s/p right BKA and left transmetatarsal amputation, hypertension, HIV, seizure disorder, schizophrenia, methamphetamine use, tobacco use and homelessness to the ED with altered mental status.  See completed course of antibiotic for pyelonephritis  CT imaging LLE, negative for osteomyelitis or abscess formation.  Evaluated by orthopedics and status post left below-knee amputation on 3/22/2023.      Interval Problem Update  3/21/2023  Vitals remained stable  Labs reviewed  Pain well controlled  Significant drainage noted around left heel, foul-smelling  Greenish discharge  Patient okay to have surgery if needed    Get wound culture  LPS and wound care follow-up  Given significant wound she would likely benefit from aggressive wound care on discharge.  Likely need placement    3/22/2023  Vitals remained stable  On room air saturating over 90  Labs reviewed noted with hemoglobin 8.1, no concern of ongoing infection  Case was discussed with CHEYANNE Horvath   status post left below-knee amputation on 3/22/2023.  LPS and orthopedics following closely  She will require IV hydromorphone for pain management    3/23/2023  Vitals remained stable  Labs reviewed  LPS and orthopedics following  Requiring IV narcotics for pain management  Eventually need placement.   aware    I have discussed this patient's plan of care and discharge plan at IDT rounds today with Case Management, Nursing, Nursing leadership, and other members of the IDT team.    Consultants/Specialty  LPS    Code Status  Full Code    Disposition  Patient is not medically cleared for discharge.   Anticipate discharge to  TBD .  I have placed the appropriate orders for post-discharge needs.    Review of Systems  Review of Systems    Constitutional:  Positive for malaise/fatigue. Negative for fever.   HENT:  Negative for hearing loss.    Eyes:  Negative for blurred vision.   Respiratory:  Negative for cough and shortness of breath.    Cardiovascular:  Negative for chest pain.   Gastrointestinal:  Negative for nausea and vomiting.   Genitourinary:  Negative for dysuria.   Musculoskeletal:  Positive for joint pain. Negative for myalgias.   Skin:  Negative for rash.   Neurological:  Negative for dizziness.   Endo/Heme/Allergies:  Does not bruise/bleed easily.      Physical Exam  Temp:  [36.3 °C (97.3 °F)-36.9 °C (98.4 °F)] 36.6 °C (97.8 °F)  Pulse:  [] 96  Resp:  [14-20] 18  BP: (124-160)/() 126/80  SpO2:  [92 %-98 %] 95 %    Physical Exam  Constitutional:       General: She is not in acute distress.  HENT:      Head: Normocephalic and atraumatic.      Right Ear: Tympanic membrane normal.      Left Ear: Tympanic membrane normal.      Nose: Nose normal.      Mouth/Throat:      Mouth: Mucous membranes are moist.   Eyes:      Extraocular Movements: Extraocular movements intact.      Pupils: Pupils are equal, round, and reactive to light.   Cardiovascular:      Rate and Rhythm: Normal rate and regular rhythm.      Pulses: Normal pulses.   Pulmonary:      Effort: Pulmonary effort is normal. No respiratory distress.   Abdominal:      General: Abdomen is flat. There is no distension.   Musculoskeletal:      Cervical back: Normal range of motion.      Right lower leg: No edema.      Left lower leg: No edema.      Comments:   Right-sided BKA, Left BKA   Skin:     General: Skin is warm.   Neurological:      General: No focal deficit present.      Mental Status: She is alert and oriented to person, place, and time. Mental status is at baseline.   Psychiatric:         Mood and Affect: Mood normal.       Fluids    Intake/Output Summary (Last 24 hours) at 3/23/2023 1344  Last data filed at 3/23/2023 0918  Gross per 24 hour   Intake 560 ml   Output  800 ml   Net -240 ml         Laboratory  Recent Labs     03/23/23  0109   WBC 8.4   RBC 3.66*   HEMOGLOBIN 9.7*   HEMATOCRIT 31.1*   MCV 85.0   MCH 26.5*   MCHC 31.2*   RDW 46.5   PLATELETCT 390   MPV 9.2     Recent Labs     03/23/23  0109   SODIUM 134*   POTASSIUM 4.3   CHLORIDE 100   CO2 24   GLUCOSE 119*   BUN 18   CREATININE 0.74   CALCIUM 8.8                   Imaging  CT-FOOT WITH PLUS RECONS LEFT   Final Result      1.  Edema and/or cellulitis of the visualized leg, ankle and foot. No focal abscess.   2.  Large blisters.   3.  Postsurgical changes first through fifth transmetatarsal amputations.   4.  No definite evidence for osteomyelitis.   5.  Some thickening of the Achilles tendon is nonspecific and not well evaluated on CT.   6.  Further evaluation with MRI as clinically indicated.      DX-CHEST-PORTABLE (1 VIEW)   Final Result      No acute cardiac or pulmonary abnormalities are identified.      DX-FEMUR-2+ RIGHT   Final Result      No radiographic evidence of acute traumatic injury.      CT-ABDOMEN-PELVIS WITH   Final Result      1.  Right internal iliac vein DVT.   2.  Somewhat striated nephrogram of the bilateral superior poles may be artifactual. Correlate with urinalysis for pyelonephritis.   3.  Redemonstration of enhancing lesion of the uterus. This has been evaluated on prior ultrasound.   4.  Prominent bilateral inguinal lymph nodes may be reactive. Malignancy not excluded.      Dr. Emanuel discussed these findings with Dr. Child at 1:49 PM by telephone on 3/6/2023      DX-FOOT-2- LEFT   Final Result      Status post transmetatarsal amputation. No definitive radiographic evidence of ostomy myelitis. If there is strong clinical suspicion, MRI of the foot with and without contrast should be obtained for further evaluation.      DX-CHEST-PORTABLE (1 VIEW)   Final Result      No acute cardiopulmonary disease evident.           Assessment/Plan  * Infection of amputation stump of left lower extremity  ([facility])  Assessment & Plan  Concern for   Initially discussed the case with orthopedic surgery, who did not believe at this time required intervention  However patient with persistent fever, all other work-up negative  Concern for possible underlying infection and patient's prior frostbitten BLE stumps, bullae noted   Blood cultures with no growth to date.   MRSA swab positive.   Discontinued vancomycin and Zosyn, switched to Zyvox.  Monitor renal function.     CT imaging LLE, negative for osteomyelitis or abscess formation.     status post left below-knee amputation on 3/22/2023.      UTI (urinary tract infection)  Assessment & Plan  Urine culture noted E. Faecium VRE.  However patient denies UTI symptoms  No indication for treatment    Fever in adult  Assessment & Plan  Patient noted to spike a 101.6 fever on 3/8, negative chest x-ray, UA negative, COVID, influenza, RSV negative, chest x-ray negative for any infiltrate or pulmonary effusion.  Lactic acid and procalcitonin negative. Blood cultures with no growth to date.  Concern for possible underlying infection and patient's prior frostbitten BLE. MRSA swab positive.     Slow transit constipation  Assessment & Plan  Patient noted to have abdominal distention, prior bowel movement several days prior  Started on aggressive bowel regimen, given one-time dose of Dulcolax  Still with no bowel movement, oral lactulose given  Large BM 3/8 PM  Serial abdominal exams  RESOLVED    Noncompliance  Assessment & Plan  Significant history with noncompliance with medications and doctor visits    Homelessness  Assessment & Plan  Complicated medical history and noncompliance and drug abuse   was consulted to consider group home      Lactic acid acidosis  Assessment & Plan  Likely related to dehydration and infection  IV fluid and ceftriaxone  Resolved    Hx of BKA, right ([facility])  Assessment & Plan  Right BKA and left transmetatarsal amputation  LPS following, local wound  care     Methamphetamine abuse ([facility])  Assessment & Plan  Encouraged the patient to quit  Urine drug screen is positive  Blood culture no growth to date    Acute deep vein thrombosis (DVT) ([facility])- (present on admission)  Assessment & Plan  Patient had recent admission at outside facility 2/2023,  she was diagnosed with a left superficial femoral vein DVT discharged with Xarelto.  Patient had CT imaging which noted right internal iliac vein DVT.    Initially on heparin drip, transitioned to Eliquis  No sign of active bleeding       HIV (human immunodeficiency virus infection) ([facility])- (present on admission)  Assessment & Plan  Patient is on Biktarvy however, clearly questioning about compliance  CD4-761 and CD8-194  Resume Biktarvy with close monitoring    Hypokalemia- (present on admission)  Assessment & Plan  Labs reviewed, resolved    Schizophrenia ([facility])- (present on admission)  Assessment & Plan  Patient has hallucination with hearing voices  Denied any SI or HI, however with active auditory hallucinations  Resume home medication Depakote, formally on Zyprexa  Psych consulted, continue with Depakote, trough level stable, Zyprexa 5 mg twice daily initiated    Tobacco abuse- (present on admission)  Assessment & Plan  Cessation counseled, 3 minute  Nicotine replacement         VTE prophylaxis: SCDs/TEDs and therapeutic anticoagulation with eliquis    I have performed a physical exam and reviewed and updated ROS and Plan today (3/23/2023). In review of yesterday's note (3/22/2023), there are no changes except as documented above.

## 2023-03-23 NOTE — CARE PLAN
The patient is Stable - Low risk of patient condition declining or worsening    Shift Goals  Clinical Goals: Pain control, wound care  Patient Goals: pain control  Family Goals: pain control    Progress made toward(s) clinical / shift goals:  Monitoring VS, controlling pain s/p L BKA.    Patient is not progressing towards the following goals:N/A      Problem: Knowledge Deficit - Standard  Goal: Patient and family/care givers will demonstrate understanding of plan of care, disease process/condition, diagnostic tests and medications  Outcome: Progressing     Problem: Pain - Standard  Goal: Alleviation of pain or a reduction in pain to the patient’s comfort goal  Outcome: Progressing     Problem: Skin Integrity  Goal: Skin integrity is maintained or improved  Outcome: Progressing     Problem: Fall Risk  Goal: Patient will remain free from falls  Outcome: Progressing     Problem: Psychosocial  Goal: Patient's level of anxiety will decrease  Outcome: Progressing

## 2023-03-23 NOTE — CARE PLAN
Problem: Discharge Barriers/Planning  Goal: Patient's continuum of care needs are met  Outcome: Progressing  Flowsheets (Taken 3/22/2023 1922)  Continuum of Care Needs:   Assessed for discharge barriers   Involved patient/family/support system in discharge process   Communicated discharge barriers to interdisciplinary nadineam     Problem: Infection - Standard  Goal: Patient will remain free from infection  Outcome: Progressing  Flowsheets (Taken 3/22/2023 1922)  Standard Infection Interventions:   Instructed patient/family on signs and symptoms of infection   Assessed for signs and symptoms of infection   Provided education on proper hand hygiene and infection prevention measures   Implemented standard precautions   The patient is Stable - Low risk of patient condition declining or worsening    Shift Goals  Clinical Goals: surgery; safety  Patient Goals: eat  Family Goals: none present    Progress made toward(s) clinical / shift goals:  Isolation Precautions:    Patient is on contact isolation for vre.    Patient educated on reason for isolation, how the infection may be transmitted, and how to help prevent transmission to others.     Patient educated that contact precautions involves staff and visitors wearing PPE, follow  Standard Precautions and perform meticulous hand hygiene in order to prevent transmission of infection. (Contact Precautions: gown and gloves; Special Contact Precautions: gown and gloves, with the added requirement of soap and water for hand hygiene; Droplet Precautions: surgical mask worn by staff and visitors in the room, and worn by the patient when out of the room; Airborne Precautions: involves staff wearing PPE to include an N95 Respirator or Controlled Air Purifying Respirator (CAPR).  Visitors should be limited and may wear an N95 mask).         Patient transport and mobilization on unit. Patient educated that they may leave their room, but prior to exiting, the patient needs to have on  a fresh patient gown, ensure the potentially infectious area is covered, perform appropriate hand hygiene immediately prior to exiting the room. (*For Airborne Precautions: Patient educated that time out of the room should be minimized and limited to transport to diagnostic procedures or other activities. Patient is to wear surgical mask when required to leave the patient room).      Patient is not progressing towards the following goals: Pt had surgery today

## 2023-03-23 NOTE — DISCHARGE PLANNING
Case Management Discharge Planning    Admission Date: 3/6/2023  GMLOS: 5.8  ALOS: 17    6-Clicks ADL Score: 19  6-Clicks Mobility Score: 9  PT and/or OT Eval ordered: Yes, but pending  Post-acute Referrals Ordered: Yes  Post-acute Choice Obtained: Yes  Has referral(s) been sent to post-acute provider:  Yes      Anticipated Discharge Dispo: Discharge Disposition: D/T to SNF with Medicare cert in anticipation of skilled care (03)    Medically Clear: No    Pt on 8L O2, needing extensive wound care. Pt has been declined by almost all SNF's due to care needs, insurance, and/or poor discharge plan/compliance.     Barriers to Discharge: Medical clearance, Pending Placement, No Social Support, and Homelessness    Is the patient up for discharge tomorrow: No

## 2023-03-24 LAB
ANION GAP SERPL CALC-SCNC: 11 MMOL/L (ref 7–16)
BASOPHILS # BLD AUTO: 0.8 % (ref 0–1.8)
BASOPHILS # BLD: 0.05 K/UL (ref 0–0.12)
BUN SERPL-MCNC: 12 MG/DL (ref 8–22)
CALCIUM SERPL-MCNC: 8.8 MG/DL (ref 8.5–10.5)
CHLORIDE SERPL-SCNC: 101 MMOL/L (ref 96–112)
CO2 SERPL-SCNC: 25 MMOL/L (ref 20–33)
CREAT SERPL-MCNC: 0.48 MG/DL (ref 0.5–1.4)
EOSINOPHIL # BLD AUTO: 0.06 K/UL (ref 0–0.51)
EOSINOPHIL NFR BLD: 1 % (ref 0–6.9)
ERYTHROCYTE [DISTWIDTH] IN BLOOD BY AUTOMATED COUNT: 48.8 FL (ref 35.9–50)
GFR SERPLBLD CREATININE-BSD FMLA CKD-EPI: 112 ML/MIN/1.73 M 2
GLUCOSE SERPL-MCNC: 87 MG/DL (ref 65–99)
HCT VFR BLD AUTO: 29.7 % (ref 37–47)
HGB BLD-MCNC: 9.4 G/DL (ref 12–16)
IMM GRANULOCYTES # BLD AUTO: 0.09 K/UL (ref 0–0.11)
IMM GRANULOCYTES NFR BLD AUTO: 1.5 % (ref 0–0.9)
LYMPHOCYTES # BLD AUTO: 1.65 K/UL (ref 1–4.8)
LYMPHOCYTES NFR BLD: 27.5 % (ref 22–41)
MCH RBC QN AUTO: 26.6 PG (ref 27–33)
MCHC RBC AUTO-ENTMCNC: 31.6 G/DL (ref 33.6–35)
MCV RBC AUTO: 84.1 FL (ref 81.4–97.8)
MONOCYTES # BLD AUTO: 0.98 K/UL (ref 0–0.85)
MONOCYTES NFR BLD AUTO: 16.3 % (ref 0–13.4)
NEUTROPHILS # BLD AUTO: 3.18 K/UL (ref 2–7.15)
NEUTROPHILS NFR BLD: 52.9 % (ref 44–72)
NRBC # BLD AUTO: 0 K/UL
NRBC BLD-RTO: 0 /100 WBC
PLATELET # BLD AUTO: 362 K/UL (ref 164–446)
PMV BLD AUTO: 9.5 FL (ref 9–12.9)
POTASSIUM SERPL-SCNC: 4.2 MMOL/L (ref 3.6–5.5)
RBC # BLD AUTO: 3.53 M/UL (ref 4.2–5.4)
SODIUM SERPL-SCNC: 137 MMOL/L (ref 135–145)
WBC # BLD AUTO: 6 K/UL (ref 4.8–10.8)

## 2023-03-24 PROCEDURE — 700102 HCHG RX REV CODE 250 W/ 637 OVERRIDE(OP): Performed by: INTERNAL MEDICINE

## 2023-03-24 PROCEDURE — 99232 SBSQ HOSP IP/OBS MODERATE 35: CPT | Performed by: STUDENT IN AN ORGANIZED HEALTH CARE EDUCATION/TRAINING PROGRAM

## 2023-03-24 PROCEDURE — A9270 NON-COVERED ITEM OR SERVICE: HCPCS | Performed by: STUDENT IN AN ORGANIZED HEALTH CARE EDUCATION/TRAINING PROGRAM

## 2023-03-24 PROCEDURE — 700102 HCHG RX REV CODE 250 W/ 637 OVERRIDE(OP): Performed by: STUDENT IN AN ORGANIZED HEALTH CARE EDUCATION/TRAINING PROGRAM

## 2023-03-24 PROCEDURE — 700102 HCHG RX REV CODE 250 W/ 637 OVERRIDE(OP): Performed by: GENERAL PRACTICE

## 2023-03-24 PROCEDURE — 36415 COLL VENOUS BLD VENIPUNCTURE: CPT

## 2023-03-24 PROCEDURE — 80048 BASIC METABOLIC PNL TOTAL CA: CPT

## 2023-03-24 PROCEDURE — 770001 HCHG ROOM/CARE - MED/SURG/GYN PRIV*

## 2023-03-24 PROCEDURE — 85025 COMPLETE CBC W/AUTO DIFF WBC: CPT

## 2023-03-24 PROCEDURE — 99232 SBSQ HOSP IP/OBS MODERATE 35: CPT

## 2023-03-24 PROCEDURE — A9270 NON-COVERED ITEM OR SERVICE: HCPCS | Performed by: GENERAL PRACTICE

## 2023-03-24 PROCEDURE — A9270 NON-COVERED ITEM OR SERVICE: HCPCS | Performed by: INTERNAL MEDICINE

## 2023-03-24 RX ADMIN — NICOTINE 14 MG: 14 PATCH, EXTENDED RELEASE TRANSDERMAL at 08:53

## 2023-03-24 RX ADMIN — OXYCODONE HYDROCHLORIDE 5 MG: 5 TABLET ORAL at 20:45

## 2023-03-24 RX ADMIN — DOCUSATE SODIUM 50 MG AND SENNOSIDES 8.6 MG 2 TABLET: 8.6; 5 TABLET, FILM COATED ORAL at 20:45

## 2023-03-24 RX ADMIN — DIVALPROEX SODIUM 500 MG: 500 TABLET, DELAYED RELEASE ORAL at 20:45

## 2023-03-24 RX ADMIN — BICTEGRAVIR SODIUM, EMTRICITABINE, AND TENOFOVIR ALAFENAMIDE FUMARATE 1 TABLET: 50; 200; 25 TABLET ORAL at 08:52

## 2023-03-24 RX ADMIN — APIXABAN 5 MG: 5 TABLET, FILM COATED ORAL at 20:45

## 2023-03-24 RX ADMIN — DOCUSATE SODIUM 50 MG AND SENNOSIDES 8.6 MG 2 TABLET: 8.6; 5 TABLET, FILM COATED ORAL at 08:52

## 2023-03-24 RX ADMIN — OLANZAPINE 5 MG: 5 TABLET, FILM COATED ORAL at 20:45

## 2023-03-24 RX ADMIN — APIXABAN 5 MG: 5 TABLET, FILM COATED ORAL at 08:52

## 2023-03-24 RX ADMIN — DIVALPROEX SODIUM 500 MG: 500 TABLET, DELAYED RELEASE ORAL at 08:52

## 2023-03-24 RX ADMIN — OLANZAPINE 5 MG: 5 TABLET, FILM COATED ORAL at 08:52

## 2023-03-24 ASSESSMENT — ENCOUNTER SYMPTOMS
FEVER: 0
BRUISES/BLEEDS EASILY: 0
COUGH: 0
DIZZINESS: 0
NAUSEA: 0
VOMITING: 0
BLURRED VISION: 0
MYALGIAS: 0
SHORTNESS OF BREATH: 0

## 2023-03-24 NOTE — PROGRESS NOTES
Case Management Discharge Planning    Admission Date: 3/6/2023  GMLOS: 5.8  ALOS: 18    6-Clicks ADL Score: 19  6-Clicks Mobility Score: 9    Anticipated Discharge Dispo: Discharge Disposition: Discharged to home/self care (01)    DME Needed: No    Action(s) Taken: OTHER    Escalations Completed: Provider  Asked Provider to complete two page Screening Form to be signed by Physician    Medically Clear: No    Next Steps: Debridement, likely Monday    Barriers to Discharge: Medical clearance and Pending Procedures    Is the patient up for discharge tomorrow: Leanne VELASQUEZ, RN Case Manager  [phone]

## 2023-03-24 NOTE — CARE PLAN
The patient is Stable - Low risk of patient condition declining or worsening    Shift Goals  Clinical Goals: safety, pain control, wound monitoring  Patient Goals: comfort  Family Goals: pain control    Progress made toward(s) clinical / shift goals:  Bed alarm on, pain to LLE being managed with PRN pain meds, frequent monitoring.     Patient is not progressing towards the following goals: N/A

## 2023-03-24 NOTE — PROGRESS NOTES
Hospital Medicine Daily Progress Note    Date of Service  3/24/2023    Chief Complaint  Kellie Chatman is a 53 y.o. female admitted 3/6/2023 with altered mental status    Hospital Course  53-year-old female with past medical history of s/p right BKA and left transmetatarsal amputation, hypertension, HIV, seizure disorder, schizophrenia, methamphetamine use, tobacco use and homelessness to the ED with altered mental status.  See completed course of antibiotic for pyelonephritis  CT imaging LLE, negative for osteomyelitis or abscess formation.  Evaluated by orthopedics and status post left below-knee amputation on 3/22/2023.      Interval Problem Update  3/21/2023  Vitals remained stable  Labs reviewed  Pain well controlled  Significant drainage noted around left heel, foul-smelling  Greenish discharge  Patient okay to have surgery if needed    Get wound culture  LPS and wound care follow-up  Given significant wound she would likely benefit from aggressive wound care on discharge.  Likely need placement    3/22/2023  Vitals remained stable  On room air saturating over 90  Labs reviewed noted with hemoglobin 8.1, no concern of ongoing infection  Case was discussed with CHEYANNE Horvath   status post left below-knee amputation on 3/22/2023.  LPS and orthopedics following closely  She will require IV hydromorphone for pain management      3/24/2023  Vitals remained stable  Labs noted with stable H&H,  No plan for further surgery, required placement   assisting    On IV narcotics for pain management      I have discussed this patient's plan of care and discharge plan at IDT rounds today with Case Management, Nursing, Nursing leadership, and other members of the IDT team.    Consultants/Specialty  LPS    Code Status  Full Code    Disposition  Patient is not medically cleared for discharge.   Anticipate discharge to  TBD .  I have placed the appropriate orders for post-discharge needs.    Review of Systems  Review of  Systems   Constitutional:  Positive for malaise/fatigue. Negative for fever.   HENT:  Negative for hearing loss.    Eyes:  Negative for blurred vision.   Respiratory:  Negative for cough and shortness of breath.    Cardiovascular:  Negative for chest pain.   Gastrointestinal:  Negative for nausea and vomiting.   Genitourinary:  Negative for dysuria.   Musculoskeletal:  Positive for joint pain. Negative for myalgias.   Skin:  Negative for rash.   Neurological:  Negative for dizziness.   Endo/Heme/Allergies:  Does not bruise/bleed easily.      Physical Exam  Temp:  [36.5 °C (97.7 °F)-36.9 °C (98.4 °F)] 36.9 °C (98.4 °F)  Pulse:  [] 92  Resp:  [17-20] 17  BP: (100-122)/(64-78) 100/69  SpO2:  [90 %-98 %] 90 %    Physical Exam  Constitutional:       General: She is not in acute distress.  HENT:      Head: Normocephalic and atraumatic.      Right Ear: Tympanic membrane normal.      Left Ear: Tympanic membrane normal.      Nose: Nose normal.      Mouth/Throat:      Mouth: Mucous membranes are moist.   Eyes:      Extraocular Movements: Extraocular movements intact.      Pupils: Pupils are equal, round, and reactive to light.   Cardiovascular:      Rate and Rhythm: Normal rate and regular rhythm.      Pulses: Normal pulses.   Pulmonary:      Effort: Pulmonary effort is normal. No respiratory distress.   Abdominal:      General: Abdomen is flat. There is no distension.   Musculoskeletal:      Cervical back: Normal range of motion.      Right lower leg: No edema.      Left lower leg: No edema.      Comments:   Right-sided BKA, left BKA   Skin:     General: Skin is warm.   Neurological:      General: No focal deficit present.      Mental Status: She is alert and oriented to person, place, and time. Mental status is at baseline.   Psychiatric:         Mood and Affect: Mood normal.       Fluids    Intake/Output Summary (Last 24 hours) at 3/24/2023 1528  Last data filed at 3/24/2023 0900  Gross per 24 hour   Intake 1200 ml    Output 2400 ml   Net -1200 ml         Laboratory  Recent Labs     03/23/23  0109 03/24/23  0432   WBC 8.4 6.0   RBC 3.66* 3.53*   HEMOGLOBIN 9.7* 9.4*   HEMATOCRIT 31.1* 29.7*   MCV 85.0 84.1   MCH 26.5* 26.6*   MCHC 31.2* 31.6*   RDW 46.5 48.8   PLATELETCT 390 362   MPV 9.2 9.5     Recent Labs     03/23/23  0109 03/24/23  0432   SODIUM 134* 137   POTASSIUM 4.3 4.2   CHLORIDE 100 101   CO2 24 25   GLUCOSE 119* 87   BUN 18 12   CREATININE 0.74 0.48*   CALCIUM 8.8 8.8                   Imaging  CT-FOOT WITH PLUS RECONS LEFT   Final Result      1.  Edema and/or cellulitis of the visualized leg, ankle and foot. No focal abscess.   2.  Large blisters.   3.  Postsurgical changes first through fifth transmetatarsal amputations.   4.  No definite evidence for osteomyelitis.   5.  Some thickening of the Achilles tendon is nonspecific and not well evaluated on CT.   6.  Further evaluation with MRI as clinically indicated.      DX-CHEST-PORTABLE (1 VIEW)   Final Result      No acute cardiac or pulmonary abnormalities are identified.      DX-FEMUR-2+ RIGHT   Final Result      No radiographic evidence of acute traumatic injury.      CT-ABDOMEN-PELVIS WITH   Final Result      1.  Right internal iliac vein DVT.   2.  Somewhat striated nephrogram of the bilateral superior poles may be artifactual. Correlate with urinalysis for pyelonephritis.   3.  Redemonstration of enhancing lesion of the uterus. This has been evaluated on prior ultrasound.   4.  Prominent bilateral inguinal lymph nodes may be reactive. Malignancy not excluded.      Dr. Emanuel discussed these findings with Dr. Child at 1:49 PM by telephone on 3/6/2023      DX-FOOT-2- LEFT   Final Result      Status post transmetatarsal amputation. No definitive radiographic evidence of ostomy myelitis. If there is strong clinical suspicion, MRI of the foot with and without contrast should be obtained for further evaluation.      DX-CHEST-PORTABLE (1 VIEW)   Final Result      No  acute cardiopulmonary disease evident.           Assessment/Plan  * Infection of amputation stump of left lower extremity (HCC)  Assessment & Plan  Concern for   Initially discussed the case with orthopedic surgery, who did not believe at this time required intervention  However patient with persistent fever, all other work-up negative  Concern for possible underlying infection and patient's prior frostbitten BLE stumps, bullae noted   Blood cultures with no growth to date.   MRSA swab positive.   Discontinued vancomycin and Zosyn, switched to Zyvox.  Monitor renal function.     CT imaging LLE, negative for osteomyelitis or abscess formation.     status post left below-knee amputation on 3/22/2023.      UTI (urinary tract infection)  Assessment & Plan  Urine culture noted E. Faecium VRE.  However patient denies UTI symptoms  No indication for treatment    Fever in adult  Assessment & Plan  Patient noted to spike a 101.6 fever on 3/8, negative chest x-ray, UA negative, COVID, influenza, RSV negative, chest x-ray negative for any infiltrate or pulmonary effusion.  Lactic acid and procalcitonin negative. Blood cultures with no growth to date.  Concern for possible underlying infection and patient's prior frostbitten BLE. MRSA swab positive.     Slow transit constipation  Assessment & Plan  Patient noted to have abdominal distention, prior bowel movement several days prior  Started on aggressive bowel regimen, given one-time dose of Dulcolax  Still with no bowel movement, oral lactulose given  Large BM 3/8 PM  Serial abdominal exams  RESOLVED    Noncompliance  Assessment & Plan  Significant history with noncompliance with medications and doctor visits    Homelessness  Assessment & Plan  Complicated medical history and noncompliance and drug abuse   was consulted to consider group home      Lactic acid acidosis  Assessment & Plan  Likely related to dehydration and infection  IV fluid and  ceftriaxone  Resolved    Hx of BKA, right ([facility])  Assessment & Plan  Right BKA and left transmetatarsal amputation  LPS following, local wound care     Methamphetamine abuse ([facility])  Assessment & Plan  Encouraged the patient to quit  Urine drug screen is positive  Blood culture no growth to date    Acute deep vein thrombosis (DVT) ([facility])- (present on admission)  Assessment & Plan  Patient had recent admission at outside facility 2/2023,  she was diagnosed with a left superficial femoral vein DVT discharged with Xarelto.  Patient had CT imaging which noted right internal iliac vein DVT.    Initially on heparin drip, transitioned to Eliquis  No sign of active bleeding       HIV (human immunodeficiency virus infection) ([facility])- (present on admission)  Assessment & Plan  Patient is on Biktarvy however, clearly questioning about compliance  CD4-761 and CD8-194  Resume Biktarvy with close monitoring    Hypokalemia- (present on admission)  Assessment & Plan  Labs reviewed, resolved    Schizophrenia ([facility])- (present on admission)  Assessment & Plan  Patient has hallucination with hearing voices  Denied any SI or HI, however with active auditory hallucinations  Resume home medication Depakote, formally on Zyprexa  Psych consulted, continue with Depakote, trough level stable, Zyprexa 5 mg twice daily initiated    Tobacco abuse- (present on admission)  Assessment & Plan  Cessation counseled, 3 minute  Nicotine replacement         VTE prophylaxis: SCDs/TEDs and therapeutic anticoagulation with eliquis    I have performed a physical exam and reviewed and updated ROS and Plan today (3/24/2023). In review of yesterday's note (3/23/2023), there are no changes except as documented above.

## 2023-03-24 NOTE — PROGRESS NOTES
LIMB PRESERVATION SERVICE  PROGRESS NOTE    HPI: Kellie Chatman is a 53 y.o.  with a past medical history that includes frostbite of bilateral feet, HIV, EtOH abuse, methamphetamine abuse.  Admitted 3/6/2023 for DVT, lower extremity, proximal, acute, left (HCC) [I82.4Y2].     LPS has been consulted for frostbite injuries to left TMA, left heel, right BKA.    The patient was initially seen on 11/14/2022 for frostbite to her right foot and left hallux.  Due to this injury patient had a right BKA on 12/20/2022 with Dr. Lux.  Due to continued decline of her left foot she eventually had a left TMA on 2/7/2023 with Dr. Lux.    Patient is homeless and does attempt to stay at the [facility] as able.  Patient states unfortunately she had another frostbite injury to the posterior aspect of her right BKA and her left TMA and plantar foot including her left heel.  Patient was seen at [facility] with DC on 3/1/2023, and it was deemed that frostbite injury needed to demarcate prior to determination for surgical intervention for 1 to 2 weeks.         INTERVAL HISTORY:  3/11/2023: Patient denies fevers, chills, nausea, vomiting.  Pain controlled.   3/14/23: complains of discomfort to left foot.  Patient with eyes closed during majority of visit, appears to be resting.  3/17/2023: Patient feeling better today, sitting up in bed, alert.  States pain under control at this time.  Discussed left TMA and possible need for left BKA if unable to heal.  Patient wishes to preserve left limb if possible and would like to continue with current wound care at this time.  3/22/2023: Received update that pt left TMA and heel have declined and pt is currently NPO. Discussed with pt that due to tissue loss/damage, she may likely require left BKA rather than left TMA revision. Pt vioces understanding and is agreeable to proceed with surgery today.       SURGERY DATE: 3/22/2023 Dr. Lux  PROCEDURE: Left below-knee amputation    3/24/2023:  "POD #2 patient denies fevers, chills, nausea, vomiting.  Patient resting in bed, states pain is under control        PERTINENT LPS RESULTS:   COVID-19: not completed  CT left foot  1.  Edema and/or cellulitis of the visualized leg, ankle and foot. No focal abscess.  2.  Large blisters.  3.  Postsurgical changes first through fifth transmetatarsal amputations.  4.  No definite evidence for osteomyelitis.  5.  Some thickening of the Achilles tendon is nonspecific and not well evaluated on CT.  6.  Further evaluation with MRI as clinically indicated.        EXAM:      Disengaged and examination    /69   Pulse 92   Temp 36.9 °C (98.4 °F) (Temporal)   Resp 17   Ht 1.778 m (5' 10\")   Wt 73.8 kg (162 lb 11.2 oz)   SpO2 90%   BMI 23.34 kg/m²     Pedal Pulses:   Right BKA warm and well-perfused, palpable popliteal  Left f BKA: Warm and well-perfused, palpable popliteal    Wound(s) :    Wound location: Right posterior BKA  Wound characteristics:   Intact Black eschar, dry, no bogginess noted   No erythema  Minimal edema  No drainage    Right BKA anterior:  Superficial eschar along previous incision line-slowly lifting  No erythema  No edema   No drainage    Surgical incision location:   Left BKA  Well approximated, staples in place  No erythema  Mild edema  Scant dried bloody drainage      Wound photo: Right BKA      Left BKA            DIABETES MANAGEMENT:    A1c:   Lab Results   Component Value Date/Time    HBA1C 5.1 02/24/2023 05:54 AM        INFECTION MANAGEMENT:    Results from last 7 days   Lab Units 03/24/23  0432 03/23/23  0109 03/18/23  0337   WBC 1501 K/uL 6.0 8.4 5.2   PLATELET COUNT 1518 K/uL [phone]           ASSESSMENT/PLAN:   53 y.o. admitted for DVT, lower extremity, proximal, acute, left (HCC) [I82.4Y2]. Presents with frostbite injury posterior right BKA, left TMA, left heel, left plantar foot.    POD #2 left BKA Dr. Lux    -Right BKA eschar remains stable, dry.  No bogginess noted  -Left " BKA well approximated.  Slight edema.  -Dressing orders placed    Wound care:   -wound care orders placed for nursing by LPS   -Right anterior/posterior BKA: Paint with Betadine, allowed to air dry, leave open to air.  Daily  -Left BKA: Cleanse with NS, pat dry.  Apply single layer of Adaptic over incision, secure with ABD pad, roll gauze, Ace wrap    Labs/Imaging:  -COVID-19: not completed this admission.     Vascular status:   -Right BKA palpable popliteal pulse  - Left BKA palpable popliteal pulse    Surgery:   - Ortho Dr. Lux  -No plans for further surgery at this time    Antibiotics:   - on antibiotics managed by hospitalist    Weight Bearing Status:   -Right BKA: Nonweightbearing  -Left BKA: Nonweightbearing    Offloading:   Pillows for comfort  -Wheelchair at bedside    PT/OT :   -involved      DISCHARGE PLAN:    Disposition:   TBD, patient currently homeless  recommend SNF              Discussed with: pt, RN, Dr. Bolivar     My total time spent caring for the patient on the day of the encounter was 50 minutes.   This does not include time spent on separately billable procedures/tests.   for patient care, counseling, and coordination on this date, including patient face-to face time, unit/floor time with review of records/pertinent lab data and studies, as well as discussing diagnostic evaluation/work up, planned therapeutic interventions, and future disposition of care, as per the interval events/subjective and the assessment and plan as noted above.        Please note that this dictation was created using voice recognition software. I have  worked with technical experts from [facility] to optimize the interface.  I have made every reasonable attempt to correct obvious errors, but there may be errors of grammar and possibly content that I did not discover before finalizing the note.    Yuliet Hwang, A.P.R.N.    If any questions or concerns, please contact [facility] through voalte.

## 2023-03-24 NOTE — CARE PLAN
The patient is Stable - Low risk of patient condition declining or worsening    Shift Goals  Clinical Goals: safety  Patient Goals: rest  Family Goals: pain control    Progress made toward(s) clinical / shift goals:        Problem: Knowledge Deficit - Standard  Goal: Patient and family/care givers will demonstrate understanding of plan of care, disease process/condition, diagnostic tests and medications  Outcome: Progressing     Problem: Pain - Standard  Goal: Alleviation of pain or a reduction in pain to the patient’s comfort goal  Outcome: Progressing     Problem: Skin Integrity  Goal: Skin integrity is maintained or improved  Outcome: Progressing     Problem: Fall Risk  Goal: Patient will remain free from falls  Outcome: Progressing     Problem: Psychosocial  Goal: Patient's level of anxiety will decrease  Outcome: Progressing  Goal: Patient's ability to verbalize feelings about condition will improve  Outcome: Progressing  Goal: Patient's ability to re-evaluate and adapt role responsibilities will improve  Outcome: Progressing  Goal: Patient and family will demonstrate ability to cope with life altering diagnosis and/or procedure  Outcome: Progressing  Goal: Spiritual and cultural needs incorporated into hospitalization  Outcome: Progressing     Problem: Communication  Goal: The ability to communicate needs accurately and effectively will improve  Outcome: Progressing     Problem: Discharge Barriers/Planning  Goal: Patient's continuum of care needs are met  Outcome: Progressing     Problem: Hemodynamics  Goal: Patient's hemodynamics, fluid balance and neurologic status will be stable or improve  Outcome: Progressing     Problem: Respiratory  Goal: Patient will achieve/maintain optimum respiratory ventilation and gas exchange  Outcome: Progressing     Problem: Chest Tube Management  Goal: Complications related to chest tube will be avoided or minimized  Outcome: Progressing     Problem: Fluid Volume  Goal: Fluid  volume balance will be maintained  Outcome: Progressing     Problem: Mechanical Ventilation  Goal: Safe management of artificial airway and ventilation  Outcome: Progressing  Goal: Successful weaning off mechanical ventilator, spontaneously maintains adequate gas exchange  Outcome: Progressing  Goal: Patient will be able to express needs and understand communication  Outcome: Progressing     Problem: Dysphagia  Goal: Dysphagia will improve  Outcome: Progressing     Problem: Risk for Aspiration  Goal: Patient's risk for aspiration will be absent or decrease  Outcome: Progressing     Problem: Nutrition  Goal: Patient's nutritional and fluid intake will be adequate or improve  Outcome: Progressing  Goal: Enteral nutrition will be maintained or improve  Outcome: Progressing  Goal: Enteral nutrition will be maintained or improve  Outcome: Progressing     Problem: Urinary Elimination  Goal: Establish and maintain regular urinary output  Outcome: Progressing     Problem: Bowel Elimination  Goal: Establish and maintain regular bowel function  Outcome: Progressing     Problem: Gastrointestinal Irritability  Goal: Nausea and vomiting will be absent or improve  Outcome: Progressing  Goal: Diarrhea will be absent or improved  Outcome: Progressing     Problem: Rectal Tube  Goal: Fecal output will be contained and skin will remain free from irritation  Outcome: Progressing     Problem: Mobility  Goal: Patient's capacity to carry out activities will improve  Outcome: Progressing     Problem: Self Care  Goal: Patient will have the ability to perform ADLs independently or with assistance (bathe, groom, dress, toilet and feed)  Outcome: Progressing     Problem: Infection - Standard  Goal: Patient will remain free from infection  Outcome: Progressing     Problem: Wound/ / Incision Healing  Goal: Patient's wound/surgical incision will decrease in size and heals properly  Outcome: Progressing

## 2023-03-25 LAB
ANION GAP SERPL CALC-SCNC: 8 MMOL/L (ref 7–16)
BASOPHILS # BLD AUTO: 0 % (ref 0–1.8)
BASOPHILS # BLD: 0 K/UL (ref 0–0.12)
BUN SERPL-MCNC: 19 MG/DL (ref 8–22)
CALCIUM SERPL-MCNC: 8.1 MG/DL (ref 8.5–10.5)
CHLORIDE SERPL-SCNC: 108 MMOL/L (ref 96–112)
CO2 SERPL-SCNC: 24 MMOL/L (ref 20–33)
CREAT SERPL-MCNC: 0.71 MG/DL (ref 0.5–1.4)
EOSINOPHIL # BLD AUTO: 0.08 K/UL (ref 0–0.51)
EOSINOPHIL NFR BLD: 1.8 % (ref 0–6.9)
ERYTHROCYTE [DISTWIDTH] IN BLOOD BY AUTOMATED COUNT: 50.4 FL (ref 35.9–50)
GFR SERPLBLD CREATININE-BSD FMLA CKD-EPI: 100 ML/MIN/1.73 M 2
GLUCOSE SERPL-MCNC: 111 MG/DL (ref 65–99)
HCT VFR BLD AUTO: 29.6 % (ref 37–47)
HGB BLD-MCNC: 8.8 G/DL (ref 12–16)
LACTATE SERPL-SCNC: 0.8 MMOL/L (ref 0.5–2)
LACTATE SERPL-SCNC: 2.6 MMOL/L (ref 0.5–2)
LYMPHOCYTES # BLD AUTO: 1.25 K/UL (ref 1–4.8)
LYMPHOCYTES NFR BLD: 27.7 % (ref 22–41)
MANUAL DIFF BLD: NORMAL
MCH RBC QN AUTO: 26.1 PG (ref 27–33)
MCHC RBC AUTO-ENTMCNC: 29.7 G/DL (ref 33.6–35)
MCV RBC AUTO: 87.8 FL (ref 81.4–97.8)
MONOCYTES # BLD AUTO: 0.68 K/UL (ref 0–0.85)
MONOCYTES NFR BLD AUTO: 15.2 % (ref 0–13.4)
MORPHOLOGY BLD-IMP: NORMAL
NEUTROPHILS # BLD AUTO: 2.49 K/UL (ref 2–7.15)
NEUTROPHILS NFR BLD: 55.3 % (ref 44–72)
NRBC # BLD AUTO: 0 K/UL
NRBC BLD-RTO: 0 /100 WBC
PLATELET # BLD AUTO: 352 K/UL (ref 164–446)
PLATELET BLD QL SMEAR: NORMAL
PMV BLD AUTO: 9.2 FL (ref 9–12.9)
POTASSIUM SERPL-SCNC: 3.9 MMOL/L (ref 3.6–5.5)
PROCALCITONIN SERPL-MCNC: <0.05 NG/ML
RBC # BLD AUTO: 3.37 M/UL (ref 4.2–5.4)
RBC BLD AUTO: NORMAL
SODIUM SERPL-SCNC: 140 MMOL/L (ref 135–145)
WBC # BLD AUTO: 4.5 K/UL (ref 4.8–10.8)

## 2023-03-25 PROCEDURE — A9270 NON-COVERED ITEM OR SERVICE: HCPCS | Performed by: GENERAL PRACTICE

## 2023-03-25 PROCEDURE — 770001 HCHG ROOM/CARE - MED/SURG/GYN PRIV*

## 2023-03-25 PROCEDURE — 99232 SBSQ HOSP IP/OBS MODERATE 35: CPT | Performed by: STUDENT IN AN ORGANIZED HEALTH CARE EDUCATION/TRAINING PROGRAM

## 2023-03-25 PROCEDURE — 85007 BL SMEAR W/DIFF WBC COUNT: CPT

## 2023-03-25 PROCEDURE — 80048 BASIC METABOLIC PNL TOTAL CA: CPT

## 2023-03-25 PROCEDURE — 85025 COMPLETE CBC W/AUTO DIFF WBC: CPT

## 2023-03-25 PROCEDURE — 700102 HCHG RX REV CODE 250 W/ 637 OVERRIDE(OP): Performed by: GENERAL PRACTICE

## 2023-03-25 PROCEDURE — A9270 NON-COVERED ITEM OR SERVICE: HCPCS | Performed by: INTERNAL MEDICINE

## 2023-03-25 PROCEDURE — 84145 PROCALCITONIN (PCT): CPT

## 2023-03-25 PROCEDURE — 700105 HCHG RX REV CODE 258: Performed by: STUDENT IN AN ORGANIZED HEALTH CARE EDUCATION/TRAINING PROGRAM

## 2023-03-25 PROCEDURE — A9270 NON-COVERED ITEM OR SERVICE: HCPCS | Performed by: STUDENT IN AN ORGANIZED HEALTH CARE EDUCATION/TRAINING PROGRAM

## 2023-03-25 PROCEDURE — 700111 HCHG RX REV CODE 636 W/ 250 OVERRIDE (IP): Performed by: INTERNAL MEDICINE

## 2023-03-25 PROCEDURE — 700102 HCHG RX REV CODE 250 W/ 637 OVERRIDE(OP): Performed by: STUDENT IN AN ORGANIZED HEALTH CARE EDUCATION/TRAINING PROGRAM

## 2023-03-25 PROCEDURE — 700102 HCHG RX REV CODE 250 W/ 637 OVERRIDE(OP): Performed by: INTERNAL MEDICINE

## 2023-03-25 PROCEDURE — 36415 COLL VENOUS BLD VENIPUNCTURE: CPT

## 2023-03-25 PROCEDURE — 83605 ASSAY OF LACTIC ACID: CPT | Mod: 91

## 2023-03-25 RX ORDER — SODIUM CHLORIDE 9 MG/ML
2000 INJECTION, SOLUTION INTRAVENOUS ONCE
Status: COMPLETED | OUTPATIENT
Start: 2023-03-25 | End: 2023-03-25

## 2023-03-25 RX ADMIN — SODIUM CHLORIDE 2000 ML: 9 INJECTION, SOLUTION INTRAVENOUS at 18:09

## 2023-03-25 RX ADMIN — OLANZAPINE 5 MG: 5 TABLET, FILM COATED ORAL at 20:26

## 2023-03-25 RX ADMIN — DIVALPROEX SODIUM 500 MG: 500 TABLET, DELAYED RELEASE ORAL at 20:26

## 2023-03-25 RX ADMIN — APIXABAN 5 MG: 5 TABLET, FILM COATED ORAL at 09:36

## 2023-03-25 RX ADMIN — DOCUSATE SODIUM 50 MG AND SENNOSIDES 8.6 MG 2 TABLET: 8.6; 5 TABLET, FILM COATED ORAL at 09:35

## 2023-03-25 RX ADMIN — NICOTINE 14 MG: 14 PATCH, EXTENDED RELEASE TRANSDERMAL at 09:35

## 2023-03-25 RX ADMIN — DIVALPROEX SODIUM 500 MG: 500 TABLET, DELAYED RELEASE ORAL at 09:36

## 2023-03-25 RX ADMIN — APIXABAN 5 MG: 5 TABLET, FILM COATED ORAL at 20:26

## 2023-03-25 RX ADMIN — OXYCODONE HYDROCHLORIDE 5 MG: 5 TABLET ORAL at 18:30

## 2023-03-25 RX ADMIN — OLANZAPINE 5 MG: 5 TABLET, FILM COATED ORAL at 09:36

## 2023-03-25 RX ADMIN — ONDANSETRON 4 MG: 4 TABLET, ORALLY DISINTEGRATING ORAL at 18:30

## 2023-03-25 RX ADMIN — BICTEGRAVIR SODIUM, EMTRICITABINE, AND TENOFOVIR ALAFENAMIDE FUMARATE 1 TABLET: 50; 200; 25 TABLET ORAL at 09:00

## 2023-03-25 RX ADMIN — DOCUSATE SODIUM 50 MG AND SENNOSIDES 8.6 MG 2 TABLET: 8.6; 5 TABLET, FILM COATED ORAL at 20:26

## 2023-03-25 ASSESSMENT — ENCOUNTER SYMPTOMS
SHORTNESS OF BREATH: 0
MYALGIAS: 0
FEVER: 0
BRUISES/BLEEDS EASILY: 0
COUGH: 0
DIZZINESS: 0
BLURRED VISION: 0
NAUSEA: 0
VOMITING: 0

## 2023-03-25 NOTE — CARE PLAN
The patient is Stable - Low risk of patient condition declining or worsening    Shift Goals  Clinical Goals: pain control  Patient Goals: rest  Family Goals: pain control    Progress made toward(s) clinical / shift goals:    Problem: Knowledge Deficit - Standard  Goal: Patient and family/care givers will demonstrate understanding of plan of care, disease process/condition, diagnostic tests and medications  Outcome: Progressing     Problem: Pain - Standard  Goal: Alleviation of pain or a reduction in pain to the patient’s comfort goal  Outcome: Progressing     Problem: Skin Integrity  Goal: Skin integrity is maintained or improved  Outcome: Progressing     Problem: Fall Risk  Goal: Patient will remain free from falls  Outcome: Progressing     Problem: Psychosocial  Goal: Patient's level of anxiety will decrease  Outcome: Progressing  Goal: Patient's ability to verbalize feelings about condition will improve  Outcome: Progressing  Goal: Patient's ability to re-evaluate and adapt role responsibilities will improve  Outcome: Progressing  Goal: Patient and family will demonstrate ability to cope with life altering diagnosis and/or procedure  Outcome: Progressing  Goal: Spiritual and cultural needs incorporated into hospitalization  Outcome: Progressing

## 2023-03-25 NOTE — CARE PLAN
The patient is Stable - Low risk of patient condition declining or worsening    Shift Goals  Clinical Goals: pain control  Patient Goals: rest  Family Goals: pain control    Progress made toward(s) clinical / shift goals:  y  Problem: Knowledge Deficit - Standard  Goal: Patient and family/care givers will demonstrate understanding of plan of care, disease process/condition, diagnostic tests and medications  Outcome: Progressing     Problem: Pain - Standard  Goal: Alleviation of pain or a reduction in pain to the patient’s comfort goal  Outcome: Progressing     Problem: Skin Integrity  Goal: Skin integrity is maintained or improved  Outcome: Progressing     Problem: Fall Risk  Goal: Patient will remain free from falls  Outcome: Progressing     Problem: Psychosocial  Goal: Patient's level of anxiety will decrease  Outcome: Progressing  Goal: Patient's ability to verbalize feelings about condition will improve  Outcome: Progressing  Goal: Patient's ability to re-evaluate and adapt role responsibilities will improve  Outcome: Progressing  Goal: Patient and family will demonstrate ability to cope with life altering diagnosis and/or procedure  Outcome: Progressing  Goal: Spiritual and cultural needs incorporated into hospitalization  Outcome: Progressing     Problem: Communication  Goal: The ability to communicate needs accurately and effectively will improve  Outcome: Progressing     Problem: Discharge Barriers/Planning  Goal: Patient's continuum of care needs are met  Outcome: Progressing     Problem: Hemodynamics  Goal: Patient's hemodynamics, fluid balance and neurologic status will be stable or improve  Outcome: Progressing     Problem: Respiratory  Goal: Patient will achieve/maintain optimum respiratory ventilation and gas exchange  Outcome: Progressing     Problem: Chest Tube Management  Goal: Complications related to chest tube will be avoided or minimized  Outcome: Progressing     Problem: Fluid Volume  Goal:  Fluid volume balance will be maintained  Outcome: Progressing     Problem: Mechanical Ventilation  Goal: Safe management of artificial airway and ventilation  Outcome: Progressing  Goal: Successful weaning off mechanical ventilator, spontaneously maintains adequate gas exchange  Outcome: Progressing  Goal: Patient will be able to express needs and understand communication  Outcome: Progressing     Problem: Dysphagia  Goal: Dysphagia will improve  Outcome: Progressing     Problem: Risk for Aspiration  Goal: Patient's risk for aspiration will be absent or decrease  Outcome: Progressing     Problem: Nutrition  Goal: Patient's nutritional and fluid intake will be adequate or improve  Outcome: Progressing  Goal: Enteral nutrition will be maintained or improve  Outcome: Progressing  Goal: Enteral nutrition will be maintained or improve  Outcome: Progressing     Problem: Urinary Elimination  Goal: Establish and maintain regular urinary output  Outcome: Progressing     Problem: Bowel Elimination  Goal: Establish and maintain regular bowel function  Outcome: Progressing     Problem: Gastrointestinal Irritability  Goal: Nausea and vomiting will be absent or improve  Outcome: Progressing  Goal: Diarrhea will be absent or improved  Outcome: Progressing     Problem: Rectal Tube  Goal: Fecal output will be contained and skin will remain free from irritation  Outcome: Progressing     Problem: Mobility  Goal: Patient's capacity to carry out activities will improve  Outcome: Progressing     Problem: Self Care  Goal: Patient will have the ability to perform ADLs independently or with assistance (bathe, groom, dress, toilet and feed)  Outcome: Progressing     Problem: Infection - Standard  Goal: Patient will remain free from infection  Outcome: Progressing     Problem: Wound/ / Incision Healing  Goal: Patient's wound/surgical incision will decrease in size and heals properly  Outcome: Progressing       Patient is not progressing  towards the following goals:

## 2023-03-25 NOTE — CARE PLAN
The patient is Stable - Low risk of patient condition declining or worsening    Shift Goals  Clinical Goals: Pain control, wound management  Patient Goals: rest  Family Goals: IVETTE    Progress made toward(s) clinical / shift goals:  Patient remove dressing and not allowing reapplication. Patient compliant with turning in bed. PT/OT asked if they can work with patient in the near future.

## 2023-03-25 NOTE — PROGRESS NOTES
Hospital Medicine Daily Progress Note    Date of Service  3/25/2023    Chief Complaint  Kellie Chatman is a 53 y.o. female admitted 3/6/2023 with altered mental status    Hospital Course  53-year-old female with past medical history of s/p right BKA and left transmetatarsal amputation, hypertension, HIV, seizure disorder, schizophrenia, methamphetamine use, tobacco use and homelessness to the ED with altered mental status.  See completed course of antibiotic for pyelonephritis  CT imaging LLE, negative for osteomyelitis or abscess formation.  Evaluated by orthopedics and status post left below-knee amputation on 3/22/2023.      Interval Problem Update  3/21/2023  Vitals remained stable  Labs reviewed  Pain well controlled  Significant drainage noted around left heel, foul-smelling  Greenish discharge  Patient okay to have surgery if needed    Get wound culture  LPS and wound care follow-up  Given significant wound she would likely benefit from aggressive wound care on discharge.  Likely need placement    3/22/2023  Vitals remained stable  On room air saturating over 90  Labs reviewed noted with hemoglobin 8.1, no concern of ongoing infection  Case was discussed with CHEYANNE Horvath   status post left below-knee amputation on 3/22/2023.  LPS and orthopedics following closely  She will require IV hydromorphone for pain management      3/24/2023  Vitals remained stable  Labs noted with stable H&H,  No plan for further surgery, required placement   assisting    On IV narcotics for pain management      3/25/2023  Vitals remained stable  Remained asymptomatic  Pain well controlled  Labs unremarkable    Continue on Eliquis, Biktarvy  On IV hydromorphone as needed for pain  Need placement   assisting    I have discussed this patient's plan of care and discharge plan at IDT rounds today with Case Management, Nursing, Nursing leadership, and other members of the IDT team.    Consultants/Specialty  LPS    Code  Status  Full Code    Disposition  Patient is not medically cleared for discharge.   Anticipate discharge to  TBD .  I have placed the appropriate orders for post-discharge needs.    Review of Systems  Review of Systems   Constitutional:  Positive for malaise/fatigue. Negative for fever.   HENT:  Negative for hearing loss.    Eyes:  Negative for blurred vision.   Respiratory:  Negative for cough and shortness of breath.    Cardiovascular:  Negative for chest pain.   Gastrointestinal:  Negative for nausea and vomiting.   Genitourinary:  Negative for dysuria.   Musculoskeletal:  Positive for joint pain. Negative for myalgias.   Skin:  Negative for rash.   Neurological:  Negative for dizziness.   Endo/Heme/Allergies:  Does not bruise/bleed easily.      Physical Exam  Temp:  [36.2 °C (97.1 °F)-36.7 °C (98 °F)] 36.2 °C (97.1 °F)  Pulse:  [] 86  Resp:  [17-18] 18  BP: ()/(51-62) 93/51  SpO2:  [94 %-96 %] 95 %    Physical Exam  Constitutional:       General: She is not in acute distress.  HENT:      Head: Normocephalic and atraumatic.      Right Ear: Tympanic membrane normal.      Left Ear: Tympanic membrane normal.      Nose: Nose normal.      Mouth/Throat:      Mouth: Mucous membranes are moist.   Eyes:      Extraocular Movements: Extraocular movements intact.      Pupils: Pupils are equal, round, and reactive to light.   Cardiovascular:      Rate and Rhythm: Normal rate and regular rhythm.      Pulses: Normal pulses.   Pulmonary:      Effort: Pulmonary effort is normal. No respiratory distress.   Abdominal:      General: Abdomen is flat. There is no distension.   Musculoskeletal:      Cervical back: Normal range of motion.      Right lower leg: No edema.      Left lower leg: No edema.      Comments:   Right-sided BKA, left BKA   Skin:     General: Skin is warm.   Neurological:      General: No focal deficit present.      Mental Status: She is alert and oriented to person, place, and time. Mental status is at  baseline.   Psychiatric:         Mood and Affect: Mood normal.       Fluids    Intake/Output Summary (Last 24 hours) at 3/25/2023 1158  Last data filed at 3/24/2023 1300  Gross per 24 hour   Intake 360 ml   Output --   Net 360 ml         Laboratory  Recent Labs     03/23/23  0109 03/24/23  0432   WBC 8.4 6.0   RBC 3.66* 3.53*   HEMOGLOBIN 9.7* 9.4*   HEMATOCRIT 31.1* 29.7*   MCV 85.0 84.1   MCH 26.5* 26.6*   MCHC 31.2* 31.6*   RDW 46.5 48.8   PLATELETCT 390 362   MPV 9.2 9.5       Recent Labs     03/23/23  0109 03/24/23  0432   SODIUM 134* 137   POTASSIUM 4.3 4.2   CHLORIDE 100 101   CO2 24 25   GLUCOSE 119* 87   BUN 18 12   CREATININE 0.74 0.48*   CALCIUM 8.8 8.8                     Imaging  CT-FOOT WITH PLUS RECONS LEFT   Final Result      1.  Edema and/or cellulitis of the visualized leg, ankle and foot. No focal abscess.   2.  Large blisters.   3.  Postsurgical changes first through fifth transmetatarsal amputations.   4.  No definite evidence for osteomyelitis.   5.  Some thickening of the Achilles tendon is nonspecific and not well evaluated on CT.   6.  Further evaluation with MRI as clinically indicated.      DX-CHEST-PORTABLE (1 VIEW)   Final Result      No acute cardiac or pulmonary abnormalities are identified.      DX-FEMUR-2+ RIGHT   Final Result      No radiographic evidence of acute traumatic injury.      CT-ABDOMEN-PELVIS WITH   Final Result      1.  Right internal iliac vein DVT.   2.  Somewhat striated nephrogram of the bilateral superior poles may be artifactual. Correlate with urinalysis for pyelonephritis.   3.  Redemonstration of enhancing lesion of the uterus. This has been evaluated on prior ultrasound.   4.  Prominent bilateral inguinal lymph nodes may be reactive. Malignancy not excluded.      Dr. Emanuel discussed these findings with Dr. Child at 1:49 PM by telephone on 3/6/2023      DX-FOOT-2- LEFT   Final Result      Status post transmetatarsal amputation. No definitive radiographic  evidence of ostomy myelitis. If there is strong clinical suspicion, MRI of the foot with and without contrast should be obtained for further evaluation.      DX-CHEST-PORTABLE (1 VIEW)   Final Result      No acute cardiopulmonary disease evident.           Assessment/Plan  * Infection of amputation stump of left lower extremity (HCC)  Assessment & Plan  Concern for   Initially discussed the case with orthopedic surgery, who did not believe at this time required intervention  However patient with persistent fever, all other work-up negative  Concern for possible underlying infection and patient's prior frostbitten BLE stumps, bullae noted   Blood cultures with no growth to date.   MRSA swab positive.   Discontinued vancomycin and Zosyn, switched to Zyvox.  Monitor renal function.     CT imaging LLE, negative for osteomyelitis or abscess formation.     status post left below-knee amputation on 3/22/2023.      UTI (urinary tract infection)  Assessment & Plan  Urine culture noted E. Faecium VRE.  However patient denies UTI symptoms  No indication for treatment    Fever in adult  Assessment & Plan  Patient noted to spike a 101.6 fever on 3/8, negative chest x-ray, UA negative, COVID, influenza, RSV negative, chest x-ray negative for any infiltrate or pulmonary effusion.  Lactic acid and procalcitonin negative. Blood cultures with no growth to date.  Concern for possible underlying infection and patient's prior frostbitten BLE. MRSA swab positive.     Slow transit constipation  Assessment & Plan  Patient noted to have abdominal distention, prior bowel movement several days prior  Started on aggressive bowel regimen, given one-time dose of Dulcolax  Still with no bowel movement, oral lactulose given  Large BM 3/8 PM  Serial abdominal exams  RESOLVED    Noncompliance  Assessment & Plan  Significant history with noncompliance with medications and doctor visits    Homelessness  Assessment & Plan  Complicated medical history  and noncompliance and drug abuse   was consulted to consider group home      Lactic acid acidosis  Assessment & Plan  Likely related to dehydration and infection  IV fluid and ceftriaxone  Resolved    Hx of BKA, right ([facility])  Assessment & Plan  Right BKA and left transmetatarsal amputation  LPS following, local wound care     Methamphetamine abuse ([facility])  Assessment & Plan  Encouraged the patient to quit  Urine drug screen is positive  Blood culture no growth to date    Acute deep vein thrombosis (DVT) ([facility])- (present on admission)  Assessment & Plan  Patient had recent admission at outside facility 2/2023,  she was diagnosed with a left superficial femoral vein DVT discharged with Xarelto.  Patient had CT imaging which noted right internal iliac vein DVT.    Initially on heparin drip, transitioned to Eliquis  No sign of active bleeding       HIV (human immunodeficiency virus infection) ([facility])- (present on admission)  Assessment & Plan  Patient is on Biktarvy however, clearly questioning about compliance  CD4-761 and CD8-194  Resume Biktarvy with close monitoring    Hypokalemia- (present on admission)  Assessment & Plan  Labs reviewed, resolved    Schizophrenia ([facility])- (present on admission)  Assessment & Plan  Patient has hallucination with hearing voices  Denied any SI or HI, however with active auditory hallucinations  Resume home medication Depakote, formally on Zyprexa  Psych consulted, continue with Depakote, trough level stable, Zyprexa 5 mg twice daily initiated    Tobacco abuse- (present on admission)  Assessment & Plan  Cessation counseled, 3 minute  Nicotine replacement         VTE prophylaxis: SCDs/TEDs and therapeutic anticoagulation with eliquis    I have performed a physical exam and reviewed and updated ROS and Plan today (3/25/2023). In review of yesterday's note (3/24/2023), there are no changes except as documented above.

## 2023-03-25 NOTE — PROGRESS NOTES
Patient states that she wants to use BSC. Patient does not have slide board. Patient also remove dressing and refusing reapplication. Patient educated on dressing importance.

## 2023-03-26 PROBLEM — R50.9 FEVER IN ADULT: Status: RESOLVED | Noted: 2023-03-08 | Resolved: 2023-03-26

## 2023-03-26 LAB
ANION GAP SERPL CALC-SCNC: 5 MMOL/L (ref 7–16)
ANISOCYTOSIS BLD QL SMEAR: ABNORMAL
BASOPHILS # BLD AUTO: 2.5 % (ref 0–1.8)
BASOPHILS # BLD: 0.11 K/UL (ref 0–0.12)
BUN SERPL-MCNC: 19 MG/DL (ref 8–22)
CALCIUM SERPL-MCNC: 8.2 MG/DL (ref 8.5–10.5)
CHLORIDE SERPL-SCNC: 108 MMOL/L (ref 96–112)
CO2 SERPL-SCNC: 27 MMOL/L (ref 20–33)
CREAT SERPL-MCNC: 0.65 MG/DL (ref 0.5–1.4)
EOSINOPHIL # BLD AUTO: 0.21 K/UL (ref 0–0.51)
EOSINOPHIL NFR BLD: 5.1 % (ref 0–6.9)
ERYTHROCYTE [DISTWIDTH] IN BLOOD BY AUTOMATED COUNT: 48.9 FL (ref 35.9–50)
GFR SERPLBLD CREATININE-BSD FMLA CKD-EPI: 104 ML/MIN/1.73 M 2
GLUCOSE SERPL-MCNC: 93 MG/DL (ref 65–99)
HCT VFR BLD AUTO: 26.8 % (ref 37–47)
HGB BLD-MCNC: 8.2 G/DL (ref 12–16)
LACTATE SERPL-SCNC: 0.6 MMOL/L (ref 0.5–2)
LACTATE SERPL-SCNC: 1.6 MMOL/L (ref 0.5–2)
LYMPHOCYTES # BLD AUTO: 1.06 K/UL (ref 1–4.8)
LYMPHOCYTES NFR BLD: 25.2 % (ref 22–41)
MAGNESIUM SERPL-MCNC: 1.7 MG/DL (ref 1.5–2.5)
MANUAL DIFF BLD: NORMAL
MCH RBC QN AUTO: 26.5 PG (ref 27–33)
MCHC RBC AUTO-ENTMCNC: 30.6 G/DL (ref 33.6–35)
MCV RBC AUTO: 86.5 FL (ref 81.4–97.8)
MICROCYTES BLD QL SMEAR: ABNORMAL
MONOCYTES # BLD AUTO: 0.46 K/UL (ref 0–0.85)
MONOCYTES NFR BLD AUTO: 10.9 % (ref 0–13.4)
MORPHOLOGY BLD-IMP: NORMAL
NEUTROPHILS # BLD AUTO: 2.36 K/UL (ref 2–7.15)
NEUTROPHILS NFR BLD: 56.3 % (ref 44–72)
NRBC # BLD AUTO: 0 K/UL
NRBC BLD-RTO: 0 /100 WBC
PHOSPHATE SERPL-MCNC: 3.4 MG/DL (ref 2.5–4.5)
PLATELET # BLD AUTO: 325 K/UL (ref 164–446)
PLATELET BLD QL SMEAR: NORMAL
PMV BLD AUTO: 9.3 FL (ref 9–12.9)
POTASSIUM SERPL-SCNC: 4.3 MMOL/L (ref 3.6–5.5)
RBC # BLD AUTO: 3.1 M/UL (ref 4.2–5.4)
RBC BLD AUTO: PRESENT
SODIUM SERPL-SCNC: 140 MMOL/L (ref 135–145)
WBC # BLD AUTO: 4.2 K/UL (ref 4.8–10.8)

## 2023-03-26 PROCEDURE — 80048 BASIC METABOLIC PNL TOTAL CA: CPT

## 2023-03-26 PROCEDURE — 99232 SBSQ HOSP IP/OBS MODERATE 35: CPT | Performed by: STUDENT IN AN ORGANIZED HEALTH CARE EDUCATION/TRAINING PROGRAM

## 2023-03-26 PROCEDURE — 700102 HCHG RX REV CODE 250 W/ 637 OVERRIDE(OP): Performed by: GENERAL PRACTICE

## 2023-03-26 PROCEDURE — 770001 HCHG ROOM/CARE - MED/SURG/GYN PRIV*

## 2023-03-26 PROCEDURE — 85025 COMPLETE CBC W/AUTO DIFF WBC: CPT

## 2023-03-26 PROCEDURE — 700102 HCHG RX REV CODE 250 W/ 637 OVERRIDE(OP): Performed by: INTERNAL MEDICINE

## 2023-03-26 PROCEDURE — A9270 NON-COVERED ITEM OR SERVICE: HCPCS | Performed by: INTERNAL MEDICINE

## 2023-03-26 PROCEDURE — 83605 ASSAY OF LACTIC ACID: CPT

## 2023-03-26 PROCEDURE — 36415 COLL VENOUS BLD VENIPUNCTURE: CPT

## 2023-03-26 PROCEDURE — 84100 ASSAY OF PHOSPHORUS: CPT

## 2023-03-26 PROCEDURE — 83735 ASSAY OF MAGNESIUM: CPT

## 2023-03-26 PROCEDURE — A9270 NON-COVERED ITEM OR SERVICE: HCPCS | Performed by: GENERAL PRACTICE

## 2023-03-26 PROCEDURE — 85007 BL SMEAR W/DIFF WBC COUNT: CPT

## 2023-03-26 RX ADMIN — BICTEGRAVIR SODIUM, EMTRICITABINE, AND TENOFOVIR ALAFENAMIDE FUMARATE 1 TABLET: 50; 200; 25 TABLET ORAL at 09:42

## 2023-03-26 RX ADMIN — OLANZAPINE 5 MG: 5 TABLET, FILM COATED ORAL at 20:38

## 2023-03-26 RX ADMIN — DOCUSATE SODIUM 50 MG AND SENNOSIDES 8.6 MG 2 TABLET: 8.6; 5 TABLET, FILM COATED ORAL at 09:42

## 2023-03-26 RX ADMIN — APIXABAN 5 MG: 5 TABLET, FILM COATED ORAL at 20:38

## 2023-03-26 RX ADMIN — OLANZAPINE 5 MG: 5 TABLET, FILM COATED ORAL at 09:42

## 2023-03-26 RX ADMIN — DOCUSATE SODIUM 50 MG AND SENNOSIDES 8.6 MG 2 TABLET: 8.6; 5 TABLET, FILM COATED ORAL at 20:38

## 2023-03-26 RX ADMIN — APIXABAN 5 MG: 5 TABLET, FILM COATED ORAL at 09:42

## 2023-03-26 RX ADMIN — NICOTINE 14 MG: 14 PATCH, EXTENDED RELEASE TRANSDERMAL at 09:42

## 2023-03-26 RX ADMIN — DIVALPROEX SODIUM 500 MG: 500 TABLET, DELAYED RELEASE ORAL at 09:42

## 2023-03-26 RX ADMIN — DIVALPROEX SODIUM 500 MG: 500 TABLET, DELAYED RELEASE ORAL at 20:39

## 2023-03-26 ASSESSMENT — COGNITIVE AND FUNCTIONAL STATUS - GENERAL
TOILETING: A LITTLE
CLIMB 3 TO 5 STEPS WITH RAILING: TOTAL
SUGGESTED CMS G CODE MODIFIER DAILY ACTIVITY: CJ
DRESSING REGULAR LOWER BODY CLOTHING: A LITTLE
MOVING FROM LYING ON BACK TO SITTING ON SIDE OF FLAT BED: UNABLE
DAILY ACTIVITIY SCORE: 20
HELP NEEDED FOR BATHING: A LOT
MOBILITY SCORE: 9
WALKING IN HOSPITAL ROOM: TOTAL
TURNING FROM BACK TO SIDE WHILE IN FLAT BAD: A LITTLE
MOVING TO AND FROM BED TO CHAIR: A LOT
SUGGESTED CMS G CODE MODIFIER MOBILITY: CM
STANDING UP FROM CHAIR USING ARMS: TOTAL

## 2023-03-26 ASSESSMENT — ENCOUNTER SYMPTOMS
SHORTNESS OF BREATH: 0
FEVER: 0
BRUISES/BLEEDS EASILY: 0
VOMITING: 0
BLURRED VISION: 0
MYALGIAS: 0
DIZZINESS: 0
NAUSEA: 0
COUGH: 0

## 2023-03-26 ASSESSMENT — PAIN DESCRIPTION - PAIN TYPE: TYPE: ACUTE PAIN

## 2023-03-26 NOTE — CARE PLAN
The patient is Stable - Low risk of patient condition declining or worsening    Shift Goals  Clinical Goals: Pain, wound care  Patient Goals: Comfort  Family Goals: ryan    Progress made toward(s) clinical / shift goals:  Patient agreeable to dressing without ace wrap, other wound care completed      Problem: Knowledge Deficit - Standard  Goal: Patient and family/care givers will demonstrate understanding of plan of care, disease process/condition, diagnostic tests and medications  Outcome: Progressing     Problem: Skin Integrity  Goal: Skin integrity is maintained or improved  Outcome: Progressing     Problem: Fall Risk  Goal: Patient will remain free from falls  Outcome: Progressing

## 2023-03-26 NOTE — PROGRESS NOTES
Hospital Medicine Daily Progress Note    Date of Service  3/26/2023    Chief Complaint  Kellie Chatman is a 53 y.o. female admitted 3/6/2023 with altered mental status    Hospital Course  53-year-old female with past medical history of s/p right BKA and left transmetatarsal amputation, hypertension, HIV, seizure disorder, schizophrenia, methamphetamine use, tobacco use and homelessness to the ED with altered mental status.  See completed course of antibiotic for pyelonephritis  CT imaging LLE, negative for osteomyelitis or abscess formation.  Evaluated by orthopedics and status post left below-knee amputation on 3/22/2023.  Patient will need placement,  assisting      Interval Problem Update  3/21/2023  Vitals remained stable  Labs reviewed  Pain well controlled  Significant drainage noted around left heel, foul-smelling  Greenish discharge  Patient okay to have surgery if needed    Get wound culture  LPS and wound care follow-up  Given significant wound she would likely benefit from aggressive wound care on discharge.  Likely need placement    3/22/2023  Vitals remained stable  On room air saturating over 90  Labs reviewed noted with hemoglobin 8.1, no concern of ongoing infection  Case was discussed with CHEYANNE Horvath   status post left below-knee amputation on 3/22/2023.  LPS and orthopedics following closely  She will require IV hydromorphone for pain management      3/24/2023  Vitals remained stable  Labs noted with stable H&H,  No plan for further surgery, required placement   assisting    On IV narcotics for pain management      3/25/2023  Vitals remained stable  Remained asymptomatic  Pain well controlled  Labs unremarkable    Continue on Eliquis, Biktarvy  On IV hydromorphone as needed for pain  Need placement   assisting    3/26/2023  Current vitals been stable  Patient had episode of hypotension with elevated lactic acid which improved with IV fluid  Current labs  unremarkable  Pathology from surgery reviewed noted with chronic active osteomyelitis, resection margin viable  I will discussed with ID to see patient need to be started on IV antibiotics      I have discussed this patient's plan of care and discharge plan at IDT rounds today with Case Management, Nursing, Nursing leadership, and other members of the IDT team.    Consultants/Specialty  LPS    Code Status  Full Code    Disposition  Patient is not medically cleared for discharge.   Anticipate discharge to  TBD .  I have placed the appropriate orders for post-discharge needs.    Review of Systems  Review of Systems   Constitutional:  Positive for malaise/fatigue. Negative for fever.   HENT:  Negative for hearing loss.    Eyes:  Negative for blurred vision.   Respiratory:  Negative for cough and shortness of breath.    Cardiovascular:  Negative for chest pain.   Gastrointestinal:  Negative for nausea and vomiting.   Genitourinary:  Negative for dysuria.   Musculoskeletal:  Positive for joint pain. Negative for myalgias.   Skin:  Negative for rash.   Neurological:  Negative for dizziness.   Endo/Heme/Allergies:  Does not bruise/bleed easily.      Physical Exam  Temp:  [36.1 °C (96.9 °F)-36.7 °C (98 °F)] 36.1 °C (96.9 °F)  Pulse:  [] 85  Resp:  [17-18] 18  BP: ()/(49-65) 104/65  SpO2:  [92 %-99 %] 99 %    Physical Exam  Constitutional:       General: She is not in acute distress.  HENT:      Head: Normocephalic and atraumatic.      Right Ear: Tympanic membrane normal.      Left Ear: Tympanic membrane normal.      Nose: Nose normal.      Mouth/Throat:      Mouth: Mucous membranes are moist.   Eyes:      Extraocular Movements: Extraocular movements intact.      Pupils: Pupils are equal, round, and reactive to light.   Cardiovascular:      Rate and Rhythm: Normal rate and regular rhythm.      Pulses: Normal pulses.   Pulmonary:      Effort: Pulmonary effort is normal. No respiratory distress.   Abdominal:       General: Abdomen is flat. There is no distension.   Musculoskeletal:      Cervical back: Normal range of motion.      Right lower leg: No edema.      Left lower leg: No edema.      Comments:   Right-sided BKA, left BKA   Skin:     General: Skin is warm.   Neurological:      General: No focal deficit present.      Mental Status: She is alert and oriented to person, place, and time. Mental status is at baseline.   Psychiatric:         Mood and Affect: Mood normal.       Fluids    Intake/Output Summary (Last 24 hours) at 3/26/2023 1210  Last data filed at 3/26/2023 0951  Gross per 24 hour   Intake 375 ml   Output 400 ml   Net -25 ml         Laboratory  Recent Labs     03/24/23 0432 03/25/23 2200 03/26/23  0212   WBC 6.0 4.5* 4.2*   RBC 3.53* 3.37* 3.10*   HEMOGLOBIN 9.4* 8.8* 8.2*   HEMATOCRIT 29.7* 29.6* 26.8*   MCV 84.1 87.8 86.5   MCH 26.6* 26.1* 26.5*   MCHC 31.6* 29.7* 30.6*   RDW 48.8 50.4* 48.9   PLATELETCT 362 352 325   MPV 9.5 9.2 9.3       Recent Labs     03/24/23 0432 03/25/23 2200 03/26/23  0212   SODIUM 137 140 140   POTASSIUM 4.2 3.9 4.3   CHLORIDE 101 108 108   CO2 25 24 27   GLUCOSE 87 111* 93   BUN 12 19 19   CREATININE 0.48* 0.71 0.65   CALCIUM 8.8 8.1* 8.2*                     Imaging  CT-FOOT WITH PLUS RECONS LEFT   Final Result      1.  Edema and/or cellulitis of the visualized leg, ankle and foot. No focal abscess.   2.  Large blisters.   3.  Postsurgical changes first through fifth transmetatarsal amputations.   4.  No definite evidence for osteomyelitis.   5.  Some thickening of the Achilles tendon is nonspecific and not well evaluated on CT.   6.  Further evaluation with MRI as clinically indicated.      DX-CHEST-PORTABLE (1 VIEW)   Final Result      No acute cardiac or pulmonary abnormalities are identified.      DX-FEMUR-2+ RIGHT   Final Result      No radiographic evidence of acute traumatic injury.      CT-ABDOMEN-PELVIS WITH   Final Result      1.  Right internal iliac vein DVT.   2.   Somewhat striated nephrogram of the bilateral superior poles may be artifactual. Correlate with urinalysis for pyelonephritis.   3.  Redemonstration of enhancing lesion of the uterus. This has been evaluated on prior ultrasound.   4.  Prominent bilateral inguinal lymph nodes may be reactive. Malignancy not excluded.      Dr. Emanuel discussed these findings with Dr. Child at 1:49 PM by telephone on 3/6/2023      DX-FOOT-2- LEFT   Final Result      Status post transmetatarsal amputation. No definitive radiographic evidence of ostomy myelitis. If there is strong clinical suspicion, MRI of the foot with and without contrast should be obtained for further evaluation.      DX-CHEST-PORTABLE (1 VIEW)   Final Result      No acute cardiopulmonary disease evident.           Assessment/Plan  * Infection of amputation stump of left lower extremity (HCC)  Assessment & Plan      CT imaging LLE, negative for osteomyelitis or abscess formation.   S/p course of abx     status post left below-knee amputation on 3/22/2023.    Pathology noted  With Gangrene of bone and soft tissue with underlying chronic active  osteomyelitis ,Resection margins viable .    Discuss with ID regarding if patient need treatment     UTI (urinary tract infection)  Assessment & Plan  Urine culture noted E. Faecium VRE.  However patient denies UTI symptoms  No indication for treatment    Slow transit constipation  Assessment & Plan  Resolved    Noncompliance  Assessment & Plan  Significant history with noncompliance with medications and doctor visits    Homelessness  Assessment & Plan  Complicated medical history and noncompliance and drug abuse   was consulted to consider group home      Lactic acid acidosis  Assessment & Plan  Likely related to dehydration and infection  IV fluid and ceftriaxone  Resolved    Hx of BKA, right (HCC)  Assessment & Plan  Right BKA and left transmetatarsal amputation  LPS following, local wound care     Methamphetamine  abuse (HCC)  Assessment & Plan  Encouraged the patient to quit  Urine drug screen is positive  Blood culture no growth to date    Acute deep vein thrombosis (DVT) ([facility])- (present on admission)  Assessment & Plan  On Eliquis        HIV (human immunodeficiency virus infection) ([facility])- (present on admission)  Assessment & Plan    Resume Biktarvy with close monitoring    Hypokalemia- (present on admission)  Assessment & Plan  Labs reviewed, resolved    Schizophrenia ([facility])- (present on admission)  Assessment & Plan  Patient has hallucination with hearing voices  Denied any SI or HI, however with active auditory hallucinations  Resume home medication Depakote, formally on Zyprexa  Psych consulted, continue with Depakote, trough level stable, Zyprexa 5 mg twice daily initiated    Tobacco abuse- (present on admission)  Assessment & Plan  Cessation counseled, 3 minute  Nicotine replacement         VTE prophylaxis: SCDs/TEDs and therapeutic anticoagulation with eliquis    I have performed a physical exam and reviewed and updated ROS and Plan today (3/26/2023). In review of yesterday's note (3/25/2023), there are no changes except as documented above.

## 2023-03-26 NOTE — PROGRESS NOTES
Patient evening BP low 88/49. MD notified. New orders received. Fluid bolus initiated lab at bedside.

## 2023-03-26 NOTE — CARE PLAN
Pt was refusing vs at very start of shift. Bolus complete and vss. Will continue to monitor  Problem: Knowledge Deficit - Standard  Goal: Patient and family/care givers will demonstrate understanding of plan of care, disease process/condition, diagnostic tests and medications  Outcome: Progressing     Problem: Pain - Standard  Goal: Alleviation of pain or a reduction in pain to the patient’s comfort goal  Outcome: Progressing     Problem: Skin Integrity  Goal: Skin integrity is maintained or improved  Outcome: Progressing     Problem: Fall Risk  Goal: Patient will remain free from falls  Outcome: Progressing     Problem: Psychosocial  Goal: Patient's level of anxiety will decrease  Outcome: Progressing  Goal: Patient's ability to verbalize feelings about condition will improve  Outcome: Progressing  Goal: Patient's ability to re-evaluate and adapt role responsibilities will improve  Outcome: Progressing  Goal: Patient and family will demonstrate ability to cope with life altering diagnosis and/or procedure  Outcome: Progressing  Goal: Spiritual and cultural needs incorporated into hospitalization  Outcome: Progressing     Problem: Communication  Goal: The ability to communicate needs accurately and effectively will improve  Outcome: Progressing     Problem: Discharge Barriers/Planning  Goal: Patient's continuum of care needs are met  Outcome: Progressing     Problem: Hemodynamics  Goal: Patient's hemodynamics, fluid balance and neurologic status will be stable or improve  Outcome: Progressing     Problem: Respiratory  Goal: Patient will achieve/maintain optimum respiratory ventilation and gas exchange  Outcome: Progressing     Problem: Chest Tube Management  Goal: Complications related to chest tube will be avoided or minimized  Outcome: Progressing     Problem: Fluid Volume  Goal: Fluid volume balance will be maintained  Outcome: Progressing     Problem: Mechanical Ventilation  Goal: Safe management of artificial  airway and ventilation  Outcome: Progressing  Goal: Successful weaning off mechanical ventilator, spontaneously maintains adequate gas exchange  Outcome: Progressing  Goal: Patient will be able to express needs and understand communication  Outcome: Progressing     Problem: Dysphagia  Goal: Dysphagia will improve  Outcome: Progressing     Problem: Risk for Aspiration  Goal: Patient's risk for aspiration will be absent or decrease  Outcome: Progressing     Problem: Nutrition  Goal: Patient's nutritional and fluid intake will be adequate or improve  Outcome: Progressing  Goal: Enteral nutrition will be maintained or improve  Outcome: Progressing  Goal: Enteral nutrition will be maintained or improve  Outcome: Progressing     Problem: Urinary Elimination  Goal: Establish and maintain regular urinary output  Outcome: Progressing     Problem: Bowel Elimination  Goal: Establish and maintain regular bowel function  Outcome: Progressing     Problem: Gastrointestinal Irritability  Goal: Nausea and vomiting will be absent or improve  Outcome: Progressing  Goal: Diarrhea will be absent or improved  Outcome: Progressing     Problem: Rectal Tube  Goal: Fecal output will be contained and skin will remain free from irritation  Outcome: Progressing     Problem: Mobility  Goal: Patient's capacity to carry out activities will improve  Outcome: Progressing     Problem: Self Care  Goal: Patient will have the ability to perform ADLs independently or with assistance (bathe, groom, dress, toilet and feed)  Outcome: Progressing     Problem: Infection - Standard  Goal: Patient will remain free from infection  Outcome: Progressing     Problem: Wound/ / Incision Healing  Goal: Patient's wound/surgical incision will decrease in size and heals properly  Outcome: Progressing       The patient is Watcher - Medium risk of patient condition declining or worsening    Shift Goals  Clinical Goals: Pain control, wound management  Patient Goals:  rest  Family Goals: IVETTE    Progress made toward(s) clinical / shift goals:  y    Patient is not progressing towards the following goals:

## 2023-03-27 PROCEDURE — 700102 HCHG RX REV CODE 250 W/ 637 OVERRIDE(OP): Performed by: GENERAL PRACTICE

## 2023-03-27 PROCEDURE — A9270 NON-COVERED ITEM OR SERVICE: HCPCS | Performed by: INTERNAL MEDICINE

## 2023-03-27 PROCEDURE — 99232 SBSQ HOSP IP/OBS MODERATE 35: CPT | Performed by: STUDENT IN AN ORGANIZED HEALTH CARE EDUCATION/TRAINING PROGRAM

## 2023-03-27 PROCEDURE — A9270 NON-COVERED ITEM OR SERVICE: HCPCS | Performed by: GENERAL PRACTICE

## 2023-03-27 PROCEDURE — 700102 HCHG RX REV CODE 250 W/ 637 OVERRIDE(OP): Performed by: INTERNAL MEDICINE

## 2023-03-27 PROCEDURE — 770001 HCHG ROOM/CARE - MED/SURG/GYN PRIV*

## 2023-03-27 RX ADMIN — NICOTINE 14 MG: 14 PATCH, EXTENDED RELEASE TRANSDERMAL at 09:56

## 2023-03-27 RX ADMIN — APIXABAN 5 MG: 5 TABLET, FILM COATED ORAL at 20:03

## 2023-03-27 RX ADMIN — DOCUSATE SODIUM 50 MG AND SENNOSIDES 8.6 MG 2 TABLET: 8.6; 5 TABLET, FILM COATED ORAL at 20:03

## 2023-03-27 RX ADMIN — OLANZAPINE 5 MG: 5 TABLET, FILM COATED ORAL at 09:54

## 2023-03-27 RX ADMIN — OLANZAPINE 5 MG: 5 TABLET, FILM COATED ORAL at 20:03

## 2023-03-27 RX ADMIN — BICTEGRAVIR SODIUM, EMTRICITABINE, AND TENOFOVIR ALAFENAMIDE FUMARATE 1 TABLET: 50; 200; 25 TABLET ORAL at 09:54

## 2023-03-27 RX ADMIN — DIVALPROEX SODIUM 500 MG: 500 TABLET, DELAYED RELEASE ORAL at 20:03

## 2023-03-27 RX ADMIN — APIXABAN 5 MG: 5 TABLET, FILM COATED ORAL at 09:55

## 2023-03-27 RX ADMIN — DIVALPROEX SODIUM 500 MG: 500 TABLET, DELAYED RELEASE ORAL at 09:54

## 2023-03-27 RX ADMIN — DOCUSATE SODIUM 50 MG AND SENNOSIDES 8.6 MG 2 TABLET: 8.6; 5 TABLET, FILM COATED ORAL at 09:54

## 2023-03-27 RX ADMIN — POLYETHYLENE GLYCOL 3350 1 PACKET: 17 POWDER, FOR SOLUTION ORAL at 20:03

## 2023-03-27 ASSESSMENT — ENCOUNTER SYMPTOMS
NAUSEA: 0
BRUISES/BLEEDS EASILY: 0
COUGH: 0
FEVER: 0
MYALGIAS: 0
BLURRED VISION: 0
DIZZINESS: 0
VOMITING: 0
SHORTNESS OF BREATH: 0

## 2023-03-27 NOTE — PROGRESS NOTES
Hospital Medicine Daily Progress Note    Date of Service  3/27/2023    Chief Complaint  Kellie Chatman is a 53 y.o. female admitted 3/6/2023 with altered mental status    Hospital Course  53-year-old female with past medical history of s/p right BKA and left transmetatarsal amputation, hypertension, HIV, seizure disorder, schizophrenia, methamphetamine use, tobacco use and homelessness to the ED with altered mental status.  See completed course of antibiotic for pyelonephritis  CT imaging LLE, negative for osteomyelitis or abscess formation.  Evaluated by orthopedics and status post left below-knee amputation on 3/22/2023.  Patient will need placement,  assisting      Interval Problem Update  3/21/2023  Vitals remained stable  Labs reviewed  Pain well controlled  Significant drainage noted around left heel, foul-smelling  Greenish discharge  Patient okay to have surgery if needed    Get wound culture  LPS and wound care follow-up  Given significant wound she would likely benefit from aggressive wound care on discharge.  Likely need placement    3/22/2023  Vitals remained stable  On room air saturating over 90  Labs reviewed noted with hemoglobin 8.1, no concern of ongoing infection  Case was discussed with CHEYANNE Horvath   status post left below-knee amputation on 3/22/2023.  LPS and orthopedics following closely  She will require IV hydromorphone for pain management      3/24/2023  Vitals remained stable  Labs noted with stable H&H,  No plan for further surgery, required placement   assisting    On IV narcotics for pain management      3/25/2023  Vitals remained stable  Remained asymptomatic  Pain well controlled  Labs unremarkable    Continue on Eliquis, Biktarvy  On IV hydromorphone as needed for pain  Need placement   assisting    3/26/2023  Current vitals been stable  Patient had episode of hypotension with elevated lactic acid which improved with IV fluid  Current labs  unremarkable  Pathology from surgery reviewed noted with chronic active osteomyelitis, resection margin viable  I will discussed with ID to see patient need to be started on IV antibiotics    3/27/2023  Vitals remained stable  Pathology result discussed with ID Dr. Duffy.  Recommended his margin is cleared no further injection of antibiotic  LPS following for left-sided BKA  Patient will need placement.   assisting      I have discussed this patient's plan of care and discharge plan at IDT rounds today with Case Management, Nursing, Nursing leadership, and other members of the IDT team.    Consultants/Specialty  LPS    Code Status  Full Code    Disposition  Patient is not medically cleared for discharge.   Anticipate discharge to  TBD .  I have placed the appropriate orders for post-discharge needs.    Review of Systems  Review of Systems   Constitutional:  Positive for malaise/fatigue. Negative for fever.   HENT:  Negative for hearing loss.    Eyes:  Negative for blurred vision.   Respiratory:  Negative for cough and shortness of breath.    Cardiovascular:  Negative for chest pain.   Gastrointestinal:  Negative for nausea and vomiting.   Genitourinary:  Negative for dysuria.   Musculoskeletal:  Positive for joint pain. Negative for myalgias.   Skin:  Negative for rash.   Neurological:  Negative for dizziness.   Endo/Heme/Allergies:  Does not bruise/bleed easily.      Physical Exam  Temp:  [36.1 °C (96.9 °F)-36.6 °C (97.9 °F)] 36.6 °C (97.9 °F)  Pulse:  [73-88] 73  Resp:  [18] 18  BP: ()/(59-68) 113/68  SpO2:  [95 %-98 %] 98 %    Physical Exam  Constitutional:       General: She is not in acute distress.  HENT:      Head: Normocephalic and atraumatic.      Right Ear: Tympanic membrane normal.      Left Ear: Tympanic membrane normal.      Nose: Nose normal.      Mouth/Throat:      Mouth: Mucous membranes are moist.   Eyes:      Extraocular Movements: Extraocular movements intact.      Pupils:  Pupils are equal, round, and reactive to light.   Cardiovascular:      Rate and Rhythm: Normal rate and regular rhythm.      Pulses: Normal pulses.   Pulmonary:      Effort: Pulmonary effort is normal. No respiratory distress.   Abdominal:      General: Abdomen is flat. There is no distension.   Musculoskeletal:      Cervical back: Normal range of motion.      Right lower leg: No edema.      Left lower leg: No edema.      Comments:   Right-sided BKA, left BKA   Skin:     General: Skin is warm.   Neurological:      General: No focal deficit present.      Mental Status: She is alert and oriented to person, place, and time. Mental status is at baseline.   Psychiatric:         Mood and Affect: Mood normal.       Fluids    Intake/Output Summary (Last 24 hours) at 3/27/2023 1322  Last data filed at 3/26/2023 1957  Gross per 24 hour   Intake 600 ml   Output --   Net 600 ml         Laboratory  Recent Labs     03/25/23 2200 03/26/23 0212   WBC 4.5* 4.2*   RBC 3.37* 3.10*   HEMOGLOBIN 8.8* 8.2*   HEMATOCRIT 29.6* 26.8*   MCV 87.8 86.5   MCH 26.1* 26.5*   MCHC 29.7* 30.6*   RDW 50.4* 48.9   PLATELETCT 352 325   MPV 9.2 9.3       Recent Labs     03/25/23 2200 03/26/23  0212   SODIUM 140 140   POTASSIUM 3.9 4.3   CHLORIDE 108 108   CO2 24 27   GLUCOSE 111* 93   BUN 19 19   CREATININE 0.71 0.65   CALCIUM 8.1* 8.2*                     Imaging  CT-FOOT WITH PLUS RECONS LEFT   Final Result      1.  Edema and/or cellulitis of the visualized leg, ankle and foot. No focal abscess.   2.  Large blisters.   3.  Postsurgical changes first through fifth transmetatarsal amputations.   4.  No definite evidence for osteomyelitis.   5.  Some thickening of the Achilles tendon is nonspecific and not well evaluated on CT.   6.  Further evaluation with MRI as clinically indicated.      DX-CHEST-PORTABLE (1 VIEW)   Final Result      No acute cardiac or pulmonary abnormalities are identified.      DX-FEMUR-2+ RIGHT   Final Result      No  radiographic evidence of acute traumatic injury.      CT-ABDOMEN-PELVIS WITH   Final Result      1.  Right internal iliac vein DVT.   2.  Somewhat striated nephrogram of the bilateral superior poles may be artifactual. Correlate with urinalysis for pyelonephritis.   3.  Redemonstration of enhancing lesion of the uterus. This has been evaluated on prior ultrasound.   4.  Prominent bilateral inguinal lymph nodes may be reactive. Malignancy not excluded.      Dr. Emanuel discussed these findings with Dr. Child at 1:49 PM by telephone on 3/6/2023      DX-FOOT-2- LEFT   Final Result      Status post transmetatarsal amputation. No definitive radiographic evidence of ostomy myelitis. If there is strong clinical suspicion, MRI of the foot with and without contrast should be obtained for further evaluation.      DX-CHEST-PORTABLE (1 VIEW)   Final Result      No acute cardiopulmonary disease evident.           Assessment/Plan  * Infection of amputation stump of left lower extremity (HCC)  Assessment & Plan      CT imaging LLE, negative for osteomyelitis or abscess formation.   S/p course of abx     status post left below-knee amputation on 3/22/2023.    Pathology noted  With Gangrene of bone and soft tissue with underlying chronic active  osteomyelitis ,Resection margins viable .    Discuss with ID regarding if patient need treatment     UTI (urinary tract infection)  Assessment & Plan  Urine culture noted E. Faecium VRE.  However patient denies UTI symptoms  No indication for treatment    Slow transit constipation  Assessment & Plan  Resolved    Noncompliance  Assessment & Plan  Significant history with noncompliance with medications and doctor visits    Homelessness  Assessment & Plan  Complicated medical history and noncompliance and drug abuse   was consulted to consider group home      Lactic acid acidosis  Assessment & Plan  Likely related to dehydration and infection  IV fluid and ceftriaxone  Resolved    Hx  of BKA, right ([facility])  Assessment & Plan  Right BKA and left transmetatarsal amputation  LPS following, local wound care     Methamphetamine abuse ([facility])  Assessment & Plan  Encouraged the patient to quit  Urine drug screen is positive  Blood culture no growth to date    Acute deep vein thrombosis (DVT) ([facility])- (present on admission)  Assessment & Plan  On Eliquis        HIV (human immunodeficiency virus infection) ([facility])- (present on admission)  Assessment & Plan    Resume Biktarvy with close monitoring    Hypokalemia- (present on admission)  Assessment & Plan  Labs reviewed, resolved    Schizophrenia ([facility])- (present on admission)  Assessment & Plan  Patient has hallucination with hearing voices  Denied any SI or HI, however with active auditory hallucinations  Resume home medication Depakote, formally on Zyprexa  Psych consulted, continue with Depakote, trough level stable, Zyprexa 5 mg twice daily initiated    Tobacco abuse- (present on admission)  Assessment & Plan  Cessation counseled, 3 minute  Nicotine replacement         VTE prophylaxis: SCDs/TEDs and therapeutic anticoagulation with eliquis    I have performed a physical exam and reviewed and updated ROS and Plan today (3/27/2023). In review of yesterday's note (3/26/2023), there are no changes except as documented above.

## 2023-03-27 NOTE — PROGRESS NOTES
Assumed care of pt and received bedside report. Pt is A&O x 4, denies pain, bedside table and personal items within reach, bed locked in low position, bed alarm on, dressings changed to bilateral BKA's, no additional needs at this time.

## 2023-03-27 NOTE — CARE PLAN
The patient is Stable - Low risk of patient condition declining or worsening    Shift Goals  Clinical Goals: Provide wound care this shift  Patient Goals: rest, comfort  Family Goals: ryan    Progress made toward(s) clinical / shift goals:  Provided wound care per order this shift and pt was able to rest comfortably through most of the night.     Problem: Knowledge Deficit - Standard  Goal: Patient and family/care givers will demonstrate understanding of plan of care, disease process/condition, diagnostic tests and medications  Outcome: Progressing     Problem: Pain - Standard  Goal: Alleviation of pain or a reduction in pain to the patient’s comfort goal  Outcome: Progressing     Problem: Skin Integrity  Goal: Skin integrity is maintained or improved  Outcome: Progressing     Problem: Fall Risk  Goal: Patient will remain free from falls  Outcome: Progressing     Problem: Psychosocial  Goal: Patient's level of anxiety will decrease  Outcome: Progressing  Goal: Patient's ability to verbalize feelings about condition will improve  Outcome: Progressing  Goal: Patient's ability to re-evaluate and adapt role responsibilities will improve  Outcome: Progressing  Goal: Patient and family will demonstrate ability to cope with life altering diagnosis and/or procedure  Outcome: Progressing  Goal: Spiritual and cultural needs incorporated into hospitalization  Outcome: Progressing     Problem: Communication  Goal: The ability to communicate needs accurately and effectively will improve  Outcome: Progressing     Problem: Discharge Barriers/Planning  Goal: Patient's continuum of care needs are met  Outcome: Progressing     Problem: Hemodynamics  Goal: Patient's hemodynamics, fluid balance and neurologic status will be stable or improve  Outcome: Progressing     Problem: Respiratory  Goal: Patient will achieve/maintain optimum respiratory ventilation and gas exchange  Outcome: Progressing     Problem: Fluid Volume  Goal: Fluid  volume balance will be maintained  Outcome: Progressing     Problem: Dysphagia  Goal: Dysphagia will improve  Outcome: Progressing     Problem: Risk for Aspiration  Goal: Patient's risk for aspiration will be absent or decrease  Outcome: Progressing     Problem: Nutrition  Goal: Patient's nutritional and fluid intake will be adequate or improve  Outcome: Progressing  Goal: Enteral nutrition will be maintained or improve  Outcome: Progressing  Goal: Enteral nutrition will be maintained or improve  Outcome: Progressing     Problem: Urinary Elimination  Goal: Establish and maintain regular urinary output  Outcome: Progressing     Problem: Bowel Elimination  Goal: Establish and maintain regular bowel function  Outcome: Progressing     Problem: Gastrointestinal Irritability  Goal: Nausea and vomiting will be absent or improve  Outcome: Progressing  Goal: Diarrhea will be absent or improved  Outcome: Progressing     Problem: Mobility  Goal: Patient's capacity to carry out activities will improve  Outcome: Progressing     Problem: Self Care  Goal: Patient will have the ability to perform ADLs independently or with assistance (bathe, groom, dress, toilet and feed)  Outcome: Progressing     Problem: Infection - Standard  Goal: Patient will remain free from infection  Outcome: Progressing     Problem: Wound/ / Incision Healing  Goal: Patient's wound/surgical incision will decrease in size and heals properly  Outcome: Progressing

## 2023-03-28 PROCEDURE — 700102 HCHG RX REV CODE 250 W/ 637 OVERRIDE(OP): Performed by: GENERAL PRACTICE

## 2023-03-28 PROCEDURE — 700102 HCHG RX REV CODE 250 W/ 637 OVERRIDE(OP): Performed by: INTERNAL MEDICINE

## 2023-03-28 PROCEDURE — 99232 SBSQ HOSP IP/OBS MODERATE 35: CPT | Performed by: STUDENT IN AN ORGANIZED HEALTH CARE EDUCATION/TRAINING PROGRAM

## 2023-03-28 PROCEDURE — A9270 NON-COVERED ITEM OR SERVICE: HCPCS | Performed by: INTERNAL MEDICINE

## 2023-03-28 PROCEDURE — A9270 NON-COVERED ITEM OR SERVICE: HCPCS | Performed by: GENERAL PRACTICE

## 2023-03-28 PROCEDURE — 770001 HCHG ROOM/CARE - MED/SURG/GYN PRIV*

## 2023-03-28 RX ADMIN — DIVALPROEX SODIUM 500 MG: 500 TABLET, DELAYED RELEASE ORAL at 09:29

## 2023-03-28 RX ADMIN — DOCUSATE SODIUM 50 MG AND SENNOSIDES 8.6 MG 2 TABLET: 8.6; 5 TABLET, FILM COATED ORAL at 20:27

## 2023-03-28 RX ADMIN — OLANZAPINE 5 MG: 5 TABLET, FILM COATED ORAL at 09:29

## 2023-03-28 RX ADMIN — DOCUSATE SODIUM 50 MG AND SENNOSIDES 8.6 MG 2 TABLET: 8.6; 5 TABLET, FILM COATED ORAL at 09:28

## 2023-03-28 RX ADMIN — APIXABAN 5 MG: 5 TABLET, FILM COATED ORAL at 09:29

## 2023-03-28 RX ADMIN — DIVALPROEX SODIUM 500 MG: 500 TABLET, DELAYED RELEASE ORAL at 20:27

## 2023-03-28 RX ADMIN — BICTEGRAVIR SODIUM, EMTRICITABINE, AND TENOFOVIR ALAFENAMIDE FUMARATE 1 TABLET: 50; 200; 25 TABLET ORAL at 09:47

## 2023-03-28 RX ADMIN — MAGNESIUM HYDROXIDE 30 ML: 400 SUSPENSION ORAL at 20:27

## 2023-03-28 RX ADMIN — OLANZAPINE 5 MG: 5 TABLET, FILM COATED ORAL at 20:27

## 2023-03-28 RX ADMIN — NICOTINE 14 MG: 14 PATCH, EXTENDED RELEASE TRANSDERMAL at 09:30

## 2023-03-28 RX ADMIN — APIXABAN 5 MG: 5 TABLET, FILM COATED ORAL at 20:27

## 2023-03-28 ASSESSMENT — ENCOUNTER SYMPTOMS
MYALGIAS: 0
FEVER: 0
VOMITING: 0
DIZZINESS: 0
SHORTNESS OF BREATH: 0
COUGH: 0
BRUISES/BLEEDS EASILY: 0
BLURRED VISION: 0
NAUSEA: 0

## 2023-03-28 ASSESSMENT — PAIN DESCRIPTION - PAIN TYPE: TYPE: ACUTE PAIN

## 2023-03-28 NOTE — DISCHARGE PLANNING
Case Management Discharge Planning    Admission Date: 3/6/2023  GMLOS: 5.8  ALOS: 22    6-Clicks ADL Score: 20  6-Clicks Mobility Score: 9  PT and/or OT Eval ordered: Yes  Post-acute Referrals Ordered: Yes  Post-acute Choice Obtained: Yes  Has referral(s) been sent to post-acute provider:  Yes      Anticipated Discharge Dispo: Discharge Disposition: Discharged to home/self care (01)    DME Needed: No    Action(s) Taken: Updated Provider/Nurse on Discharge Plan    Escalations Completed: Long Length of Stay Committee     Medically Clear: No    Next Steps: follow up with Dr Choudhary    Barriers to Discharge: Patient has been denied by all local skilled facilities, no discharge plan, behaviors.  Possible discharge to the location of her choice, with out patient wound care as needed.     Is the patient up for discharge tomorrow: No  Sw spoke to the patient about discharge planning.  Patient stated if she can not go anywhere she will go back to where she was living before, shelter.  SW asked patient where she was living prior to 2022 when she first came to [facility].  Patient stated she was in a prison in [facility], did not disclose why. SW asked patient if she has other family members, siblings, parents' etc.  Patient stated yes, but would not disclose any more information.  SW stated old chart notes stated patient had stated she had received SS in the past.  Patient stated that is correct.  SW asked patient if she was in [facility]/prison as this will stop SS payments.  Patient stated yes, [facility].  SW asked how patient got to [facility].  Patient stated that she was released from [facility] and sent to a program here, thinks it was drug program.  Patient is unsure how long she was in prison/[facility]. (If patient is in either for over 11 months patient would need to re-apply for SS benefits)    PC from [facility].  Stated she was only involved in helping patient get started with outpatient HIV treatment with [facility].  Meli stated she does not know if the patient followed up or not.  SW asked who asked her to assist, Meli stated she received a call from  [employer].  Patient came from the [facility] in [place] for treatment.  Believes patient was discharged to the street from [facility].      SW checked the [facility] Web site, patient was arrested for Burglary, previous felony arrest, suspended sentence on 9/22/21.

## 2023-03-28 NOTE — PROGRESS NOTES
[facility] Medicine Daily Progress Note    Date of Service  3/28/2023    Chief Complaint  Kellie Chatman is a 53 y.o. female admitted 3/6/2023 with altered mental status    Hospital Course  53-year-old female with past medical history of s/p right BKA and left transmetatarsal amputation, hypertension, HIV, seizure disorder, schizophrenia, methamphetamine use, tobacco use and homelessness to the ED with altered mental status.  See completed course of antibiotic for pyelonephritis  CT imaging LLE, negative for osteomyelitis or abscess formation.  Evaluated by orthopedics and status post left below-knee amputation on 3/22/2023.  Patient will need placement,  assisting      Interval Problem Update  Seen patient at bedside  S/p L BKA  Patient reports constipations, on bowel regimen  LPS following  Patient has completed antibiotics  On eliquis, tolerating  Pending placement    I have discussed this patient's plan of care and discharge plan at IDT rounds today with Case Management, Nursing, Nursing leadership, and other members of the IDT team.    Consultants/Specialty  LPS    Code Status  Full Code    Disposition  Patient is not medically cleared for discharge.   Anticipate discharge to  TBD .  I have placed the appropriate orders for post-discharge needs.    Review of Systems  Review of Systems   Constitutional:  Positive for malaise/fatigue. Negative for fever.   HENT:  Negative for hearing loss.    Eyes:  Negative for blurred vision.   Respiratory:  Negative for cough and shortness of breath.    Cardiovascular:  Negative for chest pain.   Gastrointestinal:  Negative for nausea and vomiting.   Genitourinary:  Negative for dysuria.   Musculoskeletal:  Positive for joint pain. Negative for myalgias.   Skin:  Negative for rash.   Neurological:  Negative for dizziness.   Endo/Heme/Allergies:  Does not bruise/bleed easily.      Physical Exam  Temp:  [36.2 °C (97.1 °F)-36.7 °C (98 °F)] 36.3 °C (97.3 °F)  Pulse:   [72-89] 84  Resp:  [18] 18  BP: (101-109)/(66-75) 107/75  SpO2:  [98 %-99 %] 98 %    Physical Exam  Constitutional:       General: She is not in acute distress.  HENT:      Head: Normocephalic and atraumatic.      Right Ear: Tympanic membrane normal.      Left Ear: Tympanic membrane normal.      Nose: Nose normal.      Mouth/Throat:      Mouth: Mucous membranes are moist.   Eyes:      Extraocular Movements: Extraocular movements intact.      Pupils: Pupils are equal, round, and reactive to light.   Cardiovascular:      Rate and Rhythm: Normal rate and regular rhythm.      Pulses: Normal pulses.   Pulmonary:      Effort: Pulmonary effort is normal. No respiratory distress.   Abdominal:      General: Abdomen is flat. There is no distension.   Musculoskeletal:      Cervical back: Normal range of motion.      Right lower leg: No edema.      Left lower leg: No edema.      Comments:   Right-sided BKA, left BKA   Skin:     General: Skin is warm.   Neurological:      General: No focal deficit present.      Mental Status: She is alert and oriented to person, place, and time. Mental status is at baseline.   Psychiatric:         Mood and Affect: Mood normal.       Fluids    Intake/Output Summary (Last 24 hours) at 3/28/2023 1552  Last data filed at 3/27/2023 2000  Gross per 24 hour   Intake 120 ml   Output --   Net 120 ml         Laboratory  Recent Labs     03/25/23 2200 03/26/23  0212   WBC 4.5* 4.2*   RBC 3.37* 3.10*   HEMOGLOBIN 8.8* 8.2*   HEMATOCRIT 29.6* 26.8*   MCV 87.8 86.5   MCH 26.1* 26.5*   MCHC 29.7* 30.6*   RDW 50.4* 48.9   PLATELETCT 352 325   MPV 9.2 9.3       Recent Labs     03/25/23 2200 03/26/23  0212   SODIUM 140 140   POTASSIUM 3.9 4.3   CHLORIDE 108 108   CO2 24 27   GLUCOSE 111* 93   BUN 19 19   CREATININE 0.71 0.65   CALCIUM 8.1* 8.2*                     Imaging  CT-FOOT WITH PLUS RECONS LEFT   Final Result      1.  Edema and/or cellulitis of the visualized leg, ankle and foot. No focal abscess.   2.   Large blisters.   3.  Postsurgical changes first through fifth transmetatarsal amputations.   4.  No definite evidence for osteomyelitis.   5.  Some thickening of the Achilles tendon is nonspecific and not well evaluated on CT.   6.  Further evaluation with MRI as clinically indicated.      DX-CHEST-PORTABLE (1 VIEW)   Final Result      No acute cardiac or pulmonary abnormalities are identified.      DX-FEMUR-2+ RIGHT   Final Result      No radiographic evidence of acute traumatic injury.      CT-ABDOMEN-PELVIS WITH   Final Result      1.  Right internal iliac vein DVT.   2.  Somewhat striated nephrogram of the bilateral superior poles may be artifactual. Correlate with urinalysis for pyelonephritis.   3.  Redemonstration of enhancing lesion of the uterus. This has been evaluated on prior ultrasound.   4.  Prominent bilateral inguinal lymph nodes may be reactive. Malignancy not excluded.      Dr. Emanuel discussed these findings with Dr. Child at 1:49 PM by telephone on 3/6/2023      DX-FOOT-2- LEFT   Final Result      Status post transmetatarsal amputation. No definitive radiographic evidence of ostomy myelitis. If there is strong clinical suspicion, MRI of the foot with and without contrast should be obtained for further evaluation.      DX-CHEST-PORTABLE (1 VIEW)   Final Result      No acute cardiopulmonary disease evident.           Assessment/Plan  * Infection of amputation stump of left lower extremity (HCC)  Assessment & Plan      CT imaging LLE, negative for osteomyelitis or abscess formation.   S/p course of abx     status post left below-knee amputation on 3/22/2023.    Pathology noted  With Gangrene of bone and soft tissue with underlying chronic active  osteomyelitis ,Resection margins viable .      UTI (urinary tract infection)  Assessment & Plan  Urine culture noted E. Faecium VRE.  However patient denies UTI symptoms  No indication for treatment    Slow transit constipation  Assessment &  Plan  Resolved    Noncompliance  Assessment & Plan  Significant history with noncompliance with medications and doctor visits    Homelessness  Assessment & Plan  Complicated medical history and noncompliance and drug abuse   was consulted to consider group home      Lactic acid acidosis  Assessment & Plan  Likely related to dehydration and infection  IV fluid and ceftriaxone  Resolved    Hx of BKA, right (HCC)  Assessment & Plan  Right BKA and left transmetatarsal amputation  LPS following, local wound care     Methamphetamine abuse ([facility])  Assessment & Plan  Encouraged the patient to quit  Urine drug screen is positive  Blood culture no growth to date    Acute deep vein thrombosis (DVT) ([facility])- (present on admission)  Assessment & Plan  On Eliquis  Tolerating  Hb has been stable      HIV (human immunodeficiency virus infection) ([facility])- (present on admission)  Assessment & Plan    Resume Biktarvy with close monitoring    Hypokalemia- (present on admission)  Assessment & Plan  Labs reviewed, resolved    Schizophrenia ([facility])- (present on admission)  Assessment & Plan  Patient has hallucination with hearing voices  Denied any SI or HI, however with active auditory hallucinations  Resume home medication Depakote, formally on Zyprexa  Psych consulted, continue with Depakote, trough level stable, Zyprexa 5 mg twice daily initiated    Tobacco abuse- (present on admission)  Assessment & Plan  Cessation counseled, 3 minute  Nicotine replacement         VTE prophylaxis: SCDs/TEDs and therapeutic anticoagulation with eliquis    I have performed a physical exam and reviewed and updated ROS and Plan today (3/28/2023). In review of yesterday's note (3/27/2023), there are no changes except as documented above.

## 2023-03-28 NOTE — PROGRESS NOTES
Alert x3 at least, very irritable with questioning. Allowed writer to complete dressing changes as ordered. Awaiting placement

## 2023-03-28 NOTE — CARE PLAN
Problem: Knowledge Deficit - Standard  Goal: Patient and family/care givers will demonstrate understanding of plan of care, disease process/condition, diagnostic tests and medications  Outcome: Progressing     Problem: Pain - Standard  Goal: Alleviation of pain or a reduction in pain to the patient’s comfort goal  Outcome: Progressing     Problem: Skin Integrity  Goal: Skin integrity is maintained or improved  Outcome: Progressing     Problem: Fall Risk  Goal: Patient will remain free from falls  Outcome: Progressing     Problem: Psychosocial  Goal: Patient's level of anxiety will decrease  Outcome: Progressing  Goal: Patient's ability to verbalize feelings about condition will improve  Outcome: Progressing  Goal: Patient's ability to re-evaluate and adapt role responsibilities will improve  Outcome: Progressing  Goal: Patient and family will demonstrate ability to cope with life altering diagnosis and/or procedure  Outcome: Progressing  Goal: Spiritual and cultural needs incorporated into hospitalization  Outcome: Progressing     Problem: Communication  Goal: The ability to communicate needs accurately and effectively will improve  Outcome: Progressing     Problem: Discharge Barriers/Planning  Goal: Patient's continuum of care needs are met  Outcome: Progressing     Problem: Hemodynamics  Goal: Patient's hemodynamics, fluid balance and neurologic status will be stable or improve  Outcome: Progressing     Problem: Respiratory  Goal: Patient will achieve/maintain optimum respiratory ventilation and gas exchange  Outcome: Progressing     Problem: Chest Tube Management  Goal: Complications related to chest tube will be avoided or minimized  Outcome: Progressing     Problem: Fluid Volume  Goal: Fluid volume balance will be maintained  Outcome: Progressing     Problem: Mechanical Ventilation  Goal: Safe management of artificial airway and ventilation  Outcome: Progressing  Goal: Successful weaning off mechanical  ventilator, spontaneously maintains adequate gas exchange  Outcome: Progressing  Goal: Patient will be able to express needs and understand communication  Outcome: Progressing     Problem: Dysphagia  Goal: Dysphagia will improve  Outcome: Progressing     Problem: Risk for Aspiration  Goal: Patient's risk for aspiration will be absent or decrease  Outcome: Progressing     Problem: Nutrition  Goal: Patient's nutritional and fluid intake will be adequate or improve  Outcome: Progressing  Goal: Enteral nutrition will be maintained or improve  Outcome: Progressing  Goal: Enteral nutrition will be maintained or improve  Outcome: Progressing     Problem: Urinary Elimination  Goal: Establish and maintain regular urinary output  Outcome: Progressing     Problem: Bowel Elimination  Goal: Establish and maintain regular bowel function  Outcome: Progressing     Problem: Gastrointestinal Irritability  Goal: Nausea and vomiting will be absent or improve  Outcome: Progressing  Goal: Diarrhea will be absent or improved  Outcome: Progressing     Problem: Rectal Tube  Goal: Fecal output will be contained and skin will remain free from irritation  Outcome: Progressing     Problem: Mobility  Goal: Patient's capacity to carry out activities will improve  Outcome: Progressing     Problem: Self Care  Goal: Patient will have the ability to perform ADLs independently or with assistance (bathe, groom, dress, toilet and feed)  Outcome: Progressing     Problem: Infection - Standard  Goal: Patient will remain free from infection  Outcome: Progressing     Problem: Wound/ / Incision Healing  Goal: Patient's wound/surgical incision will decrease in size and heals properly  Outcome: Progressing     Problem: Knowledge Deficit - Standard  Goal: Patient and family/care givers will demonstrate understanding of plan of care, disease process/condition, diagnostic tests and medications  Outcome: Progressing     Problem: Pain - Standard  Goal: Alleviation  of pain or a reduction in pain to the patient’s comfort goal  Outcome: Progressing     Problem: Skin Integrity  Goal: Skin integrity is maintained or improved  Outcome: Progressing     Problem: Fall Risk  Goal: Patient will remain free from falls  Outcome: Progressing     Problem: Psychosocial  Goal: Patient's level of anxiety will decrease  Outcome: Progressing     Problem: Psychosocial  Goal: Patient's ability to verbalize feelings about condition will improve  Outcome: Progressing     Problem: Psychosocial  Goal: Patient's ability to re-evaluate and adapt role responsibilities will improve  Outcome: Progressing     Problem: Psychosocial  Goal: Patient and family will demonstrate ability to cope with life altering diagnosis and/or procedure  Outcome: Progressing   The patient is Stable - Low risk of patient condition declining or worsening    Shift Goals  Clinical Goals: wound care, pain control  Patient Goals: rest/comfort  Family Goals: ryan    Progress made toward(s) clinical / shift goals:  pain control, safety    Patient is not progressing towards the following goals:

## 2023-03-29 LAB
ANION GAP SERPL CALC-SCNC: 8 MMOL/L (ref 7–16)
BUN SERPL-MCNC: 20 MG/DL (ref 8–22)
CALCIUM SERPL-MCNC: 8.4 MG/DL (ref 8.5–10.5)
CHLORIDE SERPL-SCNC: 104 MMOL/L (ref 96–112)
CO2 SERPL-SCNC: 26 MMOL/L (ref 20–33)
CREAT SERPL-MCNC: 0.59 MG/DL (ref 0.5–1.4)
ERYTHROCYTE [DISTWIDTH] IN BLOOD BY AUTOMATED COUNT: 46.8 FL (ref 35.9–50)
GFR SERPLBLD CREATININE-BSD FMLA CKD-EPI: 106 ML/MIN/1.73 M 2
GLUCOSE SERPL-MCNC: 100 MG/DL (ref 65–99)
HCT VFR BLD AUTO: 28.4 % (ref 37–47)
HGB BLD-MCNC: 8.8 G/DL (ref 12–16)
MCH RBC QN AUTO: 26 PG (ref 27–33)
MCHC RBC AUTO-ENTMCNC: 31 G/DL (ref 33.6–35)
MCV RBC AUTO: 84 FL (ref 81.4–97.8)
PLATELET # BLD AUTO: 358 K/UL (ref 164–446)
PMV BLD AUTO: 9.1 FL (ref 9–12.9)
POTASSIUM SERPL-SCNC: 4.1 MMOL/L (ref 3.6–5.5)
RBC # BLD AUTO: 3.38 M/UL (ref 4.2–5.4)
SODIUM SERPL-SCNC: 138 MMOL/L (ref 135–145)
WBC # BLD AUTO: 4.3 K/UL (ref 4.8–10.8)

## 2023-03-29 PROCEDURE — 99232 SBSQ HOSP IP/OBS MODERATE 35: CPT | Performed by: STUDENT IN AN ORGANIZED HEALTH CARE EDUCATION/TRAINING PROGRAM

## 2023-03-29 PROCEDURE — A9270 NON-COVERED ITEM OR SERVICE: HCPCS | Performed by: GENERAL PRACTICE

## 2023-03-29 PROCEDURE — 36415 COLL VENOUS BLD VENIPUNCTURE: CPT

## 2023-03-29 PROCEDURE — 700102 HCHG RX REV CODE 250 W/ 637 OVERRIDE(OP): Performed by: GENERAL PRACTICE

## 2023-03-29 PROCEDURE — 700102 HCHG RX REV CODE 250 W/ 637 OVERRIDE(OP): Performed by: INTERNAL MEDICINE

## 2023-03-29 PROCEDURE — 99232 SBSQ HOSP IP/OBS MODERATE 35: CPT | Performed by: NURSE PRACTITIONER

## 2023-03-29 PROCEDURE — 80048 BASIC METABOLIC PNL TOTAL CA: CPT

## 2023-03-29 PROCEDURE — 85027 COMPLETE CBC AUTOMATED: CPT

## 2023-03-29 PROCEDURE — A9270 NON-COVERED ITEM OR SERVICE: HCPCS | Performed by: INTERNAL MEDICINE

## 2023-03-29 PROCEDURE — RXMED WILLOW AMBULATORY MEDICATION CHARGE: Performed by: STUDENT IN AN ORGANIZED HEALTH CARE EDUCATION/TRAINING PROGRAM

## 2023-03-29 PROCEDURE — 770001 HCHG ROOM/CARE - MED/SURG/GYN PRIV*

## 2023-03-29 RX ORDER — DIVALPROEX SODIUM 500 MG/1
500 TABLET, EXTENDED RELEASE ORAL EVERY 12 HOURS
Qty: 60 TABLET | Refills: 0 | Status: SHIPPED | OUTPATIENT
Start: 2023-03-29 | End: 2023-04-29

## 2023-03-29 RX ORDER — OLANZAPINE 5 MG/1
5 TABLET ORAL 2 TIMES DAILY
Qty: 60 TABLET | Refills: 0 | Status: SHIPPED | OUTPATIENT
Start: 2023-03-29 | End: 2023-04-29

## 2023-03-29 RX ADMIN — OLANZAPINE 5 MG: 5 TABLET, FILM COATED ORAL at 21:31

## 2023-03-29 RX ADMIN — APIXABAN 5 MG: 5 TABLET, FILM COATED ORAL at 09:49

## 2023-03-29 RX ADMIN — OLANZAPINE 5 MG: 5 TABLET, FILM COATED ORAL at 09:49

## 2023-03-29 RX ADMIN — APIXABAN 5 MG: 5 TABLET, FILM COATED ORAL at 21:31

## 2023-03-29 RX ADMIN — DOCUSATE SODIUM 50 MG AND SENNOSIDES 8.6 MG 2 TABLET: 8.6; 5 TABLET, FILM COATED ORAL at 21:31

## 2023-03-29 RX ADMIN — BICTEGRAVIR SODIUM, EMTRICITABINE, AND TENOFOVIR ALAFENAMIDE FUMARATE 1 TABLET: 50; 200; 25 TABLET ORAL at 09:49

## 2023-03-29 RX ADMIN — DOCUSATE SODIUM 50 MG AND SENNOSIDES 8.6 MG 2 TABLET: 8.6; 5 TABLET, FILM COATED ORAL at 09:49

## 2023-03-29 RX ADMIN — NICOTINE 14 MG: 14 PATCH, EXTENDED RELEASE TRANSDERMAL at 09:49

## 2023-03-29 RX ADMIN — DIVALPROEX SODIUM 500 MG: 500 TABLET, DELAYED RELEASE ORAL at 21:31

## 2023-03-29 RX ADMIN — POLYETHYLENE GLYCOL 3350 1 PACKET: 17 POWDER, FOR SOLUTION ORAL at 15:57

## 2023-03-29 RX ADMIN — DIVALPROEX SODIUM 500 MG: 500 TABLET, DELAYED RELEASE ORAL at 09:49

## 2023-03-29 ASSESSMENT — ENCOUNTER SYMPTOMS
BRUISES/BLEEDS EASILY: 0
NAUSEA: 0
FEVER: 0
COUGH: 0
DIZZINESS: 0
SHORTNESS OF BREATH: 0
VOMITING: 0
BLURRED VISION: 0
MYALGIAS: 0

## 2023-03-29 NOTE — CARE PLAN
Problem: Knowledge Deficit - Standard  Goal: Patient and family/care givers will demonstrate understanding of plan of care, disease process/condition, diagnostic tests and medications  Outcome: Progressing     Problem: Pain - Standard  Goal: Alleviation of pain or a reduction in pain to the patient’s comfort goal  Outcome: Progressing     Problem: Skin Integrity  Goal: Skin integrity is maintained or improved  Outcome: Progressing     Problem: Fall Risk  Goal: Patient will remain free from falls  Outcome: Progressing     Problem: Psychosocial  Goal: Patient's level of anxiety will decrease  Outcome: Progressing  Goal: Patient's ability to verbalize feelings about condition will improve  Outcome: Progressing  Goal: Patient's ability to re-evaluate and adapt role responsibilities will improve  Outcome: Progressing  Goal: Patient and family will demonstrate ability to cope with life altering diagnosis and/or procedure  Outcome: Progressing  Goal: Spiritual and cultural needs incorporated into hospitalization  Outcome: Progressing     Problem: Communication  Goal: The ability to communicate needs accurately and effectively will improve  Outcome: Progressing     Problem: Discharge Barriers/Planning  Goal: Patient's continuum of care needs are met  Outcome: Progressing     Problem: Hemodynamics  Goal: Patient's hemodynamics, fluid balance and neurologic status will be stable or improve  Outcome: Progressing     Problem: Respiratory  Goal: Patient will achieve/maintain optimum respiratory ventilation and gas exchange  Outcome: Progressing     Problem: Chest Tube Management  Goal: Complications related to chest tube will be avoided or minimized  Outcome: Progressing     Problem: Fluid Volume  Goal: Fluid volume balance will be maintained  Outcome: Progressing     Problem: Mechanical Ventilation  Goal: Safe management of artificial airway and ventilation  Outcome: Progressing  Goal: Successful weaning off mechanical  ventilator, spontaneously maintains adequate gas exchange  Outcome: Progressing  Goal: Patient will be able to express needs and understand communication  Outcome: Progressing     Problem: Dysphagia  Goal: Dysphagia will improve  Outcome: Progressing     Problem: Risk for Aspiration  Goal: Patient's risk for aspiration will be absent or decrease  Outcome: Progressing     Problem: Nutrition  Goal: Patient's nutritional and fluid intake will be adequate or improve  Outcome: Progressing  Goal: Enteral nutrition will be maintained or improve  Outcome: Progressing  Goal: Enteral nutrition will be maintained or improve  Outcome: Progressing     Problem: Urinary Elimination  Goal: Establish and maintain regular urinary output  Outcome: Progressing     Problem: Bowel Elimination  Goal: Establish and maintain regular bowel function  Outcome: Progressing     Problem: Gastrointestinal Irritability  Goal: Nausea and vomiting will be absent or improve  Outcome: Progressing  Goal: Diarrhea will be absent or improved  Outcome: Progressing     Problem: Rectal Tube  Goal: Fecal output will be contained and skin will remain free from irritation  Outcome: Progressing     Problem: Mobility  Goal: Patient's capacity to carry out activities will improve  Outcome: Progressing     Problem: Self Care  Goal: Patient will have the ability to perform ADLs independently or with assistance (bathe, groom, dress, toilet and feed)  Outcome: Progressing     Problem: Infection - Standard  Goal: Patient will remain free from infection  Outcome: Progressing     Problem: Wound/ / Incision Healing  Goal: Patient's wound/surgical incision will decrease in size and heals properly  Outcome: Progressing   The patient is Stable - Low risk of patient condition declining or worsening    Shift Goals  Clinical Goals: wound care, pain control, comfort  Patient Goals: rest  Family Goals: ryan    Progress made toward(s) clinical / shift goals:  pain controlled,  safety/fall risk education, wound healing.    Patient is not progressing towards the following goals:

## 2023-03-29 NOTE — DISCHARGE SUMMARY
Discharge Summary    CHIEF COMPLAINT ON ADMISSION  Chief Complaint   Patient presents with    Wound Check     Open wounds to bilateral lower extremity amputation sites .    Aggressive Behavior     Reports she is hearing voices. Hx Schizophrenia, has not been taking her medication.        Reason for Admission  ems     Admission Date  3/6/2023    CODE STATUS  Full Code    HPI & HOSPITAL COURSE  53-year-old female with past medical history of s/p right BKA and left transmetatarsal amputation, hypertension, HIV, seizure disorder, schizophrenia, methamphetamine use, tobacco use and homelessness to the ED with altered mental status. She was found dehydrated with lactic acid 2.6 on admission. CT scan for abdomen showed right internal iliac vein DVT with possible pyelonephritis bilateral. She was started with eliquis and has been tolerating well with Eliquis.     Patient has history of left TMA and frostbite injury prior admission. CT L foot notes cellulitis of the visualized leg, ankle and foot. No focal abscess. Large blisters. No definite evidence for osteomyelitis.   She was evaluated by orthopedics and underwent left below-knee amputation on 3/22/2023. Patient has completed antibiotics. Pathology noted Gangrene of bone and soft tissue with underlying chronic active osteomyelitis. Resection margins viable.     Psychiatry was consulted and patient was started with zyprexa and along with her home med depakote. Patient denies hallucinations.     PT/OT evaluated the patient on the day of discharge, no further rehab needs. On the day of discharge, patient is afebrile, vital sign stable, L BKA wound well healed. Her home meds were refilled. Outpatient wound care clinic and orthopedics appointment were arranged.     Therefore, she is discharged in good and stable condition to home with close outpatient follow-up.    The patient met 2-midnight criteria for an inpatient stay at the time of discharge. She is advised to follow up  with PCP, wound care and orthopedics as outpatient    Discharge Date  3/30/23    FOLLOW UP ITEMS POST DISCHARGE  PCP  Wound clinic  orthopedics    DISCHARGE DIAGNOSES  Principal Problem:    Infection of amputation stump of left lower extremity (HCC) POA: Unknown  Active Problems:    Tobacco abuse POA: Yes    Schizophrenia ([facility]) POA: Yes    Hypokalemia POA: Yes    HIV (human immunodeficiency virus infection) ([facility]) POA: Yes    Acute deep vein thrombosis (DVT) ([facility]) POA: Yes    Methamphetamine abuse ([facility]) POA: Unknown    Hx of BKA, right ([facility]) POA: Unknown    Lactic acid acidosis POA: Unknown    Homelessness POA: Unknown    Noncompliance POA: Unknown    Slow transit constipation POA: Unknown    UTI (urinary tract infection) POA: Unknown  Resolved Problems:    Fever in adult POA: Unknown      FOLLOW UP  No future appointments.  [facility]  [address] [place] [id number]  [phone]          [facility]  [address] [place] [id number]  [phone]          Critical access hospital ([facility]  [address] [place] [id number]  [phone]  Follow up in 1 week(s)        MEDICATIONS ON DISCHARGE     Medication List        START taking these medications        Instructions   divalproex  MG Tb24  Commonly known as: DEPAKOTE ER  Replaces: divalproex 500 MG Tbec   Take 1 Tablet by mouth every 12 hours for 30 days.  Dose: 500 mg     Eliquis 5mg Tabs  Generic drug: apixaban   Take 1 Tablet by mouth 2 times a day for 30 days. Indications: DVT/PE  Dose: 5 mg            CHANGE how you take these medications        Instructions   OLANZapine 5 MG Tabs  What changed:   medication strength  how much to take  when to take this  Commonly known as: ZYPREXA   Take 1 Tablet by mouth 2 times a day for 30 days.  Dose: 5 mg            CONTINUE taking these medications        Instructions   Biktarvy -25 mg Tabs tablet  Generic drug:  bictegravir-emtricitab-TAF   Take 1 Tablet by mouth every day.  Dose: 1 Tablet            STOP taking these medications      amLODIPine 5 MG Tabs  Commonly known as: NORVASC     aspirin 81 MG EC tablet     divalproex 500 MG Tbec  Commonly known as: DEPAKOTE  Replaced by: divalproex  MG Tb24     rivaroxaban 20 MG Tabs tablet  Commonly known as: XARELTO              Allergies  No Known Allergies    DIET  Orders Placed This Encounter   Procedures    Diet Order Diet: Regular     Standing Status:   Standing     Number of Occurrences:   1     Order Specific Question:   Diet:     Answer:   Regular [1]       ACTIVITY  As tolerated.  Weight bearing as tolerated    CONSULTATIONS  Orthopedics  psych    PROCEDURES  S/p L BKA    LABORATORY  Lab Results   Component Value Date    SODIUM 138 03/29/2023    POTASSIUM 4.1 03/29/2023    CHLORIDE 104 03/29/2023    CO2 26 03/29/2023    GLUCOSE 100 (H) 03/29/2023    BUN 20 03/29/2023    CREATININE 0.59 03/29/2023        Lab Results   Component Value Date    WBC 4.3 (L) 03/29/2023    HEMOGLOBIN 8.8 (L) 03/29/2023    HEMATOCRIT 28.4 (L) 03/29/2023    PLATELETCT 358 03/29/2023      CT-FOOT WITH PLUS RECONS LEFT   Final Result      1.  Edema and/or cellulitis of the visualized leg, ankle and foot. No focal abscess.   2.  Large blisters.   3.  Postsurgical changes first through fifth transmetatarsal amputations.   4.  No definite evidence for osteomyelitis.   5.  Some thickening of the Achilles tendon is nonspecific and not well evaluated on CT.   6.  Further evaluation with MRI as clinically indicated.      DX-CHEST-PORTABLE (1 VIEW)   Final Result      No acute cardiac or pulmonary abnormalities are identified.      DX-FEMUR-2+ RIGHT   Final Result      No radiographic evidence of acute traumatic injury.      CT-ABDOMEN-PELVIS WITH   Final Result      1.  Right internal iliac vein DVT.   2.  Somewhat striated nephrogram of the bilateral superior poles may be artifactual. Correlate with  urinalysis for pyelonephritis.   3.  Redemonstration of enhancing lesion of the uterus. This has been evaluated on prior ultrasound.   4.  Prominent bilateral inguinal lymph nodes may be reactive. Malignancy not excluded.      Dr. Emanuel discussed these findings with Dr. Child at 1:49 PM by telephone on 3/6/2023      DX-FOOT-2- LEFT   Final Result      Status post transmetatarsal amputation. No definitive radiographic evidence of ostomy myelitis. If there is strong clinical suspicion, MRI of the foot with and without contrast should be obtained for further evaluation.      DX-CHEST-PORTABLE (1 VIEW)   Final Result      No acute cardiopulmonary disease evident.          Total time of the discharge process 33 minutes.

## 2023-03-29 NOTE — CARE PLAN
The patient is Stable - Low risk of patient condition declining or worsening    Shift Goals  Clinical Goals: Wound care, dressings, pain control, rest and comort  Patient Goals: Sleep, Have a bowel movement  Family Goals: IVETTE    Progress made toward(s) clinical / shift goals:  Wound care and bedding change was completed at the beginning of shift. Pt. Reported pain during dressing change but has not complained about it other than that. Pt. Also asked for something to help her go to the bathroom, so we gave her milk of mag and stool softeners. Pt has gone to the bathroom since then. Pt. Has also slept most of the night and hasn't called.     Problem: Knowledge Deficit - Standard  Goal: Patient and family/care givers will demonstrate understanding of plan of care, disease process/condition, diagnostic tests and medications  Outcome: Progressing     Problem: Pain - Standard  Goal: Alleviation of pain or a reduction in pain to the patient’s comfort goal  Outcome: Progressing     Problem: Skin Integrity  Goal: Skin integrity is maintained or improved  Outcome: Progressing     Problem: Fall Risk  Goal: Patient will remain free from falls  Outcome: Progressing     Problem: Psychosocial  Goal: Patient's level of anxiety will decrease  Outcome: Progressing     Problem: Communication  Goal: The ability to communicate needs accurately and effectively will improve  Outcome: Progressing     Problem: Discharge Barriers/Planning  Goal: Patient's continuum of care needs are met  Outcome: Progressing     Problem: Hemodynamics  Goal: Patient's hemodynamics, fluid balance and neurologic status will be stable or improve  Outcome: Progressing     Problem: Respiratory  Goal: Patient will achieve/maintain optimum respiratory ventilation and gas exchange  Outcome: Progressing     Problem: Fluid Volume  Goal: Fluid volume balance will be maintained  Outcome: Progressing     Problem: Urinary Elimination  Goal: Establish and maintain regular  urinary output  Outcome: Progressing     Problem: Bowel Elimination  Goal: Establish and maintain regular bowel function  Outcome: Progressing     Problem: Gastrointestinal Irritability  Goal: Nausea and vomiting will be absent or improve  Outcome: Progressing  Goal: Diarrhea will be absent or improved  Outcome: Progressing     Problem: Self Care  Goal: Patient will have the ability to perform ADLs independently or with assistance (bathe, groom, dress, toilet and feed)  Outcome: Progressing     Problem: Infection - Standard  Goal: Patient will remain free from infection  Outcome: Progressing     Problem: Wound/ / Incision Healing  Goal: Patient's wound/surgical incision will decrease in size and heals properly  Outcome: Progressing

## 2023-03-29 NOTE — DISCHARGE INSTRUCTIONS
Discharge Instructions per Nabila Choudhary M.D.    Please follow-up with PCP as outpatient.  Please continue taking medications   Follow up with wound clinic as outpatient   Follow up with orthopedics Dr. Lux in 2 weeks    Return to ER in the event of new or worsening symptoms. Please note importance of compliance and the patient has agreed to proceed with all medical recommendations and follow up plan indicated above. All medications come with benefits and risks. Risks may include permanent injury or death and these risks can be minimized with close reassessment and monitoring. Please make it to your scheduled follow ups with PCP and wound clinic

## 2023-03-29 NOTE — DISCHARGE PLANNING
Medical Social Work  Volt to Dr Choudhary, will be patient be discharged today with outpatient wound care.    Volt from Dr Choudhary, LPS will be out today to assess.

## 2023-03-30 ENCOUNTER — PHARMACY VISIT (OUTPATIENT)
Dept: PHARMACY | Facility: MEDICAL CENTER | Age: 53
End: 2023-03-30
Payer: COMMERCIAL

## 2023-03-30 ENCOUNTER — PATIENT OUTREACH (OUTPATIENT)
Dept: SCHEDULING | Facility: IMAGING CENTER | Age: 53
End: 2023-03-30
Payer: MEDICAID

## 2023-03-30 VITALS
BODY MASS INDEX: 23.29 KG/M2 | DIASTOLIC BLOOD PRESSURE: 51 MMHG | HEART RATE: 66 BPM | WEIGHT: 162.7 LBS | RESPIRATION RATE: 17 BRPM | TEMPERATURE: 98.2 F | HEIGHT: 70 IN | OXYGEN SATURATION: 98 % | SYSTOLIC BLOOD PRESSURE: 84 MMHG

## 2023-03-30 PROCEDURE — 97162 PT EVAL MOD COMPLEX 30 MIN: CPT

## 2023-03-30 PROCEDURE — 700102 HCHG RX REV CODE 250 W/ 637 OVERRIDE(OP): Performed by: INTERNAL MEDICINE

## 2023-03-30 PROCEDURE — 700102 HCHG RX REV CODE 250 W/ 637 OVERRIDE(OP): Performed by: GENERAL PRACTICE

## 2023-03-30 PROCEDURE — 99239 HOSP IP/OBS DSCHRG MGMT >30: CPT | Performed by: STUDENT IN AN ORGANIZED HEALTH CARE EDUCATION/TRAINING PROGRAM

## 2023-03-30 PROCEDURE — 97530 THERAPEUTIC ACTIVITIES: CPT

## 2023-03-30 PROCEDURE — A9270 NON-COVERED ITEM OR SERVICE: HCPCS | Performed by: INTERNAL MEDICINE

## 2023-03-30 PROCEDURE — A9270 NON-COVERED ITEM OR SERVICE: HCPCS | Performed by: GENERAL PRACTICE

## 2023-03-30 PROCEDURE — 97166 OT EVAL MOD COMPLEX 45 MIN: CPT

## 2023-03-30 RX ADMIN — DIVALPROEX SODIUM 500 MG: 500 TABLET, DELAYED RELEASE ORAL at 09:56

## 2023-03-30 RX ADMIN — OLANZAPINE 5 MG: 5 TABLET, FILM COATED ORAL at 09:56

## 2023-03-30 RX ADMIN — BICTEGRAVIR SODIUM, EMTRICITABINE, AND TENOFOVIR ALAFENAMIDE FUMARATE 1 TABLET: 50; 200; 25 TABLET ORAL at 09:57

## 2023-03-30 RX ADMIN — APIXABAN 5 MG: 5 TABLET, FILM COATED ORAL at 09:56

## 2023-03-30 RX ADMIN — NICOTINE 14 MG: 14 PATCH, EXTENDED RELEASE TRANSDERMAL at 09:57

## 2023-03-30 RX ADMIN — DOCUSATE SODIUM 50 MG AND SENNOSIDES 8.6 MG 2 TABLET: 8.6; 5 TABLET, FILM COATED ORAL at 09:56

## 2023-03-30 ASSESSMENT — COGNITIVE AND FUNCTIONAL STATUS - GENERAL
SUGGESTED CMS G CODE MODIFIER DAILY ACTIVITY: CJ
MOBILITY SCORE: 12
TOILETING: A LITTLE
SUGGESTED CMS G CODE MODIFIER MOBILITY: CL
DRESSING REGULAR LOWER BODY CLOTHING: A LITTLE
CLIMB 3 TO 5 STEPS WITH RAILING: TOTAL
STANDING UP FROM CHAIR USING ARMS: TOTAL
DAILY ACTIVITIY SCORE: 21
WALKING IN HOSPITAL ROOM: TOTAL
HELP NEEDED FOR BATHING: A LITTLE
MOVING FROM LYING ON BACK TO SITTING ON SIDE OF FLAT BED: UNABLE

## 2023-03-30 ASSESSMENT — GAIT ASSESSMENTS: GAIT LEVEL OF ASSIST: UNABLE TO PARTICIPATE

## 2023-03-30 ASSESSMENT — ACTIVITIES OF DAILY LIVING (ADL): TOILETING: INDEPENDENT

## 2023-03-30 NOTE — DISCHARGE PLANNING
1004  Received Choice form at 0810  Agency/Facility Name: [facility]   Referral sent per Choice form @ 1002

## 2023-03-30 NOTE — THERAPY
"Physical Therapy   Initial Evaluation     Patient Name: Kellie Chatman  Age:  53 y.o., Sex:  female  Medical Record #: [id number]  Today's Date: 3/30/2023          Assessment    Patient is 53 y.o. 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 PMHx includes homelessness, noncompliance, schizophrenia, HIV, drug abuse, smoking, s/p L TMA 2/7.    Pt is agreeable to participation in therapy session and tolerates session fairly well; reports occasional pain during mobility of L LE due to increased sensitivity s/p BKA. Pt reports anxiety/fear of falling during transfer attempt, declines use of slide board due to soreness/pain in bottom. Pt attempts UE supported squat pivot transfer to w/c from bed, during transfer w/c brakes fail causing w/c to slide and pt refuses continued attempt, requires mod assist to return to sitting EOB. Pt does demonstrate adequate UE strength and trunk strength/control for transfer to w/c, will continue to provide pt education and re attempt w/c transfers.     Pt reports staff at [facility] is able to assist with transfers to/from bed, pt voices desire to be discharged today if possible. If pt has sufficient support for transfers pt is appropriate for DC with w/c, however ideally pt will demonstrate adequate and safe transfers to/from w/c. Pt will benefit from continued PT services in acute setting, as well as in post acute setting.         Plan    Physical Therapy Initial Treatment Plan   Treatment Plan : (P) Bed Mobility, Therapeutic Activities, Therapeutic Exercise  Treatment Frequency: (P) 3 Times per Week  Duration: (P) Until Therapy Goals Met    DC Equipment Recommendations: (P)  (slideboard if pt agreeable)  Discharge Recommendations: (P) Anticipate that the patient will have no further physical therapy needs after discharge from the hospital       Subjective    \"Oh heck no, I'm not doing this.\"     Objective       03/30/23 1045   Charge Group   PT " Evaluation PT Evaluation Mod   Total Time Spent   PT Total Time Yes   PT Evaluation Time Spent (Mins) 15   PT Therapeutic Activities Time Spent (Mins) 15   PT Total Time Spent (Calculated) 30    Services   Is patient using  services for this encounter? No   Initial Contact Note    Initial Contact Note Order Received and Verified, Physical Therapy Evaluation in Progress with Full Report to Follow.   Vitals   O2 Delivery Device None - Room Air   Pain 0 - 10 Group   Location Leg   Therapist Pain Assessment During Activity  (with tactile pressure)   Prior Living Situation   Prior Services Home-Independent   Housing / Facility Homeless   Lives with - Patient's Self Care Capacity Alone and Able to Care For Self   Prior Level of Functional Mobility   Bed Mobility Independent   Transfer Status Required Assist   Ambulation Unable To Determine At This Time   Wheelchair Independent   History of Falls   History of Falls No   Cognition    Cognition / Consciousness WDL   Level of Consciousness Alert   Comments pt pleasant, limited by fear of falling, pain in LE and bottom   Strength Upper Body   Upper Body Strength  WDL   Strength Lower Body   Lower Body Strength  X  (limited by pain, NWB B LE)   Coordination Upper Body   Coordination WDL   Coordination Lower Body    Coordination Lower Body  WDL   Balance Assessment   Sitting Balance (Static) Fair +   Sitting Balance (Dynamic) Fair   Weight Shift Sitting Fair   Bed Mobility    Supine to Sit Supervised   Sit to Supine Supervised   Scooting Supervised   Gait Analysis   Gait Level Of Assist Unable to Participate   Comments B BKA   Functional Mobility   Sit to Stand Unable to Participate   Bed, Chair, Wheelchair Transfer Moderate Assist   Transfer Method Slide Board  (attempted, pt declines use of slide board)   Mobility supine to/from sit EOB, slide transfer attempted to w/c   How much difficulty does the patient currently have...   Turning over in bed  (including adjusting bedclothes, sheets and blankets)? 4   Sitting down on and standing up from a chair with arms (e.g., wheelchair, bedside commode, etc.) 1   Moving from lying on back to sitting on the side of the bed? 4   How much help from another person does the patient currently need...   Moving to and from a bed to a chair (including a wheelchair)? 1   Need to walk in a hospital room? 1   Climbing 3-5 steps with a railing? 1   6 clicks Mobility Score 12   Activity Tolerance   Sitting in Chair NT   Sitting Edge of Bed 20 min   Standing unable, B BKA   Short Term Goals    Short Term Goal # 1 pt will demonstrate slide board transfer bed to/from wheelchair and commode with min assist and verbal cues for sequencing in 6 visits   Education Group   Education Provided Role of Physical Therapist;Transfer Status   Role of Physical Therapist Patient Response Patient;Acceptance;Explanation;Verbal Demonstration   Transfer Status Patient Response Patient;Acceptance;Explanation;Verbal Demonstration;Action Demonstration   Physical Therapy Initial Treatment Plan    Treatment Plan  Bed Mobility;Therapeutic Activities;Therapeutic Exercise   Treatment Frequency 3 Times per Week   Duration Until Therapy Goals Met   Problem List    Problems Pain;Impaired Bed Mobility;Impaired Ambulation;Impaired Transfers;Functional ROM Deficit;Impaired Balance;Impaired Coordination;Functional Strength Deficit;Decreased Activity Tolerance;Safety Awareness Deficits / Cognition   Anticipated Discharge Equipment and Recommendations   DC Equipment Recommendations   (slideboard if pt agreeable)   Discharge Recommendations Anticipate that the patient will have no further physical therapy needs after discharge from the hospital   Interdisciplinary Plan of Care Collaboration   IDT Collaboration with  Nursing;Occupational Therapist   Patient Position at End of Therapy Call Light within Reach;Tray Table within Reach;Phone within Reach;In Bed;Bed Alarm On    Collaboration Comments RN updated   Session Information   Date / Session Number  3/30  -1  (1/3; 4/5)       Elizabeth Weaver DPT

## 2023-03-30 NOTE — THERAPY
"Occupational Therapy   Initial Evaluation     Patient Name: Kellie Chatman  Age:  53 y.o., Sex:  female  Medical Record #: [id number]  Today's Date: 3/30/2023     Precautions  Precautions: (P) Non Weight Bearing Right Lower Extremity, Non Weight Bearing Left Lower Extremity  Comments: (P) B BKAs    Assessment    Patient is 53 y.o. 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 PMHx includes homelessness, noncompliance, schizophrenia, HIV, drug abuse, smoking, s/p L TMA 2/7. Pt normally independent with functional mobility and ADLs living in a female shelter where she receives assistance with transfers. Pt required supervision for bed mobility and seated ADLs, modA for attempt to transfer to wheelchair (pt refused slideboard). Will continue to see for skilled therapy while admitted as well as anticipate pt wont need any further OT at discharge.      Plan    Occupational Therapy Initial Treatment Plan   Treatment Interventions: (P) Self Care / Activities of Daily Living, Adaptive Equipment, Neuro Re-Education / Balance, Therapeutic Activity, Therapeutic Exercises  Treatment Frequency: (P) 3 Times per Week  Duration: (P) Until Therapy Goals Met    DC Equipment Recommendations: (P) Unable to determine at this time  Discharge Recommendations: (P) Anticipate that the patient will have no further occupational therapy needs after discharge from the hospital     Subjective    \"I want to leave today, get outside\"     Objective       03/30/23 1048   Prior Living Situation   Prior Services Home-Independent   Housing / Facility Homeless   Equipment Owned Wheelchair   Lives with - Patient's Self Care Capacity Alone and Able to Care For Self   Prior Level of ADL Function   Self Feeding Independent   Grooming / Hygiene Independent   Bathing Independent   Dressing Independent   Toileting Independent   Prior Level of IADL Function   Prior Level Of Mobility Uses Wheel Chair for Community " Mobility;Uses Wheel Chair for in Home Mobility   History of Falls   History of Falls No   Precautions   Precautions Non Weight Bearing Right Lower Extremity;Non Weight Bearing Left Lower Extremity   Comments B BKAs   Pain 0 - 10 Group   Therapist Pain Assessment Post Activity Pain Same as Prior to Activity;Nurse Notified   Cognition    Cognition / Consciousness WDL   Level of Consciousness Alert   Active ROM Upper Body   Active ROM Upper Body  WDL   Dominant Hand Right   Strength Upper Body   Upper Body Strength  WDL   Sensation Upper Body   Upper Extremity Sensation  WDL   Upper Body Muscle Tone   Upper Body Muscle Tone  WDL   Coordination Upper Body   Coordination WDL   Balance Assessment   Sitting Balance (Static) Fair +   Sitting Balance (Dynamic) Fair   Weight Shift Sitting Fair   Bed Mobility    Supine to Sit Supervised   Sit to Supine Supervised   Scooting Supervised   ADL Assessment   Grooming Supervision;Seated   Upper Body Dressing Supervision   Toileting Minimal Assist   How much help from another person does the patient currently need...   Putting on and taking off regular lower body clothing? 3   Bathing (including washing, rinsing, and drying)? 3   Toileting, which includes using a toilet, bedpan, or urinal? 3   Putting on and taking off regular upper body clothing? 4   Taking care of personal grooming such as brushing teeth? 4   Eating meals? 4   6 Clicks Daily Activity Score 21   Functional Mobility   Sit to Stand Unable to Participate   Bed, Chair, Wheelchair Transfer Moderate Assist  (attempted)   Transfer Method Sit Pivot   Mobility bed mobility, attempted transfer to wheelchair, returned to supine   Activity Tolerance   Sitting Edge of Bed 20 min   Short Term Goals   Short Term Goal # 1 Pt will complete ADL transfers with supervision   Short Term Goal # 2 Pt will complete LB dressing with supervision   Short Term Goal # 3 Pt will complete toileting with supervision   Education Group   Education  Provided Role of Occupational Therapist   Role of Occupational Therapist Patient Response Patient;Acceptance;Explanation   Occupational Therapy Initial Treatment Plan    Treatment Interventions Self Care / Activities of Daily Living;Adaptive Equipment;Neuro Re-Education / Balance;Therapeutic Activity;Therapeutic Exercises   Treatment Frequency 3 Times per Week   Duration Until Therapy Goals Met   Problem List   Problem List Decreased Active Daily Living Skills;Decreased Homemaking Skills;Decreased Functional Mobility;Decreased Activity Tolerance;Impaired Postural Control / Balance   Anticipated Discharge Equipment and Recommendations   DC Equipment Recommendations Unable to determine at this time   Discharge Recommendations Anticipate that the patient will have no further occupational therapy needs after discharge from the hospital   Interdisciplinary Plan of Care Collaboration   IDT Collaboration with  Nursing;Physical Therapist   Patient Position at End of Therapy In Bed;Bed Alarm On;Call Light within Reach;Phone within Reach;Tray Table within Reach   Collaboration Comments RN updated                    No

## 2023-03-30 NOTE — PROGRESS NOTES
LIMB PRESERVATION SERVICE  PROGRESS NOTE    HPI: Kellie Chatman is a 53 y.o.  with a past medical history that includes frostbite of bilateral feet, HIV, EtOH abuse, methamphetamine abuse.  Admitted 3/6/2023 for DVT, lower extremity, proximal, acute, left (HCC) [I82.4Y2].     LPS has been consulted for frostbite injuries to left TMA, left heel, right BKA.    The patient was initially seen on 11/14/2022 for frostbite to her right foot and left hallux.  Due to this injury patient had a right BKA on 12/20/2022 with Dr. Lux.  Due to continued decline of her left foot she eventually had a left TMA on 2/7/2023 with Dr. Lux.    Patient is homeless and does attempt to stay at the [facility] as able.  Patient states unfortunately she had another frostbite injury to the posterior aspect of her right BKA and her left TMA and plantar foot including her left heel.  Patient was seen at [place] with DC on 3/1/2023, and it was deemed that frostbite injury needed to demarcate prior to determination for surgical intervention for 1 to 2 weeks.       SURGERY DATE: 3/22/2023 Dr. Lux  PROCEDURE: Left below-knee amputation      INTERVAL HISTORY:  3/11/2023: Patient denies fevers, chills, nausea, vomiting.  Pain controlled.   3/14/23: complains of discomfort to left foot.  Patient with eyes closed during majority of visit, appears to be resting.  3/17/2023: Patient feeling better today, sitting up in bed, alert.  States pain under control at this time.  Discussed left TMA and possible need for left BKA if unable to heal.  Patient wishes to preserve left limb if possible and would like to continue with current wound care at this time.  3/22/2023: Received update that pt left TMA and heel have declined and pt is currently NPO. Discussed with pt that due to tissue loss/damage, she may likely require left BKA rather than left TMA revision. Pt vioces understanding and is agreeable to proceed with surgery today.   3/24/2023: POD  "#2 patient denies fevers, chills, nausea, vomiting.  Patient resting in bed, states pain is under control  3/29/23: POD # 7. Pain controlled. Denies F/C/N/V. Calm.         PERTINENT LPS RESULTS:   COVID-19: not completed  CT left foot  1.  Edema and/or cellulitis of the visualized leg, ankle and foot. No focal abscess.  2.  Large blisters.  3.  Postsurgical changes first through fifth transmetatarsal amputations.  4.  No definite evidence for osteomyelitis.  5.  Some thickening of the Achilles tendon is nonspecific and not well evaluated on CT.  6.  Further evaluation with MRI as clinically indicated.        EXAM:          /79   Pulse 82   Temp 36.5 °C (97.7 °F) (Temporal)   Resp 18   Ht 1.778 m (5' 10\")   Wt 73.8 kg (162 lb 11.2 oz)   SpO2 99%   BMI 23.34 kg/m²     Pedal Pulses:   Right BKA warm and well-perfused, palpable popliteal  Left BKA: Warm and well-perfused, palpable popliteal    Wound(s) :    Wound location: Right posterior BKA  Wound characteristics:   Intact Black eschar, dry, no fluctuance  Eschar beginning to lift on edges. Remains intact to center  No erythema  Minimal edema  Scant exudate    Right BKA anterior:  Superficial eschar along previous incision line  ~2x2cm area to lateral aspect  ~0.5 x 4cm to center  ~0.5 x0.5cm to medial apsect  No erythema  No edema   No drainage    Surgical incision location:   Left BKA  Well approximated, staples in place  No erythema  Mild edema  Scant dried bloody drainage      Wound photo: Right BKA        Left BKA            DIABETES MANAGEMENT:    A1c:   Lab Results   Component Value Date/Time    HBA1C 5.1 02/24/2023 05:54 AM        INFECTION MANAGEMENT:    Results from last 7 days   Lab Units 03/29/23  0046 03/26/23  0212 03/25/23  2200 03/24/23  0432 03/23/23  0109   WBC 1501 K/uL 4.3* 4.2* 4.5* 6.0 8.4   PLATELET COUNT 1518 K/uL 358 325 352 362 390           ASSESSMENT/PLAN:   53 y.o. admitted for DVT, lower extremity, proximal, acute, left (HCC) " [I82.4Y2]. Presents with frostbite injury posterior right BKA, left TMA, left heel, left plantar foot.    POD #7 left BKA Dr. Lux    -Right BKA eschar remains stable, dry to anterior aspect and posterior aspect. Beginning to lift  -Left BKA well approximated.  Slight edema.  Recommend DC to SNF or respite home. None accepting. Plant to DC to shelter. Pt high risk reinjury to already frostbitten tissue putting her at risk for higher level amputation.       Wound care:   -wound care orders placed for nursing by LPS   -Right anterior/posterior BKA: Paint with Betadine, allowed to air dry, leave open to air.  Daily  -Left BKA: AZRA with tubigrip    Labs/Imaging:  -COVID-19: not completed this admission.     Vascular status:   -Right BKA palpable popliteal pulse  - Left BKA palpable popliteal pulse    Surgery:   - Ortho Dr. Lux  -No plans for further surgery at this time    Antibiotics:   - completed abx    Weight Bearing Status:   -Right BKA: Nonweightbearing  -Left BKA: Nonweightbearing        PT/OT :   -has not seen post L BKA  PT OT order placed       DISCHARGE PLAN:    Disposition:   TBD, patient currently homeless  recommend SNF or respite home    Wound care referral already in place  Appt to be scheduled with transport          Discussed with: pt, RN, Dr. Choudhary    My total time spent caring for the patient on the day of the encounter was 35 minutes.   This does not include time spent on separately billable procedures/tests.   for patient care, counseling, and coordination on this date, including patient face-to face time, unit/floor time with review of records/pertinent lab data and studies, as well as discussing diagnostic evaluation/work up, planned therapeutic interventions, and future disposition of care, as per the interval events/subjective and the assessment and plan as noted above.        Please note that this dictation was created using voice recognition software. I have  worked with technical  experts from [facility] to optimize the interface.  I have made every reasonable attempt to correct obvious errors, but there may be errors of grammar and possibly content that I did not discover before finalizing the note.    Jessica Banuelos, [email or web address].    If any questions or concerns, please contact LPS through voalte.

## 2023-03-30 NOTE — PROGRESS NOTES
Received report from NOC RN, pt care assumed. VS stable on RA. No signs of acute distress. Pt is aaox3 and denies pain. Call light within reach.

## 2023-03-30 NOTE — DISCHARGE PLANNING
Case Management Discharge Planning    Admission Date: 3/6/2023  GMLOS: 5.8  ALOS: 24    6-Clicks ADL Score: 20  6-Clicks Mobility Score: 12  PT and/or OT Eval ordered: Yes  Post-acute Referrals Ordered: Yes  Post-acute Choice Obtained: Yes  Has referral(s) been sent to post-acute provider:  Yes      Anticipated Discharge Dispo: Discharge Disposition: Discharged to home/self care (01)    DME Needed: No    Action(s) Taken: Updated Provider/Nurse on Discharge Plan    Escalations Completed: Long Length of Stay Committee     Medically Clear: Yes    Next Steps: n/a    Barriers to Discharge: None    Is the patient up for discharge tomorrow: No  Patient has stated she is wanting to discharge to the shelter today.  Requested assistance in getting to the shelter, cab.      SW contact wound to obtain date time of wound appointment, 4/20 at 8:00  PC to scheduling to arrange follow up with Dr Lux.  Appointment schedule for 4/6/2022 at 9:00    SW informed patient of the above information/along with information/number to contact [facility] for transport     Cab voucher provided to location of patient's choice, shelter

## 2023-03-30 NOTE — PROGRESS NOTES
Received report from NOC RN, pt care assumed. Call light within reach. Bed-alarm on. Pt is aaox4 and denies pain.

## 2023-03-30 NOTE — PROGRESS NOTES
[facility] Medicine Daily Progress Note    Date of Service  3/29/2023    Chief Complaint  Kellie Chatman is a 53 y.o. female admitted 3/6/2023 with altered mental status    Hospital Course  53-year-old female with past medical history of s/p right BKA and left transmetatarsal amputation, hypertension, HIV, seizure disorder, schizophrenia, methamphetamine use, tobacco use and homelessness to the ED with altered mental status.  See completed course of antibiotic for pyelonephritis  CT imaging LLE, negative for osteomyelitis or abscess formation.  Evaluated by orthopedics and status post left below-knee amputation on 3/22/2023.  Patient will need placement,  assisting      Interval Problem Update  Seen patient at bedside  S/p L BKA  Patient reports constipations, on bowel regimen. Still having no bowel movement. Will give milk of Mag  Discussed with LPS. Patient needs outpatient wound care and Dr. Lux follow up  On eliquis, tolerating, Hb stable  Pending placement    I have discussed this patient's plan of care and discharge plan at IDT rounds today with Case Management, Nursing, Nursing leadership, and other members of the IDT team.    Consultants/Specialty  LPS    Code Status  Full Code    Disposition  Patient is not medically cleared for discharge.   Anticipate discharge to  TBD .  I have placed the appropriate orders for post-discharge needs.    Review of Systems  Review of Systems   Constitutional:  Positive for malaise/fatigue. Negative for fever.   HENT:  Negative for hearing loss.    Eyes:  Negative for blurred vision.   Respiratory:  Negative for cough and shortness of breath.    Cardiovascular:  Negative for chest pain.   Gastrointestinal:  Negative for nausea and vomiting.   Genitourinary:  Negative for dysuria.   Musculoskeletal:  Positive for joint pain. Negative for myalgias.   Skin:  Negative for rash.   Neurological:  Negative for dizziness.   Endo/Heme/Allergies:  Does not bruise/bleed  easily.      Physical Exam  Temp:  [36.2 °C (97.2 °F)-36.5 °C (97.7 °F)] 36.5 °C (97.7 °F)  Pulse:  [74-93] 82  Resp:  [18] 18  BP: ()/(65-79) 113/79  SpO2:  [97 %-99 %] 99 %    Physical Exam  Constitutional:       General: She is not in acute distress.  HENT:      Head: Normocephalic and atraumatic.      Right Ear: Tympanic membrane normal.      Left Ear: Tympanic membrane normal.      Nose: Nose normal.      Mouth/Throat:      Mouth: Mucous membranes are moist.   Eyes:      Extraocular Movements: Extraocular movements intact.      Pupils: Pupils are equal, round, and reactive to light.   Cardiovascular:      Rate and Rhythm: Normal rate and regular rhythm.      Pulses: Normal pulses.   Pulmonary:      Effort: Pulmonary effort is normal. No respiratory distress.   Abdominal:      General: Abdomen is flat. There is no distension.   Musculoskeletal:      Cervical back: Normal range of motion.      Right lower leg: No edema.      Left lower leg: No edema.      Comments:   Right-sided BKA, left BKA   Skin:     General: Skin is warm.   Neurological:      General: No focal deficit present.      Mental Status: She is alert and oriented to person, place, and time. Mental status is at baseline.   Psychiatric:         Mood and Affect: Mood normal.       Fluids    Intake/Output Summary (Last 24 hours) at 3/29/2023 1722  Last data filed at 3/29/2023 0900  Gross per 24 hour   Intake 480 ml   Output --   Net 480 ml         Laboratory  Recent Labs     03/29/23  0046   WBC 4.3*   RBC 3.38*   HEMOGLOBIN 8.8*   HEMATOCRIT 28.4*   MCV 84.0   MCH 26.0*   MCHC 31.0*   RDW 46.8   PLATELETCT 358   MPV 9.1       Recent Labs     03/29/23  0046   SODIUM 138   POTASSIUM 4.1   CHLORIDE 104   CO2 26   GLUCOSE 100*   BUN 20   CREATININE 0.59   CALCIUM 8.4*                     Imaging  CT-FOOT WITH PLUS RECONS LEFT   Final Result      1.  Edema and/or cellulitis of the visualized leg, ankle and foot. No focal abscess.   2.  Large blisters.    3.  Postsurgical changes first through fifth transmetatarsal amputations.   4.  No definite evidence for osteomyelitis.   5.  Some thickening of the Achilles tendon is nonspecific and not well evaluated on CT.   6.  Further evaluation with MRI as clinically indicated.      DX-CHEST-PORTABLE (1 VIEW)   Final Result      No acute cardiac or pulmonary abnormalities are identified.      DX-FEMUR-2+ RIGHT   Final Result      No radiographic evidence of acute traumatic injury.      CT-ABDOMEN-PELVIS WITH   Final Result      1.  Right internal iliac vein DVT.   2.  Somewhat striated nephrogram of the bilateral superior poles may be artifactual. Correlate with urinalysis for pyelonephritis.   3.  Redemonstration of enhancing lesion of the uterus. This has been evaluated on prior ultrasound.   4.  Prominent bilateral inguinal lymph nodes may be reactive. Malignancy not excluded.      Dr. Emanuel discussed these findings with Dr. Child at 1:49 PM by telephone on 3/6/2023      DX-FOOT-2- LEFT   Final Result      Status post transmetatarsal amputation. No definitive radiographic evidence of ostomy myelitis. If there is strong clinical suspicion, MRI of the foot with and without contrast should be obtained for further evaluation.      DX-CHEST-PORTABLE (1 VIEW)   Final Result      No acute cardiopulmonary disease evident.           Assessment/Plan  * Infection of amputation stump of left lower extremity (HCC)  Assessment & Plan      CT imaging LLE, negative for osteomyelitis or abscess formation.   S/p course of abx     status post left below-knee amputation on 3/22/2023.    Pathology noted  With Gangrene of bone and soft tissue with underlying chronic active  osteomyelitis ,Resection margins viable.  Outpatient would care clinic and Dr. Lux follow up      UTI (urinary tract infection)  Assessment & Plan  Urine culture noted E. Faecium VRE.  However patient denies UTI symptoms  No indication for treatment    Slow transit  constipation  Assessment & Plan  Resolved    Noncompliance  Assessment & Plan  Significant history with noncompliance with medications and doctor visits    Homelessness  Assessment & Plan  Complicated medical history and noncompliance and drug abuse   was consulted to consider group home      Lactic acid acidosis  Assessment & Plan  Likely related to dehydration and infection  IV fluid and ceftriaxone  Resolved    Hx of BKA, right (HCC)  Assessment & Plan  Right BKA and left transmetatarsal amputation  LPS following, local wound care     Methamphetamine abuse ([facility])  Assessment & Plan  Encouraged the patient to quit  Urine drug screen is positive  Blood culture no growth to date    Acute deep vein thrombosis (DVT) ([facility])- (present on admission)  Assessment & Plan  Continue high risk med Eliquis. Hb stable  No sign of active bleeding       HIV (human immunodeficiency virus infection) ([facility])- (present on admission)  Assessment & Plan    Resume Biktarvy with close monitoring    Hypokalemia- (present on admission)  Assessment & Plan  Labs reviewed, resolved    Schizophrenia ([facility])- (present on admission)  Assessment & Plan  Patient has hallucination with hearing voices  Denied any SI or HI, however with active auditory hallucinations  Resume home medication Depakote, formally on Zyprexa  Psych consulted, continue with Depakote, trough level stable, Zyprexa 5 mg twice daily initiated    Tobacco abuse- (present on admission)  Assessment & Plan  Cessation counseled, 3 minute  Nicotine replacement         VTE prophylaxis: SCDs/TEDs and therapeutic anticoagulation with eliquis    I have performed a physical exam and reviewed and updated ROS and Plan today (3/29/2023). In review of yesterday's note (3/28/2023), there are no changes except as documented above.

## 2023-03-30 NOTE — CARE PLAN
The patient is Stable - Low risk of patient condition declining or worsening    Shift Goals  Clinical Goals: Have BM  Patient Goals: rest  Family Goals: IVETTE      Problem: Knowledge Deficit - Standard  Goal: Patient and family/care givers will demonstrate understanding of plan of care, disease process/condition, diagnostic tests and medications  Outcome: Progressing     Problem: Pain - Standard  Goal: Alleviation of pain or a reduction in pain to the patient’s comfort goal  Outcome: Progressing     Problem: Skin Integrity  Goal: Skin integrity is maintained or improved  Outcome: Progressing     Problem: Fall Risk  Goal: Patient will remain free from falls  Outcome: Progressing     Problem: Psychosocial  Goal: Patient's level of anxiety will decrease  Outcome: Progressing  Goal: Patient's ability to verbalize feelings about condition will improve  Outcome: Progressing  Goal: Patient's ability to re-evaluate and adapt role responsibilities will improve  Outcome: Progressing  Goal: Patient and family will demonstrate ability to cope with life altering diagnosis and/or procedure  Outcome: Progressing  Goal: Spiritual and cultural needs incorporated into hospitalization  Outcome: Progressing     Problem: Communication  Goal: The ability to communicate needs accurately and effectively will improve  Outcome: Progressing     Problem: Discharge Barriers/Planning  Goal: Patient's continuum of care needs are met  Outcome: Progressing     Problem: Respiratory  Goal: Patient will achieve/maintain optimum respiratory ventilation and gas exchange  Outcome: Progressing     Problem: Nutrition  Goal: Patient's nutritional and fluid intake will be adequate or improve  Outcome: Progressing  Goal: Enteral nutrition will be maintained or improve  Outcome: Progressing  Goal: Enteral nutrition will be maintained or improve  Outcome: Progressing     Problem: Gastrointestinal Irritability  Goal: Nausea and vomiting will be absent or  improve  Outcome: Progressing  Goal: Diarrhea will be absent or improved  Outcome: Progressing     Problem: Mobility  Goal: Patient's capacity to carry out activities will improve  Outcome: Progressing     Problem: Self Care  Goal: Patient will have the ability to perform ADLs independently or with assistance (bathe, groom, dress, toilet and feed)  Outcome: Progressing     Problem: Infection - Standard  Goal: Patient will remain free from infection  Outcome: Progressing     Problem: Wound/ / Incision Healing  Goal: Patient's wound/surgical incision will decrease in size and heals properly  Outcome: Progressing

## 2023-03-31 NOTE — PROGRESS NOTES
Discharge instructions given and discussed, signed copy in chart. Pt verbalized understanding and all questions answered. Meds to beds prescriptions delivered. Pt discharged in stable condition on RA and escorted by staff. Personal belongings sent with patient including personal wheelchair, clothing, and meds. Called for wheelchair accessible cab for patient. She refused to wait at nurse station for cab and requested that she wait outside at ED entrance. Cab voucher given to patient. Appointment scheduled in April for [place] to remove sutures.

## 2023-04-03 ENCOUNTER — TELEPHONE (OUTPATIENT)
Dept: VASCULAR LAB | Facility: MEDICAL CENTER | Age: 53
End: 2023-04-03
Payer: MEDICAID

## 2023-04-03 NOTE — TELEPHONE ENCOUNTER
[facility]    Received anticoagulation referral from Dr Friend on 3/6    Pt has no working ph #  Mailing address on file is local shelter  Pt has no emergency contact    Will check back in 2 weeks if contact info updates.    Insurance: Medicaid  PCP: none  Locations to be seen: [address]    [facility] at [phone], fax [phone]    Estelita Vega, AkinD

## 2023-04-07 ENCOUNTER — HOSPITAL ENCOUNTER (INPATIENT)
Facility: MEDICAL CENTER | Age: 53
LOS: 3 days | DRG: 394 | End: 2023-04-11
Attending: STUDENT IN AN ORGANIZED HEALTH CARE EDUCATION/TRAINING PROGRAM | Admitting: HOSPITALIST
Payer: MEDICAID

## 2023-04-07 ENCOUNTER — APPOINTMENT (OUTPATIENT)
Dept: RADIOLOGY | Facility: MEDICAL CENTER | Age: 53
DRG: 394 | End: 2023-04-07
Attending: STUDENT IN AN ORGANIZED HEALTH CARE EDUCATION/TRAINING PROGRAM
Payer: MEDICAID

## 2023-04-07 DIAGNOSIS — K56.7 ILEUS (HCC): ICD-10-CM

## 2023-04-07 DIAGNOSIS — K62.89 ACUTE PROCTITIS: ICD-10-CM

## 2023-04-07 DIAGNOSIS — R10.84 GENERALIZED ABDOMINAL PAIN: ICD-10-CM

## 2023-04-07 DIAGNOSIS — E87.29 INCREASED ANION GAP METABOLIC ACIDOSIS: ICD-10-CM

## 2023-04-07 DIAGNOSIS — E86.0 DEHYDRATION: ICD-10-CM

## 2023-04-07 LAB
ALBUMIN SERPL BCP-MCNC: 3.7 G/DL (ref 3.2–4.9)
ALBUMIN/GLOB SERPL: 0.8 G/DL
ALP SERPL-CCNC: 85 U/L (ref 30–99)
ALT SERPL-CCNC: 8 U/L (ref 2–50)
ANION GAP SERPL CALC-SCNC: 20 MMOL/L (ref 7–16)
ANISOCYTOSIS BLD QL SMEAR: ABNORMAL
AST SERPL-CCNC: 19 U/L (ref 12–45)
BASOPHILS # BLD AUTO: 0.9 % (ref 0–1.8)
BASOPHILS # BLD: 0.06 K/UL (ref 0–0.12)
BILIRUB SERPL-MCNC: 0.4 MG/DL (ref 0.1–1.5)
BUN SERPL-MCNC: 25 MG/DL (ref 8–22)
BURR CELLS BLD QL SMEAR: NORMAL
CALCIUM ALBUM COR SERPL-MCNC: 9.6 MG/DL (ref 8.5–10.5)
CALCIUM SERPL-MCNC: 9.4 MG/DL (ref 8.5–10.5)
CHLORIDE SERPL-SCNC: 103 MMOL/L (ref 96–112)
CO2 SERPL-SCNC: 15 MMOL/L (ref 20–33)
CREAT SERPL-MCNC: 0.64 MG/DL (ref 0.5–1.4)
EOSINOPHIL # BLD AUTO: 0.31 K/UL (ref 0–0.51)
EOSINOPHIL NFR BLD: 4.5 % (ref 0–6.9)
ERYTHROCYTE [DISTWIDTH] IN BLOOD BY AUTOMATED COUNT: 48.2 FL (ref 35.9–50)
GFR SERPLBLD CREATININE-BSD FMLA CKD-EPI: 104 ML/MIN/1.73 M 2
GLOBULIN SER CALC-MCNC: 4.7 G/DL (ref 1.9–3.5)
GLUCOSE SERPL-MCNC: 84 MG/DL (ref 65–99)
HCT VFR BLD AUTO: 36.1 % (ref 37–47)
HGB BLD-MCNC: 11.1 G/DL (ref 12–16)
HYPOCHROMIA BLD QL SMEAR: ABNORMAL
LIPASE SERPL-CCNC: 14 U/L (ref 11–82)
LYMPHOCYTES # BLD AUTO: 1.74 K/UL (ref 1–4.8)
LYMPHOCYTES NFR BLD: 25.2 % (ref 22–41)
MANUAL DIFF BLD: NORMAL
MCH RBC QN AUTO: 25.9 PG (ref 27–33)
MCHC RBC AUTO-ENTMCNC: 30.7 G/DL (ref 33.6–35)
MCV RBC AUTO: 84.1 FL (ref 81.4–97.8)
MICROCYTES BLD QL SMEAR: ABNORMAL
MONOCYTES # BLD AUTO: 0.43 K/UL (ref 0–0.85)
MONOCYTES NFR BLD AUTO: 6.3 % (ref 0–13.4)
MORPHOLOGY BLD-IMP: NORMAL
NEUTROPHILS # BLD AUTO: 4.35 K/UL (ref 2–7.15)
NEUTROPHILS NFR BLD: 63.1 % (ref 44–72)
NRBC # BLD AUTO: 0 K/UL
NRBC BLD-RTO: 0 /100 WBC
PLATELET # BLD AUTO: 385 K/UL (ref 164–446)
PLATELET BLD QL SMEAR: NORMAL
PMV BLD AUTO: 9.1 FL (ref 9–12.9)
POIKILOCYTOSIS BLD QL SMEAR: NORMAL
POTASSIUM SERPL-SCNC: 3.6 MMOL/L (ref 3.6–5.5)
PROT SERPL-MCNC: 8.4 G/DL (ref 6–8.2)
RBC # BLD AUTO: 4.29 M/UL (ref 4.2–5.4)
RBC BLD AUTO: PRESENT
SODIUM SERPL-SCNC: 138 MMOL/L (ref 135–145)
WBC # BLD AUTO: 6.9 K/UL (ref 4.8–10.8)

## 2023-04-07 PROCEDURE — 99285 EMERGENCY DEPT VISIT HI MDM: CPT

## 2023-04-07 PROCEDURE — 36415 COLL VENOUS BLD VENIPUNCTURE: CPT

## 2023-04-07 PROCEDURE — 85007 BL SMEAR W/DIFF WBC COUNT: CPT

## 2023-04-07 PROCEDURE — 96375 TX/PRO/DX INJ NEW DRUG ADDON: CPT

## 2023-04-07 PROCEDURE — 85025 COMPLETE CBC W/AUTO DIFF WBC: CPT

## 2023-04-07 PROCEDURE — 80053 COMPREHEN METABOLIC PANEL: CPT

## 2023-04-07 PROCEDURE — 96361 HYDRATE IV INFUSION ADD-ON: CPT

## 2023-04-07 PROCEDURE — 700105 HCHG RX REV CODE 258: Performed by: STUDENT IN AN ORGANIZED HEALTH CARE EDUCATION/TRAINING PROGRAM

## 2023-04-07 PROCEDURE — 700111 HCHG RX REV CODE 636 W/ 250 OVERRIDE (IP): Performed by: STUDENT IN AN ORGANIZED HEALTH CARE EDUCATION/TRAINING PROGRAM

## 2023-04-07 PROCEDURE — 83690 ASSAY OF LIPASE: CPT

## 2023-04-07 RX ORDER — HYDROMORPHONE HYDROCHLORIDE 1 MG/ML
0.5 INJECTION, SOLUTION INTRAMUSCULAR; INTRAVENOUS; SUBCUTANEOUS ONCE
Status: COMPLETED | OUTPATIENT
Start: 2023-04-07 | End: 2023-04-07

## 2023-04-07 RX ORDER — SODIUM CHLORIDE 9 MG/ML
1000 INJECTION, SOLUTION INTRAVENOUS ONCE
Status: COMPLETED | OUTPATIENT
Start: 2023-04-07 | End: 2023-04-08

## 2023-04-07 RX ADMIN — HYDROMORPHONE HYDROCHLORIDE 0.5 MG: 1 INJECTION, SOLUTION INTRAMUSCULAR; INTRAVENOUS; SUBCUTANEOUS at 23:19

## 2023-04-07 RX ADMIN — SODIUM CHLORIDE 1000 ML: 9 INJECTION, SOLUTION INTRAVENOUS at 23:18

## 2023-04-07 ASSESSMENT — FIBROSIS 4 INDEX: FIB4 SCORE: 0.51

## 2023-04-08 PROBLEM — D64.9 ANEMIA: Status: RESOLVED | Noted: 2022-11-25 | Resolved: 2023-04-08

## 2023-04-08 PROBLEM — E87.29 HIGH ANION GAP METABOLIC ACIDOSIS: Status: ACTIVE | Noted: 2023-04-08

## 2023-04-08 PROBLEM — K62.89 PROCTITIS: Status: ACTIVE | Noted: 2023-04-08

## 2023-04-08 PROBLEM — K56.7 ILEUS (HCC): Status: ACTIVE | Noted: 2023-04-08

## 2023-04-08 PROBLEM — Z86.718 HISTORY OF DVT (DEEP VEIN THROMBOSIS): Status: ACTIVE | Noted: 2023-04-08

## 2023-04-08 LAB
BLOOD CULTURE HOLD CXBCH: NORMAL
BLOOD CULTURE HOLD CXBCH: NORMAL

## 2023-04-08 PROCEDURE — 99406 BEHAV CHNG SMOKING 3-10 MIN: CPT

## 2023-04-08 PROCEDURE — 86359 T CELLS TOTAL COUNT: CPT

## 2023-04-08 PROCEDURE — 99497 ADVNCD CARE PLAN 30 MIN: CPT | Mod: 25 | Performed by: HOSPITALIST

## 2023-04-08 PROCEDURE — 700101 HCHG RX REV CODE 250: Performed by: STUDENT IN AN ORGANIZED HEALTH CARE EDUCATION/TRAINING PROGRAM

## 2023-04-08 PROCEDURE — 86360 T CELL ABSOLUTE COUNT/RATIO: CPT

## 2023-04-08 PROCEDURE — 99223 1ST HOSP IP/OBS HIGH 75: CPT | Mod: 25 | Performed by: HOSPITALIST

## 2023-04-08 PROCEDURE — 700117 HCHG RX CONTRAST REV CODE 255: Performed by: STUDENT IN AN ORGANIZED HEALTH CARE EDUCATION/TRAINING PROGRAM

## 2023-04-08 PROCEDURE — 99406 BEHAV CHNG SMOKING 3-10 MIN: CPT | Performed by: HOSPITALIST

## 2023-04-08 PROCEDURE — 770001 HCHG ROOM/CARE - MED/SURG/GYN PRIV*

## 2023-04-08 PROCEDURE — 700111 HCHG RX REV CODE 636 W/ 250 OVERRIDE (IP): Performed by: STUDENT IN AN ORGANIZED HEALTH CARE EDUCATION/TRAINING PROGRAM

## 2023-04-08 PROCEDURE — 87491 CHLMYD TRACH DNA AMP PROBE: CPT

## 2023-04-08 PROCEDURE — 700105 HCHG RX REV CODE 258: Performed by: HOSPITALIST

## 2023-04-08 PROCEDURE — 99232 SBSQ HOSP IP/OBS MODERATE 35: CPT | Performed by: INTERNAL MEDICINE

## 2023-04-08 PROCEDURE — 700105 HCHG RX REV CODE 258: Performed by: STUDENT IN AN ORGANIZED HEALTH CARE EDUCATION/TRAINING PROGRAM

## 2023-04-08 PROCEDURE — 97602 WOUND(S) CARE NON-SELECTIVE: CPT

## 2023-04-08 PROCEDURE — 87591 N.GONORRHOEAE DNA AMP PROB: CPT

## 2023-04-08 PROCEDURE — 96365 THER/PROPH/DIAG IV INF INIT: CPT

## 2023-04-08 PROCEDURE — 700102 HCHG RX REV CODE 250 W/ 637 OVERRIDE(OP): Performed by: HOSPITALIST

## 2023-04-08 PROCEDURE — 74177 CT ABD & PELVIS W/CONTRAST: CPT

## 2023-04-08 PROCEDURE — A9270 NON-COVERED ITEM OR SERVICE: HCPCS | Performed by: HOSPITALIST

## 2023-04-08 PROCEDURE — 96375 TX/PRO/DX INJ NEW DRUG ADDON: CPT

## 2023-04-08 PROCEDURE — 700101 HCHG RX REV CODE 250: Performed by: HOSPITALIST

## 2023-04-08 RX ORDER — DIVALPROEX SODIUM 500 MG/1
500 TABLET, EXTENDED RELEASE ORAL EVERY 12 HOURS
Status: DISCONTINUED | OUTPATIENT
Start: 2023-04-08 | End: 2023-04-11 | Stop reason: HOSPADM

## 2023-04-08 RX ORDER — LABETALOL HYDROCHLORIDE 5 MG/ML
10 INJECTION, SOLUTION INTRAVENOUS EVERY 4 HOURS PRN
Status: DISCONTINUED | OUTPATIENT
Start: 2023-04-08 | End: 2023-04-11 | Stop reason: HOSPADM

## 2023-04-08 RX ORDER — SODIUM CHLORIDE 9 MG/ML
1000 INJECTION, SOLUTION INTRAVENOUS ONCE
Status: COMPLETED | OUTPATIENT
Start: 2023-04-08 | End: 2023-04-08

## 2023-04-08 RX ORDER — PROMETHAZINE HYDROCHLORIDE 25 MG/1
12.5-25 SUPPOSITORY RECTAL EVERY 4 HOURS PRN
Status: DISCONTINUED | OUTPATIENT
Start: 2023-04-08 | End: 2023-04-11 | Stop reason: HOSPADM

## 2023-04-08 RX ORDER — SODIUM CHLORIDE, SODIUM LACTATE, POTASSIUM CHLORIDE, CALCIUM CHLORIDE 600; 310; 30; 20 MG/100ML; MG/100ML; MG/100ML; MG/100ML
INJECTION, SOLUTION INTRAVENOUS CONTINUOUS
Status: DISCONTINUED | OUTPATIENT
Start: 2023-04-08 | End: 2023-04-09

## 2023-04-08 RX ORDER — HYDROMORPHONE HYDROCHLORIDE 1 MG/ML
0.5 INJECTION, SOLUTION INTRAMUSCULAR; INTRAVENOUS; SUBCUTANEOUS EVERY 4 HOURS PRN
Status: DISCONTINUED | OUTPATIENT
Start: 2023-04-08 | End: 2023-04-10

## 2023-04-08 RX ORDER — PROMETHAZINE HYDROCHLORIDE 25 MG/1
12.5-25 TABLET ORAL EVERY 4 HOURS PRN
Status: DISCONTINUED | OUTPATIENT
Start: 2023-04-08 | End: 2023-04-11 | Stop reason: HOSPADM

## 2023-04-08 RX ORDER — METRONIDAZOLE 500 MG/100ML
500 INJECTION, SOLUTION INTRAVENOUS ONCE
Status: COMPLETED | OUTPATIENT
Start: 2023-04-08 | End: 2023-04-08

## 2023-04-08 RX ORDER — CEFTRIAXONE 2 G/1
2000 INJECTION, POWDER, FOR SOLUTION INTRAMUSCULAR; INTRAVENOUS ONCE
Status: COMPLETED | OUTPATIENT
Start: 2023-04-08 | End: 2023-04-08

## 2023-04-08 RX ORDER — ACETAMINOPHEN 325 MG/1
650 TABLET ORAL EVERY 6 HOURS PRN
Status: DISCONTINUED | OUTPATIENT
Start: 2023-04-08 | End: 2023-04-11 | Stop reason: HOSPADM

## 2023-04-08 RX ORDER — OLANZAPINE 5 MG/1
5 TABLET ORAL 2 TIMES DAILY
Status: DISCONTINUED | OUTPATIENT
Start: 2023-04-08 | End: 2023-04-11 | Stop reason: HOSPADM

## 2023-04-08 RX ORDER — OXYCODONE HYDROCHLORIDE 5 MG/1
5 TABLET ORAL EVERY 4 HOURS PRN
Status: DISCONTINUED | OUTPATIENT
Start: 2023-04-08 | End: 2023-04-10

## 2023-04-08 RX ORDER — ONDANSETRON 2 MG/ML
4 INJECTION INTRAMUSCULAR; INTRAVENOUS EVERY 4 HOURS PRN
Status: DISCONTINUED | OUTPATIENT
Start: 2023-04-08 | End: 2023-04-11 | Stop reason: HOSPADM

## 2023-04-08 RX ORDER — METRONIDAZOLE 500 MG/100ML
500 INJECTION, SOLUTION INTRAVENOUS EVERY 8 HOURS
Status: DISCONTINUED | OUTPATIENT
Start: 2023-04-08 | End: 2023-04-11 | Stop reason: HOSPADM

## 2023-04-08 RX ORDER — KETOROLAC TROMETHAMINE 30 MG/ML
30 INJECTION, SOLUTION INTRAMUSCULAR; INTRAVENOUS EVERY 6 HOURS PRN
Status: DISCONTINUED | OUTPATIENT
Start: 2023-04-08 | End: 2023-04-09

## 2023-04-08 RX ORDER — ONDANSETRON 4 MG/1
4 TABLET, ORALLY DISINTEGRATING ORAL EVERY 4 HOURS PRN
Status: DISCONTINUED | OUTPATIENT
Start: 2023-04-08 | End: 2023-04-11 | Stop reason: HOSPADM

## 2023-04-08 RX ORDER — PROCHLORPERAZINE EDISYLATE 5 MG/ML
5-10 INJECTION INTRAMUSCULAR; INTRAVENOUS EVERY 4 HOURS PRN
Status: DISCONTINUED | OUTPATIENT
Start: 2023-04-08 | End: 2023-04-11 | Stop reason: HOSPADM

## 2023-04-08 RX ADMIN — METRONIDAZOLE 500 MG: 5 INJECTION, SOLUTION INTRAVENOUS at 08:37

## 2023-04-08 RX ADMIN — SODIUM CHLORIDE, POTASSIUM CHLORIDE, SODIUM LACTATE AND CALCIUM CHLORIDE: 600; 310; 30; 20 INJECTION, SOLUTION INTRAVENOUS at 15:53

## 2023-04-08 RX ADMIN — CEFTRIAXONE SODIUM 2000 MG: 2 INJECTION, POWDER, FOR SOLUTION INTRAMUSCULAR; INTRAVENOUS at 02:25

## 2023-04-08 RX ADMIN — SODIUM CHLORIDE 1000 ML: 9 INJECTION, SOLUTION INTRAVENOUS at 01:26

## 2023-04-08 RX ADMIN — IOHEXOL 100 ML: 350 INJECTION, SOLUTION INTRAVENOUS at 01:00

## 2023-04-08 RX ADMIN — METRONIDAZOLE 500 MG: 5 INJECTION, SOLUTION INTRAVENOUS at 15:55

## 2023-04-08 RX ADMIN — SODIUM CHLORIDE 1000 ML: 9 INJECTION, SOLUTION INTRAVENOUS at 03:45

## 2023-04-08 RX ADMIN — OXYCODONE 5 MG: 5 TABLET ORAL at 21:37

## 2023-04-08 RX ADMIN — SODIUM CHLORIDE, POTASSIUM CHLORIDE, SODIUM LACTATE AND CALCIUM CHLORIDE: 600; 310; 30; 20 INJECTION, SOLUTION INTRAVENOUS at 04:48

## 2023-04-08 RX ADMIN — METRONIDAZOLE 500 MG: 500 INJECTION, SOLUTION INTRAVENOUS at 01:45

## 2023-04-08 ASSESSMENT — ENCOUNTER SYMPTOMS
DIARRHEA: 1
BLURRED VISION: 0
TREMORS: 0
HALLUCINATIONS: 0
SPUTUM PRODUCTION: 0
MYALGIAS: 0
DIZZINESS: 0
WHEEZING: 0
NAUSEA: 0
FLANK PAIN: 0
HEMOPTYSIS: 0
NECK PAIN: 0
CLAUDICATION: 0
SORE THROAT: 0
PHOTOPHOBIA: 0
PALPITATIONS: 0
ABDOMINAL PAIN: 1
POLYDIPSIA: 0
PND: 0
COUGH: 0
TINGLING: 0
EYE PAIN: 0
VOMITING: 0
BLOOD IN STOOL: 0
STRIDOR: 0
ORTHOPNEA: 0
BACK PAIN: 0
HEARTBURN: 0
FALLS: 0
DOUBLE VISION: 0
SINUS PAIN: 0
SHORTNESS OF BREATH: 0
DIAPHORESIS: 0
FEVER: 0
HEADACHES: 0
BRUISES/BLEEDS EASILY: 0
WEAKNESS: 0
CHILLS: 0
CONSTIPATION: 0
DEPRESSION: 0

## 2023-04-08 ASSESSMENT — COGNITIVE AND FUNCTIONAL STATUS - GENERAL
HELP NEEDED FOR BATHING: A LITTLE
DRESSING REGULAR LOWER BODY CLOTHING: A LITTLE
MOVING FROM LYING ON BACK TO SITTING ON SIDE OF FLAT BED: UNABLE
MOVING TO AND FROM BED TO CHAIR: A LITTLE
TOILETING: A LITTLE
MOBILITY SCORE: 11
DAILY ACTIVITIY SCORE: 21
WALKING IN HOSPITAL ROOM: TOTAL
CLIMB 3 TO 5 STEPS WITH RAILING: TOTAL
STANDING UP FROM CHAIR USING ARMS: TOTAL
SUGGESTED CMS G CODE MODIFIER MOBILITY: CL
SUGGESTED CMS G CODE MODIFIER DAILY ACTIVITY: CJ

## 2023-04-08 ASSESSMENT — PAIN DESCRIPTION - PAIN TYPE
TYPE: ACUTE PAIN

## 2023-04-08 ASSESSMENT — LIFESTYLE VARIABLES: SUBSTANCE_ABUSE: 0

## 2023-04-08 NOTE — WOUND TEAM
[facility]  Inpatient Services  Initial Wound and Skin Care Evaluation    Admission Date: 4/7/2023     Last order of IP CONSULT TO WOUND CARE was found on 4/8/2023 from Hospital Encounter on 4/7/2023     HPI, PMH, SH: Reviewed    Past Surgical History:   Procedure Laterality Date    KNEE AMPUTATION BELOW Left 03/22/2023    Procedure: AMPUTATION, BELOW KNEE;  Surgeon: Nas Lux M.D.;  Location: SURGERY [facility];  Service: Orthopedics    AMPUTATION, BELOW THE KNEE Right      Social History     Tobacco Use    Smoking status: Every Day     Packs/day: 0.25     Types: Cigarettes    Smokeless tobacco: Never   Substance Use Topics    Alcohol use: Not Currently     Chief Complaint   Patient presents with    Abdominal Pain     Described as cramping x4 days intermittently since pt fell out of wheelchair onto her stomach 5 days ago; pt denies N/V    Diarrhea     Intermittently x4 days     Diagnosis: Proctitis [K62.89]    Unit where seen by Wound Team: T415/02     WOUND CONSULT/FOLLOW UP RELATED TO:  R BKA     WOUND HISTORY:  R posterior BKA with frostbite injury.    WOUND ASSESSMENT/LDA       Wound 03/06/23 Other (comment) Leg Anterior;Posterior Right R BKA wound r/t Frostbite (Active)   Wound Image        04/08/23 1330   Site Assessment Eschar;Red;Brown;Yellow 04/08/23 1330   Periwound Assessment Clean;Intact;Dry 04/08/23 1330   Margins Attached edges 04/08/23 1330   Closure Open to air 04/08/23 1330   Drainage Amount Small 04/08/23 1330   Drainage Description Serosanguineous 04/08/23 1330   Treatments Cleansed;Site care 04/08/23 1330   Wound Cleansing Approved Wound Cleanser 03/16/23 2145   Periwound Protectant Skin Protectant Wipes to Periwound 03/29/23 2200   Dressing Cleansing/Solutions 3% Betadine 04/08/23 1330   Dressing Options Open to Air 04/08/23 1330   Dressing Changed Changed 03/29/23 2200   Dressing Status Open to Air 03/29/23 2200   Dressing Change/Treatment Frequency Every Shift, and As  Needed 04/08/23 1330   NEXT Dressing Change/Treatment Date 04/08/23 04/08/23 1330   NEXT Weekly Photo (Inpatient Only) 04/15/23 04/08/23 1330   Non-staged Wound Description Full thickness 04/08/23 1330   Wound Bed Granulation (%) 10 % 03/15/23 1200   Wound Bed Slough (%) 10 % 03/15/23 1200   Wound Bed Eschar (%) 80 % 03/15/23 1200   Shape Circular 04/08/23 1330   Wound Odor None 04/08/23 1330   Exposed Structures IVETTE 04/08/23 1330   WOUND NURSE ONLY - Time Spent with Patient (mins) 30 04/08/23 1330       Vascular:    GISELLA:   No results found.    Lab Values:    Lab Results   Component Value Date/Time    WBC 6.9 04/07/2023 06:54 PM    RBC 4.29 04/07/2023 06:54 PM    HEMOGLOBIN 11.1 (L) 04/07/2023 06:54 PM    HEMATOCRIT 36.1 (L) 04/07/2023 06:54 PM    CREACTPROT 1.41 (H) 03/06/2023 07:41 PM    SEDRATEWES 24 03/06/2023 07:41 PM    HBA1C 5.1 02/24/2023 05:54 AM        Culture Results show:  No results found for this or any previous visit (from the past 720 hour(s)).    Pain Level/Medicated:   C/o of pain throughout wound care, tolerated without pain medication        INTERVENTIONS BY WOUND TEAM:  Chart and images reviewed. Discussed with bedside RN. All areas of concern (based on picture review, LDA review and discussion with bedside RN) have been thoroughly assessed. Documentation of areas based on significant findings. This RN in to assess patient. Performed standard wound care which includes appropriate positioning, dressing removal and non-selective debridement. Pictures and measurements obtained weekly if/when required.  Preparation for Dressing removal: NA AZRA  Non-selectively Debrided with:  Normal Saline and Gauze   Sharp debridement: NA  Nory wound: Cleansed with Normal Saline and Gauze,   Primary Dressing: Betadine ordered  Secondary (Outer) Dressing: Open to Air    Advanced Wound Care Discharge Planning  Number of Clinicians necessary to complete wound care: 1  Is patient requiring IV pain medications for  dressing changes: no  Length of time for dressing change 1 min. (This does not include chart review, pre-medication time, set up, clean up or time spent charting.)    Interdisciplinary consultation: Patient, Bedside RN (Cassy), MD Robertson     EVALUATION / RATIONALE FOR TREATMENT:  Most Recent Date:  4/8/23: R posterior BKA with dry adherent eschar cap related to previous frostbite injury. Edges are lifting with bogginess in some areas. Betadine to keep clean and dry. Anterior aspect of R BKA also with dry eschar and dark red nonviable appearing tissue. Patient requested to be left open to air. Recommend Ortho consult as patient may require revision. MD Robertson updated.     Goals: Steady decrease in wound area and depth weekly.    WOUND TEAM PLAN OF CARE ([X] for frequency of wound follow up,):   Nursing to follow dressing orders written for wound care. Contact wound team if area fails to progress, deteriorates or with any questions/concerns if something comes up before next scheduled follow up (See below as to whether wound is following and frequency of wound follow up)  Dressing changes by wound team:                   Follow up 3 times weekly:                NPWT change 3 times weekly:     Follow up 1-2 times weekly:     Follow up Bi-Monthly:           Follow up Monthly (High Risk):                        Follow up as needed:   X  Other (explain):     NURSING PLAN OF CARE ORDERS (X):  Dressing changes: See Dressing Care orders: X  Skin care: See Skin Care orders: X  RN Prevention Protocol: X  Rectal tube care: See Rectal Tube Care orders:   Other orders:    RSKIN:   CURRENTLY IN PLACE (X), APPLIED THIS VISIT (A), ORDERED (O):   Q shift Lonnie:  X  Q shift pressure point assessments:  X    Surface/Positioning   Standard Mattress/Trauma Bed           Low Airloss       O   ICU Low Airloss   Bariatric SHASHA     Waffle cushion        Waffle Overlay          Reposition q 2 hours    O  TAPs Turning system     Z James  Pillow     Offloading/Redistribution   Sacral Offloading Dressing (Silicone dressing)     Heel Offloading Dressing (Silicone dressing)         Heel float boots (Prevalon boot)             Float Heels off Bed with Pillows           Respiratory NA RA  Silicone O2 tubing         Gray Foam Ear protectors     Cannula fixation Device (Tender )          High flow offloading Clip    Elastic head band offloading device      Anchorfast                                                         Trach with Optifoam split foam             Containment/Moisture Prevention   Rectal tube or BMS    Purwick/Condom Cath      O  Leggett Catheter    Barrier wipes           Barrier paste     O  Antifungal tx      Interdry        Mobilization     wheelchair  Up to chair        Ambulate      PT/OT      Nutrition   Dietician        Diabetes Education      PO     TF     TPN     NPO  0  # days     Other        Anticipated discharge plans: pt is homeless  LTACH:        SNF/Rehab:                  Home Health Care:           Outpatient Wound Center:            Self/Family Care:        Other:                  Vac Discharge Needs:   Vac Discharge plan is purely a recommendation from wound team and not a requirement for discharge unless otherwise stated by physician.  Not Applicable Pt not on a wound vac:    X   Regular Vac while inpatient, alternative dressing at DC:        Regular Vac in use and continued at DC:            Reg. Vac w/ Skin Sub/Biologic in use. Will need to be changed 2x wkly:      Veraflo Vac while inpatient, ok to transition to Regular Vac on Discharge (Bedside RN to Clamp small instillation tubing at time of DC):           Veraflo Vac while inpatient, would benefit from remaining on Veraflo Vac upon discharge:

## 2023-04-08 NOTE — PROGRESS NOTES
4 Eyes Skin Assessment Completed by MARIA EUGENIA [place] and MARIA EUGENIA [place].    Head WDL  Ears WDL  Nose WDL  Mouth WDL  Neck WDL  Breast/Chest WDL  Shoulder Blades WDL  Spine IVETTE; refused assessment  (R) Arm/Elbow/Hand WDL  (L) Arm/Elbow/Hand WDL  Abdomen Scar  Groin WDL  Scrotum/Coccyx/Buttocks IVETTE - refused assessment   (R) Leg BKA; wound on stump  (L) Leg BKA; incision with staples        Devices In Places Pulse Ox      Interventions In Place Waffle Overlay, Pillows, Barrier Cream, Dri-James Pads, and Pressure Redistribution Mattress; purewick    Possible Skin Injury No - previously charted    Pictures Uploaded Into Epic previously uploaded  Wound Consult Placed N/A - previously ordered  RN Wound Prevention Protocol Ordered No   - previously ordered

## 2023-04-08 NOTE — ASSESSMENT & PLAN NOTE
Unknown etiology at this point, clinically improving on 4/10  HIV well controlled on most recent CD4  Continue empiric IV CTX/flagyl  G/C rectal swab negative, I personally reviewed this lab on 4/10  Advance to FLD on 4/10

## 2023-04-08 NOTE — PROGRESS NOTES
Patient's diet has just changed back to NPO/sips with meds. Per Dr. Robertson, okay for patient to finish lunch tray and then be made NPO after. Patient also refused to allow this RN to perform lab swab. Dr. Robertson aware.

## 2023-04-08 NOTE — PROGRESS NOTES
Hospital Medicine Daily Progress Note    Date of Service  4/8/2023    Chief Complaint  Kellie Chatman is a 53 y.o. homeless female with schizophrenia, HIV (CD4 752 in 3/2023), R BKA, HTN, seizures, ongoing meth use, ongoing tobacco abuse and prior DVT who presented to our hospital with abdominal pain and diarrhea. Proctitis was diagnosed.     Hospital Course  No notes on file    Interval Problem Update  4/8-no acute events overnight. Patient refused to talk to me. She told me to get out of her room Now!    I have discussed this patient's plan of care and discharge plan at IDT rounds today with Case Management, Nursing, Nursing leadership, and other members of the IDT team.    Consultants/Specialty  none    Code Status  Full Code    Disposition  Patient is not medically cleared for discharge.   Anticipate discharge to to home with close outpatient follow-up.  I have placed the appropriate orders for post-discharge needs.    Review of Systems  Review of Systems   Unable to perform ROS: Psychiatric disorder      Physical Exam  Temp:  [37.1 °C (98.7 °F)-37.5 °C (99.5 °F)] 37.5 °C (99.5 °F)  Pulse:  [] 97  Resp:  [15-18] 16  BP: (107-146)/(59-92) 118/72  SpO2:  [89 %-98 %] 91 %    Physical Exam  Vitals and nursing note reviewed.   Constitutional:       General: She is not in acute distress.     Appearance: She is well-developed.      Comments: Refuses to talk to me  Refused full physical exam   HENT:      Head: Normocephalic and atraumatic.   Eyes:      General: No scleral icterus.     Pupils: Pupils are equal, round, and reactive to light.   Neck:      Thyroid: No thyromegaly.   Cardiovascular:      Rate and Rhythm: Normal rate.      Comments: Refused  Pulmonary:      Effort: Pulmonary effort is normal.      Comments: Refused  Abdominal:      Comments: Refused   Musculoskeletal:         General: No tenderness. Normal range of motion.      Cervical back: Normal range of motion and neck supple.   Skin:     General:  Skin is warm and dry.   Neurological:      Mental Status: She is alert and oriented to person, place, and time.      Cranial Nerves: No cranial nerve deficit.      Comments: Limited       Fluids    Intake/Output Summary (Last 24 hours) at 4/8/2023 1230  Last data filed at 4/8/2023 0800  Gross per 24 hour   Intake 1000 ml   Output --   Net 1000 ml       Laboratory  Recent Labs     04/07/23  1854   WBC 6.9   RBC 4.29   HEMOGLOBIN 11.1*   HEMATOCRIT 36.1*   MCV 84.1   MCH 25.9*   MCHC 30.7*   RDW 48.2   PLATELETCT 385   MPV 9.1     Recent Labs     04/07/23  1854   SODIUM 138   POTASSIUM 3.6   CHLORIDE 103   CO2 15*   GLUCOSE 84   BUN 25*   CREATININE 0.64   CALCIUM 9.4                   Imaging  CT-ABDOMEN-PELVIS WITH   Final Result         1.Diffuse gaseous distention of the colon, most in the sigmoid colon, could relate to ileus.      2. Wall thickening of the rectum could relate to mild proctitis.           Assessment/Plan  * Proctitis- (present on admission)  Assessment & Plan  Unknown etiology at this point  HIV well controlled on most recent CD4  Continue empiric IV CTX/flagyl  Need G/C urine and RECTAL SWAB  Start CLD    Ileus (HCC)- (present on admission)  Assessment & Plan  Start CLD 4/8  Refused physical exam  IV fluids      High anion gap metabolic acidosis- (present on admission)  Assessment & Plan  Likely related to dehydration  Continue IV fluids    History of DVT (deep vein thrombosis)- (present on admission)  Assessment & Plan  Continue Eliquis for now    Methamphetamine abuse (HCC)- (present on admission)  Assessment & Plan  Monitor signs for withdrawal    Anemia- (present on admission)  Assessment & Plan  At her baseline    HIV (human immunodeficiency virus infection) (HCC)- (present on admission)  Assessment & Plan  Last CD4 in 3/2023 was 752 with undetectable HIV VL  Continue outpatient Biktarvy    Schizophrenia ([facility])- (present on admission)  Assessment & Plan  Continue home medications    Tobacco  abuse- (present on admission)  Assessment & Plan  Encourage cessation           VTE prophylaxis: therapeutic anticoagulation with eliquis     I have performed a physical exam and reviewed and updated ROS and Plan today (4/8/2023). In review of yesterday's note (4/7/2023), there are no changes except as documented above.

## 2023-04-08 NOTE — ED NOTES
Report to T4, RN. All questions and concerns addressed at this time. All belongings are with pt at the time of transport. Pt AOx4, GCS 15, VSS

## 2023-04-08 NOTE — DISCHARGE PLANNING
"Case Management Discharge Planning    Admission Date: 4/7/2023  GMLOS:    ALOS: 0    6-Clicks ADL Score:    6-Clicks Mobility Score:    PT and/or OT Eval ordered: No  Post-acute Referrals Ordered: No  Post-acute Choice Obtained: No  Has referral(s) been sent to post-acute provider:  ROLANDO      Anticipated Discharge Dispo: Discharge Disposition: Discharged to home/self care (01) with close OP follow up    DME Needed: No    Action(s) Taken: Updated Provider/Nurse on Discharge Plan  \"53 y.o. homeless female who presented 4/7/2023 with history of right-sided BKA, hypertension, HIV, seizures, schizophrenia, meth abuse, tobacco abuse, DVT who comes into the hospital for 1 day of abdominal pain and diarrhea.  Abdominal pain is in epigastric region, nonradiating, nonexertional.  Patient is also having frequent diarrhea but denies bloody stools.  She denies any fever, chills, nausea, vomiting.  She does not remember when her last bowel movement but said that she is not passing gas..  She denies any recent travel history.  She is never had an endoscopy or colonoscopy in the past.  On her last admission she was started on Eliquis, Zyprexa a month ago.     CT scan abdomen found diffuse gaseous distention of the colon, most in the sigmoid colon could relate to ileus  Wall thickening of the rectum could relate to mild proctitis\"    Pt is now on Rocephin with end date 4/15  and Metronidazole IV with end date 4/15 per MAR.    Per chart\"+ voiding, pure wick in place  BM PTA  Pt has bilateral BKA, mobilizes with wheelchair  Pt is NPO  Staples to L. BKA  \"       Pt has [place] Medicaid   Will provide Community resources if requested and as needed.      Escalations Completed: None    Medically Clear: No    Next Steps:   This RN CM to continue to assist Pt with discharge as needed    Barriers to Discharge: Medical clearance    Is the patient up for discharge tomorrow: No          " Chief Complaint:     Chief Complaint   Patient presents with    Positive For Covid-19     had positive at home         Cough  This is a new problem. The current episode started in the past 7 days. The problem has been unchanged. The cough is non-productive. Associated symptoms include nasal congestion and postnasal drip. Pertinent negatives include no chest pain, ear congestion, ear pain, eye redness, fever, headaches, myalgias, rash, rhinorrhea, sore throat, shortness of breath, sweats, weight loss or wheezing. He has tried nothing for the symptoms. The treatment provided no relief. There is no history of environmental allergies. Patient Active Problem List   Diagnosis    Allergic rhinitis    Insomnia    Depressed affect       No past medical history on file. No past surgical history on file. Current Outpatient Medications   Medication Sig Dispense Refill    omeprazole (PRILOSEC) 20 MG delayed release capsule take 1 capsule by mouth once daily 90 capsule 0    cetirizine (ZYRTEC) 10 MG tablet take 1 tablet by mouth once daily 30 tablet 11    Multiple Vitamins-Minerals (MULTIVITAL) CHEW Take  by mouth. No current facility-administered medications for this visit.        No Known Allergies    Social History     Socioeconomic History    Marital status: Single     Spouse name: Not on file    Number of children: Not on file    Years of education: Not on file    Highest education level: Not on file   Occupational History    Not on file   Tobacco Use    Smoking status: Never Smoker    Smokeless tobacco: Never Used   Substance and Sexual Activity    Alcohol use: No    Drug use: No    Sexual activity: Not on file   Other Topics Concern    Not on file   Social History Narrative    Not on file     Social Determinants of Health     Financial Resource Strain:     Difficulty of Paying Living Expenses: Not on file   Food Insecurity:     Worried About Running Out of Food in the Last Year: Not on file    [address] in the Last Year: Not on file   Transportation Needs:     Lack of Transportation (Medical): Not on file    Lack of Transportation (Non-Medical): Not on file   Physical Activity:     Days of Exercise per Week: Not on file    Minutes of Exercise per Session: Not on file   Stress:     Feeling of Stress : Not on file   Social Connections:     Frequency of Communication with Friends and Family: Not on file    Frequency of Social Gatherings with Friends and Family: Not on file    Attends Latter-day Services: Not on file    Active Member of [address] [place] [address] [employer] or Organizations: Not on file    Attends Club or Organization Meetings: Not on file    Marital Status: Not on file   Intimate Partner Violence:     Fear of Current or Ex-Partner: Not on file    Emotionally Abused: Not on file    Physically Abused: Not on file    Sexually Abused: Not on file   Housing Stability:     Unable to Pay for Housing in the Last Year: Not on file    Number of [place] in the Last Year: Not on file    Unstable Housing in the Last Year: Not on file       No family history on file. Review of Systems   Constitutional: Negative for activity change, appetite change, fatigue, fever and weight loss. HENT: Positive for postnasal drip. Negative for congestion, ear pain, hearing loss, nosebleeds, rhinorrhea, sinus pressure and sore throat. Eyes: Negative for pain, redness, itching and visual disturbance. Respiratory: Positive for cough. Negative for apnea, chest tightness, shortness of breath and wheezing. Cardiovascular: Negative for chest pain, palpitations and leg swelling. Gastrointestinal: Negative for abdominal pain, blood in stool, constipation, diarrhea, nausea and vomiting. Endocrine: Negative. Genitourinary: Negative for decreased urine volume, difficulty urinating, dysuria, frequency, hematuria and urgency.    Musculoskeletal: Negative for arthralgias, back pain, gait problem, myalgias and neck pain. Skin: Negative for color change and rash. Allergic/Immunologic: Negative for environmental allergies and food allergies. Neurological: Negative for dizziness, weakness, light-headedness, numbness and headaches. Hematological: Negative for adenopathy. Does not bruise/bleed easily. Psychiatric/Behavioral: Negative for behavioral problems, dysphoric mood and sleep disturbance. The patient is not nervous/anxious and is not hyperactive. All other systems reviewed and are negative. /70   Pulse 86   Temp 97.5 °F (36.4 °C)   Resp 16   Ht 5' 11\" (1.803 m)   Wt 238 lb (108 kg)   SpO2 98%   BMI 33.19 kg/m²     Physical Exam  Vitals and nursing note reviewed. Constitutional:       General: He is not in acute distress. Appearance: Normal appearance. He is well-developed. HENT:      Head: Normocephalic and atraumatic. Right Ear: Hearing, tympanic membrane and external ear normal. No tenderness. No middle ear effusion. Left Ear: Hearing, tympanic membrane and external ear normal. No tenderness. No middle ear effusion. Nose: Nose normal. No congestion or rhinorrhea. Right Turbinates: Not enlarged. Left Turbinates: Not enlarged. Mouth/Throat:      Mouth: Mucous membranes are moist.      Tongue: No lesions. Pharynx: Oropharynx is clear. No oropharyngeal exudate or posterior oropharyngeal erythema. Eyes:      General: No scleral icterus. Conjunctiva/sclera: Conjunctivae normal.      Pupils: Pupils are equal, round, and reactive to light. Neck:      Thyroid: No thyromegaly. Cardiovascular:      Rate and Rhythm: Normal rate and regular rhythm. Heart sounds: Normal heart sounds. No murmur heard. Pulmonary:      Effort: Pulmonary effort is normal. No respiratory distress. Breath sounds: Normal breath sounds. No wheezing or rales. Abdominal:      General: Bowel sounds are normal. There is no distension.       Palpations: Abdomen is soft. Tenderness: There is no abdominal tenderness. Musculoskeletal:         General: No tenderness. Normal range of motion. Cervical back: Normal range of motion and neck supple. No rigidity. No muscular tenderness. Lymphadenopathy:      Cervical: No cervical adenopathy. Skin:     General: Skin is warm and dry. Findings: No erythema or rash. Neurological:      General: No focal deficit present. Mental Status: He is alert and oriented to person, place, and time. Cranial Nerves: No cranial nerve deficit. Deep Tendon Reflexes: Reflexes are normal and symmetric. Reflexes normal.   Psychiatric:         Mood and Affect: Mood normal.                                 ASSESSMENT/PLAN:    Patient Active Problem List   Diagnosis    Allergic rhinitis    Insomnia    Depressed affect       Praful was seen today for positive for covid-19. Diagnoses and all orders for this visit:    Suspected COVID-19 virus infection  -     POCT COVID-19, Rapid    COVID-19 virus infection          Return if symptoms worsen or fail to improve. I spent 20 minutes with this patient. I spent greater than 50% of the time counseling this patient.         Katie Duran,   1/7/2022  8:31 AM

## 2023-04-08 NOTE — ED NOTES
Pt medicated per MAR. Tolerated well. Pts cleaned up. Incontinent of stool and urine. New linens placed and pts clothes placed in bag at bedside.

## 2023-04-08 NOTE — PROGRESS NOTES
Pt arrived to unit via transport.  Pt moved to hospital bed with slide board.   A0x4  Pt declines any SOB on room air, chest pain, new onset of numbness/tingling  Pt stating she is in pain but refusing to provide a pain scale number.  Pt refusing any pain intervention at this time.   + voiding, pure wick in place  BM PTA  Pt has bilateral BKA, mobilizes with wheelchair  Pt is NPO  Yndia to L. BKA   Plan of care discussed, all questions answered.Call light is within reach, treaded slipper socks on, bed in lowest/locked position, hourly rounding in place, all needs met at this time.

## 2023-04-08 NOTE — ED PROVIDER NOTES
ED Provider Note    CHIEF COMPLAINT  Chief Complaint   Patient presents with    Abdominal Pain     Described as cramping x4 days intermittently since pt fell out of wheelchair onto her stomach 5 days ago; pt denies N/V    Diarrhea     Intermittently x4 days       EXTERNAL RECORDS REVIEWED  Inpatient Notes patient was admitted to the hospital and discharged on 30 March.  She presented to the ER with altered mental status and was found to be dehydrated with a lactic acidosis.  She was found to have right internal iliac vein DVT with possible pyelonephritis bilaterally she was started on Eliquis.  She also underwent a left below the knee amputation by orthopedics on 22 March for left foot cellulitis due to a frostbite injury.  With chronic active osteomyelitis.    HPI/ROS  LIMITATION TO HISTORY   Select: : None  OUTSIDE HISTORIAN(S):  None    Kellie Chatman is a 53 y.o. female who presents with crampy abdominal pain and associated diarrhea.  Says that symptoms started about 4 days ago.  Says that she has had multiple episodes of loose watery diarrhea which she says about every 5 minutes today.  She also has associated cramping abdominal pain which is mostly periumbilical and does not localize to one side.  The pain does not radiate and is not exertional.  She denies any associated nausea or vomiting and has been tolerating oral intake.  She has been compliant with her Eliquis.  She denies any dysuria, hematuria or urgency.  She denies any fevers or chills.  Patient states that she was not discharged from the hospital on antibiotics however she was recently hospitalized as described above.  She is also endorsing pain to her left BKA at site of surgery.  He also reports sustaining a fall out of her wheelchair about 4 days ago just before the pain started. She did not hit her head and denies any LOC or other injuries sustained.     PAST MEDICAL HISTORY   has a past medical history of Frostbite of foot, left, Frostbite of  "foot, right, and HIV (human immunodeficiency virus infection) (HCC).    SURGICAL HISTORY   has a past surgical history that includes knee amputation below (Left, 03/22/2023) and amputation, below the knee (Right).    FAMILY HISTORY  History reviewed. No pertinent family history.    SOCIAL HISTORY  Social History     Tobacco Use    Smoking status: Every Day     Packs/day: 0.25     Types: Cigarettes    Smokeless tobacco: Never   Vaping Use    Vaping Use: Never used   Substance and Sexual Activity    Alcohol use: Not Currently    Drug use: Yes     Types: Inhaled     Comment: smokes meth    Sexual activity: Not on file       CURRENT MEDICATIONS  Home Medications       Reviewed by Kelly Worley R.N. (Registered Nurse) on 04/07/23 at 1844  Med List Status: Not Addressed     Medication Last Dose Status   apixaban (ELIQUIS) 5mg Tab  Active   bictegravir-emtricitab-TAF (BIKTARVY) -25 mg Tab tablet  Active   divalproex ER (DEPAKOTE ER) 500 MG TABLET SR 24 HR  Active   OLANZapine (ZYPREXA) 5 MG Tab  Active                    ALLERGIES  No Known Allergies    PHYSICAL EXAM  VITAL SIGNS: /67   Pulse (!) 111   Temp 37.2 °C (99 °F) (Temporal)   Resp 18   Ht 1.778 m (5' 10\")   Wt 73.5 kg (162 lb)   SpO2 89%   BMI 23.24 kg/m²    Constitutional: Awake and alert . Non toxic  HENT: Normal inspection  . Dry mucous membranes  Eyes: Normal inspection  Neck: Grossly normal range of motion.  Cardiovascular: Bradycardic heart rate, Normal rhythm.  Symmetric peripheral pulses.   Thorax & Lungs: No respiratory distress, No wheezing, No rales, No rhonchi, No chest tenderness.   Abdomen: Soft, mildly distended, mild tenderness to palpation in the periumbilical region without rebound or guarding, no mass  Skin: No obvious rash.  Extremities: She has bilateral BKA's.  Left BKA is well-healing without any erythema, edema, warmth purulent drainage or tenderness.  Neurologic: Grossly normal   Psychiatric: Normal for " situation      DIAGNOSTIC STUDIES / PROCEDURES    LABS  Results for orders placed or performed during the hospital encounter of 04/07/23   CBC with Differential   Result Value Ref Range    WBC 6.9 4.8 - 10.8 K/uL    RBC 4.29 4.20 - 5.40 M/uL    Hemoglobin 11.1 (L) 12.0 - 16.0 g/dL    Hematocrit 36.1 (L) 37.0 - 47.0 %    MCV 84.1 81.4 - 97.8 fL    MCH 25.9 (L) 27.0 - 33.0 pg    MCHC 30.7 (L) 33.6 - 35.0 g/dL    RDW 48.2 35.9 - 50.0 fL    Platelet Count 385 164 - 446 K/uL    MPV 9.1 9.0 - 12.9 fL    Neutrophils-Polys 63.10 44.00 - 72.00 %    Lymphocytes 25.20 22.00 - 41.00 %    Monocytes 6.30 0.00 - 13.40 %    Eosinophils 4.50 0.00 - 6.90 %    Basophils 0.90 0.00 - 1.80 %    Nucleated RBC 0.00 /100 WBC    Neutrophils (Absolute) 4.35 2.00 - 7.15 K/uL    Lymphs (Absolute) 1.74 1.00 - 4.80 K/uL    Monos (Absolute) 0.43 0.00 - 0.85 K/uL    Eos (Absolute) 0.31 0.00 - 0.51 K/uL    Baso (Absolute) 0.06 0.00 - 0.12 K/uL    NRBC (Absolute) 0.00 K/uL    Hypochromia 1+     Anisocytosis 1+     Microcytosis 1+    Complete Metabolic Panel   Result Value Ref Range    Sodium 138 135 - 145 mmol/L    Potassium 3.6 3.6 - 5.5 mmol/L    Chloride 103 96 - 112 mmol/L    Co2 15 (L) 20 - 33 mmol/L    Anion Gap 20.0 (H) 7.0 - 16.0    Glucose 84 65 - 99 mg/dL    Bun 25 (H) 8 - 22 mg/dL    Creatinine 0.64 0.50 - 1.40 mg/dL    Calcium 9.4 8.5 - 10.5 mg/dL    AST(SGOT) 19 12 - 45 U/L    ALT(SGPT) 8 2 - 50 U/L    Alkaline Phosphatase 85 30 - 99 U/L    Total Bilirubin 0.4 0.1 - 1.5 mg/dL    Albumin 3.7 3.2 - 4.9 g/dL    Total Protein 8.4 (H) 6.0 - 8.2 g/dL    Globulin 4.7 (H) 1.9 - 3.5 g/dL    A-G Ratio 0.8 g/dL   Lipase   Result Value Ref Range    Lipase 14 11 - 82 U/L   CORRECTED CALCIUM   Result Value Ref Range    Correct Calcium 9.6 8.5 - 10.5 mg/dL   ESTIMATED GFR   Result Value Ref Range    GFR (CKD-EPI) 104 >60 mL/min/1.73 m 2   PERIPHERAL SMEAR REVIEW   Result Value Ref Range    Peripheral Smear Review see below    PLATELET ESTIMATE   Result  Value Ref Range    Plt Estimation Normal    MORPHOLOGY   Result Value Ref Range    RBC Morphology Present     Poikilocytosis 1+     Echinocytes 1+    DIFFERENTIAL MANUAL   Result Value Ref Range    Manual Diff Status PERFORMED          RADIOLOGY  I have independently interpreted the diagnostic imaging associated with this visit and am waiting the final reading from the radiologist.   My preliminary interpretation is as follows: CT no obvious obstruction  Radiologist interpretation:   CT-ABDOMEN-PELVIS WITH   Final Result         1.Diffuse gaseous distention of the colon, most in the sigmoid colon, could relate to ileus.      2. Wall thickening of the rectum could relate to mild proctitis.            COURSE & MEDICAL DECISION MAKING    ED Observation Status? Yes; I am placing the patient in to an observation status due to a diagnostic uncertainty as well as therapeutic intensity. Patient placed in observation status at 10:19 PM, 4/7/2023.     Observation plan is as follows: IV, Labs, Fluids, Stools Studies    Upon Reevaluation, the patient's condition has: not improved; and will be escalated to hospitalization.    Patient discharged from ED Observation status at 1.59 AM 4/7/2023    INITIAL ASSESSMENT, COURSE AND PLAN  Care Narrative: 53 yr old female with past medical history of s/p right BKA and left transmetatarsal amputation, hypertension, HIV, seizure disorder, schizophrenia, methamphetamine use, tobacco use and homelessness.  She presents with abdominal pain and associated diarrhea.  On arrival she was bradycardic but normotensive, afebrile.  Abdominal exam is overall reassuring without signs of peritonitis.  Her labs are also reassuring she does not have a leukocytosis, no significant electrolyte derangements and normal renal function.  She is chronically anemic and has an anion gap acidosis.  Her lipase is normal and I doubt pancreatitis she additionally has normal liver function testing and biliary pathology  seems unlikely.  I did obtain a CT scan to evaluate for colitis, appendicitis, small bowel obstruction or intra-abdominal infection, diverticulitis among others.  The CT scan is notable for likely proctitis in addition to possible ileus.  Unclear exactly the etiology of possible proctitis. She does have a history of HIV though per chart review her CD4 count has been over 600.  C. difficile studies are pending as are gonorrhea and chlamydia.  It is possible that it is due to enteric organism and therefore I started her on ceftriaxone and Flagyl after discussing with pharmacy.  Patient will be admitted to the hospital for further management.      HYDRATION: Based on the patient's presentation of Dehydration the patient was given IV fluids. IV Hydration was used because oral hydration was not adequate alone. Upon recheck following hydration, the patient was improved.        DISPOSITION AND DISCUSSIONS  I have discussed management of the patient with the following physicians and LONNY's:  Dr. Humphries    Discussion of management with other [facility] or appropriate source(s): Pharmacy Regarding antibiotics      Escalation of care considered, and ultimately not performed:none    Barriers to care at this time, including but not limited to: Patient is homeless.     Decision tools and prescription drugs considered including, but not limited to: Antibiotics Initiated IV .    FINAL DIAGNOSIS  1. Acute proctitis Acute   2. Generalized abdominal pain Acute   3. Increased anion gap metabolic acidosis Acute   4. Ileus (HCC) Acute   5. Dehydration Acute          Electronically signed by: Sugar Kellogg M.D., 4/7/2023 10:07 PM

## 2023-04-08 NOTE — CARE PLAN
The patient is Watcher - Medium risk of patient condition declining or worsening    Shift Goals  Clinical Goals: falls prevention, skin integrity preservation  Patient Goals: IVETTE  Family Goals: not present    Progress made toward(s) clinical / shift goals:  Fall precautions remain in place. Waffle overlay is in place. Patient is encouraged to change position.     Patient is not progressing towards the following goals: Patient is refusing most interventions provided.     Problem: Knowledge Deficit - Standard  Goal: Patient and family/care givers will demonstrate understanding of plan of care, disease process/condition, diagnostic tests and medications  Outcome: Progressing     Problem: Skin Integrity  Goal: Skin integrity is maintained or improved  Outcome: Progressing     Problem: Fall Risk  Goal: Patient will remain free from falls  Outcome: Progressing

## 2023-04-08 NOTE — DIETARY
[facility] [facility] - Alert received for newly identified wound. Wound team consult pending, will await wound staging to make recommendations if appropriate. RD will monitor per dept policy.

## 2023-04-08 NOTE — PROGRESS NOTES
4 Eyes Skin Assessment Completed by Kennedy, RN and MARIA EUGENIA Johnson.    Head  IVETTE: pt refuses assessment  Ears WDL  Nose WDL  Mouth WDL  Neck WDL  Breast/Chest WDL  Shoulder Blades IVETTE: pt refused assessment  Spine: IVETTE: pt refused assessment  (R) Arm/Elbow/Hand WDL  (L) Arm/Elbow/Hand WDL  Abdomen Scar  Groin WDL  Scrotum/Coccyx/Buttocks: IVETTE: pt refused assessment   (R) Leg : BKA, wound to stump   (L) Leg : BKA Incision with staples            Devices In Places Blood Pressure Cuff and Pulse Ox      Interventions In Place Pillows    Possible Skin Injury Yes    Pictures Uploaded Into Epic Yes  Wound Consult Placed Yes  RN Wound Prevention Protocol Ordered Yes

## 2023-04-08 NOTE — H&P
Hospital Medicine History & Physical Note    Date of Service  4/8/2023    Primary Care Physician  Pcp Pt States None    Consultants      Specialist Names:     Code Status  Full Code    Chief Complaint  Chief Complaint   Patient presents with    Abdominal Pain     Described as cramping x4 days intermittently since pt fell out of wheelchair onto her stomach 5 days ago; pt denies N/V    Diarrhea     Intermittently x4 days       History of Presenting Illness  Kellie Chatman is a 53 y.o. homeless female who presented 4/7/2023 with history of right-sided BKA, hypertension, HIV, seizures, schizophrenia, meth abuse, tobacco abuse, DVT who comes into the hospital for 1 day of abdominal pain and diarrhea.  Abdominal pain is in epigastric region, nonradiating, nonexertional.  Patient is also having frequent diarrhea but denies bloody stools.  She denies any fever, chills, nausea, vomiting.  She does not remember when her last bowel movement but said that she is not passing gas..  She denies any recent travel history.  She is never had an endoscopy or colonoscopy in the past.  On her last admission she was started on Eliquis, Zyprexa a month ago.    CT scan abdomen found diffuse gaseous distention of the colon, most in the sigmoid colon could relate to ileus  Wall thickening of the rectum could relate to mild proctitis    I discussed the plan of care with patient.    Review of Systems  Review of Systems   Constitutional:  Negative for chills, diaphoresis, fever and malaise/fatigue.   HENT:  Negative for congestion, ear discharge, ear pain, hearing loss, nosebleeds, sinus pain, sore throat and tinnitus.    Eyes:  Negative for blurred vision, double vision, photophobia and pain.   Respiratory:  Negative for cough, hemoptysis, sputum production, shortness of breath, wheezing and stridor.    Cardiovascular:  Negative for chest pain, palpitations, orthopnea, claudication, leg swelling and PND.   Gastrointestinal:  Positive for  abdominal pain and diarrhea. Negative for blood in stool, constipation, heartburn, melena, nausea and vomiting.   Genitourinary:  Negative for dysuria, flank pain, frequency, hematuria and urgency.   Musculoskeletal:  Negative for back pain, falls, joint pain, myalgias and neck pain.   Skin:  Negative for itching and rash.   Neurological:  Negative for dizziness, tingling, tremors, weakness and headaches.   Endo/Heme/Allergies:  Negative for environmental allergies and polydipsia. Does not bruise/bleed easily.   Psychiatric/Behavioral:  Negative for depression, hallucinations, substance abuse and suicidal ideas.      Past Medical History   has a past medical history of Frostbite of foot, left, Frostbite of foot, right, and HIV (human immunodeficiency virus infection) (HCC).    Surgical History   has a past surgical history that includes knee amputation below (Left, 03/22/2023) and amputation, below the knee (Right).     Family History    Family history reviewed with patient. There is no family history that is pertinent to the chief complaint.     Social History   reports that she has been smoking cigarettes. She has been smoking an average of .25 packs per day. She has never used smokeless tobacco. She reports that she does not currently use alcohol. She reports current drug use. Drug: Inhaled.    Allergies  No Known Allergies    Medications  Prior to Admission Medications   Prescriptions Last Dose Informant Patient Reported? Taking?   OLANZapine (ZYPREXA) 5 MG Tab   No No   Sig: Take 1 Tablet by mouth 2 times a day for 30 days.   apixaban (ELIQUIS) 5mg Tab   No No   Sig: Take 1 Tablet by mouth 2 times a day for 30 days. Indications: DVT/PE   bictegravir-emtricitab-TAF (BIKTARVY) -25 mg Tab tablet   No No   Sig: Take 1 Tablet by mouth every day.   divalproex ER (DEPAKOTE ER) 500 MG TABLET SR 24 HR   No No   Sig: Take 1 Tablet by mouth every 12 hours for 30 days.      Facility-Administered Medications: None        Physical Exam  Temp:  [37.1 °C (98.7 °F)] 37.1 °C (98.7 °F)  Pulse:  [] 116  Resp:  [18] 18  BP: (109-146)/(60-92) 141/61  SpO2:  [93 %-98 %] 95 %  Blood Pressure: (!) 141/61   Temperature: 37.1 °C (98.7 °F)   Pulse: (!) 116   Respiration: 18   Pulse Oximetry: 95 %       Physical Exam  Vitals and nursing note reviewed.   Constitutional:       General: She is not in acute distress.     Appearance: Normal appearance. She is not ill-appearing, toxic-appearing or diaphoretic.   HENT:      Head: Normocephalic and atraumatic.      Nose: No congestion or rhinorrhea.      Mouth/Throat:      Pharynx: No oropharyngeal exudate or posterior oropharyngeal erythema.   Eyes:      General: No scleral icterus.  Neck:      Vascular: No carotid bruit or JVD.   Cardiovascular:      Rate and Rhythm: Normal rate and regular rhythm.      Pulses: Normal pulses.      Heart sounds: Normal heart sounds. No murmur heard.    No friction rub. No gallop.   Pulmonary:      Effort: Pulmonary effort is normal. No respiratory distress.      Breath sounds: No stridor. No wheezing, rhonchi or rales.   Abdominal:      General: Abdomen is flat. There is distension.      Palpations: There is no mass.      Tenderness: There is no abdominal tenderness. There is no left CVA tenderness, guarding or rebound.      Hernia: No hernia is present.   Musculoskeletal:         General: No swelling. Normal range of motion.      Cervical back: No rigidity. No muscular tenderness.      Right lower leg: No edema.      Left lower leg: No edema.   Lymphadenopathy:      Cervical: No cervical adenopathy.   Skin:     General: Skin is warm and dry.      Capillary Refill: Capillary refill takes more than 3 seconds.      Coloration: Skin is not jaundiced or pale.      Findings: No bruising or erythema.   Neurological:      Mental Status: She is alert.       Laboratory:  Recent Labs     04/07/23  1854   WBC 6.9   RBC 4.29   HEMOGLOBIN 11.1*   HEMATOCRIT 36.1*   MCV  84.1   MCH 25.9*   MCHC 30.7*   RDW 48.2   PLATELETCT 385   MPV 9.1     Recent Labs     04/07/23  1854   SODIUM 138   POTASSIUM 3.6   CHLORIDE 103   CO2 15*   GLUCOSE 84   BUN 25*   CREATININE 0.64   CALCIUM 9.4     Recent Labs     04/07/23  1854   ALTSGPT 8   ASTSGOT 19   ALKPHOSPHAT 85   TBILIRUBIN 0.4   LIPASE 14   GLUCOSE 84         No results for input(s): NTPROBNP in the last 72 hours.      No results for input(s): TROPONINT in the last 72 hours.    Imaging:  CT-ABDOMEN-PELVIS WITH   Final Result         1.Diffuse gaseous distention of the colon, most in the sigmoid colon, could relate to ileus.      2. Wall thickening of the rectum could relate to mild proctitis.          no X-Ray or EKG requiring interpretation    Assessment/Plan:  Justification for Admission Status  I anticipate this patient will require at least two midnights for appropriate medical management, necessitating inpatient admission because proctitis    Patient will need a Med/Surg bed on MEDICAL service .  The need is secondary to proctitis.    * Proctitis- (present on admission)  Assessment & Plan  Unknown etiology at this point   with a history of HIV that should be evaluated  Check C. difficile, stool cultures, chlamydia and gonorrhea swab  Pain control with oral and IV narcotics, monitor mental and respiratory status closely  Start ceftriaxone and Flagyl for now    Ileus ([facility])  Assessment & Plan  Keep n.p.o.  IV hydration  Monitor electrolytes      High anion gap metabolic acidosis  Assessment & Plan  Likely related to dehydration  Continue IV fluids    History of DVT (deep vein thrombosis)  Assessment & Plan  Continue Eliquis for now    Methamphetamine abuse ([facility])- (present on admission)  Assessment & Plan  Monitor signs for withdrawal    HIV (human immunodeficiency virus infection) ([facility])- (present on admission)  Assessment & Plan  Check CD4 count    Schizophrenia ([facility])- (present on admission)  Assessment & Plan  Continue home  medications    Tobacco abuse- (present on admission)  Assessment & Plan  Tobacco cessation education provided for more than 5 minutes.  We discussed the risks of smoking including increased risk of heart disease, stroke, cancer and COPD. We discussed the benefits of quitting smoking. We discussed options of nicotine patch, wellbutrin and chantix.  The patient has the intention to quit smoking. Nicotine replacement protocol will be provided to the patient.          VTE prophylaxis: SCDs/TEDs        I discussed advance care planning for at least 20 minutes with the patient and wife_, including diagnosis, prognosis, plan of care, risks and benefits of any therapies that could be offered, as well as alternatives including palliation and hospice, as appropriate. The patient has opted Full code. Time spent is exclusive of evaluation and management or other separately billable procedures. Start time: 1:30 AM end time: 1:50 AM

## 2023-04-08 NOTE — ED TRIAGE NOTES
Chief Complaint   Patient presents with    Abdominal Pain     Described as cramping x4 days intermittently since pt fell out of wheelchair onto her stomach 5 days ago; pt denies N/V    Diarrhea     Intermittently x4 days     Pt brought in by EMS and to triage via wheelchair for above complaint. Pt also c/o pain to bilateral BKA surgery sites, no signs of infection noted in triage.     Pt is alert/oriented and follows commands. Pt speaking in full sentences and responds appropriately to questions. No acute distress noted in triage and respirations are even and unlabored.     Pt placed in lobby and educated on triage process. Pt encouraged to alert staff for any changes in condition.

## 2023-04-09 PROBLEM — Z89.512 HX OF BKA, LEFT (HCC): Status: ACTIVE | Noted: 2023-04-09

## 2023-04-09 PROBLEM — D72.819 LEUKOPENIA: Status: ACTIVE | Noted: 2023-04-09

## 2023-04-09 LAB
ALBUMIN SERPL BCP-MCNC: 2.6 G/DL (ref 3.2–4.9)
ALBUMIN/GLOB SERPL: 0.8 G/DL
ALP SERPL-CCNC: 58 U/L (ref 30–99)
ALT SERPL-CCNC: 5 U/L (ref 2–50)
ANION GAP SERPL CALC-SCNC: 11 MMOL/L (ref 7–16)
APPEARANCE UR: CLEAR
AST SERPL-CCNC: 7 U/L (ref 12–45)
BASOPHILS # BLD AUTO: 0.9 % (ref 0–1.8)
BASOPHILS # BLD: 0.04 K/UL (ref 0–0.12)
BILIRUB SERPL-MCNC: 0.2 MG/DL (ref 0.1–1.5)
BILIRUB UR QL STRIP.AUTO: NEGATIVE
BUN SERPL-MCNC: 7 MG/DL (ref 8–22)
C TRACH DNA GENITAL QL NAA+PROBE: NEGATIVE
CALCIUM ALBUM COR SERPL-MCNC: 9.1 MG/DL (ref 8.5–10.5)
CALCIUM SERPL-MCNC: 8 MG/DL (ref 8.5–10.5)
CHLORIDE SERPL-SCNC: 111 MMOL/L (ref 96–112)
CO2 SERPL-SCNC: 22 MMOL/L (ref 20–33)
COLOR UR: YELLOW
CREAT SERPL-MCNC: 0.51 MG/DL (ref 0.5–1.4)
EOSINOPHIL # BLD AUTO: 0.09 K/UL (ref 0–0.51)
EOSINOPHIL NFR BLD: 2.1 % (ref 0–6.9)
ERYTHROCYTE [DISTWIDTH] IN BLOOD BY AUTOMATED COUNT: 46.8 FL (ref 35.9–50)
GFR SERPLBLD CREATININE-BSD FMLA CKD-EPI: 110 ML/MIN/1.73 M 2
GLOBULIN SER CALC-MCNC: 3.4 G/DL (ref 1.9–3.5)
GLUCOSE SERPL-MCNC: 69 MG/DL (ref 65–99)
GLUCOSE UR STRIP.AUTO-MCNC: NEGATIVE MG/DL
HCT VFR BLD AUTO: 28.5 % (ref 37–47)
HGB BLD-MCNC: 8.8 G/DL (ref 12–16)
IMM GRANULOCYTES # BLD AUTO: 0.01 K/UL (ref 0–0.11)
IMM GRANULOCYTES NFR BLD AUTO: 0.2 % (ref 0–0.9)
KETONES UR STRIP.AUTO-MCNC: 15 MG/DL
LEUKOCYTE ESTERASE UR QL STRIP.AUTO: NEGATIVE
LYMPHOCYTES # BLD AUTO: 1.32 K/UL (ref 1–4.8)
LYMPHOCYTES NFR BLD: 30.9 % (ref 22–41)
MCH RBC QN AUTO: 25.8 PG (ref 27–33)
MCHC RBC AUTO-ENTMCNC: 30.9 G/DL (ref 33.6–35)
MCV RBC AUTO: 83.6 FL (ref 81.4–97.8)
MICRO URNS: ABNORMAL
MONOCYTES # BLD AUTO: 0.48 K/UL (ref 0–0.85)
MONOCYTES NFR BLD AUTO: 11.2 % (ref 0–13.4)
N GONORRHOEA DNA GENITAL QL NAA+PROBE: NEGATIVE
NEUTROPHILS # BLD AUTO: 2.33 K/UL (ref 2–7.15)
NEUTROPHILS NFR BLD: 54.7 % (ref 44–72)
NITRITE UR QL STRIP.AUTO: NEGATIVE
NRBC # BLD AUTO: 0 K/UL
NRBC BLD-RTO: 0 /100 WBC
PH UR STRIP.AUTO: 5.5 [PH] (ref 5–8)
PLATELET # BLD AUTO: 222 K/UL (ref 164–446)
PMV BLD AUTO: 9.2 FL (ref 9–12.9)
POTASSIUM SERPL-SCNC: 2.5 MMOL/L (ref 3.6–5.5)
PROT SERPL-MCNC: 6 G/DL (ref 6–8.2)
PROT UR QL STRIP: NEGATIVE MG/DL
RBC # BLD AUTO: 3.41 M/UL (ref 4.2–5.4)
RBC UR QL AUTO: NEGATIVE
SODIUM SERPL-SCNC: 144 MMOL/L (ref 135–145)
SP GR UR STRIP.AUTO: 1.03
SPECIMEN SOURCE: NORMAL
UROBILINOGEN UR STRIP.AUTO-MCNC: 0.2 MG/DL
WBC # BLD AUTO: 4.3 K/UL (ref 4.8–10.8)

## 2023-04-09 PROCEDURE — 700102 HCHG RX REV CODE 250 W/ 637 OVERRIDE(OP): Performed by: HOSPITALIST

## 2023-04-09 PROCEDURE — 770001 HCHG ROOM/CARE - MED/SURG/GYN PRIV*

## 2023-04-09 PROCEDURE — 85025 COMPLETE CBC W/AUTO DIFF WBC: CPT

## 2023-04-09 PROCEDURE — 700105 HCHG RX REV CODE 258: Performed by: HOSPITALIST

## 2023-04-09 PROCEDURE — 51798 US URINE CAPACITY MEASURE: CPT

## 2023-04-09 PROCEDURE — A9270 NON-COVERED ITEM OR SERVICE: HCPCS | Performed by: HOSPITALIST

## 2023-04-09 PROCEDURE — 700101 HCHG RX REV CODE 250: Performed by: INTERNAL MEDICINE

## 2023-04-09 PROCEDURE — 700111 HCHG RX REV CODE 636 W/ 250 OVERRIDE (IP): Performed by: HOSPITALIST

## 2023-04-09 PROCEDURE — 36415 COLL VENOUS BLD VENIPUNCTURE: CPT

## 2023-04-09 PROCEDURE — 80053 COMPREHEN METABOLIC PANEL: CPT

## 2023-04-09 PROCEDURE — 700101 HCHG RX REV CODE 250: Performed by: HOSPITALIST

## 2023-04-09 PROCEDURE — 81003 URINALYSIS AUTO W/O SCOPE: CPT

## 2023-04-09 PROCEDURE — A9270 NON-COVERED ITEM OR SERVICE: HCPCS | Performed by: INTERNAL MEDICINE

## 2023-04-09 PROCEDURE — 99232 SBSQ HOSP IP/OBS MODERATE 35: CPT | Performed by: INTERNAL MEDICINE

## 2023-04-09 PROCEDURE — 700102 HCHG RX REV CODE 250 W/ 637 OVERRIDE(OP): Performed by: INTERNAL MEDICINE

## 2023-04-09 RX ORDER — SODIUM CHLORIDE AND POTASSIUM CHLORIDE 150; 900 MG/100ML; MG/100ML
INJECTION, SOLUTION INTRAVENOUS CONTINUOUS
Status: DISPENSED | OUTPATIENT
Start: 2023-04-09 | End: 2023-04-10

## 2023-04-09 RX ORDER — POTASSIUM CHLORIDE 20 MEQ/1
40 TABLET, EXTENDED RELEASE ORAL 3 TIMES DAILY
Status: COMPLETED | OUTPATIENT
Start: 2023-04-09 | End: 2023-04-10

## 2023-04-09 RX ADMIN — OXYCODONE 5 MG: 5 TABLET ORAL at 05:47

## 2023-04-09 RX ADMIN — APIXABAN 5 MG: 5 TABLET, FILM COATED ORAL at 17:01

## 2023-04-09 RX ADMIN — OLANZAPINE 5 MG: 5 TABLET, FILM COATED ORAL at 17:01

## 2023-04-09 RX ADMIN — OLANZAPINE 5 MG: 5 TABLET, FILM COATED ORAL at 05:43

## 2023-04-09 RX ADMIN — CEFTRIAXONE SODIUM 1000 MG: 10 INJECTION, POWDER, FOR SOLUTION INTRAVENOUS at 06:25

## 2023-04-09 RX ADMIN — POTASSIUM CHLORIDE 40 MEQ: 1500 TABLET, EXTENDED RELEASE ORAL at 13:19

## 2023-04-09 RX ADMIN — METRONIDAZOLE 500 MG: 5 INJECTION, SOLUTION INTRAVENOUS at 08:16

## 2023-04-09 RX ADMIN — SODIUM CHLORIDE, POTASSIUM CHLORIDE, SODIUM LACTATE AND CALCIUM CHLORIDE: 600; 310; 30; 20 INJECTION, SOLUTION INTRAVENOUS at 05:27

## 2023-04-09 RX ADMIN — POTASSIUM CHLORIDE AND SODIUM CHLORIDE: 900; 150 INJECTION, SOLUTION INTRAVENOUS at 13:20

## 2023-04-09 RX ADMIN — DIVALPROEX SODIUM 500 MG: 500 TABLET, EXTENDED RELEASE ORAL at 05:43

## 2023-04-09 RX ADMIN — DIVALPROEX SODIUM 500 MG: 500 TABLET, EXTENDED RELEASE ORAL at 17:01

## 2023-04-09 RX ADMIN — METRONIDAZOLE 500 MG: 5 INJECTION, SOLUTION INTRAVENOUS at 16:59

## 2023-04-09 RX ADMIN — POTASSIUM CHLORIDE 40 MEQ: 1500 TABLET, EXTENDED RELEASE ORAL at 17:01

## 2023-04-09 RX ADMIN — METRONIDAZOLE 500 MG: 5 INJECTION, SOLUTION INTRAVENOUS at 00:10

## 2023-04-09 RX ADMIN — BICTEGRAVIR SODIUM, EMTRICITABINE, AND TENOFOVIR ALAFENAMIDE FUMARATE 1 TABLET: 50; 200; 25 TABLET ORAL at 05:43

## 2023-04-09 ASSESSMENT — ENCOUNTER SYMPTOMS
ABDOMINAL PAIN: 1
NAUSEA: 0
VOMITING: 0
MYALGIAS: 1
CHILLS: 0
FEVER: 0

## 2023-04-09 ASSESSMENT — PAIN DESCRIPTION - PAIN TYPE
TYPE: ACUTE PAIN
TYPE: ACUTE PAIN

## 2023-04-09 NOTE — PROGRESS NOTES
Hospital Medicine Daily Progress Note    Date of Service  4/9/2023    Chief Complaint  Kellie Chatman is a 53 y.o. homeless female with schizophrenia, HIV (CD4 752 in 3/2023), R BKA, HTN, seizures, ongoing meth use, ongoing tobacco abuse and prior DVT who presented to our hospital with abdominal pain and diarrhea. Proctitis was diagnosed.     Hospital Course  No notes on file    Interval Problem Update  4/8-no acute events overnight. Patient refused to talk to me. She told me to get out of her room Now!    4/9-patient quite irritable this AM. I tried to examine her legs and she yelled at me to stop. She wants to eat food. No other acute events except for very low K+ as noted below.     I have discussed this patient's plan of care and discharge plan at IDT rounds today with Case Management, Nursing, Nursing leadership, and other members of the IDT team.    Consultants/Specialty  none    Code Status  Full Code    Disposition  Patient is not medically cleared for discharge.   Anticipate discharge to to home with close outpatient follow-up. She denies being homeless. She yelled at me when asking her about her home situation.  I have placed the appropriate orders for post-discharge needs.    Review of Systems  Review of Systems   Unable to perform ROS: Psychiatric disorder   Constitutional:  Negative for chills and fever.   Gastrointestinal:  Positive for abdominal pain. Negative for nausea and vomiting.   Genitourinary:  Negative for dysuria and urgency.   Musculoskeletal:  Positive for joint pain and myalgias.   All other systems reviewed and are negative.     Physical Exam  Temp:  [36.4 °C (97.5 °F)-37.4 °C (99.3 °F)] 37.1 °C (98.8 °F)  Pulse:  [] 90  Resp:  [15-18] 18  BP: (104-134)/(60-78) 129/67  SpO2:  [90 %-95 %] 91 %    Physical Exam  Vitals and nursing note reviewed.   Constitutional:       General: She is not in acute distress.     Appearance: She is well-developed.      Comments: Quite irritable, but  more talkative today   HENT:      Head: Normocephalic and atraumatic.   Eyes:      General: No scleral icterus.     Pupils: Pupils are equal, round, and reactive to light.   Neck:      Thyroid: No thyromegaly.   Cardiovascular:      Rate and Rhythm: Normal rate.      Comments: Refused  Pulmonary:      Effort: Pulmonary effort is normal.      Comments: Refused  Abdominal:      General: There is no distension.      Tenderness: There is abdominal tenderness.   Musculoskeletal:         General: No tenderness. Normal range of motion.      Cervical back: Normal range of motion and neck supple.      Comments: B/L BKA  R BKA stump with eschar and mild bogginess, moderate TTP appreciated     Skin:     General: Skin is warm and dry.   Neurological:      Mental Status: She is alert and oriented to person, place, and time.      Cranial Nerves: No cranial nerve deficit.      Comments: Limited       Fluids    Intake/Output Summary (Last 24 hours) at 4/9/2023 1233  Last data filed at 4/9/2023 0825  Gross per 24 hour   Intake 1760.25 ml   Output 325 ml   Net 1435.25 ml       Laboratory  Recent Labs     04/07/23  1854 04/09/23  1000   WBC 6.9 4.3*   RBC 4.29 3.41*   HEMOGLOBIN 11.1* 8.8*   HEMATOCRIT 36.1* 28.5*   MCV 84.1 83.6   MCH 25.9* 25.8*   MCHC 30.7* 30.9*   RDW 48.2 46.8   PLATELETCT 385 222   MPV 9.1 9.2     Recent Labs     04/07/23  1854 04/09/23  0823   SODIUM 138 144   POTASSIUM 3.6 2.5*   CHLORIDE 103 111   CO2 15* 22   GLUCOSE 84 69   BUN 25* 7*   CREATININE 0.64 0.51   CALCIUM 9.4 8.0*                   Imaging  CT-ABDOMEN-PELVIS WITH   Final Result         1.Diffuse gaseous distention of the colon, most in the sigmoid colon, could relate to ileus.      2. Wall thickening of the rectum could relate to mild proctitis.           Assessment/Plan  * Proctitis- (present on admission)  Assessment & Plan  Unknown etiology at this point  HIV well controlled on most recent CD4  Continue empiric IV CTX/flagyl  Need G/C urine  and RECTAL SWAB-pending at this time  Start CLD, consider advancing later today if improving  Clinically stable on 4/9  WBC stable, I personally reviewed the CBC on 4/9    Leukopenia  Assessment & Plan  Likely related to HIV  Trend    Ileus (HCC)- (present on admission)  Assessment & Plan  Clinically improving slowly 4/9  Diet as noted above      High anion gap metabolic acidosis- (present on admission)  Assessment & Plan  Resolvd, decrease iV fluids as oral intake improves    History of DVT (deep vein thrombosis)- (present on admission)  Assessment & Plan  Continue Eliquis for now    Hx of BKA, right (HCC)- (present on admission)  Assessment & Plan  Mild eschar and some bogginess  Ortho evaluated and deemed no surgical intervention at this time  High risk for worsening given ongoing homelessness, high risk of frostbite and limited tissue to work with    Methamphetamine abuse ([facility])- (present on admission)  Assessment & Plan  Monitor signs for withdrawal    Anemia- (present on admission)  Assessment & Plan  At her baseline    HIV (human immunodeficiency virus infection) ([facility])- (present on admission)  Assessment & Plan  Last CD4 in 3/2023 was 752 with undetectable HIV VL  Continue outpatient Biktarvy    Hypokalemia- (present on admission)  Assessment & Plan  Profoundly low  Aggressive replacement  Check BMP and Mg++ on 4/10    Schizophrenia ([facility])- (present on admission)  Assessment & Plan  Continue home medications    Tobacco abuse- (present on admission)  Assessment & Plan  Encourage cessation           VTE prophylaxis: therapeutic anticoagulation with eliquis     I have performed a physical exam and reviewed and updated ROS and Plan today (4/9/2023). In review of yesterday's note (4/8/2023), there are no changes except as documented above.

## 2023-04-09 NOTE — PROGRESS NOTES
Bedside report received, assessment completed    A&O x  2, pt calls appropriately   Mobility: Bilateral BKA. Up with x2 assist and WC  Fall Risk Assessment: High, bed alarm on, door notifications in use  Pain Assessment / Reassessment completed, medication provided per MAR  Diet: NPO, sips with meds   LDA:   IV Access: 18 R AC, CDI/ Infusing LR at 100 mL    GI/: + void with purwick in place, + flatus, PTA BM  Skin: per flowsheets     Reviewed plan of care with patient, bed in lowest position and locked, pt resting comfortably now, call light within reach, all needs met at this time. Interventions will be executed per plan of care

## 2023-04-09 NOTE — PROGRESS NOTES
4 Eyes Skin Assessment Completed by MARIA EUGENIA Almanza and MARIA EUGENIA Franz.    Head WDL  Ears WDL  Nose WDL  Mouth WDL  Neck WDL  Breast/Chest WDL  Shoulder Blades WDL  Spine WDL  (R) Arm/Elbow/Hand WDL  (L) Arm/Elbow/Hand WDL  Abdomen WDL  Groin WDL  Scrotum/Coccyx/Buttocks Micro tears  (R) Leg BKA Scab, Bruising, and Abrasion  (L) Leg BKA Incision with staples AZRA          Devices In Places Purwick and pulse Ox      Interventions In Place Waffle Overlay, TAP System, Pillows, Q2 Turns, and Barrier Cream    Possible Skin Injury No    Pictures Uploaded Into Epic N/A  Wound Consult Placed Yes  RN Wound Prevention Protocol Ordered Yes

## 2023-04-09 NOTE — PROGRESS NOTES
Patient seen and examined.  Quite drowsy this morning.  Left leg continues to heal well with intact incision and staple line.  No drainage on the side.  Right leg shows a stable eschar to the posterior of the stump but has been stable over the last couple months.  No open or draining wounds to the side.    Overall both legs are stable.  Staples on the left leg can be removed at the 3 weeks postop time point.  Continue wound care t o bilateral lower extremities.  Avoid pressure on the posterior aspect of the right stump.  I would not recommend any surgical treatment at this point as this would create a larger wound or could necessitate a higher level of amputation

## 2023-04-09 NOTE — PROGRESS NOTES
"Patient unable to void throughout majority of shift. Bladder scan results 564mL.   Educated patient on attempting to void via purewick or commode. Patient stated: \"It's not time for me to pee.\"   Sly Goodwin RN, Cami Ryan, paged on call hospitalist regarding patient condition. New orders received.     Approximately 325 mL of urine from straight cath. UA obtained.   "

## 2023-04-09 NOTE — ASSESSMENT & PLAN NOTE
Mild eschar and some bogginess  Ortho evaluated and deemed no surgical intervention at this time  High risk for worsening given ongoing homelessness, high risk of frostbite and limited tissue to work with

## 2023-04-10 PROBLEM — E83.42 HYPOMAGNESEMIA: Status: ACTIVE | Noted: 2023-04-10

## 2023-04-10 LAB
ANION GAP SERPL CALC-SCNC: 10 MMOL/L (ref 7–16)
BUN SERPL-MCNC: 6 MG/DL (ref 8–22)
CALCIUM SERPL-MCNC: 7.7 MG/DL (ref 8.5–10.5)
CHLORIDE SERPL-SCNC: 111 MMOL/L (ref 96–112)
CO2 SERPL-SCNC: 22 MMOL/L (ref 20–33)
CREAT SERPL-MCNC: 0.47 MG/DL (ref 0.5–1.4)
GFR SERPLBLD CREATININE-BSD FMLA CKD-EPI: 112 ML/MIN/1.73 M 2
GLUCOSE SERPL-MCNC: 83 MG/DL (ref 65–99)
MAGNESIUM SERPL-MCNC: 1.3 MG/DL (ref 1.5–2.5)
POTASSIUM SERPL-SCNC: 2.9 MMOL/L (ref 3.6–5.5)
SODIUM SERPL-SCNC: 143 MMOL/L (ref 135–145)

## 2023-04-10 PROCEDURE — 80048 BASIC METABOLIC PNL TOTAL CA: CPT

## 2023-04-10 PROCEDURE — 770001 HCHG ROOM/CARE - MED/SURG/GYN PRIV*

## 2023-04-10 PROCEDURE — 83735 ASSAY OF MAGNESIUM: CPT

## 2023-04-10 PROCEDURE — 700101 HCHG RX REV CODE 250: Performed by: HOSPITALIST

## 2023-04-10 PROCEDURE — 700102 HCHG RX REV CODE 250 W/ 637 OVERRIDE(OP): Performed by: INTERNAL MEDICINE

## 2023-04-10 PROCEDURE — 700101 HCHG RX REV CODE 250: Performed by: INTERNAL MEDICINE

## 2023-04-10 PROCEDURE — 99232 SBSQ HOSP IP/OBS MODERATE 35: CPT | Performed by: INTERNAL MEDICINE

## 2023-04-10 PROCEDURE — A9270 NON-COVERED ITEM OR SERVICE: HCPCS | Performed by: INTERNAL MEDICINE

## 2023-04-10 PROCEDURE — A9270 NON-COVERED ITEM OR SERVICE: HCPCS | Performed by: HOSPITALIST

## 2023-04-10 PROCEDURE — 700102 HCHG RX REV CODE 250 W/ 637 OVERRIDE(OP): Performed by: HOSPITALIST

## 2023-04-10 PROCEDURE — 700111 HCHG RX REV CODE 636 W/ 250 OVERRIDE (IP): Performed by: HOSPITALIST

## 2023-04-10 RX ADMIN — POTASSIUM CHLORIDE 40 MEQ: 1500 TABLET, EXTENDED RELEASE ORAL at 05:45

## 2023-04-10 RX ADMIN — MAGNESIUM 64 MG (MAGNESIUM CHLORIDE) TABLET,DELAYED RELEASE 64 MG: at 09:05

## 2023-04-10 RX ADMIN — METRONIDAZOLE 500 MG: 5 INJECTION, SOLUTION INTRAVENOUS at 00:17

## 2023-04-10 RX ADMIN — BICTEGRAVIR SODIUM, EMTRICITABINE, AND TENOFOVIR ALAFENAMIDE FUMARATE 1 TABLET: 50; 200; 25 TABLET ORAL at 05:44

## 2023-04-10 RX ADMIN — DIVALPROEX SODIUM 500 MG: 500 TABLET, EXTENDED RELEASE ORAL at 05:44

## 2023-04-10 RX ADMIN — CEFTRIAXONE SODIUM 1000 MG: 10 INJECTION, POWDER, FOR SOLUTION INTRAVENOUS at 05:45

## 2023-04-10 RX ADMIN — METRONIDAZOLE 500 MG: 5 INJECTION, SOLUTION INTRAVENOUS at 17:11

## 2023-04-10 RX ADMIN — OLANZAPINE 5 MG: 5 TABLET, FILM COATED ORAL at 05:45

## 2023-04-10 RX ADMIN — POTASSIUM CHLORIDE 40 MEQ: 1500 TABLET, EXTENDED RELEASE ORAL at 13:31

## 2023-04-10 RX ADMIN — DIVALPROEX SODIUM 500 MG: 500 TABLET, EXTENDED RELEASE ORAL at 18:10

## 2023-04-10 RX ADMIN — APIXABAN 5 MG: 5 TABLET, FILM COATED ORAL at 05:44

## 2023-04-10 RX ADMIN — OLANZAPINE 5 MG: 5 TABLET, FILM COATED ORAL at 18:10

## 2023-04-10 RX ADMIN — APIXABAN 5 MG: 5 TABLET, FILM COATED ORAL at 18:10

## 2023-04-10 RX ADMIN — MAGNESIUM 64 MG (MAGNESIUM CHLORIDE) TABLET,DELAYED RELEASE 64 MG: at 18:10

## 2023-04-10 RX ADMIN — METRONIDAZOLE 500 MG: 5 INJECTION, SOLUTION INTRAVENOUS at 09:09

## 2023-04-10 RX ADMIN — POTASSIUM CHLORIDE AND SODIUM CHLORIDE: 900; 150 INJECTION, SOLUTION INTRAVENOUS at 04:18

## 2023-04-10 ASSESSMENT — ENCOUNTER SYMPTOMS
FEVER: 0
NAUSEA: 0
CHILLS: 0
VOMITING: 0
MYALGIAS: 1
ABDOMINAL PAIN: 1

## 2023-04-10 ASSESSMENT — PAIN DESCRIPTION - PAIN TYPE
TYPE: ACUTE PAIN

## 2023-04-10 NOTE — PROGRESS NOTES
Hospital Medicine Daily Progress Note    Date of Service  4/10/2023    Chief Complaint  Kellie Chatman is a 53 y.o. homeless female with schizophrenia, HIV (CD4 752 in 3/2023), R BKA, HTN, seizures, ongoing meth use, ongoing tobacco abuse and prior DVT who presented to our hospital with abdominal pain and diarrhea. Proctitis was diagnosed.     Hospital Course  No notes on file    Interval Problem Update  4/8-no acute events overnight. Patient refused to talk to me. She told me to get out of her room Now!    4/9-patient quite irritable this AM. I tried to examine her legs and she yelled at me to stop. She wants to eat food. No other acute events except for very low K+ as noted below.     4/10-patient refused to talk to me again this AM. No clear acute events overnight. Afebrile. Tolerating CLD per nursing notes.     I have discussed this patient's plan of care and discharge plan at IDT rounds today with Case Management, Nursing, Nursing leadership, and other members of the IDT team.    Consultants/Specialty  none    Code Status  Full Code    Disposition  Patient is not medically cleared for discharge. If tolerates GI soft diet tonight and hypokalemia resolves, she could discharge to shelter on 4/11  Anticipate discharge to to home with close outpatient follow-up. She denies being homeless. She yelled at me when asking her about her home situation.  I have placed the appropriate orders for post-discharge needs.    Review of Systems  Review of Systems   Unable to perform ROS: Psychiatric disorder   Constitutional:  Negative for chills and fever.        She mostly refused to talk to me today   Gastrointestinal:  Positive for abdominal pain (unclear). Negative for nausea and vomiting.   Genitourinary:  Negative for dysuria and urgency.   Musculoskeletal:  Positive for joint pain and myalgias.        Unclear     All other systems reviewed and are negative.     Physical Exam  Temp:  [36.9 °C (98.4 °F)-37.2 °C (99 °F)] 37.1  °C (98.8 °F)  Pulse:  [72-93] 72  Resp:  [16-19] 16  BP: (123-141)/(64-86) 141/86  SpO2:  [92 %-94 %] 93 %    Physical Exam  Vitals and nursing note reviewed.   Constitutional:       General: She is not in acute distress.     Appearance: She is well-developed.      Comments: Very irritable, refused to allow me to do a physical exam   HENT:      Head: Normocephalic and atraumatic.   Eyes:      General: No scleral icterus.     Pupils: Pupils are equal, round, and reactive to light.   Neck:      Thyroid: No thyromegaly.   Cardiovascular:      Rate and Rhythm: Normal rate.      Comments: Refused  Pulmonary:      Effort: Pulmonary effort is normal.      Comments: Refused  Abdominal:      General: There is no distension.      Tenderness: There is abdominal tenderness.      Comments: ?unclear if changed today   Musculoskeletal:         General: No tenderness. Normal range of motion.      Cervical back: Normal range of motion and neck supple.      Comments: B/L BKA  R BKA stump with eschar and mild bogginess, moderate TTP appreciated     Skin:     General: Skin is warm and dry.   Neurological:      Mental Status: She is alert and oriented to person, place, and time.      Cranial Nerves: No cranial nerve deficit.      Comments: Limited       Fluids    Intake/Output Summary (Last 24 hours) at 4/10/2023 1251  Last data filed at 4/10/2023 0810  Gross per 24 hour   Intake 537.69 ml   Output 0 ml   Net 537.69 ml       Laboratory  Recent Labs     04/07/23  1854 04/09/23  1000   WBC 6.9 4.3*   RBC 4.29 3.41*   HEMOGLOBIN 11.1* 8.8*   HEMATOCRIT 36.1* 28.5*   MCV 84.1 83.6   MCH 25.9* 25.8*   MCHC 30.7* 30.9*   RDW 48.2 46.8   PLATELETCT 385 222   MPV 9.1 9.2     Recent Labs     04/07/23  1854 04/09/23  0823 04/10/23  0047   SODIUM 138 144 143   POTASSIUM 3.6 2.5* 2.9*   CHLORIDE 103 111 111   CO2 15* 22 22   GLUCOSE 84 69 83   BUN 25* 7* 6*   CREATININE 0.64 0.51 0.47*   CALCIUM 9.4 8.0* 7.7*                    Imaging  CT-ABDOMEN-PELVIS WITH   Final Result         1.Diffuse gaseous distention of the colon, most in the sigmoid colon, could relate to ileus.      2. Wall thickening of the rectum could relate to mild proctitis.           Assessment/Plan  * Proctitis- (present on admission)  Assessment & Plan  Unknown etiology at this point, clinically improving on 4/10  HIV well controlled on most recent CD4  Continue empiric IV CTX/flagyl  G/C rectal swab negative, I personally reviewed this lab on 4/10  Advance to FLD on 4/10    Hypomagnesemia  Assessment & Plan  Replace  I personally reviewed the MG++ level 4/10    Hx of BKA, left (HCC)- (present on admission)  Assessment & Plan  Appears normal and non-infected at this time    Leukopenia  Assessment & Plan  Likely related to HIV  Trend    Ileus (HCC)- (present on admission)  Assessment & Plan  Clinically improving slowly 4/10  Diet as noted above      High anion gap metabolic acidosis- (present on admission)  Assessment & Plan  Resolvd, decrease iV fluids as oral intake improves    History of DVT (deep vein thrombosis)- (present on admission)  Assessment & Plan  Continue Eliquis for now    Hx of BKA, right (HCC)- (present on admission)  Assessment & Plan  Mild eschar and some bogginess  Ortho evaluated and deemed no surgical intervention at this time  High risk for worsening given ongoing homelessness, high risk of frostbite and limited tissue to work with    Methamphetamine abuse (HCC)- (present on admission)  Assessment & Plan  Monitor signs for withdrawal    Anemia- (present on admission)  Assessment & Plan  At her baseline    HIV (human immunodeficiency virus infection) ([facility])- (present on admission)  Assessment & Plan  Last CD4 in 3/2023 was 752 with undetectable HIV VL  Continue outpatient Biktarvy    Hypokalemia- (present on admission)  Assessment & Plan  Remains low 4/10  I personally reviewed the BMP on 4/10  Aggressive replacement    Schizophrenia ([facility])-  (present on admission)  Assessment & Plan  Continue home medications  Check serum depakote level given excessive sleepiness    Tobacco abuse- (present on admission)  Assessment & Plan  Encourage cessation           VTE prophylaxis: therapeutic anticoagulation with eliquis     I have performed a physical exam and reviewed and updated ROS and Plan today (4/10/2023). In review of yesterday's note (4/9/2023), there are no changes except as documented above.

## 2023-04-10 NOTE — CARE PLAN
The patient is Stable - Low risk of patient condition declining or worsening    Shift Goals  Clinical Goals: wound care; safety  Patient Goals: comfort; rest  Family Goals: ryan    Progress made toward(s) clinical / shift goals:  wound care Qshift; safety precautions in place     Patient is not progressing towards the following goals:

## 2023-04-10 NOTE — PROGRESS NOTES
AA&Ox4. Denies CP/SOB.  No reports of pain.   Educated patient regarding pharmacologic and non pharmacologic modalities for pain management.  Skin per flowsheet.  Tolerating full liquid diet. Denies N/V.  + void. Last BM PTA.  Pt ambulates x2 to wheelchair.  All needs met at this time. Call light within reach. Pt calls appropriately. Bed low and locked, non skid socks in place. Hourly rounding in place.

## 2023-04-10 NOTE — PROGRESS NOTES
Bedside report received, assessment completed    A&O x  2, pt calls appropriately  Pt very lethargic, responds to voice, and refusing thorough assessment at this time. Will attempt to reassess throughout the night.   Mobility: Up with x2 assist and WC  Fall Risk Assessment: Moderate, bed alarm on, door notifications in use  Pain Assessment / Reassessment completed, medication provided per MAR  Diet: Clear liquids  LDA:   IV Access: 18 R AC, CDI    GI/: + void, + flatus, 4/8 BM  DVT Prophylaxis: Eliquis     Reviewed plan of care with patient, bed in lowest position and locked, pt resting comfortably now, call light within reach, all needs met at this time. Interventions will be executed per plan of care

## 2023-04-10 NOTE — THERAPY
Physical Therapy Contact Note    Patient Name: Kellie Chatman  Age:  53 y.o., Sex:  female  Medical Record #: [id number]  Today's Date: 4/10/2023    PT Consult received/acknowledged, PT eval attempted. Pt expressed frustration of being bothered as she was sleeping. Pt reported she has still been residing at the shelter and getting assist for her transfers however no longer has that support. Explained role of PT in acute setting and goal to assess ability to transfer in/out of WC. Pt increasingly irritable with PT's presence in the room and stopped responding to therapist. Will re-attempt PT eval as able.    Theresa Alexis, PT, DPT  Ext. [phone]

## 2023-04-10 NOTE — CARE PLAN
The patient is Stable - Low risk of patient condition declining or worsening    Shift Goals  Clinical Goals: Wound care, safety, and rest  Patient Goals: rest      Progress made toward(s) clinical / shift goals:  Qshift wound care with betadine to RLE. Pt room close to nurses station for safety. Pt sleeping intermittently.   Problem: Skin Integrity  Goal: Skin integrity is maintained or improved  Outcome: Progressing     Problem: Fall Risk  Goal: Patient will remain free from falls  Outcome: Progressing

## 2023-04-10 NOTE — DISCHARGE PLANNING
RNCM met with pt at bedside and obtained the information used in this assessment. Pt verified accuracy of facesheet. Pt is homeless. Pt has bilateral amputations and is wheelchair bound. Her wheelchair is here with her.  Pt says she uses [facility] pharmacy, but is unable to say which one. Pt cannot name her PCP, but is sure she has one. She cannot recall doctor's name or location. We discussed possible discharge tomorrow and CM's concern of whether she will go home on IV antibiotics vs oral. She was cooperative with assessment, she just wasn't able to provide much information. Care Transition Team Assessment    Information Source  Orientation Level: Oriented to place, Oriented to situation, Oriented to person  Information Given By: Patient         Elopement Risk  Legal Hold: No  Ambulatory or Self Mobile in Wheelchair: Yes  Disoriented: No  Psychiatric Symptoms: None  History of Wandering: No  Elopement this Admit: No  Vocalizing Wanting to Leave: No  Displays Behaviors, Body Language Wanting to Leave: No-Not at Risk for Elopement  Elopement Risk: Not at Risk for Elopement    Interdisciplinary Discharge Planning  Does Admitting Nurse Feel This Could be a Complex Discharge?: Yes  Primary Care Physician: She is able to state she goes to a clinic, but cannot recall street, name of clinic or doctor  Lives with - Patient's Self Care Capacity: Other (Comments) (pt is homeless)  Patient or legal guardian wants to designate a caregiver: No  Support Systems: None  Housing / Facility: Homeless  Able to Return to Previous ADL's: Yes  Mobility Issues: Yes  Prior Services: Unable To Determine At This Time  Patient Prefers to be Discharged to:: streets  Assistance Needed: No  Durable Medical Equipment: Other - Specify    Discharge Preparedness  What is your plan after discharge?: Uncertain - pending medical team collaboration  Prior Functional Level: Wheelchair Dependent  Difficulity with ADLs: Walking, Toileting, Eating,  Dressing, Brushing teeth, Bathing  Difficulty with ADLs Comment: she has access to her wheelchair only  Difficulity with IADLs: Cooking, Driving, Keeping track of finances, Laundry, Managing medication, Shopping  Difficulity with IADL Comments: she does not have access to most of these needs    Functional Assesment  Prior Functional Level: Wheelchair Dependent                        Domestic Abuse  Have you ever been the victim of abuse or violence?: No    Psychological Assessment  History of Substance Abuse: Alcohol, Amphetamines  History of Psychiatric Problems: Yes    Discharge Risks or Barriers  Discharge risks or barriers?: Mental health, Substance abuse, Homeless / couch surfing  Patient risk factors: Cognitive / sensory / physical deficit, Homeless, Lack of outside supports, Mental health, Noncompliance, Substance abuse    Anticipated Discharge Information  Discharge Disposition: Still a Patient (30)

## 2023-04-11 ENCOUNTER — PHARMACY VISIT (OUTPATIENT)
Dept: PHARMACY | Facility: MEDICAL CENTER | Age: 53
End: 2023-04-11
Payer: COMMERCIAL

## 2023-04-11 VITALS
BODY MASS INDEX: 23.19 KG/M2 | OXYGEN SATURATION: 95 % | HEIGHT: 70 IN | SYSTOLIC BLOOD PRESSURE: 138 MMHG | WEIGHT: 162 LBS | TEMPERATURE: 98.1 F | RESPIRATION RATE: 20 BRPM | DIASTOLIC BLOOD PRESSURE: 84 MMHG | HEART RATE: 76 BPM

## 2023-04-11 LAB
ANION GAP SERPL CALC-SCNC: 8 MMOL/L (ref 7–16)
ANNOTATION COMMENT IMP: ABNORMAL
BASOPHILS # BLD AUTO: 0.9 % (ref 0–1.8)
BASOPHILS # BLD: 0.03 K/UL (ref 0–0.12)
BUN SERPL-MCNC: 9 MG/DL (ref 8–22)
CALCIUM SERPL-MCNC: 7.8 MG/DL (ref 8.5–10.5)
CD3 CELLS # BLD: 856 CELLS/UL (ref 570–2400)
CD3+CD4+ CELLS # BLD: 674 CELLS/UL (ref 430–1800)
CD3+CD4+ CELLS/CD3+CD8+ CLL BLD: 3.59 RATIO (ref 0.8–3.9)
CD3+CD8+ CELLS # BLD: 188 CELLS/UL (ref 210–1200)
CHLORIDE SERPL-SCNC: 113 MMOL/L (ref 96–112)
CO2 SERPL-SCNC: 22 MMOL/L (ref 20–33)
CREAT SERPL-MCNC: 0.64 MG/DL (ref 0.5–1.4)
EOSINOPHIL # BLD AUTO: 0.17 K/UL (ref 0–0.51)
EOSINOPHIL NFR BLD: 5.1 % (ref 0–6.9)
ERYTHROCYTE [DISTWIDTH] IN BLOOD BY AUTOMATED COUNT: 45 FL (ref 35.9–50)
GFR SERPLBLD CREATININE-BSD FMLA CKD-EPI: 104 ML/MIN/1.73 M 2
GLUCOSE SERPL-MCNC: 95 MG/DL (ref 65–99)
HCT VFR BLD AUTO: 26.6 % (ref 37–47)
HGB BLD-MCNC: 8.7 G/DL (ref 12–16)
IMM GRANULOCYTES # BLD AUTO: 0.01 K/UL (ref 0–0.11)
IMM GRANULOCYTES NFR BLD AUTO: 0.3 % (ref 0–0.9)
LYMPHOCYTES # BLD AUTO: 1.44 K/UL (ref 1–4.8)
LYMPHOCYTES NFR BLD: 43.4 % (ref 22–41)
MAGNESIUM SERPL-MCNC: 1.3 MG/DL (ref 1.5–2.5)
MCH RBC QN AUTO: 26.4 PG (ref 27–33)
MCHC RBC AUTO-ENTMCNC: 32.7 G/DL (ref 33.6–35)
MCV RBC AUTO: 80.6 FL (ref 81.4–97.8)
MONOCYTES # BLD AUTO: 0.4 K/UL (ref 0–0.85)
MONOCYTES NFR BLD AUTO: 12 % (ref 0–13.4)
NEUTROPHILS # BLD AUTO: 1.27 K/UL (ref 2–7.15)
NEUTROPHILS NFR BLD: 38.3 % (ref 44–72)
NRBC # BLD AUTO: 0 K/UL
NRBC BLD-RTO: 0 /100 WBC
PLATELET # BLD AUTO: 234 K/UL (ref 164–446)
PMV BLD AUTO: 9.7 FL (ref 9–12.9)
POTASSIUM SERPL-SCNC: 3.6 MMOL/L (ref 3.6–5.5)
RBC # BLD AUTO: 3.3 M/UL (ref 4.2–5.4)
SODIUM SERPL-SCNC: 143 MMOL/L (ref 135–145)
WBC # BLD AUTO: 3.3 K/UL (ref 4.8–10.8)

## 2023-04-11 PROCEDURE — 700102 HCHG RX REV CODE 250 W/ 637 OVERRIDE(OP): Performed by: HOSPITALIST

## 2023-04-11 PROCEDURE — 83735 ASSAY OF MAGNESIUM: CPT

## 2023-04-11 PROCEDURE — 700111 HCHG RX REV CODE 636 W/ 250 OVERRIDE (IP): Performed by: INTERNAL MEDICINE

## 2023-04-11 PROCEDURE — 80048 BASIC METABOLIC PNL TOTAL CA: CPT

## 2023-04-11 PROCEDURE — RXMED WILLOW AMBULATORY MEDICATION CHARGE: Performed by: INTERNAL MEDICINE

## 2023-04-11 PROCEDURE — 700111 HCHG RX REV CODE 636 W/ 250 OVERRIDE (IP): Performed by: HOSPITALIST

## 2023-04-11 PROCEDURE — 700101 HCHG RX REV CODE 250: Performed by: HOSPITALIST

## 2023-04-11 PROCEDURE — A9270 NON-COVERED ITEM OR SERVICE: HCPCS | Performed by: INTERNAL MEDICINE

## 2023-04-11 PROCEDURE — 700102 HCHG RX REV CODE 250 W/ 637 OVERRIDE(OP): Performed by: INTERNAL MEDICINE

## 2023-04-11 PROCEDURE — A9270 NON-COVERED ITEM OR SERVICE: HCPCS | Performed by: HOSPITALIST

## 2023-04-11 PROCEDURE — 85025 COMPLETE CBC W/AUTO DIFF WBC: CPT

## 2023-04-11 PROCEDURE — 99239 HOSP IP/OBS DSCHRG MGMT >30: CPT | Performed by: INTERNAL MEDICINE

## 2023-04-11 RX ORDER — CEFDINIR 300 MG/1
300 CAPSULE ORAL 2 TIMES DAILY
Qty: 6 CAPSULE | Refills: 0 | Status: ACTIVE | OUTPATIENT
Start: 2023-04-11 | End: 2023-04-14

## 2023-04-11 RX ORDER — METRONIDAZOLE 500 MG/1
500 TABLET ORAL EVERY 8 HOURS
Qty: 9 TABLET | Refills: 0 | Status: ACTIVE | OUTPATIENT
Start: 2023-04-11 | End: 2023-04-14

## 2023-04-11 RX ORDER — MAGNESIUM SULFATE HEPTAHYDRATE 40 MG/ML
4 INJECTION, SOLUTION INTRAVENOUS ONCE
Status: COMPLETED | OUTPATIENT
Start: 2023-04-11 | End: 2023-04-11

## 2023-04-11 RX ADMIN — DIVALPROEX SODIUM 500 MG: 500 TABLET, EXTENDED RELEASE ORAL at 04:46

## 2023-04-11 RX ADMIN — METRONIDAZOLE 500 MG: 5 INJECTION, SOLUTION INTRAVENOUS at 09:01

## 2023-04-11 RX ADMIN — APIXABAN 5 MG: 5 TABLET, FILM COATED ORAL at 04:46

## 2023-04-11 RX ADMIN — OLANZAPINE 5 MG: 5 TABLET, FILM COATED ORAL at 04:46

## 2023-04-11 RX ADMIN — METRONIDAZOLE 500 MG: 5 INJECTION, SOLUTION INTRAVENOUS at 00:05

## 2023-04-11 RX ADMIN — BICTEGRAVIR SODIUM, EMTRICITABINE, AND TENOFOVIR ALAFENAMIDE FUMARATE 1 TABLET: 50; 200; 25 TABLET ORAL at 06:15

## 2023-04-11 RX ADMIN — CEFTRIAXONE SODIUM 1000 MG: 10 INJECTION, POWDER, FOR SOLUTION INTRAVENOUS at 04:46

## 2023-04-11 RX ADMIN — MAGNESIUM 64 MG (MAGNESIUM CHLORIDE) TABLET,DELAYED RELEASE 64 MG: at 04:46

## 2023-04-11 RX ADMIN — MAGNESIUM SULFATE HEPTAHYDRATE 4 G: 40 INJECTION, SOLUTION INTRAVENOUS at 12:01

## 2023-04-11 ASSESSMENT — PAIN DESCRIPTION - PAIN TYPE
TYPE: ACUTE PAIN
TYPE: ACUTE PAIN

## 2023-04-11 NOTE — DISCHARGE SUMMARY
Discharge Summary    CHIEF COMPLAINT ON ADMISSION  Chief Complaint   Patient presents with    Abdominal Pain     Described as cramping x4 days intermittently since pt fell out of wheelchair onto her stomach 5 days ago; pt denies N/V    Diarrhea     Intermittently x4 days       Reason for Admission  Abdominal Pain; Constipation; Leg *     Admission Date  4/7/2023    CODE STATUS  Full Code    HPI & HOSPITAL COURSE  This is a 53 y.o. female here with diarrhea.       53 y.o. homeless female with schizophrenia, HIV (CD4 752 in 3/2023), R BKA, HTN, seizures, ongoing meth use, ongoing tobacco abuse and prior DVT who presented to our hospital with abdominal pain and diarrhea.  CT scan showed diffuse gaseous distention of the colon most of the sigmoid colon and wall thickening of the rectum could relate to mild proctitis.  Patient was started on IV antibiotics with improvement.  Patient also had very low potassium and magnesium levels which were treated with aggressively with replacement.  Patient also noted to have some changes to her BKA site and orthopedic surgery was consulted, they did not recommend any surgical treatment at this point and to avoid pressure on the posterior aspect of the right stump, she did not have any open or draining wounds.  Patient is 3 weeks postop and Ortho cleared patient for staple remover prior to discharge.  Unfortunately patient largely did not want to participate in her care or with her physicians. She refused to work with therapy multiple times and days. Patient's vital signs of been stable and she is tolerating a diet.  She has been given oral antibiotics to finish off her course for the proctitis and is to follow-up outpatient with her primary care physician.  She is to return to the ER if her condition worsens.       Therefore, she is discharged in fair and stable condition to home with close outpatient follow-up.    The patient met 2-midnight criteria for an inpatient stay at the time  of discharge.    Discharge Date  4/11/2023    FOLLOW UP ITEMS POST DISCHARGE  Follow up with primary care     DISCHARGE DIAGNOSES  Principal Problem:    Proctitis POA: Yes  Active Problems:    Tobacco abuse POA: Yes    Schizophrenia (HCC) POA: Yes    Hypokalemia POA: Yes    HIV (human immunodeficiency virus infection) (HCC) POA: Yes    Anemia POA: Yes    Methamphetamine abuse (HCC) POA: Yes    Hx of BKA, right (HCC) POA: Yes    History of DVT (deep vein thrombosis) POA: Yes    High anion gap metabolic acidosis POA: Yes    Ileus (HCC) POA: Yes    Leukopenia POA: No    Hx of BKA, left (HCC) POA: Yes    Hypomagnesemia POA: No  Resolved Problems:    * No resolved hospital problems. *      FOLLOW UP  Future Appointments   Date Time Provider Department Center   4/20/2023  8:00 AM AFUA OlivaresND 2nd [facility]  [address] [place] [address]  [phone]  Follow up  Follow up with primary care in 1 week for post hospitalization follow up. If you do not have one please call the number above to make an appointment      MEDICATIONS ON DISCHARGE     Medication List        START taking these medications        Instructions   cefdinir 300 MG Caps  Commonly known as: OMNICEF   Take 1 Capsule by mouth 2 times a day for 3 days.  Dose: 300 mg     metroNIDAZOLE 500 MG Tabs  Commonly known as: FLAGYL   Take 1 Tablet by mouth every 8 hours for 3 days.  Dose: 500 mg            CONTINUE taking these medications        Instructions   Biktarvy -25 mg Tabs tablet  Generic drug: bictegravir-emtricitab-TAF   Take 1 Tablet by mouth every day.  Dose: 1 Tablet     divalproex  MG Tb24  Commonly known as: DEPAKOTE ER   Take 1 Tablet by mouth every 12 hours for 30 days.  Dose: 500 mg     Eliquis 5mg Tabs  Generic drug: apixaban   Take 1 Tablet by mouth 2 times a day for 30 days. Indications: DVT/PE  Dose: 5 mg     OLANZapine 5 MG Tabs  Commonly  known as: ZYPREXA   Take 1 Tablet by mouth 2 times a day for 30 days.  Dose: 5 mg              Allergies  No Known Allergies    DIET  Orders Placed This Encounter   Procedures    Diet Order Diet: Low Fiber(GI Soft)     Standing Status:   Standing     Number of Occurrences:   1     Order Specific Question:   Diet:     Answer:   Low Fiber(GI Soft) [2]       ACTIVITY  As tolerated.  Weight bearing as tolerated    CONSULTATIONS  Orthopedic surgery     PROCEDURES  N/A    LABORATORY  Lab Results   Component Value Date    SODIUM 143 04/11/2023    POTASSIUM 3.6 04/11/2023    CHLORIDE 113 (H) 04/11/2023    CO2 22 04/11/2023    GLUCOSE 95 04/11/2023    BUN 9 04/11/2023    CREATININE 0.64 04/11/2023        Lab Results   Component Value Date    WBC 3.3 (L) 04/11/2023    HEMOGLOBIN 8.7 (L) 04/11/2023    HEMATOCRIT 26.6 (L) 04/11/2023    PLATELETCT 234 04/11/2023        Total time of the discharge process exceeds 34 minutes.

## 2023-04-11 NOTE — THERAPY
Occupational Therapy Contact Note    Patient Name: Kellie Chatman  Age:  53 y.o., Sex:  female  Medical Record #: [id number]  Today's Date: 4/11/2023    OT consult rec'd. Pt refused to participate despite education, but reports she is willing to participate tomorrow. Reports R stump incision site is painful and she is fearful of mobility. Educated on role of OT and support/assistance able to provide during mobility/txfs. Pt continued to refuse and shut down; refused to speak with therapist. Will hold OT eval and reattempt as appropriate/able one more time, but if refuses with d/c OT order.

## 2023-04-11 NOTE — CARE PLAN
The patient is Stable - Low risk of patient condition declining or worsening    Shift Goals  Clinical Goals: pain control, monitor wound  Patient Goals: rest  Family Goals: ryan    Progress made toward(s) clinical / shift goals:  Patient denies pain. Call light and personal belongings within reach.      Problem: Knowledge Deficit - Standard  Goal: Patient and family/care givers will demonstrate understanding of plan of care, disease process/condition, diagnostic tests and medications  Outcome: Progressing     Problem: Skin Integrity  Goal: Skin integrity is maintained or improved  Outcome: Progressing

## 2023-04-11 NOTE — CARE PLAN
The patient is Stable - Low risk of patient condition declining or worsening    Shift Goals  Clinical Goals: wound care and IV ABX  Patient Goals: rest  Family Goals: ryan    Progress made toward(s) clinical / shift goals: Q shift wound care and administer IV ABX.  Problem: Skin Integrity  Goal: Skin integrity is maintained or improved  Outcome: Progressing     Problem: Fall Risk  Goal: Patient will remain free from falls  Outcome: Progressing

## 2023-04-11 NOTE — DISCHARGE PLANNING
Informed in IDR that pt will be ready for discharge today. There are no needs identified as pt will return to a shelter which she is familiar with. She did ask her RN for a cab voucher. Was able to explain that a bus pass was available, pt was thankful. This was issued to her.

## 2023-04-11 NOTE — PROGRESS NOTES
Patient transferring to [place] lounge per MD order. All personal belongings with patient at time of transfer. Bus pass given to patient by .

## 2023-04-11 NOTE — THERAPY
Physical Therapy Contact Note    Patient Name: Kellie Chatman  Age:  53 y.o., Sex:  female  Medical Record #: [id number]  Today's Date: 4/11/2023    PT consult received, evaluation attempted for 2nd time. Pt refused to work with PT citing too much pain and fear surrounding mobility. Reiterated goals of PT and benefit of being seeing with PT and OT to support mobility to reduce fear, pt continued decline. Attempted to encourage pt to participte and pt further shut down. Will reattempt PT evaluation 1x more, should pt continue to refuse PT will sign off until pt is participatory in care with nursing staff.      Lupe Garcia, PT, DPT    Voalte [id number]

## 2023-04-11 NOTE — PROGRESS NOTES
Received report from previous shift RN  Assessment complete.  A&O x 3, disoriented to time . Patient calls appropriately.  Patient up with max assist.   Patient denies pain. Pain managed with prescribed medications.  Denies N&V. Tolerating GI soft low fiber diet.  Skin per flowsheets.  + void, + flatus, - BM.  Patient denies SOB. Spo2>95% on room air.  Reviewed plan of care with patient. Call light and personal belongings with in reach. Hourly rounding in place. All needs met at this time.

## 2023-04-11 NOTE — PROGRESS NOTES
4 Eyes Skin Assessment Completed by MARIA EUGENIA Almanza and MARIA EUGENIA Zhou.    Head WDL  Ears WDL  Nose WDL  Mouth WDL  Neck WDL  Breast/Chest WDL  Shoulder Blades WDL  Spine WDL  (R) Arm/Elbow/Hand WDL  (L) Arm/Elbow/Hand WDL  Abdomen WDL  Groin WDL  Scrotum/Coccyx/Buttocks Micro tears  (R) Leg Scab, Bruising, and Abrasion  (L) Leg Incision AZRA        Devices In Places Pulse Ox and Purwick      Interventions In Place Waffle Overlay, Pillows, Q2 Turns, and Barrier Cream    Possible Skin Injury No    Pictures Uploaded Into Epic N/A  Wound Consult Placed Yes  RN Wound Prevention Protocol Ordered Yes

## 2023-04-11 NOTE — DISCHARGE PLANNING
CHW Naomi attempted to contact pt via bedside phone. CHW was unable to reach. CHW will try again at a later time.

## 2023-04-11 NOTE — PROGRESS NOTES
Bedside report received, assessment completed    A&O x  2, pt calls appropriately  Mobility: Up with x2 assist and WC  Fall Risk Assessment: Moderate, bed alarm on, door notifications in use  Pain Assessment / Reassessment completed, medication provided per MAR  Diet: GI soft  LDA:   IV Access: 18 R AC, CDI/ flushed/ SL    GI/: + void, + flatus, PTA BM  DVT Prophylaxis: Apixaban     Reviewed plan of care with patient, bed in lowest position and locked, pt resting comfortably now, call light within reach, all needs met at this time. Interventions will be executed per plan of care

## 2023-04-12 ENCOUNTER — PATIENT OUTREACH (OUTPATIENT)
Dept: HEALTH INFORMATION MANAGEMENT | Facility: OTHER | Age: 53
End: 2023-04-12
Payer: MEDICAID

## 2023-04-12 NOTE — PROGRESS NOTES
Due to pt being in isolation while in the hospital and pt not having a contact number post discharge. CHW is unable to reach pt to offer Community Care Management services. CHW will not continue to follow at this time.

## 2023-04-17 ENCOUNTER — TELEPHONE (OUTPATIENT)
Dept: VASCULAR LAB | Facility: MEDICAL CENTER | Age: 53
End: 2023-04-17
Payer: MEDICAID

## 2023-04-17 NOTE — TELEPHONE ENCOUNTER
[facility]     Received anticoagulation referral from Dr Friend on 3/6     Pt has no working ph #  Mailing address on file is local shelter  Pt has no emergency contact     Contact info has not been updated.  Will await pt contact     Insurance: Medicaid  PCP: none  Locations to be seen: [facility] at [phone], fax [phone]     Estelita Vega, AkinD

## 2023-05-04 ENCOUNTER — APPOINTMENT (OUTPATIENT)
Dept: RADIOLOGY | Facility: MEDICAL CENTER | Age: 53
DRG: 854 | End: 2023-05-04
Attending: EMERGENCY MEDICINE
Payer: MEDICAID

## 2023-05-04 ENCOUNTER — HOSPITAL ENCOUNTER (INPATIENT)
Facility: MEDICAL CENTER | Age: 53
LOS: 21 days | DRG: 854 | End: 2023-05-25
Attending: EMERGENCY MEDICINE | Admitting: FAMILY MEDICINE
Payer: MEDICAID

## 2023-05-04 ENCOUNTER — APPOINTMENT (OUTPATIENT)
Dept: RADIOLOGY | Facility: MEDICAL CENTER | Age: 53
DRG: 854 | End: 2023-05-04
Payer: MEDICAID

## 2023-05-04 DIAGNOSIS — T87.44 INFECTION OF AMPUTATION STUMP OF LEFT LOWER EXTREMITY (HCC): ICD-10-CM

## 2023-05-04 DIAGNOSIS — Z89.512 HX OF BKA, LEFT (HCC): ICD-10-CM

## 2023-05-04 DIAGNOSIS — Z89.511 HX OF BKA, RIGHT (HCC): ICD-10-CM

## 2023-05-04 DIAGNOSIS — R78.81 BACTEREMIA: ICD-10-CM

## 2023-05-04 DIAGNOSIS — M86.9 OSTEOMYELITIS, UNSPECIFIED SITE, UNSPECIFIED TYPE (HCC): Primary | ICD-10-CM

## 2023-05-04 DIAGNOSIS — M86.39 CHRONIC MULTIFOCAL OSTEOMYELITIS OF MULTIPLE SITES (HCC): ICD-10-CM

## 2023-05-04 DIAGNOSIS — F15.10 METHAMPHETAMINE ABUSE (HCC): ICD-10-CM

## 2023-05-04 DIAGNOSIS — T81.40XA POSTOPERATIVE INFECTION, UNSPECIFIED TYPE, INITIAL ENCOUNTER: ICD-10-CM

## 2023-05-04 PROBLEM — T14.8XXA WOUND INFECTION: Status: ACTIVE | Noted: 2023-05-04

## 2023-05-04 PROBLEM — L08.9 WOUND INFECTION: Status: ACTIVE | Noted: 2023-05-04

## 2023-05-04 LAB
ALBUMIN SERPL BCP-MCNC: 3.8 G/DL (ref 3.2–4.9)
ALBUMIN/GLOB SERPL: 0.8 G/DL
ALP SERPL-CCNC: 86 U/L (ref 30–99)
ALT SERPL-CCNC: 10 U/L (ref 2–50)
ANION GAP SERPL CALC-SCNC: 13 MMOL/L (ref 7–16)
APPEARANCE UR: CLEAR
AST SERPL-CCNC: 17 U/L (ref 12–45)
BACTERIA #/AREA URNS HPF: ABNORMAL /HPF
BASOPHILS # BLD AUTO: 1 % (ref 0–1.8)
BASOPHILS # BLD: 0.09 K/UL (ref 0–0.12)
BILIRUB SERPL-MCNC: 0.4 MG/DL (ref 0.1–1.5)
BILIRUB UR QL STRIP.AUTO: NEGATIVE
BUN SERPL-MCNC: 19 MG/DL (ref 8–22)
CALCIUM ALBUM COR SERPL-MCNC: 9.6 MG/DL (ref 8.5–10.5)
CALCIUM SERPL-MCNC: 9.4 MG/DL (ref 8.5–10.5)
CHLORIDE SERPL-SCNC: 103 MMOL/L (ref 96–112)
CO2 SERPL-SCNC: 24 MMOL/L (ref 20–33)
COLOR UR: YELLOW
CREAT SERPL-MCNC: 0.87 MG/DL (ref 0.5–1.4)
CRP SERPL HS-MCNC: 2.4 MG/DL (ref 0–0.75)
EKG IMPRESSION: NORMAL
EOSINOPHIL # BLD AUTO: 0.03 K/UL (ref 0–0.51)
EOSINOPHIL NFR BLD: 0.3 % (ref 0–6.9)
EPI CELLS #/AREA URNS HPF: ABNORMAL /HPF
ERYTHROCYTE [DISTWIDTH] IN BLOOD BY AUTOMATED COUNT: 50 FL (ref 35.9–50)
GFR SERPLBLD CREATININE-BSD FMLA CKD-EPI: 79 ML/MIN/1.73 M 2
GLOBULIN SER CALC-MCNC: 4.7 G/DL (ref 1.9–3.5)
GLUCOSE SERPL-MCNC: 100 MG/DL (ref 65–99)
GLUCOSE UR STRIP.AUTO-MCNC: NEGATIVE MG/DL
HCT VFR BLD AUTO: 33.2 % (ref 37–47)
HGB BLD-MCNC: 10 G/DL (ref 12–16)
HYALINE CASTS #/AREA URNS LPF: ABNORMAL /LPF
IMM GRANULOCYTES # BLD AUTO: 0.09 K/UL (ref 0–0.11)
IMM GRANULOCYTES NFR BLD AUTO: 1 % (ref 0–0.9)
KETONES UR STRIP.AUTO-MCNC: NEGATIVE MG/DL
LACTATE SERPL-SCNC: 1.8 MMOL/L (ref 0.5–2)
LEUKOCYTE ESTERASE UR QL STRIP.AUTO: ABNORMAL
LYMPHOCYTES # BLD AUTO: 1.59 K/UL (ref 1–4.8)
LYMPHOCYTES NFR BLD: 17.5 % (ref 22–41)
MAGNESIUM SERPL-MCNC: 1.7 MG/DL (ref 1.5–2.5)
MCH RBC QN AUTO: 24.9 PG (ref 27–33)
MCHC RBC AUTO-ENTMCNC: 30.1 G/DL (ref 33.6–35)
MCV RBC AUTO: 82.8 FL (ref 81.4–97.8)
MICRO URNS: ABNORMAL
MONOCYTES # BLD AUTO: 0.77 K/UL (ref 0–0.85)
MONOCYTES NFR BLD AUTO: 8.5 % (ref 0–13.4)
MUCOUS THREADS #/AREA URNS HPF: ABNORMAL /HPF
NEUTROPHILS # BLD AUTO: 6.53 K/UL (ref 2–7.15)
NEUTROPHILS NFR BLD: 71.7 % (ref 44–72)
NITRITE UR QL STRIP.AUTO: NEGATIVE
NRBC # BLD AUTO: 0 K/UL
NRBC BLD-RTO: 0 /100 WBC
PH UR STRIP.AUTO: 5 [PH] (ref 5–8)
PHOSPHATE SERPL-MCNC: 3.6 MG/DL (ref 2.5–4.5)
PLATELET # BLD AUTO: 521 K/UL (ref 164–446)
PMV BLD AUTO: 9.5 FL (ref 9–12.9)
POTASSIUM SERPL-SCNC: 3.6 MMOL/L (ref 3.6–5.5)
PROT SERPL-MCNC: 8.5 G/DL (ref 6–8.2)
PROT UR QL STRIP: 30 MG/DL
RBC # BLD AUTO: 4.01 M/UL (ref 4.2–5.4)
RBC # URNS HPF: ABNORMAL /HPF
RBC UR QL AUTO: NEGATIVE
SODIUM SERPL-SCNC: 140 MMOL/L (ref 135–145)
SP GR UR STRIP.AUTO: 1.02
UROBILINOGEN UR STRIP.AUTO-MCNC: 0.2 MG/DL
WBC # BLD AUTO: 9.1 K/UL (ref 4.8–10.8)
WBC #/AREA URNS HPF: ABNORMAL /HPF

## 2023-05-04 PROCEDURE — 93005 ELECTROCARDIOGRAM TRACING: CPT | Performed by: EMERGENCY MEDICINE

## 2023-05-04 PROCEDURE — 83605 ASSAY OF LACTIC ACID: CPT

## 2023-05-04 PROCEDURE — 85652 RBC SED RATE AUTOMATED: CPT

## 2023-05-04 PROCEDURE — 36415 COLL VENOUS BLD VENIPUNCTURE: CPT

## 2023-05-04 PROCEDURE — 700105 HCHG RX REV CODE 258: Mod: UD | Performed by: EMERGENCY MEDICINE

## 2023-05-04 PROCEDURE — 96366 THER/PROPH/DIAG IV INF ADDON: CPT

## 2023-05-04 PROCEDURE — 80053 COMPREHEN METABOLIC PANEL: CPT

## 2023-05-04 PROCEDURE — 700102 HCHG RX REV CODE 250 W/ 637 OVERRIDE(OP)

## 2023-05-04 PROCEDURE — 96365 THER/PROPH/DIAG IV INF INIT: CPT

## 2023-05-04 PROCEDURE — 83735 ASSAY OF MAGNESIUM: CPT

## 2023-05-04 PROCEDURE — 700102 HCHG RX REV CODE 250 W/ 637 OVERRIDE(OP): Performed by: STUDENT IN AN ORGANIZED HEALTH CARE EDUCATION/TRAINING PROGRAM

## 2023-05-04 PROCEDURE — 87040 BLOOD CULTURE FOR BACTERIA: CPT

## 2023-05-04 PROCEDURE — 84100 ASSAY OF PHOSPHORUS: CPT

## 2023-05-04 PROCEDURE — 96375 TX/PRO/DX INJ NEW DRUG ADDON: CPT

## 2023-05-04 PROCEDURE — 86140 C-REACTIVE PROTEIN: CPT

## 2023-05-04 PROCEDURE — 700111 HCHG RX REV CODE 636 W/ 250 OVERRIDE (IP): Mod: UD | Performed by: EMERGENCY MEDICINE

## 2023-05-04 PROCEDURE — 770001 HCHG ROOM/CARE - MED/SURG/GYN PRIV*

## 2023-05-04 PROCEDURE — 85025 COMPLETE CBC W/AUTO DIFF WBC: CPT

## 2023-05-04 PROCEDURE — 700111 HCHG RX REV CODE 636 W/ 250 OVERRIDE (IP): Mod: UD

## 2023-05-04 PROCEDURE — 99285 EMERGENCY DEPT VISIT HI MDM: CPT

## 2023-05-04 PROCEDURE — 81001 URINALYSIS AUTO W/SCOPE: CPT

## 2023-05-04 PROCEDURE — 96372 THER/PROPH/DIAG INJ SC/IM: CPT

## 2023-05-04 PROCEDURE — 700105 HCHG RX REV CODE 258

## 2023-05-04 PROCEDURE — 96367 TX/PROPH/DG ADDL SEQ IV INF: CPT

## 2023-05-04 PROCEDURE — 87086 URINE CULTURE/COLONY COUNT: CPT

## 2023-05-04 PROCEDURE — A9270 NON-COVERED ITEM OR SERVICE: HCPCS

## 2023-05-04 PROCEDURE — A9270 NON-COVERED ITEM OR SERVICE: HCPCS | Performed by: STUDENT IN AN ORGANIZED HEALTH CARE EDUCATION/TRAINING PROGRAM

## 2023-05-04 PROCEDURE — 71045 X-RAY EXAM CHEST 1 VIEW: CPT

## 2023-05-04 RX ORDER — OLANZAPINE 5 MG/1
5 TABLET ORAL EVERY EVENING
Status: DISCONTINUED | OUTPATIENT
Start: 2023-05-04 | End: 2023-05-25 | Stop reason: HOSPADM

## 2023-05-04 RX ORDER — ACETAMINOPHEN 500 MG
1000 TABLET ORAL EVERY 6 HOURS
Status: DISPENSED | OUTPATIENT
Start: 2023-05-05 | End: 2023-05-09

## 2023-05-04 RX ORDER — OXYCODONE HYDROCHLORIDE 5 MG/1
5 TABLET ORAL
Status: DISCONTINUED | OUTPATIENT
Start: 2023-05-04 | End: 2023-05-12

## 2023-05-04 RX ORDER — HYDROMORPHONE HYDROCHLORIDE 1 MG/ML
0.5 INJECTION, SOLUTION INTRAMUSCULAR; INTRAVENOUS; SUBCUTANEOUS
Status: DISCONTINUED | OUTPATIENT
Start: 2023-05-04 | End: 2023-05-12

## 2023-05-04 RX ORDER — SODIUM CHLORIDE 9 MG/ML
1000 INJECTION, SOLUTION INTRAVENOUS ONCE
Status: COMPLETED | OUTPATIENT
Start: 2023-05-04 | End: 2023-05-04

## 2023-05-04 RX ORDER — AMOXICILLIN 250 MG
2 CAPSULE ORAL 2 TIMES DAILY
Status: DISCONTINUED | OUTPATIENT
Start: 2023-05-04 | End: 2023-05-04

## 2023-05-04 RX ORDER — OXYCODONE HYDROCHLORIDE 10 MG/1
10 TABLET ORAL
Status: DISCONTINUED | OUTPATIENT
Start: 2023-05-04 | End: 2023-05-12

## 2023-05-04 RX ORDER — BISACODYL 10 MG
10 SUPPOSITORY, RECTAL RECTAL
Status: DISCONTINUED | OUTPATIENT
Start: 2023-05-04 | End: 2023-05-04

## 2023-05-04 RX ORDER — IBUPROFEN 800 MG/1
800 TABLET ORAL 3 TIMES DAILY PRN
Status: DISCONTINUED | OUTPATIENT
Start: 2023-05-10 | End: 2023-05-17

## 2023-05-04 RX ORDER — KETOROLAC TROMETHAMINE 30 MG/ML
15 INJECTION, SOLUTION INTRAMUSCULAR; INTRAVENOUS ONCE
Status: COMPLETED | OUTPATIENT
Start: 2023-05-04 | End: 2023-05-04

## 2023-05-04 RX ORDER — HEPARIN SODIUM 5000 [USP'U]/ML
5000 INJECTION, SOLUTION INTRAVENOUS; SUBCUTANEOUS EVERY 8 HOURS
Status: DISCONTINUED | OUTPATIENT
Start: 2023-05-05 | End: 2023-05-17

## 2023-05-04 RX ORDER — DIVALPROEX SODIUM 500 MG/1
500 TABLET, EXTENDED RELEASE ORAL 2 TIMES DAILY
Status: ON HOLD | COMMUNITY
Start: 2023-05-01 | End: 2023-05-25

## 2023-05-04 RX ORDER — DIVALPROEX SODIUM 500 MG/1
500 TABLET, EXTENDED RELEASE ORAL 2 TIMES DAILY
Status: DISCONTINUED | OUTPATIENT
Start: 2023-05-04 | End: 2023-05-25 | Stop reason: HOSPADM

## 2023-05-04 RX ORDER — IBUPROFEN 800 MG/1
800 TABLET ORAL 3 TIMES DAILY
Status: DISPENSED | OUTPATIENT
Start: 2023-05-05 | End: 2023-05-10

## 2023-05-04 RX ORDER — HEPARIN SODIUM 5000 [USP'U]/ML
5000 INJECTION, SOLUTION INTRAVENOUS; SUBCUTANEOUS EVERY 8 HOURS
Status: DISCONTINUED | OUTPATIENT
Start: 2023-05-04 | End: 2023-05-04

## 2023-05-04 RX ORDER — SODIUM CHLORIDE 9 MG/ML
30 INJECTION, SOLUTION INTRAVENOUS ONCE
Status: COMPLETED | OUTPATIENT
Start: 2023-05-04 | End: 2023-05-04

## 2023-05-04 RX ORDER — ACETAMINOPHEN 500 MG
1000 TABLET ORAL EVERY 6 HOURS PRN
Status: DISCONTINUED | OUTPATIENT
Start: 2023-05-10 | End: 2023-05-25 | Stop reason: HOSPADM

## 2023-05-04 RX ORDER — ENOXAPARIN SODIUM 100 MG/ML
40 INJECTION SUBCUTANEOUS DAILY
Status: DISCONTINUED | OUTPATIENT
Start: 2023-05-04 | End: 2023-05-04

## 2023-05-04 RX ORDER — METRONIDAZOLE 500 MG/1
500 TABLET ORAL 3 TIMES DAILY
COMMUNITY

## 2023-05-04 RX ORDER — ACETAMINOPHEN 325 MG/1
650 TABLET ORAL EVERY 6 HOURS PRN
Status: DISCONTINUED | OUTPATIENT
Start: 2023-05-04 | End: 2023-05-04

## 2023-05-04 RX ORDER — CEFDINIR 300 MG/1
300 CAPSULE ORAL 2 TIMES DAILY
COMMUNITY

## 2023-05-04 RX ORDER — POLYETHYLENE GLYCOL 3350 17 G/17G
1 POWDER, FOR SOLUTION ORAL
Status: DISCONTINUED | OUTPATIENT
Start: 2023-05-04 | End: 2023-05-04

## 2023-05-04 RX ADMIN — VANCOMYCIN HYDROCHLORIDE 1750 MG: 500 INJECTION, POWDER, LYOPHILIZED, FOR SOLUTION INTRAVENOUS at 20:22

## 2023-05-04 RX ADMIN — BICTEGRAVIR SODIUM, EMTRICITABINE, AND TENOFOVIR ALAFENAMIDE FUMARATE 1 TABLET: 50; 200; 25 TABLET ORAL at 21:39

## 2023-05-04 RX ADMIN — OXYCODONE HYDROCHLORIDE 10 MG: 10 TABLET ORAL at 23:33

## 2023-05-04 RX ADMIN — SODIUM CHLORIDE 2244 ML: 9 INJECTION, SOLUTION INTRAVENOUS at 20:15

## 2023-05-04 RX ADMIN — SODIUM CHLORIDE 1000 ML: 9 INJECTION, SOLUTION INTRAVENOUS at 23:42

## 2023-05-04 RX ADMIN — ENOXAPARIN SODIUM 40 MG: 40 INJECTION SUBCUTANEOUS at 20:06

## 2023-05-04 RX ADMIN — METOPROLOL TARTRATE 25 MG: 25 TABLET, FILM COATED ORAL at 23:32

## 2023-05-04 RX ADMIN — ACETAMINOPHEN 1000 MG: 325 TABLET, FILM COATED ORAL at 23:32

## 2023-05-04 RX ADMIN — AMPICILLIN AND SULBACTAM 3 G: 1; 2 INJECTION, POWDER, FOR SOLUTION INTRAMUSCULAR; INTRAVENOUS at 19:00

## 2023-05-04 RX ADMIN — KETOROLAC TROMETHAMINE 15 MG: 30 INJECTION, SOLUTION INTRAMUSCULAR; INTRAVENOUS at 20:05

## 2023-05-04 RX ADMIN — OLANZAPINE 5 MG: 5 TABLET, FILM COATED ORAL at 21:40

## 2023-05-04 RX ADMIN — DIVALPROEX SODIUM 500 MG: 500 TABLET, EXTENDED RELEASE ORAL at 21:39

## 2023-05-04 RX ADMIN — AMPICILLIN SODIUM AND SULBACTAM SODIUM 3 G: 2; 1 INJECTION, POWDER, FOR SOLUTION INTRAMUSCULAR; INTRAVENOUS at 23:59

## 2023-05-04 ASSESSMENT — COGNITIVE AND FUNCTIONAL STATUS - GENERAL
SUGGESTED CMS G CODE MODIFIER DAILY ACTIVITY: CK
MOBILITY SCORE: 11
SUGGESTED CMS G CODE MODIFIER MOBILITY: CL
DRESSING REGULAR UPPER BODY CLOTHING: A LITTLE
DRESSING REGULAR LOWER BODY CLOTHING: A LITTLE
TOILETING: A LOT
WALKING IN HOSPITAL ROOM: TOTAL
PERSONAL GROOMING: A LITTLE
CLIMB 3 TO 5 STEPS WITH RAILING: TOTAL
MOVING TO AND FROM BED TO CHAIR: A LITTLE
EATING MEALS: A LITTLE
MOVING FROM LYING ON BACK TO SITTING ON SIDE OF FLAT BED: UNABLE
STANDING UP FROM CHAIR USING ARMS: A LOT
TURNING FROM BACK TO SIDE WHILE IN FLAT BAD: A LITTLE
DAILY ACTIVITIY SCORE: 17
HELP NEEDED FOR BATHING: A LITTLE

## 2023-05-04 ASSESSMENT — LIFESTYLE VARIABLES
AVERAGE NUMBER OF DAYS PER WEEK YOU HAVE A DRINK CONTAINING ALCOHOL: 0
TOTAL SCORE: 0
ALCOHOL_USE: NO
CONSUMPTION TOTAL: NEGATIVE
EVER HAD A DRINK FIRST THING IN THE MORNING TO STEADY YOUR NERVES TO GET RID OF A HANGOVER: NO
EVER FELT BAD OR GUILTY ABOUT YOUR DRINKING: NO
ON A TYPICAL DAY WHEN YOU DRINK ALCOHOL HOW MANY DRINKS DO YOU HAVE: 0
DOES PATIENT WANT TO STOP DRINKING: NO
TOTAL SCORE: 0
HAVE YOU EVER FELT YOU SHOULD CUT DOWN ON YOUR DRINKING: NO
HAVE PEOPLE ANNOYED YOU BY CRITICIZING YOUR DRINKING: NO
TOTAL SCORE: 0
HOW MANY TIMES IN THE PAST YEAR HAVE YOU HAD 5 OR MORE DRINKS IN A DAY: 0

## 2023-05-04 ASSESSMENT — PATIENT HEALTH QUESTIONNAIRE - PHQ9
1. LITTLE INTEREST OR PLEASURE IN DOING THINGS: NOT AT ALL
2. FEELING DOWN, DEPRESSED, IRRITABLE, OR HOPELESS: NOT AT ALL
SUM OF ALL RESPONSES TO PHQ9 QUESTIONS 1 AND 2: 0

## 2023-05-04 ASSESSMENT — PAIN DESCRIPTION - PAIN TYPE: TYPE: ACUTE PAIN

## 2023-05-04 ASSESSMENT — FIBROSIS 4 INDEX
FIB4 SCORE: 0.57

## 2023-05-04 NOTE — ED NOTES
Assumed pt care, pt. Needed 3 person assist to bed, pt. With billateral BKA's, both extremity swelling and painful, pt state she was discharged from [facility] yesterday and pain worse, hr elevated, ekg  being done in the room

## 2023-05-04 NOTE — ED TRIAGE NOTES
Chief Complaint   Patient presents with    Leg Pain     Bilateral leg pain, bilateral BKA      Pt BIB EMS for above complaint. Pt is wheel chair bound and unable to care for herself. Per report EMS states Pt has open wounds to both stumps with swelling noted. Bandage in place.    Pt educated on triage process and returned to lobby.

## 2023-05-04 NOTE — ED PROVIDER NOTES
ER Provider Note    Scribed for Brandon Helms M.d. by Hanny Snow. 5/4/2023  3:34 PM    Primary Care Provider: Pcp Pt States None    CHIEF COMPLAINT  Chief Complaint   Patient presents with    Leg Pain     Bilateral leg pain, bilateral BKA     LIMITATION TO HISTORY   Behavior. History of schizophrenia.    HPI/ROS  OUTSIDE HISTORIAN(S):  None    EXTERNAL RECORDS REVIEWED  Inpatient records from a nearby hospital for the patient was discharged yesterday after an admission on the 24th of last month.  She had bilateral BKA amputations in March of this year due to frostbite.  She has a history of schizophrenia on Zyprexa, HIV on Biktarvy, as well as ongoing methamphetamine and tobacco abuse.  She presented via EMS because of bilateral stump pain.  She was treated for hypokalemia and hypomagnesemia.  No leukocytosis.  ESR and CRP were elevated.  She was transition from IV to oral antibiotics, discharged in improved condition.  She refused a number of medications and refused physical therapy.    Kellie Chatman is a 53 y.o. female with a history of bilateral below the knee amputation who presents to the ED for intermittent bilateral leg pain onset 10 days ago. The patient locates her pain to her bilateral BKA stumps and states she was discharged from [facility] yesterday after being admitted for about 5 days for the same and has been living at the shelter. She reports being prescribed antibiotics for her symptoms, but was not able to pick them up. Patient had a bilateral below the knee amputation on 3/22/23 by Dr. Lux, orthopedics. Patient denies fever. No alleviating or exacerbating factors reported.    PAST MEDICAL HISTORY  Past Medical History:   Diagnosis Date    Frostbite of foot, left     Frostbite of foot, right     HIV (human immunodeficiency virus infection) (HCC)      SURGICAL HISTORY  Past Surgical History:   Procedure Laterality Date    KNEE AMPUTATION BELOW Left 03/22/2023    Procedure: AMPUTATION,  BELOW KNEE;  Surgeon: Nas Lux M.D.;  Location: SURGERY [facility];  Service: Orthopedics    AMPUTATION, BELOW THE KNEE Right      FAMILY HISTORY  History reviewed. No pertinent family history.    SOCIAL HISTORY   reports that she has been smoking cigarettes. She has been smoking an average of .25 packs per day. She has never used smokeless tobacco. She reports that she does not currently use alcohol. She reports current drug use. Drug: Inhaled.    CURRENT MEDICATIONS  Previous Medications    BICTEGRAVIR-EMTRICITAB-TAF (BIKTARVY) -25 MG TAB TABLET    Take 1 Tablet by mouth every day.     ALLERGIES  Patient has no known allergies.    PHYSICAL EXAM  VITAL SIGNS: BP (!) 133/91   Pulse (!) 119   Temp 37 °C (98.6 °F) (Temporal)   Resp 20   Wt 74.8 kg (165 lb)   SpO2 96%   BMI 23.68 kg/m²   Pulse ox interpretation: I interpret this pulse ox as normal.  Constitutional: Alert in no apparent distress.  HENT: No signs of trauma, Bilateral external ears normal, Nose normal.   Eyes: Conjunctiva normal, Non-icteric.   Neck: Normal range of motion, Supple, No stridor.   Lymphatic: No lymphadenopathy noted.   Cardiovascular: Tachycardic rate and rhythm, no murmurs.   Thorax & Lungs: Normal breath sounds, No respiratory distress, No wheezing  Abdomen: Bowel sounds normal, Soft, No tenderness, No masses, No pulsatile masses. No peritoneal signs.  Skin: Warm, Dry, No erythema, No rash.   Back: No midline bony tenderness.   Extremities: Recent bilateral BKAs with evidence of delayed wound healing at the bases of both stumps, Mix of granulation tissue and more raw wet tissue concerning for infection, All sutures and staples previously removed except for one remaining staple.  Musculoskeletal: Good range of motion in all major joints. No or major deformities noted.   Neurologic: Alert , Normal motor function, Normal sensory function, No focal deficits noted.   Psychiatric: Affect normal, Judgment normal, Mood  normal.     DIAGNOSTIC STUDIES & PROCEDURES    Labs:   Results for orders placed or performed during the hospital encounter of 05/04/23   Lactic acid (lactate)   Result Value Ref Range    Lactic Acid 1.8 0.5 - 2.0 mmol/L   Urinalysis    Specimen: Urine   Result Value Ref Range    Micro Urine Req Microscopic    MAGNESIUM   Result Value Ref Range    Magnesium 1.7 1.5 - 2.5 mg/dL   CRP QUANTITIVE (NON-CARDIAC)   Result Value Ref Range    Stat C-Reactive Protein 2.40 (H) 0.00 - 0.75 mg/dL   PHOSPHORUS   Result Value Ref Range    Phosphorus 3.6 2.5 - 4.5 mg/dL   CBC WITH DIFFERENTIAL   Result Value Ref Range    WBC 9.1 4.8 - 10.8 K/uL    RBC 4.01 (L) 4.20 - 5.40 M/uL    Hemoglobin 10.0 (L) 12.0 - 16.0 g/dL    Hematocrit 33.2 (L) 37.0 - 47.0 %    MCV 82.8 81.4 - 97.8 fL    MCH 24.9 (L) 27.0 - 33.0 pg    MCHC 30.1 (L) 33.6 - 35.0 g/dL    RDW 50.0 35.9 - 50.0 fL    Platelet Count 521 (H) 164 - 446 K/uL    MPV 9.5 9.0 - 12.9 fL    Neutrophils-Polys 71.70 44.00 - 72.00 %    Lymphocytes 17.50 (L) 22.00 - 41.00 %    Monocytes 8.50 0.00 - 13.40 %    Eosinophils 0.30 0.00 - 6.90 %    Basophils 1.00 0.00 - 1.80 %    Immature Granulocytes 1.00 (H) 0.00 - 0.90 %    Nucleated RBC 0.00 /100 WBC    Neutrophils (Absolute) 6.53 2.00 - 7.15 K/uL    Lymphs (Absolute) 1.59 1.00 - 4.80 K/uL    Monos (Absolute) 0.77 0.00 - 0.85 K/uL    Eos (Absolute) 0.03 0.00 - 0.51 K/uL    Baso (Absolute) 0.09 0.00 - 0.12 K/uL    Immature Granulocytes (abs) 0.09 0.00 - 0.11 K/uL    NRBC (Absolute) 0.00 K/uL   Comp Metabolic Panel   Result Value Ref Range    Sodium 140 135 - 145 mmol/L    Potassium 3.6 3.6 - 5.5 mmol/L    Chloride 103 96 - 112 mmol/L    Co2 24 20 - 33 mmol/L    Anion Gap 13.0 7.0 - 16.0    Glucose 100 (H) 65 - 99 mg/dL    Bun 19 8 - 22 mg/dL    Creatinine 0.87 0.50 - 1.40 mg/dL    Calcium 9.4 8.5 - 10.5 mg/dL    AST(SGOT) 17 12 - 45 U/L    ALT(SGPT) 10 2 - 50 U/L    Alkaline Phosphatase 86 30 - 99 U/L    Total Bilirubin 0.4 0.1 - 1.5 mg/dL     Albumin 3.8 3.2 - 4.9 g/dL    Total Protein 8.5 (H) 6.0 - 8.2 g/dL    Globulin 4.7 (H) 1.9 - 3.5 g/dL    A-G Ratio 0.8 g/dL   CORRECTED CALCIUM   Result Value Ref Range    Correct Calcium 9.6 8.5 - 10.5 mg/dL   ESTIMATED GFR   Result Value Ref Range    GFR (CKD-EPI) 79 >60 mL/min/1.73 m 2   EKG   Result Value Ref Range    Report       [facility]    Test Date:  2023  Pt Name:    MARY MCCONNELL                Department: ER  MRN:        [id number]                      Room:       TN 71  Gender:     Female                       Technician: [id number]  :        1970                   Requested By:BABS HARRISON  Order #:    [id number]                    Reading MD:    Measurements  Intervals                                Axis  Rate:       106                          P:          49  TN:         126                          QRS:        4  QRSD:       88                           T:          40  QT:         398  QTc:        529    Interpretive Statements  Sinus tachycardia  Abnormal R-wave progression, early transition  Prolonged QT interval  Compared to ECG 2023 11:48:38  Prolonged QT interval now present  Atrial premature complex(es) no longer present       All labs reviewed by me.    EKG:   I have independently interpreted this EKG as noted above.     Radiology:   The attending Emergency Physician has independently interpreted the diagnostic imaging associated with this visit and is awaiting the final reading from the radiologist, which will be displayed below.    Preliminary interpretation is a follows: Normal chest x-ray  Radiologist interpretation:   DX-CHEST-LIMITED (1 VIEW)   Final Result         No acute cardiac or pulmonary abnormality is identified.         COURSE & MEDICAL DECISION MAKING    ED Observation Status? Yes; I am placing the patient in to an observation status due to a diagnostic uncertainty as well as therapeutic intensity. Patient placed in  observation status at 3:43 PM, 5/4/2023.     Observation plan is as follows: labs and radiology.    Upon Reevaluation, the patient's condition has: not improved; and will be escalated to hospitalization.    Patient discharged from ED Observation status at 7:01 PM, 5/4/2023.    INITIAL ASSESSMENT AND PLAN  Care Narrative:       3:34 PM - Patient seen and evaluated at bedside. Kellie Chatman is a 53 y.o. female with a history of bilateral BKAs, who presents with bilateral leg pain. Ordered DX-Chest, Magnesium, Phosphorus, CRP Quantitative, and Sed Rate to evaluate. Discussed plan of care, including a chest x-ray to rule out respiratory infection, as well as lab work. She understands and agrees to the plan of care. Differential diagnoses include but are not limited to: Sepsis, BKA stump infection, possibly bilateral, urosepsis or respiratory infection also considered. Patient will be treated with Unasyn 3g IV and Vancocin 1,750 mg in NS for her symptoms.      6:27 PM - Given the patient's lab results, vital signs, I remain concerned for cellulitis versus deep soft tissue versus osteomyelitis infection of her stumps.  She will need to be admitted.    7:20 PM - I discussed the patient's case and the above findings with [facility] Medicine who agrees to evaluate the patient for hospitalization.    ADDITIONAL PROBLEM LIST AND DISPOSITION  In addition to concern for infection, methamphetamine abuse, altered mental status, homelessness, and chronic psychiatric illness all contributed to the difficulty of this case.               DISPOSITION AND DISCUSSIONS  I have discussed management of the patient with the following physicians and LONNY's: [facility]    DISPOSITION:  Patient will be hospitalized by [facility] in guarded condition.    FINAL IMPRESSION   1. (Rule out) osteomyelitis    2. Postoperative infection, unspecified type, initial encounter      IHanny (Scribe), am scribing for, and in the presence of,  Brandon Helsm M.D..    Electronically signed by: Hanny Snow (Scribe), 5/4/2023    IBrandon M.D. personally performed the services described in this documentation, as scribed by Hanny Snow in my presence, and it is both accurate and complete.    The note accurately reflects work and decisions made by me.  Brandon Helms M.D.  5/9/2023  3:50 PM

## 2023-05-05 LAB
ANION GAP SERPL CALC-SCNC: 10 MMOL/L (ref 7–16)
BASOPHILS # BLD AUTO: 0.7 % (ref 0–1.8)
BASOPHILS # BLD: 0.05 K/UL (ref 0–0.12)
BUN SERPL-MCNC: 15 MG/DL (ref 8–22)
CALCIUM SERPL-MCNC: 7.8 MG/DL (ref 8.5–10.5)
CHLORIDE SERPL-SCNC: 108 MMOL/L (ref 96–112)
CO2 SERPL-SCNC: 21 MMOL/L (ref 20–33)
COMMENT 1642: NORMAL
CREAT SERPL-MCNC: 0.85 MG/DL (ref 0.5–1.4)
EOSINOPHIL # BLD AUTO: 0.05 K/UL (ref 0–0.51)
EOSINOPHIL NFR BLD: 0.7 % (ref 0–6.9)
ERYTHROCYTE [DISTWIDTH] IN BLOOD BY AUTOMATED COUNT: 50.8 FL (ref 35.9–50)
ERYTHROCYTE [SEDIMENTATION RATE] IN BLOOD BY WESTERGREN METHOD: 75 MM/HOUR (ref 0–25)
GFR SERPLBLD CREATININE-BSD FMLA CKD-EPI: 81 ML/MIN/1.73 M 2
GLUCOSE SERPL-MCNC: 99 MG/DL (ref 65–99)
HCT VFR BLD AUTO: 28.8 % (ref 37–47)
HGB BLD-MCNC: 8.5 G/DL (ref 12–16)
IMM GRANULOCYTES # BLD AUTO: 0.04 K/UL (ref 0–0.11)
IMM GRANULOCYTES NFR BLD AUTO: 0.5 % (ref 0–0.9)
LYMPHOCYTES # BLD AUTO: 1.55 K/UL (ref 1–4.8)
LYMPHOCYTES NFR BLD: 21 % (ref 22–41)
MCH RBC QN AUTO: 24.8 PG (ref 27–33)
MCHC RBC AUTO-ENTMCNC: 29.5 G/DL (ref 33.6–35)
MCV RBC AUTO: 84 FL (ref 81.4–97.8)
MONOCYTES # BLD AUTO: 0.73 K/UL (ref 0–0.85)
MONOCYTES NFR BLD AUTO: 9.9 % (ref 0–13.4)
MORPHOLOGY BLD-IMP: NORMAL
NEUTROPHILS # BLD AUTO: 4.96 K/UL (ref 2–7.15)
NEUTROPHILS NFR BLD: 67.2 % (ref 44–72)
NRBC # BLD AUTO: 0 K/UL
NRBC BLD-RTO: 0 /100 WBC
OVALOCYTES BLD QL SMEAR: NORMAL
PLATELET # BLD AUTO: 363 K/UL (ref 164–446)
PLATELET BLD QL SMEAR: NORMAL
PMV BLD AUTO: 9.9 FL (ref 9–12.9)
POIKILOCYTOSIS BLD QL SMEAR: NORMAL
POTASSIUM SERPL-SCNC: 3.6 MMOL/L (ref 3.6–5.5)
RBC # BLD AUTO: 3.43 M/UL (ref 4.2–5.4)
RBC BLD AUTO: PRESENT
SODIUM SERPL-SCNC: 139 MMOL/L (ref 135–145)
WBC # BLD AUTO: 7.4 K/UL (ref 4.8–10.8)

## 2023-05-05 PROCEDURE — A9270 NON-COVERED ITEM OR SERVICE: HCPCS | Performed by: STUDENT IN AN ORGANIZED HEALTH CARE EDUCATION/TRAINING PROGRAM

## 2023-05-05 PROCEDURE — 700111 HCHG RX REV CODE 636 W/ 250 OVERRIDE (IP)

## 2023-05-05 PROCEDURE — 700111 HCHG RX REV CODE 636 W/ 250 OVERRIDE (IP): Performed by: INTERNAL MEDICINE

## 2023-05-05 PROCEDURE — 700105 HCHG RX REV CODE 258

## 2023-05-05 PROCEDURE — 700105 HCHG RX REV CODE 258: Performed by: INTERNAL MEDICINE

## 2023-05-05 PROCEDURE — 36415 COLL VENOUS BLD VENIPUNCTURE: CPT

## 2023-05-05 PROCEDURE — A9270 NON-COVERED ITEM OR SERVICE: HCPCS

## 2023-05-05 PROCEDURE — 80048 BASIC METABOLIC PNL TOTAL CA: CPT

## 2023-05-05 PROCEDURE — 99222 1ST HOSP IP/OBS MODERATE 55: CPT | Mod: GC | Performed by: FAMILY MEDICINE

## 2023-05-05 PROCEDURE — 700102 HCHG RX REV CODE 250 W/ 637 OVERRIDE(OP)

## 2023-05-05 PROCEDURE — 700102 HCHG RX REV CODE 250 W/ 637 OVERRIDE(OP): Performed by: STUDENT IN AN ORGANIZED HEALTH CARE EDUCATION/TRAINING PROGRAM

## 2023-05-05 PROCEDURE — 770020 HCHG ROOM/CARE - TELE (206)

## 2023-05-05 PROCEDURE — 99222 1ST HOSP IP/OBS MODERATE 55: CPT

## 2023-05-05 PROCEDURE — 85025 COMPLETE CBC W/AUTO DIFF WBC: CPT

## 2023-05-05 RX ADMIN — METOPROLOL TARTRATE 25 MG: 25 TABLET, FILM COATED ORAL at 05:35

## 2023-05-05 RX ADMIN — VANCOMYCIN HYDROCHLORIDE 750 MG: 5 INJECTION, POWDER, LYOPHILIZED, FOR SOLUTION INTRAVENOUS at 08:47

## 2023-05-05 RX ADMIN — AMPICILLIN SODIUM AND SULBACTAM SODIUM 3 G: 2; 1 INJECTION, POWDER, FOR SOLUTION INTRAMUSCULAR; INTRAVENOUS at 05:45

## 2023-05-05 RX ADMIN — OXYCODONE HYDROCHLORIDE 10 MG: 10 TABLET ORAL at 21:49

## 2023-05-05 RX ADMIN — AMPICILLIN SODIUM AND SULBACTAM SODIUM 3 G: 2; 1 INJECTION, POWDER, FOR SOLUTION INTRAMUSCULAR; INTRAVENOUS at 12:17

## 2023-05-05 RX ADMIN — IBUPROFEN 800 MG: 800 TABLET, FILM COATED ORAL at 05:35

## 2023-05-05 RX ADMIN — ACETAMINOPHEN 1000 MG: 325 TABLET, FILM COATED ORAL at 05:34

## 2023-05-05 RX ADMIN — DIVALPROEX SODIUM 500 MG: 500 TABLET, EXTENDED RELEASE ORAL at 05:35

## 2023-05-05 RX ADMIN — VANCOMYCIN HYDROCHLORIDE 750 MG: 5 INJECTION, POWDER, LYOPHILIZED, FOR SOLUTION INTRAVENOUS at 21:51

## 2023-05-05 ASSESSMENT — PAIN SCALES - PAIN ASSESSMENT IN ADVANCED DEMENTIA (PAINAD)
CONSOLABILITY: NO NEED TO CONSOLE
FACIALEXPRESSION: SMILING OR INEXPRESSIVE
BREATHING: OCCASIONAL LABORED BREATHING, SHORT PERIOD OF HYPERVENTILATION
BODYLANGUAGE: TENSE, DISTRESSED PACING, FIDGETING

## 2023-05-05 ASSESSMENT — PAIN SCALES - WONG BAKER: WONGBAKER_NUMERICALRESPONSE: DOESN'T HURT AT ALL

## 2023-05-05 ASSESSMENT — PAIN DESCRIPTION - PAIN TYPE
TYPE: ACUTE PAIN

## 2023-05-05 ASSESSMENT — PATIENT HEALTH QUESTIONNAIRE - PHQ9
SUM OF ALL RESPONSES TO PHQ9 QUESTIONS 1 AND 2: 0
2. FEELING DOWN, DEPRESSED, IRRITABLE, OR HOPELESS: NOT AT ALL
1. LITTLE INTEREST OR PLEASURE IN DOING THINGS: NOT AT ALL

## 2023-05-05 NOTE — PROGRESS NOTES
"[facility] PROGRESS NOTE     Attending: Dr. Myers     Senior Resident: Nas Bernardo, PGY-3    PATIENT: Kellie Chatman; [id number]; 1970 Hospital Day: 2    ID: Kellie Chatman is a 53 y.o. 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     SUBJECTIVE: Patient resting and appeared drowsy this morning, but responsive.     OBJECTIVE:     Vitals:    05/04/23 2133 05/04/23 2215 05/04/23 2230 05/05/23 0421   BP: 135/82 130/64  117/87   Pulse: (!) 126 (!) 120  89   Resp:  20  16   Temp:  36.7 °C (98 °F)  36.5 °C (97.7 °F)   TempSrc:  Temporal  Temporal   SpO2: 94% 94%  92%   Weight:  72.2 kg (159 lb 2.8 oz) 70.6 kg (155 lb 10.3 oz)    Height:   1.778 m (5' 10\")      No intake or output data in the 24 hours ending 05/05/23 0629    PE:  General: No acute distress, resting comfortably in bed.  HEENT: NC/AT. EOMI.   Cardiovascular: RRR  Respiratory: CTAB, non-labored   EXT:  Bilateral BKA, open wound over posterior left stump.   Neuro: no focal deficits     LABS:  Recent Labs     05/04/23  1610 05/05/23  0320   WBC 9.1 7.4   RBC 4.01* 3.43*   HEMOGLOBIN 10.0* 8.5*   HEMATOCRIT 33.2* 28.8*   MCV 82.8 84.0   MCH 24.9* 24.8*   RDW 50.0 50.8*   PLATELETCT 521* 363   MPV 9.5 9.9   NEUTSPOLYS 71.70 67.20   LYMPHOCYTES 17.50* 21.00*   MONOCYTES 8.50 9.90   EOSINOPHILS 0.30 0.70   BASOPHILS 1.00 0.70   RBCMORPHOLO  --  Present     Recent Labs     05/04/23  1610 05/05/23  0320   SODIUM 140 139   POTASSIUM 3.6 3.6   CHLORIDE 103 108   CO2 24 21   BUN 19 15   CREATININE 0.87 0.85   CALCIUM 9.4 7.8*   MAGNESIUM 1.7  --    PHOSPHORUS 3.6  --    ALBUMIN 3.8  --      Estimated GFR/CRCL = Estimated Creatinine Clearance: 80.9 mL/min (by C-G formula based on SCr of 0.85 " mg/dL).  Recent Labs     05/04/23  1610 05/05/23  0320   GLUCOSE 100* 99     Recent Labs     05/04/23  1610   ASTSGOT 17   ALTSGPT 10   TBILIRUBIN 0.4   ALKPHOSPHAT 86   GLOBULIN 4.7*             No results for input(s): INR, APTT, FIBRINOGEN in the last 72 hours.    Invalid input(s): DIMER      MEDS:  Current Facility-Administered Medications   Medication Last Admin    ampicillin/sulbactam (UNASYN) 3 g in  mL IVPB Stopped at 05/05/23 0615    MD Alert...Vancomycin per Pharmacy      bictegravir-emtricitab-TAF (Biktarvy) -25 mg tablet 1 Tablet 1 Tablet at 05/04/23 2139    divalproex ER (DEPAKOTE ER) tablet 500 mg 500 mg at 05/05/23 0535    OLANZapine (ZYPREXA) tablet 5 mg 5 mg at 05/04/23 2140    Pharmacy Consult Request ...Pain Management Review 1 Each      acetaminophen (TYLENOL) tablet 1,000 mg 1,000 mg at 05/05/23 0534    Followed by    [START ON 5/10/2023] acetaminophen (TYLENOL) tablet 1,000 mg      ibuprofen (MOTRIN) tablet 800 mg 800 mg at 05/05/23 0535    Followed by    [START ON 5/10/2023] ibuprofen (MOTRIN) tablet 800 mg      oxyCODONE immediate-release (ROXICODONE) tablet 5 mg      Or    oxyCODONE immediate release (ROXICODONE) tablet 10 mg 10 mg at 05/04/23 2333    Or    HYDROmorphone (Dilaudid) injection 0.5 mg      metoprolol tartrate (LOPRESSOR) tablet 25 mg 25 mg at 05/05/23 0535    heparin injection 5,000 Units      vancomycin (VANCOCIN) 750 mg in  mL IVPB      Pharmacy Consult Request         ASSESSMENT/PLAN: 53 y.o. female  with past medical history of HIV on Biktarvy (CD4 752 at 3/2023), schizophrenia and bipolar on Zyprexa and Depakote, bilateral BKA due to frost bite on 3/2023, atrial fibrillation, DVT on apixaban, hypertension, and seizures admitted for sepsis secondary to infected stump.      #Bilateral BKA cellulitis   - History of BKA surgery 3/2023  - Skin breakdown over bilateral BKA consistent with cellulitis  - Patient was recently admitted to [facility] and discharged  with antibiotics for treatment of cellulitis  - MRI bilateral lower extremity for further evaluation  -Continue vancomycin and Unasyn  - ID consultation  - Limb preservation consultation       #History of DVT   -Hold Eliquis, pending LPS consultation and MRI    #History of HIV   - CD4 752 at 3/2023  - Continue home medication of Biktarvy -25 mg daily      #Bipolar   #Schizophrenia  #Seizures   - Continue home medication of olanzapine 5 mg BID and Depakote 500 mg daily.   - Unclear seizure history. Seizure precautions in place      #Atrial fibrillation  - Continue home medication of metoprolol 25 mg BID      Core Measures:  Fluids: None      Lines: Peripheral IV   Abx: IV unasyn and vancomycin   Diet: Regular  PPX:  Heparin   CODE STATUS: Full code    DISPO:  Inpatient for IV antibiotics

## 2023-05-05 NOTE — CONSULTS
LIMB PRESERVATION SERVICE CONSULT      REFERRED BY: Dr. Sheppard    DATE OF CONSULTATION: 5/5/2023    REASON FOR CONSULT: Bilateral BKA's with eschar     HISTORY OF PRESENT ILLNESS: Kellie Chatman is a 53 y.o.  with a past medical history that includes frostbite, HIV, EtOH abuse, methamphetamine abuse, schizophrenia and bipolar disorder.  Admitted 5/4/2023 for Wound infection [T14.8XXA, L08.9].     [facility] has been consulted for bilateral BKA's with eschar.    Patient known to LPS.  Seen on admit of 11/14/2022 with bilateral frostbite injuries to toes and feet.  Unfortunately due to progression of ischemic damage patient has undergone multiple surgeries in an attempt at limb salvage.  Patient had right TMA on 12/12/2022 by Dr. Lux  Patient had right BKA on 12/20/2022 by Dr. Lux  Patient had left second toe amputation with wound VAC application on 2/3/2023 by Dr. Lux  Patient had left TMA on 2/7/2023 by Dr. Lux  Patient had left BKA on 3/22/2023 by Dr. Lux    Patient is homeless and has a history of mental illness.  Unfortunately since patient's original frostbite injury in November 2022 patient has had repeated frostbite injuries in between attempts at limb salvage.  Patient has had difficulty with wound care in between surgeries.  Patient is wheelchair-bound.  Patient had increasing bilateral BKA pain with increasing edema and patient reported fever and chills causing her to present to the ED.  Patient denies fevers, chills, nausea, vomiting at this time.     Antibiotics were started on this admission.  Infectious diseases has not been consulted.  Xray not completed.  Bilateral MRI ordered.  Ortho and Vascular surgery not involved yet.       Smoking:   reports that she has been smoking cigarettes. She has been smoking an average of .25 packs per day. She has never used smokeless tobacco.    Alcohol:   reports that she does not currently use alcohol.    Drug:   reports current drug use. Drug:  Inhaled.      PAST MEDICAL HISTORY:   Past Medical History:   Diagnosis Date    Frostbite of foot, left     Frostbite of foot, right     HIV (human immunodeficiency virus infection) (HCC)         PAST SURGICAL HISTORY:   Past Surgical History:   Procedure Laterality Date    KNEE AMPUTATION BELOW Left 03/22/2023    Procedure: AMPUTATION, BELOW KNEE;  Surgeon: Nas Lux M.D.;  Location: SURGERY [facility];  Service: Orthopedics    AMPUTATION, BELOW THE KNEE Right        MEDICATIONS:   Scheduled Medications   Medication Dose Frequency    ampicillin-sulbactam (UNASYN) IV  3 g Q6HRS    MD Alert...Vancomycin per Pharmacy   PHARMACY TO DOSE    bictegravir-emtricitab-TAF  1 Tablet Q EVENING    divalproex ER  500 mg BID    OLANZapine  5 mg Q EVENING    Pharmacy Consult Request  1 Each PHARMACY TO DOSE    acetaminophen  1,000 mg Q6HRS    ibuprofen  800 mg TID    metoprolol tartrate  25 mg TWICE DAILY    heparin  5,000 Units Q8HRS    vancomycin  750 mg Q12HRS    Pharmacy  1 Each PHARMACY TO DOSE       ALLERGIES:  No Known Allergies     FAMILY HISTORY: History reviewed. No pertinent family history.      REVIEW OF SYSTEMS:   Constitutional: Negative for chills, fever   Respiratory: Negative for cough and shortness of breath.    Cardiovascular:Negative for chest pain, and claudication.   Gastrointestinal: Negative for constipation, diarrhea, nausea and vomiting.   Lower extremities: positive for swelling and redness  Neurological: Negative for numbness to lower legs  All other systems reviewed and are negative     RESULTS:     Recent Labs     05/04/23  1610 05/05/23  0320   WBC 9.1 7.4   RBC 4.01* 3.43*   HEMOGLOBIN 10.0* 8.5*   HEMATOCRIT 33.2* 28.8*   MCV 82.8 84.0   MCH 24.9* 24.8*   MCHC 30.1* 29.5*   RDW 50.0 50.8*   PLATELETCT 521* 363   MPV 9.5 9.9     Recent Labs     05/04/23  1610 05/05/23  0320   SODIUM 140 139   POTASSIUM 3.6 3.6   CHLORIDE 103 108   CO2 24 21   GLUCOSE 100* 99   BUN 19 15         ESR:    "  Results from last 7 days   Lab Units 05/04/23  1610   SED RATE WESTERGREN 1526 mm/hour 75*       CRP:       Results from last 7 days   Lab Units 05/04/23  1610   C REACTIVE PROTEIN 4596 mg/dL 2.40*         COVID-19: Not completed this admission     Imaging:  DX-CHEST-LIMITED (1 VIEW)   Final Result         No acute cardiac or pulmonary abnormality is identified.      MR-MRA LOWER EXTREMITY-WITH & W/O LEFT    (Results Pending)   MR-MRA LOWER EXTREMITY-WITH & W/O RIGHT    (Results Pending)     MRI: Pending    Arterial studies: None    A1c:  Lab Results   Component Value Date/Time    HBA1C 5.1 02/24/2023 05:54 AM            Microbiology:  Results       Procedure Component Value Units Date/Time    Blood Culture [[id number]] Collected: 05/04/23 1612    Order Status: Completed Specimen: Blood from Peripheral Updated: 05/05/23 0747     Significant Indicator NEG     Source BLD     Site PERIPHERAL     Culture Result No Growth  Note: Blood cultures are incubated for 5 days and  are monitored continuously.Positive blood cultures  are called to the RN and reported as soon as  they are identified.      Narrative:      Per Hospital Policy: Only change Specimen Src: to \"Line\" if  specified by physician order.  Left Forearm/Arm    Blood Culture [[id number]] Collected: 05/04/23 1612    Order Status: Completed Specimen: Blood from Peripheral Updated: 05/05/23 0747     Significant Indicator NEG     Source BLD     Site PERIPHERAL     Culture Result No Growth  Note: Blood cultures are incubated for 5 days and  are monitored continuously.Positive blood cultures  are called to the RN and reported as soon as  they are identified.      Narrative:      Per Hospital Policy: Only change Specimen Src: to \"Line\" if  specified by physician order.  Right Forearm/Arm    C Diff by PCR rflx Toxin [[id number]]     Order Status: No result Specimen: Stool     Urinalysis [[id number]]  (Abnormal) Collected: 05/04/23 1850    Order Status: Completed Specimen: " "Urine Updated: 05/04/23 1920     Color Yellow     Character Clear     Specific Gravity 1.019     Ph 5.0     Glucose Negative mg/dL      Ketones Negative mg/dL      Protein 30 mg/dL      Bilirubin Negative     Urobilinogen, Urine 0.2     Nitrite Negative     Leukocyte Esterase Trace     Occult Blood Negative     Micro Urine Req Microscopic    Narrative:      Indication for culture:->Evaluation for sepsis without a  clear source of infection    Urine Culture (New) [[id number]] Collected: 05/04/23 1850    Order Status: Sent Specimen: Urine Updated: 05/04/23 1857    Narrative:      Indication for culture:->Evaluation for sepsis without a  clear source of infection             PHYSICAL EXAMINATION:     VITAL SIGNS: /77   Pulse 98   Temp 37 °C (98.6 °F) (Temporal)   Resp 16   Ht 1.778 m (5' 10\")   Wt 70.6 kg (155 lb 10.3 oz)   SpO2 94%   BMI 22.33 kg/m²       General Appearance:  Well developed, well nourished, in no acute distress      Lower Extremity Assessment:    Edema:   Moderate    Structural /mechanical changes:  Bilateral BKA    Pulses:  R popliteal palpable  L popliteal palpable      Wound Assessment:    Wound(s)   location: Right BKA  Full thickness  Wound characteristics: Yellow, black eschar, underlying tissue boggy  Erythema: Mild  Edema: Moderate  Drainage: Scant  Odor: None  Measures: 4 x 15 by IVETTE    Wound(s)   location: Left BKA  Full thickness  Wound characteristics: Black eschar, underlying tissue boggy  Erythema: Mild  Edema: Moderate  Drainage: None  Odor: None  Measures: 4 x 12 by IVETTE    Wound(s)   location: Left posterior thigh  Wound characteristics: Pink intact scar tissue, yellow-brown eschar  Erythema: None  Edema: Mild  Drainage: None  Odor: None      Wound care  Bilateral BKA eschar: Cleanse with NS, pat dry.  Apply Betadine to all areas of eschar, allowed to dry.  Leave open to air  Left posterior thigh: Cleanse with NS, pat dry.  Apply Betadine, allowed to air dry.  Protect with " silicone adhesive foam    Wound photo:                       ASSESSMENT AND PLAN:   53 y.o. admitted for Wound infection [T14.8XXA, L08.9]. Presents with bilateral BKA eschar    -Patient with moderate edema and mild erythema to bilateral BKA's.  -Right BKA with larger area of eschar-boggy tissue underneath, scant drainage  -Left BKA with small area of eschar with boggy tissue underneath.  No drainage  -Betadine initiated to keep eschar dry and provide antimicrobial    -Patient will likely require debridement in the OR  -MRI pending  -We will contact Dr. Lux once MRI has been completed        Wound care:   -Wound care orders placed for nursing by LPS   -Bilateral BKA eschar: Cleanse with NS, pat dry.  Apply Betadine to all areas of eschar, allowed to dry.  Leave open to air  Left posterior thigh: Cleanse with NS, pat dry.  Apply Betadine, allowed to air dry.  Protect with silicone adhesive foam    Imaging/Labs:  -COVID-19: not completed this admission.     Vascular status:   R popliteal palpable  L popliteal palpable    Surgery:   -  no plans for surgery at this time  -Awaiting MRI to assist with surgical planning    Antibiotics:   -currently on antibiotics managed by hospitalist   - recommend ID consult     Weight Bearing Status:   -Right BKA: Non Weight bearing  -Left BKA: Non Weight bearing    Offloading:   -Pillows for comfort    PT/OT:   -consult in place      -   Lab Results   Component Value Date/Time    HBA1C 5.1 02/24/2023 05:54 AM          Smoking Cessation:   -Did not discuss            Discharge Plan:  -TBD, patient currently homeless      D/W: pt, RN, Dr. Bernardo    Please note that this dictation was created using voice recognition software. I have  worked with technical experts from [employer] to optimize the interface.  I have made every reasonable attempt to correct obvious errors, but there may be errors of grammar and possibly content that I did not discover before finalizing the  note.    Please contact [facility] through [employer].

## 2023-05-05 NOTE — H&P
[facility] MEDICINE H&P    PATIENT ID:  NAME:  Kellie Chatman  MRN:               [id number]  YOB: 1970    Date of Admission: 5/4/2023     Attending: Cristian Myers M.d.    Resident: Jefferson Ocampo M.D. PGY-2 Sugar Sheppard M.D. PGY-1    Primary Care Physician:  Pcp Pt States None    CC:  Pain and skin break down of bilateral BKA     HPI: Kellie Chatman is a 53 y.o. 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 Patient is a poor historian and appears in significant pain throughout exam. Patient reports significant radiating pain from bilateral BKA site has been occurring for the past week. She states she has been having associated fever and chills. Denies pain or wounds elsewhere, denies headache, chest pain, nausea, vomiting, or diarrhea. She states history of bipolar and schizophrenia. Denies further medical history or meth use.     Per chart review, patient was discharged 5/1/2023 from [place] after being treated for bilateral BKA cellulitis. Pt was noted to have a fib at that time. ECHO was negative for acute pathology. CT scan was performed of the left leg with no evidence of abscess but did show DVT in left popliteal vein and left superficial femoral vein. Pt was started on apixaban. Pt was treated with IV Vancomycin  and discharged home on doxycyline. Pt then was refusing meds or PT and was discharged.     ERCourse:  Pt presented tachycardic. Afebrile with normal range BP. CBC indicated hemoglobin of 10.0, Hct 33.2, plt 521. CMP within normal limits. Magnesium 1.7. LA 1.8. UA trace leukocyte esterase, blood and urine culture collected. CXR no acute cardiac or pulmonary abnormality. Pt was given a one time dose of IV Unasyn  and vancomycin.     REVIEW OF SYSTEMS:   Ten systems reviewed and were negative except as noted in the HPI.                PAST MEDICAL HISTORY:  Past Medical History:   Diagnosis Date    Frostbite of foot, left     Frostbite of foot, right     HIV (human immunodeficiency virus infection) (HCC)      PAST SURGICAL HISTORY:  Past Surgical History:   Procedure Laterality Date    KNEE AMPUTATION BELOW Left 03/22/2023    Procedure: AMPUTATION, BELOW KNEE;  Surgeon: Nas Lux M.D.;  Location: SURGERY [facility];  Service: Orthopedics    AMPUTATION, BELOW THE KNEE Right      FAMILY HISTORY:  History reviewed. No pertinent family history.    SOCIAL HISTORY:   Pt is homeless, lives at a shelter   Smoking, pt reports she smokes 4-5 cigarettes a day   Etoh use denies   Drug use denies. Documented meth use     DIET:   Orders Placed This Encounter   Procedures    Diet Order Diet: Regular     Standing Status:   Standing     Number of Occurrences:   1     Order Specific Question:   Diet:     Answer:   Regular [1]    Diet NPO Restrict to: Sips with Medications     Standing Status:   Standing     Number of Occurrences:   8     Order Specific Question:   Diet NPO Restrict to:     Answer:   Sips with Medications [3]     ALLERGIES:  No Known Allergies    OUTPATIENT MEDICATIONS:    Current Facility-Administered Medications:     vancomycin (VANCOCIN) 1,750 mg in  mL IVPB, 25 mg/kg, Intravenous, Once, Brandon Helms M.D., Last Rate: 166.7 mL/hr at 05/04/23 2022, 1,750 mg at 05/04/23 2022    senna-docusate (PERICOLACE or SENOKOT S) 8.6-50 MG per tablet 2 Tablet, 2 Tablet, Oral, BID **AND** polyethylene glycol/lytes (MIRALAX) PACKET 1 Packet, 1 Packet, Oral, QDAY PRN **AND** magnesium hydroxide (MILK OF MAGNESIA) suspension 30 mL, 30 mL, Oral, QDAY PRN **AND** bisacodyl (DULCOLAX) suppository 10 mg, 10 mg, Rectal, QDAY PRN, Sugar Sheppard M.D.    acetaminophen (Tylenol) tablet 650 mg, 650 mg, Oral, Q6HRS PRN, Sugar WAITE  HOANG Sheppard    [START ON 2023] ampicillin/sulbactam (UNASYN) 3 g in  mL IVPB, 3 g, Intravenous, Q6HRS, Sugar Sheppard M.D.    heparin injection 5,000 Units, 5,000 Units, Subcutaneous, Q8HRS, Sugar Sheppard M.D.    MD Alert...Vancomycin per Pharmacy, , Other, PHARMACY TO DOSE, Sugar Sheppard M.D.    [START ON 2023] bictegravir-emtricitab-TAF (Biktarvy) -25 mg tablet 1 Tablet, 1 Tablet, Oral, DAILY, Sugar Sheppard M.D.    divalproex ER (DEPAKOTE ER) tablet 500 mg, 500 mg, Oral, BID, Sugar Sheppard M.D.    OLANZapine (ZYPREXA) tablet 5 mg, 5 mg, Oral, Q EVENING, Sugar Sheppard M.D.    Current Outpatient Medications:     apixaban (ELIQUIS) 5mg Tab, Take 5 mg by mouth 2 times a day., Disp: , Rfl:     divalproex ER (DEPAKOTE ER) 500 MG TABLET SR 24 HR, Take 500 mg by mouth 2 times a day., Disp: , Rfl:     cefdinir (OMNICEF) 300 MG Cap, Take 300 mg by mouth 2 times a day. 3 day course prescribed 3/11/23, Disp: , Rfl:     metroNIDAZOLE (FLAGYL) 500 MG Tab, Take 500 mg by mouth 3 times a day. 3 day course prescribed 23, Disp: , Rfl:     bictegravir-emtricitab-TAF (BIKTARVY) -25 mg Tab tablet, Take 1 Tablet by mouth every day., Disp: 30 Tablet, Rfl: 0    PHYSICAL EXAM:  Vitals:    23 1735 23 1800 23 1841 23 2040   BP: 131/84 (!) 133/91  (!) 155/93   Pulse: (!) 119 (!) 119  (!) 120   Resp: (!) 22 20  (!) 24   Temp:       TempSrc:       SpO2: 99% 96%  94%   Weight:   74.8 kg (165 lb)    , Temp (24hrs), Av.3 °C (97.3 °F), Min:35.5 °C (95.9 °F), Max:37 °C (98.6 °F)  , Pulse Oximetry: 94 %, O2 Delivery Device: None - Room Air    General: Pt resting in NAD, cooperative   Skin:  Pink, warm and dry.  No rashes  HEENT: NC/AT. PERRL. EOMI. MMM. No nasal discharge. Oropharynx nonerythematous without exudate/plaques  Neck:  Supple without lymphadenopathy or rigidity. No JVD   Lungs:  Symmetrical.  CTAB with no W/R/R.  Good air movement   Cardiovascular:  S1/S2 RRR  "without M/R/G.  Abdomen:  Abdomen is soft NT/ND. +BS. No masses noted.  Extremities:  bilateral BKA with areas of open wounds at the base of both stumps with purulent drainage and granulation tissue present   Spine:  Straight without vertebral anomalies.  CNS:  Muscle tone is normal. Cranial nerves II-XII grossly intact. 2+ DTRs.       LAB TESTS:   Recent Labs     05/04/23  1610   WBC 9.1   RBC 4.01*   HEMOGLOBIN 10.0*   HEMATOCRIT 33.2*   MCV 82.8   MCH 24.9*   RDW 50.0   PLATELETCT 521*   MPV 9.5   NEUTSPOLYS 71.70   LYMPHOCYTES 17.50*   MONOCYTES 8.50   EOSINOPHILS 0.30   BASOPHILS 1.00         Recent Labs     05/04/23  1610   SODIUM 140   POTASSIUM 3.6   CHLORIDE 103   CO2 24   BUN 19   CREATININE 0.87   CALCIUM 9.4   MAGNESIUM 1.7   PHOSPHORUS 3.6   ALBUMIN 3.8       CULTURES:   Results       Procedure Component Value Units Date/Time    Urinalysis [[id number]]  (Abnormal) Collected: 05/04/23 1850    Order Status: Completed Specimen: Urine Updated: 05/04/23 1920     Color Yellow     Character Clear     Specific Gravity 1.019     Ph 5.0     Glucose Negative mg/dL      Ketones Negative mg/dL      Protein 30 mg/dL      Bilirubin Negative     Urobilinogen, Urine 0.2     Nitrite Negative     Leukocyte Esterase Trace     Occult Blood Negative     Micro Urine Req Microscopic    Narrative:      Indication for culture:->Evaluation for sepsis without a  clear source of infection    Urine Culture (New) [[id number]] Collected: 05/04/23 1850    Order Status: Sent Specimen: Urine Updated: 05/04/23 1857    Narrative:      Indication for culture:->Evaluation for sepsis without a  clear source of infection    Blood Culture [[id number]] Collected: 05/04/23 1612    Order Status: Sent Specimen: Blood from Peripheral Updated: 05/04/23 1746    Narrative:      Per Hospital Policy: Only change Specimen Src: to \"Line\" if  specified by physician order.    Blood Culture [[id number]] Collected: 05/04/23 1612    Order Status: Sent Specimen: " "Blood from Peripheral Updated: 05/04/23 8272    Narrative:      Per Hospital Policy: Only change Specimen Src: to \"Line\" if  specified by physician order.          IMAGES:  DX-CHEST-LIMITED (1 VIEW)   Final Result         No acute cardiac or pulmonary abnormality is identified.      MR-MRA LOWER EXTREMITY-WITH & W/O LEFT    (Results Pending)   MR-MRA LOWER EXTREMITY-WITH & W/O RIGHT    (Results Pending)     CONSULTS:   None     ASSESSMENT/PLAN: 53 y.o. female  with past medical history of HIV on Biktarvy (CD4 752 at 3/2023), schizophrenia and bipolar on Zyprexa and Depakote, bilateral BKA due to frost bite on 3/2023, atrial fibrillation, DVT on apixaban, hypertension, seizures, documented meth use, presented with bilateral BKA stump pain and skin infection admitted for meeting SIRS criteria, osteomyelitis rule out, and IV antibiotic administration.     #Sepsis   - Pt meets SIRS criteria given her elevated HR and RR. BP is within normal range, pt is not in septic shock. Likely due to infectious source of open wounds with purulent discharge and skin break down of bilateral BKA.  (CXR negative, UA with trace leukocyte esterase)   - WBC and lactic acid within normal range. CRP elevated   - Pt received one bolus NS in the ED   Plan:   - Continue IV fluids at this time   - Blood and urine cultures pending   - Pt currently on IV Unasyn and IV Vancomycin, see further details below     #Bilateral BKA cellulitis   - Pt with significant cellulitis, purulent drainage, and skin break down of bilateral BKA. Has been going on for several weeks. BKA surgery was 3/2023. Concern for possible osteomyelitis.   - Was discharged with doxycyline from [facility] [place] after being treated for cellulitis 5/2/2023. At that time MRI imaging was not performed to rule out osteomyelitis.  Plan:   - Pending bilateral lower extremity MRI with contrast for further evaluation of possible osteo  - Tylenol and motrin as needed for pain, oxycodone and " dilaudid for break through pain   - Pt started on IV unasyn and vancomycin. Per chart patient has known history of MRSA. Will get repeat MRSA nares   - Wound care and limb preservation consult placed   - If MRI indicates osteo, day team to consider surgery consult. Pt will be NPO at midnight.   - Pt likely will need long term antibiotics. Day team to consider ID consult as well     #History of DVT   - Pt was noted to have DVT of the left lower extremity at [facility] 4/24/2023 and was started on apixaban.   Plan:   - If patient needs possible surgery tomorrow pt was placed on Heparin DVT prophylaxis    #History of HIV   - CD4 752 at 3/2023  - Continue home medication of Biktarvy -25 mg daily     #History of bipolar and schizophrenia  #History of seizures   - Continue home medication of olanzapine 5 mg BID and Depakote 500 mg daily.   - Unclear seizure history. Seizure precautions in place     #History of atrial fibrillation   - Per chart review patient was started on and sent home with metroprolol at [facility] 4/24/23.   - Will continue home medication of metoprolol 25 mg BID     Core Measures:  Fluids: NS bolus       Lines: Peripheral IV   Abx: IV unasyn and vancomycin   Diet: Regular, NPO at midnight   PPX:  Heparin   CODE STATUS: Full code    DISPO:  Inpatient     Sugar Sheppard M.D. PGY-1  [facility]

## 2023-05-05 NOTE — CARE PLAN
The patient is Stable - Low risk of patient condition declining or worsening    Shift Goals  Clinical Goals: Pain management, Fall prevention.  Patient Goals: Rest  and alleviate the pain.  Family Goals: IVETTE: no family members present.    Progress made toward(s) clinical / shift goals:    Problem: Knowledge Deficit - Standard  Goal: Patient and family/care givers will demonstrate understanding of plan of care, disease process/condition, diagnostic tests and medications  Outcome: Progressing  Note: Pt verbalize plan of care.     Problem: Pain - Standard  Goal: Alleviation of pain or a reduction in pain to the patient’s comfort goal  Outcome: Progressing  Note: PRN pain medications met patient's pain goal.     Problem: Skin Integrity  Goal: Skin integrity is maintained or improved  Outcome: Progressing  Note: Wound consult placed, for wounds found on pt.       Patient is not progressing towards the following goals:

## 2023-05-05 NOTE — DIETARY
Nutrition Services:    Presence of wound noted on Nutrition Admit Screen. Pt w/ bilateral BKA cellulitis. Wound consult is pending.     Based on nursing's nutrition screen pt reports poor PO PTA. No report of wt loss PTA; however, pt is homeless and most likely pt does not have access to a scale. Wt hx reviewed in Epic, pt w/ insignificant wt loss of 5.2% x 6 months and 2% x 2 months.     Pt is currently NPO due to possible surgery consult.     Ht: 177.8 cm, Wt: 70.6 kg, BMI 22.3 (WNL).      RD will follow for completion of wound consult and initiation of diet.

## 2023-05-05 NOTE — ED NOTES
Transport at bedside to take pt to S175/1. Pt remains on O2 for transport. All of pts belongings are with pt at the time of admit

## 2023-05-05 NOTE — PROGRESS NOTES
4 Eyes Skin Assessment Completed by MARIA EUGENIA Guerra and MARIA EUGENIA emmanuel.    Head Scratch  Ears WDL  Nose WDL  Mouth WDL  Neck WDL  Breast/Chest WDL  Shoulder Blades WDL  Spine WDL  (R) Arm/Elbow/Hand WDL  (L) Arm/Elbow/Hand WDL  Abdomen WDL  Groin MASD  Scrotum/Coccyx/Buttocks Moisture Fissure  (R) Leg Wound, (back)  (L) Leg ulcer, necrotic wound.  (R) Heel/Foot/Toe wound, scab,redness,edema,BKA  (L) Heel/Foot/Toe ulcer,BKA      Devices In Places n/a      Interventions In Place Waffle Overlay and Pillows    Possible Skin Injury Yes    Pictures Uploaded Into Epic Yes  Wound Consult Placed Yes  RN Wound Prevention Protocol Ordered No

## 2023-05-05 NOTE — ED NOTES
Bedside report received from MARIA EUGENIA Frazier. Patient's IV infiltrated. Removed and placed another IV via ultrasound.

## 2023-05-05 NOTE — PROGRESS NOTES
Pharmacy Vancomycin Kinetics Note for 5/4/2023     53 y.o. female on Vancomycin day # 1     Vancomycin Indication (AUC Dosing): Skin/skin structure infection    Provider specified end date: 05/07/23    Active Antibiotics (From admission, onward)      Ordered     Ordering Provider       Thu May 4, 2023 11:07 PM    05/04/23 2307  vancomycin (VANCOCIN) 750 mg in  mL IVPB  (vancomycin (VANCOCIN) IV (LD + Maintenance))  EVERY 12 HOURS         Sugar Sheppard M.D.       Thu May 4, 2023  8:43 PM    05/04/23 2043  bictegravir-emtricitab-TAF (Biktarvy) -25 mg tablet 1 Tablet  EVERY EVENING         Sugar Sheppard M.D.       Thu May 4, 2023  8:42 PM    05/04/23 2042  MD Alert...Vancomycin per Pharmacy  PHARMACY TO DOSE        Question:  Indication(s) for vancomycin?  Answer:  Skin and soft tissue infection  Comment:  bilateral BKA that is infected, just seen at [facility] for this. They documented pt has known hx of MRSA.    Sugar Sheppard M.D.       Thu May 4, 2023  7:30 PM    05/04/23 1930  ampicillin/sulbactam (UNASYN) 3 g in  mL IVPB  EVERY 6 HOURS         Sugar Sheppard M.D.          Dosing Weight: 70 kg (154 lb 5.2 oz)    Admission History: Admitted on 5/4/2023 for Wound infection [T14.8XXA, L08.9]  Pertinent history: 54 yo female w HIV on HAART presents with significant cellulitis, purulent drainage, and skin break down of bilateral BKA. Has been going on for several weeks. BKA surgery was 3/2023. Concern for possible osteomyelitis. She was discharged with doxycyline from [place] after being treated for cellulitis. MRSA in recent cultures from [facility]    Allergies: Patient has no known allergies.     Pertinent cultures to date:   Results       Procedure Component Value Units Date/Time    Urinalysis [[id number]]  (Abnormal) Collected: 05/04/23 1850    Order Status: Completed Specimen: Urine Updated: 05/04/23 1920     Color Yellow     Character Clear     Specific Gravity 1.019     Ph 5.0      "Glucose Negative mg/dL      Ketones Negative mg/dL      Protein 30 mg/dL      Bilirubin Negative     Urobilinogen, Urine 0.2     Nitrite Negative     Leukocyte Esterase Trace     Occult Blood Negative     Micro Urine Req Microscopic    Narrative:      Indication for culture:->Evaluation for sepsis without a  clear source of infection    Urine Culture (New) [[id number]] Collected: 23    Order Status: Sent Specimen: Urine Updated: 23    Narrative:      Indication for culture:->Evaluation for sepsis without a  clear source of infection    Blood Culture [[id number]] Collected: 23    Order Status: Sent Specimen: Blood from Peripheral Updated: 23    Narrative:      Per Hospital Policy: Only change Specimen Src: to \"Line\" if  specified by physician order.    Blood Culture [[id number]] Collected: 23    Order Status: Sent Specimen: Blood from Peripheral Updated: 23    Narrative:      Per Hospital Policy: Only change Specimen Src: to \"Line\" if  specified by physician order.          Labs: Estimated Creatinine Clearance: 79 mL/min (by C-G formula based on SCr of 0.87 mg/dL).  Recent Labs     23  1610   WBC 9.1   NEUTSPOLYS 71.70     Recent Labs     23  1610   BUN 19   CREATININE 0.87   ALBUMIN 3.8     No intake or output data in the 24 hours ending 23 2309   /64   Pulse (!) 120   Temp 36.7 °C (98 °F) (Temporal)   Resp 20   Ht 1.778 m (5' 10\")   Wt 70.6 kg (155 lb 10.3 oz)   SpO2 94%  Temp (24hrs), Av.8 °C (98.2 °F), Min:35.5 °C (95.9 °F), Max:37.9 °C (100.3 °F)    List concerns for Vancomycin clearance: BUN/Scr ratio greater than 20:1;Receipt of contrast dye    Pharmacokinetics:   AUC kinetics:   Ke (hr ^-1): 0.07 hr^-1  Half life: 9.9 hr  Clearance: 3.185  Estimated TDD: 1592.5  Estimated Dose: 790  Estimated interval: 11.9    A/P:   -  Vancomycin dose: 1750mg load then 750mg q12h at 3874-7960    -  Next vancomycin level(s): not " ordered    -  Predicted vancomycin AUC from initial AUC test calculator: 471 mg·hr/L    -  Comments: obtain recent cultures from [facility] to aid in antibiotic selection. Consider transtion to linelzolid PO.    Akin GonzalezD, BCPS

## 2023-05-05 NOTE — DISCHARGE PLANNING
Case Management Discharge Planning    CM visited patient at bedside, she was alert to self only and unable to participate in conversation about d/c planning. CM will follow up as appropriate    Admission Date: 5/4/2023  GMLOS: 6.2  ALOS: 1    6-Clicks ADL Score: 17  6-Clicks Mobility Score: 11  PT and/or OT Eval ordered: Yes  Post-acute Referrals Ordered: Yes  Post-acute Choice Obtained: No  Has referral(s) been sent to post-acute provider:  NA      Anticipated Discharge Dispo:      DME Needed: No    Action(s) Taken: DC Assessment Complete (See below)    Escalations Completed: None    Medically Clear: No    Barriers to Discharge: Homelessness    Is the patient up for discharge tomorrow: No    Care Transition Team Assessment    Information Source  Orientation Level: Disoriented to place, Disoriented to time, Disoriented to situation    Readmission Evaluation  Is this a readmission?: Yes - unplanned readmission    Elopement Risk  Legal Hold: No  Ambulatory or Self Mobile in Wheelchair: No-Not an Elopement Risk  Disoriented:  (disoriented to place,time and situation.)  Psychiatric Symptoms: None  History of Wandering: No  Elopement this Admit: No  Vocalizing Wanting to Leave: No  Displays Behaviors, Body Language Wanting to Leave: No-Not at Risk for Elopement  Elopement Risk: Not at Risk for Elopement  Picture of Patient on Inside Chart Front Cover: No (See Comments)  Purple Armband on Patient: No (See Comments)    Interdisciplinary Discharge Planning  Lives with - Patient's Self Care Capacity: Alone and Unable to Care For Self  Patient or legal guardian wants to designate a caregiver: No  Support Systems: Shelter  Housing / Facility: Homeless  Durable Medical Equipment: Other - Specify (wheelchair)    Discharge Preparedness  What is your plan after discharge?: Uncertain - pending medical team collaboration  Prior Functional Level: Wheelchair Dependent    Functional Assesment  Prior Functional Level: Wheelchair  Dependent         Vision / Hearing Impairment  Vision Impairment : No  Hearing Impairment : No              Domestic Abuse  Have you ever been the victim of abuse or violence?: No  Physical Abuse or Sexual Abuse: No  Verbal Abuse or Emotional Abuse: No  Possible Abuse/Neglect Reported to:: Not Applicable    Psychological Assessment  History of Substance Abuse: Methamphetamine  History of Psychiatric Problems: Yes  Newly Diagnosed Illness: No    Discharge Risks or Barriers  Discharge risks or barriers?: Mental health, Complex medical needs  Patient risk factors: Homeless, Cognitive / sensory / physical deficit

## 2023-05-05 NOTE — ED NOTES
3x staff members required to straight cath pt to obtain urine sample. Urine sample collected and sent to lab.

## 2023-05-05 NOTE — WOUND TEAM
[facility]  Inpatient Services  Initial Wound and Skin Care Evaluation    Admission Date: 5/4/2023     Last order of IP CONSULT TO WOUND CARE was found on 5/5/2023 from Hospital Encounter on 5/4/2023     HPI, PMH, SH: Reviewed    Past Surgical History:   Procedure Laterality Date    KNEE AMPUTATION BELOW Left 03/22/2023    Procedure: AMPUTATION, BELOW KNEE;  Surgeon: Nas Lux M.D.;  Location: SURGERY [facility];  Service: Orthopedics    AMPUTATION, BELOW THE KNEE Right      Social History     Tobacco Use    Smoking status: Every Day     Packs/day: 0.25     Types: Cigarettes    Smokeless tobacco: Never   Substance Use Topics    Alcohol use: Not Currently     Chief Complaint   Patient presents with    Leg Pain     Bilateral leg pain, bilateral BKA     Diagnosis: Wound infection [T14.8XXA, L08.9]    Unit where seen by Wound Team: S175/01     WOUND CONSULT/FOLLOW UP RELATED TO:  Rashi BKA, L Post thigh, L radha-area     WOUND HISTORY:  '53 y.o. female  with past medical history of HIV on Biktarvy (CD4 752 at 3/2023), schizophrenia and bipolar on Zyprexa and Depakote, bilateral BKA due to frost bite on 3/2023, atrial fibrillation, DVT on apixaban, hypertension, and seizures admitted for sepsis secondary to infected stump.      #Bilateral BKA cellulitis   - History of BKA surgery 3/2023  - Skin breakdown over bilateral BKA consistent with cellulitis  - Patient was recently admitted to [place] and discharged with antibiotics for treatment of cellulitis  - MRI bilateral lower extremity for further evaluation  -Continue vancomycin and Unasyn  - ID consultation  - Limb preservation consultation        #History of DVT   -Hold Eliquis, pending LPS consultation and MRI     #History of HIV   - CD4 752 at 3/2023  - Continue home medication of Biktarvy -25 mg daily      #Bipolar   #Schizophrenia  #Seizures   - Continue home medication of olanzapine 5 mg BID and Depakote 500 mg daily.   - Unclear  seizure history. Seizure precautions in place      #Atrial fibrillation  - Continue home medication of metoprolol 25 mg BID'     WOUND ASSESSMENT/LDA  Wound 03/06/23 Other (comment) Leg Anterior;Posterior Right R BKA wound r/t Frostbite (Active)   Wound Image    05/05/23 1100   Site Assessment Closed Wound Edges;Eschar;Fragile;Painful;Red;Boggy;Edema 05/05/23 1100   Periwound Assessment Warm;Blanchable erythema 05/05/23 1100   Margins Attached edges 05/05/23 1100   Closure Open to air 05/05/23 1100   Drainage Amount None 05/05/23 1100   Drainage Description Other (Comment) 05/04/23 2315   Treatments Cleansed;Offloading;Site care 05/05/23 1100   Wound Cleansing Approved Wound Cleanser 05/05/23 1100   Periwound Protectant Barrier Paste 04/10/23 2000   Dressing Cleansing/Solutions 3% Betadine 05/05/23 1100   Dressing Options Open to Air 05/05/23 1100   Dressing Changed Changed 04/10/23 2000   Dressing Status Open to Air 05/05/23 1100   Dressing Change/Treatment Frequency Every Shift, and As Needed 05/05/23 1100   NEXT Dressing Change/Treatment Date 04/11/23 04/10/23 2000   NEXT Weekly Photo (Inpatient Only) 05/12/23 05/05/23 1100   Non-staged Wound Description Full thickness 04/08/23 1330   Wound Length (cm) 4 cm 05/05/23 1100   Wound Width (cm) 15 cm 05/05/23 1100   Wound Surface Area (cm^2) 60 cm^2 05/05/23 1100   Wound Bed Granulation (%) 10 % 03/15/23 1200   Wound Bed Slough (%) 10 % 03/15/23 1200   Wound Bed Eschar (%) 100 % 05/05/23 1100   Shape Circular 04/08/23 1330   Wound Odor Mild 05/05/23 1100   Exposed Structures IVETTE 04/08/23 1330   WOUND NURSE ONLY - Time Spent with Patient (mins) 30 05/05/23 1100   Number of days: 60       Wound 05/05/23 Incision Knee Left BKA (Active)   Wound Image    05/05/23 1100   Site Assessment Dry;Brown;Eschar;Painful;Boggy 05/05/23 1100   Periwound Assessment Dry;Warm;Blanchable erythema;Fragile 05/05/23 1100   Margins Defined edges 05/05/23 1100   Closure Open to air 05/05/23  1100   Drainage Amount None 05/05/23 1100   Treatments Cleansed;Offloading;Site care 05/05/23 1100   Wound Cleansing Approved Wound Cleanser 05/05/23 1100   Dressing Cleansing/Solutions 3% Betadine 05/05/23 1100   Dressing Options Open to Air 05/05/23 1100   Dressing Change/Treatment Frequency Every Shift, and As Needed 05/05/23 1100   NEXT Weekly Photo (Inpatient Only) 05/12/23 05/05/23 1100   Wound Length (cm) 4 cm 05/05/23 1100   Wound Width (cm) 12 cm 05/05/23 1100   Wound Surface Area (cm^2) 48 cm^2 05/05/23 1100   Wound Bed Eschar (%) 100 % 05/05/23 1100   Wound Odor None 05/05/23 1100   Exposed Structures None 05/05/23 1100   WOUND NURSE ONLY - Time Spent with Patient (mins) 30 05/05/23 1100   Number of days: 0       Wound 05/05/23 Abrasion Thigh Posterior Left POA (Active)   Wound Image   05/05/23 1100   Site Assessment Pink;Yellow;Brown;Drainage;Eschar;Slough 05/05/23 1100   Periwound Assessment Scar tissue;Intact 05/05/23 1100   Margins Attached edges;Defined edges 05/05/23 1100   Closure Secondary intention 05/05/23 1100   Drainage Amount Scant 05/05/23 1100   Drainage Description Serosanguineous 05/05/23 1100   Treatments Cleansed;Offloading;Site care 05/05/23 1100   Wound Cleansing Approved Wound Cleanser 05/05/23 1100   Dressing Cleansing/Solutions 3% Betadine 05/05/23 1100   Dressing Options Silicone Adhesive Foam 05/05/23 1100   Dressing Changed New 05/05/23 1100   Dressing Status Clean;Dry;Intact 05/05/23 1100   Dressing Change/Treatment Frequency Daily, and As Needed 05/05/23 1100   NEXT Dressing Change/Treatment Date 05/06/23 05/05/23 1100   NEXT Weekly Photo (Inpatient Only) 05/12/23 05/05/23 1100   Wound Length (cm) 1.5 cm 05/05/23 1100   Wound Width (cm) 2 cm 05/05/23 1100   Wound Depth (cm) 0.1 cm 05/05/23 1100   Wound Surface Area (cm^2) 3 cm^2 05/05/23 1100   Wound Volume (cm^3) 0.3 cm^3 05/05/23 1100   Wound Bed Slough (%) 20 % 05/05/23 1100   Shape oval 05/05/23 1100   Wound Odor None  05/05/23 1100   Exposed Structures None 05/05/23 1100   WOUND NURSE ONLY - Time Spent with Patient (mins) 30 05/05/23 1100   Number of days: 0       GISELLA:   No results found.    Lab Values:    Lab Results   Component Value Date/Time    WBC 7.4 05/05/2023 03:20 AM    RBC 3.43 (L) 05/05/2023 03:20 AM    HEMOGLOBIN 8.5 (L) 05/05/2023 03:20 AM    HEMATOCRIT 28.8 (L) 05/05/2023 03:20 AM    CREACTPROT 2.40 (H) 05/04/2023 04:10 PM    SEDRATEWES 75 (H) 05/04/2023 04:10 PM    HBA1C 5.1 02/24/2023 05:54 AM        Culture Results show:  No results found for this or any previous visit (from the past 720 hour(s)).    Pain Level/Medicated:  None, Tolerated without pain medication       INTERVENTIONS BY WOUND TEAM:  Chart and images reviewed. Discussed with bedside RN. All areas of concern (based on picture review, LDA review and discussion with bedside RN) have been thoroughly assessed. Documentation of areas based on significant findings. This RN in to assess patient. Performed standard wound care which includes appropriate positioning, dressing removal and non-selective debridement. Pictures and measurements obtained weekly if/when required.    Bilateral BKA:   Preparation for Dressing removal Open to air  Non-selectively Debrided with:  Wound cleanser   Sharp debridement: no  Nory wound: Cleansed with Wound cleanser  Primary Dressing:  betadine  Secondary (Outer) Dressing: Open to Air    Left posterior thigh (abrasion):   Preparation for Dressing removal Open to air  Non-selectively Debrided with:  Wound cleanser   Sharp debridement: no  Nory wound: no sting barrier  Primary Dressing: silicone dressing    No open wound or pressure injury to left groin, cleansed area and applied interdry cloth    Advanced Wound Care Discharge Planning  Number of Clinicians necessary to complete wound care: 1  Is patient requiring IV pain medications for dressing changes: no  Length of time for dressing change 30 min. (This does not include chart  review, pre-medication time, set up, clean up or time spent charting.)    Interdisciplinary consultation: Patient, Bedside RN (Domo)    EVALUATION / RATIONALE FOR TREATMENT:  Most Recent Date:  5/5/23: Patient well known to wound care team for treatment of frostbite to bilateral lower legs and feet which over time has resulted in bilateral BKAs. Patient admitted for cellulitis to bilateral BKAs. Intact brown eschar to both BKAs, no drainage at this time. Bilateral BKA tissue is red, warm, and edematous, diagnosed cellulitis by MD. Applied betadine to keep eschar dry and for antimicrobial component. Abrasion to Left posterior leg cleansed and covered with silicone dressing to pad and protect area. LPS consulted and will follow at this time. Wound care signing off.      Goals: Steady decrease in wound area and depth weekly.    WOUND TEAM PLAN OF CARE ([X] for frequency of wound follow up,):   Nursing to follow dressing orders written for wound care. Contact wound team if area fails to progress, deteriorates or with any questions/concerns if something comes up before next scheduled follow up (See below as to whether wound is following and frequency of wound follow up)  Dressing changes by wound team:                   Follow up 3 times weekly:                NPWT change 3 times weekly:     Follow up 1-2 times weekly:      Follow up Bi-Monthly:           Follow up Monthly (High Risk):                        Follow up as needed:  X   Other (explain):     NURSING PLAN OF CARE ORDERS (X):  Dressing changes: See Dressing Care orders: X  Skin care: See Skin Care orders: X  RN Prevention Protocol:   Rectal tube care: See Rectal Tube Care orders:   Other orders:    RSKIN:   CURRENTLY IN PLACE (X), APPLIED THIS VISIT (A), ORDERED (O):   Q shift Lonnie:  X  Q shift pressure point assessments:  X    Surface/Positioning X  Standard Mattress/Trauma Bed   X        Low Airloss          ICU Low Airloss   Bariatric SHASHA Espinosa  cushion        Waffle Overlay          Reposition q 2 hours      TAPs Turning system     Z James Pillow     Offloading/Redistribution   Sacral Offloading Dressing (Silicone dressing)    Heel Offloading Dressing (Silicone dressing)         Heel float boots (Prevalon boot)             Float Heels off Bed with Pillows           Respiratory   Silicone O2 tubing         Gray Foam Ear protectors     Cannula fixation Device (Tender )          High flow offloading Clip    Elastic head band offloading device      Anchorfast                                                         Trach with Optifoam split foam             Containment/Moisture Prevention     Rectal tube or BMS    Purwick/Condom Cath        Leggett Catheter    Barrier wipes           Barrier paste       Antifungal tx      Interdry   X     Mobilization IVETTE      Up to chair        Ambulate      PT/OT      Nutrition       Dietician        Diabetes Education      PO     TF     TPN     NPO   # days     Anticipated discharge plans: TBD  LTACH:        SNF/Rehab:                  Home Health Care:           Outpatient Wound Center:   X patient would benefit from outpatient wound care upon discharge         Self/Family Care:        Other:                  Vac Discharge Needs:   Vac Discharge plan is purely a recommendation from wound team and not a requirement for discharge unless otherwise stated by physician.  Not Applicable Pt not on a wound vac:   X    Regular Vac while inpatient, alternative dressing at DC:        Regular Vac in use and continued at DC:            Reg. Vac w/ Skin Sub/Biologic in use. Will need to be changed 2x wkly:      Veraflo Vac while inpatient, ok to transition to Regular Vac on Discharge (Bedside RN to Clamp small instillation tubing at time of DC):           Veraflo Vac while inpatient, would benefit from remaining on Veraflo Vac upon discharge:

## 2023-05-05 NOTE — THERAPY
"Physical Therapy Contact Note    Patient Name: Kellie Chatman  Age:  53 y.o., Sex:  female  Medical Record #: [id number]  Today's Date: 5/5/2023 05/05/23 0943   Interdisciplinary Plan of Care Collaboration   Collaboration Comments PT evaluation attempted.  Pt lethargic however woke up briefly, reported she normally scoots bed <> WC.  Attempted to educate on role of PT and plan to sit EOB, pt stated \"No\", trailed off and fell back asleep.  Unable to wake pt with further verbal stimulation.  Will re-attempt as able/appropriate.       "

## 2023-05-05 NOTE — ED NOTES
Med Rec complete per patient  No known allergies  Preferred Pharmacy: [facility]    Patient reports she took one of the antibiotics that she was discharged with on 4/11/23 for 1 day, unsure which one.

## 2023-05-06 ENCOUNTER — APPOINTMENT (OUTPATIENT)
Dept: RADIOLOGY | Facility: MEDICAL CENTER | Age: 53
DRG: 854 | End: 2023-05-06
Attending: STUDENT IN AN ORGANIZED HEALTH CARE EDUCATION/TRAINING PROGRAM
Payer: MEDICAID

## 2023-05-06 LAB
ALBUMIN SERPL BCP-MCNC: 2.5 G/DL (ref 3.2–4.9)
ALBUMIN/GLOB SERPL: 0.7 G/DL
ALP SERPL-CCNC: 68 U/L (ref 30–99)
ALT SERPL-CCNC: 10 U/L (ref 2–50)
ANION GAP SERPL CALC-SCNC: 10 MMOL/L (ref 7–16)
AST SERPL-CCNC: 13 U/L (ref 12–45)
BILIRUB SERPL-MCNC: 0.2 MG/DL (ref 0.1–1.5)
BUN SERPL-MCNC: 10 MG/DL (ref 8–22)
CALCIUM ALBUM COR SERPL-MCNC: 9.1 MG/DL (ref 8.5–10.5)
CALCIUM SERPL-MCNC: 7.9 MG/DL (ref 8.5–10.5)
CHLORIDE SERPL-SCNC: 112 MMOL/L (ref 96–112)
CO2 SERPL-SCNC: 21 MMOL/L (ref 20–33)
CREAT SERPL-MCNC: 0.8 MG/DL (ref 0.5–1.4)
GFR SERPLBLD CREATININE-BSD FMLA CKD-EPI: 87 ML/MIN/1.73 M 2
GLOBULIN SER CALC-MCNC: 3.5 G/DL (ref 1.9–3.5)
GLUCOSE SERPL-MCNC: 76 MG/DL (ref 65–99)
POTASSIUM SERPL-SCNC: 3.3 MMOL/L (ref 3.6–5.5)
PROT SERPL-MCNC: 6 G/DL (ref 6–8.2)
SODIUM SERPL-SCNC: 143 MMOL/L (ref 135–145)

## 2023-05-06 PROCEDURE — 700105 HCHG RX REV CODE 258

## 2023-05-06 PROCEDURE — 700111 HCHG RX REV CODE 636 W/ 250 OVERRIDE (IP): Performed by: INTERNAL MEDICINE

## 2023-05-06 PROCEDURE — A9579 GAD-BASE MR CONTRAST NOS,1ML: HCPCS | Performed by: STUDENT IN AN ORGANIZED HEALTH CARE EDUCATION/TRAINING PROGRAM

## 2023-05-06 PROCEDURE — 700105 HCHG RX REV CODE 258: Performed by: INTERNAL MEDICINE

## 2023-05-06 PROCEDURE — 700111 HCHG RX REV CODE 636 W/ 250 OVERRIDE (IP)

## 2023-05-06 PROCEDURE — 99232 SBSQ HOSP IP/OBS MODERATE 35: CPT | Mod: GC | Performed by: FAMILY MEDICINE

## 2023-05-06 PROCEDURE — 770020 HCHG ROOM/CARE - TELE (206)

## 2023-05-06 PROCEDURE — 700117 HCHG RX CONTRAST REV CODE 255: Performed by: STUDENT IN AN ORGANIZED HEALTH CARE EDUCATION/TRAINING PROGRAM

## 2023-05-06 PROCEDURE — A9270 NON-COVERED ITEM OR SERVICE: HCPCS

## 2023-05-06 PROCEDURE — 700102 HCHG RX REV CODE 250 W/ 637 OVERRIDE(OP): Performed by: STUDENT IN AN ORGANIZED HEALTH CARE EDUCATION/TRAINING PROGRAM

## 2023-05-06 PROCEDURE — 99253 IP/OBS CNSLTJ NEW/EST LOW 45: CPT | Performed by: INTERNAL MEDICINE

## 2023-05-06 PROCEDURE — A9270 NON-COVERED ITEM OR SERVICE: HCPCS | Performed by: BEHAVIOR ANALYST

## 2023-05-06 PROCEDURE — 700102 HCHG RX REV CODE 250 W/ 637 OVERRIDE(OP): Performed by: BEHAVIOR ANALYST

## 2023-05-06 PROCEDURE — 700102 HCHG RX REV CODE 250 W/ 637 OVERRIDE(OP)

## 2023-05-06 PROCEDURE — A9270 NON-COVERED ITEM OR SERVICE: HCPCS | Performed by: STUDENT IN AN ORGANIZED HEALTH CARE EDUCATION/TRAINING PROGRAM

## 2023-05-06 PROCEDURE — 73720 MRI LWR EXTREMITY W/O&W/DYE: CPT | Mod: RT

## 2023-05-06 PROCEDURE — 36415 COLL VENOUS BLD VENIPUNCTURE: CPT

## 2023-05-06 PROCEDURE — 73720 MRI LWR EXTREMITY W/O&W/DYE: CPT | Mod: LT

## 2023-05-06 PROCEDURE — 80053 COMPREHEN METABOLIC PANEL: CPT

## 2023-05-06 RX ORDER — POTASSIUM CHLORIDE 20 MEQ/1
40 TABLET, EXTENDED RELEASE ORAL DAILY
Status: DISCONTINUED | OUTPATIENT
Start: 2023-05-06 | End: 2023-05-25 | Stop reason: HOSPADM

## 2023-05-06 RX ADMIN — DIVALPROEX SODIUM 500 MG: 500 TABLET, EXTENDED RELEASE ORAL at 08:32

## 2023-05-06 RX ADMIN — AMPICILLIN SODIUM AND SULBACTAM SODIUM 3 G: 2; 1 INJECTION, POWDER, FOR SOLUTION INTRAMUSCULAR; INTRAVENOUS at 06:43

## 2023-05-06 RX ADMIN — AMPICILLIN SODIUM AND SULBACTAM SODIUM 3 G: 2; 1 INJECTION, POWDER, FOR SOLUTION INTRAMUSCULAR; INTRAVENOUS at 23:41

## 2023-05-06 RX ADMIN — DIVALPROEX SODIUM 500 MG: 500 TABLET, EXTENDED RELEASE ORAL at 17:30

## 2023-05-06 RX ADMIN — AMPICILLIN SODIUM AND SULBACTAM SODIUM 3 G: 2; 1 INJECTION, POWDER, FOR SOLUTION INTRAMUSCULAR; INTRAVENOUS at 11:38

## 2023-05-06 RX ADMIN — AMPICILLIN SODIUM AND SULBACTAM SODIUM 3 G: 2; 1 INJECTION, POWDER, FOR SOLUTION INTRAMUSCULAR; INTRAVENOUS at 17:31

## 2023-05-06 RX ADMIN — ACETAMINOPHEN 1000 MG: 325 TABLET, FILM COATED ORAL at 17:29

## 2023-05-06 RX ADMIN — BICTEGRAVIR SODIUM, EMTRICITABINE, AND TENOFOVIR ALAFENAMIDE FUMARATE 1 TABLET: 50; 200; 25 TABLET ORAL at 21:36

## 2023-05-06 RX ADMIN — VANCOMYCIN HYDROCHLORIDE 750 MG: 5 INJECTION, POWDER, LYOPHILIZED, FOR SOLUTION INTRAVENOUS at 11:35

## 2023-05-06 RX ADMIN — GADOTERIDOL 14 ML: 279.3 INJECTION, SOLUTION INTRAVENOUS at 10:27

## 2023-05-06 RX ADMIN — OLANZAPINE 5 MG: 5 TABLET, FILM COATED ORAL at 17:30

## 2023-05-06 RX ADMIN — HEPARIN SODIUM 5000 UNITS: 5000 INJECTION, SOLUTION INTRAVENOUS; SUBCUTANEOUS at 14:57

## 2023-05-06 RX ADMIN — POTASSIUM CHLORIDE 40 MEQ: 1500 TABLET, EXTENDED RELEASE ORAL at 08:33

## 2023-05-06 RX ADMIN — ACETAMINOPHEN 1000 MG: 325 TABLET, FILM COATED ORAL at 01:14

## 2023-05-06 RX ADMIN — IBUPROFEN 800 MG: 800 TABLET, FILM COATED ORAL at 11:32

## 2023-05-06 RX ADMIN — IBUPROFEN 800 MG: 800 TABLET, FILM COATED ORAL at 17:29

## 2023-05-06 RX ADMIN — VANCOMYCIN HYDROCHLORIDE 750 MG: 5 INJECTION, POWDER, LYOPHILIZED, FOR SOLUTION INTRAVENOUS at 21:05

## 2023-05-06 RX ADMIN — ACETAMINOPHEN 1000 MG: 325 TABLET, FILM COATED ORAL at 11:32

## 2023-05-06 RX ADMIN — AMPICILLIN SODIUM AND SULBACTAM SODIUM 3 G: 2; 1 INJECTION, POWDER, FOR SOLUTION INTRAMUSCULAR; INTRAVENOUS at 01:15

## 2023-05-06 RX ADMIN — METOPROLOL TARTRATE 25 MG: 25 TABLET, FILM COATED ORAL at 17:30

## 2023-05-06 RX ADMIN — HEPARIN SODIUM 5000 UNITS: 5000 INJECTION, SOLUTION INTRAVENOUS; SUBCUTANEOUS at 21:04

## 2023-05-06 ASSESSMENT — FIBROSIS 4 INDEX: FIB4 SCORE: 0.62

## 2023-05-06 NOTE — PROGRESS NOTES
[facility] MEDICINE PROGRESS NOTE     Attending: Dr. Myers     [place] Resident: Dillon Lynn MD    PATIENT: Kellie Chatman; [id number]; 1970 Hospital Day: 2    ID: Kellie Chatman is a 53 y.o. 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     SUBJECTIVE: Patient resting and appeared drowsy this morning, but responsive. Pt states she no longer is staying at shelter, she is on the streets now.     OBJECTIVE:     Vitals:    05/05/23 0733 05/05/23 1621 05/06/23 0133 05/06/23 0537   BP: 114/43 115/77 130/73 126/72   Pulse: 91 98 92 80   Resp: 16 16 20 20   Temp: 36.6 °C (97.9 °F) 37 °C (98.6 °F) 37.1 °C (98.7 °F) 37.5 °C (99.5 °F)   TempSrc: Temporal Temporal Temporal Temporal   SpO2: 91% 94% 93% 93%   Weight:       Height:         No intake or output data in the 24 hours ending 05/05/23 0629    PE:  General: No acute distress, resting comfortably in bed.  HEENT: NC/AT. EOMI.   Cardiovascular: RRR  Respiratory: CTAB, non-labored   EXT:  Bilateral BKA, open wound over posterior left stump.   Neuro: no focal deficits     LABS:  Recent Labs     05/04/23  1610 05/05/23  0320   WBC 9.1 7.4   RBC 4.01* 3.43*   HEMOGLOBIN 10.0* 8.5*   HEMATOCRIT 33.2* 28.8*   MCV 82.8 84.0   MCH 24.9* 24.8*   RDW 50.0 50.8*   PLATELETCT 521* 363   MPV 9.5 9.9   NEUTSPOLYS 71.70 67.20   LYMPHOCYTES 17.50* 21.00*   MONOCYTES 8.50 9.90   EOSINOPHILS 0.30 0.70   BASOPHILS 1.00 0.70   RBCMORPHOLO  --  Present       Recent Labs     05/04/23  1610 05/05/23  0320 05/06/23  0205   SODIUM 140 139 143   POTASSIUM 3.6 3.6 3.3*   CHLORIDE 103 108 112   CO2 24 21 21   BUN 19 15 10   CREATININE 0.87 0.85 0.80   CALCIUM 9.4 7.8* 7.9*   MAGNESIUM 1.7  --   --    PHOSPHORUS 3.6  --   --    ALBUMIN 3.8   --  2.5*       Estimated GFR/CRCL = Estimated Creatinine Clearance: 85.9 mL/min (by C-G formula based on SCr of 0.8 mg/dL).  Recent Labs     05/04/23  1610 05/05/23  0320 05/06/23  0205   GLUCOSE 100* 99 76       Recent Labs     05/04/23  1610 05/06/23  0205   ASTSGOT 17 13   ALTSGPT 10 10   TBILIRUBIN 0.4 0.2   ALKPHOSPHAT 86 68   GLOBULIN 4.7* 3.5               No results for input(s): INR, APTT, FIBRINOGEN in the last 72 hours.    Invalid input(s): DIMER      MEDS:  Current Facility-Administered Medications   Medication Last Admin    potassium chloride SA (Kdur) tablet 40 mEq      ampicillin/sulbactam (UNASYN) 3 g in  mL IVPB Stopped at 05/06/23 0145    MD Alert...Vancomycin per Pharmacy      bictegravir-emtricitab-TAF (Biktarvy) -25 mg tablet 1 Tablet 1 Tablet at 05/04/23 2139    divalproex ER (DEPAKOTE ER) tablet 500 mg 500 mg at 05/05/23 0535    OLANZapine (ZYPREXA) tablet 5 mg 5 mg at 05/04/23 2140    Pharmacy Consult Request ...Pain Management Review 1 Each      acetaminophen (TYLENOL) tablet 1,000 mg 1,000 mg at 05/06/23 0114    Followed by    [START ON 5/10/2023] acetaminophen (TYLENOL) tablet 1,000 mg      ibuprofen (MOTRIN) tablet 800 mg 800 mg at 05/05/23 0535    Followed by    [START ON 5/10/2023] ibuprofen (MOTRIN) tablet 800 mg      oxyCODONE immediate-release (ROXICODONE) tablet 5 mg      Or    oxyCODONE immediate release (ROXICODONE) tablet 10 mg 10 mg at 05/05/23 2149    Or    HYDROmorphone (Dilaudid) injection 0.5 mg      metoprolol tartrate (LOPRESSOR) tablet 25 mg 25 mg at 05/05/23 0535    heparin injection 5,000 Units      vancomycin (VANCOCIN) 750 mg in  mL IVPB Stopped at 05/05/23 2350    Pharmacy Consult Request         ASSESSMENT/PLAN: 53 y.o. 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     VSS today, afebrile over 24h; refusing some med doses intermittently; Bld clx neg 24h; LPS recc Ortho for debridement; ID consulted and agrees with current abx regimen and surgery     #Bilateral BKA cellulitis   - History of BKA surgery 3/2023  - Skin breakdown over bilateral BKA consistent with cellulitis  - Patient was recently admitted to [place] and discharged with antibiotics for treatment of cellulitis  - MRI bilateral lower extremity for further evaluation  -Continue vancomycin and Unasyn  - ID consultation  - Limb preservation consultation, appreciate reccs    #hypoK  3.3 this AM  -replete w/ oral KcL 40  -ctm     #History of DVT   -Hold Eliquis, pending LPS consultation and MRI    #History of HIV   - CD4 752 at 3/2023  - Continue home medication of Biktarvy -25 mg daily      #Bipolar   #Schizophrenia  #Seizures   - Continue home medication of olanzapine 5 mg BID and Depakote 500 mg daily.   - Unclear seizure history. Seizure precautions in place      #Atrial fibrillation  - Continue home medication of metoprolol 25 mg BID      Core Measures:  Fluids: None      Lines: Peripheral IV   Abx: IV unasyn and vancomycin   Diet: Regular  PPX:  Heparin   CODE STATUS: Full code    DISPO:  Inpatient for IV antibiotics

## 2023-05-06 NOTE — CARE PLAN
Problem: Knowledge Deficit - Standard  Goal: Patient and family/care givers will demonstrate understanding of plan of care, disease process/condition, diagnostic tests and medications  Description: Target End Date:  1-3 days or as soon as patient condition allows    Document in Patient Education    1.  Patient and family/caregiver oriented to unit, equipment, visitation policy and means for communicating concern  2.  Complete/review Learning Assessment  3.  Assess knowledge level of disease process/condition, treatment plan, diagnostic tests and medications  4.  Explain disease process/condition, treatment plan, diagnostic tests and medications  Outcome: Not Met  Note: Patient will be aware of plan of care as evidence by medical team explaining to patient in lament terms, reorienting patient, providing thorough education before every interaction with patient, including medications, procedures, pain.     The patient is Stable - Low risk of patient condition declining or worsening    Shift Goals  Clinical Goals: MRI, ABX  Patient Goals: rest  Family Goals: IVETTE    Progress made toward(s) clinical / shift goals:     Patient is not progressing towards the following goals:      Problem: Knowledge Deficit - Standard  Goal: Patient and family/care givers will demonstrate understanding of plan of care, disease process/condition, diagnostic tests and medications  Description: Target End Date:  1-3 days or as soon as patient condition allows    Document in Patient Education    1.  Patient and family/caregiver oriented to unit, equipment, visitation policy and means for communicating concern  2.  Complete/review Learning Assessment  3.  Assess knowledge level of disease process/condition, treatment plan, diagnostic tests and medications  4.  Explain disease process/condition, treatment plan, diagnostic tests and medications  Outcome: Not Met  Note: Patient will be aware of plan of care as evidence by medical team explaining  to patient in lament terms, reorienting patient, providing thorough education before every interaction with patient, including medications, procedures, pain.

## 2023-05-06 NOTE — CONSULTS
"DATE OF SERVICE:  05/06/2023     INFECTIOUS DISEASE CONSULTATION     REQUESTING PHYSICIAN:  Cristian Myers MD.     IDENTIFICATION:  A 53-year-old patient seen for antibiotic advice for lower   extremity infections.     HISTORY OF PRESENT ILLNESS:  I am not able to obtain any history from her.    She says \"she does not know\" to  any questions.  She is very polite however,   but not able to provide any useful information.  The patient has a history of HIV and is apparently   prescribed Biktarvy.  She is not able to tell me, if she takes it, where she   gets up from or clinic sees her, etc., and I cannot find any further details   on chart review.  She is apparently homeless and has had a horrible   complications of frostbite.  She has had multiple surgeries, the last time she   had a left transmetatarsal on 02/07 followed by a left BKA on 03/22/2023.    She has also had a right BKA in December of last year.  She now presents with   increasing pain and swelling of her lower extremities.  She presented to the   Emergency Room and has been seen by limb preservation service.  The patient   has been put on empiric antibiotics with Unasyn and vancomycin.  She does have   a history of positive MRSA nasal screen.  We do not have any prior culture   results on her.     PAST MEDICAL HISTORY:  Unable to be obtained from her .  She does have   a history of apparently alcohol and amphetamine abuse as well as   schizophrenia, bipolar disorder and HIV infection.  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     PAST SURGICAL HISTORY:  As noted above with multiple amputations bilaterally.     SOCIAL HISTORY:  She is homeless and smokes cigarettes.     ALLERGIES:  No known antibiotic allergies.   /84   Pulse 83   Temp 36.7 °C (98.1 °F) (Temporal)   Resp 20   Ht 1.778 m (5' 10\")   Wt 72.9 kg (160 lb 11.5 oz)   SpO2 94%    PHYSICAL EXAMINATION:  She is afebrile.  She is lying in bed.  " "She says \"I do   not know\" to every question, but is very polite.  She has no thrush.  Her   lower extremities show a BKA site.  There are ulcers on the bottom and they   are quite boggy and almost feel like there is purulence inside them. There is   no active drainage.     LABORATORY DATA:  A recent CD4 count was done and remarkably it was 674.  No   viral load was obtained and she has a CD4 to CD8 ratio of 3.6.  Blood cultures   are negative.     ASSESSMENT AND PLAN:  This is a homeless mentally ill patient, who has a   history of HIV, by report and now presents with infected-appearing BKA stumps   with ulcers and bogginess.     RECOMMENDATION: Continue empiric vancomycin and Unasyn and see if we can   get cultures during surgery to help guide therapy, linezolid might be a good   option for her, but with her mental health medicines these are   contraindicated.  It is also interesting that CD4 to CD8 ratio was 3.6, which   is almost unheard of in an HIV infected patient.  So we should actually   confirm that she has HIV and I will order that test.     Thank you very much and we will continue to follow with you.              ______________________________  MD ELIJAH GILL/JAZMÍN/YOBANI    DD:  05/06/2023 11:14  DT:  05/06/2023 13:09    Job#:  [id number]  "

## 2023-05-06 NOTE — CARE PLAN
The patient is Stable - Low risk of patient condition declining or worsening    Shift Goals  Clinical Goals: Pain management, Fall prevention.  Patient Goals: Rest  and alleviate the pain.  Family Goals: IVETTE: no family members present.    Progress made toward(s) clinical / shift goals:      Problem: Skin Integrity  Goal: Skin integrity is maintained or improved  Outcome: Progressing  Pt turns self from side to side. Wound care onboard. Waffle mattress in place. Pt educated about the importance of frequent repositioning to prevent skin breakdown. Encouraged turning in bed and notifying staff for help. Pt verbalized understanding. Appropriate dressings in place. Extra pillows in place for comfort, between knees. Barrier cream applied as appropriate. Pt cleaned and linens changed frequently as appropriate.          Problem: Fall Risk  Goal: Patient will remain free from falls  Outcome: Progressing   Fall risk assessed, fall precautions in place, bed alarm on, pt verbalizes understanding of fall risk.       Patient is not progressing towards the following goals:    Problem: Pain - Standard  Goal: Alleviation of pain or a reduction in pain to the patient’s comfort goal  5/5/2023 1708 by Domo Emanuel, R.N.  Outcome: Not Met  5/5/2023 1516 by Domo Emanuel, R.N.  Outcome: Progressing   Pt refused scheduled pain medication as ordered per MD this afternoon. Pt verbalized understanding of pain increasing when doses are skipped.

## 2023-05-06 NOTE — CARE PLAN
The patient is Stable - Low risk of patient condition declining or worsening    Shift Goals  Clinical Goals: pain management, IV Abxs, safety  Patient Goals: rest  Family Goals: IVETTE    Progress made toward(s) clinical / shift goals:      Patient is not progressing towards the following goals:      Problem: Pain - Standard  Goal: Alleviation of pain or a reduction in pain to the patient’s comfort goal  Outcome: Progressing    PRN pain medications in use for pain control.       Problem: Fall Risk  Goal: Patient will remain free from falls  Outcome: Progressing   Patient remained free from falls. All fall precautions in place. Patient educated the need to call when needed assistance. Call light and personal belongings within reach.

## 2023-05-06 NOTE — PROGRESS NOTES
Assumed care of pt at 1900H. Report received from dayshift RN. Pt is A & O x 1-2. Tried to converse with patient but pt just looked at the writer and closed her eyes.     Bed in lowest locked position, call light within reach, bed alarm on, hourly rounding in place. Labs reviewed, orders reviewed, communication board updated.     Pt declines any further needs at this time.      2210H patient transferred to room 150 via bed, all belongings with the patient.

## 2023-05-06 NOTE — PROGRESS NOTES
Patient transferred to [facility]. Received report and assumed care of patient. Patient alert and oriented x 4, on RA. Assessment completed. Patient updated regarding plan of care, all questions answered. Bed in lock and lowest position with bed alarm on. Call light and belongings are within reach. Educated on room and call light, patient nodded head in response. Patient expressed no further needs at this time.

## 2023-05-07 LAB
BACTERIA UR CULT: NORMAL
BASOPHILS # BLD AUTO: 0.4 % (ref 0–1.8)
BASOPHILS # BLD: 0.02 K/UL (ref 0–0.12)
C DIFF DNA SPEC QL NAA+PROBE: NEGATIVE
C DIFF TOX GENS STL QL NAA+PROBE: NEGATIVE
EOSINOPHIL # BLD AUTO: 0.06 K/UL (ref 0–0.51)
EOSINOPHIL NFR BLD: 1.1 % (ref 0–6.9)
ERYTHROCYTE [DISTWIDTH] IN BLOOD BY AUTOMATED COUNT: 48.5 FL (ref 35.9–50)
HCT VFR BLD AUTO: 25.7 % (ref 37–47)
HGB BLD-MCNC: 8 G/DL (ref 12–16)
HIV 1+2 AB+HIV1 P24 AG SERPL QL IA: ABNORMAL
IMM GRANULOCYTES # BLD AUTO: 0.02 K/UL (ref 0–0.11)
IMM GRANULOCYTES NFR BLD AUTO: 0.4 % (ref 0–0.9)
LYMPHOCYTES # BLD AUTO: 1.56 K/UL (ref 1–4.8)
LYMPHOCYTES NFR BLD: 28.9 % (ref 22–41)
MCH RBC QN AUTO: 25.6 PG (ref 27–33)
MCHC RBC AUTO-ENTMCNC: 31.1 G/DL (ref 33.6–35)
MCV RBC AUTO: 82.1 FL (ref 81.4–97.8)
MONOCYTES # BLD AUTO: 0.61 K/UL (ref 0–0.85)
MONOCYTES NFR BLD AUTO: 11.3 % (ref 0–13.4)
NEUTROPHILS # BLD AUTO: 3.13 K/UL (ref 2–7.15)
NEUTROPHILS NFR BLD: 57.9 % (ref 44–72)
NRBC # BLD AUTO: 0 K/UL
NRBC BLD-RTO: 0 /100 WBC
PLATELET # BLD AUTO: 395 K/UL (ref 164–446)
PMV BLD AUTO: 10 FL (ref 9–12.9)
RBC # BLD AUTO: 3.13 M/UL (ref 4.2–5.4)
SIGNIFICANT IND 70042: NORMAL
SITE SITE: NORMAL
SOURCE SOURCE: NORMAL
VANCOMYCIN PEAK SERPL-MCNC: 19.8 UG/ML (ref 20–40)
VANCOMYCIN TROUGH SERPL-MCNC: 17.7 UG/ML (ref 10–20)
WBC # BLD AUTO: 5.4 K/UL (ref 4.8–10.8)

## 2023-05-07 PROCEDURE — 700102 HCHG RX REV CODE 250 W/ 637 OVERRIDE(OP): Performed by: STUDENT IN AN ORGANIZED HEALTH CARE EDUCATION/TRAINING PROGRAM

## 2023-05-07 PROCEDURE — 87493 C DIFF AMPLIFIED PROBE: CPT

## 2023-05-07 PROCEDURE — G0475 HIV COMBINATION ASSAY: HCPCS

## 2023-05-07 PROCEDURE — 770020 HCHG ROOM/CARE - TELE (206)

## 2023-05-07 PROCEDURE — 700102 HCHG RX REV CODE 250 W/ 637 OVERRIDE(OP)

## 2023-05-07 PROCEDURE — 86702 HIV-2 ANTIBODY: CPT

## 2023-05-07 PROCEDURE — A9270 NON-COVERED ITEM OR SERVICE: HCPCS | Performed by: STUDENT IN AN ORGANIZED HEALTH CARE EDUCATION/TRAINING PROGRAM

## 2023-05-07 PROCEDURE — 85025 COMPLETE CBC W/AUTO DIFF WBC: CPT

## 2023-05-07 PROCEDURE — A9270 NON-COVERED ITEM OR SERVICE: HCPCS | Performed by: BEHAVIOR ANALYST

## 2023-05-07 PROCEDURE — 700111 HCHG RX REV CODE 636 W/ 250 OVERRIDE (IP)

## 2023-05-07 PROCEDURE — 80202 ASSAY OF VANCOMYCIN: CPT

## 2023-05-07 PROCEDURE — A9270 NON-COVERED ITEM OR SERVICE: HCPCS

## 2023-05-07 PROCEDURE — 99232 SBSQ HOSP IP/OBS MODERATE 35: CPT | Mod: GC | Performed by: FAMILY MEDICINE

## 2023-05-07 PROCEDURE — 36415 COLL VENOUS BLD VENIPUNCTURE: CPT

## 2023-05-07 PROCEDURE — 700105 HCHG RX REV CODE 258: Performed by: INTERNAL MEDICINE

## 2023-05-07 PROCEDURE — 86701 HIV-1ANTIBODY: CPT

## 2023-05-07 PROCEDURE — 700111 HCHG RX REV CODE 636 W/ 250 OVERRIDE (IP): Performed by: INTERNAL MEDICINE

## 2023-05-07 PROCEDURE — 700102 HCHG RX REV CODE 250 W/ 637 OVERRIDE(OP): Performed by: BEHAVIOR ANALYST

## 2023-05-07 RX ORDER — LOPERAMIDE HYDROCHLORIDE 2 MG/1
2 CAPSULE ORAL 4 TIMES DAILY PRN
Status: DISCONTINUED | OUTPATIENT
Start: 2023-05-07 | End: 2023-05-25 | Stop reason: HOSPADM

## 2023-05-07 RX ADMIN — AMPICILLIN SODIUM AND SULBACTAM SODIUM 3 G: 2; 1 INJECTION, POWDER, FOR SOLUTION INTRAMUSCULAR; INTRAVENOUS at 17:52

## 2023-05-07 RX ADMIN — ACETAMINOPHEN 1000 MG: 325 TABLET, FILM COATED ORAL at 22:11

## 2023-05-07 RX ADMIN — OLANZAPINE 5 MG: 5 TABLET, FILM COATED ORAL at 17:49

## 2023-05-07 RX ADMIN — VANCOMYCIN HYDROCHLORIDE 750 MG: 5 INJECTION, POWDER, LYOPHILIZED, FOR SOLUTION INTRAVENOUS at 09:24

## 2023-05-07 RX ADMIN — HEPARIN SODIUM 5000 UNITS: 5000 INJECTION, SOLUTION INTRAVENOUS; SUBCUTANEOUS at 14:49

## 2023-05-07 RX ADMIN — BICTEGRAVIR SODIUM, EMTRICITABINE, AND TENOFOVIR ALAFENAMIDE FUMARATE 1 TABLET: 50; 200; 25 TABLET ORAL at 17:49

## 2023-05-07 RX ADMIN — ACETAMINOPHEN 1000 MG: 325 TABLET, FILM COATED ORAL at 17:49

## 2023-05-07 RX ADMIN — METOPROLOL TARTRATE 25 MG: 25 TABLET, FILM COATED ORAL at 09:19

## 2023-05-07 RX ADMIN — HEPARIN SODIUM 5000 UNITS: 5000 INJECTION, SOLUTION INTRAVENOUS; SUBCUTANEOUS at 05:32

## 2023-05-07 RX ADMIN — DIVALPROEX SODIUM 500 MG: 500 TABLET, EXTENDED RELEASE ORAL at 17:49

## 2023-05-07 RX ADMIN — AMPICILLIN SODIUM AND SULBACTAM SODIUM 3 G: 2; 1 INJECTION, POWDER, FOR SOLUTION INTRAMUSCULAR; INTRAVENOUS at 14:52

## 2023-05-07 RX ADMIN — METOPROLOL TARTRATE 25 MG: 25 TABLET, FILM COATED ORAL at 17:49

## 2023-05-07 RX ADMIN — DIVALPROEX SODIUM 500 MG: 500 TABLET, EXTENDED RELEASE ORAL at 09:19

## 2023-05-07 RX ADMIN — IBUPROFEN 800 MG: 800 TABLET, FILM COATED ORAL at 09:18

## 2023-05-07 RX ADMIN — ACETAMINOPHEN 500 MG: 325 TABLET, FILM COATED ORAL at 09:19

## 2023-05-07 RX ADMIN — AMPICILLIN SODIUM AND SULBACTAM SODIUM 3 G: 2; 1 INJECTION, POWDER, FOR SOLUTION INTRAMUSCULAR; INTRAVENOUS at 05:33

## 2023-05-07 RX ADMIN — VANCOMYCIN HYDROCHLORIDE 750 MG: 5 INJECTION, POWDER, LYOPHILIZED, FOR SOLUTION INTRAVENOUS at 22:14

## 2023-05-07 RX ADMIN — POTASSIUM CHLORIDE 40 MEQ: 1500 TABLET, EXTENDED RELEASE ORAL at 09:18

## 2023-05-07 RX ADMIN — IBUPROFEN 800 MG: 800 TABLET, FILM COATED ORAL at 17:49

## 2023-05-07 RX ADMIN — IBUPROFEN 800 MG: 800 TABLET, FILM COATED ORAL at 14:50

## 2023-05-07 RX ADMIN — ACETAMINOPHEN 1000 MG: 325 TABLET, FILM COATED ORAL at 14:50

## 2023-05-07 RX ADMIN — HEPARIN SODIUM 5000 UNITS: 5000 INJECTION, SOLUTION INTRAVENOUS; SUBCUTANEOUS at 22:09

## 2023-05-07 RX ADMIN — LOPERAMIDE HYDROCHLORIDE 2 MG: 2 CAPSULE ORAL at 22:10

## 2023-05-07 ASSESSMENT — PAIN SCALES - PAIN ASSESSMENT IN ADVANCED DEMENTIA (PAINAD)
CONSOLABILITY: NO NEED TO CONSOLE
BODYLANGUAGE: RELAXED
FACIALEXPRESSION: SMILING OR INEXPRESSIVE
BREATHING: NORMAL

## 2023-05-07 ASSESSMENT — PATIENT HEALTH QUESTIONNAIRE - PHQ9
1. LITTLE INTEREST OR PLEASURE IN DOING THINGS: NOT AT ALL
SUM OF ALL RESPONSES TO PHQ9 QUESTIONS 1 AND 2: 0
2. FEELING DOWN, DEPRESSED, IRRITABLE, OR HOPELESS: NOT AT ALL
2. FEELING DOWN, DEPRESSED, IRRITABLE, OR HOPELESS: NOT AT ALL
1. LITTLE INTEREST OR PLEASURE IN DOING THINGS: NOT AT ALL
SUM OF ALL RESPONSES TO PHQ9 QUESTIONS 1 AND 2: 0

## 2023-05-07 ASSESSMENT — FIBROSIS 4 INDEX: FIB4 SCORE: 0.57

## 2023-05-07 ASSESSMENT — PAIN DESCRIPTION - PAIN TYPE: TYPE: ACUTE PAIN

## 2023-05-07 NOTE — PROGRESS NOTES
[facility] [place] [facility] [place]GRESS [place]     Attending: Dr. Myers     [place] Resident: Dillon Lynn MD    PATIENT: Kellie Chatman; [id number]; 1970 Hospital Day: 2    ID: Kellie Chatman is a 53 y.o. 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     SUBJECTIVE: Patient resting, no acute overnight events; pt has no complaints at this time    OBJECTIVE:     Vitals:    05/06/23 1651 05/06/23 1955 05/06/23 2331 05/07/23 0458   BP: 124/84 114/63 120/64 136/80   Pulse: 83 63 66 64   Resp: 20 20 19 19   Temp: 36.7 °C (98.1 °F) 36.1 °C (97 °F) 36.4 °C (97.5 °F) 36.3 °C (97.4 °F)   TempSrc: Temporal Temporal Temporal Temporal   SpO2: 94% 99% 97% 96%   Weight:       Height:         No intake or output data in the 24 hours ending 05/05/23 0629    PE:  General: No acute distress, resting comfortably in bed.  HEENT: NC/AT. EOMI.   Cardiovascular: RRR  Respiratory: CTAB, non-labored   EXT:  Bilateral BKA, open wound over posterior left stump.   Neuro: no focal deficits     LABS:  Recent Labs     05/04/23  1610 05/05/23  0320 05/07/23  0024   WBC 9.1 7.4 5.4   RBC 4.01* 3.43* 3.13*   HEMOGLOBIN 10.0* 8.5* 8.0*   HEMATOCRIT 33.2* 28.8* 25.7*   MCV 82.8 84.0 82.1   MCH 24.9* 24.8* 25.6*   RDW 50.0 50.8* 48.5   PLATELETCT 521* 363 395   MPV 9.5 9.9 10.0   NEUTSPOLYS 71.70 67.20 57.90   LYMPHOCYTES 17.50* 21.00* 28.90   MONOCYTES 8.50 9.90 11.30   EOSINOPHILS 0.30 0.70 1.10   BASOPHILS 1.00 0.70 0.40   RBCMORPHOLO  --  Present  --        Recent Labs     05/04/23  1610 05/05/23  0320 05/06/23  0205   SODIUM 140 139 143   POTASSIUM 3.6 3.6 3.3*   CHLORIDE 103 108 112   CO2 24 21 21   BUN 19 15 10   CREATININE 0.87 0.85 0.80   CALCIUM 9.4 7.8* 7.9*   MAGNESIUM 1.7  --   --    PHOSPHORUS  3.6  --   --    ALBUMIN 3.8  --  2.5*       Estimated GFR/CRCL = Estimated Creatinine Clearance: 85.9 mL/min (by C-G formula based on SCr of 0.8 mg/dL).  Recent Labs     05/04/23  1610 05/05/23  0320 05/06/23  0205   GLUCOSE 100* 99 76       Recent Labs     05/04/23  1610 05/06/23  0205   ASTSGOT 17 13   ALTSGPT 10 10   TBILIRUBIN 0.4 0.2   ALKPHOSPHAT 86 68   GLOBULIN 4.7* 3.5               No results for input(s): INR, APTT, FIBRINOGEN in the last 72 hours.    Invalid input(s): DIMER      MEDS:  Current Facility-Administered Medications   Medication Last Admin    potassium chloride SA (Kdur) tablet 40 mEq 40 mEq at 05/06/23 0833    ampicillin/sulbactam (UNASYN) 3 g in  mL IVPB Stopped at 05/07/23 0603    MD Alert...Vancomycin per Pharmacy      bictegravir-emtricitab-TAF (Biktarvy) -25 mg tablet 1 Tablet 1 Tablet at 05/06/23 2136    divalproex ER (DEPAKOTE ER) tablet 500 mg 500 mg at 05/06/23 1730    OLANZapine (ZYPREXA) tablet 5 mg 5 mg at 05/06/23 1730    Pharmacy Consult Request ...Pain Management Review 1 Each      acetaminophen (TYLENOL) tablet 1,000 mg 1,000 mg at 05/06/23 1729    Followed by    [START ON 5/10/2023] acetaminophen (TYLENOL) tablet 1,000 mg      ibuprofen (MOTRIN) tablet 800 mg 800 mg at 05/06/23 1729    Followed by    [START ON 5/10/2023] ibuprofen (MOTRIN) tablet 800 mg      oxyCODONE immediate-release (ROXICODONE) tablet 5 mg      Or    oxyCODONE immediate release (ROXICODONE) tablet 10 mg 10 mg at 05/05/23 2149    Or    HYDROmorphone (Dilaudid) injection 0.5 mg      metoprolol tartrate (LOPRESSOR) tablet 25 mg 25 mg at 05/06/23 1730    heparin injection 5,000 Units 5,000 Units at 05/07/23 0532    vancomycin (VANCOCIN) 750 mg in  mL IVPB Stopped at 05/06/23 5217    Pharmacy Consult Request         ASSESSMENT/PLAN: 53 y.o. female  with past medical history of HIV on Biktarvy (CD4 752 at 3/2023), schizophrenia and bipolar on Zyprexa and Depakote, bilateral BKA due to frost  bite on 3/2023, atrial fibrillation, DVT on apixaban, hypertension, and seizures admitted for sepsis secondary to infected stump.     OR Wednesday w/ Dr. Lux, NPO at midnight Tuesday; if septic picture arises, contact srx for urgent source control evaluation    VSS today, afebrile over 48h; Bld/urine clx neg 48h; MRI shows possible osteo b/l; LPS recc Ortho for debridement; ID consulted and agrees with current abx regimen and surgery; ID also investigating if pt has true HIV infection d/t remarkable cd4/cd8 ratio, HIV ag/ab assay reactive;    #Osteomyelitis b/l BKA stumps   #Bilateral BKA cellulitis   - History of BKA surgery 3/2023  - Skin breakdown over bilateral BKA consistent with cellulitis  - Patient was recently admitted to [facility] and discharged with antibiotics for treatment of cellulitis  - MRI bilateral lower extremity for further evaluation  -Continue vancomycin and Unasyn  - ID consultation  - Limb preservation consultation, appreciate reccs  -OR Wednesday w/ Dr. Lux, NPO at midnight Tuesday    #hypoK  3.3 this  on 5/6  -replete w/ oral KcL 40  -ctm     #History of DVT   -Hold Eliquis, pending LPS consultation and MRI    #History of HIV   ID also investigating if pt has true HIV infection d/t remarkable cd4/cd8 ratio, HIV ag/ab assay reactive on 5/7/23  - CD4 752 at 3/2023  - Continue home medication of Biktarvy -25 mg daily      #Bipolar   #Schizophrenia  #Seizures   - Continue home medication of olanzapine 5 mg BID and Depakote 500 mg daily.   - Unclear seizure history. Seizure precautions in place      #Atrial fibrillation  - Continue home medication of metoprolol 25 mg BID      Core Measures:  Fluids: None      Lines: Peripheral IV   Abx: IV unasyn and vancomycin   Diet: Regular  PPX:  Heparin   CODE STATUS: Full code      DISPO:  Inpatient for IV antibiotics;OR Wednesday w/ Dr. Lux, NPO at midnight Tuesday

## 2023-05-07 NOTE — CARE PLAN
Problem: Skin Integrity  Goal: Skin integrity is maintained or improved  Description: Target End Date:  Prior to discharge or change in level of care    Document interventions on Skin Risk/Lonnie flowsheet groups and corresponding LDA    1.  Assess and monitor skin integrity, appearance and/or temperature  2.  Assess risk factors for impaired skin integrity and/or pressures ulcers  3.  Implement precautions to protect skin integrity in collaboration with interdisciplinary team  4.  Implement pressure ulcer prevention protocol if at risk for skin breakdown  5.  Confirm wound care consult if at risk for skin breakdown  6.  Ensure patient use of pressure relieving devices  (Low air loss bed, waffle overlay, heel protectors, ROHO cushion, etc)  Outcome: Not Met  Note: Patient skin will remain free from open sores or wounds by end of shift as evidence by utilizing waffle bed, turning patient, making sure patient stays dry.      The patient is Stable - Low risk of patient condition declining or worsening    Shift Goals  Clinical Goals: ABX, output  Patient Goals: rest,  Family Goals: IVETTE    Progress made toward(s) clinical / shift goals:  ABX administration, comfort, lab results    Patient is not progressing towards the following goals:      Problem: Skin Integrity  Goal: Skin integrity is maintained or improved  Description: Target End Date:  Prior to discharge or change in level of care    Document interventions on Skin Risk/Lonnie flowsheet groups and corresponding LDA    1.  Assess and monitor skin integrity, appearance and/or temperature  2.  Assess risk factors for impaired skin integrity and/or pressures ulcers  3.  Implement precautions to protect skin integrity in collaboration with interdisciplinary team  4.  Implement pressure ulcer prevention protocol if at risk for skin breakdown  5.  Confirm wound care consult if at risk for skin breakdown  6.  Ensure patient use of pressure relieving devices  (Low air  loss bed, waffle overlay, heel protectors, ROHO cushion, etc)  Outcome: Not Met  Note: Patient skin will remain free from open sores or wounds by end of shift as evidence by utilizing waffle bed, turning patient, making sure patient stays dry.

## 2023-05-07 NOTE — PROGRESS NOTES
LIMB PRESERVATION SERVICE       53 y.o.  with a past medical history that includes frostbite, HIV, EtOH abuse, methamphetamine abuse, schizophrenia and bipolar disorder.  Admitted 5/4/2023 for Wound infection [T14.8XXA, L08.9].     LPS has been consulted for bilateral BKA's with eschar.      Right BKA 12/20/2022 and left BKA 3/22/2023.  Both completed by Dr. Lux.      Results  MRI right tib-fib:  1.  No roc osteomyelitis     2.  Edema within the distal tibial stump evident on T2-weighted imaging and possibly minimal change on T1-weighted imaging. This could indicate very early osteomyelitis although at this point is not specific.     3.  Negative for abscess    MRI left tib-fib  1.  Early osteomyelitis of the distal aspect of the left tibial stump     2.  Negative for abscess     3.  Left knee joint effusion        Updated Dr. Lux on MRI results.      PLAN  NPO  Tuesday at midnight for surgery on Wednesday 5/10/2023 with Dr. Lux for bilateral BKA I&D with possible revision.  Continue with current wound care        D/W: Dr. Lux, Dr. Lynn

## 2023-05-07 NOTE — PROGRESS NOTES
Pharmacy Vancomycin Kinetics Note for 5/7/2023     53 y.o. female on Vancomycin day # 3     Vancomycin Indication (AUC Dosing): Skin/skin structure infection    Provider specified end date: 05/07/23    Active Antibiotics (From admission, onward)      Ordered     Ordering Provider       Thu May 4, 2023 11:07 PM    05/04/23 2307  vancomycin (VANCOCIN) 750 mg in  mL IVPB  (vancomycin (VANCOCIN) IV (LD + Maintenance))  EVERY 12 HOURS         Jimmie Manzo M.D.       Thu May 4, 2023  8:43 PM    05/04/23 2043  bictegravir-emtricitab-TAF (Biktarvy) -25 mg tablet 1 Tablet  EVERY EVENING         Sugar Sheppard M.D.       Thu May 4, 2023  8:42 PM    05/04/23 2042  MD Alert...Vancomycin per Pharmacy  PHARMACY TO DOSE        Question:  Indication(s) for vancomycin?  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 They documented pt has known hx of MRSA.    Jimmie Manzo M.D.       Thu May 4, 2023  7:30 PM    05/04/23 1930  ampicillin/sulbactam (UNASYN) 3 g in  mL IVPB  EVERY 6 HOURS         Jimmie Manzo M.D.            Dosing Weight: 72 kg (158 lb 11.7 oz)      Admission History: Admitted on 5/4/2023 for Wound infection [T14.8XXA, L08.9]  Pertinent history: 54 yo female w HIV on HAART presents with significant cellulitis, purulent drainage, and skin break down of bilateral BKA. Has been going on for several weeks. BKA surgery was 3/2023. Concern for possible osteomyelitis. She was discharged with doxycyline from [facility] after being treated for cellulitis. MRSA in recent cultures from [facility]. Patient to undergo revision of BKA on 5/10 at [facility] MRI of BKA sites showing osteomyelitis on L tib-fib and possible osteomyelitis of R tib-fib. All cultures from this admission are NGTD.    Allergies:     Patient has no known allergies.     Pertinent cultures to date:     Results       Procedure Component Value Units Date/Time     "Urine Culture (New) [[id number]] Collected: 05/04/23 1850    Order Status: Completed Specimen: Urine Updated: 05/07/23 0614     Significant Indicator NEG     Source UR     Site -     Culture Result No growth at 48 hours.    Narrative:      Indication for culture:->Evaluation for sepsis without a  clear source of infection  Indication for culture:->Evaluation for sepsis without a    Blood Culture [[id number]] Collected: 05/04/23 1612    Order Status: Completed Specimen: Blood from Peripheral Updated: 05/05/23 0747     Significant Indicator NEG     Source BLD     Site PERIPHERAL     Culture Result No Growth  Note: Blood cultures are incubated for 5 days and  are monitored continuously.Positive blood cultures  are called to the RN and reported as soon as  they are identified.      Narrative:      Per Hospital Policy: Only change Specimen Src: to \"Line\" if  specified by physician order.  Left Forearm/Arm    Blood Culture [[id number]] Collected: 05/04/23 1612    Order Status: Completed Specimen: Blood from Peripheral Updated: 05/05/23 0747     Significant Indicator NEG     Source BLD     Site PERIPHERAL     Culture Result No Growth  Note: Blood cultures are incubated for 5 days and  are monitored continuously.Positive blood cultures  are called to the RN and reported as soon as  they are identified.      Narrative:      Per Hospital Policy: Only change Specimen Src: to \"Line\" if  specified by physician order.  Right Forearm/Arm    C Diff by PCR rflx Toxin [[id number]]     Order Status: No result Specimen: Stool     Urinalysis [[id number]]  (Abnormal) Collected: 05/04/23 1850    Order Status: Completed Specimen: Urine Updated: 05/04/23 1920     Color Yellow     Character Clear     Specific Gravity 1.019     Ph 5.0     Glucose Negative mg/dL      Ketones Negative mg/dL      Protein 30 mg/dL      Bilirubin Negative     Urobilinogen, Urine 0.2     Nitrite Negative     Leukocyte Esterase Trace     Occult Blood Negative     Micro " "Urine Req Microscopic    Narrative:      Indication for culture:->Evaluation for sepsis without a  clear source of infection            Labs:     Estimated Creatinine Clearance: 85.9 mL/min (by C-G formula based on SCr of 0.8 mg/dL).  Recent Labs     23  1610 23  0320 23  0024   WBC 9.1 7.4 5.4   NEUTSPOLYS 71.70 67.20 57.90     Recent Labs     23  1610 23  0320 23  0205   BUN 19 15 10   CREATININE 0.87 0.85 0.80   ALBUMIN 3.8  --  2.5*       Intake/Output Summary (Last 24 hours) at 2023 1214  Last data filed at 2023 0800  Gross per 24 hour   Intake 360 ml   Output --   Net 360 ml      BP (!) 138/95   Pulse 64   Temp 37.1 °C (98.7 °F) (Temporal)   Resp 18   Ht 1.778 m (5' 10\")   Wt 72 kg (158 lb 11.7 oz)   SpO2 96%  Temp (24hrs), Av.6 °C (97.8 °F), Min:36.1 °C (97 °F), Max:37.1 °C (98.7 °F)      List concerns for Vancomycin clearance:     Nephrotoxic drugs;Receipt of contrast dye    Pharmacokinetics:     Two level kinetics:   Ke (hr ^-1): 0.0136  Half life: 51  Vd: Steady state Vd : 243.733  Calculated AUC: 453 mg·hr/L    Trough kinetics:   Recent Labs     23  0024 23  0838   VANCOTROUGH  --  17.7   VANCOPEAK 19.8*  --        A/P:     -  Vancomycin dose: 750 mg IV q 12 hours (0900, 2100)    -  Next vancomycin level(s): 3-4 days if therapy continues, unless significant change in renal function     -  Predicted vancomycin AUC from two level test calculator: 452 mg·hr/L    -  Comments: Patient started on scheduled ibuprofen, but renal function remains stable. AUC at steady state at goal of 400-600. Will continue with current dosing strategy. ID is now following and revision of BKA planned 5/10/2023. Pharmacy will continue to monitor and adjust when clinically indicated.     Ivania Hubbard, PharmD   PGY1 Pharmacy Practice Resident      "

## 2023-05-07 NOTE — CARE PLAN
The patient is Stable - Low risk of patient condition declining or worsening    Shift Goals  Clinical Goals: Iv Abx, rest  Patient Goals: abx  Family Goals: IVETTE    Progress made toward(s) clinical / shift goals:    Problem: Pain - Standard  Goal: Alleviation of pain or a reduction in pain to the patient’s comfort goal  Description: Target End Date:  Prior to discharge or change in level of care    Document on Vitals flowsheet    1.  Document pain using the appropriate pain scale per order or unit policy  2.  Educate and implement non-pharmacologic comfort measures (i.e. relaxation, distraction, massage, cold/heat therapy, etc.)  3.  Pain management medications as ordered  4.  Reassess pain after pain med administration per policy  5.  If opiods administered assess patient's response to pain medication is appropriate per POSS sedation scale  6.  Follow pain management plan developed in collaboration with patient and interdisciplinary team (including palliative care or pain specialists if applicable)  Outcome: Progressing     Problem: Skin Integrity  Goal: Skin integrity is maintained or improved  Description: Target End Date:  Prior to discharge or change in level of care    Document interventions on Skin Risk/Lonnie flowsheet groups and corresponding LDA    1.  Assess and monitor skin integrity, appearance and/or temperature  2.  Assess risk factors for impaired skin integrity and/or pressures ulcers  3.  Implement precautions to protect skin integrity in collaboration with interdisciplinary team  4.  Implement pressure ulcer prevention protocol if at risk for skin breakdown  5.  Confirm wound care consult if at risk for skin breakdown  6.  Ensure patient use of pressure relieving devices  (Low air loss bed, waffle overlay, heel protectors, ROHO cushion, etc)  Outcome: Progressing  Note: Continues iv abx      Problem: Fall Risk  Goal: Patient will remain free from falls  Description: Target End Date:  Prior to  discharge or change in level of care    Document interventions on the Yuly Constantino Fall Risk Assessment    1.  Assess for fall risk factors  2.  Implement fall precautions  Outcome: Progressing  Note: Bed alarm in place. Patient calls for assistance.       Patient is not progressing towards the following goals:

## 2023-05-08 PROBLEM — Z87.898 HISTORY OF SEIZURES: Status: ACTIVE | Noted: 2023-05-08

## 2023-05-08 PROBLEM — M86.39 CHRONIC MULTIFOCAL OSTEOMYELITIS OF MULTIPLE SITES (HCC): Status: ACTIVE | Noted: 2023-05-08

## 2023-05-08 PROBLEM — Z86.79 HISTORY OF ATRIAL FIBRILLATION: Status: ACTIVE | Noted: 2023-05-08

## 2023-05-08 LAB
ANION GAP SERPL CALC-SCNC: 12 MMOL/L (ref 7–16)
BASOPHILS # BLD AUTO: 0.5 % (ref 0–1.8)
BASOPHILS # BLD: 0.02 K/UL (ref 0–0.12)
BUN SERPL-MCNC: 5 MG/DL (ref 8–22)
CALCIUM SERPL-MCNC: 7.8 MG/DL (ref 8.5–10.5)
CHLORIDE SERPL-SCNC: 108 MMOL/L (ref 96–112)
CO2 SERPL-SCNC: 21 MMOL/L (ref 20–33)
CREAT SERPL-MCNC: 0.7 MG/DL (ref 0.5–1.4)
EOSINOPHIL # BLD AUTO: 0.07 K/UL (ref 0–0.51)
EOSINOPHIL NFR BLD: 1.8 % (ref 0–6.9)
ERYTHROCYTE [DISTWIDTH] IN BLOOD BY AUTOMATED COUNT: 47.1 FL (ref 35.9–50)
GFR SERPLBLD CREATININE-BSD FMLA CKD-EPI: 102 ML/MIN/1.73 M 2
GLUCOSE SERPL-MCNC: 94 MG/DL (ref 65–99)
HCT VFR BLD AUTO: 26.6 % (ref 37–47)
HGB BLD-MCNC: 8.2 G/DL (ref 12–16)
IMM GRANULOCYTES # BLD AUTO: 0.02 K/UL (ref 0–0.11)
IMM GRANULOCYTES NFR BLD AUTO: 0.5 % (ref 0–0.9)
LYMPHOCYTES # BLD AUTO: 1.2 K/UL (ref 1–4.8)
LYMPHOCYTES NFR BLD: 31.4 % (ref 22–41)
MCH RBC QN AUTO: 24.7 PG (ref 27–33)
MCHC RBC AUTO-ENTMCNC: 30.8 G/DL (ref 33.6–35)
MCV RBC AUTO: 80.1 FL (ref 81.4–97.8)
MONOCYTES # BLD AUTO: 0.35 K/UL (ref 0–0.85)
MONOCYTES NFR BLD AUTO: 9.2 % (ref 0–13.4)
NEUTROPHILS # BLD AUTO: 2.16 K/UL (ref 2–7.15)
NEUTROPHILS NFR BLD: 56.6 % (ref 44–72)
NRBC # BLD AUTO: 0 K/UL
NRBC BLD-RTO: 0 /100 WBC
PLATELET # BLD AUTO: 467 K/UL (ref 164–446)
PMV BLD AUTO: 9.2 FL (ref 9–12.9)
POTASSIUM SERPL-SCNC: 3.4 MMOL/L (ref 3.6–5.5)
RBC # BLD AUTO: 3.32 M/UL (ref 4.2–5.4)
SODIUM SERPL-SCNC: 141 MMOL/L (ref 135–145)
WBC # BLD AUTO: 3.8 K/UL (ref 4.8–10.8)

## 2023-05-08 PROCEDURE — 85025 COMPLETE CBC W/AUTO DIFF WBC: CPT

## 2023-05-08 PROCEDURE — 700102 HCHG RX REV CODE 250 W/ 637 OVERRIDE(OP)

## 2023-05-08 PROCEDURE — A9270 NON-COVERED ITEM OR SERVICE: HCPCS | Performed by: STUDENT IN AN ORGANIZED HEALTH CARE EDUCATION/TRAINING PROGRAM

## 2023-05-08 PROCEDURE — 700111 HCHG RX REV CODE 636 W/ 250 OVERRIDE (IP): Performed by: INTERNAL MEDICINE

## 2023-05-08 PROCEDURE — 700102 HCHG RX REV CODE 250 W/ 637 OVERRIDE(OP): Performed by: STUDENT IN AN ORGANIZED HEALTH CARE EDUCATION/TRAINING PROGRAM

## 2023-05-08 PROCEDURE — 700105 HCHG RX REV CODE 258: Performed by: INTERNAL MEDICINE

## 2023-05-08 PROCEDURE — A9270 NON-COVERED ITEM OR SERVICE: HCPCS

## 2023-05-08 PROCEDURE — 770020 HCHG ROOM/CARE - TELE (206)

## 2023-05-08 PROCEDURE — 80048 BASIC METABOLIC PNL TOTAL CA: CPT

## 2023-05-08 PROCEDURE — 99232 SBSQ HOSP IP/OBS MODERATE 35: CPT | Mod: GC | Performed by: FAMILY MEDICINE

## 2023-05-08 PROCEDURE — 36415 COLL VENOUS BLD VENIPUNCTURE: CPT

## 2023-05-08 PROCEDURE — 700102 HCHG RX REV CODE 250 W/ 637 OVERRIDE(OP): Performed by: BEHAVIOR ANALYST

## 2023-05-08 PROCEDURE — 700111 HCHG RX REV CODE 636 W/ 250 OVERRIDE (IP)

## 2023-05-08 PROCEDURE — A9270 NON-COVERED ITEM OR SERVICE: HCPCS | Performed by: BEHAVIOR ANALYST

## 2023-05-08 RX ORDER — POTASSIUM CHLORIDE 20 MEQ/1
20 TABLET, EXTENDED RELEASE ORAL ONCE
Status: COMPLETED | OUTPATIENT
Start: 2023-05-08 | End: 2023-05-08

## 2023-05-08 RX ADMIN — VANCOMYCIN HYDROCHLORIDE 750 MG: 5 INJECTION, POWDER, LYOPHILIZED, FOR SOLUTION INTRAVENOUS at 21:08

## 2023-05-08 RX ADMIN — ACETAMINOPHEN 1000 MG: 325 TABLET, FILM COATED ORAL at 12:10

## 2023-05-08 RX ADMIN — AMPICILLIN SODIUM AND SULBACTAM SODIUM 3 G: 2; 1 INJECTION, POWDER, FOR SOLUTION INTRAMUSCULAR; INTRAVENOUS at 00:56

## 2023-05-08 RX ADMIN — AMPICILLIN SODIUM AND SULBACTAM SODIUM 3 G: 2; 1 INJECTION, POWDER, FOR SOLUTION INTRAMUSCULAR; INTRAVENOUS at 12:12

## 2023-05-08 RX ADMIN — IBUPROFEN 800 MG: 800 TABLET, FILM COATED ORAL at 06:24

## 2023-05-08 RX ADMIN — POTASSIUM CHLORIDE 20 MEQ: 1500 TABLET, EXTENDED RELEASE ORAL at 17:07

## 2023-05-08 RX ADMIN — METOPROLOL TARTRATE 25 MG: 25 TABLET, FILM COATED ORAL at 06:24

## 2023-05-08 RX ADMIN — DIVALPROEX SODIUM 500 MG: 500 TABLET, EXTENDED RELEASE ORAL at 17:07

## 2023-05-08 RX ADMIN — DIVALPROEX SODIUM 500 MG: 500 TABLET, EXTENDED RELEASE ORAL at 06:24

## 2023-05-08 RX ADMIN — AMPICILLIN SODIUM AND SULBACTAM SODIUM 3 G: 2; 1 INJECTION, POWDER, FOR SOLUTION INTRAMUSCULAR; INTRAVENOUS at 23:23

## 2023-05-08 RX ADMIN — VANCOMYCIN HYDROCHLORIDE 750 MG: 5 INJECTION, POWDER, LYOPHILIZED, FOR SOLUTION INTRAVENOUS at 10:10

## 2023-05-08 RX ADMIN — IBUPROFEN 800 MG: 800 TABLET, FILM COATED ORAL at 17:06

## 2023-05-08 RX ADMIN — IBUPROFEN 800 MG: 800 TABLET, FILM COATED ORAL at 12:10

## 2023-05-08 RX ADMIN — HEPARIN SODIUM 5000 UNITS: 5000 INJECTION, SOLUTION INTRAVENOUS; SUBCUTANEOUS at 21:13

## 2023-05-08 RX ADMIN — HEPARIN SODIUM 5000 UNITS: 5000 INJECTION, SOLUTION INTRAVENOUS; SUBCUTANEOUS at 06:24

## 2023-05-08 RX ADMIN — ACETAMINOPHEN 1000 MG: 325 TABLET, FILM COATED ORAL at 23:24

## 2023-05-08 RX ADMIN — BICTEGRAVIR SODIUM, EMTRICITABINE, AND TENOFOVIR ALAFENAMIDE FUMARATE 1 TABLET: 50; 200; 25 TABLET ORAL at 17:06

## 2023-05-08 RX ADMIN — LOPERAMIDE HYDROCHLORIDE 2 MG: 2 CAPSULE ORAL at 07:03

## 2023-05-08 RX ADMIN — AMPICILLIN SODIUM AND SULBACTAM SODIUM 3 G: 2; 1 INJECTION, POWDER, FOR SOLUTION INTRAMUSCULAR; INTRAVENOUS at 06:28

## 2023-05-08 RX ADMIN — METOPROLOL TARTRATE 25 MG: 25 TABLET, FILM COATED ORAL at 17:10

## 2023-05-08 RX ADMIN — OLANZAPINE 5 MG: 5 TABLET, FILM COATED ORAL at 17:06

## 2023-05-08 RX ADMIN — POTASSIUM CHLORIDE 40 MEQ: 1500 TABLET, EXTENDED RELEASE ORAL at 06:23

## 2023-05-08 RX ADMIN — LOPERAMIDE HYDROCHLORIDE 2 MG: 2 CAPSULE ORAL at 23:23

## 2023-05-08 RX ADMIN — AMPICILLIN SODIUM AND SULBACTAM SODIUM 3 G: 2; 1 INJECTION, POWDER, FOR SOLUTION INTRAMUSCULAR; INTRAVENOUS at 17:09

## 2023-05-08 RX ADMIN — LOPERAMIDE HYDROCHLORIDE 2 MG: 2 CAPSULE ORAL at 17:07

## 2023-05-08 RX ADMIN — HEPARIN SODIUM 5000 UNITS: 5000 INJECTION, SOLUTION INTRAVENOUS; SUBCUTANEOUS at 17:06

## 2023-05-08 ASSESSMENT — PAIN DESCRIPTION - PAIN TYPE: TYPE: ACUTE PAIN

## 2023-05-08 NOTE — ASSESSMENT & PLAN NOTE
MRI showin.  Early osteomyelitis of the distal aspect of the left tibial stump. S/p I&D and  on 5/10 with Dr. Lux  - ID consulting- Dr Manzo   - Discontinue vanco and unasyn (-)  - Continue Doxy 100mg BID and augmentin 875 BID- continue for duration of wound vac placement   - Wound care following   - Ativan 2mg PRN wound vac changes

## 2023-05-08 NOTE — ASSESSMENT & PLAN NOTE
Unclear history of seizure activity. Patient states she has never had a seizure before.   - Continue Depakote 500 mg daily for history of seizures  - Depakote likely for mood stabilization

## 2023-05-08 NOTE — CARE PLAN
Problem: Knowledge Deficit - Standard  Goal: Patient and family/care givers will demonstrate understanding of plan of care, disease process/condition, diagnostic tests and medications  Description: Target End Date:  1-3 days or as soon as patient condition allows    Document in Patient Education    1.  Patient and family/caregiver oriented to unit, equipment, visitation policy and means for communicating concern  2.  Complete/review Learning Assessment  3.  Assess knowledge level of disease process/condition, treatment plan, diagnostic tests and medications  4.  Explain disease process/condition, treatment plan, diagnostic tests and medications  Outcome: Progressing  Note: Patient will have knowledge of plan of care by end of shift as evidence by medical team educating and involving patient.      Problem: Skin Integrity  Goal: Skin integrity is maintained or improved  Description: Target End Date:  Prior to discharge or change in level of care    Document interventions on Skin Risk/Lonnie flowsheet groups and corresponding LDA    1.  Assess and monitor skin integrity, appearance and/or temperature  2.  Assess risk factors for impaired skin integrity and/or pressures ulcers  3.  Implement precautions to protect skin integrity in collaboration with interdisciplinary team  4.  Implement pressure ulcer prevention protocol if at risk for skin breakdown  5.  Confirm wound care consult if at risk for skin breakdown  6.  Ensure patient use of pressure relieving devices  (Low air loss bed, waffle overlay, heel protectors, ROHO cushion, etc)  Outcome: Progressing  Note: Patient will not have increase size of wounds by end of shift as evidence by turning patient and ensuring patient is clean and dry.      The patient is Stable - Low risk of patient condition declining or worsening    Shift Goals  Clinical Goals: IV abx compliance treatment  Patient Goals: rest,  Family Goals: IVETTE    Progress made toward(s) clinical / shift  goals:  skin integrity, pain control    Patient is not progressing towards the following goals:

## 2023-05-08 NOTE — ASSESSMENT & PLAN NOTE
Initially unclear diagnosis. However, confirmatory testing indicates that patient is in fact positive for HIV 1.  Patient continues to have normal CD4 counts (CD4 752 at 3/2023)  - ID following   - Confirmatory testing conclusive for HIV  - Continue Biktarvy daily

## 2023-05-08 NOTE — ASSESSMENT & PLAN NOTE
Patient was previously staying at [facility] previously.  Recommend social work consultation prior to discharge for safe discharge planning.

## 2023-05-08 NOTE — ASSESSMENT & PLAN NOTE
DVT noted on US from [facility] 4/23/2023.  - Discontinue heparin  - Plan to resume eliquis 5/17

## 2023-05-08 NOTE — ASSESSMENT & PLAN NOTE
- Discontinue vancomycin and Unasyn.(5/4 - 5/21)  - Continue Doxy 100mg BID and augmentin 875 BID- continue for duration of wound vac placement

## 2023-05-08 NOTE — THERAPY
Physical Therapy Contact Note    Patient Name: Kellie Chatman  Age:  53 y.o., Sex:  female  Medical Record #: [id number]  Today's Date: 5/8/2023 05/08/23 1048   Interdisciplinary Plan of Care Collaboration   Collaboration Comments Chart review indicates pt going to OR 5/10 for I&D vs BKA revision.  Will HOLD and follow up post op as able/appropriate.

## 2023-05-08 NOTE — ASSESSMENT & PLAN NOTE
EKG with A fib;  from [facility] on 4/23/2023. Sinus rhythm for the duration of this hospital admission.   - Continue metoprolol 25 mg twice daily  - Discontinue heparin  - Plan to resume eliquis 5/17  - Dc telemetry monitoring

## 2023-05-08 NOTE — CARE PLAN
Problem: Skin Integrity  Goal: Skin integrity is maintained or improved  Note: Maintain or prevent onset of new wounds. Prevent further infection.   The patient is Stable - Low risk of patient condition declining or worsening    Shift Goals  Clinical Goals: IV ABX and compliance to treatment  Patient Goals: Rest  Family Goals: IVETTE: no family members present at this time    Progress made toward(s) clinical / shift goals:    Problem: Knowledge Deficit - Standard  Goal: Patient and family/care givers will demonstrate understanding of plan of care, disease process/condition, diagnostic tests and medications  5/8/2023 0438 by Mari Jeffery, R.N.  Outcome: Progressing  Note: Patient is cooperative in her treatment plan.  5/8/2023 0437 by Mari Jeffery R.N.  Outcome: Progressing     Problem: Pain - Standard  Goal: Alleviation of pain or a reduction in pain to the patient’s comfort goal  5/8/2023 0438 by Mari Jeffery, R.N.  Outcome: Progressing  Note: Patient verbalized that her pain level is controlled.  5/8/2023 0437 by Mari Jeffery, R.N.  Outcome: Progressing     Problem: Skin Integrity  Goal: Skin integrity is maintained or improved  5/8/2023 0438 by Mari Jeffery, R.N.  Outcome: Progressing  Note: Prevent further skin breakdown  5/8/2023 0437 by Mari Jeffery, R.N.  Outcome: Progressing  5/8/2023 0434 by Mari Jeffery, R.N.  Note: Maintain or prevent onset of new wounds. Prevent further infection.     Problem: Fall Risk  Goal: Patient will remain free from falls  5/8/2023 0438 by Mari Jeffery, R.N.  Outcome: Progressing  Note: Maintain safety precautions for the whole shift.  5/8/2023 0437 by Mari Jeffery, R.N.  Outcome: Progressing       Patient is not progressing towards the following goals:

## 2023-05-08 NOTE — PROGRESS NOTES
[facility] FAMILY MEDICINE PROGRESS NOTE     Attending:   Dr. 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  Admitted 5/4/2023 for bilateral stump infections.    SUBJECTIVE:   No acute events overnight.  Patient states she continues to have pain in her left stump.  She has no other acute concerns or complaints at this time.    OBJECTIVE:  Vitals:    05/07/23 2336 05/08/23 0417 05/08/23 0857 05/08/23 1234   BP: 124/82 (!) 144/88 126/82 135/89   Pulse: 62 63 75 77   Resp: 18 18 17 17   Temp: 36.9 °C (98.4 °F) 36.9 °C (98.4 °F) 36.2 °C (97.2 °F) 36.4 °C (97.5 °F)   TempSrc: Temporal Temporal Temporal Temporal   SpO2: 98% 99% 96% 95%   Weight:       Height:           Intake/Output Summary (Last 24 hours) at 5/8/2023 0527  Last data filed at 5/7/2023 2118  Gross per 24 hour   Intake 680 ml   Output --   Net 680 ml       PHYSICAL EXAM:   General: No acute distress, afebrile, resting comfortably.  HEENT: NC/AT. EOMI.   Cardiovascular: RRR without murmurs, rubs, heaves. Normal capillary refill   Respiratory: CTAB, no tachypnea or retractions   Abdomen: normal bowel sounds, soft, nontender, nondistended, no masses, no organomegaly   EXT: Bilateral BKA.  Right stump with eschar and closed wound present.  Minimal tenderness to palpation.  Left stump with eschar and closed wounds at base.  Tenderness to palpation present.  Neuro: Non-focal, alert and orientated       LABS:  Recent Labs     05/07/23  0024   WBC 5.4   RBC 3.13*   HEMOGLOBIN 8.0*   HEMATOCRIT 25.7*   MCV 82.1   MCH 25.6*   RDW 48.5   PLATELETCT 395   MPV 10.0   NEUTSPOLYS 57.90   LYMPHOCYTES 28.90   MONOCYTES 11.30   EOSINOPHILS 1.10   BASOPHILS 0.40     Recent Labs     05/06/23  0205 05/08/23  1123   SODIUM 143 141   POTASSIUM 3.3* 3.4*   CHLORIDE 112 108   CO2 21 21   BUN  10 5*   CREATININE 0.80 0.70   CALCIUM 7.9* 7.8*   ALBUMIN 2.5*  --      Estimated GFR/CRCL = Estimated Creatinine Clearance: 98.2 mL/min (by C-G formula based on SCr of 0.7 mg/dL).  Recent Labs     05/06/23  0205 05/08/23  1123   GLUCOSE 76 94     Recent Labs     05/06/23  0205   ASTSGOT 13   ALTSGPT 10   TBILIRUBIN 0.2   ALKPHOSPHAT 68   GLOBULIN 3.5             No results for input(s): INR, APTT, FIBRINOGEN in the last 72 hours.    Invalid input(s): DIMER    MICROBIOLOGY:   Blood Culture Hold   Date Value Ref Range Status   04/08/2023 Collected  Final        IMAGING:   AI-ECXLN-[id number]-WITH & W/O LEFT   Final Result      1.  Early osteomyelitis of the distal aspect of the left tibial stump      2.  Negative for abscess      3.  Left knee joint effusion      CD-DAMTI-JAKFCJ-WITH & W/O RIGHT   Final Result      1.  No roc osteomyelitis      2.  Edema within the distal tibial stump evident on T2-weighted imaging and possibly minimal change on T1-weighted imaging. This could indicate very early osteomyelitis although at this point is not specific.      3.  Negative for abscess      DX-CHEST-LIMITED (1 VIEW)   Final Result         No acute cardiac or pulmonary abnormality is identified.          CULTURES:   Results       Procedure Component Value Units Date/Time    C Diff by PCR rflx Toxin [[id number]] Collected: 05/07/23 1256    Order Status: Completed Specimen: Stool Updated: 05/07/23 1544     C Diff by PCR Negative     Comment: C. difficile NOT detected by PCR.    Acute diarrhea Special Contact Precautions is required  until 48 hours after diarrhea has resolved, patient’s stool  has returned to baseline or until an infectious agent is  no longer suspected.  Treatment not indicated per guidelines.  Repeat testing not indicated within 7 days.          027-NAP1-BI Presumptive Negative     Comment: Presumptive 027/NAP1/BI target DNA sequences are NOT DETECTED.       Narrative:      Special Contact Isolation  Collected  "By: [id number] ERIC ALSTON  Does this patient have risk factors for C-diff?->Yes  C-Diff Risk Factors->antibiotic exposure    Urine Culture (New) [[id number]] Collected: 05/04/23 1850    Order Status: Completed Specimen: Urine Updated: 05/07/23 0614     Significant Indicator NEG     Source UR     Site -     Culture Result No growth at 48 hours.    Narrative:      Indication for culture:->Evaluation for sepsis without a  clear source of infection  Indication for culture:->Evaluation for sepsis without a    Blood Culture [[id number]] Collected: 05/04/23 1612    Order Status: Completed Specimen: Blood from Peripheral Updated: 05/05/23 0747     Significant Indicator NEG     Source BLD     Site PERIPHERAL     Culture Result No Growth  Note: Blood cultures are incubated for 5 days and  are monitored continuously.Positive blood cultures  are called to the RN and reported as soon as  they are identified.      Narrative:      Per Hospital Policy: Only change Specimen Src: to \"Line\" if  specified by physician order.  Left Forearm/Arm    Blood Culture [[id number]] Collected: 05/04/23 1612    Order Status: Completed Specimen: Blood from Peripheral Updated: 05/05/23 0747     Significant Indicator NEG     Source BLD     Site PERIPHERAL     Culture Result No Growth  Note: Blood cultures are incubated for 5 days and  are monitored continuously.Positive blood cultures  are called to the RN and reported as soon as  they are identified.      Narrative:      Per Hospital Policy: Only change Specimen Src: to \"Line\" if  specified by physician order.  Right Forearm/Arm    Urinalysis [[id number]]  (Abnormal) Collected: 05/04/23 1850    Order Status: Completed Specimen: Urine Updated: 05/04/23 1920     Color Yellow     Character Clear     Specific Gravity 1.019     Ph 5.0     Glucose Negative mg/dL      Ketones Negative mg/dL      Protein 30 mg/dL      Bilirubin Negative     Urobilinogen, Urine 0.2     Nitrite Negative     Leukocyte Esterase " Trace     Occult Blood Negative     Micro Urine Req Microscopic    Narrative:      Indication for culture:->Evaluation for sepsis without a  clear source of infection            MEDS:  Current Facility-Administered Medications   Medication Last Admin    potassium chloride SA (Kdur) tablet 20 mEq      loperamide (IMODIUM) capsule 2 mg 2 mg at 23 0703    potassium chloride SA (Kdur) tablet 40 mEq 40 mEq at 23 0623    ampicillin/sulbactam (UNASYN) 3 g in  mL IVPB Stopped at 23 1242    MD Alert...Vancomycin per Pharmacy      bictegravir-emtricitab-TAF (Biktarvy) -25 mg tablet 1 Tablet 1 Tablet at 23 1749    divalproex ER (DEPAKOTE ER) tablet 500 mg 500 mg at 23 0624    OLANZapine (ZYPREXA) tablet 5 mg 5 mg at 23 1749    Pharmacy Consult Request ...Pain Management Review 1 Each      acetaminophen (TYLENOL) tablet 1,000 mg 1,000 mg at 23 1210    Followed by    [START ON 5/10/2023] acetaminophen (TYLENOL) tablet 1,000 mg      ibuprofen (MOTRIN) tablet 800 mg 800 mg at 23 1210    Followed by    [START ON 5/10/2023] ibuprofen (MOTRIN) tablet 800 mg      oxyCODONE immediate-release (ROXICODONE) tablet 5 mg      Or    oxyCODONE immediate release (ROXICODONE) tablet 10 mg 10 mg at 23 2149    Or    HYDROmorphone (Dilaudid) injection 0.5 mg      metoprolol tartrate (LOPRESSOR) tablet 25 mg 25 mg at 23 0624    heparin injection 5,000 Units 5,000 Units at 23 0624    vancomycin (VANCOCIN) 750 mg in  mL IVPB Stopped at 23 1210       ASSESSMENT/PLAN:   Kellie Chatman is a 53-year-old female with a PMHx HIV, schizophrenia, bipolar on Zyprexa and Depakote; 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  Admitted 2023 for bilateral stump infections.    Chronic multifocal osteomyelitis of multiple sites (HCC)  Assessment & Plan  MRI showin.  Early osteomyelitis of the distal aspect of the left tibial  stump  -Continue vancomycin and Unasyn  - ID consulting - Dr. Manzo recommending Vanco and Unasyn for now. Possibly Linezolid pending medication interactions. Waiting for wound cultures prior to changing regimen.   - LSP following  - Planning for OR on 5/10/2023 with Dr. Lux for I&D and possible revision  - N.p.o. at midnight on 5/9  - Hold heparin at midnight on 5/9    History of atrial fibrillation  Assessment & Plan  On discharge from [facility] previously patient was noted to be in atrial fibrillation.  No record on EKGs through [facility].  - Continue metoprolol 25 mg twice daily    History of seizures  Assessment & Plan  Unclear history of seizure activity.  - Continue Depakote 500 mg daily for history of seizures    History of DVT (deep vein thrombosis)  Assessment & Plan  Hold home Eliquis while pending surgery.  Plan for resumption after cleared by surgery  - Continue heparin at this time    Homelessness  Assessment & Plan  Patient was previously staying at [facility].  Recommend social work consultation prior to discharge for safe discharge planning.    HIV (human immunodeficiency virus infection) ([facility])  Assessment & Plan  Unclear diagnosis.  Reactive HIV assay upon admission.  Patient continues to have normal CD4 counts (CD4 752 at 3/2023)  - ID following  - Confirmatory testing pending  - Continue Biktarvy daily    Hypokalemia  Assessment & Plan  Resolved.  Continue to monitor.    Schizophrenia ([facility])  Assessment & Plan  - Continue home olanzapine 5 mg twice daily      * Wound infection  Assessment & Plan  - Continue vancomycin and Unasyn.      Core Measures:   Fluids: P.o. intake  Lines: IVP  Abx: Vancomycin and Unasyn  DVT prophylaxis: Hold home Eliquis; heparin until cleared by surgery  Code Status: Full code    Disposition: Inpatient for continued antibiotics and planned I&D with Ortho on 5/10    Dilcia Lovelace MD   PGY-3 Family Medicine Resident   [facility]

## 2023-05-09 LAB
ANION GAP SERPL CALC-SCNC: 11 MMOL/L (ref 7–16)
BACTERIA BLD CULT: NORMAL
BACTERIA BLD CULT: NORMAL
BUN SERPL-MCNC: 9 MG/DL (ref 8–22)
CALCIUM SERPL-MCNC: 7.8 MG/DL (ref 8.5–10.5)
CHLORIDE SERPL-SCNC: 109 MMOL/L (ref 96–112)
CO2 SERPL-SCNC: 24 MMOL/L (ref 20–33)
CREAT SERPL-MCNC: 0.91 MG/DL (ref 0.5–1.4)
GFR SERPLBLD CREATININE-BSD FMLA CKD-EPI: 74 ML/MIN/1.73 M 2
GLUCOSE SERPL-MCNC: 90 MG/DL (ref 65–99)
HIV 1 & 2 AB SER-IMP: ABNORMAL
HIV 1 & 2 AB SERPL IA.RAPID: ABNORMAL
HIV 2 AB SERPL QL IA: NEGATIVE
HIV1 AB SERPL QL IA: POSITIVE
MAGNESIUM SERPL-MCNC: 1.4 MG/DL (ref 1.5–2.5)
POTASSIUM SERPL-SCNC: 3.4 MMOL/L (ref 3.6–5.5)
SIGNIFICANT IND 70042: NORMAL
SIGNIFICANT IND 70042: NORMAL
SITE SITE: NORMAL
SITE SITE: NORMAL
SODIUM SERPL-SCNC: 144 MMOL/L (ref 135–145)
SOURCE SOURCE: NORMAL
SOURCE SOURCE: NORMAL

## 2023-05-09 PROCEDURE — A9270 NON-COVERED ITEM OR SERVICE: HCPCS | Performed by: BEHAVIOR ANALYST

## 2023-05-09 PROCEDURE — 80048 BASIC METABOLIC PNL TOTAL CA: CPT

## 2023-05-09 PROCEDURE — 700111 HCHG RX REV CODE 636 W/ 250 OVERRIDE (IP): Performed by: INTERNAL MEDICINE

## 2023-05-09 PROCEDURE — 770020 HCHG ROOM/CARE - TELE (206)

## 2023-05-09 PROCEDURE — 700105 HCHG RX REV CODE 258: Performed by: INTERNAL MEDICINE

## 2023-05-09 PROCEDURE — A9270 NON-COVERED ITEM OR SERVICE: HCPCS | Performed by: STUDENT IN AN ORGANIZED HEALTH CARE EDUCATION/TRAINING PROGRAM

## 2023-05-09 PROCEDURE — 700102 HCHG RX REV CODE 250 W/ 637 OVERRIDE(OP)

## 2023-05-09 PROCEDURE — 83735 ASSAY OF MAGNESIUM: CPT

## 2023-05-09 PROCEDURE — 700111 HCHG RX REV CODE 636 W/ 250 OVERRIDE (IP): Performed by: STUDENT IN AN ORGANIZED HEALTH CARE EDUCATION/TRAINING PROGRAM

## 2023-05-09 PROCEDURE — 51798 US URINE CAPACITY MEASURE: CPT

## 2023-05-09 PROCEDURE — A9270 NON-COVERED ITEM OR SERVICE: HCPCS

## 2023-05-09 PROCEDURE — 99232 SBSQ HOSP IP/OBS MODERATE 35: CPT | Performed by: NURSE PRACTITIONER

## 2023-05-09 PROCEDURE — 700102 HCHG RX REV CODE 250 W/ 637 OVERRIDE(OP): Performed by: BEHAVIOR ANALYST

## 2023-05-09 PROCEDURE — 700111 HCHG RX REV CODE 636 W/ 250 OVERRIDE (IP)

## 2023-05-09 PROCEDURE — 99231 SBSQ HOSP IP/OBS SF/LOW 25: CPT | Mod: GC | Performed by: FAMILY MEDICINE

## 2023-05-09 PROCEDURE — 36415 COLL VENOUS BLD VENIPUNCTURE: CPT

## 2023-05-09 PROCEDURE — 700102 HCHG RX REV CODE 250 W/ 637 OVERRIDE(OP): Performed by: STUDENT IN AN ORGANIZED HEALTH CARE EDUCATION/TRAINING PROGRAM

## 2023-05-09 RX ORDER — MAGNESIUM SULFATE HEPTAHYDRATE 40 MG/ML
4 INJECTION, SOLUTION INTRAVENOUS ONCE
Status: COMPLETED | OUTPATIENT
Start: 2023-05-09 | End: 2023-05-09

## 2023-05-09 RX ADMIN — DIVALPROEX SODIUM 500 MG: 500 TABLET, EXTENDED RELEASE ORAL at 04:43

## 2023-05-09 RX ADMIN — BICTEGRAVIR SODIUM, EMTRICITABINE, AND TENOFOVIR ALAFENAMIDE FUMARATE 1 TABLET: 50; 200; 25 TABLET ORAL at 17:33

## 2023-05-09 RX ADMIN — MAGNESIUM SULFATE HEPTAHYDRATE 4 G: 4 INJECTION, SOLUTION INTRAVENOUS at 14:39

## 2023-05-09 RX ADMIN — DIVALPROEX SODIUM 500 MG: 500 TABLET, EXTENDED RELEASE ORAL at 17:33

## 2023-05-09 RX ADMIN — IBUPROFEN 800 MG: 800 TABLET, FILM COATED ORAL at 17:33

## 2023-05-09 RX ADMIN — HEPARIN SODIUM 5000 UNITS: 5000 INJECTION, SOLUTION INTRAVENOUS; SUBCUTANEOUS at 14:08

## 2023-05-09 RX ADMIN — IBUPROFEN 800 MG: 800 TABLET, FILM COATED ORAL at 12:23

## 2023-05-09 RX ADMIN — HEPARIN SODIUM 5000 UNITS: 5000 INJECTION, SOLUTION INTRAVENOUS; SUBCUTANEOUS at 04:44

## 2023-05-09 RX ADMIN — ACETAMINOPHEN 1000 MG: 325 TABLET, FILM COATED ORAL at 04:44

## 2023-05-09 RX ADMIN — VANCOMYCIN HYDROCHLORIDE 750 MG: 5 INJECTION, POWDER, LYOPHILIZED, FOR SOLUTION INTRAVENOUS at 09:30

## 2023-05-09 RX ADMIN — METOPROLOL TARTRATE 25 MG: 25 TABLET, FILM COATED ORAL at 17:32

## 2023-05-09 RX ADMIN — AMPICILLIN SODIUM AND SULBACTAM SODIUM 3 G: 2; 1 INJECTION, POWDER, FOR SOLUTION INTRAMUSCULAR; INTRAVENOUS at 23:33

## 2023-05-09 RX ADMIN — METOPROLOL TARTRATE 25 MG: 25 TABLET, FILM COATED ORAL at 04:43

## 2023-05-09 RX ADMIN — ACETAMINOPHEN 1000 MG: 325 TABLET, FILM COATED ORAL at 17:33

## 2023-05-09 RX ADMIN — POTASSIUM CHLORIDE 40 MEQ: 1500 TABLET, EXTENDED RELEASE ORAL at 04:43

## 2023-05-09 RX ADMIN — OLANZAPINE 5 MG: 5 TABLET, FILM COATED ORAL at 17:32

## 2023-05-09 RX ADMIN — AMPICILLIN SODIUM AND SULBACTAM SODIUM 3 G: 2; 1 INJECTION, POWDER, FOR SOLUTION INTRAMUSCULAR; INTRAVENOUS at 04:51

## 2023-05-09 RX ADMIN — IBUPROFEN 800 MG: 800 TABLET, FILM COATED ORAL at 04:43

## 2023-05-09 RX ADMIN — AMPICILLIN SODIUM AND SULBACTAM SODIUM 3 G: 2; 1 INJECTION, POWDER, FOR SOLUTION INTRAMUSCULAR; INTRAVENOUS at 19:32

## 2023-05-09 RX ADMIN — VANCOMYCIN HYDROCHLORIDE 750 MG: 5 INJECTION, POWDER, LYOPHILIZED, FOR SOLUTION INTRAVENOUS at 21:28

## 2023-05-09 RX ADMIN — ACETAMINOPHEN 1000 MG: 325 TABLET, FILM COATED ORAL at 12:23

## 2023-05-09 RX ADMIN — AMPICILLIN SODIUM AND SULBACTAM SODIUM 3 G: 2; 1 INJECTION, POWDER, FOR SOLUTION INTRAMUSCULAR; INTRAVENOUS at 12:33

## 2023-05-09 ASSESSMENT — PATIENT HEALTH QUESTIONNAIRE - PHQ9
2. FEELING DOWN, DEPRESSED, IRRITABLE, OR HOPELESS: NOT AT ALL
2. FEELING DOWN, DEPRESSED, IRRITABLE, OR HOPELESS: NOT AT ALL
1. LITTLE INTEREST OR PLEASURE IN DOING THINGS: NOT AT ALL
SUM OF ALL RESPONSES TO PHQ9 QUESTIONS 1 AND 2: 0
1. LITTLE INTEREST OR PLEASURE IN DOING THINGS: NOT AT ALL
SUM OF ALL RESPONSES TO PHQ9 QUESTIONS 1 AND 2: 0

## 2023-05-09 ASSESSMENT — PAIN DESCRIPTION - PAIN TYPE
TYPE: ACUTE PAIN
TYPE: ACUTE PAIN

## 2023-05-09 NOTE — PROGRESS NOTES
LIMB PRESERVATION SERVICE CONSULT PROGRESS NOTE          HISTORY OF PRESENT ILLNESS: Kellie Chatman is a 53 y.o.  with a past medical history that includes frostbite, HIV, EtOH abuse, methamphetamine abuse, schizophrenia and bipolar disorder.  Admitted 5/4/2023 for Wound infection [T14.8XXA, L08.9].     [facility] has been consulted for bilateral BKA's with eschar.    Patient known to [facility].  Seen on admit of 11/14/2022 with bilateral frostbite injuries to toes and feet.  Unfortunately due to progression of ischemic damage patient has undergone multiple surgeries in an attempt at limb salvage.  Patient had right TMA on 12/12/2022 by Dr. Lux  Patient had right BKA on 12/20/2022 by Dr. Lux  Patient had left second toe amputation with wound VAC application on 2/3/2023 by Dr. Lux  Patient had left TMA on 2/7/2023 by Dr. Lux  Patient had left BKA on 3/22/2023 by Dr. Lux    Patient is homeless and has a history of mental illness.  Unfortunately since patient's original frostbite injury in November 2022 patient has had repeated frostbite injuries in between attempts at limb salvage.  Patient has had difficulty with wound care in between surgeries.  Patient is wheelchair-bound.  Patient had increasing bilateral BKA pain with increasing edema and patient reported fever and chills causing her to present to the ED.  Patient denies fevers, chills, nausea, vomiting at this time.     Antibiotics were started on this admission.  Infectious diseases has not been consulted.  Xray not completed.  Bilateral MRI ordered.  Ortho and Vascular surgery not involved yet.       INTERVAL HISTORY:   5/7/23: MRI completed to B tib fib. Updated Dr. Lux  5/9/23: Patient resting in bed.  Complains of pain to right leg worse than left leg.            RESULTS:     Recent Labs     05/07/23  0024 05/08/23  1508   WBC 5.4 3.8*   RBC 3.13* 3.32*   HEMOGLOBIN 8.0* 8.2*   HEMATOCRIT 25.7* 26.6*   MCV 82.1 80.1*   MCH 25.6* 24.7*   MCHC 31.1*  30.8*   RDW 48.5 47.1   PLATELETCT 395 467*   MPV 10.0 9.2       Recent Labs     05/08/23  1123 05/09/23  0037   SODIUM 141 144   POTASSIUM 3.4* 3.4*   CHLORIDE 108 109   CO2 21 24   GLUCOSE 94 90   BUN 5* 9           ESR:     Results from last 7 days   Lab Units 05/04/23  1610   SED RATE WESTERGREN 1526 mm/hour 75*         CRP:       Results from last 7 days   Lab Units 05/04/23  1610   C REACTIVE PROTEIN 4596 mg/dL 2.40*           COVID-19: Not completed this admission     Imaging:  [id number]-WITH & W/O LEFT   Final Result      1.  Early osteomyelitis of the distal aspect of the left tibial stump      2.  Negative for abscess      3.  Left knee joint effusion      MQ-[id number]-[id number]-WITH & W/O RIGHT   Final Result      1.  No roc osteomyelitis      2.  Edema within the distal tibial stump evident on T2-weighted imaging and possibly minimal change on T1-weighted imaging. This could indicate very early osteomyelitis although at this point is not specific.      3.  Negative for abscess      DX-CHEST-LIMITED (1 VIEW)   Final Result         No acute cardiac or pulmonary abnormality is identified.            Arterial studies: None    A1c:  Lab Results   Component Value Date/Time    HBA1C 5.1 02/24/2023 05:54 AM            Microbiology:  Results       Procedure Component Value Units Date/Time    C Diff by PCR rflx Toxin [[id number]] Collected: 05/07/23 1256    Order Status: Completed Specimen: Stool Updated: 05/07/23 1544     C Diff by PCR Negative     Comment: C. difficile NOT detected by PCR.    Acute diarrhea Special Contact Precautions is required  until 48 hours after diarrhea has resolved, patient’s stool  has returned to baseline or until an infectious agent is  no longer suspected.  Treatment not indicated per guidelines.  Repeat testing not indicated within 7 days.          027-NAP1-BI Presumptive Negative     Comment: Presumptive 027/NAP1/BI target DNA sequences are NOT DETECTED.       Narrative:       "Special Contact Isolation  Collected By: [id number] ERIC ALSTON  Does this patient have risk factors for C-diff?->Yes  C-Diff Risk Factors->antibiotic exposure    Urine Culture (New) [[id number]] Collected: 05/04/23 1850    Order Status: Completed Specimen: Urine Updated: 05/07/23 0614     Significant Indicator NEG     Source UR     Site -     Culture Result No growth at 48 hours.    Narrative:      Indication for culture:->Evaluation for sepsis without a  clear source of infection  Indication for culture:->Evaluation for sepsis without a    Blood Culture [[id number]] Collected: 05/04/23 1612    Order Status: Completed Specimen: Blood from Peripheral Updated: 05/05/23 0747     Significant Indicator NEG     Source BLD     Site PERIPHERAL     Culture Result No Growth  Note: Blood cultures are incubated for 5 days and  are monitored continuously.Positive blood cultures  are called to the RN and reported as soon as  they are identified.      Narrative:      Per Hospital Policy: Only change Specimen Src: to \"Line\" if  specified by physician order.  Left Forearm/Arm    Blood Culture [[id number]] Collected: 05/04/23 1612    Order Status: Completed Specimen: Blood from Peripheral Updated: 05/05/23 0747     Significant Indicator NEG     Source BLD     Site PERIPHERAL     Culture Result No Growth  Note: Blood cultures are incubated for 5 days and  are monitored continuously.Positive blood cultures  are called to the RN and reported as soon as  they are identified.      Narrative:      Per Hospital Policy: Only change Specimen Src: to \"Line\" if  specified by physician order.  Right Forearm/Arm    Urinalysis [[id number]]  (Abnormal) Collected: 05/04/23 1850    Order Status: Completed Specimen: Urine Updated: 05/04/23 1920     Color Yellow     Character Clear     Specific Gravity 1.019     Ph 5.0     Glucose Negative mg/dL      Ketones Negative mg/dL      Protein 30 mg/dL      Bilirubin Negative     Urobilinogen, Urine 0.2     " "Nitrite Negative     Leukocyte Esterase Trace     Occult Blood Negative     Micro Urine Req Microscopic    Narrative:      Indication for culture:->Evaluation for sepsis without a  clear source of infection             PHYSICAL EXAMINATION:     VITAL SIGNS: BP (!) 151/78 Comment: Rn Notifed  Pulse 60   Temp 36.4 °C (97.6 °F) (Temporal)   Resp 17   Ht 1.778 m (5' 10\")   Wt 72 kg (158 lb 11.7 oz)   SpO2 99%   BMI 22.78 kg/m²       General Appearance:  Well developed, well nourished, in no acute distress      Lower Extremity Assessment:    Edema:   Moderate right leg worse than left leg    Structural /mechanical changes:  Bilateral BKA    Pulses:  Bilateral popliteal nonpalpable, sounds warm.  With Doppler brisk tones noted to bilateral popliteal.      Wound Assessment:    Wound(s)   location: Right BKA  Full thickness  Wound characteristics: Black eschar to majority of the stump and posterior.  No fluctuance.  No drainage.  Erythema: None  Edema: Moderate  Drainage: None  Odor: None  Measures: 4 x 15 by IVETTE    Wound(s)   location: Left BKA  Full thickness  Wound characteristics: Black eschar, no fluctuance   erythema: None  Edema: Moderate  Drainage: None  Odor: None  Measures: 4 x 12 by IVETTE    Wound(s)   location: Left posterior thigh  Wound characteristics: Pink intact scar tissue, yellow-brown eschar  Erythema: None  Edema: Mild  Drainage: None  Odor: None          Wound photo:                       ASSESSMENT AND PLAN:   53 y.o. admitted for Wound infection [T14.8XXA, L08.9]. Presents with bilateral BKA eschar    Bilateral BKA eschar no longer fluctuant.  However right BKA significant pain with light palpation compared to left BKA.  - Bilateral tib-fib MRI completed.  Ortho updated on MRI results.  - NPO MN  - Bilateral BKA I&D with VAC, possible revision with Dr. Lux tomorrow              Wound care:   -Wound care orders placed for nursing by [facility]   -Bilateral BKA eschar: Cleanse with NS, pat dry.  " Apply Betadine to all areas of eschar, allowed to dry.  Leave open to air  Left posterior thigh: Cleanse with NS, pat dry.  Apply Betadine, allowed to air dry.  Protect with silicone adhesive foam        Vascular status:   Bilateral popliteal nonpalpable.  With Doppler brisk tones noted bilaterally    Surgery:   NPO MN  B BKA I and D with Dr. Lux tomorrow       Antibiotics:   -currently on antibiotics managed by hospitalist       Weight Bearing Status:   -Right BKA: Non Weight bearing  -Left BKA: Non Weight bearing        PT/OT:   -consult in place                Discharge Plan:  -TBD, patient currently homeless      D/W: pt, RN, Dr. Lux    Please note that this dictation was created using voice recognition software. I have  worked with technical experts from [employer] to optimize the interface.  I have made every reasonable attempt to correct obvious errors, but there may be errors of grammar and possibly content that I did not discover before finalizing the note.    Please contact [facility] through [employer].

## 2023-05-09 NOTE — PROGRESS NOTES
[facility]GRESS [facility]     Attending:   Dr. 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  Admitted 5/4/2023 for bilateral stump infections.    SUBJECTIVE:   No acute events overnight. Patient is resting during exam today. She states her pain was controlled overnight.     OBJECTIVE:  Vitals:    05/08/23 1727 05/08/23 2015 05/08/23 2333 05/09/23 0406   BP: 137/86 133/80 123/84 (!) 137/90   Pulse: 80 70 67 (!) 58   Resp: 17 16 18 20   Temp: 36.3 °C (97.3 °F) 37.1 °C (98.7 °F) 36.4 °C (97.6 °F) 37 °C (98.6 °F)   TempSrc: Temporal Temporal Temporal Temporal   SpO2: 99% 98% 98% 99%   Weight:       Height:           Intake/Output Summary (Last 24 hours) at 5/9/2023 0525  Last data filed at 5/8/2023 2040  Gross per 24 hour   Intake 150 ml   Output --   Net 150 ml       PHYSICAL EXAM:   General: No acute distress, afebrile, resting comfortably.  HEENT: NC/AT. EOMI.   Cardiovascular: RRR without murmurs, rubs, heaves. Normal capillary refill   Respiratory: CTAB, no tachypnea or retractions   Abdomen: normal bowel sounds, soft, nontender, nondistended, no masses, no organomegaly   EXT: Bilateral BKA.  Right stump with eschar and closed wound present.  Minimal tenderness to palpation.  Left stump with eschar and closed wounds at base.  Tenderness to palpation present.  Neuro: Non-focal, alert and orientated       LABS:  Recent Labs     05/07/23  0024 05/08/23  1508   WBC 5.4 3.8*   RBC 3.13* 3.32*   HEMOGLOBIN 8.0* 8.2*   HEMATOCRIT 25.7* 26.6*   MCV 82.1 80.1*   MCH 25.6* 24.7*   RDW 48.5 47.1   PLATELETCT 395 467*   MPV 10.0 9.2   NEUTSPOLYS 57.90 56.60   LYMPHOCYTES 28.90 31.40   MONOCYTES 11.30 9.20   EOSINOPHILS 1.10 1.80   BASOPHILS 0.40 0.50     Recent Labs     05/08/23  1123 05/09/23  0037   SODIUM 141 144    POTASSIUM 3.4* 3.4*   CHLORIDE 108 109   CO2 21 24   BUN 5* 9   CREATININE 0.70 0.91   CALCIUM 7.8* 7.8*     Estimated GFR/CRCL = Estimated Creatinine Clearance: 75.5 mL/min (by C-G formula based on SCr of 0.91 mg/dL).  Recent Labs     05/08/23  1123 05/09/23  0037   GLUCOSE 94 90                 No results for input(s): INR, APTT, FIBRINOGEN in the last 72 hours.    Invalid input(s): DIMER    MICROBIOLOGY:   Blood Culture Hold   Date Value Ref Range Status   04/08/2023 Collected  Final        IMAGING:   [id number]-WITH & W/O LEFT   Final Result      1.  Early osteomyelitis of the distal aspect of the left tibial stump      2.  Negative for abscess      3.  Left knee joint effusion      [id number]-WITH & W/O RIGHT   Final Result      1.  No roc osteomyelitis      2.  Edema within the distal tibial stump evident on T2-weighted imaging and possibly minimal change on T1-weighted imaging. This could indicate very early osteomyelitis although at this point is not specific.      3.  Negative for abscess      DX-CHEST-LIMITED (1 VIEW)   Final Result         No acute cardiac or pulmonary abnormality is identified.          CULTURES:   Results       Procedure Component Value Units Date/Time    C Diff by PCR rflx Toxin [[id number]] Collected: 05/07/23 1256    Order Status: Completed Specimen: Stool Updated: 05/07/23 1544     C Diff by PCR Negative     Comment: C. difficile NOT detected by PCR.    Acute diarrhea Special Contact Precautions is required  until 48 hours after diarrhea has resolved, patient’s stool  has returned to baseline or until an infectious agent is  no longer suspected.  Treatment not indicated per guidelines.  Repeat testing not indicated within 7 days.          027-NAP1-BI Presumptive Negative     Comment: Presumptive 027/NAP1/BI target DNA sequences are NOT DETECTED.       Narrative:      Special Contact Isolation  Collected By: [id number] ERIC ALSTON  Does this patient have risk factors  "for C-diff?->Yes  C-Diff Risk Factors->antibiotic exposure    Urine Culture (New) [[id number]] Collected: 05/04/23 1850    Order Status: Completed Specimen: Urine Updated: 05/07/23 0614     Significant Indicator NEG     Source UR     Site -     Culture Result No growth at 48 hours.    Narrative:      Indication for culture:->Evaluation for sepsis without a  clear source of infection  Indication for culture:->Evaluation for sepsis without a    Blood Culture [[id number]] Collected: 05/04/23 1612    Order Status: Completed Specimen: Blood from Peripheral Updated: 05/05/23 0747     Significant Indicator NEG     Source BLD     Site PERIPHERAL     Culture Result No Growth  Note: Blood cultures are incubated for 5 days and  are monitored continuously.Positive blood cultures  are called to the RN and reported as soon as  they are identified.      Narrative:      Per Hospital Policy: Only change Specimen Src: to \"Line\" if  specified by physician order.  Left Forearm/Arm    Blood Culture [[id number]] Collected: 05/04/23 1612    Order Status: Completed Specimen: Blood from Peripheral Updated: 05/05/23 0747     Significant Indicator NEG     Source BLD     Site PERIPHERAL     Culture Result No Growth  Note: Blood cultures are incubated for 5 days and  are monitored continuously.Positive blood cultures  are called to the RN and reported as soon as  they are identified.      Narrative:      Per Hospital Policy: Only change Specimen Src: to \"Line\" if  specified by physician order.  Right Forearm/Arm    Urinalysis [[id number]]  (Abnormal) Collected: 05/04/23 1850    Order Status: Completed Specimen: Urine Updated: 05/04/23 1920     Color Yellow     Character Clear     Specific Gravity 1.019     Ph 5.0     Glucose Negative mg/dL      Ketones Negative mg/dL      Protein 30 mg/dL      Bilirubin Negative     Urobilinogen, Urine 0.2     Nitrite Negative     Leukocyte Esterase Trace     Occult Blood Negative     Micro Urine Req Microscopic    " Narrative:      Indication for culture:->Evaluation for sepsis without a  clear source of infection            MEDS:  Current Facility-Administered Medications   Medication Last Admin    loperamide (IMODIUM) capsule 2 mg 2 mg at 23    potassium chloride SA (Kdur) tablet 40 mEq 40 mEq at 23    ampicillin/sulbactam (UNASYN) 3 g in  mL IVPB 3 g at 23 0451    MD Alert...Vancomycin per Pharmacy      bictegravir-emtricitab-TAF (Biktarvy) -25 mg tablet 1 Tablet 1 Tablet at 23 170    divalproex ER (DEPAKOTE ER) tablet 500 mg 500 mg at 23    OLANZapine (ZYPREXA) tablet 5 mg 5 mg at 23 170    Pharmacy Consult Request ...Pain Management Review 1 Each      acetaminophen (TYLENOL) tablet 1,000 mg 1,000 mg at 23    Followed by    [START ON 5/10/2023] acetaminophen (TYLENOL) tablet 1,000 mg      ibuprofen (MOTRIN) tablet 800 mg 800 mg at 23    Followed by    [START ON 5/10/2023] ibuprofen (MOTRIN) tablet 800 mg      oxyCODONE immediate-release (ROXICODONE) tablet 5 mg      Or    oxyCODONE immediate release (ROXICODONE) tablet 10 mg 10 mg at 239    Or    HYDROmorphone (Dilaudid) injection 0.5 mg      metoprolol tartrate (LOPRESSOR) tablet 25 mg 25 mg at 23    heparin injection 5,000 Units 5,000 Units at 234    vancomycin (VANCOCIN) 750 mg in  mL IVPB Stopped at 23 2308       ASSESSMENT/PLAN: Kellie Chatman is a 53-year-old female with a PMHx HIV, schizophrenia, bipolar on Zyprexa and Depakote; 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  Admitted 2023 for bilateral stump infections.    Chronic multifocal osteomyelitis of multiple sites (HCC)  Assessment & Plan  MRI showin.  Early osteomyelitis of the distal aspect of the left tibial stump  -Continue vancomycin and Unasyn  - ID consulting - Dr. Manzo recommending Vanco and Unasyn for now. Possibly Linezolid  pending medication interactions. Waiting for wound cultures prior to changing regimen.   - LSP following  - Planning for OR on 5/10/2023 with Dr. Lux for I&D and possible revision  - N.p.o. at midnight on 5/9  - Hold heparin at midnight on 5/9    History of atrial fibrillation  Assessment & Plan  On discharge from [facility] previously patient was noted to be in atrial fibrillation.  No record on EKGs through [facility].  - Continue metoprolol 25 mg twice daily    History of seizures  Assessment & Plan  Unclear history of seizure activity.  - Continue Depakote 500 mg daily for history of seizures    History of DVT (deep vein thrombosis)  Assessment & Plan  Hold home Eliquis while pending surgery.  Plan for resumption after cleared by surgery  - Continue heparin at this time    Homelessness  Assessment & Plan  Patient was previously staying at [facility].  Recommend social work consultation prior to discharge for safe discharge planning.    HIV (human immunodeficiency virus infection) ([facility])  Assessment & Plan  Unclear diagnosis.  Reactive HIV assay upon admission.  Patient continues to have normal CD4 counts (CD4 752 at 3/2023)  - ID following  - Confirmatory testing pending  - Continue Biktarvy daily    Hypokalemia  Assessment & Plan  Resolved.  Continue to monitor.    Schizophrenia ([facility])  Assessment & Plan  - Continue home olanzapine 5 mg twice daily      * Wound infection  Assessment & Plan  - Continue vancomycin and Unasyn.        Core Measures:   Fluids: PO intake  Lines: IVP  Abx: Vanco and Unasyn until wound cultures are obtained   DVT prophylaxis: Heparin; holding at mn  Code Status: full    Disposition: inpatient for planned OR 5/10 for bilateral BKA I&D and possible revision     Dilcia Lovelace MD   PGY-3 Family Medicine Resident   [facility]

## 2023-05-09 NOTE — CARE PLAN
The patient is Stable - Low risk of patient condition declining or worsening    Shift Goals  Clinical Goals: compliance to treatment  Patient Goals: good rest  Family Goals: IVETTE    Progress made toward(s) clinical / shift goals:    Problem: Knowledge Deficit - Standard  Goal: Patient and family/care givers will demonstrate understanding of plan of care, disease process/condition, diagnostic tests and medications  Outcome: Progressing  Note: Pt will increase knowledge about her medications, and  treatments needed for her faster recovery.     Problem: Skin Integrity  Goal: Skin integrity is maintained or improved  Outcome: Progressing  Note: Improve wounds and prevent any signs of infection.     Problem: Fall Risk  Goal: Patient will remain free from falls  Outcome: Progressing  Note: Maintain safety precautions at all times.       Patient is not progressing towards the following goals:

## 2023-05-09 NOTE — CARE PLAN
"The patient is Stable - Low risk of patient condition declining or worsening    Shift Goals  Clinical Goals: engament and compliance in care, pain management  Patient Goals: \"I'm fine\" \"sleep\"  Family Goals: not present, IVETTE    Progress made toward(s) clinical / shift goals:  met      Problem: Knowledge Deficit - Standard  Goal: Patient and family/care givers will demonstrate understanding of plan of care, disease process/condition, diagnostic tests and medications  Outcome: Progressing   Note: patient involved in POC, educated and verbalized understanding. Was educated to voice any questions or concerns      Problem: Skin Integrity  Goal: Skin integrity is maintained or improved  Outcome: Progressing  Note: Skin care done, integrity maintained, patient turns self, was educated on skin care and verbalized understanding.     "

## 2023-05-09 NOTE — DISCHARGE PLANNING
Case Management Discharge Planning    Admission Date: 5/4/2023  GMLOS: 3.5  ALOS: 5    6-Clicks ADL Score: 17  6-Clicks Mobility Score: 11  PT and/or OT Eval ordered: Yes  Post-acute Referrals Ordered: No  Post-acute Choice Obtained: No  Has referral(s) been sent to post-acute provider:  No      Anticipated Discharge Dispo: Discharge Disposition: D/T to SNF with Medicare cert in anticipation of skilled care (03)    DME Needed: No    Action(s) Taken: OTHER    Per medical team, patient has surgery scheduled for Wednesday. Patient is not medically clear. Patient is pending PT/OT evaluation. Patient is homeless.    @1574: Received message patient has a  by the name of Laine Davis. [phone].     Escalations Completed: None    Medically Clear: No    Next Steps: LSW to follow up with patient and medical team regarding discharge needs and barriers.     Barriers to Discharge: Medical clearance and Pending PT Evaluation

## 2023-05-10 ENCOUNTER — ANESTHESIA EVENT (OUTPATIENT)
Dept: SURGERY | Facility: MEDICAL CENTER | Age: 53
DRG: 854 | End: 2023-05-10
Payer: MEDICAID

## 2023-05-10 ENCOUNTER — ANESTHESIA (OUTPATIENT)
Dept: SURGERY | Facility: MEDICAL CENTER | Age: 53
DRG: 854 | End: 2023-05-10
Payer: MEDICAID

## 2023-05-10 LAB
ANION GAP SERPL CALC-SCNC: 12 MMOL/L (ref 7–16)
BUN SERPL-MCNC: 7 MG/DL (ref 8–22)
CALCIUM SERPL-MCNC: 8.4 MG/DL (ref 8.5–10.5)
CHLORIDE SERPL-SCNC: 111 MMOL/L (ref 96–112)
CO2 SERPL-SCNC: 23 MMOL/L (ref 20–33)
CREAT SERPL-MCNC: 0.7 MG/DL (ref 0.5–1.4)
GFR SERPLBLD CREATININE-BSD FMLA CKD-EPI: 102 ML/MIN/1.73 M 2
GLUCOSE SERPL-MCNC: 71 MG/DL (ref 65–99)
POTASSIUM SERPL-SCNC: 4.3 MMOL/L (ref 3.6–5.5)
SODIUM SERPL-SCNC: 146 MMOL/L (ref 135–145)

## 2023-05-10 PROCEDURE — 770020 HCHG ROOM/CARE - TELE (206)

## 2023-05-10 PROCEDURE — 700105 HCHG RX REV CODE 258: Performed by: STUDENT IN AN ORGANIZED HEALTH CARE EDUCATION/TRAINING PROGRAM

## 2023-05-10 PROCEDURE — 160009 HCHG ANES TIME/MIN: Performed by: ORTHOPAEDIC SURGERY

## 2023-05-10 PROCEDURE — 700111 HCHG RX REV CODE 636 W/ 250 OVERRIDE (IP): Performed by: STUDENT IN AN ORGANIZED HEALTH CARE EDUCATION/TRAINING PROGRAM

## 2023-05-10 PROCEDURE — 700111 HCHG RX REV CODE 636 W/ 250 OVERRIDE (IP): Performed by: ANESTHESIOLOGY

## 2023-05-10 PROCEDURE — 00400 ANES INTEGUMENTARY SYS NOS: CPT | Performed by: ANESTHESIOLOGY

## 2023-05-10 PROCEDURE — 700102 HCHG RX REV CODE 250 W/ 637 OVERRIDE(OP): Performed by: ANESTHESIOLOGY

## 2023-05-10 PROCEDURE — 8968 PR NO CHARGE - PROCEDURE: Mod: 80ROC | Performed by: STUDENT IN AN ORGANIZED HEALTH CARE EDUCATION/TRAINING PROGRAM

## 2023-05-10 PROCEDURE — 160035 HCHG PACU - 1ST 60 MINS PHASE I: Performed by: ORTHOPAEDIC SURGERY

## 2023-05-10 PROCEDURE — 160002 HCHG RECOVERY MINUTES (STAT): Performed by: ORTHOPAEDIC SURGERY

## 2023-05-10 PROCEDURE — 160038 HCHG SURGERY MINUTES - EA ADDL 1 MIN LEVEL 2: Performed by: ORTHOPAEDIC SURGERY

## 2023-05-10 PROCEDURE — A9270 NON-COVERED ITEM OR SERVICE: HCPCS

## 2023-05-10 PROCEDURE — A9270 NON-COVERED ITEM OR SERVICE: HCPCS | Performed by: ANESTHESIOLOGY

## 2023-05-10 PROCEDURE — 700102 HCHG RX REV CODE 250 W/ 637 OVERRIDE(OP): Performed by: BEHAVIOR ANALYST

## 2023-05-10 PROCEDURE — 97606 NEG PRS WND THER DME>50 SQCM: CPT | Performed by: ORTHOPAEDIC SURGERY

## 2023-05-10 PROCEDURE — 700111 HCHG RX REV CODE 636 W/ 250 OVERRIDE (IP): Performed by: INTERNAL MEDICINE

## 2023-05-10 PROCEDURE — 0JBN0ZZ EXCISION OF RIGHT LOWER LEG SUBCUTANEOUS TISSUE AND FASCIA, OPEN APPROACH: ICD-10-PCS | Performed by: ORTHOPAEDIC SURGERY

## 2023-05-10 PROCEDURE — A9270 NON-COVERED ITEM OR SERVICE: HCPCS | Performed by: BEHAVIOR ANALYST

## 2023-05-10 PROCEDURE — 700101 HCHG RX REV CODE 250: Performed by: ANESTHESIOLOGY

## 2023-05-10 PROCEDURE — 99222 1ST HOSP IP/OBS MODERATE 55: CPT | Mod: 24,57 | Performed by: ORTHOPAEDIC SURGERY

## 2023-05-10 PROCEDURE — 11045 DBRDMT SUBQ TISS EACH ADDL: CPT | Mod: 59 | Performed by: ORTHOPAEDIC SURGERY

## 2023-05-10 PROCEDURE — 700102 HCHG RX REV CODE 250 W/ 637 OVERRIDE(OP)

## 2023-05-10 PROCEDURE — 36415 COLL VENOUS BLD VENIPUNCTURE: CPT

## 2023-05-10 PROCEDURE — 160048 HCHG OR STATISTICAL LEVEL 1-5: Performed by: ORTHOPAEDIC SURGERY

## 2023-05-10 PROCEDURE — 97606 NEG PRS WND THER DME>50 SQCM: CPT | Mod: 80ROC | Performed by: STUDENT IN AN ORGANIZED HEALTH CARE EDUCATION/TRAINING PROGRAM

## 2023-05-10 PROCEDURE — 99232 SBSQ HOSP IP/OBS MODERATE 35: CPT | Mod: GC | Performed by: FAMILY MEDICINE

## 2023-05-10 PROCEDURE — 0JBP0ZZ EXCISION OF LEFT LOWER LEG SUBCUTANEOUS TISSUE AND FASCIA, OPEN APPROACH: ICD-10-PCS | Performed by: ORTHOPAEDIC SURGERY

## 2023-05-10 PROCEDURE — 700105 HCHG RX REV CODE 258: Performed by: ANESTHESIOLOGY

## 2023-05-10 PROCEDURE — 80048 BASIC METABOLIC PNL TOTAL CA: CPT

## 2023-05-10 PROCEDURE — 700105 HCHG RX REV CODE 258: Performed by: INTERNAL MEDICINE

## 2023-05-10 PROCEDURE — 11042 DBRDMT SUBQ TIS 1ST 20SQCM/<: CPT | Performed by: ORTHOPAEDIC SURGERY

## 2023-05-10 PROCEDURE — 160027 HCHG SURGERY MINUTES - 1ST 30 MINS LEVEL 2: Performed by: ORTHOPAEDIC SURGERY

## 2023-05-10 RX ORDER — DIPHENHYDRAMINE HYDROCHLORIDE 50 MG/ML
12.5 INJECTION INTRAMUSCULAR; INTRAVENOUS
Status: DISCONTINUED | OUTPATIENT
Start: 2023-05-10 | End: 2023-05-10 | Stop reason: HOSPADM

## 2023-05-10 RX ORDER — HYDROMORPHONE HYDROCHLORIDE 1 MG/ML
0.4 INJECTION, SOLUTION INTRAMUSCULAR; INTRAVENOUS; SUBCUTANEOUS
Status: DISCONTINUED | OUTPATIENT
Start: 2023-05-10 | End: 2023-05-10 | Stop reason: HOSPADM

## 2023-05-10 RX ORDER — HYDROMORPHONE HYDROCHLORIDE 1 MG/ML
0.1 INJECTION, SOLUTION INTRAMUSCULAR; INTRAVENOUS; SUBCUTANEOUS
Status: DISCONTINUED | OUTPATIENT
Start: 2023-05-10 | End: 2023-05-10 | Stop reason: HOSPADM

## 2023-05-10 RX ORDER — HYDRALAZINE HYDROCHLORIDE 20 MG/ML
5 INJECTION INTRAMUSCULAR; INTRAVENOUS
Status: DISCONTINUED | OUTPATIENT
Start: 2023-05-10 | End: 2023-05-10 | Stop reason: HOSPADM

## 2023-05-10 RX ORDER — HALOPERIDOL 5 MG/ML
1 INJECTION INTRAMUSCULAR
Status: DISCONTINUED | OUTPATIENT
Start: 2023-05-10 | End: 2023-05-10 | Stop reason: HOSPADM

## 2023-05-10 RX ORDER — HYDROMORPHONE HYDROCHLORIDE 1 MG/ML
0.2 INJECTION, SOLUTION INTRAMUSCULAR; INTRAVENOUS; SUBCUTANEOUS
Status: DISCONTINUED | OUTPATIENT
Start: 2023-05-10 | End: 2023-05-10 | Stop reason: HOSPADM

## 2023-05-10 RX ORDER — DEXAMETHASONE SODIUM PHOSPHATE 4 MG/ML
INJECTION, SOLUTION INTRA-ARTICULAR; INTRALESIONAL; INTRAMUSCULAR; INTRAVENOUS; SOFT TISSUE PRN
Status: DISCONTINUED | OUTPATIENT
Start: 2023-05-10 | End: 2023-05-10 | Stop reason: SURG

## 2023-05-10 RX ORDER — ONDANSETRON 2 MG/ML
4 INJECTION INTRAMUSCULAR; INTRAVENOUS
Status: DISCONTINUED | OUTPATIENT
Start: 2023-05-10 | End: 2023-05-10 | Stop reason: HOSPADM

## 2023-05-10 RX ORDER — CEFAZOLIN SODIUM 1 G/3ML
INJECTION, POWDER, FOR SOLUTION INTRAMUSCULAR; INTRAVENOUS PRN
Status: DISCONTINUED | OUTPATIENT
Start: 2023-05-10 | End: 2023-05-10 | Stop reason: SURG

## 2023-05-10 RX ORDER — ONDANSETRON 2 MG/ML
INJECTION INTRAMUSCULAR; INTRAVENOUS PRN
Status: DISCONTINUED | OUTPATIENT
Start: 2023-05-10 | End: 2023-05-10 | Stop reason: SURG

## 2023-05-10 RX ORDER — MIDAZOLAM HYDROCHLORIDE 1 MG/ML
INJECTION INTRAMUSCULAR; INTRAVENOUS PRN
Status: DISCONTINUED | OUTPATIENT
Start: 2023-05-10 | End: 2023-05-10 | Stop reason: SURG

## 2023-05-10 RX ORDER — OXYCODONE HCL 5 MG/5 ML
5 SOLUTION, ORAL ORAL
Status: COMPLETED | OUTPATIENT
Start: 2023-05-10 | End: 2023-05-10

## 2023-05-10 RX ORDER — LIDOCAINE HYDROCHLORIDE 20 MG/ML
INJECTION, SOLUTION EPIDURAL; INFILTRATION; INTRACAUDAL; PERINEURAL PRN
Status: DISCONTINUED | OUTPATIENT
Start: 2023-05-10 | End: 2023-05-10 | Stop reason: SURG

## 2023-05-10 RX ORDER — SODIUM CHLORIDE, SODIUM LACTATE, POTASSIUM CHLORIDE, CALCIUM CHLORIDE 600; 310; 30; 20 MG/100ML; MG/100ML; MG/100ML; MG/100ML
INJECTION, SOLUTION INTRAVENOUS CONTINUOUS
Status: DISCONTINUED | OUTPATIENT
Start: 2023-05-10 | End: 2023-05-10 | Stop reason: HOSPADM

## 2023-05-10 RX ORDER — SODIUM CHLORIDE, SODIUM LACTATE, POTASSIUM CHLORIDE, CALCIUM CHLORIDE 600; 310; 30; 20 MG/100ML; MG/100ML; MG/100ML; MG/100ML
INJECTION, SOLUTION INTRAVENOUS
Status: DISCONTINUED | OUTPATIENT
Start: 2023-05-10 | End: 2023-05-10 | Stop reason: SURG

## 2023-05-10 RX ORDER — EPHEDRINE SULFATE 50 MG/ML
5 INJECTION, SOLUTION INTRAVENOUS
Status: DISCONTINUED | OUTPATIENT
Start: 2023-05-10 | End: 2023-05-10 | Stop reason: HOSPADM

## 2023-05-10 RX ORDER — OXYCODONE HCL 5 MG/5 ML
10 SOLUTION, ORAL ORAL
Status: COMPLETED | OUTPATIENT
Start: 2023-05-10 | End: 2023-05-10

## 2023-05-10 RX ORDER — LABETALOL HYDROCHLORIDE 5 MG/ML
5 INJECTION, SOLUTION INTRAVENOUS
Status: DISCONTINUED | OUTPATIENT
Start: 2023-05-10 | End: 2023-05-10 | Stop reason: HOSPADM

## 2023-05-10 RX ADMIN — METOPROLOL TARTRATE 25 MG: 25 TABLET, FILM COATED ORAL at 16:52

## 2023-05-10 RX ADMIN — DIVALPROEX SODIUM 500 MG: 500 TABLET, EXTENDED RELEASE ORAL at 16:47

## 2023-05-10 RX ADMIN — FENTANYL CITRATE 50 MCG: 50 INJECTION INTRAMUSCULAR; INTRAVENOUS at 10:57

## 2023-05-10 RX ADMIN — VANCOMYCIN HYDROCHLORIDE 750 MG: 5 INJECTION, POWDER, LYOPHILIZED, FOR SOLUTION INTRAVENOUS at 14:16

## 2023-05-10 RX ADMIN — HYDROMORPHONE HYDROCHLORIDE 0.2 MG: 1 INJECTION, SOLUTION INTRAMUSCULAR; INTRAVENOUS; SUBCUTANEOUS at 11:48

## 2023-05-10 RX ADMIN — METOPROLOL TARTRATE 25 MG: 25 TABLET, FILM COATED ORAL at 04:48

## 2023-05-10 RX ADMIN — OLANZAPINE 5 MG: 5 TABLET, FILM COATED ORAL at 16:48

## 2023-05-10 RX ADMIN — AMPICILLIN SODIUM AND SULBACTAM SODIUM 3 G: 2; 1 INJECTION, POWDER, FOR SOLUTION INTRAMUSCULAR; INTRAVENOUS at 23:54

## 2023-05-10 RX ADMIN — FENTANYL CITRATE 50 MCG: 50 INJECTION, SOLUTION INTRAMUSCULAR; INTRAVENOUS at 11:43

## 2023-05-10 RX ADMIN — OXYCODONE HYDROCHLORIDE 10 MG: 5 SOLUTION ORAL at 11:44

## 2023-05-10 RX ADMIN — DIVALPROEX SODIUM 500 MG: 500 TABLET, EXTENDED RELEASE ORAL at 04:48

## 2023-05-10 RX ADMIN — POTASSIUM CHLORIDE 40 MEQ: 1500 TABLET, EXTENDED RELEASE ORAL at 04:51

## 2023-05-10 RX ADMIN — LIDOCAINE HYDROCHLORIDE 100 MG: 20 INJECTION, SOLUTION EPIDURAL; INFILTRATION; INTRACAUDAL at 10:57

## 2023-05-10 RX ADMIN — DEXAMETHASONE SODIUM PHOSPHATE 4 MG: 4 INJECTION INTRA-ARTICULAR; INTRALESIONAL; INTRAMUSCULAR; INTRAVENOUS; SOFT TISSUE at 11:02

## 2023-05-10 RX ADMIN — AMPICILLIN SODIUM AND SULBACTAM SODIUM 3 G: 2; 1 INJECTION, POWDER, FOR SOLUTION INTRAMUSCULAR; INTRAVENOUS at 04:49

## 2023-05-10 RX ADMIN — AMPICILLIN SODIUM AND SULBACTAM SODIUM 3 G: 2; 1 INJECTION, POWDER, FOR SOLUTION INTRAMUSCULAR; INTRAVENOUS at 16:46

## 2023-05-10 RX ADMIN — ONDANSETRON 4 MG: 2 INJECTION INTRAMUSCULAR; INTRAVENOUS at 11:20

## 2023-05-10 RX ADMIN — MIDAZOLAM HYDROCHLORIDE 2 MG: 1 INJECTION, SOLUTION INTRAMUSCULAR; INTRAVENOUS at 10:57

## 2023-05-10 RX ADMIN — PROPOFOL 150 MG: 10 INJECTION, EMULSION INTRAVENOUS at 10:57

## 2023-05-10 RX ADMIN — SODIUM CHLORIDE, POTASSIUM CHLORIDE, SODIUM LACTATE AND CALCIUM CHLORIDE: 600; 310; 30; 20 INJECTION, SOLUTION INTRAVENOUS at 10:52

## 2023-05-10 RX ADMIN — BICTEGRAVIR SODIUM, EMTRICITABINE, AND TENOFOVIR ALAFENAMIDE FUMARATE 1 TABLET: 50; 200; 25 TABLET ORAL at 16:47

## 2023-05-10 RX ADMIN — CEFAZOLIN 2 G: 1 INJECTION, POWDER, FOR SOLUTION INTRAMUSCULAR; INTRAVENOUS at 11:04

## 2023-05-10 ASSESSMENT — PAIN DESCRIPTION - PAIN TYPE
TYPE: SURGICAL PAIN
TYPE: SURGICAL PAIN
TYPE: ACUTE PAIN

## 2023-05-10 NOTE — PROGRESS NOTES
Sinus rhythm with prolonged QT interval. One PVC is present. Page 2 showing 9 beats of paroxsymal atrial tachycardia with heart rate up to 157.   HR 70, regular  NH 0.13  QRS 0.08  QT 0.45

## 2023-05-10 NOTE — CARE PLAN
The patient is Stable - Low risk of patient condition declining or worsening    Shift Goals  Clinical Goals: Enagement in care, pain management  Patient Goals: Sleep  Family Goals: IVETTE    Progress made toward(s) clinical / shift goals:    Problem: Knowledge Deficit - Standard  Goal: Patient and family/care givers will demonstrate understanding of plan of care, disease process/condition, diagnostic tests and medications  Outcome: Progressing  Note: Patient verbalizes understanding of plan of care and barriers to discharge. Scheduled for I&D of BL BKA today.      Problem: Skin Integrity  Goal: Skin integrity is maintained or improved  Outcome: Progressing  Note: Skin remains intact this shift. No new abrasions or wounds observed.        Problem: Fall Risk  Goal: Patient will remain free from falls  Outcome: Progressing  Note: Patient remained free from falls this shift. Call light in reach, educated patient on calling if needing assistance. Patient belongings in reach. Equipment out of sight.          Patient is not progressing towards the following goals:

## 2023-05-10 NOTE — CONSULTS
Date of Service: 05/10/23    Kellie Chatman was seen today in consultation for bilateral below-knee amputation stump wounds     CC: Bilateral leg wounds    HPI: Kellie Chatman is a 53 y.o. female who presents with complaints of pain to bilateral legs.  Started back in November of last year when she developed frostbite to bilateral lower extremities.  She has a history of homelessness as well as methamphetamine abuse.  At that time she was admitted at [facility] where vascular work-up showed popliteal occlusion on the right side.  She undergone initial right below-knee amputation as well as a left transmetatarsal amputation.  This needed to be later converted to a below-knee amputation as well.  The wounds have slowly healed but incompletely.  She continues to have eschar present over the stumps of bilateral lower extremities.  Recent MRIs were done which did not show any fluid collections within the stumps but was read as having possible osteomyelitis.    PMH:   Past Medical History:   Diagnosis Date    Frostbite of foot, left     Frostbite of foot, right     HIV (human immunodeficiency virus infection) ([facility])        PSH:   Past Surgical History:   Procedure Laterality Date    KNEE AMPUTATION BELOW Left 03/22/2023    Procedure: AMPUTATION, BELOW KNEE;  Surgeon: Nas Lux M.D.;  Location: SURGERY [facility];  Service: Orthopedics    AMPUTATION, BELOW THE KNEE Right        FH: History reviewed. No pertinent family history.    SH:   Social History     Socioeconomic History    Marital status: Single     Spouse name: Not on file    Number of children: Not on file    Years of education: Not on file    Highest education level: Not on file   Occupational History    Not on file   Tobacco Use    Smoking status: Every Day     Packs/day: 0.25     Types: Cigarettes    Smokeless tobacco: Never   Vaping Use    Vaping Use: Never used   Substance and Sexual Activity    Alcohol use: Not Currently    Drug use: Yes      "Types: Inhaled     Comment: smokes meth    Sexual activity: Not on file   Other Topics Concern    Not on file   Social History Narrative    Not on file     Social Determinants of Health     Financial Resource Strain: Not on file   Food Insecurity: Not on file   Transportation Needs: Not on file   Physical Activity: Not on file   Stress: Not on file   Social Connections: Not on file   Intimate Partner Violence: Not on file   Housing Stability: Not on file       ROS: In review of the following systems: Constitutional, Eyes, ENT, Cardiovascular,Respiratory, GI, , Musculoskeletal, Skin, Neuro, Psych, Hematologic, Endocrine, Allergic; no pertinent findings were found related to the chief complaint and orthopedic injury     BP (!) 157/95   Pulse 61   Temp 36.3 °C (97.3 °F) (Temporal)   Resp 18   Ht 1.778 m (5' 10\")   Wt 72 kg (158 lb 11.7 oz)   SpO2 95%     Physical Exam:  General: Well nourished, well developed, age appropriate appearance   HEENT: Normocephalic, atraumatic  Psych: Normal mood and affect  Neck: Supple, no pain to motion  Chest/Pulmonary: breathing unlabored, no audible wheezing  Cardio: Regular heart rate and rhythm  Neuro: Sensation grossly intact to BUE and BLE, moving all four extremities  Skin: Intact with no full thickness abrasions or lacerations  MSK: Eschars are present to bilateral below-knee amputations.  No active drainage from the sites.  No effusion at the knee.    Imaging and labs: MRIs of bilateral lower extremities dated 5/6/2023 were independently reviewed by myself.  These show no fluid collections within the stumps to suggest abscess or deep seroma.  The posterior musculature appears to be well approximated to the anterior tibia without dehiscence.  Subtle irregularity to the right distal tibia that likely still could just be healing related to the previous amputation    Recent Labs     05/08/23  1508   WBC 3.8*   RBC 3.32*   HEMOGLOBIN 8.2*   HEMATOCRIT 26.6*   MCV 80.1*   MCH " 24.7*   RDW 47.1   PLATELETCT 467*   MPV 9.2   NEUTSPOLYS 56.60   LYMPHOCYTES 31.40   MONOCYTES 9.20   EOSINOPHILS 1.80   BASOPHILS 0.50       Assessment:   1. (Rule out) osteomyelitis        2. Postoperative infection, unspecified type, initial encounter            I discussed the diagnosis and findings with the patient at length.  I reviewed possible non operative and operative interventions and the risks and benefits of each of these.  I recommended debridement of the bilateral eschars and possible wound VAC placement.  She still may need more aggressive management in the future if these wounds do track deeper into the stumps or imaging in the future shows osteomyelitis.  She had a chance to ask questions and all of these were answered to her satisfaction.        Plan:  N.p.o.  Bilateral stump debridement  Wound VAC  Wound care  Nonweightbearing bilateral lower extremities

## 2023-05-10 NOTE — PROGRESS NOTES
[facility] MEDICINE PROGRESS NOTE     Attending:   Dr. 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  Admitted 5/4/2023 for bilateral stump infections.    SUBJECTIVE:   No acute events overnight. Patient was NPO at [facility] for planned surgery today.     OBJECTIVE:  Vitals:    05/10/23 0727 05/10/23 0953 05/10/23 1135 05/10/23 1145   BP: 131/78 (!) 157/95 139/88 (!) 159/92   Pulse: (!) 59 61 83 73   Resp: 16 18 18 13   Temp: 36.4 °C (97.6 °F) 36.3 °C (97.3 °F) 36.1 °C (96.9 °F)    TempSrc: Temporal Temporal Temporal    SpO2: 93% 95% 99% 93%   Weight:       Height:         No intake or output data in the 24 hours ending 05/10/23 0542    PHYSICAL EXAM:   General: No acute distress, afebrile, resting comfortably.  HEENT: NC/AT. EOMI.   Cardiovascular: RRR without murmurs, rubs, heaves. Normal capillary refill   Respiratory: CTAB, no tachypnea or retractions   Abdomen: normal bowel sounds, soft, nontender, nondistended, no masses, no organomegaly   EXT: Bilateral BKA.  Right stump with eschar and closed wound present.  Minimal tenderness to palpation.  Left stump with eschar and closed wounds at base.  Tenderness to palpation present.  Neuro: Non-focal, alert and orientated       LABS:  Recent Labs     05/08/23  1508   WBC 3.8*   RBC 3.32*   HEMOGLOBIN 8.2*   HEMATOCRIT 26.6*   MCV 80.1*   MCH 24.7*   RDW 47.1   PLATELETCT 467*   MPV 9.2   NEUTSPOLYS 56.60   LYMPHOCYTES 31.40   MONOCYTES 9.20   EOSINOPHILS 1.80   BASOPHILS 0.50     Recent Labs     05/08/23  1123 05/09/23  0037 05/10/23  0817   SODIUM 141 144 146*   POTASSIUM 3.4* 3.4* 4.3   CHLORIDE 108 109 111   CO2 21 24 23   BUN 5* 9 7*   CREATININE 0.70 0.91 0.70   CALCIUM 7.8* 7.8* 8.4*   MAGNESIUM  --  1.4*  --      Estimated GFR/CRCL = Estimated Creatinine Clearance: 98.2  "mL/min (by C-G formula based on SCr of 0.7 mg/dL).  Recent Labs     05/08/23  1123 05/09/23  0037 05/10/23  0817   GLUCOSE 94 90 71                 No results for input(s): INR, APTT, FIBRINOGEN in the last 72 hours.    Invalid input(s): DIMER    MICROBIOLOGY:   Blood Culture Hold   Date Value Ref Range Status   04/08/2023 Collected  Final        IMAGING:   [id number] & W/O LEFT   Final Result      1.  Early osteomyelitis of the distal aspect of the left tibial stump      2.  Negative for abscess      3.  Left knee joint effusion      [id number]-WITH & W/O RIGHT   Final Result      1.  No roc osteomyelitis      2.  Edema within the distal tibial stump evident on T2-weighted imaging and possibly minimal change on T1-weighted imaging. This could indicate very early osteomyelitis although at this point is not specific.      3.  Negative for abscess      DX-CHEST-LIMITED (1 VIEW)   Final Result         No acute cardiac or pulmonary abnormality is identified.          CULTURES:   Results       Procedure Component Value Units Date/Time    Blood Culture [[id number]] Collected: 05/04/23 1612    Order Status: Completed Specimen: Blood from Peripheral Updated: 05/09/23 1900     Significant Indicator NEG     Source BLD     Site PERIPHERAL     Culture Result No growth after 5 days of incubation.    Narrative:      Per Hospital Policy: Only change Specimen Src: to \"Line\" if  specified by physician order.  Left Forearm/Arm    Blood Culture [[id number]] Collected: 05/04/23 1612    Order Status: Completed Specimen: Blood from Peripheral Updated: 05/09/23 1900     Significant Indicator NEG     Source BLD     Site PERIPHERAL     Culture Result No growth after 5 days of incubation.    Narrative:      Per Hospital Policy: Only change Specimen Src: to \"Line\" if  specified by physician order.  Right Forearm/Arm    C Diff by PCR rflx Toxin [[id number]] Collected: 05/07/23 1256    Order Status: Completed Specimen: Stool " Updated: 05/07/23 1544     C Diff by PCR Negative     Comment: C. difficile NOT detected by PCR.    Acute diarrhea Special Contact Precautions is required  until 48 hours after diarrhea has resolved, patient’s stool  has returned to baseline or until an infectious agent is  no longer suspected.  Treatment not indicated per guidelines.  Repeat testing not indicated within 7 days.          027-NAP1-BI Presumptive Negative     Comment: Presumptive 027/NAP1/BI target DNA sequences are NOT DETECTED.       Narrative:      Special Contact Isolation  Collected By: [id number] ERIC ALSTON  Does this patient have risk factors for C-diff?->Yes  C-Diff Risk Factors->antibiotic exposure    Urine Culture (New) [[id number]] Collected: 05/04/23 1850    Order Status: Completed Specimen: Urine Updated: 05/07/23 0614     Significant Indicator NEG     Source UR     Site -     Culture Result No growth at 48 hours.    Narrative:      Indication for culture:->Evaluation for sepsis without a  clear source of infection  Indication for culture:->Evaluation for sepsis without a    Urinalysis [[id number]]  (Abnormal) Collected: 05/04/23 1850    Order Status: Completed Specimen: Urine Updated: 05/04/23 1920     Color Yellow     Character Clear     Specific Gravity 1.019     Ph 5.0     Glucose Negative mg/dL      Ketones Negative mg/dL      Protein 30 mg/dL      Bilirubin Negative     Urobilinogen, Urine 0.2     Nitrite Negative     Leukocyte Esterase Trace     Occult Blood Negative     Micro Urine Req Microscopic    Narrative:      Indication for culture:->Evaluation for sepsis without a  clear source of infection            MEDS:  Current Facility-Administered Medications   Medication Last Admin    ondansetron (ZOFRAN) syringe/vial injection 4 mg      haloperidol lactate (HALDOL) injection 1 mg      diphenhydrAMINE (BENADRYL) injection 12.5 mg      lactated ringers infusion      fentaNYL (SUBLIMAZE) injection 25 mcg      Or    fentaNYL  (SUBLIMAZE) injection 50 mcg 50 mcg at 05/10/23 1143    HYDROmorphone (Dilaudid) injection 0.1 mg      Or    HYDROmorphone (Dilaudid) injection 0.2 mg 0.2 mg at 05/10/23 1148    Or    HYDROmorphone (Dilaudid) injection 0.4 mg      ePHEDrine injection 5 mg      labetalol (NORMODYNE/TRANDATE) injection 5 mg      hydrALAZINE (APRESOLINE) injection 5 mg      albuterol (PROVENTIL) 2.5mg/0.5ml nebulizer solution 2.5 mg      [MAR Hold] loperamide (IMODIUM) capsule 2 mg 2 mg at 05/08/23 2323    [MAR Hold] potassium chloride SA (Kdur) tablet 40 mEq 40 mEq at 05/10/23 0451    [MAR Hold] ampicillin/sulbactam (UNASYN) 3 g in  mL IVPB Stopped at 05/10/23 0519    [MAR Hold] MD Alert...Vancomycin per Pharmacy      [MAR Hold] bictegravir-emtricitab-TAF (Biktarvy) -25 mg tablet 1 Tablet 1 Tablet at 05/09/23 1733    [MAR Hold] divalproex ER (DEPAKOTE ER) tablet 500 mg 500 mg at 05/10/23 0448    [MAR Hold] OLANZapine (ZYPREXA) tablet 5 mg 5 mg at 05/09/23 1732    [MAR Hold] Pharmacy Consult Request ...Pain Management Review 1 Each      [MAR Hold] acetaminophen (TYLENOL) tablet 1,000 mg      [MAR Hold] ibuprofen (MOTRIN) tablet 800 mg      [MAR Hold] oxyCODONE immediate-release (ROXICODONE) tablet 5 mg      Or    [MAR Hold] oxyCODONE immediate-release (ROXICODONE) tablet 10 mg 10 mg at 05/05/23 2149    Or    [MAR Hold] HYDROmorphone (Dilaudid) injection 0.5 mg      [MAR Hold] metoprolol tartrate (LOPRESSOR) tablet 25 mg 25 mg at 05/10/23 0448    [Held by provider] heparin injection 5,000 Units 5,000 Units at 05/09/23 1408    [MAR Hold] vancomycin (VANCOCIN) 750 mg in  mL IVPB Stopped at 05/09/23 2328       ASSESSMENT/PLAN: Kellie Chatman is a 53-year-old female with a PMHx HIV, schizophrenia, bipolar on Zyprexa and Depakote; 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  Admitted 5/4/2023 for bilateral stump infections.    Chronic multifocal osteomyelitis of multiple sites  ([facility])  Assessment & Plan  MRI showin.  Early osteomyelitis of the distal aspect of the left tibial stump  -Continue vancomycin and Unasyn  - ID consulting - Dr. Manzo recommending Vanco and Unasyn for now. Possibly Linezolid pending medication interactions. Waiting for wound cultures prior to changing regimen.   - LSP following  - Planning for OR on 5/10/2023 with Dr. Lux for I&D and possible revision  - Resume diet after OR today   - Hold heparin; plan to resume DOAC when cleared by ortho    History of atrial fibrillation  Assessment & Plan  On discharge from [facility] previously patient was noted to be in atrial fibrillation.  No record on EKGs through [facility].  - Continue metoprolol 25 mg twice daily  - Holding home eliquis due to planned surgical intervention. Resume when cleared by ortho  - Continue telemetry monitoring after OR 5/10; consider discontinuation at that time     History of seizures  Assessment & Plan  Unclear history of seizure activity.  - Continue Depakote 500 mg daily for history of seizures    History of DVT (deep vein thrombosis)  Assessment & Plan  Hold home Eliquis while pending surgery.  Plan for resumption after cleared by surgery  - Continue heparin at this time    Homelessness  Assessment & Plan  Patient was previously staying at [facility].  Recommend social work consultation prior to discharge for safe discharge planning.    HIV (human immunodeficiency virus infection) ([facility])  Assessment & Plan  Unclear diagnosis.  Reactive HIV assay upon admission.  Patient continues to have normal CD4 counts (CD4 752 at 3/2023)  - ID following  - Confirmatory testing pending  - Continue Biktarvy daily    Hypokalemia  Assessment & Plan  Resolved.  Continue to monitor.    Schizophrenia ([facility])  Assessment & Plan  - Continue home olanzapine 5 mg twice daily      * Wound infection  Assessment & Plan  - Continue vancomycin and Unasyn.         Core Measures:   Fluids: PO intake  Lines: IVP  Abx: Vanco  and unsacyn  DVT prophylaxis: Holding heparin due to OR  Code Status: full     Disposition: inpatient for continued IV anbx and surgical interventions     Dilcia Lovelace MD   PGY-3 Family Medicine Resident   [facility]

## 2023-05-10 NOTE — PROGRESS NOTES
4 Eyes Skin Assessment Completed by MARIA EUGENIA Coates and MARIA EUGENIA Malik.    Post surgical I&D     Head WDL  Ears WDL  Nose WDL  Mouth WDL  Neck WDL  Breast/Chest WDL  Shoulder Blades WDL  Spine WDL  (R) Arm/Elbow/Hand WDL  (L) Arm/Elbow/Hand WDL  Abdomen WDL  Groin WDL  Scrotum/Coccyx/Buttocks WDL  (R) Leg   (L) Leg  Pink intact scar tissue (also noted on prior skin assessment)  (R) Heel/Foot/Toe. BKA, new surgical dressing CDI  (L) Heel/Foot/Toe BKA, new surgical dressing CDI           Devices In Places Nasal Cannula, wound vac bilateral       Interventions In Place Waffle Overlay    Possible Skin Injury Yes    Pictures Uploaded Into Epic N/A  Wound Consult Placed Yes  RN Wound Prevention Protocol Ordered NA- LPS, surg following

## 2023-05-10 NOTE — ANESTHESIA PROCEDURE NOTES
Airway    Date/Time: 5/10/2023 10:59 AM    Performed by: Raimundo Miller M.D.  Authorized by: Raimundo Miller M.D.    Location:  OR  Urgency:  Elective  Indications for Airway Management:  Anesthesia      Spontaneous Ventilation: absent    Sedation Level:  Deep  Preoxygenated: Yes    Final Airway Type:  Supraglottic airway  Final Supraglottic Airway:  Standard LMA    SGA Size:  4  Number of Attempts at Approach:  1

## 2023-05-10 NOTE — CARE PLAN
The patient is Stable - Low risk of patient condition declining or worsening    Shift Goals  Clinical Goals: POC, pain managment, remain free from falls  Patient Goals: sleep  Family Goals: not present, IVETTE    Progress made toward(s) clinical / shift goals:  met    Problem: Skin Integrity  Goal: Skin integrity is maintained or improved  Outcome: Progressing  Note: skin integrity is maintained, patient is able to turn self, has been encourage to do so during hourly rounding. Wound prevention protocol in place.        Problem: Fall Risk  Goal: Patient will remain free from falls  Outcome: Progressing     Note: fall prevention protocol in place, patient encourage to use call light as needed, assistive devices available, patient calls appropriately.

## 2023-05-10 NOTE — PROGRESS NOTES
Per monitor room tech Marcelina  Sinus reji to normal Sinus Rhythm 55-87  PVC's, PSVT 170's,   0.12/0.07/0.37

## 2023-05-10 NOTE — ANESTHESIA PREPROCEDURE EVALUATION
Case: [id number] Anesthesia Start Date/Time: 05/10/23 1052    Procedures:       IRRIGATION AND DEBRIDEMENT, WOUND LEG (Bilateral)      APPLICATION OR REPLACEMENT, WOUND VAC    Anesthesia type: General    Location: [facility] OR  / SURGERY [facility]    Surgeons: Nas Lux M.D.          Relevant Problems   CARDIAC   (positive) Acute deep vein thrombosis (DVT) (HCC)      Other   (positive) Chronic multifocal osteomyelitis of multiple sites (HCC)   (positive) HIV (human immunodeficiency virus infection) ([facility])   (positive) History of atrial fibrillation   (positive) History of seizures   (positive) Schizophrenia (HCC)       Physical Exam    Airway   Mallampati: II  TM distance: >3 FB  Neck ROM: full       Cardiovascular - normal exam  Rhythm: regular  Rate: normal  (-) murmur     Dental - normal exam           Pulmonary - normal exam  Breath sounds clear to auscultation     Abdominal    Neurological - normal exam                 Anesthesia Plan    ASA 3 (HIV)   ASA physical status 3 criteria: other (comment)    Plan - general       Airway plan will be LMA          Induction: intravenous    Postoperative Plan: Postoperative administration of opioids is intended.    Pertinent diagnostic labs and testing reviewed    Informed Consent:    Anesthetic plan and risks discussed with patient.    Use of blood products discussed with: patient whom consented to blood products.

## 2023-05-10 NOTE — ANESTHESIA TIME REPORT
Anesthesia Start and Stop Event Times     Date Time Event    5/10/2023 1040 Ready for Procedure     1052 Anesthesia Start     1135 Anesthesia Stop        Responsible Staff  05/10/23    Name Role Begin End    Raimundo Miller M.D. Anesth 1052 1135        Overtime Reason:  no overtime (within assigned shift)    Comments:

## 2023-05-10 NOTE — OR NURSING
Uneventful PACU stay. Patient medicated per orders for pain with positive effect. Patient denies nausea, eating a popsicle. Discharge per protocol to next level of care.

## 2023-05-11 LAB
ANION GAP SERPL CALC-SCNC: 12 MMOL/L (ref 7–16)
BASOPHILS # BLD AUTO: 0.4 % (ref 0–1.8)
BASOPHILS # BLD: 0.02 K/UL (ref 0–0.12)
BUN SERPL-MCNC: 14 MG/DL (ref 8–22)
CALCIUM SERPL-MCNC: 8 MG/DL (ref 8.5–10.5)
CHLORIDE SERPL-SCNC: 106 MMOL/L (ref 96–112)
CO2 SERPL-SCNC: 23 MMOL/L (ref 20–33)
CREAT SERPL-MCNC: 0.76 MG/DL (ref 0.5–1.4)
EOSINOPHIL # BLD AUTO: 0.04 K/UL (ref 0–0.51)
EOSINOPHIL NFR BLD: 0.8 % (ref 0–6.9)
ERYTHROCYTE [DISTWIDTH] IN BLOOD BY AUTOMATED COUNT: 49.4 FL (ref 35.9–50)
GFR SERPLBLD CREATININE-BSD FMLA CKD-EPI: 92 ML/MIN/1.73 M 2
GLUCOSE SERPL-MCNC: 117 MG/DL (ref 65–99)
HCT VFR BLD AUTO: 29.1 % (ref 37–47)
HGB BLD-MCNC: 8.9 G/DL (ref 12–16)
IMM GRANULOCYTES # BLD AUTO: 0.02 K/UL (ref 0–0.11)
IMM GRANULOCYTES NFR BLD AUTO: 0.4 % (ref 0–0.9)
LYMPHOCYTES # BLD AUTO: 1.5 K/UL (ref 1–4.8)
LYMPHOCYTES NFR BLD: 28.5 % (ref 22–41)
MCH RBC QN AUTO: 25 PG (ref 27–33)
MCHC RBC AUTO-ENTMCNC: 30.6 G/DL (ref 33.6–35)
MCV RBC AUTO: 81.7 FL (ref 81.4–97.8)
MONOCYTES # BLD AUTO: 0.41 K/UL (ref 0–0.85)
MONOCYTES NFR BLD AUTO: 7.8 % (ref 0–13.4)
NEUTROPHILS # BLD AUTO: 3.27 K/UL (ref 2–7.15)
NEUTROPHILS NFR BLD: 62.1 % (ref 44–72)
NRBC # BLD AUTO: 0 K/UL
NRBC BLD-RTO: 0 /100 WBC
PLATELET # BLD AUTO: 482 K/UL (ref 164–446)
PMV BLD AUTO: 9.2 FL (ref 9–12.9)
POTASSIUM SERPL-SCNC: 4.7 MMOL/L (ref 3.6–5.5)
RBC # BLD AUTO: 3.56 M/UL (ref 4.2–5.4)
SODIUM SERPL-SCNC: 141 MMOL/L (ref 135–145)
WBC # BLD AUTO: 5.3 K/UL (ref 4.8–10.8)

## 2023-05-11 PROCEDURE — 700105 HCHG RX REV CODE 258: Performed by: STUDENT IN AN ORGANIZED HEALTH CARE EDUCATION/TRAINING PROGRAM

## 2023-05-11 PROCEDURE — 85025 COMPLETE CBC W/AUTO DIFF WBC: CPT

## 2023-05-11 PROCEDURE — A9270 NON-COVERED ITEM OR SERVICE: HCPCS

## 2023-05-11 PROCEDURE — 700102 HCHG RX REV CODE 250 W/ 637 OVERRIDE(OP): Performed by: BEHAVIOR ANALYST

## 2023-05-11 PROCEDURE — 700105 HCHG RX REV CODE 258: Performed by: INTERNAL MEDICINE

## 2023-05-11 PROCEDURE — A9270 NON-COVERED ITEM OR SERVICE: HCPCS | Performed by: BEHAVIOR ANALYST

## 2023-05-11 PROCEDURE — 80048 BASIC METABOLIC PNL TOTAL CA: CPT

## 2023-05-11 PROCEDURE — 97163 PT EVAL HIGH COMPLEX 45 MIN: CPT

## 2023-05-11 PROCEDURE — 700111 HCHG RX REV CODE 636 W/ 250 OVERRIDE (IP): Performed by: STUDENT IN AN ORGANIZED HEALTH CARE EDUCATION/TRAINING PROGRAM

## 2023-05-11 PROCEDURE — 700102 HCHG RX REV CODE 250 W/ 637 OVERRIDE(OP)

## 2023-05-11 PROCEDURE — 99232 SBSQ HOSP IP/OBS MODERATE 35: CPT | Mod: GC | Performed by: FAMILY MEDICINE

## 2023-05-11 PROCEDURE — A9270 NON-COVERED ITEM OR SERVICE: HCPCS | Performed by: STUDENT IN AN ORGANIZED HEALTH CARE EDUCATION/TRAINING PROGRAM

## 2023-05-11 PROCEDURE — 700102 HCHG RX REV CODE 250 W/ 637 OVERRIDE(OP): Performed by: STUDENT IN AN ORGANIZED HEALTH CARE EDUCATION/TRAINING PROGRAM

## 2023-05-11 PROCEDURE — 36415 COLL VENOUS BLD VENIPUNCTURE: CPT

## 2023-05-11 PROCEDURE — 97167 OT EVAL HIGH COMPLEX 60 MIN: CPT

## 2023-05-11 PROCEDURE — 770020 HCHG ROOM/CARE - TELE (206)

## 2023-05-11 PROCEDURE — 700111 HCHG RX REV CODE 636 W/ 250 OVERRIDE (IP): Performed by: INTERNAL MEDICINE

## 2023-05-11 RX ADMIN — POTASSIUM CHLORIDE 40 MEQ: 1500 TABLET, EXTENDED RELEASE ORAL at 05:00

## 2023-05-11 RX ADMIN — VANCOMYCIN HYDROCHLORIDE 750 MG: 5 INJECTION, POWDER, LYOPHILIZED, FOR SOLUTION INTRAVENOUS at 04:03

## 2023-05-11 RX ADMIN — METOPROLOL TARTRATE 25 MG: 25 TABLET, FILM COATED ORAL at 18:33

## 2023-05-11 RX ADMIN — AMPICILLIN SODIUM AND SULBACTAM SODIUM 3 G: 2; 1 INJECTION, POWDER, FOR SOLUTION INTRAMUSCULAR; INTRAVENOUS at 06:38

## 2023-05-11 RX ADMIN — OLANZAPINE 5 MG: 5 TABLET, FILM COATED ORAL at 18:33

## 2023-05-11 RX ADMIN — VANCOMYCIN HYDROCHLORIDE 750 MG: 5 INJECTION, POWDER, LYOPHILIZED, FOR SOLUTION INTRAVENOUS at 14:55

## 2023-05-11 RX ADMIN — BICTEGRAVIR SODIUM, EMTRICITABINE, AND TENOFOVIR ALAFENAMIDE FUMARATE 1 TABLET: 50; 200; 25 TABLET ORAL at 18:34

## 2023-05-11 RX ADMIN — DIVALPROEX SODIUM 500 MG: 500 TABLET, EXTENDED RELEASE ORAL at 05:00

## 2023-05-11 RX ADMIN — AMPICILLIN SODIUM AND SULBACTAM SODIUM 3 G: 2; 1 INJECTION, POWDER, FOR SOLUTION INTRAMUSCULAR; INTRAVENOUS at 18:33

## 2023-05-11 RX ADMIN — DIVALPROEX SODIUM 500 MG: 500 TABLET, EXTENDED RELEASE ORAL at 18:32

## 2023-05-11 RX ADMIN — AMPICILLIN SODIUM AND SULBACTAM SODIUM 3 G: 2; 1 INJECTION, POWDER, FOR SOLUTION INTRAMUSCULAR; INTRAVENOUS at 12:58

## 2023-05-11 RX ADMIN — AMPICILLIN SODIUM AND SULBACTAM SODIUM 3 G: 2; 1 INJECTION, POWDER, FOR SOLUTION INTRAMUSCULAR; INTRAVENOUS at 23:13

## 2023-05-11 RX ADMIN — ACETAMINOPHEN 1000 MG: 500 TABLET, FILM COATED ORAL at 10:18

## 2023-05-11 ASSESSMENT — COGNITIVE AND FUNCTIONAL STATUS - GENERAL
MOBILITY SCORE: 9
TOILETING: A LITTLE
CLIMB 3 TO 5 STEPS WITH RAILING: TOTAL
WALKING IN HOSPITAL ROOM: TOTAL
MOVING TO AND FROM BED TO CHAIR: A LOT
DRESSING REGULAR LOWER BODY CLOTHING: A LITTLE
TURNING FROM BACK TO SIDE WHILE IN FLAT BAD: A LITTLE
HELP NEEDED FOR BATHING: A LITTLE
STANDING UP FROM CHAIR USING ARMS: TOTAL
DAILY ACTIVITIY SCORE: 21
MOVING FROM LYING ON BACK TO SITTING ON SIDE OF FLAT BED: UNABLE
SUGGESTED CMS G CODE MODIFIER MOBILITY: CM
SUGGESTED CMS G CODE MODIFIER DAILY ACTIVITY: CJ

## 2023-05-11 ASSESSMENT — GAIT ASSESSMENTS: GAIT LEVEL OF ASSIST: UNABLE TO PARTICIPATE

## 2023-05-11 ASSESSMENT — PAIN DESCRIPTION - PAIN TYPE
TYPE: SURGICAL PAIN
TYPE: ACUTE PAIN

## 2023-05-11 ASSESSMENT — ACTIVITIES OF DAILY LIVING (ADL): TOILETING: INDEPENDENT

## 2023-05-11 NOTE — DIETARY
Nutrition Update: Weekly rescreen, minimal records of intake in chart.     Day 7 of admit.  Kellie Chatman is a 53 y.o. female with admitting DX of Wound infection .     Current Diet: Regular.  RD met with pt who reports eating 100% of meals with no questions/concerns.     Problem: Nutritional:  Goal: Achieve adequate nutritional intake  Description: Patient will consume >50% of meals  Outcome: Goal Met.       RD to screen weekly per department policy.

## 2023-05-11 NOTE — CARE PLAN
The patient is Stable - Low risk of patient condition declining or worsening    Shift Goals  Clinical Goals: IV antibiotics, safety  Patient Goals: Eat, adequate rest  Family Goals: IVETTE    Progress made toward(s) clinical / shift goals:    Problem: Knowledge Deficit - Standard  Goal: Patient and family/care givers will demonstrate understanding of plan of care, disease process/condition, diagnostic tests and medications  Outcome: Progressing  Note: Pt actively participates in POC. Pt and board updated, all questions and concerns answered. Pt encouraged to voice all questions and concerns.      Problem: Skin Integrity  Goal: Skin integrity is maintained or improved  Outcome: Progressing     Problem: Fall Risk  Goal: Patient will remain free from falls  Outcome: Progressing  Note: Patient has remained free of falls this shift. Instructed patient to use call light for help. Call light always placed within reach when leaving room.        Patient is not progressing towards the following goals:

## 2023-05-11 NOTE — THERAPY
Occupational Therapy   Initial Evaluation     Patient Name: Kellie Chatman  Age:  53 y.o., Sex:  female  Medical Record #: [id number]  Today's Date: 5/11/2023     Precautions: Non Weight Bearing Right Lower Extremity, Non Weight Bearing Left Lower Extremity  Comments: Bilateral BKA's w/wound vacs    Assessment  Patient is 53 y.o. female admitted for worsening wounds on BLE- BKA's. Pt has a complex medical hx including: HIV, schizophrenia, bipolar, recent frost bite resulting in bilateral BKA's, Afib, DVT, HTN seizures and chronic homelessness.   Since BKA's pt has been WC bound residing at the shelter, pt reports being independent w/ADL's, but also reports not accessing the bathroom and urinating in her WC?   Pt is tangential and hyper verbose w/conversation. Today pt was only agreeable to EOB sitting, declined use of BSC or further mobility w/o her person WC.     Pt does present w/adequate UB strength and postural control to perform functional txfs, but appears primarily limited by fear and cognitive/psychological impairments impacting her attention, initiation and insight.     OT will follow in this setting, although pt also has a hx of non-compliance w/therapy. Strongly recommend locating pts WC as she is more likely to txf into her own personal device. Unclear if she came w/it this admission or if it is still at the shelter?    Plan  Occupational Therapy Initial Treatment Plan   Treatment Interventions: Self Care / Activities of Daily Living, Adaptive Equipment, Neuro Re-Education / Balance, Therapeutic Exercises, Therapeutic Activity  Treatment Frequency: 2 Times per Week  Duration: Until Therapy Goals Met    DC Equipment Recommendations: Unable to determine at this time  Discharge Recommendations:  May need post acute placement for therapy likely more for wound care management; vs more long term stable dc environment, vs no needs given hx of homelessness & substance abuse. Pt is at risk for deconditioning if she  "is able to doesn't participate in OOB activity.     Subjective  \"I aint going to that I don't want to fall\"      Objective     05/11/23 1003   Charge Group   OT Evaluation OT Evaluation High   Total Time Spent   OT Time Spent Yes   OT Evaluation (Minutes) 20   OT Total Time Spent (Calculated) 20   Initial Contact Note    Initial Contact Note Order Received and Verified, Occupational Therapy Evaluation in Progress with Full Report to Follow.   Prior Living Situation   Prior Services None   Housing / Facility Homeless   Equipment Owned Wheelchair   Lives with - Patient's Self Care Capacity Alone and Unable to Care For Self   Comments pt reports her WC is \"here\" but is tangential w/thought unclear if pt was transported here w/her WC or if it is still at the shelter   Prior Level of ADL Function   Self Feeding Independent   Grooming / Hygiene Independent   Bathing Independent   Dressing Independent   Toileting Independent   Comments per pt report, although made statements about urinating in her WC unclear if pt utlilzes a toilet/BSC at shelter   Prior Level of IADL Function   Prior Level Of Mobility Uses Wheel Chair for Community Mobility;Uses Wheel Chair for in Home Mobility   Comments homeless hx of substance abuse and non-compliance   History of Falls   History of Falls Yes   Date of Last Fall   (reports she was \"dropped\" here)   Precautions   Precautions Non Weight Bearing Right Lower Extremity;Non Weight Bearing Left Lower Extremity   Comments Bilateral BKA's w/wound vacs   Pain 0 - 10 Group   Location Leg   Location Orientation Right;Left   Therapist Pain Assessment During Activity;Nurse Notified;3   Cognition    Cognition / Consciousness X   Level of Consciousness Alert   Ability To Follow Commands 1 Step   New Learning Impaired   Attention Impaired   Sequencing Impaired   Comments Initally cooperative asking to go to the bathroom, however dimished follow through and cooperation through out session d/t tangential " thought, hyper verbose, and fear of falling   Passive ROM Upper Body   Passive ROM Upper Body WDL   Active ROM Upper Body   Active ROM Upper Body  WDL   Strength Upper Body   Upper Body Strength  WDL   Balance Assessment   Sitting Balance (Static) Fair +   Sitting Balance (Dynamic) Fair   Weight Shift Sitting Fair   Comments EOB in bed only   Bed Mobility    Supine to Sit Supervised   Sit to Supine Supervised   Scooting Supervised   ADL Assessment   Grooming Supervision;Seated   Upper Body Dressing Minimal Assist   Toileting Contact Guard Assist   Comments pt was incontient of urine in bed, attempted to encourage txf to BSC, but pt refused, assisted to rolling in bed for linen change and completed radha care in supine   How much help from another person does the patient currently need...   6 Clicks Daily Activity Score 21   Functional Mobility   Sit to Stand Unable to Participate   Bed, Chair, Wheelchair Transfer Refused   Toilet Transfers Refused   Mobility EOB   Edema / Skin Assessment   Comments james wound vacs   Activity Tolerance   Comments fearful of falling, resistent to OOB activity   Patient / Family Goals   Patient / Family Goal #1 to get my WC and get out of here   Short Term Goals   Short Term Goal # 1 pt will complete txf to BSC vs toilet w/spv   Short Term Goal # 2 pt will complete FB dressing w/spv   Education Group   Role of Occupational Therapist Patient Response Patient;Acceptance;Explanation;Demonstration   Occupational Therapy Initial Treatment Plan    Treatment Interventions Self Care / Activities of Daily Living;Adaptive Equipment;Neuro Re-Education / Balance;Therapeutic Exercises;Therapeutic Activity   Treatment Frequency 2 Times per Week   Duration Until Therapy Goals Met   Problem List   Problem List Decreased Active Daily Living Skills;Decreased Functional Mobility;Decreased Activity Tolerance;Safety Awareness Deficits / Cognition;Impaired Postural Control / Balance;Impaired Cognitive  Function   Anticipated Discharge Equipment and Recommendations   DC Equipment Recommendations Unable to determine at this time   Discharge Recommendations   (see note)   Interdisciplinary Plan of Care Collaboration   IDT Collaboration with  Nursing;Physical Therapist;Physician   Patient Position at End of Therapy In Bed;Bed Alarm On;Call Light within Reach;Tray Table within Reach;Phone within Reach   Collaboration Comments RN aware of OT eval and pts efforts   Session Information   Date / Session Number  5/11 #1 (1/2, 5/17)

## 2023-05-11 NOTE — PROGRESS NOTES
[facility] MEDICINE PROGRESS NOTE     Attending:   Dr. Goyal    Resident:   Dilcia Lovelace MD    PATIENT:   Kellie Chatman is a 53-year-old female with a PMHx HIV, schizophrenia, bipolar on Zyprexa and Depakote; 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  Admitted 5/4/2023 for bilateral stump infections s/p bilateral I&D on 5/10.    SUBJECTIVE:   No acute events overnight. Patient states she is having pain in her LE at her surgical sites. She is requesting some pain medications. She is worried about how long the drains will be in place.     OBJECTIVE:  Vitals:    05/10/23 1515 05/10/23 2031 05/11/23 0012 05/11/23 0455   BP: (!) 144/101 (!) 109/37 99/57 120/50   Pulse: 79 100 75 92   Resp: 16 18 18 16   Temp:  36.1 °C (97 °F) 36.4 °C (97.6 °F) 36.2 °C (97.2 °F)   TempSrc:  Temporal Temporal Temporal   SpO2: 95% 96% 95% 98%   Weight:       Height:           Intake/Output Summary (Last 24 hours) at 5/11/2023 0541  Last data filed at 5/10/2023 1134  Gross per 24 hour   Intake 500 ml   Output 10 ml   Net 490 ml       PHYSICAL EXAM:   General: No acute distress, afebrile, resting comfortably, conversational   HEENT: NC/AT. EOMI.   Cardiovascular: RRR without murmurs, rubs, heaves. Normal capillary refill   Respiratory: CTAB, no tachypnea or retractions   Abdomen: normal bowel sounds, soft, nontender, nondistended, no masses, no organomegaly   EXT:  Bilateral BKA with dressing in place. Clean, dry and intact. Bilateral wound vac drains with minimal output   Skin: No erythema/lesions   Neuro: Non-focal, alert and orientated       LABS:  Recent Labs     05/08/23  1508   WBC 3.8*   RBC 3.32*   HEMOGLOBIN 8.2*   HEMATOCRIT 26.6*   MCV 80.1*   MCH 24.7*   RDW 47.1   PLATELETCT 467*   MPV 9.2   NEUTSPOLYS 56.60   LYMPHOCYTES 31.40   MONOCYTES 9.20   EOSINOPHILS 1.80   BASOPHILS 0.50     Recent Labs     05/08/23  1123 05/09/23  0037 05/10/23  0817   SODIUM 141 144 146*   POTASSIUM 3.4* 3.4*  "4.3   CHLORIDE 108 109 111   CO2 21 24 23   BUN 5* 9 7*   CREATININE 0.70 0.91 0.70   CALCIUM 7.8* 7.8* 8.4*   MAGNESIUM  --  1.4*  --      Estimated GFR/CRCL = Estimated Creatinine Clearance: 98.2 mL/min (by C-G formula based on SCr of 0.7 mg/dL).  Recent Labs     05/08/23  1123 05/09/23  0037 05/10/23  0817   GLUCOSE 94 90 71                 No results for input(s): INR, APTT, FIBRINOGEN in the last 72 hours.    Invalid input(s): DIMER    MICROBIOLOGY:   Blood Culture Hold   Date Value Ref Range Status   04/08/2023 Collected  Final        IMAGING:   SO-[id number]-WITH & W/O LEFT   Final Result      1.  Early osteomyelitis of the distal aspect of the left tibial stump      2.  Negative for abscess      3.  Left knee joint effusion      OU-JWAKE-FDHIOF-WITH & W/O RIGHT   Final Result      1.  No roc osteomyelitis      2.  Edema within the distal tibial stump evident on T2-weighted imaging and possibly minimal change on T1-weighted imaging. This could indicate very early osteomyelitis although at this point is not specific.      3.  Negative for abscess      DX-CHEST-LIMITED (1 VIEW)   Final Result         No acute cardiac or pulmonary abnormality is identified.          CULTURES:   Results       Procedure Component Value Units Date/Time    Blood Culture [[id number]] Collected: 05/04/23 1612    Order Status: Completed Specimen: Blood from Peripheral Updated: 05/09/23 1900     Significant Indicator NEG     Source BLD     Site PERIPHERAL     Culture Result No growth after 5 days of incubation.    Narrative:      Per Hospital Policy: Only change Specimen Src: to \"Line\" if  specified by physician order.  Left Forearm/Arm    Blood Culture [[id number]] Collected: 05/04/23 1612    Order Status: Completed Specimen: Blood from Peripheral Updated: 05/09/23 1900     Significant Indicator NEG     Source BLD     Site PERIPHERAL     Culture Result No growth after 5 days of incubation.    Narrative:      Per Hospital Policy: Only " "change Specimen Src: to \"Line\" if  specified by physician order.  Right Forearm/Arm    C Diff by PCR rflx Toxin [[id number]] Collected: 05/07/23 1256    Order Status: Completed Specimen: Stool Updated: 05/07/23 1544     C Diff by PCR Negative     Comment: C. difficile NOT detected by PCR.    Acute diarrhea Special Contact Precautions is required  until 48 hours after diarrhea has resolved, patient’s stool  has returned to baseline or until an infectious agent is  no longer suspected.  Treatment not indicated per guidelines.  Repeat testing not indicated within 7 days.          027-NAP1-BI Presumptive Negative     Comment: Presumptive 027/NAP1/BI target DNA sequences are NOT DETECTED.       Narrative:      Special Contact Isolation  Collected By: [id number] ERIC ALSTON  Does this patient have risk factors for C-diff?->Yes  C-Diff Risk Factors->antibiotic exposure    Urine Culture (New) [[id number]] Collected: 05/04/23 1850    Order Status: Completed Specimen: Urine Updated: 05/07/23 0614     Significant Indicator NEG     Source UR     Site -     Culture Result No growth at 48 hours.    Narrative:      Indication for culture:->Evaluation for sepsis without a  clear source of infection  Indication for culture:->Evaluation for sepsis without a    Urinalysis [[id number]]  (Abnormal) Collected: 05/04/23 1850    Order Status: Completed Specimen: Urine Updated: 05/04/23 1920     Color Yellow     Character Clear     Specific Gravity 1.019     Ph 5.0     Glucose Negative mg/dL      Ketones Negative mg/dL      Protein 30 mg/dL      Bilirubin Negative     Urobilinogen, Urine 0.2     Nitrite Negative     Leukocyte Esterase Trace     Occult Blood Negative     Micro Urine Req Microscopic    Narrative:      Indication for culture:->Evaluation for sepsis without a  clear source of infection            MEDS:  Current Facility-Administered Medications   Medication Last Admin    loperamide (IMODIUM) capsule 2 mg 2 mg at 05/08/23 9923    " potassium chloride SA (Kdur) tablet 40 mEq 40 mEq at 23 0500    ampicillin/sulbactam (UNASYN) 3 g in  mL IVPB 3 g at 05/10/23 2354    MD Alert...Vancomycin per Pharmacy      bictegravir-emtricitab-TAF (Biktarvy) -25 mg tablet 1 Tablet 1 Tablet at 05/10/23 1647    divalproex ER (DEPAKOTE ER) tablet 500 mg 500 mg at 23 0500    OLANZapine (ZYPREXA) tablet 5 mg 5 mg at 05/10/23 1648    Pharmacy Consult Request ...Pain Management Review 1 Each      acetaminophen (TYLENOL) tablet 1,000 mg      ibuprofen (MOTRIN) tablet 800 mg      oxyCODONE immediate-release (ROXICODONE) tablet 5 mg      Or    oxyCODONE immediate release (ROXICODONE) tablet 10 mg 10 mg at 23 2149    Or    HYDROmorphone (Dilaudid) injection 0.5 mg      metoprolol tartrate (LOPRESSOR) tablet 25 mg 25 mg at 05/10/23 1652    [Held by provider] heparin injection 5,000 Units 5,000 Units at 23 1408    vancomycin (VANCOCIN) 750 mg in  mL IVPB 750 mg at 23 0403       ASSESSMENT/PLAN: Kellie Chatman is a 53-year-old female with a PMHx HIV, schizophrenia, bipolar on Zyprexa and Depakote; 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  Admitted 2023 for bilateral stump infections s/p bilateral I&D on 5/10.    Chronic multifocal osteomyelitis of multiple sites (HCC)  Assessment & Plan  MRI showin.  Early osteomyelitis of the distal aspect of the left tibial stump. S/p I&D and  on 5/10 with Dr. Lux  -Continue vancomycin and Unasyn  - ID consulting - Dr. Manzo recommending Vanco and Unasyn for now. Possibly Linezolid pending medication interactions. Waiting for wound cultures prior to changing regimen.   - LSP following   - Hold heparin; plan to resume DOAC when cleared by ortho    HIV (human immunodeficiency virus infection) (HCC)  Assessment & Plan  Initially unclear diagnosis. However, confirmatory testing indicates that patient is in fact positive for HIV 1.  Patient  continues to have normal CD4 counts (CD4 752 at 3/2023)  - ID following   - Confirmatory testing conclusive for HIV  - Continue Biktarvy daily    History of atrial fibrillation  Assessment & Plan  On discharge from [facility] previously patient was noted to be in atrial fibrillation.  No record on EKGs through [facility].  - Continue metoprolol 25 mg twice daily  - Holding home eliquis due to planned surgical intervention. Resume when cleared by ortho  - Continue telemetry monitoring after OR 5/10; consider discontinuation at that time     History of seizures  Assessment & Plan  Unclear history of seizure activity.  - Continue Depakote 500 mg daily for history of seizures    History of DVT (deep vein thrombosis)  Assessment & Plan  Hold home Eliquis while pending surgery.  Plan for resumption after cleared by surgery  - Continue heparin at this time    Homelessness  Assessment & Plan  Patient was previously staying at [facility].  Recommend social work consultation prior to discharge for safe discharge planning.    Hypokalemia  Assessment & Plan  Resolved.  Continue to monitor.    Schizophrenia (HCC)  Assessment & Plan  - Continue home olanzapine 5 mg twice daily      * Wound infection  Assessment & Plan  - Continue vancomycin and Unasyn.         Core Measures:   Fluids: PO intake  Lines: IVP, bilateral wound vac to BKA   Abx: Unasyn and vanco  DVT prophylaxis: holding until cleared by ortho  Code Status: full code    Disposition: inpatient for continued IV anbx, ortho and ID recommendations     Dilcia Lovelace MD   PGY-3 Family Medicine Resident   [facility]

## 2023-05-11 NOTE — THERAPY
Physical Therapy   Initial Evaluation     Patient Name: Kellie Chatman  Age:  53 y.o., Sex:  female  Medical Record #: [id number]  Today's Date: 5/11/2023     Precautions  Precautions: Non Weight Bearing Left Lower Extremity;Non Weight Bearing Right Lower Extremity  Comments: B BKAs with wound vac    Assessment  Patient is 53 y.o. female admitted for worsening wounds on B BKAs, now s/p I&D with wound vac.  Complex PMH includes: HIV, schizophrenia, bipolar disorder, A fit, DVT, HTN, and seizures.  Patient was seen for PT evaluation, tangential in conversation and hyperverbose.  Patient mobilized to/from EOB as detailed below, declined transfer to [facility] or further mobility without her WC.  Patient primarily limited by cognition/psychological impairments and fear of falling which limit attention/participation and insight.  Noted B knee flexion contractures and loose ace wraps on B stumps, patient declined rewrapping.  Will continue to follow for skilled PT.  Recommend patient mobilize at least to EOB daily with nursing staff to prevent deconditioning.  Patient is more likely to participate and transfer to her own assistive device, recommend locating patient's wheelchair.    Plan    Physical Therapy Initial Treatment Plan   Treatment Plan : Bed Mobility, Equipment, Neuro Re-Education / Balance, Self Care / Home Evaluation, Therapeutic Activities, Therapeutic Exercise  Treatment Frequency: 2 Times per Week  Duration: Until Therapy Goals Met    DC Equipment Recommendations: Unable to determine at this time  Discharge Recommendations: Other - Recommend post acute placement at this time pending participation and progress with mobility/transfers vs long term, more stable DC environment.     Objective     05/11/23 1005   Precautions   Precautions Non Weight Bearing Left Lower Extremity;Non Weight Bearing Right Lower Extremity   Comments B BKAs with wound vac   Pain 0 - 10 Group   Therapist Pain Assessment During Activity;Nurse  "Notified (Not quantified)   Prior Living Situation   Prior Services None   Housing / Facility Homeless   Equipment Owned Wheelchair   Lives with - Patient's Self Care Capacity Alone and Unable to Care For Self   Comments Pt reported her WC is \"here\" but tangential in thought/speech, not in pt's room   Prior Level of Functional Mobility   Bed Mobility Independent   Comments Unable to determine further PLOF, pt tangential   Cognition    Cognition / Consciousness X   Level of Consciousness Alert   Ability To Follow Commands 1 Step   New Learning Impaired   Attention Impaired   Sequencing Impaired   Comments Cooperative, tangential in thought & speech.  Hyperverbose, fear of falling   Active ROM Lower Body    Active ROM Lower Body  X   Comments B knee flexion contractures, hips appear WFL   Strength Lower Body   Lower Body Strength  X   Comments hip flexion WFL   Sensation Lower Body   Lower Extremity Sensation   X   Comments Endorsed BLE numbness, tingling, and pain   Balance Assessment   Sitting Balance (Static) Fair +   Sitting Balance (Dynamic) Fair   Weight Shift Sitting Fair   Comments EOB only   Bed Mobility    Supine to Sit Supervised   Sit to Supine Supervised   Scooting Supervised   Rolling Supervised   Gait Analysis   Gait Level Of Assist Unable to Participate   Functional Mobility   Sit to Stand Unable to Participate   Bed, Chair, Wheelchair Transfer Refused   Toilet Transfers Refused   Mobility EOB   Activity Tolerance   Comments fear of falling, resistant to mobility beyond EOB   Edema / Skin Assessment   Comments BLE wound vac   Short Term Goals    Short Term Goal # 1 Pt will perform seated scooting with SPV in 6 visits to progress functional mobility   Short Term Goal # 2 Pt will perform functional transfers with min A in 6 visits to progress OOB mobility     "

## 2023-05-11 NOTE — PROGRESS NOTES
"This note is intended for the purposes of medical student education and feedback only.   Please refer to the documentation by this patient's assigned medical practitioner for details of care and plans.    Medical Student Progress Note  Note Author: Kathleen Chicas, Student  Date: 5/11/2023  SUBJECTIVE  No acute events overnight.    This morning the patient was lethargic and responded to questions lying on her side with her eyes closed. She reported eating a full breakfast of ham and eggs without any abdominal discomfort. She reports having 10/10 aching pain in bilateral lower limbs and had no pain medications administered since she was transferred to the floor. Her first bowel movement after surgery was last night, she described it as runny and non-bloody.    Review of Systems  Positive for decreased energy and generalized weakness  Negative for N/V, SOB, CP, chills      OBJECTIVE   Physical Exam:  /50   Pulse 92   Temp 36.2 °C (97.2 °F) (Temporal)   Resp 16   Ht 1.778 m (5' 10\")   Wt 72 kg (158 lb 11.7 oz)   SpO2 98%       General: Well developed, well nourished, female, appears stated age, appears to be in no acute distress.  HEENT: EOM grossly intact  Cardio: Normal S1 and S2. Regular rate and rhythm. No murmurs, rubs, or gallops.  Pulmonary: Lungs are clear to auscultation bilaterally. No wheezes, rales, or rhonchi.  Abdomen: Normoactive bowel sounds. Abdomen is soft and nondistended. No masses. No tenderness to palpation in all four quadrants.   Neuro: No focal deficits  Extremities: Bilateral below the knee amputations. Wound dressings are clean, dry, and intact. Bilateral wound vac drains present    Lab Results:   Latest Reference Range & Units Most Recent   HIV 2 Abs, EIA Negative  Negative  5/7/23 00:24   HIV Ag/Ab Combo Assay Non Reactive  See Comment !  5/7/23 00:24   HIV Antibody Negative  Positive !  5/7/23 00:24   HIV Interp  Pos HIV-1 Ab !  5/7/23 00:24   !: Data is abnormal  Recent Labs     " "05/08/23  1508   WBC 3.8*   RBC 3.32*   HEMOGLOBIN 8.2*   HEMATOCRIT 26.6*   MCV 80.1*   MCH 24.7*   RDW 47.1   PLATELETCT 467*   MPV 9.2   NEUTSPOLYS 56.60   LYMPHOCYTES 31.40   MONOCYTES 9.20   EOSINOPHILS 1.80   BASOPHILS 0.50     Recent Labs     05/08/23  1123 05/09/23  0037 05/10/23  0817   SODIUM 141 144 146*   POTASSIUM 3.4* 3.4* 4.3   CHLORIDE 108 109 111   CO2 21 24 23   BUN 5* 9 7*   CREATININE 0.70 0.91 0.70   CALCIUM 7.8* 7.8* 8.4*   MAGNESIUM  --  1.4*  --      Estimated GFR/CRCL = Estimated Creatinine Clearance: 98.2 mL/min (by C-G formula based on SCr of 0.7 mg/dL).  Recent Labs     05/08/23  1123 05/09/23  0037 05/10/23  0817   GLUCOSE 94 90 71       Microbiology Results:  Results       Procedure Component Value Units Date/Time    Blood Culture [[id number]] Collected: 05/04/23 1612    Order Status: Completed Specimen: Blood from Peripheral Updated: 05/09/23 1900     Significant Indicator NEG     Source BLD     Site PERIPHERAL     Culture Result No growth after 5 days of incubation.    Narrative:      Per Hospital Policy: Only change Specimen Src: to \"Line\" if  specified by physician order.  Left Forearm/Arm    Blood Culture [[id number]] Collected: 05/04/23 1612    Order Status: Completed Specimen: Blood from Peripheral Updated: 05/09/23 1900     Significant Indicator NEG     Source BLD     Site PERIPHERAL     Culture Result No growth after 5 days of incubation.    Narrative:      Per Hospital Policy: Only change Specimen Src: to \"Line\" if  specified by physician order.  Right Forearm/Arm    C Diff by PCR rflx Toxin [[id number]] Collected: 05/07/23 1256    Order Status: Completed Specimen: Stool Updated: 05/07/23 1544     C Diff by PCR Negative     Comment: C. difficile NOT detected by PCR.    Acute diarrhea Special Contact Precautions is required  until 48 hours after diarrhea has resolved, patient’s stool  has returned to baseline or until an infectious agent is  no longer suspected.  Treatment not " indicated per guidelines.  Repeat testing not indicated within 7 days.          027-NAP1-BI Presumptive Negative     Comment: Presumptive 027/NAP1/BI target DNA sequences are NOT DETECTED.       Narrative:      Special Contact Isolation  Collected By: [id number] ERIC ALSTON  Does this patient have risk factors for C-diff?->Yes  C-Diff Risk Factors->antibiotic exposure    Urine Culture (New) [[id number]] Collected: 05/04/23 1850    Order Status: Completed Specimen: Urine Updated: 05/07/23 0614     Significant Indicator NEG     Source UR     Site -     Culture Result No growth at 48 hours.    Narrative:      Indication for culture:->Evaluation for sepsis without a  clear source of infection  Indication for culture:->Evaluation for sepsis without a    Urinalysis [[id number]]  (Abnormal) Collected: 05/04/23 1850    Order Status: Completed Specimen: Urine Updated: 05/04/23 1920     Color Yellow     Character Clear     Specific Gravity 1.019     Ph 5.0     Glucose Negative mg/dL      Ketones Negative mg/dL      Protein 30 mg/dL      Bilirubin Negative     Urobilinogen, Urine 0.2     Nitrite Negative     Leukocyte Esterase Trace     Occult Blood Negative     Micro Urine Req Microscopic    Narrative:      Indication for culture:->Evaluation for sepsis without a  clear source of infection            Imaging Results:  [id number]-AEYXAG-WITH & W/O LEFT   Final Result      1.  Early osteomyelitis of the distal aspect of the left tibial stump      2.  Negative for abscess      3.  Left knee joint effusion      TT-[id number]-WITH & W/O RIGHT   Final Result      1.  No roc osteomyelitis      2.  Edema within the distal tibial stump evident on T2-weighted imaging and possibly minimal change on T1-weighted imaging. This could indicate very early osteomyelitis although at this point is not specific.      3.  Negative for abscess      DX-CHEST-LIMITED (1 VIEW)   Final Result         No acute cardiac or pulmonary abnormality is  identified.          EKG  Results for orders placed or performed during the hospital encounter of 23   EKG   Result Value Ref Range    Report       [facility] Emergency Dept.    Test Date:  2023  Pt Name:    MARY MCCONNELL                Department: ER  MRN:        [id number]                      Room:       MS 71  Gender:     Female                       Technician: [id number]  :        [id number]                   Requested By:BABS HARRISON  Order #:    [id number]                    Reading MD:    Measurements  Intervals                                Axis  Rate:       106                          P:          49  MS:         126                          QRS:        4  QRSD:       88                           T:          40  QT:         398  QTc:        529    Interpretive Statements  Sinus tachycardia  Abnormal R-wave progression, early transition  Prolonged QT interval  Compared to ECG 2023 11:48:38  Prolonged QT interval now present  Atrial premature complex(es) no longer present         Current Medications    Current Facility-Administered Medications:     loperamide (IMODIUM) capsule 2 mg, 2 mg, Oral, 4X/DAY PRN, Colette Lewis M.D., 2 mg at 23 2323    potassium chloride SA (Kdur) tablet 40 mEq, 40 mEq, Oral, DAILY, Dillon Lynn M.D., 40 mEq at 23 0500    ampicillin/sulbactam (UNASYN) 3 g in  mL IVPB, 3 g, Intravenous, Q6HRS, Dilcia Lovelace M.D., Last Rate: 200 mL/hr at 23 0638, 3 g at 23 0638    MD Alert...Vancomycin per Pharmacy, , Other, PHARMACY TO DOSE, Dilcia Lovelace M.D.    bictegravir-emtricitab-TAF (Biktarvy) -25 mg tablet 1 Tablet, 1 Tablet, Oral, Q EVENING, Sugar Sheppard M.D., 1 Tablet at 05/10/23 1647    divalproex ER (DEPAKOTE ER) tablet 500 mg, 500 mg, Oral, BID, Sugar Sheppard M.D., 500 mg at 23 0500    OLANZapine (ZYPREXA) tablet 5 mg, 5 mg, Oral, Q EVENING, Sugar Sheppard M.D., 5 mg at  05/10/23 1648    Pharmacy Consult Request ...Pain Management Review 1 Each, 1 Each, Other, PHARMACY TO DOSE, Phill Ocampo M.D.    [] acetaminophen (TYLENOL) tablet 1,000 mg, 1,000 mg, Oral, Q6HRS, 1,000 mg at 23 1733 **FOLLOWED BY** acetaminophen (TYLENOL) tablet 1,000 mg, 1,000 mg, Oral, Q6HRS PRN, Phill Ocampo M.D.    [] ibuprofen (MOTRIN) tablet 800 mg, 800 mg, Oral, TID, 800 mg at 23 1733 **FOLLOWED BY** ibuprofen (MOTRIN) tablet 800 mg, 800 mg, Oral, TID PRN, Phill Ocampo M.D.    oxyCODONE immediate-release (ROXICODONE) tablet 5 mg, 5 mg, Oral, Q3HRS PRN **OR** oxyCODONE immediate release (ROXICODONE) tablet 10 mg, 10 mg, Oral, Q3HRS PRN, 10 mg at 23 **OR** HYDROmorphone (Dilaudid) injection 0.5 mg, 0.5 mg, Intravenous, Q3HRS PRN, Phill Ocampo M.D.    metoprolol tartrate (LOPRESSOR) tablet 25 mg, 25 mg, Oral, TWICE DAILY, Sugar Sheppard M.D., 25 mg at 05/10/23 1652    [Held by provider] heparin injection 5,000 Units, 5,000 Units, Subcutaneous, Q8HRS, Sugar Sheppard M.D., 5,000 Units at 23 1408    vancomycin (VANCOCIN) 750 mg in  mL IVPB, 750 mg, Intravenous, Q12HRS, Jimmie Manzo M.D., Last Rate: 125 mL/hr at 23 0403, 750 mg at 23 0403    ASSESSMENT/PLAN  Kellie Chatman is a 53 y.o. female with a PMH of chronic multifocal osteomyelitis of multiple sites and HIV who was admitted on 2023 with bilateral below-knee amputation wound infections. She is post-operation day 1 for debridement of bilateral eschars with wound VAC placement.    #Wound infection  Bilateral below-knee amputation stump wound infections shown on MRI to have early osteomyelitis on the left and possibly on the right.  Surgical debridement with wound VAC placement on 5/10  Vancomycin and Unasyn  to 5/15   Pending ID consult  Blood cultures were negative  Pending wound cultures  Tylenol 1000 mg q6h PRN  Ibuprofen 800 mg TID PRN  Oxycodone 15 mg q3h  PRN  Dilaudid 0.5 mg q3h PRN  PT/OT 2x per week while inpatient with possible post acute placement    #HIV 1  Reactive HIV assay with positive HIV 1 antibodies  Needs outpatient follow up  Biktarvy -25 mg QD    #Atrial fibrillation  Metoprolol 25 mg BID  Resume Eliquis after being cleared by surgery  5/11 Telemetry discontinued    #Homelessness  Patient arrived to hospital from [facility]  Social work follow up    #Bipolar Disorder  Depakote 500 mg QD  Needs outpatient follow up    #Schizophrenia  Olanzapine 5 mg QD      PROPHYLAXIS  DVT: None  GI:None

## 2023-05-12 PROCEDURE — A9270 NON-COVERED ITEM OR SERVICE: HCPCS | Performed by: BEHAVIOR ANALYST

## 2023-05-12 PROCEDURE — 700102 HCHG RX REV CODE 250 W/ 637 OVERRIDE(OP)

## 2023-05-12 PROCEDURE — 97606 NEG PRS WND THER DME>50 SQCM: CPT

## 2023-05-12 PROCEDURE — 700105 HCHG RX REV CODE 258: Performed by: INTERNAL MEDICINE

## 2023-05-12 PROCEDURE — 700102 HCHG RX REV CODE 250 W/ 637 OVERRIDE(OP): Performed by: STUDENT IN AN ORGANIZED HEALTH CARE EDUCATION/TRAINING PROGRAM

## 2023-05-12 PROCEDURE — 700111 HCHG RX REV CODE 636 W/ 250 OVERRIDE (IP): Performed by: STUDENT IN AN ORGANIZED HEALTH CARE EDUCATION/TRAINING PROGRAM

## 2023-05-12 PROCEDURE — 770020 HCHG ROOM/CARE - TELE (206)

## 2023-05-12 PROCEDURE — 700105 HCHG RX REV CODE 258: Performed by: STUDENT IN AN ORGANIZED HEALTH CARE EDUCATION/TRAINING PROGRAM

## 2023-05-12 PROCEDURE — 302098 PASTE RING (FLAT): Performed by: FAMILY MEDICINE

## 2023-05-12 PROCEDURE — 700111 HCHG RX REV CODE 636 W/ 250 OVERRIDE (IP): Performed by: INTERNAL MEDICINE

## 2023-05-12 PROCEDURE — 700111 HCHG RX REV CODE 636 W/ 250 OVERRIDE (IP)

## 2023-05-12 PROCEDURE — 99231 SBSQ HOSP IP/OBS SF/LOW 25: CPT | Performed by: INTERNAL MEDICINE

## 2023-05-12 PROCEDURE — A9270 NON-COVERED ITEM OR SERVICE: HCPCS | Performed by: STUDENT IN AN ORGANIZED HEALTH CARE EDUCATION/TRAINING PROGRAM

## 2023-05-12 PROCEDURE — A9270 NON-COVERED ITEM OR SERVICE: HCPCS

## 2023-05-12 PROCEDURE — 700102 HCHG RX REV CODE 250 W/ 637 OVERRIDE(OP): Performed by: BEHAVIOR ANALYST

## 2023-05-12 PROCEDURE — 99232 SBSQ HOSP IP/OBS MODERATE 35: CPT | Mod: GC | Performed by: FAMILY MEDICINE

## 2023-05-12 RX ORDER — HYDROCODONE BITARTRATE AND ACETAMINOPHEN 10; 325 MG/1; MG/1
1 TABLET ORAL EVERY 6 HOURS PRN
Status: DISCONTINUED | OUTPATIENT
Start: 2023-05-12 | End: 2023-05-12

## 2023-05-12 RX ORDER — AMOXICILLIN AND CLAVULANATE POTASSIUM 875; 125 MG/1; MG/1
1 TABLET, FILM COATED ORAL EVERY 12 HOURS
Status: DISCONTINUED | OUTPATIENT
Start: 2023-05-12 | End: 2023-05-25 | Stop reason: HOSPADM

## 2023-05-12 RX ORDER — LORAZEPAM 2 MG/ML
2 INJECTION INTRAMUSCULAR ONCE
Status: COMPLETED | OUTPATIENT
Start: 2023-05-12 | End: 2023-05-12

## 2023-05-12 RX ORDER — ACETAMINOPHEN 325 MG/1
650 TABLET ORAL EVERY 4 HOURS
Status: DISPENSED | OUTPATIENT
Start: 2023-05-12 | End: 2023-05-13

## 2023-05-12 RX ORDER — OXYCODONE HYDROCHLORIDE 5 MG/1
5 TABLET ORAL EVERY 4 HOURS PRN
Status: DISCONTINUED | OUTPATIENT
Start: 2023-05-12 | End: 2023-05-25 | Stop reason: HOSPADM

## 2023-05-12 RX ORDER — DOXYCYCLINE 100 MG/1
100 TABLET ORAL EVERY 12 HOURS
Status: DISCONTINUED | OUTPATIENT
Start: 2023-05-12 | End: 2023-05-25 | Stop reason: HOSPADM

## 2023-05-12 RX ORDER — LORAZEPAM 2 MG/ML
2 INJECTION INTRAMUSCULAR
Status: DISCONTINUED | OUTPATIENT
Start: 2023-05-12 | End: 2023-05-17

## 2023-05-12 RX ADMIN — LORAZEPAM 2 MG: 2 INJECTION INTRAMUSCULAR; INTRAVENOUS at 10:24

## 2023-05-12 RX ADMIN — DOXYCYCLINE 100 MG: 100 TABLET, FILM COATED ORAL at 12:39

## 2023-05-12 RX ADMIN — ACETAMINOPHEN 650 MG: 325 TABLET, FILM COATED ORAL at 14:28

## 2023-05-12 RX ADMIN — VANCOMYCIN HYDROCHLORIDE 750 MG: 5 INJECTION, POWDER, LYOPHILIZED, FOR SOLUTION INTRAVENOUS at 02:40

## 2023-05-12 RX ADMIN — AMPICILLIN SODIUM AND SULBACTAM SODIUM 3 G: 2; 1 INJECTION, POWDER, FOR SOLUTION INTRAMUSCULAR; INTRAVENOUS at 05:32

## 2023-05-12 RX ADMIN — POTASSIUM CHLORIDE 40 MEQ: 1500 TABLET, EXTENDED RELEASE ORAL at 05:31

## 2023-05-12 RX ADMIN — OXYCODONE 5 MG: 5 TABLET ORAL at 10:16

## 2023-05-12 RX ADMIN — DIVALPROEX SODIUM 500 MG: 500 TABLET, EXTENDED RELEASE ORAL at 05:31

## 2023-05-12 RX ADMIN — AMOXICILLIN AND CLAVULANATE POTASSIUM 1 TABLET: 875; 125 TABLET, FILM COATED ORAL at 12:39

## 2023-05-12 RX ADMIN — HEPARIN SODIUM 5000 UNITS: 5000 INJECTION, SOLUTION INTRAVENOUS; SUBCUTANEOUS at 14:28

## 2023-05-12 ASSESSMENT — PAIN DESCRIPTION - PAIN TYPE
TYPE: ACUTE PAIN
TYPE: ACUTE PAIN

## 2023-05-12 NOTE — WOUND TEAM
[facility]  Inpatient Services  Initial Wound and Skin Care Evaluation    Admission Date: 5/4/2023     Last order of IP CONSULT TO WOUND CARE was found on 5/5/2023 from Hospital Encounter on 5/4/2023     HPI, PMH, SH: Reviewed    Past Surgical History:   Procedure Laterality Date    IRRIGATION & DEBRIDEMENT GENERAL Bilateral 5/10/2023    Procedure: IRRIGATION AND DEBRIDEMENT, WOUND LEG;  Surgeon: Nas Lux M.D.;  Location: SURGERY [facility];  Service: Orthopedics    APPLICATION OR REPLACEMENT, WOUND VAC Bilateral 5/10/2023    Procedure: APPLICATION OR REPLACEMENT, WOUND VAC;  Surgeon: Nas Lux M.D.;  Location: SURGERY [facility];  Service: Orthopedics    KNEE AMPUTATION BELOW Left 03/22/2023    Procedure: AMPUTATION, BELOW KNEE;  Surgeon: Nas Lux M.D.;  Location: SURGERY [facility];  Service: Orthopedics    AMPUTATION, BELOW THE KNEE Right      Social History     Tobacco Use    Smoking status: Every Day     Packs/day: 0.25     Types: Cigarettes    Smokeless tobacco: Never   Vaping Use    Vaping Use: Never used   Substance Use Topics    Alcohol use: Not Currently     Chief Complaint   Patient presents with    Leg Pain     Bilateral leg pain, bilateral BKA     Diagnosis: Wound infection [T14.8XXA, L08.9]    Unit where seen by Wound Team: S175/01     WOUND CONSULT/FOLLOW UP RELATED TO:  Rashi BKA, L Post thigh, L radha-area     WOUND HISTORY:  Pt is a 54 yo female who presented to the ED for B leg pain.  Pt had B BKA for tissue damage related to frostbite in 3/2023.  Hx of B foot frost bite 11/14/23, schizophrenia, HIV, methamphetamine use, homelessness.  Pt discharged from [facility] 5/3/23 for B BKA pain.  Pt was admitted for r/o osteomyelitis and postoperative infection.  Pt is wheelchair bound    right TMA on 12/12/2022 by Dr. Lux  right BKA on 12/20/2022 by Dr. Lux  left second toe amputation with wound VAC application on 2/3/2023 by Dr. Lux  left TMA on 2/7/2023  by Dr. Lux   left BKA on 3/22/2023 by Dr. Lux    Interval History this admission  5/5/23 LPS consult Yuliet Hwang APRN LPS  5/6/23 ID consult Dr Manzo - continue Vancomycin and Unasyn,   5/10/23 B BKA debridement and wound VAC    WOUND ASSESSMENT/LDA            Negative Pressure Wound Therapy 05/12/23 Left (Active)   NPWT Pump Mode / Pressure Setting 125 mmHg;Continuous 05/12/23 1200   Dressing Type Black Foam (Regular) 05/12/23 1200   Number of Foam Pieces Used 2 05/12/23 1200       Negative Pressure Wound Therapy 05/12/23 Right (Active)   VAC VeraFlo Irrigant Normal Saline 05/12/23 1200   VAC VeraFlo Soak Time (mins) 10 05/12/23 1200   VAC VeraFlo Instill Volume (ml) 18 05/12/23 1200   VAC VeraFlo - Therapy Time (hrs) 2 05/12/23 1200   VAC VeraFlo Pressure (mm/Hg) 125 mmHg;Continuous 05/12/23 1200           Wound 03/06/23 Other (comment) Leg Anterior;Posterior Right R BKA wound r/t Frostbite (Active)   Wound Image    05/12/23 1200   Site Assessment  05/11/23 0800   Periwound Assessment  05/11/23 0800   Margins  05/11/23 0800   Closure  05/10/23 2300   Drainage Amount Small 05/12/23 1200   Drainage Description Serosanguineous 05/12/23 1200   Treatments Cleansed;Offloading;Site care 05/05/23 2215   Wound Cleansing Approved Wound Cleanser 05/05/23 2215   Periwound Protectant Barrier Paste 04/10/23 2000   Dressing Cleansing/Solutions Other (Comments) 05/10/23 0800   Dressing Options Wound Vac 05/12/23 1200   Dressing Changed Changed 05/12/23 1200   Dressing Status Open to Air 05/06/23 2000   Dressing Change/Treatment Frequency Monday, Wednesday, Friday, and As Needed 05/12/23 1200   NEXT Dressing Change/Treatment Date 05/15/23 05/12/23 1200   NEXT Weekly Photo (Inpatient Only) 05/19/23 05/12/23 1200   Non-staged Wound Description Full thickness 05/12/23 1200   Wound Length (cm) 14 cm 05/12/23 1200   Wound Width (cm) 17 cm 05/12/23 1200   Wound Surface Area (cm^2) 238 cm^2 05/12/23 1200   Wound Bed  Granulation (%) 40 % 05/12/23 1200   Wound Bed Slough (%) 60 % 05/12/23 1200   Wound Bed Eschar (%) 100 % 05/05/23 1100   Shape Circular 04/08/23 1330   Wound Odor None 05/12/23 1200   Exposed Structures IVETTE 05/12/23 1200   WOUND NURSE ONLY - Time Spent with Patient (mins) 30 05/05/23 1100       Wound 05/05/23 Incision Knee Left BKA (Active)   Wound Image   05/12/23 1200   Site Assessment  05/11/23 2100   Periwound Assessment Pink;Satellite lesions 05/12/23 1200   Margins  05/11/23 2100   Closure  05/11/23 2100   Drainage Amount Small 05/12/23 1200   Drainage Description Serosanguineous 05/12/23 1200   Treatments Cleansed;Other (Comment) 05/10/23 0800   Wound Cleansing Approved Wound Cleanser 05/10/23 0800   Periwound Protectant Moisture Barrier 05/10/23 0800   Dressing Cleansing/Solutions Other (Comments) 05/10/23 0800   Dressing Options Wound Vac 05/12/23 1200   Dressing Changed Changed 05/12/23 1200   Dressing Status Open to Air 05/10/23 0800   Dressing Change/Treatment Frequency Monday, Wednesday, Friday, and As Needed 05/12/23 1200   NEXT Dressing Change/Treatment Date 05/15/23 05/12/23 1200   NEXT Weekly Photo (Inpatient Only) 05/19/23 05/12/23 1200   Non-staged Wound Description Full thickness 05/12/23 1200   Wound Length (cm) 3.5 cm 05/12/23 1200   Wound Width (cm) 5.5 cm width with satellites is 10.5 05/12/23 1200   Wound Surface Area (cm^2) 19.25 cm^2 05/12/23 1200   Wound Bed Granulation (%) 50 % 05/12/23 1200   Wound Bed Slough (%) 50 % 05/12/23 1200   Wound Bed Eschar (%) 100 % 05/05/23 1100   Wound Odor None 05/05/23 1100   Exposed Structures IVETTE 05/12/23 1200   WOUND NURSE ONLY - Time Spent with Patient (mins) 90 05/12/23 1200             MRI 5/7/23  MRI right tib-fib:  1.  No roc osteomyelitis     2.  Edema within the distal tibial stump evident on T2-weighted imaging and possibly minimal change on T1-weighted imaging. This could indicate very early osteomyelitis although at this point is not  specific.     3.  Negative for abscess     MRI left tib-fib  1.  Early osteomyelitis of the distal aspect of the left tibial stump     2.  Negative for abscess     3.  Left knee joint effusion    HIV assay 5/7/23 positive    GISELLA:   5/5/23 B popiteal palpable    Lab Values:    Lab Results   Component Value Date/Time    WBC 5.3 05/11/2023 08:30 AM    RBC 3.56 (L) 05/11/2023 08:30 AM    HEMOGLOBIN 8.9 (L) 05/11/2023 08:30 AM    HEMATOCRIT 29.1 (L) 05/11/2023 08:30 AM    CREACTPROT 2.40 (H) 05/04/2023 04:10 PM    SEDRATEWES 75 (H) 05/04/2023 04:10 PM    HBA1C 5.1 02/24/2023 05:54 AM        Culture Results show:  No results found for this or any previous visit (from the past 720 hour(s)).    Pain Level/Medicated:  Norco and IV ativan, lidocain topically    INTERVENTIONS BY WOUND TEAM:  . Performed standard wound care which includes appropriate positioning, dressing removal and non-selective debridement. Pictures and measurements obtained weekly if/when required.    Bilateral BKA:   Preparation for Dressing removal adhesive remover, lidocaine soak, saline soak  Non-selectively Debrided with:  Wound cleanser   Sharp debridement: no - attempted - pt did not tolerate  Radha wound: Cleansed with Wound cleanser  Primary Dressing:   RIGHT - black VF foam, paste ring and drape radha, TRAC pad applied  LEFT - regular black foam, drape radha, TRAC pad.    Secondary (Outer) Dressing:     Advanced Wound Care Discharge Planning  Number of Clinicians necessary to complete wound care: 2  Is patient requiring IV pain medications for dressing changes: yes - ativan  Length of time for dressing change 90 min. (This does not include chart review, pre-medication time, set up, clean up or time spent charting.)    Interdisciplinary consultation: Patient, Bedside RNCamacho to Dr Lux inquiring about knee immobilizers      EVALUATION / RATIONALE FOR TREATMENT:  Most Recent Date:  5/13/23 expect pt to respond well to NPWT, Veraflo will help  remove necrotic tissue on the R BKA.  Anticipate increased granular tissue in wound beds and contraction of area.  Pt's pain difficult to control, ativan added today.    5/5/23: Patient well known to wound care team for treatment of frostbite to bilateral lower legs and feet which over time has resulted in bilateral BKAs. Patient admitted for cellulitis to bilateral BKAs. Intact brown eschar to both BKAs, no drainage at this time. Bilateral BKA tissue is red, warm, and edematous, diagnosed cellulitis by MD. Applied betadine to keep eschar dry and for antimicrobial component. Abrasion to Left posterior leg cleansed and covered with silicone dressing to pad and protect area. LPS consulted and will follow at this time. Wound care signing off.      Goals: Steady decrease in wound area and depth weekly.    WOUND TEAM PLAN OF CARE ([X] for frequency of wound follow up,):   Nursing to follow dressing orders written for wound care. Contact wound team if area fails to progress, deteriorates or with any questions/concerns if something comes up before next scheduled follow up (See below as to whether wound is following and frequency of wound follow up)  Dressing changes by wound team:                   Follow up 3 times weekly:                NPWT change 3 times weekly: X     Follow up 1-2 times weekly:      Follow up Bi-Monthly:           Follow up Monthly (High Risk):                        Follow up as needed:    Other (explain):     NURSING PLAN OF CARE ORDERS (X):  Dressing changes: See Dressing Care orders: X  Skin care: See Skin Care orders: X  RN Prevention Protocol:   Rectal tube care: See Rectal Tube Care orders:   Other orders:    RSKIN:   CURRENTLY IN PLACE (X), APPLIED THIS VISIT (A), ORDERED (O):   Q shift Lonnie:  X  Q shift pressure point assessments:  X    Surface/Positioning X  Standard Mattress/Trauma Bed        Low Airloss          ICU Low Airloss   Bariatric SHASHA     Waffle cushion        Waffle Overlay        O   Reposition q 2 hours      TAPs Turning system     Z James Pillow     Offloading/Redistribution   Sacral Offloading Dressing (Silicone dressing)    Heel Offloading Dressing (Silicone dressing)         Heel float boots (Prevalon boot)             Float Heels off Bed with Pillows           Respiratory             Containment/Moisture Prevention     Rectal tube or BMS    Purwick/Condom Cath        Leggett Catheter    Barrier wipes           Barrier paste       Antifungal tx      Interdry   X     Mobilization IVETTE      Up to chair        Ambulate      PT/OT  - NWB B BKA    Nutrition         Anticipated discharge plans:LTACH:        SNF/Rehab:                  Home Health Care:           Outpatient Wound Center:   X pt will require OP wound care for 3x/week VAC dressing changes  Self/Family Care:        Other:                  Vac Discharge Needs:   Vac Discharge plan is purely a recommendation from wound team and not a requirement for discharge unless otherwise stated by physician.    Veraflo Vac while inpatient, ok to transition to Regular Vac on Discharge

## 2023-05-12 NOTE — CARE PLAN
The patient is Stable - Low risk of patient condition declining or worsening    Shift Goals  Clinical Goals: IV antibiotics  Patient Goals: Adequate rest  Family Goals: IVETTE    Progress made toward(s) clinical / shift goals:    Problem: Knowledge Deficit - Standard  Goal: Patient and family/care givers will demonstrate understanding of plan of care, disease process/condition, diagnostic tests and medications  Outcome: Progressing  Note: Pt actively participates in POC. Pt and board updated, all questions and concerns answered. Pt encouraged to voice all questions and concerns.      Problem: Skin Integrity  Goal: Skin integrity is maintained or improved  Outcome: Progressing     Problem: Fall Risk  Goal: Patient will remain free from falls  Outcome: Progressing  Note: Patient has remained free of falls this shift. Instructed patient to use call light for help. Call light always placed within reach when leaving room. Patient's room has been cleared of clutter such as cords and extra chairs.        Patient is not progressing towards the following goals:

## 2023-05-12 NOTE — PROGRESS NOTES
Wound Vac continuously not working after multiple attempts. Educated the patient on a wet-to-dry dressing. Patient declined.

## 2023-05-12 NOTE — PROGRESS NOTES
This note is intended for the purposes of medical student education and feedback only.   Please refer to the documentation by this patient's assigned medical practitioner for details of care and plans.    Medical Student Progress Note  Note Author: Kathleen Chicas, Student  Date: 5/12/2023    SUBJECTIVE  Overnight nursing reporting the wound vac was not working for an extended period of time. Patient has only taken one dose of tylenol within the past 24 hours.    This morning the patient was sleeping after finishing her breakfast. The interview was shortened because the patient had difficulty remaining awake.    During team rounds the wound specialist was in the patient's room. The patient requested pain medicine and a medication to help sedate her during the wound cleaning.    Review of Systems  Positive for pain at wound site and lethargy  Negative for nausea/vomiting    OBJECTIVE   Physical Exam:  //85 P 60-81 R 16-18 POX 93-98% T 36.4-37.3    General: Well developed, well nourished, female, appears stated age, appears to be in no acute distress  HEENT: EOM grossly intact  Cardio: Normal S1 and S2. Regular rate and rhythm. No murmurs, rubs, or gallops.  Pulmonary: Lungs are clear to auscultation bilaterally. No wheezes, rales, or rhonchi.  Abdomen: Normoactive bowel sounds. Abdomen is soft and nondistended. No masses. No tenderness to palpation in all four quadrants.   Neuro: No focal deficits  Extremities: Bilateral below the knee amputations. Wounds are clean, dry, and intact. Bilateral wound vac drains present but non-functional    Lab Results:  Recent Labs     05/11/23  0830   WBC 5.3   RBC 3.56*   HEMOGLOBIN 8.9*   HEMATOCRIT 29.1*   MCV 81.7   MCH 25.0*   RDW 49.4   PLATELETCT 482*   MPV 9.2   NEUTSPOLYS 62.10   LYMPHOCYTES 28.50   MONOCYTES 7.80   EOSINOPHILS 0.80   BASOPHILS 0.40     Recent Labs     05/10/23  0817 05/11/23  0830   SODIUM 146* 141   POTASSIUM 4.3 4.7   CHLORIDE 111 106   CO2 23 23  "  BUN 7* 14   CREATININE 0.70 0.76   CALCIUM 8.4* 8.0*     Estimated GFR/CRCL = Estimated Creatinine Clearance: 90.4 mL/min (by C-G formula based on SCr of 0.76 mg/dL).  Recent Labs     05/10/23  0817 05/11/23  0830   GLUCOSE 71 117*     Microbiology Results:  Results       Procedure Component Value Units Date/Time    Blood Culture [[id number]] Collected: 05/04/23 1612    Order Status: Completed Specimen: Blood from Peripheral Updated: 05/09/23 1900     Significant Indicator NEG     Source BLD     Site PERIPHERAL     Culture Result No growth after 5 days of incubation.    Narrative:      Per Hospital Policy: Only change Specimen Src: to \"Line\" if  specified by physician order.  Left Forearm/Arm    Blood Culture [[id number]] Collected: 05/04/23 1612    Order Status: Completed Specimen: Blood from Peripheral Updated: 05/09/23 1900     Significant Indicator NEG     Source BLD     Site PERIPHERAL     Culture Result No growth after 5 days of incubation.    Narrative:      Per Hospital Policy: Only change Specimen Src: to \"Line\" if  specified by physician order.  Right Forearm/Arm    C Diff by PCR rflx Toxin [[id number]] Collected: 05/07/23 1256    Order Status: Completed Specimen: Stool Updated: 05/07/23 1544     C Diff by PCR Negative     Comment: C. difficile NOT detected by PCR.    Acute diarrhea Special Contact Precautions is required  until 48 hours after diarrhea has resolved, patient’s stool  has returned to baseline or until an infectious agent is  no longer suspected.  Treatment not indicated per guidelines.  Repeat testing not indicated within 7 days.          027-NAP1-BI Presumptive Negative     Comment: Presumptive 027/NAP1/BI target DNA sequences are NOT DETECTED.       Narrative:      Special Contact Isolation  Collected By: [id number] ERIC ALSTON  Does this patient have risk factors for C-diff?->Yes  C-Diff Risk Factors->antibiotic exposure    Urine Culture (New) [[id number]] Collected: 05/04/23 1850    Order " Status: Completed Specimen: Urine Updated: 05/07/23 0614     Significant Indicator NEG     Source UR     Site -     Culture Result No growth at 48 hours.    Narrative:      Indication for culture:->Evaluation for sepsis without a  clear source of infection  Indication for culture:->Evaluation for sepsis without a            Imaging Results:  [id number]-WITH & W/O LEFT   Final Result      1.  Early osteomyelitis of the distal aspect of the left tibial stump      2.  Negative for abscess      3.  Left knee joint effusion      [id number] & W/O RIGHT   Final Result      1.  No roc osteomyelitis      2.  Edema within the distal tibial stump evident on T2-weighted imaging and possibly minimal change on T1-weighted imaging. This could indicate very early osteomyelitis although at this point is not specific.      3.  Negative for abscess      DX-CHEST-LIMITED (1 VIEW)   Final Result         No acute cardiac or pulmonary abnormality is identified.        Current Medications    Current Facility-Administered Medications:     loperamide (IMODIUM) capsule 2 mg, 2 mg, Oral, 4X/DAY PRN, Colette Lewis M.D., 2 mg at 05/08/23 2323    potassium chloride SA (Kdur) tablet 40 mEq, 40 mEq, Oral, DAILY, Dillon Lynn M.D., 40 mEq at 05/12/23 0531    ampicillin/sulbactam (UNASYN) 3 g in  mL IVPB, 3 g, Intravenous, Q6HRS, Dilcia Lovelace M.D., Last Rate: 200 mL/hr at 05/12/23 0532, 3 g at 05/12/23 0532    MD Alert...Vancomycin per Pharmacy, , Other, PHARMACY TO DOSE, Dilcia Lovelace M.D.    bictegravir-emtricitab-TAF (Biktarvy) -25 mg tablet 1 Tablet, 1 Tablet, Oral, Q EVENING, Sugar Sheppard M.D., 1 Tablet at 05/11/23 1834    divalproex ER (DEPAKOTE ER) tablet 500 mg, 500 mg, Oral, BID, Sugar Sheppard M.D., 500 mg at 05/12/23 0531    OLANZapine (ZYPREXA) tablet 5 mg, 5 mg, Oral, Q EVENING, Sugar Sheppard M.D., 5 mg at 05/11/23 8630    Pharmacy Consult Request ...Pain Management Review  1 Each, 1 Each, Other, PHARMACY TO DOSE, Phill Ocampo M.D.    [] acetaminophen (TYLENOL) tablet 1,000 mg, 1,000 mg, Oral, Q6HRS, 1,000 mg at 23 1733 **FOLLOWED BY** acetaminophen (TYLENOL) tablet 1,000 mg, 1,000 mg, Oral, Q6HRS PRN, Phill Ocampo M.D., 1,000 mg at 23 1018    [] ibuprofen (MOTRIN) tablet 800 mg, 800 mg, Oral, TID, 800 mg at 23 1733 **FOLLOWED BY** ibuprofen (MOTRIN) tablet 800 mg, 800 mg, Oral, TID PRN, Phill Ocampo M.D.    oxyCODONE immediate-release (ROXICODONE) tablet 5 mg, 5 mg, Oral, Q3HRS PRN **OR** oxyCODONE immediate release (ROXICODONE) tablet 10 mg, 10 mg, Oral, Q3HRS PRN, 10 mg at 239 **OR** HYDROmorphone (Dilaudid) injection 0.5 mg, 0.5 mg, Intravenous, Q3HRS PRN, Phill Ocampo M.D.    metoprolol tartrate (LOPRESSOR) tablet 25 mg, 25 mg, Oral, TWICE DAILY, Sugar Sheppard M.D., 25 mg at 23 1833    [Held by provider] heparin injection 5,000 Units, 5,000 Units, Subcutaneous, Q8HRS, Sugar Sheppard M.D., 5,000 Units at 23 1408    vancomycin (VANCOCIN) 750 mg in  mL IVPB, 750 mg, Intravenous, Q12HRS, Jimmie Manzo M.D., Last Rate: 125 mL/hr at 23 0240, 750 mg at 23 0240    ASSESSMENT/PLAN  Kellie Chatman is a 53 y.o. female with a PMH of chronic multifocal osteomyelitis of multiple sites and HIV who was admitted on 2023 with bilateral below-knee amputation wound infections. She is post-operation day 2 for debridement of bilateral eschars with wound VAC placement.     #Wound infection  Bilateral below-knee amputation stump wound infections shown on MRI to have early osteomyelitis on the left and possibly on the right.  Surgical debridement with wound VAC placement on 5/10  Wound care  Vancomycin and Unasyn  to   Doxycycline 100 mg BID and Augmentin 875 BID started on   Pending ID consult  Tylenol 1000 mg q6h PRN  Ibuprofen 800 mg TID PRN  Oxycodone 15 mg q3h PRN  Dilaudid 0.5 mg q3h  PRN  Ativan 2 mg QD PRN for wound changes  PT/OT 2x per week while inpatient with possible post acute placement  Loperamide 2 mg 4x/day PRN     #HIV 1  Reactive HIV assay with positive HIV 1 antibodies  Needs outpatient follow up  Biktarvy -25 mg QD     #Atrial fibrillation  Metoprolol 25 mg BID  Resume Eliquis after being cleared by surgery  5/11 Telemetry discontinued     #Homelessness  Patient arrived to hospital from [facility]  Social work follow up     #Bipolar Disorder  Depakote 500 mg QD  [facility] consulted with patient yesterday     #Schizophrenia  Olanzapine 5 mg QD  [facility] consulted with patient yesterday     PROPHYLAXIS  DVT: Heparin  GI:None

## 2023-05-12 NOTE — CARE PLAN
The patient is Stable - Low risk of patient condition declining or worsening    Shift Goals  Clinical Goals: IV abx  Patient Goals: Eat, adequate rest  Family Goals: IVETTE    Progress made toward(s) clinical / shift goals:    Problem: Knowledge Deficit - Standard  Goal: Patient and family/care givers will demonstrate understanding of plan of care, disease process/condition, diagnostic tests and medications  Outcome: Progressing: knows and understands meds she is on. Advocating for self.        Patient is not progressing towards the following goals:

## 2023-05-12 NOTE — PROGRESS NOTES
[facility] MEDICINE PROGRESS NOTE     Attending:   Dr. 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  Admitted 5/4/2023 for bilateral stump infections s/p bilateral I&D on 5/10.    SUBJECTIVE:   No acute events overnight. Patient is concern that her wound vac and dressing changes will be painful. Agreeable to scheduled tylenol throughout the day today.     OBJECTIVE:  Vitals:    05/11/23 2357 05/12/23 0400 05/12/23 0653 05/12/23 1140   BP: 109/69 115/89 136/75 133/77   Pulse: 66 63 60 61   Resp: 16 17 16 16   Temp: 36.8 °C (98.3 °F) 36.9 °C (98.4 °F) 36.6 °C (97.9 °F) 36.3 °C (97.4 °F)   TempSrc: Temporal Temporal Temporal Temporal   SpO2: 94% 98% 96% 94%   Weight:       Height:         No intake or output data in the 24 hours ending 05/12/23 0556    PHYSICAL EXAM:   General: No acute distress, afebrile, resting comfortably, conversational   HEENT: NC/AT. EOMI.   Cardiovascular: RRR without murmurs, rubs, heaves. Normal capillary refill   Respiratory: CTAB, no tachypnea or retractions   Abdomen: normal bowel sounds, soft, nontender, nondistended, no masses, no organomegaly   EXT:  Bilateral BKA with dressing in place. Clean, dry and intact. Wound vac not currently running   Skin: No erythema/lesions   Neuro: Non-focal, alert and orientated       LABS:  Recent Labs     05/11/23  0830   WBC 5.3   RBC 3.56*   HEMOGLOBIN 8.9*   HEMATOCRIT 29.1*   MCV 81.7   MCH 25.0*   RDW 49.4   PLATELETCT 482*   MPV 9.2   NEUTSPOLYS 62.10   LYMPHOCYTES 28.50   MONOCYTES 7.80   EOSINOPHILS 0.80   BASOPHILS 0.40     Recent Labs     05/10/23  0817 05/11/23  0830   SODIUM 146* 141   POTASSIUM 4.3 4.7   CHLORIDE 111 106   CO2 23 23   BUN 7* 14   CREATININE 0.70 0.76   CALCIUM 8.4* 8.0*     Estimated GFR/CRCL = Estimated Creatinine Clearance: 90.4  "mL/min (by C-G formula based on SCr of 0.76 mg/dL).  Recent Labs     05/10/23  0817 05/11/23  0830   GLUCOSE 71 117*                 No results for input(s): INR, APTT, FIBRINOGEN in the last 72 hours.    Invalid input(s): DIMER    MICROBIOLOGY:   Blood Culture Hold   Date Value Ref Range Status   04/08/2023 Collected  Final        IMAGING:   [id number]-WITH & W/O LEFT   Final Result      1.  Early osteomyelitis of the distal aspect of the left tibial stump      2.  Negative for abscess      3.  Left knee joint effusion      [id number]-[id number]-WITH & W/O RIGHT   Final Result      1.  No roc osteomyelitis      2.  Edema within the distal tibial stump evident on T2-weighted imaging and possibly minimal change on T1-weighted imaging. This could indicate very early osteomyelitis although at this point is not specific.      3.  Negative for abscess      DX-CHEST-LIMITED (1 VIEW)   Final Result         No acute cardiac or pulmonary abnormality is identified.          CULTURES:   Results       Procedure Component Value Units Date/Time    Blood Culture [[id number]] Collected: 05/04/23 1612    Order Status: Completed Specimen: Blood from Peripheral Updated: 05/09/23 1900     Significant Indicator NEG     Source BLD     Site PERIPHERAL     Culture Result No growth after 5 days of incubation.    Narrative:      Per Hospital Policy: Only change Specimen Src: to \"Line\" if  specified by physician order.  Left Forearm/Arm    Blood Culture [[id number]] Collected: 05/04/23 1612    Order Status: Completed Specimen: Blood from Peripheral Updated: 05/09/23 1900     Significant Indicator NEG     Source BLD     Site PERIPHERAL     Culture Result No growth after 5 days of incubation.    Narrative:      Per Hospital Policy: Only change Specimen Src: to \"Line\" if  specified by physician order.  Right Forearm/Arm    C Diff by PCR rflx Toxin [[id number]] Collected: 05/07/23 1256    Order Status: Completed Specimen: Stool Updated: 05/07/23 " 1544     C Diff by PCR Negative     Comment: C. difficile NOT detected by PCR.    Acute diarrhea Special Contact Precautions is required  until 48 hours after diarrhea has resolved, patient’s stool  has returned to baseline or until an infectious agent is  no longer suspected.  Treatment not indicated per guidelines.  Repeat testing not indicated within 7 days.          027-NAP1-BI Presumptive Negative     Comment: Presumptive 027/NAP1/BI target DNA sequences are NOT DETECTED.       Narrative:      Special Contact Isolation  Collected By: [id number] ERIC ALSTON  Does this patient have risk factors for C-diff?->Yes  C-Diff Risk Factors->antibiotic exposure    Urine Culture (New) [[id number]] Collected: 05/04/23 1850    Order Status: Completed Specimen: Urine Updated: 05/07/23 0614     Significant Indicator NEG     Source UR     Site -     Culture Result No growth at 48 hours.    Narrative:      Indication for culture:->Evaluation for sepsis without a  clear source of infection  Indication for culture:->Evaluation for sepsis without a            MEDS:  Current Facility-Administered Medications   Medication Last Admin    acetaminophen (Tylenol) tablet 650 mg      oxyCODONE immediate-release (ROXICODONE) tablet 5 mg 5 mg at 05/12/23 1016    doxycycline monohydrate (ADOXA) tablet 100 mg 100 mg at 05/12/23 1239    amoxicillin-clavulanate (AUGMENTIN) 875-125 MG per tablet 1 Tablet 1 Tablet at 05/12/23 1239    LORazepam (ATIVAN) injection 2 mg      loperamide (IMODIUM) capsule 2 mg 2 mg at 05/08/23 2323    potassium chloride SA (Kdur) tablet 40 mEq 40 mEq at 05/12/23 0531    bictegravir-emtricitab-TAF (Biktarvy) -25 mg tablet 1 Tablet 1 Tablet at 05/11/23 1834    divalproex ER (DEPAKOTE ER) tablet 500 mg 500 mg at 05/12/23 0531    OLANZapine (ZYPREXA) tablet 5 mg 5 mg at 05/11/23 1833    Pharmacy Consult Request ...Pain Management Review 1 Each      acetaminophen (TYLENOL) tablet 1,000 mg 1,000 mg at 05/11/23 1018     ibuprofen (MOTRIN) tablet 800 mg      metoprolol tartrate (LOPRESSOR) tablet 25 mg 25 mg at 23 1833    heparin injection 5,000 Units 5,000 Units at 23 1408       ASSESSMENT/PLAN: Kellie Chatman is a 53-year-old female with a PMHx HIV, schizophrenia, bipolar on Zyprexa and Depakote; 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  Admitted 2023 for bilateral stump infections s/p bilateral I&D on 5/10.    Chronic multifocal osteomyelitis of multiple sites (HCC)  Assessment & Plan  MRI showin.  Early osteomyelitis of the distal aspect of the left tibial stump. S/p I&D and  on 5/10 with Dr. Lux  - ID consulting- Dr Manzo   - Discontinue vanco and unasyn (-)  - Start Doxy 100mg BID and augmentin 875 BID - duration pending  - Wound care following   - Ativan 2mg PRN wound vac changes      HIV (human immunodeficiency virus infection) (HCC)  Assessment & Plan  Initially unclear diagnosis. However, confirmatory testing indicates that patient is in fact positive for HIV 1.  Patient continues to have normal CD4 counts (CD4 752 at 3/2023)  - ID following   - Confirmatory testing conclusive for HIV  - Continue Biktarvy daily    History of atrial fibrillation  Assessment & Plan  On discharge from [facility] previously patient was noted to be in atrial fibrillation.  No record on EKGs through [facility].  - Continue metoprolol 25 mg twice daily  - Holding home eliquis due to planned surgical intervention. Resume when cleared by ortho  - Resume heparin   - Plan to dc telemetry monitoring at this time    History of seizures  Assessment & Plan  Unclear history of seizure activity. Patient states she has never had a seizure before.   - Continue Depakote 500 mg daily for history of seizures  - Depakote likely for mood stabilization     History of DVT (deep vein thrombosis)  Assessment & Plan  Hold home Eliquis while pending surgery.  Plan for resumption after cleared by surgery  -  Resume heparin 5/12    Homelessness  Assessment & Plan  Patient was previously staying at [facility] previously.  Recommend social work consultation prior to discharge for safe discharge planning.    Hypokalemia  Assessment & Plan  Resolved.  Continue to monitor.    Schizophrenia (HCC)  Assessment & Plan  - Continue home olanzapine 5 mg twice daily      * Wound infection  Assessment & Plan  - Discontinue vancomycin and Unasyn.(5/4 - 5/21)  - Start Doxy and augmentin - duration pending          Core Measures:   Fluids: PO intake  Lines: IVP; wound vac not connected   Abx: stop vanco and unasyn / start doxy and Augmentin   DVT prophylaxis: resume heparin; resume home eliquis when ready for dc   Code Status: full    Disposition: inpt to monitor PO anbx transition; likely medically cleared for dc in 24-48 hours    Dilcia Lovelace MD   PGY-3 Family Medicine Resident   [facility]

## 2023-05-13 LAB
ANION GAP SERPL CALC-SCNC: 10 MMOL/L (ref 7–16)
BASOPHILS # BLD AUTO: 0.8 % (ref 0–1.8)
BASOPHILS # BLD: 0.05 K/UL (ref 0–0.12)
BUN SERPL-MCNC: 11 MG/DL (ref 8–22)
CALCIUM SERPL-MCNC: 8.8 MG/DL (ref 8.5–10.5)
CHLORIDE SERPL-SCNC: 103 MMOL/L (ref 96–112)
CO2 SERPL-SCNC: 25 MMOL/L (ref 20–33)
CREAT SERPL-MCNC: 0.59 MG/DL (ref 0.5–1.4)
EOSINOPHIL # BLD AUTO: 0.11 K/UL (ref 0–0.51)
EOSINOPHIL NFR BLD: 1.9 % (ref 0–6.9)
ERYTHROCYTE [DISTWIDTH] IN BLOOD BY AUTOMATED COUNT: 50.2 FL (ref 35.9–50)
GFR SERPLBLD CREATININE-BSD FMLA CKD-EPI: 106 ML/MIN/1.73 M 2
GLUCOSE SERPL-MCNC: 101 MG/DL (ref 65–99)
HCT VFR BLD AUTO: 30.7 % (ref 37–47)
HGB BLD-MCNC: 9.5 G/DL (ref 12–16)
IMM GRANULOCYTES # BLD AUTO: 0.08 K/UL (ref 0–0.11)
IMM GRANULOCYTES NFR BLD AUTO: 1.4 % (ref 0–0.9)
LYMPHOCYTES # BLD AUTO: 1.81 K/UL (ref 1–4.8)
LYMPHOCYTES NFR BLD: 30.6 % (ref 22–41)
MCH RBC QN AUTO: 25.5 PG (ref 27–33)
MCHC RBC AUTO-ENTMCNC: 30.9 G/DL (ref 33.6–35)
MCV RBC AUTO: 82.3 FL (ref 81.4–97.8)
MONOCYTES # BLD AUTO: 0.62 K/UL (ref 0–0.85)
MONOCYTES NFR BLD AUTO: 10.5 % (ref 0–13.4)
NEUTROPHILS # BLD AUTO: 3.24 K/UL (ref 2–7.15)
NEUTROPHILS NFR BLD: 54.8 % (ref 44–72)
NRBC # BLD AUTO: 0 K/UL
NRBC BLD-RTO: 0 /100 WBC
PLATELET # BLD AUTO: 498 K/UL (ref 164–446)
PMV BLD AUTO: 10 FL (ref 9–12.9)
POTASSIUM SERPL-SCNC: 4.3 MMOL/L (ref 3.6–5.5)
RBC # BLD AUTO: 3.73 M/UL (ref 4.2–5.4)
SODIUM SERPL-SCNC: 138 MMOL/L (ref 135–145)
WBC # BLD AUTO: 5.9 K/UL (ref 4.8–10.8)

## 2023-05-13 PROCEDURE — A9270 NON-COVERED ITEM OR SERVICE: HCPCS

## 2023-05-13 PROCEDURE — 700102 HCHG RX REV CODE 250 W/ 637 OVERRIDE(OP)

## 2023-05-13 PROCEDURE — 700111 HCHG RX REV CODE 636 W/ 250 OVERRIDE (IP)

## 2023-05-13 PROCEDURE — A9270 NON-COVERED ITEM OR SERVICE: HCPCS | Performed by: STUDENT IN AN ORGANIZED HEALTH CARE EDUCATION/TRAINING PROGRAM

## 2023-05-13 PROCEDURE — 770020 HCHG ROOM/CARE - TELE (206)

## 2023-05-13 PROCEDURE — 700102 HCHG RX REV CODE 250 W/ 637 OVERRIDE(OP): Performed by: STUDENT IN AN ORGANIZED HEALTH CARE EDUCATION/TRAINING PROGRAM

## 2023-05-13 PROCEDURE — 85025 COMPLETE CBC W/AUTO DIFF WBC: CPT

## 2023-05-13 PROCEDURE — 700102 HCHG RX REV CODE 250 W/ 637 OVERRIDE(OP): Performed by: BEHAVIOR ANALYST

## 2023-05-13 PROCEDURE — 36415 COLL VENOUS BLD VENIPUNCTURE: CPT

## 2023-05-13 PROCEDURE — 99232 SBSQ HOSP IP/OBS MODERATE 35: CPT | Mod: GC | Performed by: HOSPITALIST

## 2023-05-13 PROCEDURE — 80048 BASIC METABOLIC PNL TOTAL CA: CPT

## 2023-05-13 PROCEDURE — A9270 NON-COVERED ITEM OR SERVICE: HCPCS | Performed by: BEHAVIOR ANALYST

## 2023-05-13 RX ADMIN — METOPROLOL TARTRATE 25 MG: 25 TABLET, FILM COATED ORAL at 04:40

## 2023-05-13 RX ADMIN — OXYCODONE 5 MG: 5 TABLET ORAL at 14:44

## 2023-05-13 RX ADMIN — ACETAMINOPHEN 650 MG: 325 TABLET, FILM COATED ORAL at 04:40

## 2023-05-13 RX ADMIN — DIVALPROEX SODIUM 500 MG: 500 TABLET, EXTENDED RELEASE ORAL at 04:41

## 2023-05-13 RX ADMIN — DIVALPROEX SODIUM 500 MG: 500 TABLET, EXTENDED RELEASE ORAL at 18:28

## 2023-05-13 RX ADMIN — AMOXICILLIN AND CLAVULANATE POTASSIUM 1 TABLET: 875; 125 TABLET, FILM COATED ORAL at 04:41

## 2023-05-13 RX ADMIN — DOXYCYCLINE 100 MG: 100 TABLET, FILM COATED ORAL at 04:40

## 2023-05-13 RX ADMIN — HEPARIN SODIUM 5000 UNITS: 5000 INJECTION, SOLUTION INTRAVENOUS; SUBCUTANEOUS at 22:41

## 2023-05-13 RX ADMIN — HEPARIN SODIUM 5000 UNITS: 5000 INJECTION, SOLUTION INTRAVENOUS; SUBCUTANEOUS at 14:43

## 2023-05-13 RX ADMIN — OLANZAPINE 5 MG: 5 TABLET, FILM COATED ORAL at 18:29

## 2023-05-13 RX ADMIN — BICTEGRAVIR SODIUM, EMTRICITABINE, AND TENOFOVIR ALAFENAMIDE FUMARATE 1 TABLET: 50; 200; 25 TABLET ORAL at 18:28

## 2023-05-13 RX ADMIN — HEPARIN SODIUM 5000 UNITS: 5000 INJECTION, SOLUTION INTRAVENOUS; SUBCUTANEOUS at 04:41

## 2023-05-13 RX ADMIN — POTASSIUM CHLORIDE 40 MEQ: 1500 TABLET, EXTENDED RELEASE ORAL at 04:40

## 2023-05-13 RX ADMIN — DOXYCYCLINE 100 MG: 100 TABLET, FILM COATED ORAL at 18:29

## 2023-05-13 RX ADMIN — AMOXICILLIN AND CLAVULANATE POTASSIUM 1 TABLET: 875; 125 TABLET, FILM COATED ORAL at 18:29

## 2023-05-13 ASSESSMENT — PAIN DESCRIPTION - PAIN TYPE
TYPE: ACUTE PAIN
TYPE: ACUTE PAIN

## 2023-05-13 NOTE — CARE PLAN
The patient is Stable - Low risk of patient condition declining or worsening    Shift Goals  Clinical Goals: IV antibiotics, wound care  Patient Goals: rest, anxiety relief  Family Goals: IVETTE    Progress made toward(s) clinical / shift goals:    Problem: Skin Integrity  Goal: Skin integrity is maintained or improved  Outcome: Progressing  Note: Educated the patient on the need to turn herself in bed if possible in order to prevent skin breakdown from her extended immobility     Problem: Fall Risk  Goal: Patient will remain free from falls  Outcome: Progressing  Note: Patient has not gotten out of bed on her own and has remained fall free during her hospital stay.

## 2023-05-13 NOTE — PROGRESS NOTES
"Seen in f/u for bilateral BKA site infections, was initially on vancomycin and amp/sulbactam with exam  showing boggy necrotic appearing stumps. 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 Apparently underwent debridement this week, now with bilateral wound vacs present. She is unable to give coherent answers to questions. After discussion today, patient switched to oral regimen    Scheduled Medications   Medication Dose Frequency    acetaminophen  650 mg Q4HRS    doxycycline monohydrate  100 mg Q12HRS    amoxicillin-clavulanate  1 Tablet Q12HRS    potassium chloride SA  40 mEq DAILY    bictegravir-emtricitab-TAF  1 Tablet Q EVENING    divalproex ER  500 mg BID    OLANZapine  5 mg Q EVENING    Pharmacy Consult Request  1 Each PHARMACY TO DOSE    metoprolol tartrate  25 mg TWICE DAILY    heparin  5,000 Units Q8HRS      /71   Pulse 71   Temp 36.8 °C (98.2 °F) (Temporal)   Resp 18   Ht 1.778 m (5' 10\")   Wt 72 kg (158 lb 11.7 oz)   SpO2 93%      She has wound vacs on both legs  I reviewed Media section pictures of both stump sites  The left site looks clean and relatively superficial  The right site is  much more extensive with ulcerations    Asses/REC: infected stumps s/p debridement . Please  continue this oral regimen until wound vacs are removed. Bactrim has excellent bone penetration and would treat residual osteomyelitis.   HIV under control -cont Biktarvy, has excellent CD4 count   Will sign off, call if any questions   "

## 2023-05-13 NOTE — CARE PLAN
The patient is Stable - Low risk of patient condition declining or worsening    Shift Goals  Clinical Goals: IV antibiotics, wound care  Patient Goals: rest, anxiety relief  Family Goals: IVETTE    Progress made toward(s) clinical / shift goals:        Problem: Knowledge Deficit - Standard  Goal: Patient and family/care givers will demonstrate understanding of plan of care, disease process/condition, diagnostic tests and medications  Outcome: Progressing     Problem: Skin Integrity  Goal: Skin integrity is maintained or improved  Outcome: Progressing     Problem: Fall Risk  Goal: Patient will remain free from falls  Outcome: Progressing

## 2023-05-13 NOTE — PROGRESS NOTES
Monitor Summary:  Rhythm: SB-SR  Rate: 54-77  Ectopy: Occasional PVC's    0.12/0.06/0.34

## 2023-05-13 NOTE — PROGRESS NOTES
[facility] MEDICINE PROGRESS NOTE     Attending:   Dr. 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  Admitted 5/4/2023 for bilateral stump infections s/p bilateral I&D on 5/10. Transitioned from IV Vancomycin and unasyn to Augmentin and doxycyline 5/12.     SUBJECTIVE:   No acute events overnight.  Patient is sleepy during our exam today.    OBJECTIVE:  Vitals:    05/12/23 1829 05/12/23 1952 05/12/23 2352 05/13/23 0417   BP: 119/67 115/79 123/65 114/77   Pulse: 71 68 66 66   Resp: 18 18 16 16   Temp:  36.6 °C (97.8 °F) 36.1 °C (97 °F) 36.8 °C (98.2 °F)   TempSrc:  Temporal Temporal Temporal   SpO2: 97% 97% 99% 100%   Weight:       Height:         No intake or output data in the 24 hours ending 05/12/23 0556    PHYSICAL EXAM:   General: No acute distress, afebrile, resting comfortably, conversational   HEENT: NC/AT. EOMI.   Cardiovascular: RRR without murmurs, rubs, heaves. Normal capillary refill   Respiratory: CTAB, no tachypnea or retractions   Abdomen: normal bowel sounds, soft, nontender, nondistended, no masses, no organomegaly   EXT:  Bilateral BKA with dressing in place. Clean, dry and intact. Wound vac with minimal serosanguineous fluid present  Skin: No erythema/lesions   Neuro: Non-focal, alert and orientated       LABS:  Recent Labs     05/11/23  0830   WBC 5.3   RBC 3.56*   HEMOGLOBIN 8.9*   HEMATOCRIT 29.1*   MCV 81.7   MCH 25.0*   RDW 49.4   PLATELETCT 482*   MPV 9.2   NEUTSPOLYS 62.10   LYMPHOCYTES 28.50   MONOCYTES 7.80   EOSINOPHILS 0.80   BASOPHILS 0.40     Recent Labs     05/10/23  0817 05/11/23  0830   SODIUM 146* 141   POTASSIUM 4.3 4.7   CHLORIDE 111 106   CO2 23 23   BUN 7* 14   CREATININE 0.70 0.76   CALCIUM 8.4* 8.0*     Estimated GFR/CRCL = Estimated Creatinine Clearance: 90.4 mL/min (by C-G  "formula based on SCr of 0.76 mg/dL).  Recent Labs     05/10/23  0817 05/11/23  0830   GLUCOSE 71 117*                 No results for input(s): INR, APTT, FIBRINOGEN in the last 72 hours.    Invalid input(s): DIMER    MICROBIOLOGY:   Blood Culture Hold   Date Value Ref Range Status   04/08/2023 Collected  Final        IMAGING:   [id number]-WITH & W/O LEFT   Final Result      1.  Early osteomyelitis of the distal aspect of the left tibial stump      2.  Negative for abscess      3.  Left knee joint effusion      [id number]-WITH & W/O RIGHT   Final Result      1.  No roc osteomyelitis      2.  Edema within the distal tibial stump evident on T2-weighted imaging and possibly minimal change on T1-weighted imaging. This could indicate very early osteomyelitis although at this point is not specific.      3.  Negative for abscess      DX-CHEST-LIMITED (1 VIEW)   Final Result         No acute cardiac or pulmonary abnormality is identified.          CULTURES:   Results       Procedure Component Value Units Date/Time    Blood Culture [[id number]] Collected: 05/04/23 1612    Order Status: Completed Specimen: Blood from Peripheral Updated: 05/09/23 1900     Significant Indicator NEG     Source BLD     Site PERIPHERAL     Culture Result No growth after 5 days of incubation.    Narrative:      Per Hospital Policy: Only change Specimen Src: to \"Line\" if  specified by physician order.  Left Forearm/Arm    Blood Culture [[id number]] Collected: 05/04/23 1612    Order Status: Completed Specimen: Blood from Peripheral Updated: 05/09/23 1900     Significant Indicator NEG     Source BLD     Site PERIPHERAL     Culture Result No growth after 5 days of incubation.    Narrative:      Per Hospital Policy: Only change Specimen Src: to \"Line\" if  specified by physician order.  Right Forearm/Arm    C Diff by PCR rflx Toxin [[id number]] Collected: 05/07/23 1256    Order Status: Completed Specimen: Stool Updated: 05/07/23 1544     C Diff " by PCR Negative     Comment: C. difficile NOT detected by PCR.    Acute diarrhea Special Contact Precautions is required  until 48 hours after diarrhea has resolved, patient’s stool  has returned to baseline or until an infectious agent is  no longer suspected.  Treatment not indicated per guidelines.  Repeat testing not indicated within 7 days.          027-NAP1-BI Presumptive Negative     Comment: Presumptive 027/NAP1/BI target DNA sequences are NOT DETECTED.       Narrative:      Special Contact Isolation  Collected By: [id number] ERIC ALSTON  Does this patient have risk factors for C-diff?->Yes  C-Diff Risk Factors->antibiotic exposure    Urine Culture (New) [[id number]] Collected: 05/04/23 1850    Order Status: Completed Specimen: Urine Updated: 05/07/23 0614     Significant Indicator NEG     Source UR     Site -     Culture Result No growth at 48 hours.    Narrative:      Indication for culture:->Evaluation for sepsis without a  clear source of infection  Indication for culture:->Evaluation for sepsis without a            MEDS:  Current Facility-Administered Medications   Medication Last Admin    acetaminophen (Tylenol) tablet 650 mg 650 mg at 05/13/23 0440    oxyCODONE immediate-release (ROXICODONE) tablet 5 mg 5 mg at 05/12/23 1016    doxycycline monohydrate (ADOXA) tablet 100 mg 100 mg at 05/13/23 0440    amoxicillin-clavulanate (AUGMENTIN) 875-125 MG per tablet 1 Tablet 1 Tablet at 05/13/23 0441    LORazepam (ATIVAN) injection 2 mg      loperamide (IMODIUM) capsule 2 mg 2 mg at 05/08/23 2323    potassium chloride SA (Kdur) tablet 40 mEq 40 mEq at 05/13/23 0440    bictegravir-emtricitab-TAF (Biktarvy) -25 mg tablet 1 Tablet 1 Tablet at 05/11/23 1834    divalproex ER (DEPAKOTE ER) tablet 500 mg 500 mg at 05/13/23 0441    OLANZapine (ZYPREXA) tablet 5 mg 5 mg at 05/11/23 1833    Pharmacy Consult Request ...Pain Management Review 1 Each      acetaminophen (TYLENOL) tablet 1,000 mg 1,000 mg at 05/11/23  1018    ibuprofen (MOTRIN) tablet 800 mg      metoprolol tartrate (LOPRESSOR) tablet 25 mg 25 mg at 23 0440    heparin injection 5,000 Units 5,000 Units at 23 0441       ASSESSMENT/PLAN: Kellie Chatman is a 53-year-old female with a PMHx HIV, schizophrenia, bipolar on Zyprexa and Depakote; 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  Admitted 2023 for bilateral stump infections s/p bilateral I&D on 5/10.    Chronic multifocal osteomyelitis of multiple sites (HCC)  Assessment & Plan  MRI showin.  Early osteomyelitis of the distal aspect of the left tibial stump. S/p I&D and  on 5/10 with Dr. Lux  - ID consulting- Dr Manzo   - Discontinue vanco and unasyn (-)  - Start Doxy 100mg BID and augmentin 875 BID - duration pending  - Wound care following   - Ativan 2mg PRN wound vac changes      HIV (human immunodeficiency virus infection) (HCC)  Assessment & Plan  Initially unclear diagnosis. However, confirmatory testing indicates that patient is in fact positive for HIV 1.  Patient continues to have normal CD4 counts (CD4 752 at 3/2023)  - ID following   - Confirmatory testing conclusive for HIV  - Continue Biktarvy daily    History of atrial fibrillation  Assessment & Plan  On discharge from [facility] previously patient was noted to be in atrial fibrillation.  No record on EKGs through [facility].  - Continue metoprolol 25 mg twice daily  - Holding home eliquis due to planned surgical intervention. Resume when cleared by ortho  - Resume heparin   - Plan to dc telemetry monitoring at this time    History of seizures  Assessment & Plan  Unclear history of seizure activity. Patient states she has never had a seizure before.   - Continue Depakote 500 mg daily for history of seizures  - Depakote likely for mood stabilization     History of DVT (deep vein thrombosis)  Assessment & Plan  Hold home Eliquis while pending surgery.  Plan for resumption after cleared by  surgery  - Resume heparin 5/12    Homelessness  Assessment & Plan  Patient was previously staying at [facility] previously.  Recommend social work consultation prior to discharge for safe discharge planning.    Hypokalemia  Assessment & Plan  Resolved.  Continue to monitor.    Schizophrenia (HCC)  Assessment & Plan  - Continue home olanzapine 5 mg twice daily      * Wound infection  Assessment & Plan  - Discontinue vancomycin and Unasyn.(5/4 - 5/21)  - Start Doxy and augmentin - duration pending          Core Measures:   Fluids: PO intake  Lines: IVP; wound vac not connected   Abx: stop vanco and unasyn / start doxy and Augmentin   DVT prophylaxis: resume heparin; resume home eliquis when ready for dc   Code Status: full    Disposition: medically cleared for dc, working with CM for SNF placement     Dilcia Lovelace MD   PGY-3 Family Medicine Resident   [facility]

## 2023-05-13 NOTE — PROGRESS NOTES
Patient refused evening medications, patient understands importance of taking medications and has been educated.

## 2023-05-14 PROCEDURE — 700102 HCHG RX REV CODE 250 W/ 637 OVERRIDE(OP)

## 2023-05-14 PROCEDURE — 700102 HCHG RX REV CODE 250 W/ 637 OVERRIDE(OP): Performed by: STUDENT IN AN ORGANIZED HEALTH CARE EDUCATION/TRAINING PROGRAM

## 2023-05-14 PROCEDURE — 51798 US URINE CAPACITY MEASURE: CPT

## 2023-05-14 PROCEDURE — A9270 NON-COVERED ITEM OR SERVICE: HCPCS | Performed by: STUDENT IN AN ORGANIZED HEALTH CARE EDUCATION/TRAINING PROGRAM

## 2023-05-14 PROCEDURE — 700102 HCHG RX REV CODE 250 W/ 637 OVERRIDE(OP): Performed by: BEHAVIOR ANALYST

## 2023-05-14 PROCEDURE — 700111 HCHG RX REV CODE 636 W/ 250 OVERRIDE (IP)

## 2023-05-14 PROCEDURE — 770001 HCHG ROOM/CARE - MED/SURG/GYN PRIV*

## 2023-05-14 PROCEDURE — 99232 SBSQ HOSP IP/OBS MODERATE 35: CPT | Mod: GC | Performed by: HOSPITALIST

## 2023-05-14 PROCEDURE — A9270 NON-COVERED ITEM OR SERVICE: HCPCS | Performed by: BEHAVIOR ANALYST

## 2023-05-14 PROCEDURE — A9270 NON-COVERED ITEM OR SERVICE: HCPCS

## 2023-05-14 RX ADMIN — HEPARIN SODIUM 5000 UNITS: 5000 INJECTION, SOLUTION INTRAVENOUS; SUBCUTANEOUS at 06:25

## 2023-05-14 RX ADMIN — DIVALPROEX SODIUM 500 MG: 500 TABLET, EXTENDED RELEASE ORAL at 06:25

## 2023-05-14 RX ADMIN — METOPROLOL TARTRATE 25 MG: 25 TABLET, FILM COATED ORAL at 18:12

## 2023-05-14 RX ADMIN — DOXYCYCLINE 100 MG: 100 TABLET, FILM COATED ORAL at 06:24

## 2023-05-14 RX ADMIN — BICTEGRAVIR SODIUM, EMTRICITABINE, AND TENOFOVIR ALAFENAMIDE FUMARATE 1 TABLET: 50; 200; 25 TABLET ORAL at 18:11

## 2023-05-14 RX ADMIN — AMOXICILLIN AND CLAVULANATE POTASSIUM 1 TABLET: 875; 125 TABLET, FILM COATED ORAL at 06:24

## 2023-05-14 RX ADMIN — HEPARIN SODIUM 5000 UNITS: 5000 INJECTION, SOLUTION INTRAVENOUS; SUBCUTANEOUS at 20:09

## 2023-05-14 RX ADMIN — AMOXICILLIN AND CLAVULANATE POTASSIUM 1 TABLET: 875; 125 TABLET, FILM COATED ORAL at 18:12

## 2023-05-14 RX ADMIN — OLANZAPINE 5 MG: 5 TABLET, FILM COATED ORAL at 18:12

## 2023-05-14 RX ADMIN — HEPARIN SODIUM 5000 UNITS: 5000 INJECTION, SOLUTION INTRAVENOUS; SUBCUTANEOUS at 14:16

## 2023-05-14 RX ADMIN — DOXYCYCLINE 100 MG: 100 TABLET, FILM COATED ORAL at 18:12

## 2023-05-14 RX ADMIN — OXYCODONE 5 MG: 5 TABLET ORAL at 14:16

## 2023-05-14 RX ADMIN — POTASSIUM CHLORIDE 40 MEQ: 1500 TABLET, EXTENDED RELEASE ORAL at 06:25

## 2023-05-14 RX ADMIN — DIVALPROEX SODIUM 500 MG: 500 TABLET, EXTENDED RELEASE ORAL at 18:12

## 2023-05-14 ASSESSMENT — PAIN DESCRIPTION - PAIN TYPE
TYPE: ACUTE PAIN
TYPE: ACUTE PAIN
TYPE: ACUTE PAIN;CHRONIC PAIN

## 2023-05-14 NOTE — CARE PLAN
The patient is Stable - Low risk of patient condition declining or worsening    Shift Goals  Clinical Goals: oral abx, wound care, infection control  Patient Goals: infection control, VSS  Family Goals: IVETTE    Progress made toward(s) clinical / shift goals:    Problem: Knowledge Deficit - Standard  Goal: Patient and family/care givers will demonstrate understanding of plan of care, disease process/condition, diagnostic tests and medications  Outcome: Progressing  Note: Patient understands that she is receiving antibiotic treatment in order to prevent infection in her bilateral BKA and that she needs the wound vac in order to help the wound heal.      Problem: Skin Integrity  Goal: Skin integrity is maintained or improved  Outcome: Progressing  Note: Patient has not had any new onset episodes of skin breakdown despite having the occasional incontinence.

## 2023-05-14 NOTE — PROGRESS NOTES
Monitor Summary:  Rhythm: SR  Rate: 66-76  Ectopy: Occasional and Rare PVC's    0.13/0.09/0.41

## 2023-05-14 NOTE — DISCHARGE PLANNING
Received choice form @: [phone]  Agency/Facility name: [facility]  Sent referral per choice form @: [phone]

## 2023-05-14 NOTE — PROGRESS NOTES
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  Admitted 5/4/2023 for bilateral stump infections s/p bilateral I&D on 5/10. Transitioned from IV Vancomycin and unasyn to Augmentin and doxycyline 5/12.     SUBJECTIVE:   No acute events overnight.  Very somnolent during morning assessment. Patient is sleepy during our exam today.    OBJECTIVE:  Vitals:    05/13/23 1636 05/13/23 2000 05/14/23 0000 05/14/23 0400   BP: 117/63 122/69 116/79 129/70   Pulse: 77 83 78 76   Resp: 16 16 16 16   Temp: 37 °C (98.6 °F) 37.3 °C (99.1 °F) 36.8 °C (98.2 °F) 36.9 °C (98.5 °F)   TempSrc: Temporal Temporal Temporal Temporal   SpO2: 95% 96% 96% 97%   Weight:       Height:         No intake or output data in the 24 hours ending 05/12/23 0556    PHYSICAL EXAM:   General: No acute distress, afebrile, sitting up in bed on arrival  HEENT: NC/AT. EOMI.   Cardiovascular: RRR without murmurs, rubs, heaves. Normal capillary refill   Respiratory: CTAB, no tachypnea or retractions   Abdomen: normal bowel sounds, soft, nontender, nondistended, no masses, no organomegaly   EXT:  Bilateral BKA with dressing in place. Clean, dry and intact. Wound vac with minimal serosanguineous fluid present  Skin: No erythema/lesions   Neuro: Non-focal, alert and orientated       LABS:  Recent Labs     05/11/23  0830 05/13/23  0629   WBC 5.3 5.9   RBC 3.56* 3.73*   HEMOGLOBIN 8.9* 9.5*   HEMATOCRIT 29.1* 30.7*   MCV 81.7 82.3   MCH 25.0* 25.5*   RDW 49.4 50.2*   PLATELETCT 482* 498*   MPV 9.2 10.0   NEUTSPOLYS 62.10 54.80   LYMPHOCYTES 28.50 30.60   MONOCYTES 7.80 10.50   EOSINOPHILS 0.80 1.90   BASOPHILS 0.40 0.80       Recent Labs     05/11/23  0830 05/13/23  0629   SODIUM 141 138   POTASSIUM 4.7 4.3   CHLORIDE 106  "103   CO2 23 25   BUN 14 11   CREATININE 0.76 0.59   CALCIUM 8.0* 8.8       Estimated GFR/CRCL = Estimated Creatinine Clearance: 116.5 mL/min (by C-G formula based on SCr of 0.59 mg/dL).  Recent Labs     05/11/23  0830 05/13/23  0629   GLUCOSE 117* 101*                   No results for input(s): INR, APTT, FIBRINOGEN in the last 72 hours.    Invalid input(s): DIMER    MICROBIOLOGY:   Blood Culture Hold   Date Value Ref Range Status   04/08/2023 Collected  Final          IMAGING:   [id number]-WITH & W/O LEFT   Final Result      1.  Early osteomyelitis of the distal aspect of the left tibial stump      2.  Negative for abscess      3.  Left knee joint effusion      [id number]-WITH & W/O RIGHT   Final Result      1.  No roc osteomyelitis      2.  Edema within the distal tibial stump evident on T2-weighted imaging and possibly minimal change on T1-weighted imaging. This could indicate very early osteomyelitis although at this point is not specific.      3.  Negative for abscess      DX-CHEST-LIMITED (1 VIEW)   Final Result         No acute cardiac or pulmonary abnormality is identified.          CULTURES:   Results       Procedure Component Value Units Date/Time    Blood Culture [[id number]] Collected: 05/04/23 1612    Order Status: Completed Specimen: Blood from Peripheral Updated: 05/09/23 1900     Significant Indicator NEG     Source BLD     Site PERIPHERAL     Culture Result No growth after 5 days of incubation.    Narrative:      Per Hospital Policy: Only change Specimen Src: to \"Line\" if  specified by physician order.  Left Forearm/Arm    Blood Culture [[id number]] Collected: 05/04/23 1612    Order Status: Completed Specimen: Blood from Peripheral Updated: 05/09/23 1900     Significant Indicator NEG     Source BLD     Site PERIPHERAL     Culture Result No growth after 5 days of incubation.    Narrative:      Per Hospital Policy: Only change Specimen Src: to \"Line\" if  specified by physician order.  Right " Forearm/Arm    C Diff by PCR rflx Toxin [[id number]] Collected: 05/07/23 1256    Order Status: Completed Specimen: Stool Updated: 05/07/23 1544     C Diff by PCR Negative     Comment: C. difficile NOT detected by PCR.    Acute diarrhea Special Contact Precautions is required  until 48 hours after diarrhea has resolved, patient’s stool  has returned to baseline or until an infectious agent is  no longer suspected.  Treatment not indicated per guidelines.  Repeat testing not indicated within 7 days.          027-NAP1-BI Presumptive Negative     Comment: Presumptive 027/NAP1/BI target DNA sequences are NOT DETECTED.       Narrative:      Special Contact Isolation  Collected By: [id number] ERIC ALSTON  Does this patient have risk factors for C-diff?->Yes  C-Diff Risk Factors->antibiotic exposure            MEDS:  Current Facility-Administered Medications   Medication Last Admin    oxyCODONE immediate-release (ROXICODONE) tablet 5 mg 5 mg at 05/13/23 1444    doxycycline monohydrate (ADOXA) tablet 100 mg 100 mg at 05/14/23 0624    amoxicillin-clavulanate (AUGMENTIN) 875-125 MG per tablet 1 Tablet 1 Tablet at 05/14/23 0624    LORazepam (ATIVAN) injection 2 mg      loperamide (IMODIUM) capsule 2 mg 2 mg at 05/08/23 2323    potassium chloride SA (Kdur) tablet 40 mEq 40 mEq at 05/14/23 0625    bictegravir-emtricitab-TAF (Biktarvy) -25 mg tablet 1 Tablet 1 Tablet at 05/13/23 1828    divalproex ER (DEPAKOTE ER) tablet 500 mg 500 mg at 05/14/23 0625    OLANZapine (ZYPREXA) tablet 5 mg 5 mg at 05/13/23 1829    Pharmacy Consult Request ...Pain Management Review 1 Each      acetaminophen (TYLENOL) tablet 1,000 mg 1,000 mg at 05/11/23 1018    ibuprofen (MOTRIN) tablet 800 mg      metoprolol tartrate (LOPRESSOR) tablet 25 mg 25 mg at 05/13/23 0440    heparin injection 5,000 Units 5,000 Units at 05/14/23 0625       ASSESSMENT/PLAN: Kellie Chatman is a 53-year-old female with a PMHx HIV, schizophrenia, bipolar on Zyprexa and  Depakote; 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  Admitted 2023 for bilateral stump infections s/p bilateral I&D on 5/10.    History of atrial fibrillation  Assessment & Plan  On discharge from [facility] previously patient was noted to be in atrial fibrillation.  No record on EKGs through [facility].  - Continue metoprolol 25 mg twice daily  - Holding home eliquis due to planned surgical intervention. Resume when cleared by ortho  - Resume heparin   - Plan to dc telemetry monitoring at this time    History of seizures  Assessment & Plan  Unclear history of seizure activity. Patient states she has never had a seizure before.   - Continue Depakote 500 mg daily for history of seizures  - Depakote likely for mood stabilization     Chronic multifocal osteomyelitis of multiple sites (HCC)  Assessment & Plan  MRI showin.  Early osteomyelitis of the distal aspect of the left tibial stump. S/p I&D and  on 5/10 with Dr. Lux  - ID consulting- Dr Manzo   - Discontinue vanco and unasyn (-)  - Start Doxy 100mg BID and augmentin 875 BID - duration pending  - Wound care following   - Ativan 2mg PRN wound vac changes      History of DVT (deep vein thrombosis)  Assessment & Plan  Hold home Eliquis while pending surgery.  Plan for resumption after cleared by surgery  - Resume heparin     Homelessness  Assessment & Plan  Patient was previously staying at [facility] previously.  Recommend social work consultation prior to discharge for safe discharge planning.    HIV (human immunodeficiency virus infection) ([facility])  Assessment & Plan  Initially unclear diagnosis. However, confirmatory testing indicates that patient is in fact positive for HIV 1.  Patient continues to have normal CD4 counts (CD4 752 at 3/2023)  - ID following   - Confirmatory testing conclusive for HIV  - Continue Biktarvy daily    Hypokalemia  Assessment & Plan  Resolved.  Continue to monitor.    Schizophrenia  ([facility])  Assessment & Plan  - Continue home olanzapine 5 mg twice daily      * Wound infection  Assessment & Plan  - Discontinue vancomycin and Unasyn.(5/4 - 5/21)  - Start Doxy and augmentin - duration pending       Core Measures:   Fluids: PO intake  Lines: IVP  Abx: Doxy and Augmentin   DVT prophylaxis: resume heparin; resume home eliquis when ready for dc   Code Status: full    Dispo: Medically cleared for dc, working with CM for SNF placement, will know more on this tomorrow.     Lili Augustin MD  Resident Intern  [facility], Family Medicine

## 2023-05-14 NOTE — DISCHARGE PLANNING
Case Management Discharge Planning    Admission Date: 5/4/2023  GMLOS: 3.5  ALOS: 10    6-Clicks ADL Score: 21  6-Clicks Mobility Score: 9  PT and/or OT Eval ordered: Yes  Post-acute Referrals Ordered: Yes  Post-acute Choice Obtained: Yes  Has referral(s) been sent to post-acute provider:  Yes      Anticipated Discharge Dispo: Discharge Disposition: D/T to SNF with Medicare cert in anticipation of skilled care (03)    DME Needed: No    Action(s) Taken: Case discussed in IDT rounds.  Patient medically cleared for DC to SNF.    CM met at bedside with patient to discuss DCP; Patient agreeable to DC Plan.  CM explained possible barriers (Hx of substance abuse & homelessness) to placement.   Patient verbalized understanding and opted for [facility] referrals.   Post SNF DC patient plans to go to homeless shelter.      Choice form faxed to DPA.     Escalations Completed:  SNF referrals submitted by DPA    Medically Clear: Yes    Next Steps: CM will f/u on SNF referral status/acceptance    Barriers to Discharge: Pending Placement, substance abuse, homelessness    Is the patient up for discharge tomorrow: Medically cleared

## 2023-05-14 NOTE — CARE PLAN
The patient is Stable - Low risk of patient condition declining or worsening    Shift Goals  Clinical Goals: Oral antibiotics, infection control, pain management  Patient Goals: Comfort  Family Goals: IVETTE    Progress made toward(s) clinical / shift goals:    Problem: Knowledge Deficit - Standard  Goal: Patient and family/care givers will demonstrate understanding of plan of care, disease process/condition, diagnostic tests and medications  Outcome: Progressing  Note: Pt actively participates in POC. Pt and board updated, all questions and concerns answered. Pt encouraged to voice all questions and concerns.      Problem: Skin Integrity  Goal: Skin integrity is maintained or improved  Outcome: Progressing  Note: Skin remains intact this shift. There are no new wounds or skin issues noted overnight.       Problem: Fall Risk  Goal: Patient will remain free from falls  Outcome: Progressing  Note: Patient has remained free of falls this shift. Instructed patient to use call light for help. Call light always placed within reach when leaving room. Patient's room has been cleared of clutter such as cords and extra chairs.        Patient is not progressing towards the following goals:

## 2023-05-15 PROCEDURE — 97608 NEG PRS WND THER NDME>50SQCM: CPT

## 2023-05-15 PROCEDURE — 11045 DBRDMT SUBQ TISS EACH ADDL: CPT | Performed by: NURSE PRACTITIONER

## 2023-05-15 PROCEDURE — 770001 HCHG ROOM/CARE - MED/SURG/GYN PRIV*

## 2023-05-15 PROCEDURE — 700111 HCHG RX REV CODE 636 W/ 250 OVERRIDE (IP): Performed by: STUDENT IN AN ORGANIZED HEALTH CARE EDUCATION/TRAINING PROGRAM

## 2023-05-15 PROCEDURE — 99232 SBSQ HOSP IP/OBS MODERATE 35: CPT | Mod: 25 | Performed by: NURSE PRACTITIONER

## 2023-05-15 PROCEDURE — 97597 DBRDMT OPN WND 1ST 20 CM/<: CPT

## 2023-05-15 PROCEDURE — 700102 HCHG RX REV CODE 250 W/ 637 OVERRIDE(OP): Performed by: BEHAVIOR ANALYST

## 2023-05-15 PROCEDURE — A9270 NON-COVERED ITEM OR SERVICE: HCPCS | Performed by: BEHAVIOR ANALYST

## 2023-05-15 PROCEDURE — 11042 DBRDMT SUBQ TIS 1ST 20SQCM/<: CPT | Performed by: NURSE PRACTITIONER

## 2023-05-15 PROCEDURE — A9270 NON-COVERED ITEM OR SERVICE: HCPCS | Performed by: STUDENT IN AN ORGANIZED HEALTH CARE EDUCATION/TRAINING PROGRAM

## 2023-05-15 PROCEDURE — 700102 HCHG RX REV CODE 250 W/ 637 OVERRIDE(OP): Performed by: STUDENT IN AN ORGANIZED HEALTH CARE EDUCATION/TRAINING PROGRAM

## 2023-05-15 PROCEDURE — A9270 NON-COVERED ITEM OR SERVICE: HCPCS

## 2023-05-15 PROCEDURE — 99231 SBSQ HOSP IP/OBS SF/LOW 25: CPT | Mod: GC | Performed by: FAMILY MEDICINE

## 2023-05-15 PROCEDURE — 700101 HCHG RX REV CODE 250: Performed by: FAMILY MEDICINE

## 2023-05-15 PROCEDURE — 700111 HCHG RX REV CODE 636 W/ 250 OVERRIDE (IP)

## 2023-05-15 PROCEDURE — 97530 THERAPEUTIC ACTIVITIES: CPT | Mod: CQ

## 2023-05-15 PROCEDURE — 700102 HCHG RX REV CODE 250 W/ 637 OVERRIDE(OP)

## 2023-05-15 RX ORDER — LIDOCAINE HYDROCHLORIDE 20 MG/ML
20 INJECTION, SOLUTION INFILTRATION; PERINEURAL
Status: DISCONTINUED | OUTPATIENT
Start: 2023-05-15 | End: 2023-05-25 | Stop reason: HOSPADM

## 2023-05-15 RX ORDER — LIDOCAINE HYDROCHLORIDE 40 MG/ML
SOLUTION TOPICAL
Status: DISCONTINUED | OUTPATIENT
Start: 2023-05-15 | End: 2023-05-25 | Stop reason: HOSPADM

## 2023-05-15 RX ADMIN — DOXYCYCLINE 100 MG: 100 TABLET, FILM COATED ORAL at 05:55

## 2023-05-15 RX ADMIN — HEPARIN SODIUM 5000 UNITS: 5000 INJECTION, SOLUTION INTRAVENOUS; SUBCUTANEOUS at 05:55

## 2023-05-15 RX ADMIN — HEPARIN SODIUM 5000 UNITS: 5000 INJECTION, SOLUTION INTRAVENOUS; SUBCUTANEOUS at 21:47

## 2023-05-15 RX ADMIN — METOPROLOL TARTRATE 25 MG: 25 TABLET, FILM COATED ORAL at 16:00

## 2023-05-15 RX ADMIN — LIDOCAINE HYDROCHLORIDE 20 ML: 20 INJECTION, SOLUTION INFILTRATION; PERINEURAL at 09:34

## 2023-05-15 RX ADMIN — DIVALPROEX SODIUM 500 MG: 500 TABLET, EXTENDED RELEASE ORAL at 05:55

## 2023-05-15 RX ADMIN — POTASSIUM CHLORIDE 40 MEQ: 1500 TABLET, EXTENDED RELEASE ORAL at 05:55

## 2023-05-15 RX ADMIN — OXYCODONE 5 MG: 5 TABLET ORAL at 09:06

## 2023-05-15 RX ADMIN — DOXYCYCLINE 100 MG: 100 TABLET, FILM COATED ORAL at 16:00

## 2023-05-15 RX ADMIN — BICTEGRAVIR SODIUM, EMTRICITABINE, AND TENOFOVIR ALAFENAMIDE FUMARATE 1 TABLET: 50; 200; 25 TABLET ORAL at 16:00

## 2023-05-15 RX ADMIN — AMOXICILLIN AND CLAVULANATE POTASSIUM 1 TABLET: 875; 125 TABLET, FILM COATED ORAL at 05:55

## 2023-05-15 RX ADMIN — LORAZEPAM 2 MG: 2 INJECTION INTRAMUSCULAR; INTRAVENOUS at 09:07

## 2023-05-15 RX ADMIN — HEPARIN SODIUM 5000 UNITS: 5000 INJECTION, SOLUTION INTRAVENOUS; SUBCUTANEOUS at 14:16

## 2023-05-15 RX ADMIN — OLANZAPINE 5 MG: 5 TABLET, FILM COATED ORAL at 16:00

## 2023-05-15 RX ADMIN — DIVALPROEX SODIUM 500 MG: 500 TABLET, EXTENDED RELEASE ORAL at 16:00

## 2023-05-15 RX ADMIN — AMOXICILLIN AND CLAVULANATE POTASSIUM 1 TABLET: 875; 125 TABLET, FILM COATED ORAL at 16:00

## 2023-05-15 ASSESSMENT — COGNITIVE AND FUNCTIONAL STATUS - GENERAL
MOBILITY SCORE: 9
MOVING TO AND FROM BED TO CHAIR: A LOT
STANDING UP FROM CHAIR USING ARMS: TOTAL
CLIMB 3 TO 5 STEPS WITH RAILING: TOTAL
TURNING FROM BACK TO SIDE WHILE IN FLAT BAD: A LITTLE
MOVING FROM LYING ON BACK TO SITTING ON SIDE OF FLAT BED: UNABLE
SUGGESTED CMS G CODE MODIFIER MOBILITY: CM
WALKING IN HOSPITAL ROOM: TOTAL

## 2023-05-15 ASSESSMENT — GAIT ASSESSMENTS: GAIT LEVEL OF ASSIST: UNABLE TO PARTICIPATE

## 2023-05-15 ASSESSMENT — PAIN SCALES - GENERAL: PAIN_LEVEL: 2

## 2023-05-15 ASSESSMENT — PAIN DESCRIPTION - PAIN TYPE
TYPE: ACUTE PAIN

## 2023-05-15 NOTE — CARE PLAN
The patient is Stable - Low risk of patient condition declining or worsening    Shift Goals  Clinical Goals: wound care, oral abx  Patient Goals: rest  Family Goals: IVETTE    Progress made toward(s) clinical / shift goals:    Problem: Skin Integrity  Goal: Skin integrity is maintained or improved  Outcome: Progressing  Note: Wound team changing bilateral BKA wound vacs M/W/F, changed today. PRN pain medications given prior. Pt turned q2, EOB with meals. Pt w/ sacral redness, barrier cream applied PRN.      Problem: Knowledge Deficit - Standard  Goal: Patient and family/care givers will demonstrate understanding of plan of care, disease process/condition, diagnostic tests and medications  5/15/2023 1432 by Angelica Phelan R.N.  Note: Pt updated on POC. Pending SNF placement. Wound vac in place on bilateral BKAs.   5/15/2023 1432 by Angelica Phelan R.ARLEN.  Note: Pt updated on POC. Pending SNF placement. Wound vac in place on bilateral BKAs.

## 2023-05-15 NOTE — THERAPY
Physical Therapy   Daily Treatment     Patient Name: Kellie Chatman  Age:  53 y.o., Sex:  female  Medical Record #: [id number]  Today's Date: 5/15/2023     Precautions  Precautions: (P) Non Weight Bearing Right Lower Extremity;Non Weight Bearing Left Lower Extremity  Comments: (P) caity MODI's w/wound VAC    Assessment    Nrsg cleared pt to participate w/PT, due of VAC change in afternoon. The pt was willing to participate w/PT, asking to get OOB. Unfortunately her w/c can not be located, unclear if she came into the hospital w/it. The pt does have her belongings w/her.   Functionally the pt required supervision w/bed mobility,  no OOB today. PT will provide pt a loaner w/c for transfer training.   The pt will need a 18' w/c w/removable arm/leg rests if dc'd to the community.   PT will cont to follow.     Plan    Treatment Plan Status: (P) Modify Current Treatment Plan  Type of Treatment: (P) Bed Mobility, Therapeutic Activities, Neuro Re-Education / Balance  Treatment Frequency: (P) 3 Times per Week  Treatment Duration: (P) Until Therapy Goals Met    DC Equipment Recommendations: (P) Wheelchair (18' w/c removable arm/leg rests.)  Discharge Recommendations: (P) Anticipate that the patient will have no further physical therapy needs after discharge from the hospital       Objective       05/15/23 1140   Charge Group   PT Therapeutic Activities (Units) 2   Total Time Spent   PT Therapeutic Activities Time Spent (Mins) 24   Precautions   Precautions Non Weight Bearing Right Lower Extremity;Non Weight Bearing Left Lower Extremity   Comments caity Rodriguez w/wound VAC   Pain 0 - 10 Group   Pain Rating Scale (NPRS) 0   Other Treatments   Other Treatments Provided EOB w/supervision, pt looking through her jackes for her ID. No ID found   Balance   Sitting Balance (Static) Good   Sitting Balance (Dynamic) Fair +   Weight Shift Sitting Fair   Bed Mobility    Supine to Sit Supervised   Sit to Supine Supervised   Scooting Supervised    Rolling Supervised   Gait Analysis   Gait Level Of Assist Unable to Participate   Comments Bilat BKA PTA   Functional Mobility   Sit to Stand Unable to Participate   Bed, Chair, Wheelchair Transfer Unable to Participate   Comments The pt's w/c is missing, do not see where she showed up in the ED w/one. The pt does have her personal belongings. The pt asking to get into a chair, PT will provide a w/c next tx session. The pt was left seated EOB for lunch, nrsg notified.   How much difficulty does the patient currently have...   Turning over in bed (including adjusting bedclothes, sheets and blankets)? 3   Sitting down on and standing up from a chair with arms (e.g., wheelchair, bedside commode, etc.) 1   Moving from lying on back to sitting on the side of the bed? 2   How much help from another person does the patient currently need...   Moving to and from a bed to a chair (including a wheelchair)? 1   Need to walk in a hospital room? 1   Climbing 3-5 steps with a railing? 1   6 clicks Mobility Score 9   Short Term Goals    Short Term Goal # 1 Pt will perform seated scooting with SPV in 6 visits to progress functional mobility   Goal Outcome # 1 goal not met   Short Term Goal # 2 Pt will perform functional transfers with min A in 6 visits to progress OOB mobility   Goal Outcome # 2 Goal not met   Education Group   Role of Physical Therapist Patient Response Patient;Acceptance;Explanation;Action Demonstration;Reinforcement Needed   Physical Therapy Treatment Plan   Physical Therapy Treatment Plan Modify Current Treatment Plan   Treatment Plan  Bed Mobility;Therapeutic Activities;Neuro Re-Education / Balance   Treatment Frequency 3 Times per Week   Duration Until Therapy Goals Met   Anticipated Discharge Equipment and Recommendations   DC Equipment Recommendations Wheelchair  (18' w/c removable arm/leg rests.)   Discharge Recommendations Anticipate that the patient will have no further physical therapy needs after  discharge from the hospital   Interdisciplinary Plan of Care Collaboration   IDT Collaboration with  Nursing   Patient Position at End of Therapy Edge of Bed;Call Light within Reach;Tray Table within Reach   Collaboration Comments Nrsg notified of pt tx efforts.   Session Information   Date / Session Number  5/15--2 (1/3, 5/22)PTA/1   Supervising Physical Therapist (PTA Treatments Only)   Supervising Physical Therapist Ngoc Dye  (PT notified of POC change, increase freq for w/c transfers.)

## 2023-05-15 NOTE — DISCHARGE PLANNING
Agency/Facility Name: [facility]  Outcome: DPA left v-mail regarding a status on referral.    Agency/Facility Name: [facility]  Spoke To: Elias  Outcome: DPA called facility for an update on referral, per Elias she will take a look and call DPA back with updates.     Agency/Facility Name: [facility]  Outcome: DPA left v-mail regarding a status on referral.    Agency/Facility Name: [facility]  Spoke To: Reji  Outcome: DPA called facility regarding a status on referral, per Reji it will be under review and will call DPA back with any updates.    Agency/Facility Name: [facility]  Outcome: DPA left v-mail regarding a status on referral.

## 2023-05-15 NOTE — FACE TO FACE
"Face to Face Note  -  Durable Medical Equipment    Dilcia Lovelace M.D. - NPI: [id number]  I certify that this patient is under my care and that they had a durable medical equipment(DME)face to face encounter by myself that meets the physician DME face-to-face encounter requirements with this patient on:    Date of encounter:   Patient:                    MRN:                       YOB: 2025  Kellie Chatman  [id number]  1970     The encounter with the patient was in whole, or in part, for the following medical condition, which is the primary reason for durable medical equipment:  Wound Care and Other - bilateral BKA     I certify that, based on my findings, the following durable medical equipment is medically necessary:    Wheelchair   Patient needs manual wheelchair for use inside the home based on the above diagnosis. Per guidelines patient meets criteria in the following ways:   A.  Patient has significant impairment in the following Toileting, Dressing, Grooming, and Bathing and is is unable to complete these tasks in a reasonable timeframe and places the patient at a heightened risk of morbidity.   B.  The patient's mobility limitations cannot be sufficiently resolved by use of  fitted cane or walker.   C.  The patient reports his home provides adequate access between rooms,  maneuvering space, and surfaces for use of the manual wheelchair that is  provided.   D.  The use of the manual wheelchair will significantly improve the patient's  ability to participate in MRADLs and the patient will use it on a regular basis in  the home.   E.  The patient has not expressed an unwillingness to use the manual  wheelchair.   F. The patient has limitations of strength, endurance, range of motion, or coordination per OT notes:.  18\" wheelchair with removable arm and leg rests     My Clinical findings support the need for the above equipment due to:  Bedbound/non-weight bearing, Frequent Falls, " Wound infection, Wound/Incision, Other - bilateral BKA

## 2023-05-15 NOTE — PROGRESS NOTES
[facility] MEDICINE PROGRESS NOTE     Attending:   Dr. 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  Admitted 5/4/2023 for bilateral stump infections s/p bilateral I&D on 5/10. Transitioned from IV Vancomycin and unasyn to Augmentin and doxycyline 5/12.     SUBJECTIVE:   No acute events overnight. Wound care at bedside together for  wound vac changes     OBJECTIVE:  Vitals:    05/14/23 0753 05/14/23 1711 05/14/23 1912 05/15/23 0509   BP: 115/68 110/66 101/58 102/61   Pulse: 80 72 84 68   Resp: 16 16 18 16   Temp: 36.4 °C (97.6 °F) 36.3 °C (97.3 °F) 37.1 °C (98.7 °F) 36.6 °C (97.9 °F)   TempSrc: Temporal Temporal Temporal Temporal   SpO2: 96% 93% 97% 94%   Weight:       Height:           Intake/Output Summary (Last 24 hours) at 5/15/2023 0550  Last data filed at 5/14/2023 0700  Gross per 24 hour   Intake 240 ml   Output --   Net 240 ml       PHYSICAL EXAM:   General: No acute distress, afebrile, resting comfortably, sleepy   HEENT: NC/AT. EOMI.   Cardiovascular: RRR without murmurs, rubs, heaves. Normal capillary refill   Respiratory: CTAB, no tachypnea or retractions   Abdomen: normal bowel sounds, soft, nontender, nondistended, no masses, no organomegaly   EXT:  Bilateral BKA with dressing in place. Clean, dry and intact. Wound vac with minimal serosanguineous fluid present  Skin: No erythema/lesions   Neuro: Non-focal, alert and orientated       LABS:  Recent Labs     05/13/23  0629   WBC 5.9   RBC 3.73*   HEMOGLOBIN 9.5*   HEMATOCRIT 30.7*   MCV 82.3   MCH 25.5*   RDW 50.2*   PLATELETCT 498*   MPV 10.0   NEUTSPOLYS 54.80   LYMPHOCYTES 30.60   MONOCYTES 10.50   EOSINOPHILS 1.90   BASOPHILS 0.80     Recent Labs     05/13/23  0629   SODIUM 138   POTASSIUM 4.3   CHLORIDE 103   CO2 25   BUN 11   CREATININE 0.59   CALCIUM  "8.8     Estimated GFR/CRCL = Estimated Creatinine Clearance: 116.5 mL/min (by C-G formula based on SCr of 0.59 mg/dL).  Recent Labs     05/13/23  0629   GLUCOSE 101*                 No results for input(s): INR, APTT, FIBRINOGEN in the last 72 hours.    Invalid input(s): DIMER    MICROBIOLOGY:   Blood Culture Hold   Date Value Ref Range Status   04/08/2023 Collected  Final        IMAGING:   [id number] & W/O LEFT   Final Result      1.  Early osteomyelitis of the distal aspect of the left tibial stump      2.  Negative for abscess      3.  Left knee joint effusion      [id number] & W/O RIGHT   Final Result      1.  No roc osteomyelitis      2.  Edema within the distal tibial stump evident on T2-weighted imaging and possibly minimal change on T1-weighted imaging. This could indicate very early osteomyelitis although at this point is not specific.      3.  Negative for abscess      DX-CHEST-LIMITED (1 VIEW)   Final Result         No acute cardiac or pulmonary abnormality is identified.          CULTURES:   Results       Procedure Component Value Units Date/Time    Blood Culture [[id number]] Collected: 05/04/23 1612    Order Status: Completed Specimen: Blood from Peripheral Updated: 05/09/23 1900     Significant Indicator NEG     Source BLD     Site PERIPHERAL     Culture Result No growth after 5 days of incubation.    Narrative:      Per Hospital Policy: Only change Specimen Src: to \"Line\" if  specified by physician order.  Left Forearm/Arm    Blood Culture [[id number]] Collected: 05/04/23 1612    Order Status: Completed Specimen: Blood from Peripheral Updated: 05/09/23 1900     Significant Indicator NEG     Source BLD     Site PERIPHERAL     Culture Result No growth after 5 days of incubation.    Narrative:      Per Hospital Policy: Only change Specimen Src: to \"Line\" if  specified by physician order.  Right Forearm/Arm            MEDS:  Current Facility-Administered Medications   Medication Last " Admin    oxyCODONE immediate-release (ROXICODONE) tablet 5 mg 5 mg at 23 1416    doxycycline monohydrate (ADOXA) tablet 100 mg 100 mg at 23 181    amoxicillin-clavulanate (AUGMENTIN) 875-125 MG per tablet 1 Tablet 1 Tablet at 23 181    LORazepam (ATIVAN) injection 2 mg      loperamide (IMODIUM) capsule 2 mg 2 mg at 23 2323    potassium chloride SA (Kdur) tablet 40 mEq 40 mEq at 23 06    bictegravir-emtricitab-TAF (Biktarvy) -25 mg tablet 1 Tablet 1 Tablet at 23    divalproex ER (DEPAKOTE ER) tablet 500 mg 500 mg at 23 181    OLANZapine (ZYPREXA) tablet 5 mg 5 mg at 23    Pharmacy Consult Request ...Pain Management Review 1 Each      acetaminophen (TYLENOL) tablet 1,000 mg 1,000 mg at 23 1018    ibuprofen (MOTRIN) tablet 800 mg      metoprolol tartrate (LOPRESSOR) tablet 25 mg 25 mg at 23    heparin injection 5,000 Units 5,000 Units at 23       ASSESSMENT/PLAN: Kellie Chatman is a 53-year-old female with a PMHx HIV, schizophrenia, bipolar on Zyprexa and Depakote; 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  Admitted 2023 for bilateral stump infections s/p bilateral I&D on 5/10. Transitioned from IV Vancomycin and unasyn to Augmentin and doxycyline .     Chronic multifocal osteomyelitis of multiple sites (HCC)  Assessment & Plan  MRI showin.  Early osteomyelitis of the distal aspect of the left tibial stump. S/p I&D and  on 5/10 with Dr. Lux  - ID consulting- Dr Manzo   - Discontinue vanco and unasyn (-)  - Start Doxy 100mg BID and augmentin 875 BID - duration pending  - Wound care following   - Ativan 2mg PRN wound vac changes      HIV (human immunodeficiency virus infection) (HCC)  Assessment & Plan  Initially unclear diagnosis. However, confirmatory testing indicates that patient is in fact positive for HIV 1.  Patient continues to have normal CD4 counts (CD4 752  at 3/2023)  - ID following   - Confirmatory testing conclusive for HIV  - Continue Biktarvy daily    History of atrial fibrillation  Assessment & Plan  On discharge from [facility] previously patient was noted to be in atrial fibrillation.  No record on EKGs through [facility].  - Continue metoprolol 25 mg twice daily  - Holding home eliquis due to planned surgical intervention. Resume when cleared by ortho  - Resume heparin   - Plan to dc telemetry monitoring at this time    History of seizures  Assessment & Plan  Unclear history of seizure activity. Patient states she has never had a seizure before.   - Continue Depakote 500 mg daily for history of seizures  - Depakote likely for mood stabilization     History of DVT (deep vein thrombosis)  Assessment & Plan  Hold home Eliquis while pending surgery.  Plan for resumption after cleared by surgery  - Resume heparin 5/12    Homelessness  Assessment & Plan  Patient was previously staying at [facility] previously.  Recommend social work consultation prior to discharge for safe discharge planning.    Hypokalemia  Assessment & Plan  Resolved.  Continue to monitor.    Schizophrenia (HCC)  Assessment & Plan  - Continue home olanzapine 5 mg twice daily      * Wound infection  Assessment & Plan  - Discontinue vancomycin and Unasyn.(5/4 - 5/21)  - Start Doxy and augmentin - duration pending          Core Measures:   Fluids: PO intake   Lines: IVP, wound vac   Abx: Doxy and augmentin   DVT prophylaxis: Heparin, resume Eliquis on dc   Code Status: Full    Disposition: medically cleared for dc, awaiting SNF placement     Dilcia Lovelace MD   PGY-3 Family Medicine Resident   [facility]

## 2023-05-15 NOTE — PROGRESS NOTES
LIMB PRESERVATION SERVICE POST OP PROGRESS NOTE        HISTORY OF PRESENT ILLNESS: Kellie Chatman is a 53 y.o.  with a past medical history that includes frostbite, HIV, EtOH abuse, methamphetamine abuse, schizophrenia and bipolar disorder.  Admitted 5/4/2023 for Wound infection [T14.8XXA, L08.9].     [facility] has been consulted for bilateral BKA's with eschar.    Patient known to LPS.  Seen on admit of 11/14/2022 with bilateral frostbite injuries to toes and feet.  Unfortunately due to progression of ischemic damage patient has undergone multiple surgeries in an attempt at limb salvage.  Patient had right TMA on 12/12/2022 by Dr. Lux  Patient had right BKA on 12/20/2022 by Dr. Lux  Patient had left second toe amputation with wound VAC application on 2/3/2023 by Dr. Lux  Patient had left TMA on 2/7/2023 by Dr. Lux  Patient had left BKA on 3/22/2023 by Dr. Lux    Patient is homeless and has a history of mental illness.  Unfortunately since patient's original frostbite injury in November 2022 patient has had repeated frostbite injuries in between attempts at limb salvage.  Patient has had difficulty with wound care in between surgeries.  Patient is wheelchair-bound.  Patient had increasing bilateral BKA pain with increasing edema and patient reported fever and chills causing her to present to the ED.  Patient denies fevers, chills, nausea, vomiting at this time.     Antibiotics were started on this admission.  Infectious diseases has not been consulted.  Xray not completed.  Bilateral MRI ordered.  Ortho and Vascular surgery not involved yet.       SURGERY DATE: 5/10/2023 by Dr. Lux  PROCEDURE: Bilateral BKA I&D with VAC placement        INTERVAL HISTORY:   5/7/23: MRI completed to B tib fib. Updated Dr. Lux  5/9/23: Patient resting in bed.  Complains of pain to right leg worse than left leg.  5/15/2023: POD #5.  Seen with wound team for VAC change to bilateral BKA.  [facility]  "referrals          RESULTS:     Recent Labs     05/13/23  0629   WBC 5.9   RBC 3.73*   HEMOGLOBIN 9.5*   HEMATOCRIT 30.7*   MCV 82.3   MCH 25.5*   MCHC 30.9*   RDW 50.2*   PLATELETCT 498*   MPV 10.0       Recent Labs     05/13/23  0629   SODIUM 138   POTASSIUM 4.3   CHLORIDE 103   CO2 25   GLUCOSE 101*   BUN 11           Imaging:  [id number] & W/O LEFT   Final Result      1.  Early osteomyelitis of the distal aspect of the left tibial stump      2.  Negative for abscess      3.  Left knee joint effusion      [id number] & W/O RIGHT   Final Result      1.  No roc osteomyelitis      2.  Edema within the distal tibial stump evident on T2-weighted imaging and possibly minimal change on T1-weighted imaging. This could indicate very early osteomyelitis although at this point is not specific.      3.  Negative for abscess      DX-CHEST-LIMITED (1 VIEW)   Final Result         No acute cardiac or pulmonary abnormality is identified.            Arterial studies: None    A1c:  Lab Results   Component Value Date/Time    HBA1C 5.1 02/24/2023 05:54 AM            Microbiology:  Results       Procedure Component Value Units Date/Time    Blood Culture [[id number]] Collected: 05/04/23 1612    Order Status: Completed Specimen: Blood from Peripheral Updated: 05/09/23 1900     Significant Indicator NEG     Source BLD     Site PERIPHERAL     Culture Result No growth after 5 days of incubation.    Narrative:      Per Hospital Policy: Only change Specimen Src: to \"Line\" if  specified by physician order.  Left Forearm/Arm    Blood Culture [[id number]] Collected: 05/04/23 1612    Order Status: Completed Specimen: Blood from Peripheral Updated: 05/09/23 1900     Significant Indicator NEG     Source BLD     Site PERIPHERAL     Culture Result No growth after 5 days of incubation.    Narrative:      Per Hospital Policy: Only change Specimen Src: to \"Line\" if  specified by physician order.  Right Forearm/Arm         " "    PHYSICAL EXAMINATION:     VITAL SIGNS: BP 99/52   Pulse 69   Temp 36.4 °C (97.5 °F) (Temporal)   Resp 16   Ht 1.778 m (5' 10\")   Wt 72 kg (158 lb 11.7 oz)   SpO2 94%   BMI 22.78 kg/m²       General Appearance:  Well developed, well nourished, in no acute distress  Calm, cooperative    Lower Extremity Assessment:    Edema:   Moderate right leg worse than left leg  Edema decreased to right leg    Structural /mechanical changes:  Bilateral BKA    Pulses:  Bilateral popliteal nonpalpable,  warm to touch bilaterally.  With Doppler brisk tones noted to bilateral popliteal.      Wound Assessment:    Wound(s)   location: Right BKA  Full thickness, no palpable bone  Wound characteristics: Desiccated adipose, brown-yellow slough, red granular buds beginning to form  Erythema: None  Edema: Moderate  Drainage: Tissue moist  Odor: None  Measures: 14 x 17 x IVETTE cm            PROCEDURE: Right BKA, premedicated with topical iodine  -Curette used to debride wound bed.  Excisional debridement was performed to remove devitalized tissue until healthy, bleeding tissue was visualized.   <20cm2 debrided.  Tissue debrided into the subcutaneous layer.    -Bleeding controlled with manual pressure.    -Wound care completed by wound RN, refer to flowsheet  -Patient tolerated the procedure well, with minimal pain or discomfort.     Postdebridement measurement  14 x 17 x 0.5 cm                  Wound(s)   location: Left BKA  Full thickness, no palpable bone  Wound characteristics: Approximately 50% slough/desiccated adipose, granular tissue to wound edges  erythema: None  Edema: Minimal, decreased from previous assessment  Drainage: Tissue semidry  Odor: None  Measures: 3.5 x 5.5 x 0.2 cm        PROCEDURE: Left BKA premedicated with topical lidocaine  -Curette used to debride wound bed.  Excisional debridement was performed to remove devitalized tissue until healthy, bleeding tissue was visualized.   14cm2 debrided.  Tissue debrided " into the subcutaneous layer.    -Bleeding controlled with manual pressure.    -Wound care completed by wound RN, refer to flowsheet  -Patient tolerated the procedure well, with minimal pain or discomfort.     Postdebridement measurement: 3.5 x 5.5 x 0.3 cm                            ASSESSMENT AND PLAN:   53 y.o. admitted for Wound infection [T14.8XXA, L08.9]. Presents with bilateral BKA eschar    Right BKA: Liquefied adipose, significant slough. Excisional debridement performed today.  Medically necessary to promote wound healing.  Continue VF VAC.  Switch to cleanse choice VF VAC Change.  Discussed with wound team.    Left BKA: Desiccated adipose, slough.  Excisional debridement performed today.  Medically necessary to promote wound healing.  Continue with regular VAC.  Add hydrogel for moist wound healing.  If no improvement start VF VAC            Wound care:   Bilateral BKA: VAC.  To be changed by wound team.  See above.        Vascular status:   Bilateral popliteal nonpalpable.  With Doppler brisk tones noted bilaterally    Surgery:   No plans for further surgery      Antibiotics:   -ID Dr. Manzo involved  On Augmentin and doxycycline until wound vacs are removed    Weight Bearing Status:   -Right BKA: Non Weight bearing  -Left BKA: Non Weight bearing        PT/OT:   Involved                Discharge Plan:  Recommend SNF with VF VAC capability  -TBD, patient currently homeless      D/W: pt, RN, Theresa DEL CID wound team    Please note that this dictation was created using voice recognition software. I have  worked with technical experts from [facility] to optimize the interface.  I have made every reasonable attempt to correct obvious errors, but there may be errors of grammar and possibly content that I did not discover before finalizing the note.    Please contact [facility] through [employer].

## 2023-05-15 NOTE — DISCHARGE PLANNING
Case Management Discharge Planning    Admission Date: 5/4/2023  GMLOS: 3.5  ALOS: 11    6-Clicks ADL Score: 21  6-Clicks Mobility Score: 9  PT and/or OT Eval ordered: Yes  Post-acute Referrals Ordered: Yes  Post-acute Choice Obtained: Yes  Has referral(s) been sent to post-acute provider:  Yes      Anticipated Discharge Dispo: Discharge Disposition: D/T to SNF with Medicare cert in anticipation of skilled care (03)    DME Needed: Yes    DME Ordered: No    Action(s) Taken: OTHER    Patient seen by PT and now acceptable to discharge with wheelchair. LSW sent voalte to resident Dr. Lovelace requesting DME order and face to face for patient's wheelchair.    If patient is not eligible for a wheelchair through her insurance, CM department does have donated wheelchairs.    Escalations Completed: None    Medically Clear: Yes    Next Steps: LSW to follow up with patient and medical team regarding discharge needs and barriers.     Barriers to Discharge: DME

## 2023-05-15 NOTE — WOUND TEAM
[facility]  Inpatient Services  Wound and Skin Care Evaluation    Admission Date: 5/4/2023     Last order of IP CONSULT TO WOUND CARE was found on 5/5/2023 from Hospital Encounter on 5/4/2023     HPI, PMH, SH: Reviewed    Past Surgical History:   Procedure Laterality Date    IRRIGATION & DEBRIDEMENT GENERAL Bilateral 5/10/2023    Procedure: IRRIGATION AND DEBRIDEMENT, WOUND LEG;  Surgeon: Nas Lux M.D.;  Location: SURGERY [facility];  Service: Orthopedics    APPLICATION OR REPLACEMENT, WOUND VAC Bilateral 5/10/2023    Procedure: APPLICATION OR REPLACEMENT, WOUND VAC;  Surgeon: Nas Lux M.D.;  Location: SURGERY [facility];  Service: Orthopedics    KNEE AMPUTATION BELOW Left 03/22/2023    Procedure: AMPUTATION, BELOW KNEE;  Surgeon: Nas Lux M.D.;  Location: SURGERY [facility];  Service: Orthopedics    AMPUTATION, BELOW THE KNEE Right      Social History     Tobacco Use    Smoking status: Every Day     Packs/day: 0.25     Types: Cigarettes    Smokeless tobacco: Never   Vaping Use    Vaping Use: Never used   Substance Use Topics    Alcohol use: Not Currently     Chief Complaint   Patient presents with    Leg Pain     Bilateral leg pain, bilateral BKA     Diagnosis: Wound infection [T14.8XXA, L08.9]    Unit where seen by Wound Team: [id number]    WOUND CONSULT/FOLLOW UP RELATED TO:  Rashi BKA, L Post thigh, L radha-area     WOUND HISTORY:  Pt is a 52 yo female who presented to the ED for B leg pain.  Pt had B BKA for tissue damage related to frostbite in 3/2023.  Hx of B foot frost bite 11/14/23, schizophrenia, HIV, methamphetamine use, homelessness.  Pt discharged from [facility] 5/3/23 for B BKA pain.  Pt was admitted for r/o osteomyelitis and postoperative infection.  Pt is wheelchair bound    right TMA on 12/12/2022 by Dr. Lux  right BKA on 12/20/2022 by Dr. Lux  left second toe amputation with wound VAC application on 2/3/2023 by Dr. Lux  left TMA on 2/7/2023 by   Maci   left BKA on 3/22/2023 by Dr. Lux    Interval History this admission  5/5/23 LPS consult Yuliet Hwang APRN LPS  5/6/23 ID consult Dr Manzo - continue Vancomycin and Unasyn,   5/10/23 B BKA debridement and wound VAC    WOUND ASSESSMENT/LDA         Negative Pressure Wound Therapy 05/10/23 Chronic Knee (Active)       Negative Pressure Wound Therapy 05/12/23 Bilateral (Active)   NPWT Pump Mode / Pressure Setting Continuous;125 mmHg 05/15/23 0800   Dressing Type Small;Black Foam (Regular);Medium;Black Foam (Veraflo) 05/13/23 0730   Number of Foam Pieces Used 4 05/13/23 0730   Canister Changed No 05/15/23 0800   Output (mL) 0 mL 05/13/23 0730   NEXT Dressing Change/Treatment Date 05/17/23 05/13/23 0730   VAC VeraFlo Irrigant Normal Saline 05/13/23 0730   VAC VeraFlo Soak Time (mins) 5 05/13/23 0730   VAC VeraFlo Instill Volume (ml) 22 05/13/23 0730   VAC VeraFlo - Therapy Time (hrs) 2 05/13/23 0730   VAC VeraFlo Pressure (mm/Hg) Intermittent;125 mmHg 05/13/23 0730           Wound 03/06/23 Other (comment) Leg Right BKA (Active)   Wound Image      05/15/23 1000   Site Assessment Yellow;Red;Hawkins 05/15/23 1000   Periwound Assessment Hawkins 05/15/23 1000   Margins Defined edges;Unattached edges 05/15/23 1000   Closure Secondary intention 05/15/23 1000   Drainage Amount Small 05/15/23 1000   Drainage Description Tan 05/15/23 1000   Treatments Cleansed;Site care;CSWD - Conservative Sharp Wound Debridement 05/15/23 1000   Wound Cleansing Approved Wound Cleanser 05/15/23 1000   Periwound Protectant Skin Protectant Wipes to Periwound;Paste Ring;Drape 05/15/23 1000   Dressing Cleansing/Solutions Normal Saline 05/15/23 1000   Dressing Options Wound Vac 05/15/23 1000   Dressing Changed Changed 05/15/23 1000   Dressing Status Intact 05/15/23 1000   Dressing Change/Treatment Frequency Monday, Wednesday, Friday, and As Needed 05/15/23 1000   NEXT Dressing Change/Treatment Date 05/17/23 05/15/23 1000   NEXT Weekly Photo  (Inpatient Only) 05/19/23 05/15/23 1000   Non-staged Wound Description Full thickness 05/12/23 1200   Wound Length (cm) 14 cm 05/12/23 1200   Wound Width (cm) 17 cm 05/12/23 1200   Wound Surface Area (cm^2) 238 cm^2 05/12/23 1200   Wound Bed Granulation (%) 40 % 05/12/23 1200   Wound Bed Slough (%) 60 % 05/12/23 1200   Wound Bed Eschar (%) 100 % 05/05/23 1100   Shape irregular 05/15/23 1000   Wound Odor None 05/15/23 1000   Exposed Structures Adipose 05/15/23 1000   WOUND NURSE ONLY - Time Spent with Patient (mins) 90 05/15/23 1000       Wound 05/05/23 Incision Knee Left BKA (Active)   Wound Image    05/15/23 1000   Site Assessment Red;Yellow 05/15/23 1000   Periwound Assessment Pink;Satellite lesions 05/15/23 1000   Margins Defined edges;Unattached edges 05/15/23 1000   Closure Secondary intention 05/15/23 1000   Drainage Amount Scant 05/15/23 1000   Drainage Description Serosanguineous 05/15/23 1000   Treatments Cleansed;Site care;CSWD - Conservative Sharp Wound Debridement 05/15/23 1000   Wound Cleansing Approved Wound Cleanser 05/15/23 1000   Periwound Protectant Skin Protectant Wipes to Periwound;Drape 05/15/23 1000   Dressing Cleansing/Solutions Not Applicable 05/15/23 1000   Dressing Options Wound Vac 05/15/23 1000   Dressing Changed Changed 05/15/23 1000   Dressing Status Intact 05/15/23 1000   Dressing Change/Treatment Frequency Monday, Wednesday, Friday, and As Needed 05/15/23 1000   NEXT Dressing Change/Treatment Date 05/17/23 05/15/23 1000   NEXT Weekly Photo (Inpatient Only) 05/19/23 05/15/23 1000   Non-staged Wound Description Full thickness 05/12/23 1200   Wound Length (cm) 3.5 cm 05/12/23 1200   Wound Width (cm) 5.5 cm 05/12/23 1200   Wound Surface Area (cm^2) 19.25 cm^2 05/12/23 1200   Wound Bed Granulation (%) 50 % 05/12/23 1200   Wound Bed Slough (%) 50 % 05/12/23 1200   Wound Bed Eschar (%) 100 % 05/05/23 1100   Shape oval 05/15/23 1000   Wound Odor None 05/15/23 1000   Pulses N/A 05/15/23 1000    Exposed Structures Adipose 05/15/23 1000   WOUND NURSE ONLY - Time Spent with Patient (mins) 90 05/12/23 1200              MRI 5/7/23  MRI right tib-fib:  1.  No roc osteomyelitis     2.  Edema within the distal tibial stump evident on T2-weighted imaging and possibly minimal change on T1-weighted imaging. This could indicate very early osteomyelitis although at this point is not specific.     3.  Negative for abscess     MRI left tib-fib  1.  Early osteomyelitis of the distal aspect of the left tibial stump     2.  Negative for abscess     3.  Left knee joint effusion    HIV assay 5/7/23 positive    GISELLA:   5/5/23 B popiteal palpable    Lab Values:    Lab Results   Component Value Date/Time    WBC 5.9 05/13/2023 06:29 AM    RBC 3.73 (L) 05/13/2023 06:29 AM    HEMOGLOBIN 9.5 (L) 05/13/2023 06:29 AM    HEMATOCRIT 30.7 (L) 05/13/2023 06:29 AM    CREACTPROT 2.40 (H) 05/04/2023 04:10 PM    SEDRATEWES 75 (H) 05/04/2023 04:10 PM    HBA1C 5.1 02/24/2023 05:54 AM        Culture Results show:  No results found for this or any previous visit (from the past 720 hour(s)).    Pain Level/Medicated:  Oxy 5 mg po  and IV ativan, --> lidocain topically    INTERVENTIONS BY WOUND TEAM:  . Performed standard wound care which includes appropriate positioning, dressing removal and non-selective debridement. Pictures and measurements obtained weekly if/when required.    Bilateral BKA:   Preparation for Dressing removal adhesive remover, lidocaine soak, saline soak  Non-selectively Debrided with:  Wound cleanser   Sharp debridement: Candelaria Wound RN and LPS APRN debrided   Radha wound: Cleansed with Wound cleanser  Primary Dressing:   RIGHT - prepped radha-wound with paste ring and drape, a small bridge was created superior wound and 1 complete roll used onto wound bed + 1 additional piece to fill in space secured with drape.  Seal intact and VF settings changed  LEFT - radha-wound prepped with drape and black regular foam cut to fit wound  bed. Secured with  drape and TRAC pad.    Secondary (Outer) Dressing:     Advanced Wound Care Discharge Planning  Number of Clinicians necessary to complete wound care: 2  Is patient requiring IV pain medications for dressing changes: yes - ativan  Length of time for dressing change 90 min. (This does not include chart review, pre-medication time, set up, clean up or time spent charting.)    Interdisciplinary consultation: Patient, Bedside RN, Wound RN Teagan and LPS SHELLEY Hodgson      EVALUATION / RATIONALE FOR TREATMENT:  Most Recent Date:  5/15/23: both wound beds have adipose tissue that was debrided away as patient was able to tolerate , R bka with more adipose & necrotic tissue than the L. BKA which also has adipose tissue but is much more shallow.  Next dressing change switch to cleanse choice to assist with mechanical debridement of non-viable tissue and promote granular tissue   5/13/23 expect pt to respond well to NPWT, Veraflo will help remove necrotic tissue on the R BKA.  Anticipate increased granular tissue in wound beds and contraction of area.  Pt's pain difficult to control, ativan added today.    5/5/23: Patient well known to wound care team for treatment of frostbite to bilateral lower legs and feet which over time has resulted in bilateral BKAs. Patient admitted for cellulitis to bilateral BKAs. Intact brown eschar to both BKAs, no drainage at this time. Bilateral BKA tissue is red, warm, and edematous, diagnosed cellulitis by MD. Applied betadine to keep eschar dry and for antimicrobial component. Abrasion to Left posterior leg cleansed and covered with silicone dressing to pad and protect area. LPS consulted and will follow at this time. Wound care signing off.      Goals: Steady decrease in wound area and depth weekly.    WOUND TEAM PLAN OF CARE ([X] for frequency of wound follow up,):   Nursing to follow dressing orders written for wound care. Contact wound team if area fails to progress,  deteriorates or with any questions/concerns if something comes up before next scheduled follow up (See below as to whether wound is following and frequency of wound follow up)  Dressing changes by wound team:                   Follow up 3 times weekly:                NPWT change 3 times weekly: X     Follow up 1-2 times weekly:      Follow up Bi-Monthly:           Follow up Monthly (High Risk):                        Follow up as needed:    Other (explain):     NURSING PLAN OF CARE ORDERS (X):  Dressing changes: See Dressing Care orders: X  Skin care: See Skin Care orders: X  RN Prevention Protocol:   Rectal tube care: See Rectal Tube Care orders:   Other orders:    RSKIN:   CURRENTLY IN PLACE (X), APPLIED THIS VISIT (A), ORDERED (O):   Q shift Lonnie:  X  Q shift pressure point assessments:  X    Surface/Positioning X  Standard Mattress/Trauma Bed     x   Low Airloss          ICU Low Airloss   Bariatric SHASHA     Waffle cushion        Waffle Overlay      x  Reposition q 2 hours      TAPs Turning system     Z James Pillow     Offloading/Redistribution n/a incontinent  Sacral Offloading Dressing (Silicone dressing)    Heel Offloading Dressing (Silicone dressing)         Heel float boots (Prevalon boot)             Float Heels off Bed with Pillows           Respiratory -- room air            Containment/Moisture Prevention   x  Rectal tube or BMS    Purwick/Condom Cath      x  Leggett Catheter    Barrier wipes   x        Barrier paste     x  Antifungal tx      Interdry   X     Mobilization Chair/bed bound      Up to chair        Ambulate      PT/OT  - NWB B BKA    Nutrition         Anticipated discharge plans:LTACH:        SNF/Rehab:                  Home Health Care:           Outpatient Wound Center:   X pt will require OP wound care for 3x/week VAC dressing changes  Self/Family Care:        Other:                  Vac Discharge Needs: --> please discuss with LPS  Vac Discharge plan is purely a recommendation from wound  team and not a requirement for discharge unless otherwise stated by physician.

## 2023-05-15 NOTE — DISCHARGE PLANNING
Medical records sent to [facility] as requested. Pt was not seen by psychiatrist so note from this specialty unable to send.

## 2023-05-15 NOTE — CARE PLAN
The patient is Stable - Low risk of patient condition declining or worsening    Shift Goals  Clinical Goals: Oral abx, wound cleaning and infection prevention  Patient Goals: pain, comfort, sleep  Family Goals: IVETTE    Progress made toward(s) clinical / shift goals:    Problem: Knowledge Deficit - Standard  Goal: Patient and family/care givers will demonstrate understanding of plan of care, disease process/condition, diagnostic tests and medications  Outcome: Progressing  Note: Patient understands her plan of care and that she is taking oral antibiotics as treatment for her previous wound infection.      Problem: Skin Integrity  Goal: Skin integrity is maintained or improved  Outcome: Progressing  Note: Patient has maintained her skin integrity thus far during her hospital stay and continues to be educated on the importance of repositioning in bed.

## 2023-05-15 NOTE — CARE PLAN
The patient is Watcher - Medium risk of patient condition declining or worsening    Shift Goals  Clinical Goals: oral abx, monitor wound vacs, skin integrity  Patient Goals: rest  Family Goals: IVETTE    Progress made toward(s) clinical / shift goals:  Oral abx administered per MAR. Wound vacs in place.     Patient is not progressing towards the following goals: NA

## 2023-05-15 NOTE — WOUND TEAM
Performed CSWD with curette to anterior LLE wound with no bleeding and slight discomfort with removal of yellow tissue <20 cm^2 debrided. LPS APRN in to see pt and continued to debride more aggressively. Assisted with NPWT drsg change to BLE.

## 2023-05-16 PROCEDURE — 700102 HCHG RX REV CODE 250 W/ 637 OVERRIDE(OP): Performed by: STUDENT IN AN ORGANIZED HEALTH CARE EDUCATION/TRAINING PROGRAM

## 2023-05-16 PROCEDURE — A9270 NON-COVERED ITEM OR SERVICE: HCPCS

## 2023-05-16 PROCEDURE — 700102 HCHG RX REV CODE 250 W/ 637 OVERRIDE(OP)

## 2023-05-16 PROCEDURE — A9270 NON-COVERED ITEM OR SERVICE: HCPCS | Performed by: BEHAVIOR ANALYST

## 2023-05-16 PROCEDURE — 86480 TB TEST CELL IMMUN MEASURE: CPT

## 2023-05-16 PROCEDURE — A9270 NON-COVERED ITEM OR SERVICE: HCPCS | Performed by: STUDENT IN AN ORGANIZED HEALTH CARE EDUCATION/TRAINING PROGRAM

## 2023-05-16 PROCEDURE — 700102 HCHG RX REV CODE 250 W/ 637 OVERRIDE(OP): Performed by: BEHAVIOR ANALYST

## 2023-05-16 PROCEDURE — 770001 HCHG ROOM/CARE - MED/SURG/GYN PRIV*

## 2023-05-16 PROCEDURE — 99231 SBSQ HOSP IP/OBS SF/LOW 25: CPT | Mod: GC | Performed by: FAMILY MEDICINE

## 2023-05-16 PROCEDURE — 700111 HCHG RX REV CODE 636 W/ 250 OVERRIDE (IP)

## 2023-05-16 PROCEDURE — 97530 THERAPEUTIC ACTIVITIES: CPT | Mod: CQ

## 2023-05-16 PROCEDURE — 36415 COLL VENOUS BLD VENIPUNCTURE: CPT

## 2023-05-16 RX ADMIN — DIVALPROEX SODIUM 500 MG: 500 TABLET, EXTENDED RELEASE ORAL at 16:50

## 2023-05-16 RX ADMIN — HEPARIN SODIUM 5000 UNITS: 5000 INJECTION, SOLUTION INTRAVENOUS; SUBCUTANEOUS at 21:46

## 2023-05-16 RX ADMIN — DOXYCYCLINE 100 MG: 100 TABLET, FILM COATED ORAL at 04:42

## 2023-05-16 RX ADMIN — ACETAMINOPHEN 1000 MG: 500 TABLET, FILM COATED ORAL at 20:00

## 2023-05-16 RX ADMIN — BICTEGRAVIR SODIUM, EMTRICITABINE, AND TENOFOVIR ALAFENAMIDE FUMARATE 1 TABLET: 50; 200; 25 TABLET ORAL at 16:51

## 2023-05-16 RX ADMIN — HEPARIN SODIUM 5000 UNITS: 5000 INJECTION, SOLUTION INTRAVENOUS; SUBCUTANEOUS at 04:42

## 2023-05-16 RX ADMIN — DIVALPROEX SODIUM 500 MG: 500 TABLET, EXTENDED RELEASE ORAL at 04:42

## 2023-05-16 RX ADMIN — OLANZAPINE 5 MG: 5 TABLET, FILM COATED ORAL at 16:49

## 2023-05-16 RX ADMIN — POTASSIUM CHLORIDE 40 MEQ: 1500 TABLET, EXTENDED RELEASE ORAL at 04:42

## 2023-05-16 RX ADMIN — AMOXICILLIN AND CLAVULANATE POTASSIUM 1 TABLET: 875; 125 TABLET, FILM COATED ORAL at 04:42

## 2023-05-16 RX ADMIN — HEPARIN SODIUM 5000 UNITS: 5000 INJECTION, SOLUTION INTRAVENOUS; SUBCUTANEOUS at 14:02

## 2023-05-16 RX ADMIN — AMOXICILLIN AND CLAVULANATE POTASSIUM 1 TABLET: 875; 125 TABLET, FILM COATED ORAL at 16:48

## 2023-05-16 RX ADMIN — DOXYCYCLINE 100 MG: 100 TABLET, FILM COATED ORAL at 16:49

## 2023-05-16 RX ADMIN — METOPROLOL TARTRATE 25 MG: 25 TABLET, FILM COATED ORAL at 16:51

## 2023-05-16 RX ADMIN — METOPROLOL TARTRATE 25 MG: 25 TABLET, FILM COATED ORAL at 04:43

## 2023-05-16 ASSESSMENT — COGNITIVE AND FUNCTIONAL STATUS - GENERAL
SUGGESTED CMS G CODE MODIFIER MOBILITY: CM
WALKING IN HOSPITAL ROOM: TOTAL
STANDING UP FROM CHAIR USING ARMS: TOTAL
TURNING FROM BACK TO SIDE WHILE IN FLAT BAD: A LITTLE
MOVING TO AND FROM BED TO CHAIR: A LOT
CLIMB 3 TO 5 STEPS WITH RAILING: TOTAL
MOVING FROM LYING ON BACK TO SITTING ON SIDE OF FLAT BED: UNABLE
MOBILITY SCORE: 9

## 2023-05-16 ASSESSMENT — PAIN DESCRIPTION - PAIN TYPE
TYPE: ACUTE PAIN
TYPE: ACUTE PAIN

## 2023-05-16 ASSESSMENT — GAIT ASSESSMENTS: GAIT LEVEL OF ASSIST: UNABLE TO PARTICIPATE

## 2023-05-16 NOTE — CARE PLAN
The patient is Stable - Low risk of patient condition declining or worsening    Shift Goals  Clinical Goals: Monitor wound vacs, Oral antibiotics  Patient Goals: Adequate rest  Family Goals: IVETTE    Progress made toward(s) clinical / shift goals:    Patient was asleep for most of the shift will awaken for medications and when she needed a snack. Patient tolerated CHG bath and every two hour turns. Patient had no questions regarding plan of care within shift.     Problem: Skin Integrity  Goal: Skin integrity is maintained or improved  Outcome: Progressing  Gave a CHG bath. Patient repositioned every two hours without any declines. Patient's lines and gown were changed. Patient's bed has a waffle overlay.      Problem: Fall Risk  Goal: Patient will remain free from falls  Outcome: Progressing   Patient's belongings and call light placed within reach. Bed set at lowest position and bed alarm turn on. Patient calls appropriately

## 2023-05-16 NOTE — THERAPY
"Physical Therapy   Daily Treatment     Patient Name: Kellie Chatman  Age:  53 y.o., Sex:  female  Medical Record #: [id number]  Today's Date: 5/16/2023     Precautions  Precautions: (P) Non Weight Bearing Right Lower Extremity;Non Weight Bearing Left Lower Extremity  Comments: (P) [place] BKA's w/wound VAC    Assessment    The pt alert and eager to get OOB to the w/c. The pt did get seated EOB w/HOB elevated and railing assist. Once seated, the pt could direct placement of the w/c and locking of w/c brakes to prepare for transfer OOB.The pt managed the transfer w/min assist for safety, the pt has gotten weak from extended hospital stay.   The pt will need 18' w/c w/removable arm/leg rests. The pt would benefit from post acute placement for wound management of bilat residual limbs.  PT will cont to progress pt toward indep w/transers.     Plan    Treatment Plan Status: (P) Continue Current Treatment Plan  Type of Treatment: Bed Mobility, Therapeutic Activities, Neuro Re-Education / Balance  Treatment Frequency: 3 Times per Week  Treatment Duration: Until Therapy Goals Met    DC Equipment Recommendations: (P) Wheelchair (18\" w/c w/removable arm/leg rests)  Discharge Recommendations: (P) Anticipate that the patient will have no further physical therapy needs after discharge from the hospital    Objective       05/16/23 1025   Charge Group   PT Therapeutic Activities (Units) 2   Total Time Spent   PT Therapeutic Activities Time Spent (Mins) 25   Precautions   Precautions Non Weight Bearing Right Lower Extremity;Non Weight Bearing Left Lower Extremity   Comments Bil[place] BKA's w/wound VAC   Other Treatments   Other Treatments Provided EOB w/supervision, having pt direct placement of w/c for transfer   Balance   Sitting Balance (Static) Good   Sitting Balance (Dynamic) Fair +   Weight Shift Sitting Fair   Bed Mobility    Supine to Sit Supervised  (HOB elevated)   Sit to Supine   (pt left seated in the w/c)   Scooting Supervised " "  Rolling Supervised   Gait Analysis   Gait Level Of Assist Unable to Participate   Comments Erika MODI's PTA   Functional Mobility   Sit to Stand Unable to Participate   Bed, Chair, Wheelchair Transfer Minimal Assist  (bed->w/c)   Transfer Method Sit Pivot   Comments Initiated transfers OOB to the w/c today. The pt demonstrated sit pivot technique, min assist for w/c management and safety.   How much difficulty does the patient currently have...   Turning over in bed (including adjusting bedclothes, sheets and blankets)? 3   Sitting down on and standing up from a chair with arms (e.g., wheelchair, bedside commode, etc.) 1   Moving from lying on back to sitting on the side of the bed? 2   How much help from another person does the patient currently need...   Moving to and from a bed to a chair (including a wheelchair)? 1   Need to walk in a hospital room? 1   Climbing 3-5 steps with a railing? 1   6 clicks Mobility Score 9   Short Term Goals    Short Term Goal # 1 Pt will perform seated scooting with SPV in 6 visits to progress functional mobility   Goal Outcome # 1 goal not met   Short Term Goal # 2 Pt will perform functional transfers with min A in 6 visits to progress OOB mobility   Goal Outcome # 2 Goal met   Education Group   Transfer Status Patient Response Patient;Acceptance;Explanation;Reinforcement Needed   Physical Therapy Treatment Plan   Physical Therapy Treatment Plan Continue Current Treatment Plan   Anticipated Discharge Equipment and Recommendations   DC Equipment Recommendations Wheelchair  (18\" w/c w/removable arm/leg rests)   Discharge Recommendations Anticipate that the patient will have no further physical therapy needs after discharge from the hospital   Interdisciplinary Plan of Care Collaboration   IDT Collaboration with  Nursing   Patient Position at End of Therapy Seated;Chair Alarm On;Call Light within Reach;Tray Table within Reach;Phone within Reach   Collaboration Comments Nrsg notified " of pts tx efforts   Session Information   Date / Session Number  5/16--3 (2/3, 5/21) PTA/2   Supervising Physical Therapist (PTA Treatments Only)   Supervising Physical Therapist Ngoc Dye  (needs updated transfer goals)

## 2023-05-16 NOTE — PROGRESS NOTES
[facility] MEDICINE PROGRESS NOTE     Attending:   Dr. 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  Admitted 5/4/2023 for bilateral stump infections s/p bilateral I&D on 5/10. Transitioned from IV Vancomycin and unasyn to Augmentin and doxycyline 5/12.     SUBJECTIVE:   No acute events overnight. Patient is doing well this morning. She is feeling good and is in good spirits today.     OBJECTIVE:  Vitals:    05/15/23 1556 05/15/23 2010 05/16/23 0317 05/16/23 0808   BP: 108/66 99/54 105/65 103/61   Pulse: 89 74 77 83   Resp: 15 20 16 16   Temp: 36.8 °C (98.2 °F) 36.2 °C (97.2 °F) 36.2 °C (97.1 °F) 36.4 °C (97.5 °F)   TempSrc: Oral Temporal Temporal Temporal   SpO2:  94% 100% 99%   Weight:       Height:           Intake/Output Summary (Last 24 hours) at 5/15/2023 0550  Last data filed at 5/14/2023 0700  Gross per 24 hour   Intake 240 ml   Output --   Net 240 ml       PHYSICAL EXAM:   General: No acute distress, afebrile, resting comfortably, sleepy   HEENT: NC/AT. EOMI.   Cardiovascular: RRR without murmurs, rubs, heaves. Normal capillary refill   Respiratory: CTAB, no tachypnea or retractions   Abdomen: normal bowel sounds, soft, nontender, nondistended, no masses, no organomegaly   EXT:  Bilateral BKA with dressing in place. Clean, dry and intact. Wound vac with serosanguineous fluid present  Skin: No erythema/lesions   Neuro: Non-focal, alert and orientated       LABS:  No results for input(s): WBC, RBC, HEMOGLOBIN, HEMATOCRIT, MCV, MCH, RDW, PLATELETCT, MPV, NEUTSPOLYS, LYMPHOCYTES, MONOCYTES, EOSINOPHILS, BASOPHILS, RBCMORPHOLO in the last 72 hours.    No results for input(s): SODIUM, POTASSIUM, CHLORIDE, CO2, BUN, CREATININE, CALCIUM, MAGNESIUM, PHOSPHORUS, ALBUMIN in the last 72 hours.    Estimated GFR/CRCL =  "Estimated Creatinine Clearance: 116.5 mL/min (by C-G formula based on SCr of 0.59 mg/dL).  No results for input(s): GLUCOSE, POCGLUCOSE in the last 72 hours.                No results for input(s): INR, APTT, FIBRINOGEN in the last 72 hours.    Invalid input(s): DIMER    MICROBIOLOGY:   Blood Culture Hold   Date Value Ref Range Status   04/08/2023 Collected  Final          IMAGING:   [id number] & W/O LEFT   Final Result      1.  Early osteomyelitis of the distal aspect of the left tibial stump      2.  Negative for abscess      3.  Left knee joint effusion      [id number]-WITH & W/O RIGHT   Final Result      1.  No roc osteomyelitis      2.  Edema within the distal tibial stump evident on T2-weighted imaging and possibly minimal change on T1-weighted imaging. This could indicate very early osteomyelitis although at this point is not specific.      3.  Negative for abscess      DX-CHEST-LIMITED (1 VIEW)   Final Result         No acute cardiac or pulmonary abnormality is identified.          CULTURES:   Results       Procedure Component Value Units Date/Time    Blood Culture [[id number]] Collected: 05/04/23 1612    Order Status: Completed Specimen: Blood from Peripheral Updated: 05/09/23 1900     Significant Indicator NEG     Source BLD     Site PERIPHERAL     Culture Result No growth after 5 days of incubation.    Narrative:      Per Hospital Policy: Only change Specimen Src: to \"Line\" if  specified by physician order.  Left Forearm/Arm    Blood Culture [[id number]] Collected: 05/04/23 1612    Order Status: Completed Specimen: Blood from Peripheral Updated: 05/09/23 1900     Significant Indicator NEG     Source BLD     Site PERIPHERAL     Culture Result No growth after 5 days of incubation.    Narrative:      Per Hospital Policy: Only change Specimen Src: to \"Line\" if  specified by physician order.  Right Forearm/Arm            MEDS:  Current Facility-Administered Medications   Medication Last Admin    " lidocaine (XYLOCAINE) 2 % injection 20 mL 20 mL at 05/15/23 0934    Or    lidocaine (XYLOCAINE) 4 % topical solution      oxyCODONE immediate-release (ROXICODONE) tablet 5 mg 5 mg at 05/15/23 0906    doxycycline monohydrate (ADOXA) tablet 100 mg 100 mg at 23 0442    amoxicillin-clavulanate (AUGMENTIN) 875-125 MG per tablet 1 Tablet 1 Tablet at 23 0442    LORazepam (ATIVAN) injection 2 mg 2 mg at 05/15/23 0907    loperamide (IMODIUM) capsule 2 mg 2 mg at 23 2323    potassium chloride SA (Kdur) tablet 40 mEq 40 mEq at 23 044    bictegravir-emtricitab-TAF (Biktarvy) -25 mg tablet 1 Tablet 1 Tablet at 05/15/23 1600    divalproex ER (DEPAKOTE ER) tablet 500 mg 500 mg at 23 0442    OLANZapine (ZYPREXA) tablet 5 mg 5 mg at 05/15/23 1600    Pharmacy Consult Request ...Pain Management Review 1 Each      acetaminophen (TYLENOL) tablet 1,000 mg 1,000 mg at 23 1018    ibuprofen (MOTRIN) tablet 800 mg      metoprolol tartrate (LOPRESSOR) tablet 25 mg 25 mg at 23 0443    heparin injection 5,000 Units 5,000 Units at 23 044       ASSESSMENT/PLAN: Kellie Chatman is a 53-year-old female with a PMHx HIV, schizophrenia, bipolar on Zyprexa and Depakote; 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  Admitted 2023 for bilateral stump infections s/p bilateral I&D on 5/10. Transitioned from IV Vancomycin and unasyn to Augmentin and doxycyline .     Chronic multifocal osteomyelitis of multiple sites (HCC)  Assessment & Plan  MRI showin.  Early osteomyelitis of the distal aspect of the left tibial stump. S/p I&D and  on 5/10 with Dr. Lux  - ID consulting- Dr Manzo   - Discontinue vanco and unasyn (-)  - Start Doxy 100mg BID and augmentin 875 BID - duration pending  - Wound care following   - Ativan 2mg PRN wound vac changes      HIV (human immunodeficiency virus infection) (HCC)  Assessment & Plan  Initially unclear diagnosis.  However, confirmatory testing indicates that patient is in fact positive for HIV 1.  Patient continues to have normal CD4 counts (CD4 752 at 3/2023)  - ID following   - Confirmatory testing conclusive for HIV  - Continue Biktarvy daily    History of atrial fibrillation  Assessment & Plan  On discharge from [facility] previously patient was noted to be in atrial fibrillation.  No record on EKGs through [facility].  - Continue metoprolol 25 mg twice daily  - Holding home eliquis due to planned surgical intervention. Resume when cleared by ortho  - Resume heparin   - Plan to dc telemetry monitoring at this time    History of seizures  Assessment & Plan  Unclear history of seizure activity. Patient states she has never had a seizure before.   - Continue Depakote 500 mg daily for history of seizures  - Depakote likely for mood stabilization     History of DVT (deep vein thrombosis)  Assessment & Plan  Hold home Eliquis while pending surgery.  Plan for resumption after cleared by surgery  - Resume heparin 5/12    Homelessness  Assessment & Plan  Patient was previously staying at [facility] previously.  Recommend social work consultation prior to discharge for safe discharge planning.    Hypokalemia  Assessment & Plan  Resolved.  Continue to monitor.    Schizophrenia (HCC)  Assessment & Plan  - Continue home olanzapine 5 mg twice daily      * Wound infection  Assessment & Plan  - Discontinue vancomycin and Unasyn.(5/4 - 5/21)  - Start Doxy and augmentin - duration pending          Core Measures:   Fluids: PO intake   Lines: IVP, wound vac   Abx: Doxy and augmentin   DVT prophylaxis: Heparin, resume Eliquis on dc   Code Status: Full    Disposition: medically cleared for dc, awaiting SNF placement     Dilcia Lovelace MD   PGY-3 Family Medicine Resident   [facility]

## 2023-05-16 NOTE — CARE PLAN
The patient is Stable - Low risk of patient condition declining or worsening    Shift Goals  Clinical Goals: Monitor Wound Vac  Patient Goals: Reset  Family Goals: IVETTE    Progress made toward(s) clinical / shift goals:    Problem: Skin Integrity  Goal: Skin integrity is maintained or improved  5/16/2023 1618 by Yumiko Flores, Student  Outcome: Progressing  Note: Appropriate interventions in place to protect skin. Pt turns on her own, dry flows in place, with pure wick for urinary elimination. Bed changes PRN. Extra pillows for positioning.    5/16/2023 1616 by Yumiko Flores, Student  Outcome: Progressing  Note: Appropriate interventions in place to protect skin. Pt turns on her own, dry flows in place, with pure wick for urinary elimination. Bed changes PRN. Extra pillows for positioning.      Problem: Fall Risk  Goal: Patient will remain free from falls  Outcome: Progressing  Note: Safety and fall education given. All fall precautions are in place with belongings at bedside table. Needs are tended to. Educated to use call light for assistance. Pt verbalized understanding.

## 2023-05-16 NOTE — FACE TO FACE
Face to Face Note  -  Durable Medical Equipment    Lili Augustin M.D. - NPI: [id number]  I certify that this patient is under my care and that they had a durable medical equipment(DME)face to face encounter by myself that meets the physician DME face-to-face encounter requirements with this patient on:    Date of encounter:   Patient:                    MRN:                       YOB: 2025  Kellie Chatman  [id number]  1970     The encounter with the patient was in whole, or in part, for the following medical condition, which is the primary reason for durable medical equipment:  Wound Care    I certify that, based on my findings, the following durable medical equipment is medically necessary:    Wound Vac.    My Clinical findings support the need for the above equipment due to:  Wound infection, Wound/Incision

## 2023-05-16 NOTE — DISCHARGE PLANNING
Case Management Discharge Planning    Admission Date: 5/4/2023  GMLOS: 3.5  ALOS: 12    6-Clicks ADL Score: 21  6-Clicks Mobility Score: 9  PT and/or OT Eval ordered: Yes  Post-acute Referrals Ordered: Yes  Post-acute Choice Obtained: Yes  Has referral(s) been sent to post-acute provider:  Yes      Anticipated Discharge Dispo: Discharge Disposition: D/T to SNF with Medicare cert in anticipation of skilled care (03)    DME Needed: Yes    DME Ordered: Yes    Action(s) Taken: Choice obtained and OTHER    LSW called ED LSW Delores to check if the pt's wheelchair was stored in the ED department. LSW delores called back and was unable to locate the pt's wheelchair. LSW called [facility] [phone] and left a detailed voicemail requesting a call back. LSW called the pt's CM Laine Davis. [phone] to see if she is able to assist with locating the pt's wheelchair.     LSW obtained choice for [facility] DME and faxed choice form to MAMIE Steiner to check if pt qualifies for a new wheelchair.     Escalations Completed: Leadership-Teams message sent to Supervisors Varinder West and Laura Tay to discuss dc plan. Pt is possibly a candidate for the [facility] pending all SNFs decline.     Quantiferon Gold test requested via VOALTE. Dr. Lovelace confirmed.     @6902 this LSW received a call back from the [facility]  [facility] [phone]. Pt has not been to the [facility] or [facility] since April of this year so it is unlikely that her wheelchair is there. [facility] requested a call back if pt confirms that she indeed left it there.      Medically Clear: No    Next Steps: LSW will follow up with pt.     Barriers to Discharge: Medical clearance, Pending Placement, Outpatient referrals pending, No Social Support, and Homelessness    Is the patient up for discharge tomorrow: No

## 2023-05-16 NOTE — ANESTHESIA POSTPROCEDURE EVALUATION
Patient: Kellie Chatman    Procedure Summary     Date: 05/10/23 Room / Location: [place] [id number] / SURGERY [facility]    Anesthesia Start: 1052 Anesthesia Stop: 1135    Procedures:       IRRIGATION AND DEBRIDEMENT, WOUND LEG (Bilateral: Leg Lower)      APPLICATION OR REPLACEMENT, WOUND VAC (Bilateral: Leg Lower) Diagnosis: (Bilateral below-the-knee stump wounds)    Surgeons: Nas Lux M.D. Responsible Provider: Raimundo Miller M.D.    Anesthesia Type: general ASA Status: 3          Final Anesthesia Type: general  Last vitals  BP   Blood Pressure: 108/66    Temp   36.8 °C (98.2 °F)    Pulse   89   Resp   15    SpO2   94 %      Anesthesia Post Evaluation    Patient location during evaluation: PACU  Patient participation: complete - patient participated  Level of consciousness: awake and alert  Pain score: 2    Airway patency: patent  Anesthetic complications: no  Cardiovascular status: hemodynamically stable  Respiratory status: acceptable  Hydration status: euvolemic    PONV: none          There were no known notable events for this encounter.     Nurse Pain Score: 0 (NPRS)

## 2023-05-16 NOTE — DISCHARGE PLANNING
Received Choice form @: 1021  Agency/Facility Name: [facility]  Referral sent per Choice form @: 1023    @1202  Agency/Facility Name: [facility]  Spoke To: Gabrielle  Outcome: DPA called for a status on referral, per Gabrielle they are declining because they need a physical address.    @1212  Agency/Facility Name: [facility]  Outcome: DPA left v-mail at facility regarding a status on referral.     @1218  Agency/Facility Name: [facility]  Outcome: DPA tried calling facility for a status on referral, v-mail is full.    @4378  DPA sent DME referral to [facility].

## 2023-05-17 LAB
GAMMA INTERFERON BACKGROUND BLD IA-ACNC: 0.03 IU/ML
M TB IFN-G BLD-IMP: NEGATIVE
M TB IFN-G CD4+ BCKGRND COR BLD-ACNC: 0.01 IU/ML
MITOGEN IGNF BCKGRD COR BLD-ACNC: >10 IU/ML
QFT TB2 - NIL TBQ2: 0.01 IU/ML

## 2023-05-17 PROCEDURE — 99231 SBSQ HOSP IP/OBS SF/LOW 25: CPT | Mod: GC | Performed by: FAMILY MEDICINE

## 2023-05-17 PROCEDURE — A9270 NON-COVERED ITEM OR SERVICE: HCPCS | Performed by: STUDENT IN AN ORGANIZED HEALTH CARE EDUCATION/TRAINING PROGRAM

## 2023-05-17 PROCEDURE — 97605 NEG PRS WND THER DME<=50SQCM: CPT

## 2023-05-17 PROCEDURE — 700102 HCHG RX REV CODE 250 W/ 637 OVERRIDE(OP)

## 2023-05-17 PROCEDURE — 700102 HCHG RX REV CODE 250 W/ 637 OVERRIDE(OP): Performed by: STUDENT IN AN ORGANIZED HEALTH CARE EDUCATION/TRAINING PROGRAM

## 2023-05-17 PROCEDURE — 700111 HCHG RX REV CODE 636 W/ 250 OVERRIDE (IP)

## 2023-05-17 PROCEDURE — A9270 NON-COVERED ITEM OR SERVICE: HCPCS

## 2023-05-17 PROCEDURE — A9270 NON-COVERED ITEM OR SERVICE: HCPCS | Performed by: BEHAVIOR ANALYST

## 2023-05-17 PROCEDURE — 700101 HCHG RX REV CODE 250: Performed by: FAMILY MEDICINE

## 2023-05-17 PROCEDURE — 700102 HCHG RX REV CODE 250 W/ 637 OVERRIDE(OP): Performed by: BEHAVIOR ANALYST

## 2023-05-17 PROCEDURE — 770001 HCHG ROOM/CARE - MED/SURG/GYN PRIV*

## 2023-05-17 RX ORDER — LORAZEPAM 2 MG/1
2 TABLET ORAL
Status: DISCONTINUED | OUTPATIENT
Start: 2023-05-17 | End: 2023-05-22

## 2023-05-17 RX ADMIN — HEPARIN SODIUM 5000 UNITS: 5000 INJECTION, SOLUTION INTRAVENOUS; SUBCUTANEOUS at 05:13

## 2023-05-17 RX ADMIN — AMOXICILLIN AND CLAVULANATE POTASSIUM 1 TABLET: 875; 125 TABLET, FILM COATED ORAL at 21:56

## 2023-05-17 RX ADMIN — DOXYCYCLINE 100 MG: 100 TABLET, FILM COATED ORAL at 05:12

## 2023-05-17 RX ADMIN — APIXABAN 5 MG: 5 TABLET, FILM COATED ORAL at 21:56

## 2023-05-17 RX ADMIN — METOPROLOL TARTRATE 25 MG: 25 TABLET, FILM COATED ORAL at 21:55

## 2023-05-17 RX ADMIN — DIVALPROEX SODIUM 500 MG: 500 TABLET, EXTENDED RELEASE ORAL at 05:12

## 2023-05-17 RX ADMIN — LORAZEPAM 2 MG: 2 TABLET ORAL at 15:24

## 2023-05-17 RX ADMIN — OLANZAPINE 5 MG: 5 TABLET, FILM COATED ORAL at 19:00

## 2023-05-17 RX ADMIN — POTASSIUM CHLORIDE 40 MEQ: 1500 TABLET, EXTENDED RELEASE ORAL at 05:12

## 2023-05-17 RX ADMIN — BICTEGRAVIR SODIUM, EMTRICITABINE, AND TENOFOVIR ALAFENAMIDE FUMARATE 1 TABLET: 50; 200; 25 TABLET ORAL at 21:57

## 2023-05-17 RX ADMIN — OXYCODONE 5 MG: 5 TABLET ORAL at 05:17

## 2023-05-17 RX ADMIN — DIVALPROEX SODIUM 500 MG: 500 TABLET, EXTENDED RELEASE ORAL at 21:55

## 2023-05-17 RX ADMIN — AMOXICILLIN AND CLAVULANATE POTASSIUM 1 TABLET: 875; 125 TABLET, FILM COATED ORAL at 05:12

## 2023-05-17 RX ADMIN — OXYCODONE 5 MG: 5 TABLET ORAL at 14:17

## 2023-05-17 RX ADMIN — LIDOCAINE HYDROCHLORIDE 1 APPLICATION: 40 SOLUTION TOPICAL at 15:14

## 2023-05-17 RX ADMIN — DOXYCYCLINE 100 MG: 100 TABLET, FILM COATED ORAL at 21:55

## 2023-05-17 ASSESSMENT — CHA2DS2 SCORE
DIABETES: NO
HYPERTENSION: NO
AGE 75 OR GREATER: NO
CHA2DS2 VASC SCORE: 1
PRIOR STROKE OR TIA OR THROMBOEMBOLISM: NO
SEX: FEMALE
AGE 65 TO 74: NO
VASCULAR DISEASE: NO
CHF OR LEFT VENTRICULAR DYSFUNCTION: NO

## 2023-05-17 ASSESSMENT — PAIN DESCRIPTION - PAIN TYPE
TYPE: ACUTE PAIN
TYPE: ACUTE PAIN

## 2023-05-17 NOTE — DISCHARGE PLANNING
ATTN: Case Management   RE: Referral for Home Health     Reason for referral denial: We are unable to accept today due to insurance capacity - We are rapidly assessing our census for discharge opportunities.  At this time we are only able to accept referrals with SCP insurance.               Unfortunately, we are not able to accept this referral for the reason listed above. If further clarity is needed, our Transitional Care Specialists are available to discuss any barriers to service at [phone].       We look forward to collaborating with you in the future,   [facility] Team

## 2023-05-17 NOTE — DISCHARGE PLANNING
Case Management Discharge Planning    Admission Date: 5/4/2023  GMLOS: 3.5  ALOS: 13    6-Clicks ADL Score: 21  6-Clicks Mobility Score: 9  PT and/or OT Eval ordered: Yes  Post-acute Referrals Ordered: Yes  Post-acute Choice Obtained: Yes  Has referral(s) been sent to post-acute provider:  Yes      Anticipated Discharge Dispo: Discharge Disposition: D/T to home under HHA care in anticipation of covered skilled care (06)    DME Needed: Yes    DME Ordered: Yes    Action(s) Taken: OTHER    Patient declined by all local SNFs. LSW spoke to leader, Laura. Patient is an appropriate candidate for our [facility] with home health.     LSW sent voalte to [facility] RN Timur Gonzalez informing her a  referral will be sent over shortly for pt.    LSW sent voalte to resident Dr. Lovelace requesting home health order and face to face.    LSW to work on Rec[facility]ative Home application. Patient will need a quant test. Per chart review, it has been ordered.    @0956: Spoke to patient at bedside and informed her of the current plan. Patient agreeable. LSW answered patient's questions.    @1127: Patient declined by [facility]. Leader to reach out regarding referral.     @1358: [facility] unable to service patient.     Escalations Completed: Leadership    Medically Clear: Yes    Next Steps: LSW to follow up with patient and medical team regarding discharge needs and barriers.     Barriers to Discharge: Pending Placement and Outpatient referrals pending

## 2023-05-17 NOTE — PROGRESS NOTES
[facility] MEDICINE PROGRESS NOTE     Attending:   Dr. 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  Admitted 5/4/2023 for bilateral stump infections s/p bilateral I&D on 5/10. Transitioned from IV Vancomycin and unasyn to Augmentin and doxycyline 5/12.     SUBJECTIVE:   No acute events overnight. Patient is resting on exam today. No acute signs of distress.     OBJECTIVE:  Vitals:    05/16/23 1625 05/16/23 2005 05/17/23 0448 05/17/23 0721   BP: 99/47 105/65 97/61 113/57   Pulse: 87 76 76 76   Resp: 17 18 18 16   Temp: 36.4 °C (97.5 °F) 36.8 °C (98.2 °F) 36.3 °C (97.4 °F)    TempSrc: Temporal Temporal Temporal    SpO2: 95% 97% 96% 98%   Weight:       Height:           Intake/Output Summary (Last 24 hours) at 5/15/2023 0550  Last data filed at 5/14/2023 0700  Gross per 24 hour   Intake 240 ml   Output --   Net 240 ml       PHYSICAL EXAM:   General: No acute distress, afebrile, resting comfortably, sleepy   HEENT: NC/AT. EOMI.   Cardiovascular: RRR without murmurs, rubs, heaves. Normal capillary refill   Respiratory: CTAB, no tachypnea or retractions   Abdomen: normal bowel sounds, soft, nontender, nondistended, no masses, no organomegaly   EXT:  Bilateral BKA with dressing in place. Clean, dry and intact. Wound vac with serosanguineous fluid present  Skin: No erythema/lesions   Neuro: Non-focal, alert and orientated       LABS:  No results for input(s): WBC, RBC, HEMOGLOBIN, HEMATOCRIT, MCV, MCH, RDW, PLATELETCT, MPV, NEUTSPOLYS, LYMPHOCYTES, MONOCYTES, EOSINOPHILS, BASOPHILS, RBCMORPHOLO in the last 72 hours.    No results for input(s): SODIUM, POTASSIUM, CHLORIDE, CO2, BUN, CREATININE, CALCIUM, MAGNESIUM, PHOSPHORUS, ALBUMIN in the last 72 hours.    Estimated GFR/CRCL = Estimated Creatinine Clearance: 116.5 mL/min (by  C-G formula based on SCr of 0.59 mg/dL).  No results for input(s): GLUCOSE, POCGLUCOSE in the last 72 hours.                No results for input(s): INR, APTT, FIBRINOGEN in the last 72 hours.    Invalid input(s): DIMER    MICROBIOLOGY:   Blood Culture Hold   Date Value Ref Range Status   04/08/2023 Collected  Final          IMAGING:   [id number]-WITH & W/O LEFT   Final Result      1.  Early osteomyelitis of the distal aspect of the left tibial stump      2.  Negative for abscess      3.  Left knee joint effusion      [id number]-WITH & W/O RIGHT   Final Result      1.  No roc osteomyelitis      2.  Edema within the distal tibial stump evident on T2-weighted imaging and possibly minimal change on T1-weighted imaging. This could indicate very early osteomyelitis although at this point is not specific.      3.  Negative for abscess      DX-CHEST-LIMITED (1 VIEW)   Final Result         No acute cardiac or pulmonary abnormality is identified.          CULTURES:   Results       ** No results found for the last 168 hours. **            MEDS:  Current Facility-Administered Medications   Medication Last Admin    apixaban (ELIQUIS) tablet 5 mg      lidocaine (XYLOCAINE) 2 % injection 20 mL 20 mL at 05/15/23 0934    Or    lidocaine (XYLOCAINE) 4 % topical solution      oxyCODONE immediate-release (ROXICODONE) tablet 5 mg 5 mg at 05/17/23 0517    doxycycline monohydrate (ADOXA) tablet 100 mg 100 mg at 05/17/23 0512    amoxicillin-clavulanate (AUGMENTIN) 875-125 MG per tablet 1 Tablet 1 Tablet at 05/17/23 0512    LORazepam (ATIVAN) injection 2 mg 2 mg at 05/15/23 0907    loperamide (IMODIUM) capsule 2 mg 2 mg at 05/08/23 2323    potassium chloride SA (Kdur) tablet 40 mEq 40 mEq at 05/17/23 0512    bictegravir-emtricitab-TAF (Biktarvy) -25 mg tablet 1 Tablet 1 Tablet at 05/16/23 1651    divalproex ER (DEPAKOTE ER) tablet 500 mg 500 mg at 05/17/23 0512    OLANZapine (ZYPREXA) tablet 5 mg 5 mg at 05/16/23 9197     Pharmacy Consult Request ...Pain Management Review 1 Each      acetaminophen (TYLENOL) tablet 1,000 mg 1,000 mg at 23    metoprolol tartrate (LOPRESSOR) tablet 25 mg 25 mg at 23 1651       ASSESSMENT/PLAN: Kellie Chatman is a 53-year-old female with a PMHx HIV, schizophrenia, bipolar on Zyprexa and Depakote; 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  Admitted 2023 for bilateral stump infections s/p bilateral I&D on 5/10. Transitioned from IV Vancomycin and unasyn to Augmentin and doxycyline .     Chronic multifocal osteomyelitis of multiple sites (HCC)  Assessment & Plan  MRI showin.  Early osteomyelitis of the distal aspect of the left tibial stump. S/p I&D and  on 5/10 with Dr. Lux  - ID consulting- Dr Manzo   - Discontinue vanco and unasyn (-)  - Continue Doxy 100mg BID and augmentin 875 BID- continue for duration of wound vac placement   - Wound care following   - Ativan 2mg PRN wound vac changes      HIV (human immunodeficiency virus infection) (HCC)  Assessment & Plan  Initially unclear diagnosis. However, confirmatory testing indicates that patient is in fact positive for HIV 1.  Patient continues to have normal CD4 counts (CD4 752 at 3/2023)  - ID following   - Confirmatory testing conclusive for HIV  - Continue Biktarvy daily    History of atrial fibrillation  Assessment & Plan  EKG with A fib;  from [facility] on 2023. Sinus rhythm for the duration of this hospital admission.   - Continue metoprolol 25 mg twice daily  - Discontinue heparin  - Plan to resume eliquis   - Dc telemetry monitoring    History of seizures  Assessment & Plan  Unclear history of seizure activity. Patient states she has never had a seizure before.   - Continue Depakote 500 mg daily for history of seizures  - Depakote likely for mood stabilization     History of DVT (deep vein thrombosis)  Assessment & Plan  DVT noted on US from [facility]  4/23/2023.  - Discontinue heparin  - Plan to resume eliquis 5/17    Homelessness  Assessment & Plan  Patient was previously staying at [facility] previously.  Recommend social work consultation prior to discharge for safe discharge planning.    Hypokalemia  Assessment & Plan  Resolved.  Continue to monitor.    Schizophrenia (HCC)  Assessment & Plan  - Continue home olanzapine 5 mg twice daily      * Wound infection  Assessment & Plan  - Discontinue vancomycin and Unasyn.(5/4 - 5/21)  - Continue Doxy 100mg BID and augmentin 875 BID- continue for duration of wound vac placement           Core Measures:   Fluids: PO intake   Lines: IVP, wound vac   Abx: Doxy and augmentin   DVT prophylaxis: Heparin, resume Eliquis on dc   Code Status: Full    Disposition: medically cleared for dc, awaiting SNF placement     Dilcia Lovelace MD   PGY-3 Family Medicine Resident   [facility]

## 2023-05-17 NOTE — PROGRESS NOTES
Pt A&Ox4, withdrawn and drowsy. Room air. Pt c/o leg pain at bedtime, tylenol given. Pt denies pain throughout the rest of the night. Incontinent of urine, removes pure wick on own. Bilateral wound vac in place with normal saline vera flow. Fall precautions in place.

## 2023-05-17 NOTE — PROGRESS NOTES
Received in bed sleeping aaox4, denies any discomfort, c/o fatigue, wound vac dressing CDI, needs attended, POC discussed, awaiting dc plans.

## 2023-05-17 NOTE — FACE TO FACE
Face to Face Supporting Documentation - Home Health    The encounter with this patient was in whole or in part the primary reason for home health admission.    Date of encounter:   Patient:                    MRN:                       YOB: 2025  Kellie Chatman  [id number]  1970     Home health to see patient for:  Skilled Nursing care for assessment, interventions & education, Wound Care, Physical Therapy evaluation and treatment, and Occupational therapy evaluation and treatment    Skilled need for:  Exacerbation of Chronic Disease State bilateral BKA and Surgical Aftercare bilateral BKA I&D with wound vac placement     Skilled nursing interventions to include:  Wound Care and Comment: per  recommendations     Homebound status evidenced by:  Need the aid of supportive devices such as crutches, canes, wheelchairs or walkers, Require the use of special transportation, or Needs the assistance of another person in order to leave the home. Leaving home requires a considerable and taxing effort. There is a normal inability to leave the home.    Community Physician to provide follow up care: Pcp Pt States None     Optional Interventions? Yes, Details: per  recommendations       I certify the face to face encounter for this home health care referral meets the CMS requirements and the encounter/clinical assessment with the patient was, in whole, or in part, for the medical condition(s) listed above, which is the primary reason for home health care. Based on my clinical findings: the service(s) are medically necessary, support the need for home health care, and the homebound criteria are met.  I certify that this patient has had a face to face encounter by myself.  Dilcia Lovelace M.D. - NPI: [id number]

## 2023-05-17 NOTE — PROGRESS NOTES
Pulled PIV, upset about getting asked questions about what happened, didn't want this RN to be in here room at this time.

## 2023-05-17 NOTE — CARE PLAN
Problem: Knowledge Deficit - Standard  Goal: Patient and family/care givers will demonstrate understanding of plan of care, disease process/condition, diagnostic tests and medications  Outcome: Progressing  Note: Wound care safety     Problem: Skin Integrity  Goal: Skin integrity is maintained or improved  Outcome: Progressing     Problem: Fall Risk  Goal: Patient will remain free from falls  Outcome: Progressing  Note: Bed alarm on, call light within reach   The patient is Stable - Low risk of patient condition declining or worsening    Shift Goals  Clinical Goals: VSS, pain control, safety  Patient Goals: rest  Family Goals: IVETTE    Progress made toward(s) clinical / shift goals:  dc plans    Patient is not progressing towards the following goals:

## 2023-05-17 NOTE — WOUND TEAM
[facility]  Inpatient Services  Wound and Skin Care Evaluation    Admission Date: 5/4/2023     Last order of IP CONSULT TO WOUND CARE was found on 5/5/2023 from Hospital Encounter on 5/4/2023     HPI, PMH, SH: Reviewed    Past Surgical History:   Procedure Laterality Date    IRRIGATION & DEBRIDEMENT GENERAL Bilateral 5/10/2023    Procedure: IRRIGATION AND DEBRIDEMENT, WOUND LEG;  Surgeon: Nas Lux M.D.;  Location: SURGERY [facility];  Service: Orthopedics    APPLICATION OR REPLACEMENT, WOUND VAC Bilateral 5/10/2023    Procedure: APPLICATION OR REPLACEMENT, WOUND VAC;  Surgeon: Nas Lux M.D.;  Location: SURGERY [facility];  Service: Orthopedics    KNEE AMPUTATION BELOW Left 03/22/2023    Procedure: AMPUTATION, BELOW KNEE;  Surgeon: Nas Lux M.D.;  Location: SURGERY [facility];  Service: Orthopedics    AMPUTATION, BELOW THE KNEE Right      Social History     Tobacco Use    Smoking status: Every Day     Packs/day: 0.25     Types: Cigarettes    Smokeless tobacco: Never   Vaping Use    Vaping Use: Never used   Substance Use Topics    Alcohol use: Not Currently     Chief Complaint   Patient presents with    Leg Pain     Bilateral leg pain, bilateral BKA     Diagnosis: Wound infection [T14.8XXA, L08.9]    Unit where seen by Wound Team: [id number]    WOUND CONSULT/FOLLOW UP RELATED TO:  Rashi BKA, L Post thigh, L radha-area     WOUND HISTORY:  Pt is a 52 yo female who presented to the ED for B leg pain.  Pt had B BKA for tissue damage related to frostbite in 3/2023.  Hx of B foot frost bite 11/14/23, schizophrenia, HIV, methamphetamine use, homelessness.  Pt discharged from [facility] 5/3/23 for B BKA pain.  Pt was admitted for r/o osteomyelitis and postoperative infection.  Pt is wheelchair bound    right TMA on 12/12/2022 by Dr. Lux  right BKA on 12/20/2022 by Dr. Lux  left second toe amputation with wound VAC application on 2/3/2023 by Dr. Lux  left TMA on 2/7/2023 by   Maci   left BKA on 3/22/2023 by Dr. Lux    Interval History this admission  5/5/23 LPS consult Yuliet Hwang APRN LPS  5/6/23 ID consult Dr Manzo - continue Vancomycin and Unasyn,   5/10/23 B BKA debridement and wound VAC    WOUND ASSESSMENT/LDA      Negative Pressure Wound Therapy 05/12/23 Bilateral (Active)   NPWT Pump Mode / Pressure Setting Intermittent;125 mmHg 05/17/23 1600   Dressing Type Small;Black Foam (Regular);Gray Foam (Cleanse Choice) 05/17/23 1600   Number of Foam Pieces Used 6 05/17/23 1600   Canister Changed Yes 05/17/23 1600   Output (mL) 300 mL 05/16/23 1300   NEXT Dressing Change/Treatment Date 05/19/23 05/17/23 1600   VAC VeraFlo Irrigant Normal Saline 05/17/23 1600   VAC VeraFlo Soak Time (mins) 5 05/17/23 1600   VAC VeraFlo Instill Volume (ml) 22 05/17/23 1600   VAC VeraFlo - Therapy Time (hrs) 2 05/17/23 1600   VAC VeraFlo Pressure (mm/Hg) Intermittent;125 mmHg 05/17/23 1600   WOUND NURSE ONLY - Time Spent with Patient (mins) 60 05/17/23 1600           Wound 03/06/23 Other (comment) Leg Right BKA (Active)   Wound Image    05/17/23 1600   Site Assessment Red;Pink;Slough 05/17/23 1600   Periwound Assessment [place] 05/17/23 1600   Margins Attached edges;Defined edges 05/17/23 1600   Closure Secondary intention 05/17/23 1600   Drainage Amount Small 05/17/23 1600   Drainage Description Serosanguineous 05/17/23 1600   Treatments Cleansed;Site care 05/17/23 1600   Wound Cleansing Normal Saline Irrigation 05/17/23 1600   Periwound Protectant Skin Protectant Wipes to Periwound;Paste Ring;Drape 05/17/23 1600   Dressing Cleansing/Solutions Normal Saline 05/17/23 1600   Dressing Options Wound Vac;Mepilex 05/17/23 1600   Dressing Changed Changed 05/17/23 1600   Dressing Status Clean;Dry;Intact 05/17/23 1600   Dressing Change/Treatment Frequency Monday, Wednesday, Friday, and As Needed 05/17/23 1600   NEXT Dressing Change/Treatment Date 05/19/23 05/17/23 1600   NEXT Weekly Photo (Inpatient  Only) 05/24/23 05/17/23 1600   Non-staged Wound Description Full thickness 05/12/23 1200   Wound Length (cm) 12.5 cm 05/17/23 1600   Wound Width (cm) 17 cm 05/17/23 1600   Wound Surface Area (cm^2) 212.5 cm^2 05/17/23 1600   Wound Bed Granulation (%) 40 % 05/12/23 1200   Wound Bed Slough (%) 60 % 05/12/23 1200   Wound Bed Eschar (%) 100 % 05/05/23 1100   Shape Irregular 05/17/23 1600   Wound Odor None 05/17/23 1600   Exposed Structures IVETTE 05/17/23 1600   WOUND NURSE ONLY - Time Spent with Patient (mins) 90 05/15/23 1000       Wound 05/05/23 Incision Knee Left BKA (Active)   Wound Image   05/17/23 1600   Site Assessment Red;Pink;Yellow 05/17/23 1600   Periwound Assessment [place] 05/17/23 1600   Margins Attached edges;Defined edges 05/17/23 1600   Closure Secondary intention 05/17/23 1600   Drainage Amount Small 05/17/23 1600   Drainage Description Serosanguineous 05/17/23 1600   Treatments Cleansed;Site care 05/17/23 1600   Wound Cleansing Normal Saline Irrigation 05/17/23 1600   Periwound Protectant Skin Protectant Wipes to Periwound;Drape 05/17/23 1600   Dressing Cleansing/Solutions Not Applicable 05/17/23 1600   Dressing Options Wound Vac;Mepilex 05/17/23 1600   Dressing Changed Changed 05/17/23 1600   Dressing Status Clean;Dry;Intact 05/17/23 1600   Dressing Change/Treatment Frequency Monday, Wednesday, Friday, and As Needed 05/17/23 1600   NEXT Dressing Change/Treatment Date 05/19/23 05/17/23 1600   NEXT Weekly Photo (Inpatient Only) 05/24/23 05/17/23 1600   Non-staged Wound Description Full thickness 05/12/23 1200   Wound Length (cm) 3.6 cm 05/17/23 1600   Wound Width (cm) 6 cm 05/17/23 1600   Wound Depth (cm) 0 cm 05/17/23 1600   Wound Surface Area (cm^2) 21.6 cm^2 05/17/23 1600   Wound Volume (cm^3) 0 cm^3 05/17/23 1600   Wound Bed Granulation (%) 50 % 05/12/23 1200   Wound Bed Slough (%) 50 % 05/12/23 1200   Wound Bed Eschar (%) 100 % 05/05/23 1100   Shape Oval 05/17/23 1600   Wound Odor None 05/17/23 1600    Pulses N/A 05/15/23 1000   Exposed Structures Adipose 05/17/23 1600   WOUND NURSE ONLY - Time Spent with Patient (mins) 90 05/12/23 1200             MRI 5/7/23  MRI right tib-fib:  1.  No roc osteomyelitis     2.  Edema within the distal tibial stump evident on T2-weighted imaging and possibly minimal change on T1-weighted imaging. This could indicate very early osteomyelitis although at this point is not specific.     3.  Negative for abscess     MRI left tib-fib  1.  Early osteomyelitis of the distal aspect of the left tibial stump     2.  Negative for abscess     3.  Left knee joint effusion    HIV assay 5/7/23 positive    GISELLA:   5/5/23 B popiteal palpable    Lab Values:    Lab Results   Component Value Date/Time    WBC 5.9 05/13/2023 06:29 AM    RBC 3.73 (L) 05/13/2023 06:29 AM    HEMOGLOBIN 9.5 (L) 05/13/2023 06:29 AM    HEMATOCRIT 30.7 (L) 05/13/2023 06:29 AM    CREACTPROT 2.40 (H) 05/04/2023 04:10 PM    SEDRATEWES 75 (H) 05/04/2023 04:10 PM    HBA1C 5.1 02/24/2023 05:54 AM        Culture Results show:  No results found for this or any previous visit (from the past 720 hour(s)).    Pain Level/Medicated:  Premedicated with PO oxy and PO ativan, topical lidocaine allowed to soak for 20min    INTERVENTIONS BY WOUND TEAM:  . Performed standard wound care which includes appropriate positioning, dressing removal and non-selective debridement. Pictures and measurements obtained weekly if/when required.    Bilateral BKA:   Preparation for Dressing removal adhesive remover, lidocaine soak, saline soak  Non-selectively Debrided with:  Normal Saline   Sharp debridement: NA   Radha wound: Cleansed with Normal Saline  Primary Dressing:   RIGHT - prepped radha-wound with no sting, paste ring and drape. 2 pieces of cleanse choice waffle applied to wound bed followed by 3 pieces of thin grey foam directly on top. All foam sealed with drape. Hole was cut and tracpad applied.  VF NPWT resumed.  LEFT - radha-wound prepped no  sting, hydrocolloid thin to lateral aspect,and drape Black regular foam cut to fit wound bed. Secured with drape. Tracpad applied. Y connected with R BKA wound vac.   Secondary (Outer) Dressing:     Advanced Wound Care Discharge Planning  Number of Clinicians necessary to complete wound care: 2  Is patient requiring IV pain medications for dressing changes: no, but tolerates changes better with IV ativan vs PO  Length of time for dressing change 90 min. (This does not include chart review, pre-medication time, set up, clean up or time spent charting.)    Interdisciplinary consultation: Patient, Bedside RN, Wound RN Linnette    EVALUATION / RATIONALE FOR TREATMENT:  Most Recent Date:  5/17/23: Left BKA wound slightly . Continue regular NPWT. R BKA wound bed still with moderate amount of slough. Unable to perform CSWD due to pain. Cleanse choice waffle foam utilized this change for more aggressive debridement of nonviable tissue. Continue VF NPWT.  5/15/23: both wound beds have adipose tissue that was debrided away as patient was able to tolerate , R bka with more adipose & necrotic tissue than the L. BKA which also has adipose tissue but is much more shallow.  Next dressing change switch to cleanse choice to assist with mechanical debridement of non-viable tissue and promote granular tissue   5/13/23 expect pt to respond well to NPWT, Veraflo will help remove necrotic tissue on the R BKA.  Anticipate increased granular tissue in wound beds and contraction of area.  Pt's pain difficult to control, ativan added today.    5/5/23: Patient well known to wound care team for treatment of frostbite to bilateral lower legs and feet which over time has resulted in bilateral BKAs. Patient admitted for cellulitis to bilateral BKAs. Intact brown eschar to both BKAs, no drainage at this time. Bilateral BKA tissue is red, warm, and edematous, diagnosed cellulitis by MD. Applied betadine to keep eschar dry and for antimicrobial  component. Abrasion to Left posterior leg cleansed and covered with silicone dressing to pad and protect area. LPS consulted and will follow at this time. Wound care signing off.      Goals: Steady decrease in wound area and depth weekly.    WOUND TEAM PLAN OF CARE ([X] for frequency of wound follow up,):   Nursing to follow dressing orders written for wound care. Contact wound team if area fails to progress, deteriorates or with any questions/concerns if something comes up before next scheduled follow up (See below as to whether wound is following and frequency of wound follow up)  Dressing changes by wound team:                   Follow up 3 times weekly:                NPWT change 3 times weekly: X     Follow up 1-2 times weekly:      Follow up Bi-Monthly:           Follow up Monthly (High Risk):                        Follow up as needed:    Other (explain):     NURSING PLAN OF CARE ORDERS (X):  Dressing changes: See Dressing Care orders: X  Skin care: See Skin Care orders: X  RN Prevention Protocol:   Rectal tube care: See Rectal Tube Care orders:   Other orders:    RSKIN:   CURRENTLY IN PLACE (X), APPLIED THIS VISIT (A), ORDERED (O):   Q shift Lonnie:  X  Q shift pressure point assessments:  X    Surface/Positioning X  Standard Mattress/Trauma Bed     x   Low Airloss          ICU Low Airloss   Bariatric SHASHA     Waffle cushion        Waffle Overlay      x  Reposition q 2 hours      TAPs Turning system     Z James Pillow     Offloading/Redistribution n/a incontinent  Sacral Offloading Dressing (Silicone dressing)    Heel Offloading Dressing (Silicone dressing)         Heel float boots (Prevalon boot)             Float Heels off Bed with Pillows           Respiratory -- room air            Containment/Moisture Prevention   x  Rectal tube or BMS    Purwick/Condom Cath      x  Leggett Catheter    Barrier wipes   x        Barrier paste     x  Antifungal tx      Interdry   X     Mobilization Chair/bed bound      Up to  chair        Ambulate      PT/OT  - NWB AISHWARYA MODI    Nutrition         Anticipated discharge plans:LTACH:        SNF/Rehab:                  Home Health Care:           Outpatient Wound Center:   X pt will require OP wound care for 3x/week VAC dressing changes  Self/Family Care:        Other:                  Vac Discharge Needs: --> please discuss with LPS  Vac Discharge plan is purely a recommendation from wound team and not a requirement for discharge unless otherwise stated by physician.

## 2023-05-17 NOTE — CARE PLAN
The patient is Watcher - Medium risk of patient condition declining or worsening    Shift Goals  Clinical Goals: VSS, pain control, safety  Patient Goals: rest  Family Goals: IVETTE    Progress made toward(s) clinical / shift goals:      Problem: Knowledge Deficit - Standard  Goal: Patient and family/care givers will demonstrate understanding of plan of care, disease process/condition, diagnostic tests and medications  Outcome: Progressing     Problem: Fall Risk  Goal: Patient will remain free from falls  Outcome: Progressing   Pt free from falls this shift. Fall precautions in place.    Patient is not progressing towards the following goals:

## 2023-05-17 NOTE — DISCHARGE PLANNING
DPA set patient up for a PCP with Dr. Rut Fair.    Received Choice form @: 0962  Agency/Facility Name: [facility]   Referral sent per Choice form @: [phone]    @[phone]  Agency/Facility Name: [facility]  Outcome: DPA called for a follow up on referral, per facility they did not receive it and asked DPA to resend.    @[phone]  Agency/Facility Name: [facility]  Outcome: DPA received v-mail, per [facility] they are declining referral. Patient's insurance is out of network with them.

## 2023-05-18 PROCEDURE — 700102 HCHG RX REV CODE 250 W/ 637 OVERRIDE(OP): Performed by: STUDENT IN AN ORGANIZED HEALTH CARE EDUCATION/TRAINING PROGRAM

## 2023-05-18 PROCEDURE — 770001 HCHG ROOM/CARE - MED/SURG/GYN PRIV*

## 2023-05-18 PROCEDURE — A9270 NON-COVERED ITEM OR SERVICE: HCPCS | Performed by: STUDENT IN AN ORGANIZED HEALTH CARE EDUCATION/TRAINING PROGRAM

## 2023-05-18 PROCEDURE — 700102 HCHG RX REV CODE 250 W/ 637 OVERRIDE(OP): Performed by: BEHAVIOR ANALYST

## 2023-05-18 PROCEDURE — 700102 HCHG RX REV CODE 250 W/ 637 OVERRIDE(OP)

## 2023-05-18 PROCEDURE — A9270 NON-COVERED ITEM OR SERVICE: HCPCS

## 2023-05-18 PROCEDURE — 99231 SBSQ HOSP IP/OBS SF/LOW 25: CPT | Mod: GC | Performed by: STUDENT IN AN ORGANIZED HEALTH CARE EDUCATION/TRAINING PROGRAM

## 2023-05-18 PROCEDURE — A9270 NON-COVERED ITEM OR SERVICE: HCPCS | Performed by: BEHAVIOR ANALYST

## 2023-05-18 RX ADMIN — DOXYCYCLINE 100 MG: 100 TABLET, FILM COATED ORAL at 19:57

## 2023-05-18 RX ADMIN — APIXABAN 5 MG: 5 TABLET, FILM COATED ORAL at 08:05

## 2023-05-18 RX ADMIN — BICTEGRAVIR SODIUM, EMTRICITABINE, AND TENOFOVIR ALAFENAMIDE FUMARATE 1 TABLET: 50; 200; 25 TABLET ORAL at 19:57

## 2023-05-18 RX ADMIN — AMOXICILLIN AND CLAVULANATE POTASSIUM 1 TABLET: 875; 125 TABLET, FILM COATED ORAL at 19:57

## 2023-05-18 RX ADMIN — OLANZAPINE 5 MG: 5 TABLET, FILM COATED ORAL at 19:57

## 2023-05-18 RX ADMIN — AMOXICILLIN AND CLAVULANATE POTASSIUM 1 TABLET: 875; 125 TABLET, FILM COATED ORAL at 08:05

## 2023-05-18 RX ADMIN — DIVALPROEX SODIUM 500 MG: 500 TABLET, EXTENDED RELEASE ORAL at 19:57

## 2023-05-18 RX ADMIN — DOXYCYCLINE 100 MG: 100 TABLET, FILM COATED ORAL at 08:05

## 2023-05-18 RX ADMIN — APIXABAN 5 MG: 5 TABLET, FILM COATED ORAL at 19:57

## 2023-05-18 RX ADMIN — DIVALPROEX SODIUM 500 MG: 500 TABLET, EXTENDED RELEASE ORAL at 08:05

## 2023-05-18 RX ADMIN — POTASSIUM CHLORIDE 40 MEQ: 1500 TABLET, EXTENDED RELEASE ORAL at 04:48

## 2023-05-18 ASSESSMENT — PAIN DESCRIPTION - PAIN TYPE
TYPE: ACUTE PAIN
TYPE: ACUTE PAIN

## 2023-05-18 NOTE — CARE PLAN
The patient is Stable - Low risk of patient condition declining or worsening    Shift Goals  Clinical Goals: safety, rest  Patient Goals: sleep  Family Goals: IVETTE    Progress made toward(s) clinical / shift goals:    Problem: Skin Integrity  Goal: Skin integrity is maintained or improved  Description: Target End Date:  Prior to discharge or change in level of care    Document interventions on Skin Risk/Lonnie flowsheet groups and corresponding LDA    1.  Assess and monitor skin integrity, appearance and/or temperature  2.  Assess risk factors for impaired skin integrity and/or pressures ulcers  3.  Implement precautions to protect skin integrity in collaboration with interdisciplinary team  4.  Implement pressure ulcer prevention protocol if at risk for skin breakdown  5.  Confirm wound care consult if at risk for skin breakdown  6.  Ensure patient use of pressure relieving devices  (Low air loss bed, waffle overlay, heel protectors, ROHO cushion, etc)  Outcome: Progressing     Problem: Fall Risk  Goal: Patient will remain free from falls  Description: Target End Date:  Prior to discharge or change in level of care    Document interventions on the Yuly Constantino Fall Risk Assessment    1.  Assess for fall risk factors  2.  Implement fall precautions  Outcome: Progressing       Patient is not progressing towards the following goals:

## 2023-05-18 NOTE — DISCHARGE PLANNING
Received Choice form @: 0819  Ag[facility]Facility Name: [facility]  Referral sent per Choice form @: 0901    @1200  Agency[facility]Facility Name: [facility]  Spoke To: Jessenia  Outcome: DPA called to follow up on referral, per Jessenia they are not contracted with patient's insurance.    Agency/Facility Name: [facility]  Referral sent per Choice form @: 1207

## 2023-05-18 NOTE — THERAPY
Physical Therapy Contact Note    Patient Name: Kellie Chatman  Age:  53 y.o., Sex:  female  Medical Record #: [id number]  Today's Date: 5/18/2023 05/18/23 1028   Treatment Variance   Reason For Missed Therapy Non-Medical - Patient Refused   Other Treatments   Other Treatments Provided Pt refused PT today, encouraged pt to get up into the w/c today w/nrsg assist.   Interdisciplinary Plan of Care Collaboration   Collaboration Comments Nrsg notified pt refused OOB @ this time.   Session Information   Date / Session Number  5/16--3 (2/3, 5/21) Ref 5/18   Supervising Physical Therapist (PTA Treatments Only)   Supervising Physical Therapist Ngoc Dye

## 2023-05-18 NOTE — CARE PLAN
The patient is Stable - Low risk of patient condition declining or worsening    Shift Goals  Clinical Goals: Safety, monitor wound vac  Patient Goals: Rest  Family Goals: IVETTE    Progress made toward(s) clinical / shift goals: Pt using call light appropriately, calm and cooperative, wound vac functioning well      Problem: Knowledge Deficit - Standard  Goal: Patient and family/care givers will demonstrate understanding of plan of care, disease process/condition, diagnostic tests and medications  Outcome: Progressing     Problem: Skin Integrity  Goal: Skin integrity is maintained or improved  Outcome: Progressing     Problem: Fall Risk  Goal: Patient will remain free from falls  Outcome: Progressing

## 2023-05-18 NOTE — DISCHARGE PLANNING
Case Management Discharge Planning    Admission Date: 5/4/2023  GMLOS: 3.5  ALOS: 14    6-Clicks ADL Score: 21  6-Clicks Mobility Score: 9  PT and/or OT Eval ordered: Yes  Post-acute Referrals Ordered: Yes  Post-acute Choice Obtained: Yes  Has referral(s) been sent to post-acute provider:  Yes      Anticipated Discharge Dispo: Discharge Disposition: D/T to home under [facility]HA care in anticipation of covered skilled care (06)    DME Needed: Yes    DME Ordered: No    Action(s) Taken: OTHER    Patient has no accepting SNF or home health provider.     In order for patient to be admitted to [facility] and to be set up with a wound vac she needs an accepting home health provider.     Patient has been declined by [facility] and [employer] Patient's insurance is Medicaid making it very difficult to find an accepting home health provider. LSW to continue working on home health referrals.     Patient needs a wheelchair. DME companies hesitant to provide wheelchair as patient is homeless and doesn't have a physical address. LSW to reach out to leadership about donor wheelchair.      @1229: Spoke to Orqu[employer] from [facility] in CM office. Per Orqu[employer], they can provide kenney wound vac for patient as long as she has a physical address and is set up with home health.    @1247: Call from [facility]. They are going to run patient's insurance benefits and get back to this LSW on acceptance or denial.     Escalations Completed: None    Medically Clear: Yes    Next Steps: LSW to follow up with patient and medical team regarding discharge needs and barriers.     Barriers to Discharge: Pending Placement, DME, No Social Support, and Homelessness

## 2023-05-18 NOTE — PROGRESS NOTES
Assumed care of patient at bedside report from day RN. Updated on POC. Patient currently A & O x 3; on room air; up with one assist to a WC with no complaints of acute pain. Assessment completed.  Call light within reach. Whiteboard updated. Fall precautions in place. Bed locked and in lowest position. All questions answered. No other needs indicated at this time.

## 2023-05-18 NOTE — PROGRESS NOTES
[facility] MEDICINE PROGRESS NOTE     Attending:   Dr. 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  Admitted 5/4/2023 for bilateral stump infections s/p bilateral I&D on 5/10. Transitioned from IV Vancomycin and unasyn to Augmentin and doxycyline 5/12.     SUBJECTIVE:   No acute events overnight. Patient c/o pain this morning. She does not wish for pain medications at this time.     OBJECTIVE:  Vitals:    05/17/23 0721 05/17/23 1656 05/17/23 1941 05/18/23 0327   BP: 113/57 111/80 128/67 102/66   Pulse: 76 96 89 77   Resp: 16 16 18 18   Temp:   36.9 °C (98.4 °F) 37.2 °C (99 °F)   TempSrc:   Temporal Temporal   SpO2: 98% 98% 96% 95%   Weight:       Height:           Intake/Output Summary (Last 24 hours) at 5/15/2023 0550  Last data filed at 5/14/2023 0700  Gross per 24 hour   Intake 240 ml   Output --   Net 240 ml       PHYSICAL EXAM:   General: No acute distress, afebrile, resting comfortably, sleepy   HEENT: NC/AT. EOMI.   Cardiovascular: RRR without murmurs, rubs, heaves. Normal capillary refill   Respiratory: CTAB, no tachypnea or retractions   Abdomen: normal bowel sounds, soft, nontender, nondistended, no masses, no organomegaly   EXT:  Bilateral BKA with dressing in place. Clean, dry and intact. Wound vac with serosanguineous fluid present  Skin: No erythema/lesions   Neuro: Non-focal, alert and orientated       LABS:  No results for input(s): WBC, RBC, HEMOGLOBIN, HEMATOCRIT, MCV, MCH, RDW, PLATELETCT, MPV, NEUTSPOLYS, LYMPHOCYTES, MONOCYTES, EOSINOPHILS, BASOPHILS, RBCMORPHOLO in the last 72 hours.    No results for input(s): SODIUM, POTASSIUM, CHLORIDE, CO2, BUN, CREATININE, CALCIUM, MAGNESIUM, PHOSPHORUS, ALBUMIN in the last 72 hours.    Estimated GFR/CRCL = Estimated Creatinine Clearance: 116.5 mL/min (by C-G  formula based on SCr of 0.59 mg/dL).  No results for input(s): GLUCOSE, POCGLUCOSE in the last 72 hours.                No results for input(s): INR, APTT, FIBRINOGEN in the last 72 hours.    Invalid input(s): DIMER    MICROBIOLOGY:   Blood Culture Hold   Date Value Ref Range Status   04/08/2023 Collected  Final          IMAGING:   [id number] & W/O LEFT   Final Result      1.  Early osteomyelitis of the distal aspect of the left tibial stump      2.  Negative for abscess      3.  Left knee joint effusion      [id number] & W/O RIGHT   Final Result      1.  No roc osteomyelitis      2.  Edema within the distal tibial stump evident on T2-weighted imaging and possibly minimal change on T1-weighted imaging. This could indicate very early osteomyelitis although at this point is not specific.      3.  Negative for abscess      DX-CHEST-LIMITED (1 VIEW)   Final Result         No acute cardiac or pulmonary abnormality is identified.          CULTURES:   Results       ** No results found for the last 168 hours. **            MEDS:  Current Facility-Administered Medications   Medication Last Admin    apixaban (ELIQUIS) tablet 5 mg 5 mg at 05/17/23 2156    LORazepam (ATIVAN) tablet 2 mg 2 mg at 05/17/23 1524    lidocaine (XYLOCAINE) 2 % injection 20 mL 20 mL at 05/15/23 0934    Or    lidocaine (XYLOCAINE) 4 % topical solution 1 Application at 05/17/23 1514    oxyCODONE immediate-release (ROXICODONE) tablet 5 mg 5 mg at 05/17/23 1417    doxycycline monohydrate (ADOXA) tablet 100 mg 100 mg at 05/17/23 2155    amoxicillin-clavulanate (AUGMENTIN) 875-125 MG per tablet 1 Tablet 1 Tablet at 05/17/23 2156    loperamide (IMODIUM) capsule 2 mg 2 mg at 05/08/23 2323    potassium chloride SA (Kdur) tablet 40 mEq 40 mEq at 05/18/23 0448    bictegravir-emtricitab-TAF (Biktarvy) -25 mg tablet 1 Tablet 1 Tablet at 05/17/23 2157    divalproex ER (DEPAKOTE ER) tablet 500 mg 500 mg at 05/17/23 2155    OLANZapine  (ZYPREXA) tablet 5 mg 5 mg at 23 1900    Pharmacy Consult Request ...Pain Management Review 1 Each      acetaminophen (TYLENOL) tablet 1,000 mg 1,000 mg at 23    metoprolol tartrate (LOPRESSOR) tablet 25 mg 25 mg at 23       ASSESSMENT/PLAN: Kellie Chatman is a 53-year-old female with a PMHx HIV, schizophrenia, bipolar on Zyprexa and Depakote; 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  Admitted 2023 for bilateral stump infections s/p bilateral I&D on 5/10. Transitioned from IV Vancomycin and unasyn to Augmentin and doxycyline .     Chronic multifocal osteomyelitis of multiple sites (HCC)  Assessment & Plan  MRI showin.  Early osteomyelitis of the distal aspect of the left tibial stump. S/p I&D and  on 5/10 with Dr. Lux  - ID consulting- Dr Manzo   - Discontinue vanco and unasyn (-)  - Continue Doxy 100mg BID and augmentin 875 BID- continue for duration of wound vac placement   - Wound care following   - Ativan 2mg PRN wound vac changes      HIV (human immunodeficiency virus infection) (HCC)  Assessment & Plan  Initially unclear diagnosis. However, confirmatory testing indicates that patient is in fact positive for HIV 1.  Patient continues to have normal CD4 counts (CD4 752 at 3/2023)  - ID following   - Confirmatory testing conclusive for HIV  - Continue Biktarvy daily    History of atrial fibrillation  Assessment & Plan  EKG with A fib;  from [facility] on 2023. Sinus rhythm for the duration of this hospital admission.   - Continue metoprolol 25 mg twice daily  - Discontinue heparin  - Plan to resume eliquis   - Dc telemetry monitoring    History of seizures  Assessment & Plan  Unclear history of seizure activity. Patient states she has never had a seizure before.   - Continue Depakote 500 mg daily for history of seizures  - Depakote likely for mood stabilization     History of DVT (deep vein  thrombosis)  Assessment & Plan  DVT noted on US from [facility] 4/23/2023.  - Discontinue heparin  - Plan to resume eliquis 5/17    Homelessness  Assessment & Plan  Patient was previously staying at [facility] previously.  Recommend social work consultation prior to discharge for safe discharge planning.    Hypokalemia  Assessment & Plan  Resolved.  Continue to monitor.    Schizophrenia (HCC)  Assessment & Plan  - Continue home olanzapine 5 mg twice daily      * Wound infection  Assessment & Plan  - Discontinue vancomycin and Unasyn.(5/4 - 5/21)  - Continue Doxy 100mg BID and augmentin 875 BID- continue for duration of wound vac placement           Core Measures:   Fluids: PO intake   Lines: IVP, wound vac   Abx: Doxy and augmentin   DVT prophylaxis: dc Heparin, resume Eliquis 5/17  Code Status: Full    Disposition: medically cleared for dc, awaiting SNF placement     Dilcia Lovelace MD   PGY-3 Family Medicine Resident   [facility]

## 2023-05-19 LAB
ANION GAP SERPL CALC-SCNC: 11 MMOL/L (ref 7–16)
BUN SERPL-MCNC: 15 MG/DL (ref 8–22)
CALCIUM SERPL-MCNC: 9.4 MG/DL (ref 8.5–10.5)
CHLORIDE SERPL-SCNC: 102 MMOL/L (ref 96–112)
CO2 SERPL-SCNC: 26 MMOL/L (ref 20–33)
CREAT SERPL-MCNC: 0.78 MG/DL (ref 0.5–1.4)
ERYTHROCYTE [DISTWIDTH] IN BLOOD BY AUTOMATED COUNT: 49.9 FL (ref 35.9–50)
GFR SERPLBLD CREATININE-BSD FMLA CKD-EPI: 89 ML/MIN/1.73 M 2
GLUCOSE SERPL-MCNC: 90 MG/DL (ref 65–99)
HCT VFR BLD AUTO: 33.1 % (ref 37–47)
HGB BLD-MCNC: 10.2 G/DL (ref 12–16)
MCH RBC QN AUTO: 25.4 PG (ref 27–33)
MCHC RBC AUTO-ENTMCNC: 30.8 G/DL (ref 33.6–35)
MCV RBC AUTO: 82.3 FL (ref 81.4–97.8)
PLATELET # BLD AUTO: 478 K/UL (ref 164–446)
PMV BLD AUTO: 9.8 FL (ref 9–12.9)
POTASSIUM SERPL-SCNC: 4.2 MMOL/L (ref 3.6–5.5)
RBC # BLD AUTO: 4.02 M/UL (ref 4.2–5.4)
SODIUM SERPL-SCNC: 139 MMOL/L (ref 135–145)
WBC # BLD AUTO: 4.6 K/UL (ref 4.8–10.8)

## 2023-05-19 PROCEDURE — 97606 NEG PRS WND THER DME>50 SQCM: CPT

## 2023-05-19 PROCEDURE — 700102 HCHG RX REV CODE 250 W/ 637 OVERRIDE(OP): Performed by: STUDENT IN AN ORGANIZED HEALTH CARE EDUCATION/TRAINING PROGRAM

## 2023-05-19 PROCEDURE — 700101 HCHG RX REV CODE 250: Performed by: FAMILY MEDICINE

## 2023-05-19 PROCEDURE — 36415 COLL VENOUS BLD VENIPUNCTURE: CPT

## 2023-05-19 PROCEDURE — 97602 WOUND(S) CARE NON-SELECTIVE: CPT

## 2023-05-19 PROCEDURE — A9270 NON-COVERED ITEM OR SERVICE: HCPCS | Performed by: BEHAVIOR ANALYST

## 2023-05-19 PROCEDURE — 306591 TRAY SUTURE REMOVAL DISP: Performed by: FAMILY MEDICINE

## 2023-05-19 PROCEDURE — A9270 NON-COVERED ITEM OR SERVICE: HCPCS

## 2023-05-19 PROCEDURE — 700102 HCHG RX REV CODE 250 W/ 637 OVERRIDE(OP)

## 2023-05-19 PROCEDURE — 99231 SBSQ HOSP IP/OBS SF/LOW 25: CPT | Mod: GC | Performed by: STUDENT IN AN ORGANIZED HEALTH CARE EDUCATION/TRAINING PROGRAM

## 2023-05-19 PROCEDURE — 80048 BASIC METABOLIC PNL TOTAL CA: CPT

## 2023-05-19 PROCEDURE — A9270 NON-COVERED ITEM OR SERVICE: HCPCS | Performed by: STUDENT IN AN ORGANIZED HEALTH CARE EDUCATION/TRAINING PROGRAM

## 2023-05-19 PROCEDURE — 770001 HCHG ROOM/CARE - MED/SURG/GYN PRIV*

## 2023-05-19 PROCEDURE — 302098 PASTE RING (FLAT): Performed by: FAMILY MEDICINE

## 2023-05-19 PROCEDURE — 85027 COMPLETE CBC AUTOMATED: CPT

## 2023-05-19 PROCEDURE — 700102 HCHG RX REV CODE 250 W/ 637 OVERRIDE(OP): Performed by: BEHAVIOR ANALYST

## 2023-05-19 RX ADMIN — AMOXICILLIN AND CLAVULANATE POTASSIUM 1 TABLET: 875; 125 TABLET, FILM COATED ORAL at 20:13

## 2023-05-19 RX ADMIN — LORAZEPAM 2 MG: 2 TABLET ORAL at 09:58

## 2023-05-19 RX ADMIN — APIXABAN 5 MG: 5 TABLET, FILM COATED ORAL at 17:49

## 2023-05-19 RX ADMIN — BICTEGRAVIR SODIUM, EMTRICITABINE, AND TENOFOVIR ALAFENAMIDE FUMARATE 1 TABLET: 50; 200; 25 TABLET ORAL at 17:49

## 2023-05-19 RX ADMIN — DOXYCYCLINE 100 MG: 100 TABLET, FILM COATED ORAL at 17:49

## 2023-05-19 RX ADMIN — OLANZAPINE 5 MG: 5 TABLET, FILM COATED ORAL at 17:49

## 2023-05-19 RX ADMIN — METOPROLOL TARTRATE 25 MG: 25 TABLET, FILM COATED ORAL at 06:44

## 2023-05-19 RX ADMIN — AMOXICILLIN AND CLAVULANATE POTASSIUM 1 TABLET: 875; 125 TABLET, FILM COATED ORAL at 09:39

## 2023-05-19 RX ADMIN — LIDOCAINE HYDROCHLORIDE 1 APPLICATION: 40 SOLUTION TOPICAL at 10:48

## 2023-05-19 RX ADMIN — DOXYCYCLINE 100 MG: 100 TABLET, FILM COATED ORAL at 06:44

## 2023-05-19 RX ADMIN — DIVALPROEX SODIUM 500 MG: 500 TABLET, EXTENDED RELEASE ORAL at 06:00

## 2023-05-19 RX ADMIN — POTASSIUM CHLORIDE 40 MEQ: 1500 TABLET, EXTENDED RELEASE ORAL at 06:44

## 2023-05-19 RX ADMIN — METOPROLOL TARTRATE 25 MG: 25 TABLET, FILM COATED ORAL at 17:49

## 2023-05-19 RX ADMIN — APIXABAN 5 MG: 5 TABLET, FILM COATED ORAL at 06:44

## 2023-05-19 RX ADMIN — OXYCODONE 5 MG: 5 TABLET ORAL at 09:58

## 2023-05-19 RX ADMIN — DIVALPROEX SODIUM 500 MG: 500 TABLET, EXTENDED RELEASE ORAL at 17:49

## 2023-05-19 ASSESSMENT — PAIN DESCRIPTION - PAIN TYPE: TYPE: ACUTE PAIN

## 2023-05-19 NOTE — PROGRESS NOTES
[facility] MEDICINE PROGRESS NOTE     Attending:   Dr. 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  Admitted 5/4/2023 for bilateral stump infections s/p bilateral I&D on 5/10. Transitioned from IV Vancomycin and unasyn to Augmentin and doxycyline 5/12.     SUBJECTIVE:   No acute events overnight. Patient is in good spirits today. She is going to have a dressing change this AM. No acute concerns or complaints.     OBJECTIVE:  Vitals:    05/18/23 0745 05/18/23 1500 05/18/23 1918 05/19/23 0416   BP: 107/67 101/64 101/55 113/73   Pulse: 79 (!) 57 85 85   Resp: 18 17 16 16   Temp: 36.9 °C (98.4 °F) 37.7 °C (99.9 °F) 36.4 °C (97.6 °F) 37.1 °C (98.7 °F)   TempSrc: Temporal Temporal Temporal Temporal   SpO2: 96% 97% 93% 96%   Weight:       Height:           Intake/Output Summary (Last 24 hours) at 5/15/2023 0550  Last data filed at 5/14/2023 0700  Gross per 24 hour   Intake 240 ml   Output --   Net 240 ml       PHYSICAL EXAM:   General: No acute distress, afebrile, resting comfortably, sleepy   HEENT: NC/AT. EOMI.   Cardiovascular: RRR without murmurs, rubs, heaves. Normal capillary refill   Respiratory: CTAB, no tachypnea or retractions   Abdomen: normal bowel sounds, soft, nontender, nondistended, no masses, no organomegaly   EXT:  Bilateral BKA with dressing in place. Clean, dry and intact. Wound vac with serosanguineous fluid present  Skin: No erythema/lesions   Neuro: Non-focal, alert and orientated       LABS:  No results for input(s): WBC, RBC, HEMOGLOBIN, HEMATOCRIT, MCV, MCH, RDW, PLATELETCT, MPV, NEUTSPOLYS, LYMPHOCYTES, MONOCYTES, EOSINOPHILS, BASOPHILS, RBCMORPHOLO in the last 72 hours.    No results for input(s): SODIUM, POTASSIUM, CHLORIDE, CO2, BUN, CREATININE, CALCIUM, MAGNESIUM, PHOSPHORUS, ALBUMIN in the last 72  hours.    Estimated GFR/CRCL = Estimated Creatinine Clearance: 116.5 mL/min (by C-G formula based on SCr of 0.59 mg/dL).  No results for input(s): GLUCOSE, POCGLUCOSE in the last 72 hours.                No results for input(s): INR, APTT, FIBRINOGEN in the last 72 hours.    Invalid input(s): DIMER    MICROBIOLOGY:   Blood Culture Hold   Date Value Ref Range Status   04/08/2023 Collected  Final          IMAGING:   [id number] & W/O LEFT   Final Result      1.  Early osteomyelitis of the distal aspect of the left tibial stump      2.  Negative for abscess      3.  Left knee joint effusion      [id number] & W/O RIGHT   Final Result      1.  No roc osteomyelitis      2.  Edema within the distal tibial stump evident on T2-weighted imaging and possibly minimal change on T1-weighted imaging. This could indicate very early osteomyelitis although at this point is not specific.      3.  Negative for abscess      DX-CHEST-LIMITED (1 VIEW)   Final Result         No acute cardiac or pulmonary abnormality is identified.          CULTURES:   Results       ** No results found for the last 168 hours. **            MEDS:  Current Facility-Administered Medications   Medication Last Admin    apixaban (ELIQUIS) tablet 5 mg 5 mg at 05/19/23 0644    LORazepam (ATIVAN) tablet 2 mg 2 mg at 05/17/23 1524    lidocaine (XYLOCAINE) 2 % injection 20 mL 20 mL at 05/15/23 0934    Or    lidocaine (XYLOCAINE) 4 % topical solution 1 Application at 05/17/23 1514    oxyCODONE immediate-release (ROXICODONE) tablet 5 mg 5 mg at 05/17/23 1417    doxycycline monohydrate (ADOXA) tablet 100 mg 100 mg at 05/19/23 0644    amoxicillin-clavulanate (AUGMENTIN) 875-125 MG per tablet 1 Tablet 1 Tablet at 05/18/23 1957    loperamide (IMODIUM) capsule 2 mg 2 mg at 05/08/23 2323    potassium chloride SA (Kdur) tablet 40 mEq 40 mEq at 05/19/23 0644    bictegravir-emtricitab-TAF (Biktarvy) -25 mg tablet 1 Tablet 1 Tablet at 05/18/23 1957     divalproex ER (DEPAKOTE ER) tablet 500 mg 500 mg at 23 0600    OLANZapine (ZYPREXA) tablet 5 mg 5 mg at 23    Pharmacy Consult Request ...Pain Management Review 1 Each      acetaminophen (TYLENOL) tablet 1,000 mg 1,000 mg at 23    metoprolol tartrate (LOPRESSOR) tablet 25 mg 25 mg at 23 0644       ASSESSMENT/PLAN: Kellie Chatman is a 53-year-old female with a PMHx HIV, schizophrenia, bipolar on Zyprexa and Depakote; 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  Admitted 2023 for bilateral stump infections s/p bilateral I&D on 5/10. Transitioned from IV Vancomycin and unasyn to Augmentin and doxycyline .     Chronic multifocal osteomyelitis of multiple sites (HCC)  Assessment & Plan  MRI showin.  Early osteomyelitis of the distal aspect of the left tibial stump. S/p I&D and  on 5/10 with Dr. Lux  - ID consulting- Dr Manzo   - Discontinue vanco and unasyn (-)  - Continue Doxy 100mg BID and augmentin 875 BID- continue for duration of wound vac placement   - Wound care following   - Ativan 2mg PRN wound vac changes      HIV (human immunodeficiency virus infection) (HCC)  Assessment & Plan  Initially unclear diagnosis. However, confirmatory testing indicates that patient is in fact positive for HIV 1.  Patient continues to have normal CD4 counts (CD4 752 at 3/2023)  - ID following   - Confirmatory testing conclusive for HIV  - Continue Biktarvy daily    History of atrial fibrillation  Assessment & Plan  EKG with A fib;  from [facility] on 2023. Sinus rhythm for the duration of this hospital admission.   - Continue metoprolol 25 mg twice daily  - Discontinue heparin  - Plan to resume eliquis   - Dc telemetry monitoring    History of seizures  Assessment & Plan  Unclear history of seizure activity. Patient states she has never had a seizure before.   - Continue Depakote 500 mg daily for history of seizures  -  Depakote likely for mood stabilization     History of DVT (deep vein thrombosis)  Assessment & Plan  DVT noted on US from [facility] 4/23/2023.  - Discontinue heparin  - Plan to resume eliquis 5/17    Homelessness  Assessment & Plan  Patient was previously staying at [facility] previously.  Recommend social work consultation prior to discharge for safe discharge planning.    Hypokalemia  Assessment & Plan  Resolved.  Continue to monitor.    Schizophrenia (HCC)  Assessment & Plan  - Continue home olanzapine 5 mg twice daily      * Wound infection  Assessment & Plan  - Discontinue vancomycin and Unasyn.(5/4 - 5/21)  - Continue Doxy 100mg BID and augmentin 875 BID- continue for duration of wound vac placement           Core Measures:   Fluids: PO intake   Lines: IVP, wound vac   Abx: Doxy and augmentin   DVT prophylaxis: dc Heparin, resume Eliquis 5/17  Code Status: Full    Disposition: medically cleared for dc, awaiting SNF placement     Dilcia Lovelace MD   PGY-3 Family Medicine Resident   [facility]

## 2023-05-19 NOTE — DISCHARGE PLANNING
Agency/Facility Name: [facility]  Referral sent per Choice form @: 2104  DPA manually sent referral, fax: [phone]

## 2023-05-19 NOTE — THERAPY
Physical Therapy Contact Note    Patient Name: Kellie Chatman  Age:  53 y.o., Sex:  female  Medical Record #: [id number]  Today's Date: 5/19/2023 05/19/23 0838   Interdisciplinary Plan of Care Collaboration   Collaboration Comments PT tx attempted, pt refused despite education.  Will re-attempt as able/appropriate.

## 2023-05-19 NOTE — DISCHARGE PLANNING
"Case Management Discharge Planning    Admission Date: 5/4/2023  GMLOS: 3.5  ALOS: 15    6-Clicks ADL Score: 21  6-Clicks Mobility Score: 9  PT and/or OT Eval ordered: Yes  Post-acute Referrals Ordered: Yes  Post-acute Choice Obtained: Yes  Has referral(s) been sent to post-acute provider:  Yes      Anticipated Discharge Dispo: Discharge Disposition: D/T to home under HHA care in anticipation of covered skilled care (06)    DME Needed: Yes    DME Ordered: No    Action(s) Taken: OTHER    LSW received phone call from [employer]. Spoke to Sue [phone]. Per Sue, she is aware placement for patient has been difficult- for both home health and SNF.     Per Sue, she believes facilities in [place] might be willing to take patient in. Sue provided this patient with a fax number for [facility]- they handle referrals so SNFs in [place]. Fax: [phone]. LSW to speak to patient and explore whether [place] is somewhere she's willing to go.    @1301: Spoke to patient at bedside. Informed her of above conversation with Sue. LSW asked patient if she would be willing to explore facilities in [place]. Patient said \"Mhmm.\" LSW requesting DPA fax referral to [facility].     LSW made call to Sue to inform her of the update. Sue said she would follow up with the [place] referrals on Monday.     Escalations Completed: None    Medically Clear: Yes    Next Steps: LSW to follow up with patient and medical team regarding discharge needs and barriers.     Barriers to Discharge: Pending Placement    "

## 2023-05-19 NOTE — CARE PLAN
The patient is Stable - Low risk of patient condition declining or worsening    Shift Goals  Clinical Goals: skin care, encourage participation in care  Patient Goals: rest  Family Goals: IVETTE    Progress made toward(s) clinical / shift goals:    Problem: Skin Integrity  Goal: Skin integrity is maintained or improved  Outcome: Progressing  Note: No new skin breakdown noted overnight. Wound vac in place.       Problem: Fall Risk  Goal: Patient will remain free from falls  Outcome: Progressing  Note: Patient remained free of falls this shift. Patient was educated to not get out of bed on their own and to always call for help prior to getting up. Bed alarm in place. Fall wrist band present.          Patient is not progressing towards the following goals:      Problem: Knowledge Deficit - Standard  Goal: Patient and family/care givers will demonstrate understanding of plan of care, disease process/condition, diagnostic tests and medications  Outcome: Not Progressing  Note: Patient educated on need for participation in care. Pt still needing encouragement and at times refuses care.         Yes...

## 2023-05-19 NOTE — WOUND TEAM
Assisted Linnette DANIELS (Wound RN), RN with wound care, non-selective debridement performed using wound cleanser/NS and gauze. Please see Linnette DANIELS (Wound RN) wound note for further wound care details.

## 2023-05-19 NOTE — WOUND TEAM
[facility]  Inpatient Services  Wound and Skin Care Evaluation    Admission Date: 5/4/2023     Last order of IP CONSULT TO WOUND CARE was found on 5/5/2023 from Hospital Encounter on 5/4/2023     HPI, PMH, SH: Reviewed    Past Surgical History:   Procedure Laterality Date    IRRIGATION & DEBRIDEMENT GENERAL Bilateral 5/10/2023    Procedure: IRRIGATION AND DEBRIDEMENT, WOUND LEG;  Surgeon: Nas Lux M.D.;  Location: SURGERY [facility];  Service: Orthopedics    APPLICATION OR REPLACEMENT, WOUND VAC Bilateral 5/10/2023    Procedure: APPLICATION OR REPLACEMENT, WOUND VAC;  Surgeon: Nas Lux M.D.;  Location: SURGERY [facility];  Service: Orthopedics    KNEE AMPUTATION BELOW Left 03/22/2023    Procedure: AMPUTATION, BELOW KNEE;  Surgeon: Nas Lux M.D.;  Location: SURGERY [facility];  Service: Orthopedics    AMPUTATION, BELOW THE KNEE Right      Social History     Tobacco Use    Smoking status: Every Day     Packs/day: 0.25     Types: Cigarettes    Smokeless tobacco: Never   Vaping Use    Vaping Use: Never used   Substance Use Topics    Alcohol use: Not Currently     Chief Complaint   Patient presents with    Leg Pain     Bilateral leg pain, bilateral BKA     Diagnosis: Wound infection [T14.8XXA, L08.9]    Unit where seen by Wound Team: [id number]    WOUND CONSULT/FOLLOW UP RELATED TO:  Rashi BKA, L Post thigh, L radha-area     WOUND HISTORY:  Pt is a 54 yo female who presented to the ED for B leg pain.  Pt had B BKA for tissue damage related to frostbite in 3/2023.  Hx of B foot frost bite 11/14/23, schizophrenia, HIV, methamphetamine use, homelessness.  Pt discharged from [facility] 5/3/23 for B BKA pain.  Pt was admitted for r/o osteomyelitis and postoperative infection.  Pt is wheelchair bound    right TMA on 12/12/2022 by Dr. Lux  right BKA on 12/20/2022 by Dr. Lux  left second toe amputation with wound VAC application on 2/3/2023 by Dr. Lux  left TMA on 2/7/2023 by   Maci   left BKA on 3/22/2023 by Dr. Lux    Interval History this admission  5/5/23 LPS consult Yuliet Hwang APRN LPS  5/6/23 ID consult Dr Manzo - continue Vancomycin and Unasyn,   5/10/23 B BKA debridement and wound VAC    WOUND ASSESSMENT/LDA   Negative Pressure Wound Therapy 05/12/23 Bilateral (Active)   NPWT Pump Mode / Pressure Setting Ulta;Intermittent;125 mmHg    Dressing Type Small;Black Foam (Regular);Gray Foam (Cleanse Choice)    Number of Foam Pieces Used 5    Canister Changed No    Output (mL) 400 mL    NEXT Dressing Change/Treatment Date 05/22/23    VAC VeraFlo Irrigant Normal Saline    VAC VeraFlo Soak Time (mins) 5    VAC VeraFlo Instill Volume (ml) 22    VAC VeraFlo - Therapy Time (hrs) 2    VAC VeraFlo Pressure (mm/Hg) Intermittent;125 mmHg      Wound 03/06/23 Full Thickness Wound Leg Right BKA (Active)   Wound Image    05/17/23 1600   Site Assessment Slough;Granulation tissue;Painful    Periwound Assessment Clean;Dry;Intact    Margins Defined edges;Attached edges    Closure Secondary intention    Drainage Amount Scant    Drainage Description Serosanguineous    Treatments Cleansed;Site care;Topical Lidocaine    Wound Cleansing Normal Saline Irrigation    Periwound Protectant Skin Protectant Wipes to Periwound;Paste Ring    Dressing Cleansing/Solutions Normal Saline    Dressing Options Wound Vac;Mepilex    Dressing Changed Changed    Dressing Status Clean;Dry;Intact    Dressing Change/Treatment Frequency Monday, Wednesday, Friday, and As Needed    NEXT Dressing Change/Treatment Date 05/22/23    NEXT Weekly Photo (Inpatient Only) 05/24/23    Non-staged Wound Description Full thickness    Wound Length (cm) 12.5 cm 05/17/23 1600   Wound Width (cm) 17 cm 05/17/23 1600   Wound Surface Area (cm^2) 212.5 cm^2 05/17/23 1600   Wound Bed Granulation (%) 40 % 05/12/23 1200   Wound Bed Slough (%) 60 % 05/12/23 1200   Wound Bed Eschar (%) 100 % 05/05/23 1100   Shape Irregular    Wound Odor None     Exposed Structures IVETTE    WOUND NURSE ONLY - Time Spent with Patient (mins) 90        Wound 05/05/23 Incision Knee Left BKA (Active)   Wound Image   05/17/23 1600   Site Assessment Slough;Granulation tissue;Painful    Periwound Assessment Scar tissue    Margins Defined edges;Attached edges    Closure Secondary intention    Drainage Amount Scant    Drainage Description Serosanguineous    Treatments Cleansed;Site care;Topical Lidocaine    Wound Cleansing Approved Wound Cleanser    Periwound Protectant Skin Protectant Wipes to Periwound;Drape    Dressing Cleansing/Solutions Not Applicable    Dressing Options Wound Vac;Mepilex    Dressing Changed Changed    Dressing Status Clean;Dry;Intact    Dressing Change/Treatment Frequency Monday, Wednesday, Friday, and As Needed    NEXT Dressing Change/Treatment Date 05/22/23    NEXT Weekly Photo (Inpatient Only) 05/24/23    Non-staged Wound Description Full thickness    Wound Length (cm) 3.6 cm 05/17/23 1600   Wound Width (cm) 6 cm 05/17/23 1600   Wound Depth (cm) 0 cm 05/17/23 1600   Wound Surface Area (cm^2) 21.6 cm^2 05/17/23 1600   Wound Volume (cm^3) 0 cm^3    Wound Bed Granulation (%) 50 %    Wound Bed Slough (%) 50 %    Wound Bed Eschar (%) 100 %    Shape Oval    Wound Odor None    Pulses N/A    Exposed Structures Adipose    WOUND NURSE ONLY - Time Spent with Patient (mins) 60           MRI 5/7/23  MRI right tib-fib:  1.  No roc osteomyelitis     2.  Edema within the distal tibial stump evident on T2-weighted imaging and possibly minimal change on T1-weighted imaging. This could indicate very early osteomyelitis although at this point is not specific.     3.  Negative for abscess     MRI left tib-fib  1.  Early osteomyelitis of the distal aspect of the left tibial stump     2.  Negative for abscess     3.  Left knee joint effusion    HIV assay 5/7/23 positive    GISELLA:   5/5/23 B popiteal palpable    Lab Values:    Lab Results   Component Value Date/Time    WBC 4.6 (L)  05/19/2023 09:45 AM    RBC 4.02 (L) 05/19/2023 09:45 AM    HEMOGLOBIN 10.2 (L) 05/19/2023 09:45 AM    HEMATOCRIT 33.1 (L) 05/19/2023 09:45 AM    CREACTPROT 2.40 (H) 05/04/2023 04:10 PM    SEDRATEWES 75 (H) 05/04/2023 04:10 PM    HBA1C 5.1 02/24/2023 05:54 AM        Culture Results show:  No results found for this or any previous visit (from the past 720 hour(s)).    Pain Level/Medicated:  Premedicated with PO oxy and PO ativan 45mins prior, 4% topical lidocaine allowed to soak for 45min    INTERVENTIONS BY WOUND TEAM:  . Performed standard wound care which includes appropriate positioning, dressing removal and non-selective debridement. Pictures and measurements obtained weekly if/when required.    Bilateral BKA:   Preparation for Dressing removal: Dressing removed utilizing adhesive remover spray and wound cleanser  Non-selectively Debrided with:  Wound cleanser   Sharp debridement: NA   Radha wound: Cleansed with Wound cleanser, both radha-wounds prepped with no sting skin protectant  Primary Dressing:   RIGHT: Two paste rings used to prep periwound. 2 pieces of cleanse choice waffle applied to wound bed followed by 2 pieces of thin grey foam directly on top. All foam sealed with drape. Hole was cut and tracpad applied.  VF NPWT resumed.  LEFT: Hydrocolloid to satellite lesions, periwound prepped with drape. 1 piece of half thickness black regular foam cut to fit wound bed. Secured with drape. Tracpad applied. Y connected with R BKA wound vac.   Secondary (Outer) Dressing: Offloading adhesive foams applied to offload tubing.    Advanced Wound Care Discharge Planning  Number of Clinicians necessary to complete wound care: 2  Is patient requiring IV pain medications for dressing changes: no, but tolerates changes better with IV ativan vs PO  Length of time for dressing change 90 min. (This does not include chart review, pre-medication time, set up, clean up or time spent charting.)    Interdisciplinary consultation:  Patient, Bedside RN, Wound RN Linnette    EVALUATION / RATIONALE FOR TREATMENT:  Most Recent Date:  5/19/23: Decreasing amounts of slough on R BKA, continuing current POC with cleanse choice (right) and reg Vac (left). If patient to discharge, will need to change cleanse choice foam to regular vac foam prior to discharging- highly recommend coordination of this effort, wound team does routine vac changes on MWF.    5/17/23: Left BKA wound slightly . Continue regular NPWT. R BKA wound bed still with moderate amount of slough. Unable to perform CSWD due to pain. Cleanse choice waffle foam utilized this change for more aggressive debridement of nonviable tissue. Continue VF NPWT.  5/15/23: both wound beds have adipose tissue that was debrided away as patient was able to tolerate , R bka with more adipose & necrotic tissue than the L. BKA which also has adipose tissue but is much more shallow.  Next dressing change switch to cleanse choice to assist with mechanical debridement of non-viable tissue and promote granular tissue   5/13/23 expect pt to respond well to NPWT, Veraflo will help remove necrotic tissue on the R BKA.  Anticipate increased granular tissue in wound beds and contraction of area.  Pt's pain difficult to control, ativan added today.    5/5/23: Patient well known to wound care team for treatment of frostbite to bilateral lower legs and feet which over time has resulted in bilateral BKAs. Patient admitted for cellulitis to bilateral BKAs. Intact brown eschar to both BKAs, no drainage at this time. Bilateral BKA tissue is red, warm, and edematous, diagnosed cellulitis by MD. Applied betadine to keep eschar dry and for antimicrobial component. Abrasion to Left posterior leg cleansed and covered with silicone dressing to pad and protect area. LPS consulted and will follow at this time. Wound care signing off.      Goals: Steady decrease in wound area and depth weekly.    WOUND TEAM PLAN OF CARE ([X] for  frequency of wound follow up,):   Nursing to follow dressing orders written for wound care. Contact wound team if area fails to progress, deteriorates or with any questions/concerns if something comes up before next scheduled follow up (See below as to whether wound is following and frequency of wound follow up)  Dressing changes by wound team:                   Follow up 3 times weekly:                NPWT change 3 times weekly: X   MWF  Follow up 1-2 times weekly:      Follow up Bi-Monthly:           Follow up Monthly (High Risk):                        Follow up as needed:    Other (explain):     NURSING PLAN OF CARE ORDERS (X):  Dressing changes: See Dressing Care orders: X  Skin care: See Skin Care orders: X  RN Prevention Protocol:   Rectal tube care: See Rectal Tube Care orders:   Other orders:    RSKIN:   CURRENTLY IN PLACE (X), APPLIED THIS VISIT (A), ORDERED (O):   Q shift Lonnie:  X  Q shift pressure point assessments:  X    Surface/Positioning X  Standard Mattress/Trauma Bed     x   Low Airloss          ICU Low Airloss   Bariatric SHASHA     Waffle cushion        Waffle Overlay      x  Reposition q 2 hours      TAPs Turning system     Z James Pillow     Offloading/Redistribution n/a incontinent  Sacral Offloading Dressing (Silicone dressing)    Heel Offloading Dressing (Silicone dressing)         Heel float boots (Prevalon boot)             Float Heels off Bed with Pillows           Respiratory -- room air            Containment/Moisture Prevention   x  Rectal tube or BMS    Purwick/Condom Cath      x  Leggett Catheter    Barrier wipes   x        Barrier paste     x  Antifungal tx      Interdry   X     Mobilization Chair/bed bound      Up to chair        Ambulate      PT/OT  - NWB B BKA    Nutrition         Anticipated discharge plans:LTACH:        SNF/Rehab:                  Home Health Care:           Outpatient Wound Center:   X pt will require OP wound care for 3x/week VAC dressing changes  Self/Family  Care:        Other:                  Vac Discharge Needs: --> please discuss with LPS  Vac Discharge plan is purely a recommendation from wound team and not a requirement for discharge unless otherwise stated by physician.

## 2023-05-20 PROCEDURE — 99231 SBSQ HOSP IP/OBS SF/LOW 25: CPT | Mod: GC | Performed by: STUDENT IN AN ORGANIZED HEALTH CARE EDUCATION/TRAINING PROGRAM

## 2023-05-20 PROCEDURE — 700102 HCHG RX REV CODE 250 W/ 637 OVERRIDE(OP)

## 2023-05-20 PROCEDURE — A9270 NON-COVERED ITEM OR SERVICE: HCPCS

## 2023-05-20 PROCEDURE — 700102 HCHG RX REV CODE 250 W/ 637 OVERRIDE(OP): Performed by: STUDENT IN AN ORGANIZED HEALTH CARE EDUCATION/TRAINING PROGRAM

## 2023-05-20 PROCEDURE — A9270 NON-COVERED ITEM OR SERVICE: HCPCS | Performed by: BEHAVIOR ANALYST

## 2023-05-20 PROCEDURE — A9270 NON-COVERED ITEM OR SERVICE: HCPCS | Performed by: STUDENT IN AN ORGANIZED HEALTH CARE EDUCATION/TRAINING PROGRAM

## 2023-05-20 PROCEDURE — 770001 HCHG ROOM/CARE - MED/SURG/GYN PRIV*

## 2023-05-20 PROCEDURE — 700102 HCHG RX REV CODE 250 W/ 637 OVERRIDE(OP): Performed by: BEHAVIOR ANALYST

## 2023-05-20 RX ADMIN — OLANZAPINE 5 MG: 5 TABLET, FILM COATED ORAL at 17:42

## 2023-05-20 RX ADMIN — BICTEGRAVIR SODIUM, EMTRICITABINE, AND TENOFOVIR ALAFENAMIDE FUMARATE 1 TABLET: 50; 200; 25 TABLET ORAL at 17:50

## 2023-05-20 RX ADMIN — DOXYCYCLINE 100 MG: 100 TABLET, FILM COATED ORAL at 17:43

## 2023-05-20 RX ADMIN — AMOXICILLIN AND CLAVULANATE POTASSIUM 1 TABLET: 875; 125 TABLET, FILM COATED ORAL at 09:08

## 2023-05-20 RX ADMIN — METOPROLOL TARTRATE 25 MG: 25 TABLET, FILM COATED ORAL at 17:42

## 2023-05-20 RX ADMIN — ACETAMINOPHEN 1000 MG: 500 TABLET, FILM COATED ORAL at 21:15

## 2023-05-20 RX ADMIN — DIVALPROEX SODIUM 500 MG: 500 TABLET, EXTENDED RELEASE ORAL at 09:07

## 2023-05-20 RX ADMIN — AMOXICILLIN AND CLAVULANATE POTASSIUM 1 TABLET: 875; 125 TABLET, FILM COATED ORAL at 21:16

## 2023-05-20 RX ADMIN — POTASSIUM CHLORIDE 40 MEQ: 1500 TABLET, EXTENDED RELEASE ORAL at 09:09

## 2023-05-20 RX ADMIN — DIVALPROEX SODIUM 500 MG: 500 TABLET, EXTENDED RELEASE ORAL at 17:44

## 2023-05-20 RX ADMIN — METOPROLOL TARTRATE 25 MG: 25 TABLET, FILM COATED ORAL at 09:09

## 2023-05-20 RX ADMIN — OXYCODONE 5 MG: 5 TABLET ORAL at 09:06

## 2023-05-20 RX ADMIN — OXYCODONE 5 MG: 5 TABLET ORAL at 17:46

## 2023-05-20 RX ADMIN — APIXABAN 5 MG: 5 TABLET, FILM COATED ORAL at 17:43

## 2023-05-20 RX ADMIN — APIXABAN 5 MG: 5 TABLET, FILM COATED ORAL at 09:09

## 2023-05-20 RX ADMIN — DOXYCYCLINE 100 MG: 100 TABLET, FILM COATED ORAL at 09:08

## 2023-05-20 ASSESSMENT — PAIN DESCRIPTION - PAIN TYPE
TYPE: ACUTE PAIN
TYPE: ACUTE PAIN;CHRONIC PAIN
TYPE: ACUTE PAIN

## 2023-05-20 ASSESSMENT — FIBROSIS 4 INDEX: FIB4 SCORE: 0.47

## 2023-05-20 ASSESSMENT — PATIENT HEALTH QUESTIONNAIRE - PHQ9
SUM OF ALL RESPONSES TO PHQ9 QUESTIONS 1 AND 2: 0
1. LITTLE INTEREST OR PLEASURE IN DOING THINGS: NOT AT ALL
2. FEELING DOWN, DEPRESSED, IRRITABLE, OR HOPELESS: NOT AT ALL

## 2023-05-20 NOTE — CARE PLAN
Problem: Knowledge Deficit - Standard  Goal: Patient and family/care givers will demonstrate understanding of plan of care, disease process/condition, diagnostic tests and medications  Description: Target End Date:  1-3 days or as soon as patient condition allows    Document in Patient Education    1.  Patient and family/caregiver oriented to unit, equipment, visitation policy and means for communicating concern  2.  Complete/review Learning Assessment  3.  Assess knowledge level of disease process/condition, treatment plan, diagnostic tests and medications  4.  Explain disease process/condition, treatment plan, diagnostic tests and medications  Outcome: Progressing     Problem: Skin Integrity  Goal: Skin integrity is maintained or improved  Description: Target End Date:  Prior to discharge or change in level of care    Document interventions on Skin Risk/Lonnie flowsheet groups and corresponding LDA    1.  Assess and monitor skin integrity, appearance and/or temperature  2.  Assess risk factors for impaired skin integrity and/or pressures ulcers  3.  Implement precautions to protect skin integrity in collaboration with interdisciplinary team  4.  Implement pressure ulcer prevention protocol if at risk for skin breakdown  5.  Confirm wound care consult if at risk for skin breakdown  6.  Ensure patient use of pressure relieving devices  (Low air loss bed, waffle overlay, heel protectors, ROHO cushion, etc)  Outcome: Progressing     Problem: Fall Risk  Goal: Patient will remain free from falls  Description: Target End Date:  Prior to discharge or change in level of care    Document interventions on the Yuly Constantino Fall Risk Assessment    1.  Assess for fall risk factors  2.  Implement fall precautions  Outcome: Progressing   The patient is Watcher - Medium risk of patient condition declining or worsening    Shift Goals  Clinical Goals: skin care, pt  Patient Goals: rest  Family Goals: IVETTE    Progress made toward(s)  clinical / shift goals:  patient completed dressing change today    Patient is not progressing towards the following goals:

## 2023-05-20 NOTE — CARE PLAN
The patient is Stable - Low risk of patient condition declining or worsening    Shift Goals  Clinical Goals: skin care, rest  Patient Goals: rest  Family Goals: IVETTE    Progress made toward(s) clinical / shift goals:    Problem: Skin Integrity  Goal: Skin integrity is maintained or improved  Outcome: Progressing  Note: No new skin breakdown noted overnight. Wound vac in place       Problem: Fall Risk  Goal: Patient will remain free from falls  Outcome: Progressing  Note: Patient remained free of falls this shift. Patient was educated to not get out of bed on their own and to always call for help prior to getting up. Bed alarm in place. Fall wrist band present.

## 2023-05-20 NOTE — PROGRESS NOTES
[facility] MEDICINE PROGRESS NOTE     Attending:   Dr. 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  Admitted 5/4/2023 for bilateral stump infections s/p bilateral I&D on 5/10. Transitioned from IV Vancomycin and unasyn to Augmentin and doxycyline 5/12.     SUBJECTIVE:   No acute events overnight. Patient states she would like to sleep during exam today.     OBJECTIVE:  Vitals:    05/19/23 0724 05/19/23 1610 05/19/23 2019 05/20/23 0425   BP: 100/49 113/60 93/51 90/50   Pulse: 71 89 77 76   Resp: 16 16 16 18   Temp: 36.4 °C (97.6 °F) 36.1 °C (96.9 °F) 36.8 °C (98.2 °F) 36.6 °C (97.9 °F)   TempSrc: Temporal Temporal Temporal Temporal   SpO2: 97% 96% 96% 95%   Weight:       Height:           Intake/Output Summary (Last 24 hours) at 5/15/2023 0550  Last data filed at 5/14/2023 0700  Gross per 24 hour   Intake 240 ml   Output --   Net 240 ml       PHYSICAL EXAM:   General: No acute distress, afebrile, resting comfortably, sleepy   HEENT: NC/AT. EOMI.   Cardiovascular: RRR without murmurs, rubs, heaves. Normal capillary refill   Respiratory: CTAB, no tachypnea or retractions   Abdomen: normal bowel sounds, soft, nontender, nondistended, no masses, no organomegaly   EXT:  Bilateral BKA with dressing in place. Clean, dry and intact. Wound vac with serosanguineous fluid present  Skin: No erythema/lesions   Neuro: Non-focal, alert and orientated       LABS:  Recent Labs     05/19/23  0945   WBC 4.6*   RBC 4.02*   HEMOGLOBIN 10.2*   HEMATOCRIT 33.1*   MCV 82.3   MCH 25.4*   RDW 49.9   PLATELETCT 478*   MPV 9.8       Recent Labs     05/19/23  0945   SODIUM 139   POTASSIUM 4.2   CHLORIDE 102   CO2 26   BUN 15   CREATININE 0.78   CALCIUM 9.4       Estimated GFR/CRCL = Estimated Creatinine Clearance: 88.1 mL/min (by C-G formula based on  SCr of 0.78 mg/dL).  Recent Labs     05/19/23  0945   GLUCOSE 90                   No results for input(s): INR, APTT, FIBRINOGEN in the last 72 hours.    Invalid input(s): DIMER    MICROBIOLOGY:   Blood Culture Hold   Date Value Ref Range Status   04/08/2023 Collected  Final          IMAGING:   [id number]-WITH & W/O LEFT   Final Result      1.  Early osteomyelitis of the distal aspect of the left tibial stump      2.  Negative for abscess      3.  Left knee joint effusion      [id number]-WITH & W/O RIGHT   Final Result      1.  No roc osteomyelitis      2.  Edema within the distal tibial stump evident on T2-weighted imaging and possibly minimal change on T1-weighted imaging. This could indicate very early osteomyelitis although at this point is not specific.      3.  Negative for abscess      DX-CHEST-LIMITED (1 VIEW)   Final Result         No acute cardiac or pulmonary abnormality is identified.          CULTURES:   Results       ** No results found for the last 168 hours. **            MEDS:  Current Facility-Administered Medications   Medication Last Admin    apixaban (ELIQUIS) tablet 5 mg 5 mg at 05/19/23 1749    LORazepam (ATIVAN) tablet 2 mg 2 mg at 05/19/23 0958    lidocaine (XYLOCAINE) 2 % injection 20 mL 20 mL at 05/15/23 0934    Or    lidocaine (XYLOCAINE) 4 % topical solution 1 Application at 05/19/23 1048    oxyCODONE immediate-release (ROXICODONE) tablet 5 mg 5 mg at 05/19/23 0958    doxycycline monohydrate (ADOXA) tablet 100 mg 100 mg at 05/19/23 1749    amoxicillin-clavulanate (AUGMENTIN) 875-125 MG per tablet 1 Tablet 1 Tablet at 05/19/23 2013    loperamide (IMODIUM) capsule 2 mg 2 mg at 05/08/23 2323    potassium chloride SA (Kdur) tablet 40 mEq 40 mEq at 05/19/23 0644    bictegravir-emtricitab-TAF (Biktarvy) -25 mg tablet 1 Tablet 1 Tablet at 05/19/23 1749    divalproex ER (DEPAKOTE ER) tablet 500 mg 500 mg at 05/19/23 1749    OLANZapine (ZYPREXA) tablet 5 mg 5 mg at 05/19/23  9985    Pharmacy Consult Request ...Pain Management Review 1 Each      acetaminophen (TYLENOL) tablet 1,000 mg 1,000 mg at 23    metoprolol tartrate (LOPRESSOR) tablet 25 mg 25 mg at 23 [id number]       ASSESSMENT/PLAN: Kellie Chatman is a 53-year-old female with a PMHx HIV, schizophrenia, bipolar on Zyprexa and Depakote; 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  Admitted 2023 for bilateral stump infections s/p bilateral I&D on 5/10. Transitioned from IV Vancomycin and unasyn to Augmentin and doxycyline .     Chronic multifocal osteomyelitis of multiple sites (HCC)  Assessment & Plan  MRI showin.  Early osteomyelitis of the distal aspect of the left tibial stump. S/p I&D and  on 5/10 with Dr. Lux  - ID consulting- Dr Manzo   - Discontinue vanco and unasyn (-)  - Continue Doxy 100mg BID and augmentin 875 BID- continue for duration of wound vac placement   - Wound care following   - Ativan 2mg PRN wound vac changes      HIV (human immunodeficiency virus infection) (HCC)  Assessment & Plan  Initially unclear diagnosis. However, confirmatory testing indicates that patient is in fact positive for HIV 1.  Patient continues to have normal CD4 counts (CD4 752 at 3/2023)  - ID following   - Confirmatory testing conclusive for HIV  - Continue Biktarvy daily    History of atrial fibrillation  Assessment & Plan  EKG with A fib;  from [facility] on 2023. Sinus rhythm for the duration of this hospital admission.   - Continue metoprolol 25 mg twice daily  - Discontinue heparin  - Plan to resume eliquis   - Dc telemetry monitoring    History of seizures  Assessment & Plan  Unclear history of seizure activity. Patient states she has never had a seizure before.   - Continue Depakote 500 mg daily for history of seizures  - Depakote likely for mood stabilization     History of DVT (deep vein thrombosis)  Assessment & Plan  DVT noted on US from  [facility] 4/23/2023.  - Discontinue heparin  - Plan to resume eliquis 5/17    Homelessness  Assessment & Plan  Patient was previously staying at [facility] previously.  Recommend social work consultation prior to discharge for safe discharge planning.    Hypokalemia  Assessment & Plan  Resolved.  Continue to monitor.    Schizophrenia (HCC)  Assessment & Plan  - Continue home olanzapine 5 mg twice daily      * Wound infection  Assessment & Plan  - Discontinue vancomycin and Unasyn.(5/4 - 5/21)  - Continue Doxy 100mg BID and augmentin 875 BID- continue for duration of wound vac placement           Core Measures:   Fluids: PO intake   Lines: IVP, wound vac   Abx: Doxy and augmentin   DVT prophylaxis: dc Heparin, resume Eliquis 5/17  Code Status: Full    Disposition: medically cleared for dc, awaiting SNF placement     Dilcia Lovelace MD   PGY-3 Family Medicine Resident   [facility]

## 2023-05-21 PROCEDURE — A9270 NON-COVERED ITEM OR SERVICE: HCPCS | Performed by: STUDENT IN AN ORGANIZED HEALTH CARE EDUCATION/TRAINING PROGRAM

## 2023-05-21 PROCEDURE — 700102 HCHG RX REV CODE 250 W/ 637 OVERRIDE(OP)

## 2023-05-21 PROCEDURE — A9270 NON-COVERED ITEM OR SERVICE: HCPCS | Performed by: BEHAVIOR ANALYST

## 2023-05-21 PROCEDURE — A9270 NON-COVERED ITEM OR SERVICE: HCPCS

## 2023-05-21 PROCEDURE — 770001 HCHG ROOM/CARE - MED/SURG/GYN PRIV*

## 2023-05-21 PROCEDURE — 99231 SBSQ HOSP IP/OBS SF/LOW 25: CPT | Mod: GC | Performed by: STUDENT IN AN ORGANIZED HEALTH CARE EDUCATION/TRAINING PROGRAM

## 2023-05-21 PROCEDURE — 700102 HCHG RX REV CODE 250 W/ 637 OVERRIDE(OP): Performed by: STUDENT IN AN ORGANIZED HEALTH CARE EDUCATION/TRAINING PROGRAM

## 2023-05-21 PROCEDURE — 700102 HCHG RX REV CODE 250 W/ 637 OVERRIDE(OP): Performed by: BEHAVIOR ANALYST

## 2023-05-21 RX ADMIN — BICTEGRAVIR SODIUM, EMTRICITABINE, AND TENOFOVIR ALAFENAMIDE FUMARATE 1 TABLET: 50; 200; 25 TABLET ORAL at 16:15

## 2023-05-21 RX ADMIN — METOPROLOL TARTRATE 25 MG: 25 TABLET, FILM COATED ORAL at 05:03

## 2023-05-21 RX ADMIN — APIXABAN 5 MG: 5 TABLET, FILM COATED ORAL at 05:03

## 2023-05-21 RX ADMIN — POTASSIUM CHLORIDE 40 MEQ: 1500 TABLET, EXTENDED RELEASE ORAL at 05:03

## 2023-05-21 RX ADMIN — APIXABAN 5 MG: 5 TABLET, FILM COATED ORAL at 16:15

## 2023-05-21 RX ADMIN — AMOXICILLIN AND CLAVULANATE POTASSIUM 1 TABLET: 875; 125 TABLET, FILM COATED ORAL at 16:15

## 2023-05-21 RX ADMIN — DOXYCYCLINE 100 MG: 100 TABLET, FILM COATED ORAL at 16:15

## 2023-05-21 RX ADMIN — OLANZAPINE 5 MG: 5 TABLET, FILM COATED ORAL at 16:15

## 2023-05-21 RX ADMIN — AMOXICILLIN AND CLAVULANATE POTASSIUM 1 TABLET: 875; 125 TABLET, FILM COATED ORAL at 08:22

## 2023-05-21 RX ADMIN — DIVALPROEX SODIUM 500 MG: 500 TABLET, EXTENDED RELEASE ORAL at 16:15

## 2023-05-21 RX ADMIN — DIVALPROEX SODIUM 500 MG: 500 TABLET, EXTENDED RELEASE ORAL at 05:03

## 2023-05-21 RX ADMIN — DOXYCYCLINE 100 MG: 100 TABLET, FILM COATED ORAL at 05:03

## 2023-05-21 ASSESSMENT — PAIN DESCRIPTION - PAIN TYPE: TYPE: ACUTE PAIN

## 2023-05-21 NOTE — CARE PLAN
The patient is Stable - Low risk of patient condition declining or worsening    Shift Goals  Clinical Goals: wound care, stable VSS  Patient Goals: none  Family Goals: IVETTE    Progress made toward(s) clinical / shift goals:    Problem: Knowledge Deficit - Standard  Goal: Patient and family/care givers will demonstrate understanding of plan of care, disease process/condition, diagnostic tests and medications  Outcome: Progressing  Note: Pt updated on POC. Pending placement. Wound vac in place. Dressing to be changed by wound care team.      Problem: Fall Risk  Goal: Patient will remain free from falls  5/21/2023 1429 by Angelica Phelan R.N.  Outcome: Progressing  Note: Pt free from falls this shift. Bed alarm in place.   5/21/2023 1429 by Angelica Phelan R.N.  Note: Pt free from falls this shift. Bed alarm in place.        Patient is not progressing towards the following goals:

## 2023-05-21 NOTE — CARE PLAN
The patient is Stable - Low risk of patient condition declining or worsening    Shift Goals  Clinical Goals: reposition, skin care, POC  Patient Goals: Rest  Family Goals: Not present, IVETTE    Progress made toward(s) clinical / shift goals:  met      Problem: Knowledge Deficit - Standard  Goal: Patient and family/care givers will demonstrate understanding of plan of care, disease process/condition, diagnostic tests and medications  Outcome: Progressing   Note: patient involved in POC, white board updated, was educated to voice any questions or concerns, verbalized understanding, uses call light aproprietly   Problem: Fall Risk  Goal: Patient will remain free from falls  Outcome: Progressing       Note: Fall risk prevention protocols in place, patient remain free from falls, uses call light appropriately.

## 2023-05-21 NOTE — PROGRESS NOTES
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  Admitted 5/4/2023 for bilateral stump infections s/p bilateral I&D on 5/10. Transitioned from IV Vancomycin and unasyn to Augmentin and doxycyline 5/12.     SUBJECTIVE:   No acute events overnight. Vitally stable and afebrile overnight. No new complaints this morning.    OBJECTIVE:  Vitals:    05/20/23 0706 05/20/23 1533 05/20/23 1926 05/21/23 0433   BP:  101/62 93/55 96/55   Pulse:  78 88 79   Resp:  16 16 18   Temp:  36.4 °C (97.6 °F) 36.2 °C (97.1 °F) 36.2 °C (97.2 °F)   TempSrc:  Temporal Temporal Temporal   SpO2:  98% 94% 99%   Weight: 61.9 kg (136 lb 7.4 oz)      Height:           Intake/Output Summary (Last 24 hours) at 5/15/2023 0550  Last data filed at 5/14/2023 0700  Gross per 24 hour   Intake 240 ml   Output --   Net 240 ml       PHYSICAL EXAM:   General: No acute distress, afebrile, resting comfortably, sleepy   HEENT: NC/AT. EOMI.   Cardiovascular: RRR without murmurs, rubs, heaves. Normal capillary refill   Respiratory: CTAB, no tachypnea or retractions   Abdomen: normal bowel sounds, soft, nontender, nondistended, no masses, no organomegaly   EXT:  Bilateral BKA with dressing in place. Clean, dry and intact. Wound vac with serosanguineous fluid present  Skin: No erythema/lesions   Neuro: Non-focal, alert and orientated       LABS:  Recent Labs     05/19/23  0945   WBC 4.6*   RBC 4.02*   HEMOGLOBIN 10.2*   HEMATOCRIT 33.1*   MCV 82.3   MCH 25.4*   RDW 49.9   PLATELETCT 478*   MPV 9.8         Recent Labs     05/19/23  0945   SODIUM 139   POTASSIUM 4.2   CHLORIDE 102   CO2 26   BUN 15   CREATININE 0.78   CALCIUM 9.4         Estimated GFR/CRCL = Estimated Creatinine Clearance: 79.6 mL/min (by C-G formula based  on SCr of 0.78 mg/dL).  Recent Labs     05/19/23  0945   GLUCOSE 90                     No results for input(s): INR, APTT, FIBRINOGEN in the last 72 hours.    Invalid input(s): DIMER    MICROBIOLOGY:   Blood Culture Hold   Date Value Ref Range Status   04/08/2023 Collected  Final          IMAGING:   [id number] & W/O LEFT   Final Result      1.  Early osteomyelitis of the distal aspect of the left tibial stump      2.  Negative for abscess      3.  Left knee joint effusion      [id number] & W/O RIGHT   Final Result      1.  No roc osteomyelitis      2.  Edema within the distal tibial stump evident on T2-weighted imaging and possibly minimal change on T1-weighted imaging. This could indicate very early osteomyelitis although at this point is not specific.      3.  Negative for abscess      DX-CHEST-LIMITED (1 VIEW)   Final Result         No acute cardiac or pulmonary abnormality is identified.          CULTURES:   Results       ** No results found for the last 168 hours. **            MEDS:  Current Facility-Administered Medications   Medication Last Admin    apixaban (ELIQUIS) tablet 5 mg 5 mg at 05/21/23 0503    LORazepam (ATIVAN) tablet 2 mg 2 mg at 05/19/23 0958    lidocaine (XYLOCAINE) 2 % injection 20 mL 20 mL at 05/15/23 0934    Or    lidocaine (XYLOCAINE) 4 % topical solution 1 Application at 05/19/23 1048    oxyCODONE immediate-release (ROXICODONE) tablet 5 mg 5 mg at 05/20/23 1746    doxycycline monohydrate (ADOXA) tablet 100 mg 100 mg at 05/21/23 0503    amoxicillin-clavulanate (AUGMENTIN) 875-125 MG per tablet 1 Tablet 1 Tablet at 05/20/23 2116    loperamide (IMODIUM) capsule 2 mg 2 mg at 05/08/23 2323    potassium chloride SA (Kdur) tablet 40 mEq 40 mEq at 05/21/23 0503    bictegravir-emtricitab-TAF (Biktarvy) -25 mg tablet 1 Tablet 1 Tablet at 05/20/23 1750    divalproex ER (DEPAKOTE ER) tablet 500 mg 500 mg at 05/21/23 0503    OLANZapine (ZYPREXA) tablet 5 mg 5 mg at  23 1742    Pharmacy Consult Request ...Pain Management Review 1 Each      acetaminophen (TYLENOL) tablet 1,000 mg 1,000 mg at 23 2115    metoprolol tartrate (LOPRESSOR) tablet 25 mg 25 mg at 23 0503       ASSESSMENT/PLAN: Kellie Chatman is a 53-year-old female with a PMHx HIV, schizophrenia, bipolar on Zyprexa and Depakote; 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  Admitted 2023 for bilateral stump infections s/p bilateral I&D on 5/10. Transitioned from IV Vancomycin and unasyn to Augmentin and doxycyline .     History of atrial fibrillation  Assessment & Plan  EKG with A fib;  from [facility] on 2023. Sinus rhythm for the duration of this hospital admission.   - Continue metoprolol 25 mg twice daily  - Discontinue heparin  - Plan to resume eliquis   - Dc telemetry monitoring    History of seizures  Assessment & Plan  Unclear history of seizure activity. Patient states she has never had a seizure before.   - Continue Depakote 500 mg daily for history of seizures  - Depakote likely for mood stabilization     Chronic multifocal osteomyelitis of multiple sites (HCC)  Assessment & Plan  MRI showin.  Early osteomyelitis of the distal aspect of the left tibial stump. S/p I&D and  on 5/10 with Dr. Lux  - ID consulting- Dr Manzo   - Discontinue vanco and unasyn (-)  - Continue Doxy 100mg BID and augmentin 875 BID- continue for duration of wound vac placement   - Wound care following   - Ativan 2mg PRN wound vac changes      History of DVT (deep vein thrombosis)  Assessment & Plan  DVT noted on US from [facility] 2023.  - Discontinue heparin  - Plan to resume eliquis     Homelessness  Assessment & Plan  Patient was previously staying at [facility] previously.  Recommend social work consultation prior to discharge for safe discharge planning.    HIV (human immunodeficiency virus infection) (HCC)  Assessment &  Plan  Initially unclear diagnosis. However, confirmatory testing indicates that patient is in fact positive for HIV 1.  Patient continues to have normal CD4 counts (CD4 752 at 3/2023)  - ID following   - Confirmatory testing conclusive for HIV  - Continue Biktarvy daily    Hypokalemia  Assessment & Plan  Resolved.  Continue to monitor.    Schizophrenia (HCC)  Assessment & Plan  - Continue home olanzapine 5 mg twice daily      * Wound infection  Assessment & Plan  - Discontinue vancomycin and Unasyn.(5/4 - 5/21)  - Continue Doxy 100mg BID and augmentin 875 BID- continue for duration of wound vac placement        Core Measures:   Fluids: PO intake   Lines: IVP, wound vac   Abx: Doxy and augmentin   DVT prophylaxis: dc Heparin, resume Eliquis 5/17  Code Status: Full    Dispo: Medically cleared for dc, awaiting SNF placement.     Lili Augustin MD  Resident Intern  [employer], Family Medicine

## 2023-05-21 NOTE — CARE PLAN
Problem: Knowledge Deficit - Standard  Goal: Patient and family/care givers will demonstrate understanding of plan of care, disease process/condition, diagnostic tests and medications  Description: Target End Date:  1-3 days or as soon as patient condition allows    Document in Patient Education    1.  Patient and family/caregiver oriented to unit, equipment, visitation policy and means for communicating concern  2.  Complete/review Learning Assessment  3.  Assess knowledge level of disease process/condition, treatment plan, diagnostic tests and medications  4.  Explain disease process/condition, treatment plan, diagnostic tests and medications  Outcome: Not Met     Problem: Skin Integrity  Goal: Skin integrity is maintained or improved  Description: Target End Date:  Prior to discharge or change in level of care    Document interventions on Skin Risk/Lonnie flowsheet groups and corresponding LDA    1.  Assess and monitor skin integrity, appearance and/or temperature  2.  Assess risk factors for impaired skin integrity and/or pressures ulcers  3.  Implement precautions to protect skin integrity in collaboration with interdisciplinary team  4.  Implement pressure ulcer prevention protocol if at risk for skin breakdown  5.  Confirm wound care consult if at risk for skin breakdown  6.  Ensure patient use of pressure relieving devices  (Low air loss bed, waffle overlay, heel protectors, ROHO cushion, etc)  Outcome: Not Met   The patient is Stable - Low risk of patient condition declining or worsening    Shift Goals  Clinical Goals: stable v/s, wound care  Patient Goals: sleep  Family Goals: Not present, IVETTE    Progress made toward(s) clinical / shift goals:  yes    Patient is not progressing towards the following goals:      Problem: Knowledge Deficit - Standard  Goal: Patient and family/care givers will demonstrate understanding of plan of care, disease process/condition, diagnostic tests and  medications  Description: Target End Date:  1-3 days or as soon as patient condition allows    Document in Patient Education    1.  Patient and family/caregiver oriented to unit, equipment, visitation policy and means for communicating concern  2.  Complete/review Learning Assessment  3.  Assess knowledge level of disease process/condition, treatment plan, diagnostic tests and medications  4.  Explain disease process/condition, treatment plan, diagnostic tests and medications  Outcome: Not Met     Problem: Skin Integrity  Goal: Skin integrity is maintained or improved  Description: Target End Date:  Prior to discharge or change in level of care    Document interventions on Skin Risk/Lonnie flowsheet groups and corresponding LDA    1.  Assess and monitor skin integrity, appearance and/or temperature  2.  Assess risk factors for impaired skin integrity and/or pressures ulcers  3.  Implement precautions to protect skin integrity in collaboration with interdisciplinary team  4.  Implement pressure ulcer prevention protocol if at risk for skin breakdown  5.  Confirm wound care consult if at risk for skin breakdown  6.  Ensure patient use of pressure relieving devices  (Low air loss bed, waffle overlay, heel protectors, ROHO cushion, etc)  Outcome: Not Met

## 2023-05-22 PROCEDURE — 97530 THERAPEUTIC ACTIVITIES: CPT | Mod: CQ

## 2023-05-22 PROCEDURE — 11042 DBRDMT SUBQ TIS 1ST 20SQCM/<: CPT | Performed by: NURSE PRACTITIONER

## 2023-05-22 PROCEDURE — 700102 HCHG RX REV CODE 250 W/ 637 OVERRIDE(OP): Performed by: STUDENT IN AN ORGANIZED HEALTH CARE EDUCATION/TRAINING PROGRAM

## 2023-05-22 PROCEDURE — 99231 SBSQ HOSP IP/OBS SF/LOW 25: CPT | Mod: GC | Performed by: FAMILY MEDICINE

## 2023-05-22 PROCEDURE — A9270 NON-COVERED ITEM OR SERVICE: HCPCS | Performed by: STUDENT IN AN ORGANIZED HEALTH CARE EDUCATION/TRAINING PROGRAM

## 2023-05-22 PROCEDURE — 700102 HCHG RX REV CODE 250 W/ 637 OVERRIDE(OP)

## 2023-05-22 PROCEDURE — 700101 HCHG RX REV CODE 250: Performed by: FAMILY MEDICINE

## 2023-05-22 PROCEDURE — 770001 HCHG ROOM/CARE - MED/SURG/GYN PRIV*

## 2023-05-22 PROCEDURE — A9270 NON-COVERED ITEM OR SERVICE: HCPCS | Performed by: BEHAVIOR ANALYST

## 2023-05-22 PROCEDURE — 97606 NEG PRS WND THER DME>50 SQCM: CPT

## 2023-05-22 PROCEDURE — 99232 SBSQ HOSP IP/OBS MODERATE 35: CPT | Mod: 25 | Performed by: NURSE PRACTITIONER

## 2023-05-22 PROCEDURE — 97602 WOUND(S) CARE NON-SELECTIVE: CPT

## 2023-05-22 PROCEDURE — A9270 NON-COVERED ITEM OR SERVICE: HCPCS

## 2023-05-22 PROCEDURE — 700102 HCHG RX REV CODE 250 W/ 637 OVERRIDE(OP): Performed by: BEHAVIOR ANALYST

## 2023-05-22 RX ORDER — LORAZEPAM 2 MG/1
2 TABLET ORAL
Status: DISCONTINUED | OUTPATIENT
Start: 2023-05-22 | End: 2023-05-25 | Stop reason: HOSPADM

## 2023-05-22 RX ADMIN — METOPROLOL TARTRATE 25 MG: 25 TABLET, FILM COATED ORAL at 04:46

## 2023-05-22 RX ADMIN — AMOXICILLIN AND CLAVULANATE POTASSIUM 1 TABLET: 875; 125 TABLET, FILM COATED ORAL at 04:46

## 2023-05-22 RX ADMIN — APIXABAN 5 MG: 5 TABLET, FILM COATED ORAL at 04:46

## 2023-05-22 RX ADMIN — OXYCODONE 5 MG: 5 TABLET ORAL at 01:31

## 2023-05-22 RX ADMIN — APIXABAN 5 MG: 5 TABLET, FILM COATED ORAL at 16:37

## 2023-05-22 RX ADMIN — LIDOCAINE HYDROCHLORIDE 20 ML: 20 INJECTION, SOLUTION INFILTRATION; PERINEURAL at 09:15

## 2023-05-22 RX ADMIN — LORAZEPAM 2 MG: 2 TABLET ORAL at 08:18

## 2023-05-22 RX ADMIN — OXYCODONE 5 MG: 5 TABLET ORAL at 08:18

## 2023-05-22 RX ADMIN — DOXYCYCLINE 100 MG: 100 TABLET, FILM COATED ORAL at 04:54

## 2023-05-22 RX ADMIN — DIVALPROEX SODIUM 500 MG: 500 TABLET, EXTENDED RELEASE ORAL at 04:46

## 2023-05-22 RX ADMIN — BICTEGRAVIR SODIUM, EMTRICITABINE, AND TENOFOVIR ALAFENAMIDE FUMARATE 1 TABLET: 50; 200; 25 TABLET ORAL at 16:37

## 2023-05-22 RX ADMIN — DOXYCYCLINE 100 MG: 100 TABLET, FILM COATED ORAL at 16:37

## 2023-05-22 RX ADMIN — DIVALPROEX SODIUM 500 MG: 500 TABLET, EXTENDED RELEASE ORAL at 16:37

## 2023-05-22 RX ADMIN — POTASSIUM CHLORIDE 40 MEQ: 1500 TABLET, EXTENDED RELEASE ORAL at 04:46

## 2023-05-22 RX ADMIN — OLANZAPINE 5 MG: 5 TABLET, FILM COATED ORAL at 16:37

## 2023-05-22 RX ADMIN — AMOXICILLIN AND CLAVULANATE POTASSIUM 1 TABLET: 875; 125 TABLET, FILM COATED ORAL at 16:37

## 2023-05-22 ASSESSMENT — PAIN DESCRIPTION - PAIN TYPE
TYPE: ACUTE PAIN

## 2023-05-22 ASSESSMENT — PAIN SCALES - PAIN ASSESSMENT IN ADVANCED DEMENTIA (PAINAD)
CONSOLABILITY: NO NEED TO CONSOLE
TOTALSCORE: 0
BODYLANGUAGE: RELAXED
BREATHING: NORMAL
FACIALEXPRESSION: SMILING OR INEXPRESSIVE
BODYLANGUAGE: RELAXED
BREATHING: NORMAL
CONSOLABILITY: NO NEED TO CONSOLE
FACIALEXPRESSION: SMILING OR INEXPRESSIVE
TOTALSCORE: 0

## 2023-05-22 ASSESSMENT — COGNITIVE AND FUNCTIONAL STATUS - GENERAL
MOVING TO AND FROM BED TO CHAIR: A LITTLE
SUGGESTED CMS G CODE MODIFIER MOBILITY: CL
STANDING UP FROM CHAIR USING ARMS: TOTAL
MOVING FROM LYING ON BACK TO SITTING ON SIDE OF FLAT BED: UNABLE
MOBILITY SCORE: 11
WALKING IN HOSPITAL ROOM: TOTAL
CLIMB 3 TO 5 STEPS WITH RAILING: TOTAL

## 2023-05-22 ASSESSMENT — GAIT ASSESSMENTS: GAIT LEVEL OF ASSIST: UNABLE TO PARTICIPATE

## 2023-05-22 ASSESSMENT — PAIN SCALES - WONG BAKER
WONGBAKER_NUMERICALRESPONSE: DOESN'T HURT AT ALL
WONGBAKER_NUMERICALRESPONSE: DOESN'T HURT AT ALL

## 2023-05-22 NOTE — DISCHARGE PLANNING
Agency/Facility Name: [facility]  Outcome: DPA left v-mail regarding a status on referral.    @[phone]  Agency/Facility Name: [facility]  Spoke To: Chantel  Outcome: DPA received phone call, per Chantel in order for them to review referral they will need PT/OT notes, lab notes, and a d/c summary. DPA will fax notes over.    @[phone]  DPA manually sent PT/OT notes to [facility]. Did not send d/c summary as there is not one yet.  Fax: [phone] @[phone]  Agency/Facility Name: [facility]  Outcome: DPA left v-mail regarding a status on referral.

## 2023-05-22 NOTE — WOUND TEAM
[facility]  Inpatient Services  Wound and Skin Care Evaluation    Admission Date: 5/4/2023     Last order of IP CONSULT TO WOUND CARE was found on 5/5/2023 from Hospital Encounter on 5/4/2023     HPI, PMH, SH: Reviewed    Past Surgical History:   Procedure Laterality Date    IRRIGATION & DEBRIDEMENT GENERAL Bilateral 5/10/2023    Procedure: IRRIGATION AND DEBRIDEMENT, WOUND LEG;  Surgeon: Nas Lux M.D.;  Location: SURGERY [facility];  Service: Orthopedics    APPLICATION OR REPLACEMENT, WOUND VAC Bilateral 5/10/2023    Procedure: APPLICATION OR REPLACEMENT, WOUND VAC;  Surgeon: Nas Lux M.D.;  Location: SURGERY [facility];  Service: Orthopedics    KNEE AMPUTATION BELOW Left 03/22/2023    Procedure: AMPUTATION, BELOW KNEE;  Surgeon: Nas Lux M.D.;  Location: SURGERY [facility];  Service: Orthopedics    AMPUTATION, BELOW THE KNEE Right      Social History     Tobacco Use    Smoking status: Every Day     Packs/day: 0.25     Types: Cigarettes    Smokeless tobacco: Never   Vaping Use    Vaping Use: Never used   Substance Use Topics    Alcohol use: Not Currently     Chief Complaint   Patient presents with    Leg Pain     Bilateral leg pain, bilateral BKA     Diagnosis: Wound infection [T14.8XXA, L08.9]    Unit where seen by Wound Team: [id number]    WOUND CONSULT/FOLLOW UP RELATED TO:  Rashi BKA, L Post thigh, L radha-area   5/22 VAC change Bilat BKA    WOUND HISTORY:  Pt is a 52 yo female who presented to the ED for B leg pain.  Pt had B BKA for tissue damage related to frostbite in 3/2023.  Hx of B foot frost bite 11/14/23, schizophrenia, HIV, methamphetamine use, homelessness.  Pt discharged from [facility] 5/3/23 for B BKA pain.  Pt was admitted for r/o osteomyelitis and postoperative infection.  Pt is wheelchair bound    right TMA on 12/12/2022 by Dr. Lux  right BKA on 12/20/2022 by Dr. Lux  left second toe amputation with wound VAC application on 2/3/2023 by   Maci  left TMA on 2/7/2023 by Dr. Lux   left BKA on 3/22/2023 by Dr. Lux    Interval History this admission  5/5/23 LPS consult Yuilet Hwang APRN LPS  5/6/23 ID consult Dr Manzo - continue Vancomycin and Unasyn,   5/10/23 B BKA debridement and wound VAC    WOUND ASSESSMENT/LDA           Negative Pressure Wound Therapy 05/12/23 Bilateral (Active)   NPWT Pump Mode / Pressure Setting Intermittent;125 mmHg    Dressing Type Medium;Gray Foam (Cleanse Choice)    Number of Foam Pieces Used 5    Canister Changed No    Output (mL) 0 mL    NEXT Dressing Change/Treatment Date 05/24/23    VAC VeraFlo Irrigant Normal Saline    VAC VeraFlo Soak Time (mins) 5    VAC VeraFlo Instill Volume (ml) 22    VAC VeraFlo - Therapy Time (hrs) 2    VAC VeraFlo Pressure (mm/Hg) 125 mmHg;Intermittent            Wound 03/06/23 Full Thickness Wound Leg Right BKA (Active)       05/22/23 0900   Site Assessment Yellow;Red    Periwound Assessment Pink    Margins Defined edges;Unattached edges    Closure Secondary intention    Drainage Amount Scant    Drainage Description Serosanguineous    Treatments Cleansed;Site care    Wound Cleansing Approved Wound Cleanser    Periwound Protectant Skin Protectant Wipes to Periwound;Paste Ring;Drape    Dressing Cleansing/Solutions Normal Saline    Dressing Options Wound Vac    Dressing Changed Changed    Dressing Status Intact    Dressing Change/Treatment Frequency Monday, Wednesday, Friday, and As Needed    NEXT Dressing Change/Treatment Date 05/24/23    NEXT Weekly Photo (Inpatient Only) 05/24/23    Non-staged Wound Description Full thickness 05/22/23 0900   Wound Length (cm) 12.5 cm 05/17/23 1600   Wound Width (cm) 17 cm 05/17/23 1600   Wound Surface Area (cm^2) 212.5 cm^2 05/17/23 1600   Wound Bed Granulation (%) 40 % 05/12/23 1200   Wound Bed Slough (%) 60 % 05/12/23 1200   Wound Bed Eschar (%) 100 % 05/05/23 1100   Shape irregular    Wound Odor None    Exposed Structures Adipose         Wound 05/05/23 Incision Knee Left BKA (Active)      05/22/23 0900   Site Assessment Red;Yellow    Periwound Assessment Pink    Margins Defined edges;Attached edges    Closure Adhesive bandage    Drainage Amount Scant    Drainage Description Serosanguineous    Treatments Cleansed;Site care;Offloading    Wound Cleansing Approved Wound Cleanser    Periwound Protectant Skin Protectant Wipes to Periwound    Dressing Cleansing/Solutions Not Applicable    Dressing Options Collagen Dressing;Hydrofera Blue Ready;Mepilex Heel    Dressing Changed New    Dressing Status Intact    Dressing Change/Treatment Frequency Every 72 hrs, and As Needed    NEXT Dressing Change/Treatment Date 05/25/23    NEXT Weekly Photo (Inpatient Only) 05/25/23    Non-staged Wound Description Full thickness 05/22/23 0900   Wound Length (cm) 3.6 cm 05/17/23 1600   Wound Width (cm) 6 cm 05/17/23 1600   Wound Depth (cm) 0 cm 05/17/23 1600   Wound Surface Area (cm^2) 21.6 cm^2 05/17/23 1600   Wound Volume (cm^3) 0 cm^3 05/17/23 1600   Wound Bed Granulation (%) 50 % 05/12/23 1200   Wound Bed Slough (%) 50 % 05/12/23 1200   Wound Bed Eschar (%) 100 % 05/05/23 1100   Shape oval    Wound Odor None    Pulses N/A    Exposed Structures None        MRI 5/7/23  MRI right tib-fib:  1.  No roc osteomyelitis     2.  Edema within the distal tibial stump evident on T2-weighted imaging and possibly minimal change on T1-weighted imaging. This could indicate very early osteomyelitis although at this point is not specific.     3.  Negative for abscess     MRI left tib-fib  1.  Early osteomyelitis of the distal aspect of the left tibial stump     2.  Negative for abscess     3.  Left knee joint effusion    HIV assay 5/7/23 positive    GISELLA:   5/5/23 B popiteal palpable    Lab Values:    Lab Results   Component Value Date/Time    WBC 4.6 (L) 05/19/2023 09:45 AM    RBC 4.02 (L) 05/19/2023 09:45 AM    HEMOGLOBIN 10.2 (L) 05/19/2023 09:45 AM    HEMATOCRIT 33.1 (L) 05/19/2023  09:45 AM    CREACTPROT 2.40 (H) 05/04/2023 04:10 PM    SEDRATEWES 75 (H) 05/04/2023 04:10 PM    HBA1C 5.1 02/24/2023 05:54 AM        Culture Results show:  No results found for this or any previous visit (from the past 720 hour(s)).    Pain Level/Medicated:  Premedicated with PO oxy and PO ativan 45mins prior, 4% topical lidocaine allowed to soak for 45min    INTERVENTIONS BY WOUND TEAM:  . Performed standard wound care which includes appropriate positioning, dressing removal and non-selective debridement. Pictures and measurements obtained weekly if/when required.    Bilateral BKA:   Preparation for Dressing removal: Dressing removed utilizing adhesive remover wipes and wound cleanser  Non-selectively Debrided with:  Wound cleanser and gauze  Sharp debridement: NA performed by LPS APRN  Radha wound: Cleansed with Wound cleanser, both radha-wounds prepped with no sting skin protectant  Primary Dressing:   RIGHT: Two paste rings used to prep periwound. 2 pieces of cleanse choice waffle applied to wound bed followed by 2 pieces of thin grey foam directly on top. All foam sealed with drape. Hole was cut and TRAC pad applied.  VF NPWT resumed.  LEFT: Hydrocolloid to satellite lesions, periwound prepped with No Sting. 1 piece of Christa to wound bed then moistened with NS. Covered with Hydrofera Blue. Secured with hypafix tape.   Secondary (Outer) Dressing: Offloading adhesive foams applied to offload tubing on RLE.    Advanced Wound Care Discharge Planning  Number of Clinicians necessary to complete wound care: 2  Is patient requiring IV pain medications for dressing changes: no, but tolerates changes better with IV ativan vs PO  Length of time for dressing change 90 min. (This does not include chart review, pre-medication time, set up, clean up or time spent charting.)    Interdisciplinary consultation: Patient, Bedside RN, Wound RN Theresa    EVALUATION / RATIONALE FOR TREATMENT:  Most Recent Date:  5/22: Pt's L BKA wound  at skin level. DC'd VAC from this wound and covered with Christa and Hydrofera blue. Christa a collagen drsg maintains a physiologically moist microenvironment at the wound surface. This environment is conducive to granulation tissue formation, epithelization and optimal wound healing. It has ionically bound silver for antimicrobial. Hydrofera Blue applied for the hydrophilic polyurethane foam which contains ethylene oxide used as a bactericidal, fungicidal, and sporicidal disinfectant. Hydrofera Blue also aids in maintaining a moist wound environment. The absorption properties of this dressing are important in collecting exudates and bacteria from the injured area. These harmful fluid secretions bind to the dressing removing it from the wound without the foam sticking to the wound causing more harm.   R BKA wound  with use of cleanse choice waffle foam. SHELLEY Hodgson was able to debride non-tissue from wound bed in some areas before VAC replaced. Continuing Cleanse foam and Veraflow NPWT.     5/19/23: Decreasing amounts of slough on R BKA, continuing current POC with cleanse choice (right) and reg Vac (left). If patient to discharge, will need to change cleanse choice foam to regular vac foam prior to discharging- highly recommend coordination of this effort, wound team does routine vac changes on MWF.    5/17/23: Left BKA wound slightly . Continue regular NPWT. R BKA wound bed still with moderate amount of slough. Unable to perform CSWD due to pain. Cleanse choice waffle foam utilized this change for more aggressive debridement of nonviable tissue. Continue VF NPWT.  5/15/23: both wound beds have adipose tissue that was debrided away as patient was able to tolerate , R bka with more adipose & necrotic tissue than the L. BKA which also has adipose tissue but is much more shallow.  Next dressing change switch to cleanse choice to assist with mechanical debridement of non-viable tissue and promote granular  tissue   5/13/23 expect pt to respond well to NPWT, Veraflo will help remove necrotic tissue on the R BKA.  Anticipate increased granular tissue in wound beds and contraction of area.  Pt's pain difficult to control, ativan added today.    5/5/23: Patient well known to wound care team for treatment of frostbite to bilateral lower legs and feet which over time has resulted in bilateral BKAs. Patient admitted for cellulitis to bilateral BKAs. Intact brown eschar to both BKAs, no drainage at this time. Bilateral BKA tissue is red, warm, and edematous, diagnosed cellulitis by MD. Applied betadine to keep eschar dry and for antimicrobial component. Abrasion to Left posterior leg cleansed and covered with silicone dressing to pad and protect area. LPS consulted and will follow at this time. Wound care signing off.      Goals: Steady decrease in wound area and depth weekly.    WOUND TEAM PLAN OF CARE ([X] for frequency of wound follow up,):   Nursing to follow dressing orders written for wound care. Contact wound team if area fails to progress, deteriorates or with any questions/concerns if something comes up before next scheduled follow up (See below as to whether wound is following and frequency of wound follow up)  Dressing changes by wound team:                   Follow up 3 times weekly:                NPWT change 3 times weekly: X   MWF  Follow up 1-2 times weekly:      Follow up Bi-Monthly:           Follow up Monthly (High Risk):                        Follow up as needed:    Other (explain):     NURSING PLAN OF CARE ORDERS (X):  Dressing changes: See Dressing Care orders: X  Skin care: See Skin Care orders: X  RN Prevention Protocol:   Rectal tube care: See Rectal Tube Care orders:   Other orders:    RSKIN:   CURRENTLY IN PLACE (X), APPLIED THIS VISIT (A), ORDERED (O):   Q shift Lonnie:  X  Q shift pressure point assessments:  X    Surface/Positioning X  Standard Mattress/Trauma Bed     x   Low Airloss           ICU Low Airloss   Bariatric SHASHA     Waffle cushion        Waffle Overlay      x  Reposition q 2 hours      TAPs Turning system     Z James Pillow     Offloading/Redistribution n/a incontinent  Sacral Offloading Dressing (Silicone dressing)    Heel Offloading Dressing (Silicone dressing)         Heel float boots (Prevalon boot)             Float Heels off Bed with Pillows           Respiratory -- room air            Containment/Moisture Prevention   x  Rectal tube or BMS    Purwick/Condom Cath      x  Leggett Catheter    Barrier wipes   x        Barrier paste     x  Antifungal tx      Interdry   X     Mobilization Chair/bed bound      Up to chair        Ambulate      PT/OT  - NWB B BKA     Nutrition         Anticipated discharge plans:  LTACH:        SNF/Rehab:   x               Home Health Care:           Outpatient Wound Center:     Self/Family Care:        Other:                  Vac Discharge Needs: --> Please discuss with LPS --- If pt to DC to [facility] she will need to be placed on wet to dry prior to DC r/t Hospital machine doesn't leave the facility.   Vac Discharge plan is purely a recommendation from wound team and not a requirement for discharge unless otherwise stated by physician.

## 2023-05-22 NOTE — THERAPY
"Physical Therapy   Daily Treatment     Patient Name: Kellie Chatman  Age:  53 y.o., Sex:  female  Medical Record #: [id number]  Today's Date: 5/22/2023     Precautions  Precautions: (P) Non Weight Bearing Right Lower Extremity;Non Weight Bearing Left Lower Extremity  Comments: (P) caity Rodriguez w/wound VAC    Assessment    The pt was agreeable to getting OOB. No assist needed to get seated EOB and CGA for sit scoot transfer from the bed->w/c. The pt does cue for proper w/c set up ie: locking brakes.   The pt will need a 18\" w/c w/removable arm/leg rests.   PT will cont to follow.     Plan    Treatment Plan Status: (P) Continue Current Treatment Plan  Type of Treatment: Bed Mobility, Therapeutic Activities, Neuro Re-Education / Balance  Treatment Frequency: 3 Times per Week  Treatment Duration: Until Therapy Goals Met    DC Equipment Recommendations: (P) Wheelchair (18' w/c w/removable arm/leg rests)  Discharge Recommendations: (P) Anticipate that the patient will have no further physical therapy needs after discharge from the hospital    Objective       05/22/23 1330   Charge Group   PT Therapeutic Activities (Units) 2   Total Time Spent   PT Therapeutic Activities Time Spent (Mins) 24   Precautions   Precautions Non Weight Bearing Right Lower Extremity;Non Weight Bearing Left Lower Extremity   Comments caity Rodriguez w/wound VAC   Pain 0 - 10 Group   Pain Rating Scale (NPRS) 3   Balance   Sitting Balance (Static) Good   Sitting Balance (Dynamic) Fair +   Weight Shift Sitting Fair   Bed Mobility    Supine to Sit Supervised  (HOB elevated)   Sit to Supine   (pt left seated in w/c)   Scooting Supervised   Rolling Supervised   Gait Analysis   Gait Level Of Assist Unable to Participate   Comments Caity MODI's PTA   Functional Mobility   Sit to Stand Unable to Participate   Bed, Chair, Wheelchair Transfer Contact Guard Assist  (bed->w/c)   Transfer Method   (sit scoot from bed->w/c)   Comments Pt slightly slower w/her transfers, " cont to need w/c managment 2* brakes not fully holding.   How much difficulty does the patient currently have...   Turning over in bed (including adjusting bedclothes, sheets and blankets)? 4   Sitting down on and standing up from a chair with arms (e.g., wheelchair, bedside commode, etc.) 1   Moving from lying on back to sitting on the side of the bed? 3   How much help from another person does the patient currently need...   Moving to and from a bed to a chair (including a wheelchair)? 1   Need to walk in a hospital room? 1   Climbing 3-5 steps with a railing? 1   6 clicks Mobility Score 11   Short Term Goals    Short Term Goal # 1 Pt will perform seated scooting with SPV in 6 visits to progress functional mobility   Goal Outcome # 1 goal not met   Education Group   Role of Physical Therapist Patient Response Patient;Acceptance;Explanation;Action Demonstration   Physical Therapy Treatment Plan   Physical Therapy Treatment Plan Continue Current Treatment Plan   Anticipated Discharge Equipment and Recommendations   DC Equipment Recommendations Wheelchair  (18' w/c w/removable arm/leg rests)   Discharge Recommendations Anticipate that the patient will have no further physical therapy needs after discharge from the hospital   Interdisciplinary Plan of Care Collaboration   IDT Collaboration with  Nursing   Patient Position at End of Therapy Seated;Chair Alarm On;Call Light within Reach;Tray Table within Reach;Phone within Reach   Collaboration Comments Nrsg notified of pts tx efforts   Session Information   Date / Session Number  5/22--4 (1/3, 5/28) PTA/2   Supervising Physical Therapist (PTA Treatments Only)   Supervising Physical Therapist Ngoc Dye

## 2023-05-22 NOTE — PROGRESS NOTES
LIMB PRESERVATION SERVICE POST OP PROGRESS NOTE        HISTORY OF PRESENT ILLNESS: Kellie Chatman is a 53 y.o.  with a past medical history that includes frostbite, HIV, EtOH abuse, methamphetamine abuse, schizophrenia and bipolar disorder.  Admitted 5/4/2023 for Wound infection [T14.8XXA, L08.9].     [facility] has been consulted for bilateral BKA's with eschar.    Patient known to LPS.  Seen on admit of 11/14/2022 with bilateral frostbite injuries to toes and feet.  Unfortunately due to progression of ischemic damage patient has undergone multiple surgeries in an attempt at limb salvage.  Patient had right TMA on 12/12/2022 by Dr. Lux  Patient had right BKA on 12/20/2022 by Dr. Lux  Patient had left second toe amputation with wound VAC application on 2/3/2023 by Dr. Lux  Patient had left TMA on 2/7/2023 by Dr. Lux  Patient had left BKA on 3/22/2023 by Dr. Lux    Patient is homeless and has a history of mental illness.  Unfortunately since patient's original frostbite injury in November 2022 patient has had repeated frostbite injuries in between attempts at limb salvage.  Patient has had difficulty with wound care in between surgeries.  Patient is wheelchair-bound.  Patient had increasing bilateral BKA pain with increasing edema and patient reported fever and chills causing her to present to the ED.  Patient denies fevers, chills, nausea, vomiting at this time.     Antibiotics were started on this admission.  Infectious diseases has not been consulted.  Xray not completed.  Bilateral MRI ordered.  Ortho and Vascular surgery not involved yet.       SURGERY DATE: 5/10/2023 by Dr. Lux  PROCEDURE: Bilateral BKA I&D with VAC placement        INTERVAL HISTORY:   5/7/23: MRI completed to B tib fib. Updated Dr. Lux  5/9/23: Patient resting in bed.  Complains of pain to right leg worse than left leg.  5/15/2023: POD #5.  Seen with wound team for VAC change to bilateral BKA.  SNF referrals  5/22/2023: POD  "#12.  Seen with wound team for VAC change.  Patient requesting cereal with milk.  SNF referrals to [facility] in place.        RESULTS:           No results for input(s): SODIUM, POTASSIUM, CHLORIDE, CO2, GLUCOSE, BUN, CPKTOTAL in the last 72 hours.          Imaging:  [id number] & W/O LEFT   Final Result      1.  Early osteomyelitis of the distal aspect of the left tibial stump      2.  Negative for abscess      3.  Left knee joint effusion      [id number] & W/O RIGHT   Final Result      1.  No roc osteomyelitis      2.  Edema within the distal tibial stump evident on T2-weighted imaging and possibly minimal change on T1-weighted imaging. This could indicate very early osteomyelitis although at this point is not specific.      3.  Negative for abscess      DX-CHEST-LIMITED (1 VIEW)   Final Result         No acute cardiac or pulmonary abnormality is identified.            Arterial studies: None    A1c:  Lab Results   Component Value Date/Time    HBA1C 5.1 02/24/2023 05:54 AM            Microbiology:  Results       ** No results found for the last 168 hours. **             PHYSICAL EXAMINATION:     VITAL SIGNS: /54   Pulse 78   Temp 36.7 °C (98 °F) (Temporal)   Resp 17   Ht 1.778 m (5' 10\")   Wt 61.9 kg (136 lb 7.4 oz)   SpO2 96%   BMI 19.58 kg/m²       General Appearance:  Well developed, well nourished, in no acute distress  Calm, cooperative, inquisitive about wound care/dressing changes    Lower Extremity Assessment:    Edema:     Edema decreased to right leg    Structural /mechanical changes:  Bilateral BKA    Pulses:  Bilateral popliteal nonpalpable,  warm to touch bilaterally.  With Doppler brisk tones noted to bilateral popliteal.      Wound Assessment:    Wound(s)   location: Right BKA  Full thickness, no palpable bone  Wound characteristics: increased tissue domes from waffle foam.  Desiccated adipose and slough has decreased, ~30% but remains in between the tissue domes. red " granular tissue ~70% erythema: None  Edema: Decreased  Moderate  Drainage: Tissue moist  Odor: None  Measures: 12.5 x 17 x 0.2cm                  PROCEDURE: Right BKA, premedicated with topical viscous lidocaine for 10 minutes  -Curette, scissors, forceps used to debride wound bed.  Excisional debridement was performed to remove devitalized tissue until healthy, bleeding tissue was visualized.   <20cm2 debrided.  Tissue debrided into the subcutaneous layer.    -Bleeding controlled with manual pressure.    -Wound care completed by wound RN, refer to flowsheet  -Patient tolerated the procedure well, with minimal pain or discomfort.     Postdebridement measurement  12.5 x 17 x 0.4 cm  Photo not saved by RN                Wound(s)   location: Left BKA  Full thickness, no palpable bone  Wound characteristics: Approximately 10% slough/desiccated adipose, 90% granular tissue.  Wound is at surface with skin edges  Erythema: None  Edema: Minimal,   Drainage: Tissue moist  Odor: None  Measures: 3.6 x 6 x 0.1 cm                  ASSESSMENT AND PLAN:   53 y.o. admitted for Wound infection [T14.8XXA, L08.9]. Presents with bilateral BKA eschar    Right BKA: Wound has decreased in size slightly.  Adipose/slough remain in between healing tissue domes.  Overall there is increased red granular tissue. Excisional debridement performed today.  Medically necessary to promote wound healing.  Continue cleanse choice VF VAC.  Anticipate switching to VF VAC in 1 week.  Discussed with wound team.    Left BKA:   Wound has increased in size slightly however depth has decreased and is now at skin level.  Increased granular tissue noted.  VAC discontinued and transition to Christa, Hydrofera Blue dressing.          Wound care:   Right BKA: Cleanse choice VF VAC.  To be changed by wound team 3 times per week.  See above.  Left BKA: Christa, Hydrofera Blue, Hypafix tape.  Change every 48 hours      Vascular status:   Bilateral popliteal nonpalpable.   With Doppler brisk tones noted bilaterally    Surgery:   No plans for further surgery      Antibiotics:   -ID Dr. Manzo involved  On Augmentin and doxycycline until wound vacs are removed    Weight Bearing Status:   -Right BKA: Non Weight bearing  -Left BKA: Non Weight bearing        PT/OT:   Involved                Discharge Plan:  Recommend SNF with VF VAC capability  -TBD, patient currently homeless  [facility] ref[facility] in [facility] to [facility]    D/W: pt, RN, Teagan RN wound team    Please note that this dictation was created using voice recognition software. I have  worked with technical experts from [employer]  [facility] to optimize the interface.  I have made every reasonable attempt to correct obvious errors, but there may be errors of grammar and possibly content that I did not discover before finalizing the note.    Please contact [facility] through [employer].

## 2023-05-22 NOTE — PROGRESS NOTES
[facility] MEDICINE PROGRESS NOTE     Attending:   Nas Hurtado MD.    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  Admitted 5/4/2023 for bilateral stump infections s/p bilateral I&D on 5/10. Transitioned from IV Vancomycin and unasyn to Augmentin and doxycyline 5/12.     SUBJECTIVE:   No acute events overnight. Vitally stable and afebrile overnight. No new complaints this morning.     OBJECTIVE:  Vitals:    05/21/23 1612 05/21/23 2038 05/22/23 0131 05/22/23 0327   BP: 93/63 90/59  104/63   Pulse: 67 75  64   Resp: 16 16 18 18   Temp: 37.3 °C (99.2 °F) 36.9 °C (98.4 °F)  36.5 °C (97.7 °F)   TempSrc: Temporal Temporal  Temporal   SpO2: 96% 98%  97%   Weight:       Height:           Intake/Output Summary (Last 24 hours) at 5/15/2023 0550  Last data filed at 5/14/2023 0700  Gross per 24 hour   Intake 240 ml   Output --   Net 240 ml       PHYSICAL EXAM:   General: No acute distress, afebrile, resting comfortably, sleepy   HEENT: NC/AT. EOMI.   Cardiovascular: RRR without murmurs, rubs, heaves. Normal capillary refill   Respiratory: CTAB, no tachypnea or retractions   Abdomen: normal bowel sounds, soft, nontender, nondistended, no masses, no organomegaly   EXT:  Bilateral BKA with dressing in place. Clean, dry and intact. Wound vac with serosanguineous fluid present  Skin: No erythema/lesions   Neuro: Non-focal, alert and orientated       LABS:  Recent Labs     05/19/23  0945   WBC 4.6*   RBC 4.02*   HEMOGLOBIN 10.2*   HEMATOCRIT 33.1*   MCV 82.3   MCH 25.4*   RDW 49.9   PLATELETCT 478*   MPV 9.8         Recent Labs     05/19/23  0945   SODIUM 139   POTASSIUM 4.2   CHLORIDE 102   CO2 26   BUN 15   CREATININE 0.78   CALCIUM 9.4         Estimated GFR/CRCL = Estimated Creatinine Clearance: 79.6 mL/min (by C-G formula based on SCr of 0.78  mg/dL).  Recent Labs     05/19/23  0945   GLUCOSE 90                     No results for input(s): INR, APTT, FIBRINOGEN in the last 72 hours.    Invalid input(s): DIMER    MICROBIOLOGY:   Blood Culture Hold   Date Value Ref Range Status   04/08/2023 Collected  Final          IMAGING:   [id number] & W/O LEFT   Final Result      1.  Early osteomyelitis of the distal aspect of the left tibial stump      2.  Negative for abscess      3.  Left knee joint effusion      [id number] & W/O RIGHT   Final Result      1.  No roc osteomyelitis      2.  Edema within the distal tibial stump evident on T2-weighted imaging and possibly minimal change on T1-weighted imaging. This could indicate very early osteomyelitis although at this point is not specific.      3.  Negative for abscess      DX-CHEST-LIMITED (1 VIEW)   Final Result         No acute cardiac or pulmonary abnormality is identified.          CULTURES:   Results       ** No results found for the last 168 hours. **            MEDS:  Current Facility-Administered Medications   Medication Last Admin    apixaban (ELIQUIS) tablet 5 mg 5 mg at 05/22/23 0446    LORazepam (ATIVAN) tablet 2 mg 2 mg at 05/19/23 0958    lidocaine (XYLOCAINE) 2 % injection 20 mL 20 mL at 05/15/23 0934    Or    lidocaine (XYLOCAINE) 4 % topical solution 1 Application at 05/19/23 1048    oxyCODONE immediate-release (ROXICODONE) tablet 5 mg 5 mg at 05/22/23 0131    doxycycline monohydrate (ADOXA) tablet 100 mg 100 mg at 05/22/23 0454    amoxicillin-clavulanate (AUGMENTIN) 875-125 MG per tablet 1 Tablet 1 Tablet at 05/22/23 0446    loperamide (IMODIUM) capsule 2 mg 2 mg at 05/08/23 2323    potassium chloride SA (Kdur) tablet 40 mEq 40 mEq at 05/22/23 0446    bictegravir-emtricitab-TAF (Biktarvy) -25 mg tablet 1 Tablet 1 Tablet at 05/21/23 1615    divalproex ER (DEPAKOTE ER) tablet 500 mg 500 mg at 05/22/23 0446    OLANZapine (ZYPREXA) tablet 5 mg 5 mg at 05/21/23 1615     Pharmacy Consult Request ...Pain Management Review 1 Each      acetaminophen (TYLENOL) tablet 1,000 mg 1,000 mg at 235    metoprolol tartrate (LOPRESSOR) tablet 25 mg 25 mg at 23 0446       ASSESSMENT/PLAN: Kellie Chatman is a 53-year-old female with a PMHx HIV, schizophrenia, bipolar on Zyprexa and Depakote; 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  Admitted 2023 for bilateral stump infections s/p bilateral I&D on 5/10. Transitioned from IV Vancomycin and unasyn to Augmentin and doxycyline .     History of atrial fibrillation  Assessment & Plan  EKG with A fib;  from [facility] on 2023. Sinus rhythm for the duration of this hospital admission.   - Continue metoprolol 25 mg twice daily  - Discontinue heparin  - Plan to resume eliquis   - Dc telemetry monitoring    History of seizures  Assessment & Plan  Unclear history of seizure activity. Patient states she has never had a seizure before.   - Continue Depakote 500 mg daily for history of seizures  - Depakote likely for mood stabilization     Chronic multifocal osteomyelitis of multiple sites (HCC)  Assessment & Plan  MRI showin.  Early osteomyelitis of the distal aspect of the left tibial stump. S/p I&D and  on 5/10 with Dr. Lux  - ID consulting- Dr Manzo   - Discontinue vanco and unasyn (-)  - Continue Doxy 100mg BID and augmentin 875 BID- continue for duration of wound vac placement   - Wound care following   - Ativan 2mg PRN wound vac changes      History of DVT (deep vein thrombosis)  Assessment & Plan  DVT noted on US from [facility] 2023.  - Discontinue heparin  - Plan to resume eliquis     Homelessness  Assessment & Plan  Patient was previously staying at [facility] previously.  Recommend social work consultation prior to discharge for safe discharge planning.    HIV (human immunodeficiency virus infection) (HCC)  Assessment & Plan  Initially unclear  diagnosis. However, confirmatory testing indicates that patient is in fact positive for HIV 1.  Patient continues to have normal CD4 counts (CD4 752 at 3/2023)  - ID following   - Confirmatory testing conclusive for HIV  - Continue Biktarvy daily    Hypokalemia  Assessment & Plan  Resolved.  Continue to monitor.    Schizophrenia (HCC)  Assessment & Plan  - Continue home olanzapine 5 mg twice daily      * Wound infection  Assessment & Plan  - Discontinue vancomycin and Unasyn.(5/4 - 5/21)  - Continue Doxy 100mg BID and augmentin 875 BID- continue for duration of wound vac placement        Core Measures:   Fluids: PO intake   Lines: IVP, wound vac   Abx: Doxy and augmentin   DVT prophylaxis: dc Heparin, resume Eliquis 5/17  Code Status: Full    Dispo: Medically cleared for dc, awaiting SNF placement.     Lili Augustin MD  Resident Intern  [employer], Family Medicine

## 2023-05-22 NOTE — WOUND TEAM
Assisted Teagan SALVADOR (Wound RN), RN with wound care, non-selective debridement performed using wound cleanser/NS and gauze. Please see Teagan SALVADOR (Wound RN) wound note for further wound care details.

## 2023-05-23 PROCEDURE — A9270 NON-COVERED ITEM OR SERVICE: HCPCS | Performed by: STUDENT IN AN ORGANIZED HEALTH CARE EDUCATION/TRAINING PROGRAM

## 2023-05-23 PROCEDURE — A9270 NON-COVERED ITEM OR SERVICE: HCPCS | Performed by: BEHAVIOR ANALYST

## 2023-05-23 PROCEDURE — 700102 HCHG RX REV CODE 250 W/ 637 OVERRIDE(OP)

## 2023-05-23 PROCEDURE — 770001 HCHG ROOM/CARE - MED/SURG/GYN PRIV*

## 2023-05-23 PROCEDURE — 99231 SBSQ HOSP IP/OBS SF/LOW 25: CPT | Mod: GC | Performed by: FAMILY MEDICINE

## 2023-05-23 PROCEDURE — 700102 HCHG RX REV CODE 250 W/ 637 OVERRIDE(OP): Performed by: BEHAVIOR ANALYST

## 2023-05-23 PROCEDURE — RXMED WILLOW AMBULATORY MEDICATION CHARGE

## 2023-05-23 PROCEDURE — 700102 HCHG RX REV CODE 250 W/ 637 OVERRIDE(OP): Performed by: STUDENT IN AN ORGANIZED HEALTH CARE EDUCATION/TRAINING PROGRAM

## 2023-05-23 PROCEDURE — A9270 NON-COVERED ITEM OR SERVICE: HCPCS

## 2023-05-23 RX ORDER — DIVALPROEX SODIUM 500 MG/1
500 TABLET, EXTENDED RELEASE ORAL 2 TIMES DAILY
Qty: 30 TABLET | Refills: 0 | Status: SHIPPED | OUTPATIENT
Start: 2023-05-23 | End: 2023-06-22

## 2023-05-23 RX ORDER — DOXYCYCLINE 100 MG/1
100 TABLET ORAL EVERY 12 HOURS
Qty: 30 TABLET | Refills: 0 | Status: ACTIVE | OUTPATIENT
Start: 2023-05-23 | End: 2023-06-09

## 2023-05-23 RX ORDER — LORAZEPAM 2 MG/1
2 TABLET ORAL
Qty: 30 TABLET | Refills: 0 | Status: SHIPPED | OUTPATIENT
Start: 2023-05-23 | End: 2023-06-04

## 2023-05-23 RX ORDER — LOPERAMIDE HYDROCHLORIDE 2 MG/1
2 CAPSULE ORAL 4 TIMES DAILY PRN
Qty: 30 CAPSULE | Refills: 0 | Status: SHIPPED | OUTPATIENT
Start: 2023-05-23 | End: 2023-06-09

## 2023-05-23 RX ORDER — AMOXICILLIN AND CLAVULANATE POTASSIUM 875; 125 MG/1; MG/1
1 TABLET, FILM COATED ORAL EVERY 12 HOURS
Qty: 30 TABLET | Refills: 0 | Status: ACTIVE | OUTPATIENT
Start: 2023-05-23 | End: 2023-06-09

## 2023-05-23 RX ADMIN — OXYCODONE 5 MG: 5 TABLET ORAL at 08:28

## 2023-05-23 RX ADMIN — BICTEGRAVIR SODIUM, EMTRICITABINE, AND TENOFOVIR ALAFENAMIDE FUMARATE 1 TABLET: 50; 200; 25 TABLET ORAL at 17:28

## 2023-05-23 RX ADMIN — APIXABAN 5 MG: 5 TABLET, FILM COATED ORAL at 17:11

## 2023-05-23 RX ADMIN — AMOXICILLIN AND CLAVULANATE POTASSIUM 1 TABLET: 875; 125 TABLET, FILM COATED ORAL at 05:19

## 2023-05-23 RX ADMIN — APIXABAN 5 MG: 5 TABLET, FILM COATED ORAL at 05:19

## 2023-05-23 RX ADMIN — DIVALPROEX SODIUM 500 MG: 500 TABLET, EXTENDED RELEASE ORAL at 17:28

## 2023-05-23 RX ADMIN — DOXYCYCLINE 100 MG: 100 TABLET, FILM COATED ORAL at 17:11

## 2023-05-23 RX ADMIN — DIVALPROEX SODIUM 500 MG: 500 TABLET, EXTENDED RELEASE ORAL at 05:18

## 2023-05-23 RX ADMIN — AMOXICILLIN AND CLAVULANATE POTASSIUM 1 TABLET: 875; 125 TABLET, FILM COATED ORAL at 17:11

## 2023-05-23 RX ADMIN — DOXYCYCLINE 100 MG: 100 TABLET, FILM COATED ORAL at 05:18

## 2023-05-23 RX ADMIN — POTASSIUM CHLORIDE 40 MEQ: 1500 TABLET, EXTENDED RELEASE ORAL at 05:18

## 2023-05-23 RX ADMIN — OXYCODONE 5 MG: 5 TABLET ORAL at 21:18

## 2023-05-23 RX ADMIN — METOPROLOL TARTRATE 25 MG: 25 TABLET, FILM COATED ORAL at 05:18

## 2023-05-23 RX ADMIN — OLANZAPINE 5 MG: 5 TABLET, FILM COATED ORAL at 17:11

## 2023-05-23 ASSESSMENT — PAIN SCALES - PAIN ASSESSMENT IN ADVANCED DEMENTIA (PAINAD)
FACIALEXPRESSION: SMILING OR INEXPRESSIVE
BODYLANGUAGE: RELAXED
BREATHING: NORMAL
BREATHING: NORMAL
BODYLANGUAGE: RELAXED
CONSOLABILITY: NO NEED TO CONSOLE
FACIALEXPRESSION: SMILING OR INEXPRESSIVE
TOTALSCORE: 0
CONSOLABILITY: NO NEED TO CONSOLE
TOTALSCORE: 0

## 2023-05-23 ASSESSMENT — PAIN SCALES - WONG BAKER
WONGBAKER_NUMERICALRESPONSE: DOESN'T HURT AT ALL
WONGBAKER_NUMERICALRESPONSE: DOESN'T HURT AT ALL

## 2023-05-23 ASSESSMENT — PAIN DESCRIPTION - PAIN TYPE
TYPE: ACUTE PAIN

## 2023-05-23 NOTE — CARE PLAN
Problem: Knowledge Deficit - Standard  Goal: Patient and family/care givers will demonstrate understanding of plan of care, disease process/condition, diagnostic tests and medications  Outcome: Progressing     Problem: Skin Integrity  Goal: Skin integrity is maintained or improved  Outcome: Progressing     Problem: Fall Risk  Goal: Patient will remain free from falls  Outcome: Progressing   The patient is Stable - Low risk of patient condition declining or worsening    Shift Goals  Clinical Goals: wound care  Patient Goals: rest  Family Goals: IVETTE    Progress made toward(s) clinical / shift goals:  patient updated on POC    Patient is not progressing towards the following goals:

## 2023-05-23 NOTE — CARE PLAN
The patient is Stable - Low risk of patient condition declining or worsening    Shift Goals  Clinical Goals: woud care  Patient Goals: sleep comfort  Family Goals: ryan    Progress made toward(s) clinical / shift goals:    Problem: Skin Integrity  Goal: Skin integrity is maintained or improved  5/23/2023 0210 by Vinicius Zamudio R.N.  Outcome: Progressing  Note: Wounds inspected and soiled dressing changed as needed, ensured woundvac is functioning and fully attached. Turned pt as needed and cleaned pt after incontinent episode  5/23/2023 0210 by Vinicius Zamudio R.N.    Problem: Fall Risk  Goal: Patient will remain free from falls  Outcome: Progressing  Note: Ensured side rails up, bed alarm on, socks on and armband on. Pt calls if needing assistance and is free of falls this shift       Patient is not progressing towards the following goals:       Was A Bandage Applied: Yes

## 2023-05-23 NOTE — DISCHARGE SUMMARY
[facility] DISCHARGE SUMMARY     PATIENT ID:  Name:             Kellie Chatman     YOB: 1970  Age:                 53 y.o.  female   MRN:               [id number]  Address:         [place] [address]  Phone:            [phone] (home)    ADMISSION DATE:   5/4/2023    DISCHARGE DATE:   5/23/2023    DISCHARGE DIAGNOSES:   Chronic multifocal osteomyelitis of multiple sites  Human immunodeficiency virus; HIV  History of atrial fibrillation  History of seizures  History of DVT  Hypokalemia  Schizophrenia  Wound infection  Social concern: Homelessness    ATTENDING PHYSICIAN:   Andrzej Doss MD    RESIDENT:   Lili Augustin MD     CONSULTANTS:    Orthopedic surgery  Infectious disease  Dietary  Physical therapy  Occupational Therapy    PROCEDURES:    Surgical debridement of bilateral lower extremity eschars of residual limbs.    IMAGING:   [id number]-WITH & W/O LEFT   Final Result      1.  Early osteomyelitis of the distal aspect of the left tibial stump      2.  Negative for abscess      3.  Left knee joint effusion      [id number]-WITH & W/O RIGHT   Final Result      1.  No roc osteomyelitis      2.  Edema within the distal tibial stump evident on T2-weighted imaging and possibly minimal change on T1-weighted imaging. This could indicate very early osteomyelitis although at this point is not specific.      3.  Negative for abscess      DX-CHEST-LIMITED (1 VIEW)   Final Result         No acute cardiac or pulmonary abnormality is identified.        PHYSICAL EXAM:   General: No acute distress, afebrile, resting comfortably, sleepy   HEENT: NC/AT. EOMI.   Cardiovascular: RRR without murmurs, rubs, heaves. Normal capillary refill   Respiratory: CTAB, no tachypnea or retractions   Abdomen: normal bowel sounds, soft, nontender, nondistended, no masses, no organomegaly   EXT:  Bilateral BKA with dressing in place. Clean, dry and intact. Wound vac with serosanguineous  fluid present  Skin: No erythema/lesions   Neuro: Non-focal, alert and orientated     LABS:  No results for input(s): WBC, RBC, HEMOGLOBIN, HEMATOCRIT, MCV, MCH, RDW, PLATELETCT, MPV, NEUTSPOLYS, LYMPHOCYTES, MONOCYTES, EOSINOPHILS, BASOPHILS, RBCMORPHOLO in the last 72 hours.  No results for input(s): SODIUM, POTASSIUM, CHLORIDE, CO2, GLUCOSE, BUN, CPKTOTAL in the last 72 hours.  No results found for: CHOLSTRLTOT, LDL, HDL, TRIGLYCERIDE    No results found for: TROPONINI, CKMB  No results found for: TROPONINI, CKMB      HISTORY OF PRESENTING ILLNESS:  HPI per Dr. Sheppard:  Kellie Chatman is a 53 y.o. 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 Patient is a poor historian and appears in significant pain throughout exam. Patient reports significant radiating pain from bilateral BKA site has been occurring for the past week. She states she has been having associated fever and chills. Denies pain or wounds elsewhere, denies headache, chest pain, nausea, vomiting, or diarrhea. She states history of bipolar and schizophrenia. Denies further medical history or meth use.      Per chart review, patient was discharged 5/1/2023 from [place] after being treated for bilateral BKA cellulitis. Pt was noted to have a fib at that time. ECHO was negative for acute pathology. CT scan was performed of the left leg with no evidence of abscess but did show DVT in left popliteal vein and left superficial femoral vein. Pt was started on apixaban. Pt was treated with IV Vancomycin  and discharged home on doxycyline. Pt then was refusing meds or PT and was discharged.      ERCourse:  Pt presented tachycardic. Afebrile with normal range BP. CBC indicated hemoglobin of 10.0,  Hct 33.2, plt 521. CMP within normal limits. Magnesium 1.7. LA 1.8. UA trace leukocyte esterase, blood and urine culture collected. CXR no acute cardiac or pulmonary abnormality. Pt was given a one time dose of IV Unasyn and vancomycin.      HOSPITAL COURSE:   He was admitted and treated for the following.  Skilled nursing facility to follow-up as outlined below.    History of atrial fibrillation  EKG with A fib;  from [facility] on 4/23/2023.  Patient remained in sinus rhythm for the duration of this hospital admission.  We continued metoprolol 25 mg twice daily.  Heparin was held for DVT prophylaxis as we started patient on Eliquis post procedure on 5/17/2023.  -SNF to continue patient's Eliquis and heart rate monitoring.    History of seizures  Unclear history of seizure activity. Patient states she has never had a seizure before.  Patient was prescribed Depakote; likely for mood stabilization.  This medication was continued while inpatient.  No seizures during hospitalization.    -SNF to continue Depakote.    Chronic multifocal osteomyelitis of multiple sites (HCC)  MRI done while inpatient showed early osteomyelitis of the distal aspect of left tibial residual limb.  Patient is status post incision and drainage on 5/10/2023 with Dr. Lux.  Procedure was successful and infectious disease was consulted following procedure.  Patient was started on vancomycin and Unasyn on 5/4/2023 and discontinued on 5/12/2023; when Doxy 100 mg twice daily and Augmentin 875 twice daily was started.  This regimen is to be continued until wound VAC is discontinued.    -SNF to continue Doxy 100 mg twice daily and Augmentin 875 twice daily. *Please continue Ativan 2 mg as needed during wound VAC changes.     History of DVT (deep vein thrombosis)  Ultrasound at [facility] showed DVT on 4/23/2023.  Held heparin for DVT prophylaxis while inpatient.  Resumed patient's Eliquis while inpatient.    -SNF to continue  patient's Eliquis for DVT prophylaxis.    Homelessness  Patient was previously staying at [facility].  Recommend social work consultation prior to discharge from skilled nursing facility.      -SNF to follow-up on [facility] placement following discharge.      HIV (human immunodeficiency virus infection) ([facility])  Initial diagnosis was unclear however confirmatory testing during hospitalization confirmed patient is positive for HIV1. Patient continues to have normal CD4 counts (CD4 752 at 3/2023).  Infectious diseases following patient while inpatient.  Biktarvy was continued daily.    -SNF to continue Biktarvy daily.  Consider repeating CD4 counts as appropriate.     Hypokalemia  Resolved prior to discharge.  Patient was found to be hypokalemic while inpatient.  Potassium was repleted as indicated.     Schizophrenia ([facility])  No concerns while inpatient.  Patient was continued on olanzapine 5 mg twice daily.     -SNF to to continue olanzapine 5 mg twice daily.     * Wound infection  Wound team followed patient while inpatient for bilateral lower extremity residual limb wound care.  Wound VAC changes were done as scheduled twice weekly.  Continue wound VAC dressing changes as required with Ativan 2 mg prior to with dressing changes.  Antibiotics as outlined above to be continued for the duration of wound VAC placement.  And discontinued once wound VAC is discontinued.     DISCHARGE CONDITION:    Stable    DISPOSITION:   Skilled Nursing Facility; [facility]    DISCHARGE MEDICATIONS:      Medication List        START taking these medications        Instructions   amoxicillin-clavulanate 875-125 MG Tabs  Commonly known as: AUGMENTIN   Doctor's comments: To be continued until bilateral lower extremity wound vacs are removed.   Refill as appropriate.  Take 1 Tablet by mouth every 12 hours for 15 days.  Dose: 1 Tablet     doxycycline monohydrate 100 MG tablet  Commonly known as: ADOXA   Doctor's comments: To be continued  until bilateral lower extremity wound vacs are removed.   Refill as appropriate.  Take 1 Tablet by mouth every 12 hours for 15 days.  Dose: 100 mg     loperamide 2 MG Caps  Commonly known as: IMODIUM   Take 1 Capsule by mouth 4 times a day as needed for Diarrhea for up to 15 days.  Dose: 2 mg     LORazepam 2 MG tablet  Commonly known as: ATIVAN   Take 1 Tablet by mouth 1 time a day as needed for Anxiety (wound vac dressing changes) for up to 5 days.  Dose: 2 mg            CHANGE how you take these medications        Instructions   * apixaban 5mg Tabs  What changed: Another medication with the same name was added. Make sure you understand how and when to take each.  Commonly known as: ELIQUIS   Take 5 mg by mouth 2 times a day.  Dose: 5 mg     * apixaban 5mg Tabs  What changed: You were already taking a medication with the same name, and this prescription was added. Make sure you understand how and when to take each.  Commonly known as: ELIQUIS   Take 1 Tablet by mouth 2 times a day for 30 days. Indications: Thromboembolism secondary to Atrial Fibrillation  Dose: 5 mg     * Biktarvy -25 mg Tabs tablet  What changed: Another medication with the same name was added. Make sure you understand how and when to take each.  Generic drug: bictegravir-emtricitab-TAF   Take 1 Tablet by mouth every day.  Dose: 1 Tablet     * bictegravir-emtricitab-TAF -25 mg Tabs tablet  What changed: You were already taking a medication with the same name, and this prescription was added. Make sure you understand how and when to take each.  Commonly known as: Biktarvy   Take 1 Tablet by mouth every evening for 30 days.  Dose: 1 Tablet     * divalproex  MG Tb24  What changed: Another medication with the same name was added. Make sure you understand how and when to take each.  Commonly known as: DEPAKOTE ER   Take 500 mg by mouth 2 times a day.  Dose: 500 mg     * divalproex  MG Tb24  What changed: You were already taking  a medication with the same name, and this prescription was added. Make sure you understand how and when to take each.  Commonly known as: DEPAKOTE ER   Take 1 Tablet by mouth 2 times a day for 30 days.  Dose: 500 mg           * This list has 6 medication(s) that are the same as other medications prescribed for you. Read the directions carefully, and ask your doctor or other care provider to review them with you.                CONTINUE taking these medications        Instructions   cefdinir 300 MG Caps  Commonly known as: OMNICEF   Take 300 mg by mouth 2 times a day. 3 day course prescribed 3/11/23  Dose: 300 mg     metroNIDAZOLE 500 MG Tabs  Commonly known as: FLAGYL   Take 500 mg by mouth 3 times a day. 3 day course prescribed 4/11/23  Dose: 500 mg            ACTIVITY:   Normal Activity as Tolerated.    DIET:   Healthy    DISCHARGE INSTRUCTIONS AND FOLLOW UP:  Patient is medically stable for discharge and will be discharged to skilled nursing facility; [facility].    Discharge Instructions:   Patient was instructed to return the ER in the event of worsening symptoms including but not limited to increasing pain, nausea, vomiting, or fever, or new or concerning symptoms or any other major concerns. Patient understands that failure to do so may indicate worsening of her medical condition(s) and result in adverse clinical outcomes including fatality. We have counseled the patient on the importance of compliance and the patient has agreed to proceed with all medical recommendations and follow up plan indicated above. The patient understands that the failure to do so may result in result in adverse clinical outcomes including fatality.     Lili Augustin MD   Resident Intern  [facility], Family Medicine

## 2023-05-23 NOTE — PROGRESS NOTES
Assessment completed. Pt A&Ox 4. Respirations are even and unlabored on room air. Pt reports pain at this time. Medical patient, VS stable, call light and belongings within reach. POC updated (placement). Pt educated on room and call light, pt verbalized understanding. Communication board updated. Needs met.

## 2023-05-23 NOTE — PROGRESS NOTES
[facility] MEDICINE PROGRESS NOTE     Attending:   Andrzej Doss MD    Resident:   Lili Augustin MD    PATIENT:   Kellie Chatman is a 53-year-old female with a PMHx HIV, schizophrenia, bipolar on Zyprexa and Depakote; 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  Admitted 5/4/2023 for bilateral stump infections s/p bilateral I&D on 5/10. Transitioned from IV Vancomycin and unasyn to Augmentin and doxycyline 5/12.     SUBJECTIVE:   No acute events overnight. Vitally stable and afebrile overnight. No new complaints this morning.     OBJECTIVE:  Vitals:    05/22/23 1955 05/23/23 0500 05/23/23 0523 05/23/23 0654   BP: (!) 123/97 110/80 93/61 106/56   Pulse: 84 80 83 66   Resp: 18 17 18 18   Temp: 37.7 °C (99.8 °F) 37.5 °C (99.5 °F) 36.8 °C (98.2 °F) 37.4 °C (99.3 °F)   TempSrc: Temporal Temporal Temporal Temporal   SpO2: 95%  94% 97%   Weight:       Height:           Intake/Output Summary (Last 24 hours) at 5/15/2023 0550  Last data filed at 5/14/2023 0700  Gross per 24 hour   Intake 240 ml   Output --   Net 240 ml       PHYSICAL EXAM:   General: No acute distress, afebrile, resting comfortably, sleepy   HEENT: NC/AT. EOMI.   Cardiovascular: RRR without murmurs, rubs, heaves. Normal capillary refill   Respiratory: CTAB, no tachypnea or retractions   Abdomen: normal bowel sounds, soft, nontender, nondistended, no masses, no organomegaly   EXT:  Bilateral BKA with dressing in place. Clean, dry and intact. Wound vac with serosanguineous fluid present  Skin: No erythema/lesions   Neuro: Non-focal, alert and orientated       LABS:  No results for input(s): WBC, RBC, HEMOGLOBIN, HEMATOCRIT, MCV, MCH, RDW, PLATELETCT, MPV, NEUTSPOLYS, LYMPHOCYTES, MONOCYTES, EOSINOPHILS, BASOPHILS, RBCMORPHOLO in the last 72 hours.      No results for input(s): SODIUM, POTASSIUM, CHLORIDE, CO2, BUN, CREATININE, CALCIUM, MAGNESIUM, PHOSPHORUS, ALBUMIN in the last 72 hours.      Estimated GFR/CRCL =  Estimated Creatinine Clearance: 79.6 mL/min (by C-G formula based on SCr of 0.78 mg/dL).  No results for input(s): GLUCOSE, POCGLUCOSE in the last 72 hours.                  No results for input(s): INR, APTT, FIBRINOGEN in the last 72 hours.    Invalid input(s): DIMER    MICROBIOLOGY:   Blood Culture Hold   Date Value Ref Range Status   04/08/2023 Collected  Final          IMAGING:   [id number]-WITH & W/O LEFT   Final Result      1.  Early osteomyelitis of the distal aspect of the left tibial stump      2.  Negative for abscess      3.  Left knee joint effusion      [id number]-WITH & W/O RIGHT   Final Result      1.  No roc osteomyelitis      2.  Edema within the distal tibial stump evident on T2-weighted imaging and possibly minimal change on T1-weighted imaging. This could indicate very early osteomyelitis although at this point is not specific.      3.  Negative for abscess      DX-CHEST-LIMITED (1 VIEW)   Final Result         No acute cardiac or pulmonary abnormality is identified.          CULTURES:   Results       ** No results found for the last 168 hours. **            MEDS:  Current Facility-Administered Medications   Medication Last Admin    LORazepam (ATIVAN) tablet 2 mg      apixaban (ELIQUIS) tablet 5 mg 5 mg at 05/23/23 0519    lidocaine (XYLOCAINE) 2 % injection 20 mL 20 mL at 05/22/23 0915    Or    lidocaine (XYLOCAINE) 4 % topical solution 1 Application at 05/19/23 1048    oxyCODONE immediate-release (ROXICODONE) tablet 5 mg 5 mg at 05/23/23 0828    doxycycline monohydrate (ADOXA) tablet 100 mg 100 mg at 05/23/23 0518    amoxicillin-clavulanate (AUGMENTIN) 875-125 MG per tablet 1 Tablet 1 Tablet at 05/23/23 0519    loperamide (IMODIUM) capsule 2 mg 2 mg at 05/08/23 2323    potassium chloride SA (Kdur) tablet 40 mEq 40 mEq at 05/23/23 0518    bictegravir-emtricitab-TAF (Biktarvy) -25 mg tablet 1 Tablet 1 Tablet at 05/22/23 1637    divalproex ER (DEPAKOTE ER) tablet 500 mg 500 mg at  23 0518    OLANZapine (ZYPREXA) tablet 5 mg 5 mg at 23 1637    Pharmacy Consult Request ...Pain Management Review 1 Each      acetaminophen (TYLENOL) tablet 1,000 mg 1,000 mg at 23 2115    metoprolol tartrate (LOPRESSOR) tablet 25 mg 25 mg at 23 0518       ASSESSMENT/PLAN: Kellie Chatman is a 53-year-old female with a PMHx HIV, schizophrenia, bipolar on Zyprexa and Depakote; 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  Admitted 2023 for bilateral stump infections s/p bilateral I&D on 5/10. Transitioned from IV Vancomycin and unasyn to Augmentin and doxycyline .     History of atrial fibrillation  Assessment & Plan  EKG with A fib;  from [facility] on 2023. Sinus rhythm for the duration of this hospital admission.   - Continue metoprolol 25 mg twice daily  - Discontinue heparin  - Plan to resume eliquis   - Dc telemetry monitoring    History of seizures  Assessment & Plan  Unclear history of seizure activity. Patient states she has never had a seizure before.   - Continue Depakote 500 mg daily for history of seizures  - Depakote likely for mood stabilization     Chronic multifocal osteomyelitis of multiple sites (HCC)  Assessment & Plan  MRI showin.  Early osteomyelitis of the distal aspect of the left tibial stump. S/p I&D and  on 5/10 with Dr. Lux  - ID consulting- Dr Manzo   - Discontinue vanco and unasyn (-)  - Continue Doxy 100mg BID and augmentin 875 BID- continue for duration of wound vac placement   - Wound care following   - Ativan 2mg PRN wound vac changes      History of DVT (deep vein thrombosis)  Assessment & Plan  DVT noted on US from [facility] 2023.  - Discontinue heparin  - Plan to resume eliquis     Homelessness  Assessment & Plan  Patient was previously staying at [facility] previously.  Recommend social work consultation prior to discharge for safe discharge planning.    HIV (human  immunodeficiency virus infection) (HCC)  Assessment & Plan  Initially unclear diagnosis. However, confirmatory testing indicates that patient is in fact positive for HIV 1.  Patient continues to have normal CD4 counts (CD4 752 at 3/2023)  - ID following   - Confirmatory testing conclusive for HIV  - Continue Biktarvy daily    Hypokalemia  Assessment & Plan  Resolved.  Continue to monitor.    Schizophrenia (HCC)  Assessment & Plan  - Continue home olanzapine 5 mg twice daily      * Wound infection  Assessment & Plan  - Discontinue vancomycin and Unasyn.(5/4 - 5/21)  - Continue Doxy 100mg BID and augmentin 875 BID- continue for duration of wound vac placement        Core Measures:   Fluids: PO intake   Lines: IVP, wound vac   Abx: Doxy and augmentin   DVT prophylaxis: dc Heparin, resume Eliquis 5/17  Code Status: Full    Dispo: Medically cleared for dc, awaiting SNF placement.     Lili Augustin MD  Resident Intern  [facility], Family Medicine

## 2023-05-23 NOTE — DISCHARGE PLANNING
Case Management Discharge Planning    Admission Date: 5/4/2023  GMLOS: 3.5  ALOS: 19    6-Clicks ADL Score: 21  6-Clicks Mobility Score: 11  PT and/or OT Eval ordered: Yes  Post-acute Referrals Ordered: Yes  Post-acute Choice Obtained: Yes  Has referral(s) been sent to post-acute provider:  Yes      Anticipated Discharge Dispo: Discharge Disposition: D/T to home under HHA care in anticipation of covered skilled care (06)    DME Needed: No    Action(s) Taken: OTHER    LSW spoke to Sue from [employer] [phone]. Patient has a SNF in [facility] that is considering taking her, but they want a discharge summary and med rec before officially accepting. Moreover, if accepted, they want patient to discharge with HIV meds from hospital.    LSW sent voalte to residents Dr. Mcdaniel and Dr. Augustin with the above information. Once obtained, they will be faxed to [phone].    @1436: Faxed discharge summary and med rec to [phone].    Escalations Completed: None    Medically Clear: Yes    Next Steps: LSW to follow up with patient and medical team regarding discharge needs and barriers.     Barriers to Discharge: Pending Placement

## 2023-05-23 NOTE — DISCHARGE PLANNING
Agency/Facility Name: [facility]  Spoke To: Chantel  Outcome: DPA called facility to follow up on referral, per Chantel she needs to know if patient is on chemo dialysis. DPA will call back with any updates.     Agency/Facility Name: [facility]  Spoke To: Chantel  Outcome: DPA called facility back to inform them patient is not on dialysis, per Chantel referral will still be under review.    @0923  Agency/Facility Name: [facility]  Spoke To: Brady  Outcome: DPA received call, per Brady they have an accepting facility in [place] that is willing to accept patient which is called [facility]. Per Brady she needs a d/c summary and updated notes faxed over to her, she also needs PASSAR and LOC. DPA will fax notes.

## 2023-05-24 PROCEDURE — 99232 SBSQ HOSP IP/OBS MODERATE 35: CPT | Mod: GC | Performed by: FAMILY MEDICINE

## 2023-05-24 PROCEDURE — 700102 HCHG RX REV CODE 250 W/ 637 OVERRIDE(OP)

## 2023-05-24 PROCEDURE — 97602 WOUND(S) CARE NON-SELECTIVE: CPT

## 2023-05-24 PROCEDURE — 97605 NEG PRS WND THER DME<=50SQCM: CPT

## 2023-05-24 PROCEDURE — A9270 NON-COVERED ITEM OR SERVICE: HCPCS | Performed by: BEHAVIOR ANALYST

## 2023-05-24 PROCEDURE — A9270 NON-COVERED ITEM OR SERVICE: HCPCS | Performed by: STUDENT IN AN ORGANIZED HEALTH CARE EDUCATION/TRAINING PROGRAM

## 2023-05-24 PROCEDURE — 770001 HCHG ROOM/CARE - MED/SURG/GYN PRIV*

## 2023-05-24 PROCEDURE — A9270 NON-COVERED ITEM OR SERVICE: HCPCS

## 2023-05-24 PROCEDURE — 700102 HCHG RX REV CODE 250 W/ 637 OVERRIDE(OP): Performed by: STUDENT IN AN ORGANIZED HEALTH CARE EDUCATION/TRAINING PROGRAM

## 2023-05-24 PROCEDURE — 700102 HCHG RX REV CODE 250 W/ 637 OVERRIDE(OP): Performed by: BEHAVIOR ANALYST

## 2023-05-24 RX ADMIN — POTASSIUM CHLORIDE 40 MEQ: 1500 TABLET, EXTENDED RELEASE ORAL at 04:48

## 2023-05-24 RX ADMIN — DOXYCYCLINE 100 MG: 100 TABLET, FILM COATED ORAL at 04:48

## 2023-05-24 RX ADMIN — DIVALPROEX SODIUM 500 MG: 500 TABLET, EXTENDED RELEASE ORAL at 04:48

## 2023-05-24 RX ADMIN — LORAZEPAM 2 MG: 2 TABLET ORAL at 09:57

## 2023-05-24 RX ADMIN — DIVALPROEX SODIUM 500 MG: 500 TABLET, EXTENDED RELEASE ORAL at 17:07

## 2023-05-24 RX ADMIN — APIXABAN 5 MG: 5 TABLET, FILM COATED ORAL at 17:07

## 2023-05-24 RX ADMIN — AMOXICILLIN AND CLAVULANATE POTASSIUM 1 TABLET: 875; 125 TABLET, FILM COATED ORAL at 04:48

## 2023-05-24 RX ADMIN — DOXYCYCLINE 100 MG: 100 TABLET, FILM COATED ORAL at 17:08

## 2023-05-24 RX ADMIN — BICTEGRAVIR SODIUM, EMTRICITABINE, AND TENOFOVIR ALAFENAMIDE FUMARATE 1 TABLET: 50; 200; 25 TABLET ORAL at 17:07

## 2023-05-24 RX ADMIN — OXYCODONE 5 MG: 5 TABLET ORAL at 09:57

## 2023-05-24 RX ADMIN — AMOXICILLIN AND CLAVULANATE POTASSIUM 1 TABLET: 875; 125 TABLET, FILM COATED ORAL at 17:07

## 2023-05-24 RX ADMIN — APIXABAN 5 MG: 5 TABLET, FILM COATED ORAL at 04:48

## 2023-05-24 RX ADMIN — OLANZAPINE 5 MG: 5 TABLET, FILM COATED ORAL at 17:07

## 2023-05-24 ASSESSMENT — PAIN DESCRIPTION - PAIN TYPE: TYPE: ACUTE PAIN

## 2023-05-24 NOTE — DISCHARGE PLANNING
Agency/Facility Name: [facility]  Spoke To: Vonda  Outcome: DPA called facility to follow up on patient's referral, per Vonda referral is still being under review. They will call DPA with any updates.    @4338  Agency/Facility Name: [facility]  Spoke To: Adelia  Outcome: DPA received call. [facility] is accepting patient and have a bed available for today. DPA asked if they can take patient tomorrow in order for us to set up transport. Facility will call back DPA with an update.     @1005  Agency/Facility Name: [facility]  Spoke To: Adelia  Outcome: DPA called to get an address for facility. Adelia mentioned facility is able to hold a bed for tomorrow but if transport can be set up for today that would be great. She is still waiting for scripts and lab notes to be faxed over.   Facility name: [facility]  Address: [address] [place] [address], [place] [address]

## 2023-05-24 NOTE — DISCHARGE PLANNING
Case Management Discharge Planning    Admission Date: 5/4/2023  GMLOS: 3.5  ALOS: 20    6-Clicks ADL Score: 21  6-Clicks Mobility Score: 11  PT and/or OT Eval ordered: Yes  Post-acute Referrals Ordered: Yes  Post-acute Choice Obtained: Yes  Has referral(s) been sent to post-acute provider:  Yes      Anticipated Discharge Dispo: Discharge Disposition: D/T to home under HHA care in anticipation of covered skilled care (06)    DME Needed: No    Action(s) Taken: OTHER    LSW informed patient has accepting SNF in [place]: [facility].    SNF requesting paper prescription for Ativan. LSW sent voalte to residents with this information.    DPA working to find out if patient will have a bed tomorrow.     Patient has [facility] transport benefits. LSW to request transport with [facility].    @1017: Faxed transport request to [facility] for tomorrow 5/25.  Facility name: [facility]  Address: [address] [place] [id number]    @1155: Spoke to patient at bedside. Provided her with information for the facility she is going to. Patient agreeable to go. LSW let her know we're working on transport for tomorrow 5/25 at 0315-0947. Patient said that was too early. LSW explained its a long drive and time is still not confirmed.     @1508: [facility] coordinator Kami Mccabe is working closely with [facility]'s long distance transport team to set up patient's transport to [facility]. Pending scheduling arrangements from [facility].    Escalations Completed: None    Medically Clear: Yes    Next Steps: LSW to follow up with patient and medical team regarding discharge needs and barriers.     Barriers to Discharge: Transportation

## 2023-05-24 NOTE — WOUND TEAM
[facility]  Inpatient Services  Wound and Skin Care Evaluation    Admission Date: 5/4/2023     Last order of IP CONSULT TO WOUND CARE was found on 5/5/2023 from Hospital Encounter on 5/4/2023     HPI, PMH, SH: Reviewed    Past Surgical History:   Procedure Laterality Date    IRRIGATION & DEBRIDEMENT GENERAL Bilateral 5/10/2023    Procedure: IRRIGATION AND DEBRIDEMENT, WOUND LEG;  Surgeon: Nas Lux M.D.;  Location: SURGERY [facility];  Service: Orthopedics    APPLICATION OR REPLACEMENT, WOUND VAC Bilateral 5/10/2023    Procedure: APPLICATION OR REPLACEMENT, WOUND VAC;  Surgeon: Nas Lux M.D.;  Location: SURGERY [facility];  Service: Orthopedics    KNEE AMPUTATION BELOW Left 03/22/2023    Procedure: AMPUTATION, BELOW KNEE;  Surgeon: Nas Lux M.D.;  Location: SURGERY [facility];  Service: Orthopedics    AMPUTATION, BELOW THE KNEE Right      Social History     Tobacco Use    Smoking status: Every Day     Packs/day: 0.25     Types: Cigarettes    Smokeless tobacco: Never   Vaping Use    Vaping Use: Never used   Substance Use Topics    Alcohol use: Not Currently     Chief Complaint   Patient presents with    Leg Pain     Bilateral leg pain, bilateral BKA     Diagnosis: Wound infection [T14.8XXA, L08.9]    Unit where seen by Wound Team: [id number]    WOUND CONSULT/FOLLOW UP RELATED TO:  Rashi BKA, L Post thigh, L radha-area   5/22 VAC change Bilat BKA    WOUND HISTORY:  Pt is a 54 yo female who presented to the ED for B leg pain.  Pt had B BKA for tissue damage related to frostbite in 3/2023.  Hx of B foot frost bite 11/14/23, schizophrenia, HIV, methamphetamine use, homelessness.  Pt discharged from [facility] 5/3/23 for B BKA pain.  Pt was admitted for r/o osteomyelitis and postoperative infection.  Pt is wheelchair bound    right TMA on 12/12/2022 by Dr. Lux  right BKA on 12/20/2022 by Dr. Lux  left second toe amputation with wound VAC application on 2/3/2023 by   Maci  left TMA on 2/7/2023 by Dr. Lux   left BKA on 3/22/2023 by Dr. Lux    Interval History this admission  5/5/23 LPS consult Yuliet Hwang APRN LPS  5/6/23 ID consult Dr Manzo - continue Vancomycin and Unasyn,   5/10/23 B BKA debridement and wound VAC    WOUND ASSESSMENT/LDA      Negative Pressure Wound Therapy 05/12/23 Bilateral (Active)   NPWT Pump Mode / Pressure Setting Ulta;Intermittent;125 mmHg 05/24/23 1100   Dressing Type Medium;Gray Foam (Cleanse Choice) 05/24/23 1100   Number of Foam Pieces Used 6 05/24/23 1100   Canister Changed No 05/24/23 1100   Output (mL) 0 mL 05/22/23 0900   NEXT Dressing Change/Treatment Date 05/26/23 05/24/23 1100   VAC VeraFlo Irrigant Normal Saline 05/24/23 1100   VAC VeraFlo Soak Time (mins) 5 05/24/23 1100   VAC VeraFlo Instill Volume (ml) 22 05/24/23 1100   VAC VeraFlo - Therapy Time (hrs) 2 05/24/23 1100   VAC VeraFlo Pressure (mm/Hg) 125 mmHg;Intermittent 05/24/23 1100   WOUND NURSE ONLY - Time Spent with Patient (mins) 60 05/24/23 1100           Wound 03/06/23 Full Thickness Wound Leg Right BKA (Active)   Wound Image    05/24/23 1100   Site Assessment Red;Yellow 05/24/23 1100   Periwound Assessment Intact 05/24/23 1100   Margins Defined edges;Unattached edges 05/24/23 1100   Closure Secondary intention 05/24/23 1100   Drainage Amount Scant 05/24/23 1100   Drainage Description Serosanguineous 05/24/23 1100   Treatments Cleansed;Offloading;Site care 05/24/23 1100   Wound Cleansing Normal Saline Irrigation 05/24/23 1100   Periwound Protectant Drape;Paste Ring;Skin Protectant Wipes to Periwound 05/24/23 1100   Dressing Cleansing/Solutions Normal Saline 05/24/23 1100   Dressing Options Wound Vac;Mepilex Heel 05/24/23 1100   Dressing Changed Changed 05/24/23 1100   Dressing Status Clean;Dry;Intact 05/24/23 1100   Dressing Change/Treatment Frequency Monday, Wednesday, Friday, and As Needed 05/24/23 1100   NEXT Dressing Change/Treatment Date 05/26/23 05/24/23  1100   NEXT Weekly Photo (Inpatient Only) 05/31/23 05/24/23 1100   Non-staged Wound Description Full thickness 05/24/23 1100   Wound Length (cm) 12 cm 05/24/23 1100   Wound Width (cm) 15 cm 05/24/23 1100   Wound Depth (cm) 0.2 cm 05/24/23 1100   Wound Surface Area (cm^2) 180 cm^2 05/24/23 1100   Wound Volume (cm^3) 36 cm^3 05/24/23 1100   Wound Bed Granulation (%) 40 % 05/12/23 1200   Wound Bed Slough (%) 60 % 05/12/23 1200   Wound Bed Eschar (%) 100 % 05/05/23 1100   Shape irregular 05/24/23 1100   Wound Odor None 05/24/23 1100   Exposed Structures None 05/24/23 1100   WOUND NURSE ONLY - Time Spent with Patient (mins) 60 05/24/23 1100       Wound 05/05/23 Incision Knee Left BKA (Active)   Wound Image   05/24/23 1100   Site Assessment Red;Granulation tissue 05/24/23 1100   Periwound Assessment Intact 05/24/23 1100   Margins Defined edges;Attached edges 05/24/23 1100   Closure Open to air 05/24/23 1100   Drainage Amount Small 05/24/23 1100   Drainage Description Serous 05/24/23 1100   Treatments Cleansed;Site care 05/24/23 1100   Wound Cleansing Approved Wound Cleanser 05/24/23 1100   Periwound Protectant Skin Protectant Wipes to Periwound 05/24/23 1100   Dressing Cleansing/Solutions Not Applicable 05/24/23 1100   Dressing Options Collagen Dressing;Hydrofera Blue Ready;Mepilex 05/24/23 1100   Dressing Changed Changed 05/24/23 1100   Dressing Status Clean;Dry;Intact 05/24/23 1100   Dressing Change/Treatment Frequency Every 48 hrs, and As Needed 05/24/23 1100   NEXT Dressing Change/Treatment Date 05/26/23 05/24/23 1100   NEXT Weekly Photo (Inpatient Only) 05/31/23 05/24/23 1100   Non-staged Wound Description Full thickness 05/24/23 1100   Wound Length (cm) 4 cm 05/24/23 1100   Wound Width (cm) 5.1 cm 05/24/23 1100   Wound Depth (cm) 0.1 cm 05/24/23 1100   Wound Surface Area (cm^2) 20.4 cm^2 05/24/23 1100   Wound Volume (cm^3) 2.04 cm^3 05/24/23 1100   Wound Bed Granulation (%) 50 % 05/12/23 1200   Wound Bed Slough (%)  50 % 05/12/23 1200   Wound Bed Eschar (%) 100 % 05/05/23 1100   Shape oval 05/24/23 1100   Wound Odor None 05/24/23 1100   Pulses N/A 05/24/23 1100   Exposed Structures None 05/24/23 1100   WOUND NURSE ONLY - Time Spent with Patient (mins) 60 05/24/23 1100           MRI 5/7/23  MRI right tib-fib:  1.  No roc osteomyelitis     2.  Edema within the distal tibial stump evident on T2-weighted imaging and possibly minimal change on T1-weighted imaging. This could indicate very early osteomyelitis although at this point is not specific.     3.  Negative for abscess     MRI left tib-fib  1.  Early osteomyelitis of the distal aspect of the left tibial stump     2.  Negative for abscess     3.  Left knee joint effusion    HIV assay 5/7/23 positive    GISELLA:   5/5/23 B popiteal palpable    Lab Values:    Lab Results   Component Value Date/Time    WBC 4.6 (L) 05/19/2023 09:45 AM    RBC 4.02 (L) 05/19/2023 09:45 AM    HEMOGLOBIN 10.2 (L) 05/19/2023 09:45 AM    HEMATOCRIT 33.1 (L) 05/19/2023 09:45 AM    CREACTPROT 2.40 (H) 05/04/2023 04:10 PM    SEDRATEWES 75 (H) 05/04/2023 04:10 PM    HBA1C 5.1 02/24/2023 05:54 AM        Culture Results show:  No results found for this or any previous visit (from the past 720 hour(s)).    Pain Level/Medicated:  Premedicated with PO oxy and PO ativan 45mins prior, 4% topical lidocaine allowed to soak for 45min    INTERVENTIONS BY WOUND TEAM:  . Performed standard wound care which includes appropriate positioning, dressing removal and non-selective debridement. Pictures and measurements obtained weekly if/when required.    Bilateral BKA:   Preparation for Dressing removal: Dressing soaked with lidocaine and wound cleanser prior to remova   Non-selectively Debrided with:  Wound cleanser and gauze  Sharp debridement: NA   Radha wound: Cleansed with Wound cleanser, both radha-wounds prepped with no sting skin protectant  Primary Dressing:   RIGHT: Two paste rings used to prep periwound. 2 pieces of  cleanse choice waffle applied to wound bed followed by 2 pieces of thin grey foam directly on top. All foam sealed with drape. Hole was cut and TRAC pad applied.  VF NPWT resumed.  LEFT: periwound prepped with No Sting. 1 piece of Christa to wound bed then moistened with NS. Covered with Hydrofera Blue. Secured with mepilex   Secondary (Outer) Dressing: Offloading adhesive foams applied to offload tubing on RLE.    Advanced Wound Care Discharge Planning  Number of Clinicians necessary to complete wound care: 2  Is patient requiring IV pain medications for dressing changes: no  Length of time for dressing change 45 min. (This does not include chart review, pre-medication time, set up, clean up or time spent charting.)    Interdisciplinary consultation: Patient, Bedside RN, Wound RN Maggy     EVALUATION / RATIONALE FOR TREATMENT:  Most Recent Date:    5/24/23: L BKA wound continues to contract in size, resumed christa and HFB. R BKA still with small area of adherent slough, however majority of wound bed near surface level and clean. Resumed cleanse choice VF for now, but orders placed to transition patient to a WTD dressing as she may transfer to a SNF in [facility] tomorrow. Updated bedside MARIA EUGENIA Duval.     5/22: Pt's L BKA wound at skin level. DC'd VAC from this wound and covered with Christa and Hydrofera blue. Christa a collagen drsg maintains a physiologically moist microenvironment at the wound surface. This environment is conducive to granulation tissue formation, epithelization and optimal wound healing. It has ionically bound silver for antimicrobial. Hydrofera Blue applied for the hydrophilic polyurethane foam which contains ethylene oxide used as a bactericidal, fungicidal, and sporicidal disinfectant. Hydrofera Blue also aids in maintaining a moist wound environment. The absorption properties of this dressing are important in collecting exudates and bacteria from the injured area. These harmful fluid secretions  bind to the dressing removing it from the wound without the foam sticking to the wound causing more harm.   R BKA wound  with use of cleanse choice waffle foam. SHELLEY Hodgson was able to debride non-tissue from wound bed in some areas before VAC replaced. Continuing Cleanse foam and Veraflow NPWT.     5/19/23: Decreasing amounts of slough on R BKA, continuing current POC with cleanse choice (right) and reg Vac (left). If patient to discharge, will need to change cleanse choice foam to regular vac foam prior to discharging- highly recommend coordination of this effort, wound team does routine vac changes on MWF.    5/17/23: Left BKA wound slightly . Continue regular NPWT. R BKA wound bed still with moderate amount of slough. Unable to perform CSWD due to pain. Cleanse choice waffle foam utilized this change for more aggressive debridement of nonviable tissue. Continue VF NPWT.  5/15/23: both wound beds have adipose tissue that was debrided away as patient was able to tolerate , R bka with more adipose & necrotic tissue than the L. BKA which also has adipose tissue but is much more shallow.  Next dressing change switch to cleanse choice to assist with mechanical debridement of non-viable tissue and promote granular tissue   5/13/23 expect pt to respond well to NPWT, Veraflo will help remove necrotic tissue on the R BKA.  Anticipate increased granular tissue in wound beds and contraction of area.  Pt's pain difficult to control, ativan added today.    5/5/23: Patient well known to wound care team for treatment of frostbite to bilateral lower legs and feet which over time has resulted in bilateral BKAs. Patient admitted for cellulitis to bilateral BKAs. Intact brown eschar to both BKAs, no drainage at this time. Bilateral BKA tissue is red, warm, and edematous, diagnosed cellulitis by MD. Applied betadine to keep eschar dry and for antimicrobial component. Abrasion to Left posterior leg cleansed and covered  with silicone dressing to pad and protect area. LPS consulted and will follow at this time. Wound care signing off.      Goals: Steady decrease in wound area and depth weekly.    WOUND TEAM PLAN OF CARE ([X] for frequency of wound follow up,):   Nursing to follow dressing orders written for wound care. Contact wound team if area fails to progress, deteriorates or with any questions/concerns if something comes up before next scheduled follow up (See below as to whether wound is following and frequency of wound follow up)  Dressing changes by wound team:                   Follow up 3 times weekly:                NPWT change 3 times weekly: X   MWF  Follow up 1-2 times weekly:      Follow up Bi-Monthly:           Follow up Monthly (High Risk):                        Follow up as needed:    Other (explain):     NURSING PLAN OF CARE ORDERS (X):  Dressing changes: See Dressing Care orders: X  Skin care: See Skin Care orders: X  RN Prevention Protocol:   Rectal tube care: See Rectal Tube Care orders:   Other orders:    RSKIN:   CURRENTLY IN PLACE (X), APPLIED THIS VISIT (A), ORDERED (O):   Q shift Lonnie:  X  Q shift pressure point assessments:  X    Surface/Positioning X  Standard Mattress/Trauma Bed     x   Low Airloss          ICU Low Airloss   Bariatric SHASHA     Waffle cushion        Waffle Overlay      x  Reposition q 2 hours      TAPs Turning system     Z James Pillow     Offloading/Redistribution n/a incontinent  Sacral Offloading Dressing (Silicone dressing)    Heel Offloading Dressing (Silicone dressing)         Heel float boots (Prevalon boot)             Float Heels off Bed with Pillows           Respiratory -- room air            Containment/Moisture Prevention   x  Rectal tube or BMS    Purwick/Condom Cath      x  Leggett Catheter    Barrier wipes   x        Barrier paste     x  Antifungal tx      Interdry   X     Mobilization Chair/bed bound      Up to chair        Ambulate      PT/OT  - NWB B BKA     Nutrition          Anticipated discharge plans:  LTACH:        SNF/Rehab:   x               Home Health Care:           Outpatient Wound Center:     Self/Family Care:        Other:                  Vac Discharge Needs: --> Please discuss with LPS --- If pt to DC to [facility] she will need to be placed on wet to dry prior to DC r/t Hospital machine doesn't leave the facility.   Vac Discharge plan is purely a recommendation from wound team and not a requirement for discharge unless otherwise stated by physician.

## 2023-05-24 NOTE — PROGRESS NOTES
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  Admitted 5/4/2023 for bilateral stump infections s/p bilateral I&D on 5/10. Transitioned from IV Vancomycin and unasyn to Augmentin and doxycyline 5/12.     SUBJECTIVE:   No acute events overnight. Vitally stable and afebrile overnight. No new complaints this morning.     OBJECTIVE:  Vitals:    05/23/23 1720 05/23/23 1950 05/24/23 0342 05/24/23 0656   BP: 91/41 98/62 95/57 96/61   Pulse:  92  79   Resp:  16 16 18   Temp:  37.3 °C (99.1 °F) 37.2 °C (98.9 °F) 36.6 °C (97.9 °F)   TempSrc:  Temporal Temporal Temporal   SpO2:  98% 97% 100%   Weight:       Height:           Intake/Output Summary (Last 24 hours) at 5/15/2023 0550  Last data filed at 5/14/2023 0700  Gross per 24 hour   Intake 240 ml   Output --   Net 240 ml       PHYSICAL EXAM:   General: No acute distress, afebrile, resting comfortably, sleepy   HEENT: NC/AT. EOMI.   Cardiovascular: RRR without murmurs, rubs, heaves. Normal capillary refill   Respiratory: CTAB, no tachypnea or retractions   Abdomen: normal bowel sounds, soft, nontender, nondistended, no masses, no organomegaly   EXT:  Bilateral BKA with dressing in place. Clean, dry and intact. Wound vac with serosanguineous fluid present  Skin: No erythema/lesions   Neuro: Non-focal, alert and orientated       LABS:  No results for input(s): WBC, RBC, HEMOGLOBIN, HEMATOCRIT, MCV, MCH, RDW, PLATELETCT, MPV, NEUTSPOLYS, LYMPHOCYTES, MONOCYTES, EOSINOPHILS, BASOPHILS, RBCMORPHOLO in the last 72 hours.      No results for input(s): SODIUM, POTASSIUM, CHLORIDE, CO2, BUN, CREATININE, CALCIUM, MAGNESIUM, PHOSPHORUS, ALBUMIN in the last 72 hours.      Estimated GFR/CRCL = Estimated Creatinine Clearance: 79.6 mL/min (by  C-G formula based on SCr of 0.78 mg/dL).  No results for input(s): GLUCOSE, POCGLUCOSE in the last 72 hours.                  No results for input(s): INR, APTT, FIBRINOGEN in the last 72 hours.    Invalid input(s): DIMER    MICROBIOLOGY:   Blood Culture Hold   Date Value Ref Range Status   04/08/2023 Collected  Final          IMAGING:   [id number]-WITH & W/O LEFT   Final Result      1.  Early osteomyelitis of the distal aspect of the left tibial stump      2.  Negative for abscess      3.  Left knee joint effusion      IN-[id number]-[id number]-WITH & W/O RIGHT   Final Result      1.  No roc osteomyelitis      2.  Edema within the distal tibial stump evident on T2-weighted imaging and possibly minimal change on T1-weighted imaging. This could indicate very early osteomyelitis although at this point is not specific.      3.  Negative for abscess      DX-CHEST-LIMITED (1 VIEW)   Final Result         No acute cardiac or pulmonary abnormality is identified.          CULTURES:   Results       ** No results found for the last 168 hours. **            MEDS:  Current Facility-Administered Medications   Medication Last Admin    LORazepam (ATIVAN) tablet 2 mg      apixaban (ELIQUIS) tablet 5 mg 5 mg at 05/24/23 0448    lidocaine (XYLOCAINE) 2 % injection 20 mL 20 mL at 05/22/23 0915    Or    lidocaine (XYLOCAINE) 4 % topical solution 1 Application at 05/19/23 1048    oxyCODONE immediate-release (ROXICODONE) tablet 5 mg 5 mg at 05/23/23 2118    doxycycline monohydrate (ADOXA) tablet 100 mg 100 mg at 05/24/23 0448    amoxicillin-clavulanate (AUGMENTIN) 875-125 MG per tablet 1 Tablet 1 Tablet at 05/24/23 0448    loperamide (IMODIUM) capsule 2 mg 2 mg at 05/08/23 2323    potassium chloride SA (Kdur) tablet 40 mEq 40 mEq at 05/24/23 0448    bictegravir-emtricitab-TAF (Biktarvy) -25 mg tablet 1 Tablet 1 Tablet at 05/23/23 1728    divalproex ER (DEPAKOTE ER) tablet 500 mg 500 mg at 05/24/23 0448    OLANZapine (ZYPREXA) tablet 5  mg 5 mg at 23 1711    Pharmacy Consult Request ...Pain Management Review 1 Each      acetaminophen (TYLENOL) tablet 1,000 mg 1,000 mg at 23 2115    metoprolol tartrate (LOPRESSOR) tablet 25 mg 25 mg at 23 0518       ASSESSMENT/PLAN: Kellie Chatman is a 53-year-old female with a PMHx HIV, schizophrenia, bipolar on Zyprexa and Depakote; 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  Admitted 2023 for bilateral stump infections s/p bilateral I&D on 5/10. Transitioned from IV Vancomycin and unasyn to Augmentin and doxycyline .     History of atrial fibrillation  Assessment & Plan  EKG with A fib;  from [facility] on 2023. Sinus rhythm for the duration of this hospital admission.   - Continue metoprolol 25 mg twice daily  - Discontinue heparin  - Plan to resume eliquis   - Dc telemetry monitoring    History of seizures  Assessment & Plan  Unclear history of seizure activity. Patient states she has never had a seizure before.   - Continue Depakote 500 mg daily for history of seizures  - Depakote likely for mood stabilization     Chronic multifocal osteomyelitis of multiple sites (HCC)  Assessment & Plan  MRI showin.  Early osteomyelitis of the distal aspect of the left tibial stump. S/p I&D and  on 5/10 with Dr. Lux  - ID consulting- Dr Manzo   - Discontinue vanco and unasyn (-)  - Continue Doxy 100mg BID and augmentin 875 BID- continue for duration of wound vac placement   - Wound care following   - Ativan 2mg PRN wound vac changes      History of DVT (deep vein thrombosis)  Assessment & Plan  DVT noted on US from [facility] 2023.  - Discontinue heparin  - Plan to resume eliquis     Homelessness  Assessment & Plan  Patient was previously staying at [facility] previously.  Recommend social work consultation prior to discharge for safe discharge planning.    HIV (human immunodeficiency virus infection) (HCC)  Assessment  & Plan  Initially unclear diagnosis. However, confirmatory testing indicates that patient is in fact positive for HIV 1.  Patient continues to have normal CD4 counts (CD4 752 at 3/2023)  - ID following   - Confirmatory testing conclusive for HIV  - Continue Biktarvy daily    Hypokalemia  Assessment & Plan  Resolved.  Continue to monitor.    Schizophrenia (HCC)  Assessment & Plan  - Continue home olanzapine 5 mg twice daily      * Wound infection  Assessment & Plan  - Discontinue vancomycin and Unasyn.(5/4 - 5/21)  - Continue Doxy 100mg BID and augmentin 875 BID- continue for duration of wound vac placement        Core Measures:   Fluids: PO intake   Lines: IVP, wound vac   Abx: Doxy and augmentin   DVT prophylaxis: dc Heparin, resume Eliquis 5/17  Code Status: FULL    Dispo: Medically cleared for discharge. Transport to [facility] tomorrow.     Lili Augustin MD  Resident Intern  [facility], Family Medicine

## 2023-05-24 NOTE — CARE PLAN
The patient is Stable - Low risk of patient condition declining or worsening    Shift Goals  Clinical Goals: pain mgt  Patient Goals: rest and comfort  Family Goals: ryan    Progress made toward(s) clinical / shift goals:    Problem: Skin Integrity  Goal: Skin integrity is maintained or improved  Outcome: Progressing  Note: Waffle overlay placed under pt, res potion pt in a comfortable position, reposition pt as needed, dressing changed as appropriate, maintained patency and function of wound vac machine.       Problem: Fall Risk  Goal: Patient will remain free from falls  Outcome: Progressing  Note: Instructed pt to use call light when needing something. Pt uses call light. Bed alarm active, and fall risk sign displayed outside, hourly nursing rounds. Pt is free from falls        Patient is not progressing towards the following goals:

## 2023-05-24 NOTE — DISCHARGE PLANNING
DC Transport Scheduled    Received request at: 5/24//2023 at 1017    Transport Company Scheduled:  Still being worked out  Spoke with Camille at [facility] to schedule transport.  [facility] Trip #: [id number]     Scheduled Date: 5/25/2023  Scheduled Time: Not confirmed     Destination: [facility]     Notified care team of scheduled transport via Voalte.     If there are any changes needed to the DC transportation scheduled, please contact [facility] at ext. [phone] between the hours of 1136-4495 Mon-Fri. If outside those hours, contact the ED Case Manager at ext. [phone].

## 2023-05-24 NOTE — CARE PLAN
The patient is Stable - Low risk of patient condition declining or worsening    Shift Goals  Clinical Goals: pain control, wound vac change, stable VS  Patient Goals: rest and comfort  Family Goals: not present    Progress made toward(s) clinical / shift goals:    Problem: Knowledge Deficit - Standard  Goal: Patient and family/care givers will demonstrate understanding of plan of care, disease process/condition, diagnostic tests and medications  Outcome: Progressing  Note: EDucated pt on POC, monitor VS/pain control, wound vac and dressing changes, mobility, DC planning, all questions answered, hourly rounding in progress     Problem: Skin Integrity  Goal: Skin integrity is maintained or improved  Outcome: Progressing  Note: Pt turns self from side to side, barrier cream to sacrum applied, waffle overlay in place, hourly rounding in progress     Problem: Fall Risk  Goal: Patient will remain free from falls  Outcome: Progressing  Note: Pt free from fall, call light and belongings within reach, bed alarm on, pt call appropriately, hourly rounding in progress       Patient is not progressing towards the following goals:

## 2023-05-25 ENCOUNTER — PHARMACY VISIT (OUTPATIENT)
Dept: PHARMACY | Facility: MEDICAL CENTER | Age: 53
End: 2023-05-25
Payer: COMMERCIAL

## 2023-05-25 VITALS
RESPIRATION RATE: 18 BRPM | OXYGEN SATURATION: 97 % | HEIGHT: 70 IN | HEART RATE: 88 BPM | TEMPERATURE: 98.4 F | BODY MASS INDEX: 19.54 KG/M2 | SYSTOLIC BLOOD PRESSURE: 92 MMHG | WEIGHT: 136.47 LBS | DIASTOLIC BLOOD PRESSURE: 46 MMHG

## 2023-05-25 PROCEDURE — 99238 HOSP IP/OBS DSCHRG MGMT 30/<: CPT | Mod: GC | Performed by: FAMILY MEDICINE

## 2023-05-25 PROCEDURE — 97530 THERAPEUTIC ACTIVITIES: CPT | Mod: CQ

## 2023-05-25 PROCEDURE — 700102 HCHG RX REV CODE 250 W/ 637 OVERRIDE(OP): Performed by: STUDENT IN AN ORGANIZED HEALTH CARE EDUCATION/TRAINING PROGRAM

## 2023-05-25 PROCEDURE — 700102 HCHG RX REV CODE 250 W/ 637 OVERRIDE(OP): Performed by: BEHAVIOR ANALYST

## 2023-05-25 PROCEDURE — 700102 HCHG RX REV CODE 250 W/ 637 OVERRIDE(OP)

## 2023-05-25 PROCEDURE — A9270 NON-COVERED ITEM OR SERVICE: HCPCS | Performed by: STUDENT IN AN ORGANIZED HEALTH CARE EDUCATION/TRAINING PROGRAM

## 2023-05-25 PROCEDURE — A9270 NON-COVERED ITEM OR SERVICE: HCPCS | Performed by: BEHAVIOR ANALYST

## 2023-05-25 PROCEDURE — A9270 NON-COVERED ITEM OR SERVICE: HCPCS

## 2023-05-25 RX ORDER — BICTEGRAVIR SODIUM, EMTRICITABINE, AND TENOFOVIR ALAFENAMIDE FUMARATE 50; 200; 25 MG/1; MG/1; MG/1
1 TABLET ORAL DAILY
Qty: 30 TABLET | Refills: 0 | Status: ACTIVE | OUTPATIENT
Start: 2023-05-25 | End: 2023-06-24

## 2023-05-25 RX ADMIN — APIXABAN 5 MG: 5 TABLET, FILM COATED ORAL at 04:48

## 2023-05-25 RX ADMIN — DIVALPROEX SODIUM 500 MG: 500 TABLET, EXTENDED RELEASE ORAL at 04:48

## 2023-05-25 RX ADMIN — DOXYCYCLINE 100 MG: 100 TABLET, FILM COATED ORAL at 04:48

## 2023-05-25 RX ADMIN — AMOXICILLIN AND CLAVULANATE POTASSIUM 1 TABLET: 875; 125 TABLET, FILM COATED ORAL at 04:47

## 2023-05-25 RX ADMIN — POTASSIUM CHLORIDE 40 MEQ: 1500 TABLET, EXTENDED RELEASE ORAL at 04:48

## 2023-05-25 ASSESSMENT — COGNITIVE AND FUNCTIONAL STATUS - GENERAL
WALKING IN HOSPITAL ROOM: TOTAL
SUGGESTED CMS G CODE MODIFIER MOBILITY: CL
MOBILITY SCORE: 11
STANDING UP FROM CHAIR USING ARMS: TOTAL
MOVING FROM LYING ON BACK TO SITTING ON SIDE OF FLAT BED: UNABLE
MOVING TO AND FROM BED TO CHAIR: A LITTLE
CLIMB 3 TO 5 STEPS WITH RAILING: TOTAL

## 2023-05-25 ASSESSMENT — PAIN DESCRIPTION - PAIN TYPE
TYPE: ACUTE PAIN

## 2023-05-25 ASSESSMENT — GAIT ASSESSMENTS: GAIT LEVEL OF ASSIST: UNABLE TO PARTICIPATE

## 2023-05-25 NOTE — DISCHARGE PLANNING
Case Management Discharge Planning    Admission Date: 5/4/2023  GMLOS: 3.5  ALOS: 21    6-Clicks ADL Score: 21  6-Clicks Mobility Score: 11  PT and/or OT Eval ordered: Yes  Post-acute Referrals Ordered: Yes  Post-acute Choice Obtained: Yes  Has referral(s) been sent to post-acute provider:  Yes      Anticipated Discharge Dispo: Discharge Disposition: D/T to home under [facility] care in anticipation of covered skilled care (06)    DME Needed: No    Action(s) Taken: OTHER    LSW received notice MTM (Medicaid Medical Transportation Management) is still working on transfer request as of 7:50 this morning.    @0817: Per iron coordinator, LISBET Mcclure is working with [facility] to put a crew together. Time to be confirmed. LSW preparing transfer packet. Message to patient's RN, resident, and charge RN.    @1100: Patient transport confirmed for 11:30. LSW handed transfer packet to MARIA EUGENIA Maher.     Escalations Completed: None    Medically Clear: Yes    Next Steps: LSW to follow up with patient and medical team regarding discharge needs and barriers.     Barriers to Discharge: Transportation

## 2023-05-25 NOTE — PROGRESS NOTES
Received patient resting in bed. Drowsy but easily arousable. No complaint of pain at this time. NO PIV per MD order. Wound vac in place to  right lower leg and dressing intact to left BKA. Bed alarm in place. Bed in low position, wheels locked, side rails up x2 and call light within reach.

## 2023-05-25 NOTE — DISCHARGE PLANNING
Agency/Facility Name: [facility]  Spoke To: Erika  Outcome: DPA called to follow up on patient's referral/transportation time. DPA mentioned what is the latest they can take patient, per Erika she will need to ask Adelia. Per Erika they still need updated lab notes and scripts. Erika mentioned Adelia gets to work around [phone] since she is the one following this case. DPA will send what is needed.    @3192  Agency/Facility Name: [facility]  Spoke To: Mila  Outcome: DPA called to follow up on what is the latest time facility can take patient in. Per Mila the lastest they have been taking patient in is at 11:00-11:30PM. Mila still needs the script faxed. DPA will go ahead and work on that.

## 2023-05-25 NOTE — PROGRESS NOTES
Patient discharged to SNF in [place], [place] with [facility] via [facility]. Discharge information and education provided by this RN, all questions answered.  All belongings returned, including clothing, medication and wheelchair. Provided patient with snacks for ride.

## 2023-05-25 NOTE — CARE PLAN
The patient is Stable - Low risk of patient condition declining or worsening  Instructed on plan of care, meds. Wound care to Bil SIOBHANA. Fall risk protocol in place    Problem: Knowledge Deficit - Standard  Goal: Patient and family/care givers will demonstrate understanding of plan of care, disease process/condition, diagnostic tests and medications  5/24/2023 2238 by Angeles Rashid R.N.  Outcome: Progressing  5/24/2023 2225 by Angeles Rashid R.N.  Outcome: Progressing     Problem: Skin Integrity  Goal: Skin integrity is maintained or improved  5/24/2023 2238 by Angeles Rashid R.N.  Outcome: Progressing  5/24/2023 2225 by NEELAM MeyersN.  Outcome: Progressing     Problem: Fall Risk  Goal: Patient will remain free from falls  5/24/2023 2238 by Angeles Rashid R.N.  Outcome: Progressing  5/24/2023 2225 by NEELAM MeyersN.  Outcome: Progressing     Shift Goals  Clinical Goals: safety, pain control  Patient Goals: rest/comfort  Family Goals: not present    Progress made toward(s) clinical / shift goals:  progressing    Patient is not progressing towards the following goals:

## 2023-05-25 NOTE — DISCHARGE SUMMARY
[facility] DISCHARGE SUMMARY      PATIENT ID:  Name:             Kellie Chatman     YOB: 1970  Age:                 53 y.o.  female             MRN:               [id number]  Address:         [place] [address]  Phone:            [phone] (home)     ADMISSION DATE:   5/4/2023     DISCHARGE DATE:   5/25/2023     DISCHARGE DIAGNOSES:   Chronic multifocal osteomyelitis of multiple sites  Human immunodeficiency virus; HIV  History of atrial fibrillation  History of seizures  History of DVT  Hypokalemia  Schizophrenia  Wound infection  Social concern: Homelessness     ATTENDING PHYSICIAN:   Andrzej Doss MD     RESIDENT:   Lili Augustin MD, MD     CONSULTANTS:    Orthopedic surgery  Infectious disease  Dietary  Physical therapy  Occupational Therapy     PROCEDURES:    Surgical debridement of bilateral lower extremity eschars of residual limbs.     IMAGING:   [id number] & W/O LEFT   Final Result       1.  Early osteomyelitis of the distal aspect of the left tibial stump       2.  Negative for abscess       3.  Left knee joint effusion       [id number] & W/O RIGHT   Final Result       1.  No roc osteomyelitis       2.  Edema within the distal tibial stump evident on T2-weighted imaging and possibly minimal change on T1-weighted imaging. This could indicate very early osteomyelitis although at this point is not specific.       3.  Negative for abscess       DX-CHEST-LIMITED (1 VIEW)   Final Result           No acute cardiac or pulmonary abnormality is identified.          PHYSICAL EXAM:   General: No acute distress, afebrile, resting comfortably, sleepy but arousable  HEENT: NC/AT. EOMI.   Cardiovascular: RRR without murmurs, rubs, heaves. Normal capillary refill   Respiratory: CTAB, no tachypnea or retractions   Abdomen: normal bowel sounds, soft, nontender, nondistended, no masses, no organomegaly   EXT:  Bilateral BKA with dressing  "in place. Clean, dry and intact. Unchanged from prior exam  Skin: No erythema/lesions   Neuro: Non-focal, alert and orientated      LABS:  No results for input(s): WBC, RBC, HEMOGLOBIN, HEMATOCRIT, MCV, MCH, RDW, PLATELETCT, MPV, NEUTSPOLYS, LYMPHOCYTES, MONOCYTES, EOSINOPHILS, BASOPHILS, RBCMORPHOLO in the last 72 hours.  No results for input(s): SODIUM, POTASSIUM, CHLORIDE, CO2, GLUCOSE, BUN, CPKTOTAL in the last 72 hours.  No results found for: CHOLSTRLTOT, LDL, HDL, TRIGLYCERIDE    No results found for: TROPONINI, CKMB  No results found for: TROPONINI, CKMB       HISTORY OF PRESENTING ILLNESS:  HPI per Dr. Sheppard:  \"Kellie Chatman is a 53 y.o. 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 Patient is a poor historian and appears in significant pain throughout exam. Patient reports significant radiating pain from bilateral BKA site has been occurring for the past week. She states she has been having associated fever and chills. Denies pain or wounds elsewhere, denies headache, chest pain, nausea, vomiting, or diarrhea. She states history of bipolar and schizophrenia. Denies further medical history or meth use.      Per chart review, patient was discharged 5/1/2023 from [facility] after being treated for bilateral BKA cellulitis. Pt was noted to have a fib at that time. ECHO was negative for acute pathology. CT scan was performed of the left leg with no evidence of abscess but did show DVT in left popliteal vein and left superficial femoral vein. Pt was started on apixaban. Pt was treated with IV Vancomycin  and discharged home on doxycyline. Pt then was refusing meds or PT and was discharged.      ERCourse:  Pt presented tachycardic. Afebrile with " "normal range BP. CBC indicated hemoglobin of 10.0, Hct 33.2, plt 521. CMP within normal limits. Magnesium 1.7. LA 1.8. UA trace leukocyte esterase, blood and urine culture collected. CXR no acute cardiac or pulmonary abnormality. Pt was given a one time dose of IV Unasyn and vancomycin.\"      HOSPITAL COURSE:   Patient was admitted and treated for the following.  Skilled nursing facility to follow-up as outlined below.     History of atrial fibrillation  EKG with A fib;  from [facility] on 4/23/2023.  Patient remained in sinus rhythm for the duration of this hospital admission.  We continued metoprolol 25 mg twice daily.  Heparin was held for DVT prophylaxis as we started patient on Eliquis post procedure on 5/17/2023.  -SNF to continue patient's Eliquis and heart rate monitoring.     History of seizures  Unclear history of seizure activity. Patient states she has never had a seizure before.  Patient was prescribed Depakote; likely for mood stabilization.  This medication was continued while inpatient.  No seizures during hospitalization.    -SNF to continue Depakote.     Chronic multifocal osteomyelitis of multiple sites (HCC)  MRI done while inpatient showed early osteomyelitis of the distal aspect of left tibial residual limb.  Patient is status post incision and drainage on 5/10/2023 with Dr. Lux.  Procedure was successful and infectious disease was consulted following procedure.  Patient was started on vancomycin and Unasyn on 5/4/2023 and discontinued on 5/12/2023; when Doxy 100 mg twice daily and Augmentin 875 twice daily was started.  This regimen is to be continued until wound VAC is discontinued.     -SNF to continue Doxy 100 mg twice daily and Augmentin 875 twice daily. *Please continue Ativan 2 mg as needed during wound VAC changes.     History of DVT (deep vein thrombosis)  Ultrasound at [facility] showed DVT on 4/23/2023.  Held heparin for DVT prophylaxis while inpatient.  Resumed " patient's Eliquis while inpatient.     -SNF to continue patient's Eliquis for DVT prophylaxis.     Homelessness  Patient was previously staying at [facility].  Recommend social work consultation prior to discharge from skilled nursing facility.       -SNF to follow-up on [facility] placement following discharge.      HIV (human immunodeficiency virus infection) ([facility])  Initial diagnosis was unclear however confirmatory testing during hospitalization confirmed patient is positive for HIV1. Patient continues to have normal CD4 counts (CD4 752 at 3/2023).  Infectious diseases following patient while inpatient.  Biktarvy was continued daily.     -SNF to continue Biktarvy daily.  Consider repeating CD4 counts as appropriate.     Hypokalemia  Resolved prior to discharge.  Patient was found to be hypokalemic while inpatient.  Potassium was repleted as indicated.      Schizophrenia ([facility])  No concerns while inpatient.  Patient was continued on olanzapine 5 mg twice daily.     -SNF to to continue olanzapine 5 mg twice daily.     Wound infection  Wound team followed patient while inpatient for bilateral lower extremity residual limb wound care.  Wound VAC changes were done as scheduled twice weekly.  Continue wound VAC dressing changes as required with Ativan 2 mg prior to with dressing changes.  Antibiotics as outlined above to be continued for the duration of wound VAC placement.  And discontinued once wound VAC is discontinued.      DISCHARGE CONDITION:    Stable     DISPOSITION:   Skilled Nursing Facility; [facility]     DISCHARGE MEDICATIONS:       Medication List          START taking these medications         Instructions   amoxicillin-clavulanate 875-125 MG Tabs  Commonly known as: AUGMENTIN    Doctor's comments: To be continued until bilateral lower extremity wound vacs are removed.   Refill as appropriate.  Take 1 Tablet by mouth every 12 hours for 15 days.  Dose: 1 Tablet      doxycycline monohydrate 100 MG  tablet  Commonly known as: ADOXA    Doctor's comments: To be continued until bilateral lower extremity wound vacs are removed.   Refill as appropriate.  Take 1 Tablet by mouth every 12 hours for 15 days.  Dose: 100 mg      loperamide 2 MG Caps  Commonly known as: IMODIUM    Take 1 Capsule by mouth 4 times a day as needed for Diarrhea for up to 15 days.  Dose: 2 mg      LORazepam 2 MG tablet  Commonly known as: ATIVAN    Take 1 Tablet by mouth 1 time a day as needed for Anxiety (wound vac dressing changes) for up to 5 days.  Dose: 2 mg                CHANGE how you take these medications         Instructions   * apixaban 5mg Tabs  What changed: Another medication with the same name was added. Make sure you understand how and when to take each.  Commonly known as: ELIQUIS    Take 5 mg by mouth 2 times a day.  Dose: 5 mg      * apixaban 5mg Tabs  What changed: You were already taking a medication with the same name, and this prescription was added. Make sure you understand how and when to take each.  Commonly known as: ELIQUIS    Take 1 Tablet by mouth 2 times a day for 30 days. Indications: Thromboembolism secondary to Atrial Fibrillation  Dose: 5 mg      * Biktarvy -25 mg Tabs tablet  What changed: Another medication with the same name was added. Make sure you understand how and when to take each.  Generic drug: bictegravir-emtricitab-TAF    Take 1 Tablet by mouth every day.  Dose: 1 Tablet      * bictegravir-emtricitab-TAF -25 mg Tabs tablet  What changed: You were already taking a medication with the same name, and this prescription was added. Make sure you understand how and when to take each.  Commonly known as: Biktarvy    Take 1 Tablet by mouth every evening for 30 days.  Dose: 1 Tablet      * divalproex  MG Tb24  What changed: Another medication with the same name was added. Make sure you understand how and when to take each.  Commonly known as: DEPAKOTE ER    Take 500 mg by mouth 2 times a  day.  Dose: 500 mg      * divalproex  MG Tb24  What changed: You were already taking a medication with the same name, and this prescription was added. Make sure you understand how and when to take each.  Commonly known as: DEPAKOTE ER    Take 1 Tablet by mouth 2 times a day for 30 days.  Dose: 500 mg              * This list has 6 medication(s) that are the same as other medications prescribed for you. Read the directions carefully, and ask your doctor or other care provider to review them with you.                    CONTINUE taking these medications         Instructions   cefdinir 300 MG Caps  Commonly known as: OMNICEF    Take 300 mg by mouth 2 times a day. 3 day course prescribed 3/11/23  Dose: 300 mg      metroNIDAZOLE 500 MG Tabs  Commonly known as: FLAGYL    Take 500 mg by mouth 3 times a day. 3 day course prescribed 4/11/23  Dose: 500 mg                ACTIVITY:   Normal Activity as Tolerated.     DIET:   Healthy     DISCHARGE INSTRUCTIONS AND FOLLOW UP:  Patient is medically stable for discharge and will be discharged to skilled nursing facility; [facility].     Discharge Instructions:   Patient was instructed to return the ER in the event of worsening symptoms including but not limited to increasing pain, nausea, vomiting, or fever, or new or concerning symptoms or any other major concerns. Patient understands that failure to do so may indicate worsening of her medical condition(s) and result in adverse clinical outcomes including fatality. We have counseled the patient on the importance of compliance and the patient has agreed to proceed with all medical recommendations and follow up plan indicated above. The patient understands that the failure to do so may result in result in adverse clinical outcomes including fatality.        CC:   Pcp Pt States None

## 2023-05-25 NOTE — THERAPY
Physical Therapy   Daily Treatment     Patient Name: Kellie Chatman  Age:  53 y.o., Sex:  female  Medical Record #: [id number]  Today's Date: 5/25/2023     Precautions  Precautions: (P) Non Weight Bearing Right Lower Extremity;Non Weight Bearing Left Lower Extremity  Comments: (P) Erika Rodriguez w/wound VAC    Assessment    Possible d/c to [facility] today, assisted pt w/clothes from [facility] closet and dressing. Provided emotional support regarding her discharge. PT will cont to follow if not dc'd.     Plan    Treatment Plan Status: (P) Continue Current Treatment Plan  Type of Treatment: Bed Mobility, Therapeutic Activities, Neuro Re-Education / Balance  Treatment Frequency: 3 Times per Week  Treatment Duration: Until Therapy Goals Met    DC Equipment Recommendations: Wheelchair (18' w/c w/removable arm/leg rests)  Discharge Recommendations: (P) Recommend post-acute placement for additional physical therapy services prior to discharge home    Objective       05/25/23 1140   Charge Group   PT Therapeutic Activities (Units) 2   Total Time Spent   PT Therapeutic Activities Time Spent (Mins) 24   Precautions   Precautions Non Weight Bearing Right Lower Extremity;Non Weight Bearing Left Lower Extremity   Comments Erika Rodriguez w/wound VAC   Other Treatments   Other Treatments Provided Assited pt w/pericare and dressing in prep for d/c home.   Balance   Sitting Balance (Static) Good   Sitting Balance (Dynamic) Fair +   Weight Shift Sitting Fair   Bed Mobility    Supine to Sit Modified Independent   Rolling Supervised  (w/railing)   Gait Analysis   Gait Level Of Assist Unable to Participate   Functional Mobility   Sit to Stand Unable to Participate   How much difficulty does the patient currently have...   Turning over in bed (including adjusting bedclothes, sheets and blankets)? 4   Sitting down on and standing up from a chair with arms (e.g., wheelchair, bedside commode, etc.) 1   Moving from lying on back to sitting on the  side of the bed? 3   How much help from another person does the patient currently need...   Moving to and from a bed to a chair (including a wheelchair)? 1   Need to walk in a hospital room? 1   Climbing 3-5 steps with a railing? 1   6 clicks Mobility Score 11   Short Term Goals    Short Term Goal # 1 Pt will perform seated scooting with SPV in 6 visits to progress functional mobility   Goal Outcome # 1 goal not met   Education Group   Role of Physical Therapist Patient Response Patient;Acceptance;Explanation;Action Demonstration   Physical Therapy Treatment Plan   Physical Therapy Treatment Plan Continue Current Treatment Plan   Anticipated Discharge Equipment and Recommendations   Discharge Recommendations Recommend post-acute placement for additional physical therapy services prior to discharge home   Interdisciplinary Plan of Care Collaboration   IDT Collaboration with  Nursing   Patient Position at End of Therapy In Bed;Call Light within Reach;Tray Table within Reach;Phone within Reach   Collaboration Comments Nrsg notified of pts tx efforts   Session Information   Date / Session Number  5/25--5 (2/3, 5/28) PTA/3   Supervising Physical Therapist (PTA Treatments Only)   Supervising Physical Therapist Ngoc Dye

## 2023-05-25 NOTE — DISCHARGE PLANNING
DC Transport Scheduled    Received request at: 5/25/2023 at 1438    Transport Company Scheduled:  [facility]  Spoke with Evi at [facility] to schedule transport.    Scheduled Date: 5/25/2023  Scheduled Time: 1130    Destination: [facility] at [address] [place] [address]    Notified care team of scheduled transport via Voalte.     If there are any changes needed to the DC transportation scheduled, please contact [facility] at ext. [phone] between the hours of 8180-2312 Mon-Fri. If outside those hours, contact the ED Case Manager at ext. [phone].

## 2023-05-25 NOTE — DISCHARGE INSTRUCTIONS
Discharge Instructions    Discharged to other by medical transportation with self. Discharged via ambulance, hospital escort: Refused.  Special equipment needed: Wheelchair    Be sure to schedule a follow-up appointment with your primary care doctor or any specialists as instructed.     Discharge Plan:   Diet Plan: Discussed  Activity Level: Discussed  Confirmed Follow up Appointment: Patient to Call and Schedule Appointment  Confirmed Symptoms Management: Discussed  Medication Reconciliation Updated: Yes    I understand that a diet low in cholesterol, fat, and sodium is recommended for good health. Unless I have been given specific instructions below for another diet, I accept this instruction as my diet prescription.   Other diet: regular diet    Special Instructions: None    -Is this patient being discharged with medication to prevent blood clots?  No    Is patient discharged on Warfarin / Coumadin?   No     Wound Infection  A wound infection happens when germs start to grow in a wound. Germs that cause wound infections are most often bacteria. Other types of infections can occur as well. An infection can cause the wound to break open. Wound infections need treatment. If a wound infection is not treated, problems can happen.  What are the causes?  Most often caused by germs (bacteria) that grow in a wound.  Other germs, such as yeast and funguses, can also cause wound infections.  What increases the risk?  Having a weak body defense system (immune system).  Having diabetes.  Taking certain medicines (steroids) for a long time.  Smoking.  Being an older person.  Being overweight.  Taking certain medicines for cancer treatment.  What are the signs or symptoms?  Having more redness, swelling, or pain at the wound site.  Having more blood or fluid at the wound site.  A bad smell coming from a wound or bandage (dressing).  Having a fever.  Feeling very tired.  Having warmth at or around the wound.  Having pus at the  wound site.  How is this treated?  This condition is most often treated with an antibiotic medicine.  The infection should improve 24-48 hours after you start antibiotics.  After 24-48 hours, redness around the wound should stop spreading. The wound should also be less painful.  Follow these instructions at home:  Medicines  Take or apply over-the-counter and prescription medicines only as told by your doctor.  If you were prescribed an antibiotic medicine, take or apply it as told by your doctor. Do not stop using the antibiotic even if you start to feel better.  Wound care    Clean the wound each day, or as told by your doctor.  Wash the wound with mild soap and water.  Rinse the wound with water to remove all soap.  Pat the wound dry with a clean towel. Do not rub it.  Follow instructions from your doctor about how to take care of your wound. Make sure you:  Wash your hands with soap and water before and after you change your bandage. If you cannot use soap and water, use hand .  Change your bandage as told by your doctor.  Leave stitches (sutures), skin glue, or skin tape (adhesive) strips in place if your wound has been closed. They may need to stay in place for 2 weeks or longer. If tape strips get loose and curl up, you may trim the loose edges. Do not remove tape strips completely unless your doctor says it is okay. Some wounds are left open to heal on their own.  Check your wound every day for signs of infection. Watch for:  More redness, swelling, or pain.  More fluid or blood.  Warmth.  Pus or a bad smell.  General instructions  Keep the bandage dry until your doctor says it can be removed.  Do not take baths, swim, or use a hot tub until your doctor approves. Ask your doctor if you may take showers. You may only be allowed to take sponge baths.  Raise (elevate) the injured area above the level of your heart while you are sitting or lying down.  Do not scratch or pick at the wound.  Keep all  follow-up visits as told by your doctor. This is important.  Contact a doctor if:  Medicine does not help your pain.  You have more redness, swelling, or pain around your wound.  You have more fluid or blood coming from your wound.  Your wound feels warm to the touch.  You have pus coming from your wound.  You notice a bad smell coming from your wound or your bandage.  Your wound that was closed breaks open.  Get help right away if:  You have a red streak going away from your wound.  You have a fever.  Summary  A wound infection happens when germs start to grow in a wound.  This condition is usually treated with an antibiotic medicine.  Follow instructions from your doctor about how to take care of your wound.  Contact a doctor if your wound infection does not start to get better in 24-48 hours, or your symptoms get worse.  Keep all follow-up visits as told by your doctor. This is important.  This information is not intended to replace advice given to you by your health care provider. Make sure you discuss any questions you have with your health care provider.  Document Released: 09/26/2009 Document Revised: 07/30/2019 Document Reviewed: 07/30/2019  [employer] Patient Education © 2020 [employer]

## 2023-05-30 NOTE — OP REPORT
DATE OF OPERATION: 5/10/2023     PREOPERATIVE DIAGNOSIS: Right below knee amputation eschar, left below-knee amputation eschar    POSTOPERATIVE DIAGNOSIS: Same    PROCEDURE PERFORMED:   1.  Debridement of right leg amputation stump eschar involving skin and subcutaneous tissues, 18 x 15 cm  2.  Debridement of left leg amputation stump eschar involving skin and subcutaneous tissues, 8 x 4 cm  3.  Application negative pressure wound device 18 x 15 cm right lower extremity  4.  Application negative pressure wound device8 x 4 cm left lower extremity    SURGEON: Nas Lux M.D.     ASSISTANT: Phill Patricia MD     ANESTHESIA: General    SPECIMEN: None    ESTIMATED BLOOD LOSS: 10 mL    IMPLANTS: None      INDICATIONS: The patient is a 53 y.o. female who presented with previous gangrenous bilateral lower extremities from frostbite that were treated with below-knee amputations.  She has had large eschars present to both sites over the last several months.  MRI did not show any signs of deep infection or fluid collections.  I recommended debridement of the eschar and wound VAC till further wound healing.  I discussed the risks and benefits of the procedure which include but are not limited to risks of infection, wound healing complication, neurovascular injury, pain, and the medical risks of anesthesia including MI, stroke, and death.  Alternatives to surgery were also discussed, including non-operative management, which I did not recommend.  The patient was in agreement with the plan to proceed, and the informed consent was signed and documented.  I met with the patient pre-operatively and marked the operative extremity with their agreement.  We proceeded to the operating room.     DESCRIPTION OF PROCEDURE:  Patient was seen in the preoperative holding area on the day of surgery. The operative site was marked with my initials.  she was taken to the operating room and placed supine on the operative table.   Anesthesia was induced.  The operative extremity was prepped and draped in the normal sterile fashion.  Operative pause was conducted and the correct patient, site, side, procedure, and surgeon's initials on extremity were identified.  Left lower extremity was addressed first.  The eschar was sharply excised with a 10 blade knife.  This was kept at the margin of healthy skin where bleeding tissue was present.  This wound measured roughly 8 x 4 cm after the eschar was removed.  There is no signs of deep purulence.  The site was then thoroughly irrigated normal saline.  This extended down into the subcutaneous tissues and was an excisional debridement.  A wound VAC was placed to the same 8 x 4 cm wound.  Next the right lower extremity was addressed.  Again using a 10 blade knife we did a sharp excisional debridement of the right below knee amputation stump.  This eschar measured 18 x 15 cm.  This was debrided down into the subcutaneous tissues.  Again no purulence was seen deep within the wound.  There was a healthy bleeding base to the wound.  A wound VAC was placed to the same 18 x 15 cm wound.  Both wound vacs held excellent seal.  Ace wrap's were applied over these.  The patient awoke in the operating room and was taken to PACU in stable condition.    POSTOPERATIVE PLAN: Bedside wound VAC changes.  Wound will be allowed to heal by secondary intention.  Weightbearing as tolerated after wounds of healed.  Continue working on range of motion to the knees to prevent stiffness.  Discharge planning.      ____________________________________   Nas Lux M.D.

## (undated) DEVICE — SUTURE 2-0 VICRYL PLUS CT-1 36 (36PK/BX)"

## (undated) DEVICE — COVER LIGHT HANDLE ALC PLUS DISP (18EA/BX)

## (undated) DEVICE — LACTATED RINGERS INJ 1000 ML - (14EA/CA 60CA/PF)

## (undated) DEVICE — BANDAGE ELASTIC 6 HONEYCOMB - 6X5YD LF (20/CA)"

## (undated) DEVICE — TUBE CONNECTING SUCTION - CLEAR PLASTIC STERILE 72 IN (50EA/CA)

## (undated) DEVICE — CANNULA O2 COMFORT SOFT EAR ADULT 7 FT TUBING (50/CA)

## (undated) DEVICE — TUBING CLEARLINK DUO-VENT - C-FLO (48EA/CA)

## (undated) DEVICE — CHLORAPREP 26 ML APPLICATOR - ORANGE TINT(25/CA)

## (undated) DEVICE — SET LEADWIRE 5 LEAD BEDSIDE DISPOSABLE ECG (1SET OF 5/EA)

## (undated) DEVICE — PACK LOWER EXTREMITY - (2/CA)

## (undated) DEVICE — VAC CANISTER W/GEL 500ML - FITS NEW MACHINES (10EA/CA)

## (undated) DEVICE — SODIUM CHL IRRIGATION 0.9% 1000ML (12EA/CA)

## (undated) DEVICE — BLADE SURGICAL #10 - (50/BX)

## (undated) DEVICE — SENSOR OXIMETER ADULT SPO2 RD SET (20EA/BX)

## (undated) DEVICE — SUCTION INSTRUMENT YANKAUER BULBOUS TIP W/O VENT (50EA/CA)

## (undated) DEVICE — GLOVE BIOGEL INDICATOR SZ 7.5 SURGICAL PF LTX - (50PR/BX 4BX/CA)

## (undated) DEVICE — STAPLER SKIN DISP - (6/BX 10BX/CA) VISISTAT

## (undated) DEVICE — SUTURE GENERAL

## (undated) DEVICE — GLOVE BIOGEL SZ 7 SURGICAL PF LTX - (50PR/BX 4BX/CA)

## (undated) DEVICE — SUTURE 0 SILK TIES (36PK/BX)

## (undated) DEVICE — BANDAGE ELASTIC 4 HONEYCOMB - 4"X5YD LF (20/CA)"

## (undated) DEVICE — DRESSING PETROLEUM GAUZE 5 X 9" (50EA/BX 4BX/CA)"

## (undated) DEVICE — SUTURE 2-0 VICRYL PLUS CT-1 - 8 X 18 INCH(12/BX)

## (undated) DEVICE — TRAY SRGPRP PVP IOD WT PRP - (20/CA)

## (undated) DEVICE — PAD LAP STERILE 18 X 18 - (5/PK 40PK/CA)

## (undated) DEVICE — SUTURE 3-0 VICRYL PLUS CT-1 - 8 X 18 INCH (12/BX)

## (undated) DEVICE — CUP DENTURE W/ LID - (200/CA)

## (undated) DEVICE — PADDING CAST 6 IN STERILE - 6 X 4 YDS (24/CA)

## (undated) DEVICE — PADDING CAST 4 IN STERILE - 4 X 4 YDS (24/CA)

## (undated) DEVICE — KIT  I.V. START (100EA/CA)

## (undated) DEVICE — ELECTRODE DUAL RETURN W/ CORD - (50/PK)

## (undated) DEVICE — SWAB ANAEROBIC SPEC.COLLECTOR - (25/PK 4PK/CA 100EA/CA)

## (undated) DEVICE — SLEEVE, VASO, THIGH, MED

## (undated) DEVICE — MASK OXYGEN VNYL ADLT MED CONC WITH 7 FOOT TUBING  - (50EA/CA)

## (undated) DEVICE — SET EXTENSION WITH 2 PORTS (48EA/CA) ***PART #2C8610 IS A SUBSTITUTE*****

## (undated) DEVICE — BLADE SAW 90X25X1.37MM SAGITTAL DUAL CUT

## (undated) DEVICE — CANISTER SUCTION 3000ML MECHANICAL FILTER AUTO SHUTOFF MEDI-VAC NONSTERILE LF DISP  (40EA/CA)

## (undated) DEVICE — DRSG THK1CM SM 10X7.5CM NADH

## (undated) DEVICE — GOWN WARMING STANDARD FLEX - (30/CA)

## (undated) DEVICE — DRESSING KIT V.A.C. SENSA T.R.A.C. LARGE (10EA/CA)

## (undated) DEVICE — TOWEL STOP TIMEOUT SAFETY FLAG (40EA/CA)

## (undated) DEVICE — SUTURE 3-0 ETHILON PS-1 (36PK/BX)

## (undated) DEVICE — TOWELS CLOTH SURGICAL - (4/PK 20PK/CA)

## (undated) DEVICE — PACK MINOR BASIN - (2EA/CA)

## (undated) DEVICE — SLEEVE VASO CALF MED - (10PR/CA)

## (undated) DEVICE — WATER IRRIGATION STERILE 1000ML (12EA/CA)

## (undated) DEVICE — GLOVE BIOGEL SZ 7.5 SURGICAL PF LTX - (50PR/BX 4BX/CA)

## (undated) DEVICE — SUTURE 0 VICRYL PLUS CT-1 - 36 INCH (36/BX)

## (undated) DEVICE — SUTURE 3-0 ETHILON FSLX 30 (36PK/BX)"

## (undated) DEVICE — DRAPE LARGE 3 QUARTER - (20/CA)

## (undated) DEVICE — DRAPE SURGICAL U 77X120 - (10/CA)

## (undated) DEVICE — SODIUM CHL. IRRIGATION 0.9% 3000ML (4EA/CA 65CA/PF)

## (undated) DEVICE — CANISTER SUCTION RIGID RED 1500CC (40EA/CA)